# Patient Record
Sex: FEMALE | Race: WHITE | NOT HISPANIC OR LATINO | Employment: OTHER | URBAN - METROPOLITAN AREA
[De-identification: names, ages, dates, MRNs, and addresses within clinical notes are randomized per-mention and may not be internally consistent; named-entity substitution may affect disease eponyms.]

---

## 2017-01-21 RX ORDER — TRAMADOL HYDROCHLORIDE 50 MG/1
50 TABLET ORAL 2 TIMES DAILY PRN
Qty: 60 TABLET | Refills: 0 | Status: SHIPPED | OUTPATIENT
Start: 2017-01-21 | End: 2017-03-15 | Stop reason: SDUPTHER

## 2017-01-27 DIAGNOSIS — M25.512 LEFT SHOULDER PAIN: ICD-10-CM

## 2017-01-27 DIAGNOSIS — M65.20 CALCIFIC TENDONITIS: ICD-10-CM

## 2017-01-27 RX ORDER — MELOXICAM 15 MG/1
TABLET ORAL
Qty: 30 TABLET | Refills: 1 | Status: SHIPPED | OUTPATIENT
Start: 2017-01-27 | End: 2017-02-25 | Stop reason: SDUPTHER

## 2017-02-10 RX ORDER — SUMATRIPTAN SUCCINATE 100 MG/1
TABLET ORAL
Qty: 9 TABLET | Refills: 2 | Status: SHIPPED | OUTPATIENT
Start: 2017-02-10 | End: 2017-04-24 | Stop reason: SDUPTHER

## 2017-02-25 DIAGNOSIS — M65.20 CALCIFIC TENDONITIS: ICD-10-CM

## 2017-02-25 DIAGNOSIS — M25.512 LEFT SHOULDER PAIN: ICD-10-CM

## 2017-02-25 RX ORDER — ZOLPIDEM TARTRATE 10 MG/1
TABLET ORAL
Qty: 30 TABLET | Refills: 5 | Status: SHIPPED | OUTPATIENT
Start: 2017-02-25 | End: 2017-03-24 | Stop reason: SDUPTHER

## 2017-02-27 RX ORDER — MELOXICAM 15 MG/1
TABLET ORAL
Qty: 30 TABLET | Refills: 3 | Status: SHIPPED | OUTPATIENT
Start: 2017-02-27 | End: 2017-05-02

## 2017-03-17 RX ORDER — TRAMADOL HYDROCHLORIDE 50 MG/1
TABLET ORAL
Qty: 60 TABLET | Refills: 1 | Status: SHIPPED | OUTPATIENT
Start: 2017-03-17 | End: 2017-04-24 | Stop reason: SDUPTHER

## 2017-03-26 RX ORDER — ZOLPIDEM TARTRATE 10 MG/1
TABLET ORAL
Qty: 30 TABLET | Refills: 2 | Status: SHIPPED | OUTPATIENT
Start: 2017-03-26 | End: 2017-08-14 | Stop reason: SDUPTHER

## 2017-04-17 ENCOUNTER — PATIENT MESSAGE (OUTPATIENT)
Dept: FAMILY MEDICINE | Facility: CLINIC | Age: 67
End: 2017-04-17

## 2017-04-18 ENCOUNTER — OFFICE VISIT (OUTPATIENT)
Dept: SPORTS MEDICINE | Facility: CLINIC | Age: 67
End: 2017-04-18
Payer: COMMERCIAL

## 2017-04-18 VITALS — TEMPERATURE: 98 F | BODY MASS INDEX: 23.16 KG/M2 | WEIGHT: 118 LBS | HEIGHT: 60 IN

## 2017-04-18 DIAGNOSIS — M12.812 ROTATOR CUFF ARTHROPATHY, LEFT: Primary | ICD-10-CM

## 2017-04-18 PROCEDURE — 20610 DRAIN/INJ JOINT/BURSA W/O US: CPT | Mod: LT,S$GLB,, | Performed by: FAMILY MEDICINE

## 2017-04-18 PROCEDURE — 1160F RVW MEDS BY RX/DR IN RCRD: CPT | Mod: S$GLB,,, | Performed by: FAMILY MEDICINE

## 2017-04-18 PROCEDURE — 99999 PR PBB SHADOW E&M-EST. PATIENT-LVL III: CPT | Mod: PBBFAC,,, | Performed by: FAMILY MEDICINE

## 2017-04-18 PROCEDURE — 99214 OFFICE O/P EST MOD 30 MIN: CPT | Mod: 25,S$GLB,, | Performed by: FAMILY MEDICINE

## 2017-04-18 RX ORDER — TRIAMCINOLONE ACETONIDE 40 MG/ML
40 INJECTION, SUSPENSION INTRA-ARTICULAR; INTRAMUSCULAR
Status: DISCONTINUED | OUTPATIENT
Start: 2017-04-18 | End: 2017-04-18 | Stop reason: HOSPADM

## 2017-04-18 RX ADMIN — TRIAMCINOLONE ACETONIDE 40 MG: 40 INJECTION, SUSPENSION INTRA-ARTICULAR; INTRAMUSCULAR at 01:04

## 2017-04-18 NOTE — PROGRESS NOTES
Florentino Bowman, a 67 y.o. female, is here for evaluation of LEFT shoulder pain.     HISTORY OF PRESENT ILLNESS  Hand dominance, right      Location: anterior and lateral, left    Onset: Insidious  Palliative:    Relative rest   Oral analgesics   CSI, SAB, 15.12, 16.05, significant improvement, [place]    CSI, SAB, 12/05/16, 85% improvement   HgPT, modest compliance    Provocative:    ADLs   Prior: none  Progression: worsening discomfort since mid-March 2017  Quality:    Sharp pain at times   Ache  Radiation: none  Severity: per nursing documentation  Timing: intermittent with use  Trauma: none known    Review of systems (ROS):  A 10+ review of systems was performed with pertinent positives and negatives noted above in the history of present illness. Other systems were negative unless otherwise specified.      PHYSICAL EXAMINATION  General:  The patient is alert and oriented x 3. Mood is pleasant. Observation of ears, eyes and nose reveal no gross abnormalities. HEENT: NCAT, sclera anicteric. Lungs: Respirations are equal and unlabored.     LEFT SHOULDER EXAMINATION     OBSERVATION:     Swelling  none  Deformity  none   Discoloration  none   Scapular winging none   Scars   none  Atrophy  none    TENDERNESS / CREPITUS (T/C):          T/C      T/C   Clavicle   -/-  SUPRAspinatus    -/-     AC Jt.    -/-  INFRAspinatus  -/-    SC Jt.    -/-  Deltoid    -/-      G. Tuberosity  -/-  LH BICEP groove  -/-   Acromion:  -/-  Midline Neck   -/-     Scapular Spine -/-  Trapezium   -/-   SMA Scapula  -/-  GH jt. line - post  -/-     Scapulothoracic  -/-         ROM:     Right shoulder   Left shoulder        AROM (PROM)   AROM (PROM)   FE    170° (175°)     170° (175°)     ER at 0°    60°  (65°)    60°  (65°)   ER at 90° ABD  90°  (90°)    90°  (90°)   IR at 90°  ABD   NA  (40°)     NA  (40°)      IR (spine level)   T10     T10    STRENGTH: (* = with pain) RIGHT SHOULDER  LEFT  SHOULDER   SCAPTION   5/5    5/5    IR    5/5    5/5   ER    5/5    5/5   BICEPS   5/5    5/5   Deltoid    5/5    5/5     SIGNS:  Painful side       NEER   -   ONEENAS        -    CANDELARIO   +   SPEEDS        +   DROP ARM   -   BELLY PRESS       -    X-Body ADD    -   LIFT-OFF        +   HORNBLOWERS      -              STABILITY TESTING   RIGHT SHOULDER  LEFT SHOULDER     Translation     Anterior up face    up face    Posterior up face   up face    Sulcus  < 10mm   < 10 mm     Signs   Apprehension   neg     neg       Relocation   no change    no change      Jerk test  neg    neg    EXTREMITY NEURO-VASCULAR EXAM    Sensation grossly intact to light touch all dermatomal regions.    DTR 2+ Biceps, Triceps, BR and Negative Dannys sign   Grossly intact motor function at Elbow, Wrist and Hand   Distal pulses radial and ulnar 2+, brisk cap refill, symmetric.      NECK:  Painless FROM and spinous processes non-tender. Negative Spurlings sign.       Other Findings:    ASSESSMENT & PLAN   Assessment:   #1 supraspinatus tendinosis, left    W/ calcification     No evidence of neurologic pathology  No evidence of vascular pathology     Imaging studies reviewed:   X-ray shoulder, left 16.12  MRI shoulder s contrast, left 15.10     Plan:     We discussed options including:  #1 watchful waiting  #2 re start physical therapy aimed at:   GH RoM   Strengthening rotator cuff   Scapular stability  #3 injection therapy:   CSI SAB    Left, effective 85%, repeat    CSI iaGH    Left,    orthobiologics   #4 perc tenotomy      The patient chooses #2 and #3 csi sab     Pain management: handout given  Bracing:   Physical therapy:    hgPT, handout given, re start as above  Activity (e.g. sports, work) restrictions: as tolerated   school/vocation:      Follow up appointment offered, deferred by patient  A/e csi sab left  [facility], [facility] in June??  Should symptoms worsen or fail to resolve, consider:  Revisiting the above  options

## 2017-04-18 NOTE — PROCEDURES
Large Joint Aspiration/Injection  Date/Time: 4/18/2017 1:43 PM  Performed by: SANTIAGO AQUINO  Authorized by: SANTIAGO AQUINO     Consent Done?:  Yes (Verbal)  Indications:  Pain  Procedure site marked: Yes    Timeout: Prior to procedure the correct patient, procedure, and site was verified      Location:  Shoulder  Site:  L subacromial bursa  Prep: Patient was prepped and draped in usual sterile fashion    Ultrasonic Guidance for needle placement: No  Needle size:  22 G  Approach:  Posterior  Medications:  40 mg triamcinolone acetonide 40 mg/mL  Patient tolerance:  Patient tolerated the procedure well with no immediate complications

## 2017-04-18 NOTE — MR AVS SNAPSHOT
[facility]  [address]  [facility] [address]  Phone: [phone]                  Florentino Bowman   2017 1:00 PM   Appointment    Description:  Female : 1950   Provider:  Catalino Arora MD   Department:  [facility]                To Do List           Future Appointments        Provider Department Dept Phone    2017 1:00 PM Catalino Arora MD [facility] [phone]      Goals (5 Years of Data)     None      [facility]     [facility] Care [facility] -  Assistance  Unless otherwise directed by your provider, please contact [facility], our nurse care line that is available for  assistance.     Registered nurses in the [facility] provide: appointment scheduling, clinical advisement, health education, and other advisory services.  Call: [phone] (toll free)               Medications           Message regarding Medications     Verify the changes and/or additions to your medication regime listed below are the same as discussed with your clinician today.  If any of these changes or additions are incorrect, please notify your healthcare provider.             Verify that the below list of medications is an accurate representation of the medications you are currently taking.  If none reported, the list may be blank. If incorrect, please contact your healthcare provider. Carry this list with you in case of emergency.           Current Medications     alprazolam (XANAX) 0.5 MG tablet Take 1 tablet (0.5 mg total) by mouth nightly as needed.    aspirin (ECOTRIN) 81 MG EC tablet Take 1 tablet (81 mg total) by mouth once daily.    atorvastatin (LIPITOR) 20 MG tablet Take 1 tablet (20 mg total) by mouth once daily.    citalopram (CELEXA) 20 MG tablet Take 1 tablet (20 mg total) by mouth once daily.    estradiol (ESTRACE) 1 MG tablet Take 1 mg by mouth once daily.     levothyroxine (SYNTHROID) 125  MCG tablet Take 1 tablet (125 mcg total) by mouth once daily.    meloxicam (MOBIC) 15 MG tablet Take 1 tablet by mouth once daily.    progesterone (PROMETRIUM) 100 MG capsule Take 100 mg by mouth.     sumatriptan (IMITREX) 100 MG tablet TAKE 1 TABLET BY MOUTH EVERY 2 HOURS AS NEEDED FOR MIGRAINE    sumatriptan (IMITREX) 100 MG tablet TAKE 1 TABLET BY MOUTH EVERY 2 HOURS AS NEEDED FOR MIGRAINE    tramadol (ULTRAM) 50 mg tablet TAKE 1 TABLET BY MOUTH TWICE A DAY AS NEEDED FOR PAIN    vitamin D 1000 units Tab Take 185 mg by mouth once daily.    zolpidem (AMBIEN) 10 mg Tab TAKE 1 TABLET BY MOUTH NIGHTLY AS NEEDED           Clinical Reference Information           Allergies as of 4/18/2017     No Known Allergies      Immunizations Administered on Date of Encounter - 4/18/2017     None      Language Assistance Services     ATTENTION: Language assistance services are available, free of charge. Please call [phone].      ATENCIÓN: Si habla anil, tiene a calle disposición servicios gratuitos de asistencia lingüística. Llame al [phone].     CHÚ Ý: N?u b?n nói Ti?ng Vi?t, có các d?ch v? h? tr? ngôn ng? mi?n phí jonelleh cho b?n. G?i s? [phone].         [facility] complies with applicable Federal civil rights laws and does not discriminate on the basis of race, color, national origin, age, disability, or sex.

## 2017-04-21 ENCOUNTER — CLINICAL SUPPORT (OUTPATIENT)
Dept: FAMILY MEDICINE | Facility: CLINIC | Age: 67
End: 2017-04-21
Payer: COMMERCIAL

## 2017-04-21 DIAGNOSIS — Z23 NEED FOR MMR VACCINE: Primary | ICD-10-CM

## 2017-04-21 PROCEDURE — 90471 IMMUNIZATION ADMIN: CPT | Mod: S$GLB,,, | Performed by: FAMILY MEDICINE

## 2017-04-21 PROCEDURE — 90707 MMR VACCINE SC: CPT | Mod: S$GLB,,, | Performed by: FAMILY MEDICINE

## 2017-04-21 NOTE — PROGRESS NOTES
Pt here for mmr vaccine due to traveling out of the country.   Pt states dr Chou recommended they receive this vaccine prior to traveling

## 2017-04-24 RX ORDER — SUMATRIPTAN SUCCINATE 100 MG/1
TABLET ORAL
Qty: 9 TABLET | Refills: 2 | Status: SHIPPED | OUTPATIENT
Start: 2017-04-24 | End: 2017-05-02 | Stop reason: SDUPTHER

## 2017-04-25 RX ORDER — TRAMADOL HYDROCHLORIDE 50 MG/1
TABLET ORAL
Qty: 60 TABLET | Refills: 2 | Status: SHIPPED | OUTPATIENT
Start: 2017-04-25 | End: 2017-05-02

## 2017-05-02 ENCOUNTER — TELEPHONE (OUTPATIENT)
Dept: FAMILY MEDICINE | Facility: CLINIC | Age: 67
End: 2017-05-02

## 2017-05-02 ENCOUNTER — OFFICE VISIT (OUTPATIENT)
Dept: FAMILY MEDICINE | Facility: CLINIC | Age: 67
End: 2017-05-02
Payer: COMMERCIAL

## 2017-05-02 VITALS
RESPIRATION RATE: 18 BRPM | DIASTOLIC BLOOD PRESSURE: 72 MMHG | TEMPERATURE: 97 F | SYSTOLIC BLOOD PRESSURE: 112 MMHG | HEIGHT: 60 IN | HEART RATE: 90 BPM | BODY MASS INDEX: 23.29 KG/M2 | OXYGEN SATURATION: 97 % | WEIGHT: 118.63 LBS

## 2017-05-02 DIAGNOSIS — M54.31 SCIATICA OF RIGHT SIDE: Primary | ICD-10-CM

## 2017-05-02 PROCEDURE — 99213 OFFICE O/P EST LOW 20 MIN: CPT | Mod: S$GLB,,, | Performed by: FAMILY MEDICINE

## 2017-05-02 PROCEDURE — 1160F RVW MEDS BY RX/DR IN RCRD: CPT | Mod: S$GLB,,, | Performed by: FAMILY MEDICINE

## 2017-05-02 RX ORDER — HYDROCODONE BITARTRATE AND ACETAMINOPHEN 5; 325 MG/1; MG/1
1 TABLET ORAL 3 TIMES DAILY PRN
Qty: 30 TABLET | Refills: 0 | Status: SHIPPED | OUTPATIENT
Start: 2017-05-02 | End: 2017-05-24 | Stop reason: SDUPTHER

## 2017-05-02 RX ORDER — GABAPENTIN 300 MG/1
300 CAPSULE ORAL 2 TIMES DAILY
Qty: 60 CAPSULE | Refills: 5 | Status: SHIPPED | OUTPATIENT
Start: 2017-05-02 | End: 2017-08-14 | Stop reason: SDUPTHER

## 2017-05-02 RX ORDER — METHYLPREDNISOLONE 4 MG/1
TABLET ORAL
Qty: 1 PACKAGE | Refills: 0 | Status: SHIPPED | OUTPATIENT
Start: 2017-05-02 | End: 2017-05-26 | Stop reason: ALTCHOICE

## 2017-05-02 NOTE — PROGRESS NOTES
Subjective:      Patient ID: Florentino Bowman is a 67 y.o. female.    Chief Complaint: Sciatica (right side)    HPI Comments: Excruciating pain top right buttock, numb right medial thiah and pain lateral leg; began Thursday twisting progressed since then; saw chiro yest and today and xrayed L4-5 on right no space; going to [place] Sunday; sitting aggravates it; lying flat best; right leg is heavy; no falls or trips; voids ok;     Review of Systems   Constitutional: Negative.    HENT: Negative.    Respiratory: Negative.    Cardiovascular: Negative.    Gastrointestinal: Negative.    Endocrine: Negative.    Genitourinary: Negative.    Musculoskeletal: Positive for back pain and gait problem.   Neurological: Positive for numbness.   Psychiatric/Behavioral: Negative.    All other systems reviewed and are negative.    Objective:     Physical Exam   Constitutional: She is oriented to person, place, and time. She appears well-developed and well-nourished.   HENT:   Head: Normocephalic.   Eyes: Conjunctivae and EOM are normal. Pupils are equal, round, and reactive to light.   Neck: Normal range of motion. Neck supple.   Cardiovascular: Normal rate, regular rhythm and normal heart sounds.    Pulmonary/Chest: Effort normal and breath sounds normal.   Musculoskeletal: She exhibits no tenderness or deformity.   Neurological: She is alert and oriented to person, place, and time. She has normal reflexes. She displays normal reflexes. No cranial nerve deficit. She exhibits normal muscle tone. Coordination normal.   Skin: Skin is warm and dry.   Psychiatric: She has a normal mood and affect. Her behavior is normal. Judgment and thought content normal.   Nursing note and vitals reviewed.    Assessment:     1. Sciatica of right side      Plan:     New Prescriptions    GABAPENTIN (NEURONTIN) 300 MG CAPSULE    Take 1 capsule (300 mg total) by mouth 2 (two) times daily.    HYDROCODONE-ACETAMINOPHEN 5-325MG (NORCO) 5-325 MG PER TABLET     Take 1 tablet by mouth 3 (three) times daily as needed for Pain.    METHYLPREDNISOLONE (MEDROL DOSEPACK) 4 MG TABLET    use as directed     Discontinued Medications    ASPIRIN (ECOTRIN) 81 MG EC TABLET    Take 1 tablet (81 mg total) by mouth once daily.    GABAPENTIN (NEURONTIN) 300 MG CAPSULE    Take 1 capsule (300 mg total) by mouth every evening.    GABAPENTIN (NEURONTIN) 300 MG CAPSULE    Take 1 capsule (300 mg total) by mouth 3 (three) times daily. START TAKING ONE CAPS IN EVENING, AND GRADUALLY INCREASE BY ADDING ONE CAPS EVERY 3 DAYS, IF TOLERATED.    MELOXICAM (MOBIC) 15 MG TABLET    Take 1 tablet by mouth once daily.    SUMATRIPTAN (IMITREX) 100 MG TABLET    TAKE 1 TABLET BY MOUTH EVERY 2 HOURS AS NEEDED FOR MIGRAINE    TRAMADOL (ULTRAM) 50 MG TABLET    TAKE 1 TABLET BY MOUTH TWICE A DAY AS NEEDED FOR PAIN    VITAMIN D 1000 UNITS TAB    Take 185 mg by mouth once daily.     Modified Medications    No medications on file       Sciatica of right side  -     MRI Lumbar Spine Without Contrast; Future; Expected date: 5/2/17  -     Ambulatory referral to Pain Clinic    Other orders  -     methylPREDNISolone (MEDROL DOSEPACK) 4 mg tablet; use as directed  Dispense: 1 Package; Refill: 0  -     gabapentin (NEURONTIN) 300 MG capsule; Take 1 capsule (300 mg total) by mouth 2 (two) times daily.  Dispense: 60 capsule; Refill: 5  -     hydrocodone-acetaminophen 5-325mg (NORCO) 5-325 mg per tablet; Take 1 tablet by mouth 3 (three) times daily as needed for Pain.  Dispense: 30 tablet; Refill: 0

## 2017-05-02 NOTE — TELEPHONE ENCOUNTER
----- Message from Lucero Bejarano sent at 5/2/2017  9:51 AM CDT -----  Contact: Will pt's / [phone]  Patient experiencing lower back pain. Patient would like to see Dr. Chou today to see if she can get a MRI scheduled and a Epidural shot. Please advise

## 2017-05-02 NOTE — MR AVS SNAPSHOT
[address]  [place] [address]  Phone: [phone]  Fax: [phone]                  Florentino Bowman   2017 1:20 PM   Office Visit    Description:  Female : 1950   Provider:  Fam Chou MD   Department:  [facility]           Reason for Visit     Sciatica           Diagnoses this Visit        Comments    Sciatica of right side    -  Primary            To Do List           Goals (5 Years of Data)     None      Follow-Up and Disposition     Return if symptoms worsen or fail to improve.       These Medications        Disp Refills Start End    methylPREDNISolone (MEDROL DOSEPACK) 4 mg tablet 1 Package 0 2017     use as directed    Pharmacy: [facility] - [place], [place] - [address] Ph #: [phone]       gabapentin (NEURONTIN) 300 MG capsule 60 capsule 5 2017    Take 1 capsule (300 mg total) by mouth 2 (two) times daily. - Oral    Pharmacy: [facility] - [place], [place] - [address] Ph #: [phone]       hydrocodone-acetaminophen 5-325mg (NORCO) 5-325 mg per tablet 30 tablet 0 2017     Take 1 tablet by mouth 3 (three) times daily as needed for Pain. - Oral    Pharmacy: [facility] - [place], [place] - [address] Ph #: [phone]         [facility]     [facility] Care [facility] -  Assistance  Unless otherwise directed by your provider, please contact [facility], our nurse care line that is available for  assistance.     Registered nurses in the [facility] provide: appointment scheduling, clinical advisement, health education, and other advisory services.  Call: [phone] (toll free)               Medications           Message regarding Medications     Verify the changes and/or additions to your medication regime listed below are the same as discussed with your clinician today.  If any of these  changes or additions are incorrect, please notify your healthcare provider.        START taking these NEW medications        Refills    methylPREDNISolone (MEDROL DOSEPACK) 4 mg tablet 0    Sig: use as directed    Class: Normal    gabapentin (NEURONTIN) 300 MG capsule 5    Sig: Take 1 capsule (300 mg total) by mouth 2 (two) times daily.    Class: Normal    Route: Oral    hydrocodone-acetaminophen 5-325mg (NORCO) 5-325 mg per tablet 0    Sig: Take 1 tablet by mouth 3 (three) times daily as needed for Pain.    Class: Normal    Route: Oral      STOP taking these medications     aspirin (ECOTRIN) 81 MG EC tablet Take 1 tablet (81 mg total) by mouth once daily.    vitamin D 1000 units Tab Take 185 mg by mouth once daily.    meloxicam (MOBIC) 15 MG tablet Take 1 tablet by mouth once daily.    tramadol (ULTRAM) 50 mg tablet TAKE 1 TABLET BY MOUTH TWICE A DAY AS NEEDED FOR PAIN           Verify that the below list of medications is an accurate representation of the medications you are currently taking.  If none reported, the list may be blank. If incorrect, please contact your healthcare provider. Carry this list with you in case of emergency.           Current Medications     alprazolam (XANAX) 0.5 MG tablet Take 1 tablet (0.5 mg total) by mouth nightly as needed.    atorvastatin (LIPITOR) 20 MG tablet Take 1 tablet (20 mg total) by mouth once daily.    citalopram (CELEXA) 20 MG tablet Take 1 tablet (20 mg total) by mouth once daily.    estradiol (ESTRACE) 1 MG tablet Take 1 mg by mouth once daily.     gabapentin (NEURONTIN) 300 MG capsule Take 1 capsule (300 mg total) by mouth 2 (two) times daily.    hydrocodone-acetaminophen 5-325mg (NORCO) 5-325 mg per tablet Take 1 tablet by mouth 3 (three) times daily as needed for Pain.    levothyroxine (SYNTHROID) 125 MCG tablet Take 1 tablet (125 mcg total) by mouth once daily.    methylPREDNISolone (MEDROL DOSEPACK) 4 mg tablet use as directed    progesterone (PROMETRIUM) 100 MG  capsule Take 100 mg by mouth.     sumatriptan (IMITREX) 100 MG tablet TAKE 1 TABLET BY MOUTH EVERY 2 HOURS AS NEEDED FOR MIGRAINE    zolpidem (AMBIEN) 10 mg Tab TAKE 1 TABLET BY MOUTH NIGHTLY AS NEEDED           Clinical Reference Information           Your Vitals Were     BP Pulse Temp Resp Height Weight    112/72 (BP Location: Left arm, Patient Position: Sitting, BP Method: Manual) 90 97 °F (36.1 °C) (Oral) 18 5' (1.524 m) 53.8 kg (118 lb 9.7 oz)    SpO2 BMI             97% 23.16 kg/m2         Blood Pressure          Most Recent Value    BP  112/72      Allergies as of 5/2/2017     No Known Allergies      Immunizations Administered on Date of Encounter - 5/2/2017     None      Orders Placed During Today's Visit      Normal Orders This Visit    Ambulatory referral to Pain Clinic     Future Labs/Procedures Expected by Expires    MRI Lumbar Spine Without Contrast  5/2/2017 5/3/2018      Language Assistance Services     ATTENTION: Language assistance services are available, free of charge. Please call [phone].      ATENCIÓN: Si suzyla anil, tiene a calle disposición servicios gratuitos de asistencia lingüística. Llame al [phone].     DANE Ý: N?u b?n nói Ti?ng Vi?t, có các d?ch v? h? tr? ngôn ng? mi?n phí dành cho b?n. G?i s? [phone].         [facility] complies with applicable Federal civil rights laws and does not discriminate on the basis of race, color, national origin, age, disability, or sex.

## 2017-05-03 ENCOUNTER — HOSPITAL ENCOUNTER (OUTPATIENT)
Dept: RADIOLOGY | Facility: HOSPITAL | Age: 67
Discharge: HOME | End: 2017-05-03
Attending: FAMILY MEDICINE
Payer: MEDICARE

## 2017-05-03 ENCOUNTER — TELEPHONE (OUTPATIENT)
Dept: FAMILY MEDICINE | Facility: CLINIC | Age: 67
End: 2017-05-03

## 2017-05-03 DIAGNOSIS — M54.31 SCIATICA OF RIGHT SIDE: ICD-10-CM

## 2017-05-03 PROCEDURE — 72148 MRI LUMBAR SPINE W/O DYE: CPT | Mod: TC,PO

## 2017-05-03 NOTE — TELEPHONE ENCOUNTER
----- Message from Lucero Bejarano sent at 5/3/2017 10:26 AM CDT -----  Contact: VIVEK DOYLE: Please fax MRI results to Aramis Vital MD when they're available in [facility]. Patient trying to get in to see Dr. Vital this week for a Epidural injection.

## 2017-05-06 ENCOUNTER — TELEPHONE (OUTPATIENT)
Dept: FAMILY MEDICINE | Facility: CLINIC | Age: 67
End: 2017-05-06

## 2017-05-06 DIAGNOSIS — R49.0 CHRONIC HOARSENESS: Primary | ICD-10-CM

## 2017-05-06 NOTE — TELEPHONE ENCOUNTER
Pt called by me to notify her of MRI; saw Dr Vital; complains of hoarsness for one year; referral to ENT Dr Hoffman placed; non smoker; clears throat a lot

## 2017-05-08 ENCOUNTER — TELEPHONE (OUTPATIENT)
Dept: FAMILY MEDICINE | Facility: CLINIC | Age: 67
End: 2017-05-08

## 2017-05-08 NOTE — TELEPHONE ENCOUNTER
----- Message from Fam Chou MD sent at 5/6/2017 10:08 AM CDT -----  Left message; ddd, arhtirits, mod sp stenosis and pinched nerves

## 2017-05-22 DIAGNOSIS — M54.10 RADICULOPATHY WITH LOWER EXTREMITY SYMPTOMS: ICD-10-CM

## 2017-05-22 DIAGNOSIS — E03.4 HYPOTHYROIDISM DUE TO ACQUIRED ATROPHY OF THYROID: ICD-10-CM

## 2017-05-22 DIAGNOSIS — M54.31 SCIATICA OF RIGHT SIDE: Primary | ICD-10-CM

## 2017-05-22 RX ORDER — CITALOPRAM 20 MG/1
TABLET, FILM COATED ORAL
Qty: 30 TABLET | Refills: 11 | Status: SHIPPED | OUTPATIENT
Start: 2017-05-22 | End: 2017-08-14 | Stop reason: SDUPTHER

## 2017-05-22 RX ORDER — LEVOTHYROXINE SODIUM 125 UG/1
TABLET ORAL
Qty: 90 TABLET | Refills: 3 | Status: SHIPPED | OUTPATIENT
Start: 2017-05-22 | End: 2017-08-14 | Stop reason: SDUPTHER

## 2017-05-23 NOTE — PROGRESS NOTES
Chronic Pain - New Consult    Referring Physician: Fam Chou MD    Chief Complaint:   Chief Complaint   Patient presents with    Hip Pain    Leg Pain        SUBJECTIVE:    Florentino Bowman is a 68 y/o female with hx of chronic low back pain who presents to the clinic for the evaluation of right hip pain. The pain started approximately one month ago and symptoms have been worsening, although improved today. She first noticed the pain after lifting her grandchild one month ago. The pain is located in the right lumbar paraspinal and buttock area and radiates into the anterior and lateral aspect of the right thigh to the knee. Initially, the pain was radiating below the knee down into the lateral calf. The pain is described as burning, numbing and stabbing and is rated as 2/10. The pain is rated with a score of  2/10 on the BEST day and a score of 8/10 on the WORST day.  Symptoms interfere with daily activity and sleeping. The pain is exacerbated by standing, bending, walking, activity, twisting, going from sit to stand standing for a long time.  The pain is mitigated by heat, ice and laying down, massage, and TENS unit. She has tried chiropractic care, Medrol dose pack, and lumbar facet injection with no improvement. The patient reports 4 hours of uninterrupted sleep per night.    Patient denies night fever/night sweats, urinary incontinence, bowel incontinence, significant weight loss, and significant motor weakness. She reports numbness in the right medial thigh area.    Physical Therapy/Home Exercise: yes, exercising at home    Pain Disability Index Review:  Last 3 PDI Scores 5/24/2017 5/24/2017   Pain Disability Index (PDI) 27 42       Pain Medications:  Citalopram 20 mg, 1 tablet daily  Gabapentin 300mg, 1 tablet BID  Alprazolam 0.5mg, 1/2 tablet every couple of weeks  Ambien 10mg QHS       report:  Reviewed     Pain Procedures:   Lumbar Facet Injection (5/5/17) - no relief    Imaging:      MRI lumbar  spine without contrast   17     History:   low back pain., M54.31 Sciatica, right side.    Standard multiplanar noncontrast MRI sequences of the lumbar spine.    The conus is located at the L1 level.  No abnormal signal in distal cord.    L1-2: Minimal disc bulge.    L2-3: Minimal disc bulge.    L3-4: Minimal disc bulge.  Mild degenerative facets with mild facet hypertrophy.    L4-5: Disc space narrowing.  Circumferential disc bulge.  Mild-to-moderate central spinal stenosis.  Bilateral bony neural foraminal narrowing with possible L4 nerve impingement.  Facet hypertrophy.    L5-S1:  Desiccation of the disc with broad-based disc bulge.  Narrowing of the lateral recesses.  Bilateral bony neural foraminal narrowing without evidence of definite L5 nerve dimensions.    Impression:    Mild broad-based disc bulge at the L2-L3 level.  Circumferential disc bulge at the L3-L4 level with degenerative changes of the vertebral endplates, bilateral bony neural foramina narrowing but no definite nerve impingement.  Disc space narrowing with degenerative changes of the vertebral endplates at the L4-L5 level there is circumferential disc bulge and moderate central spinal stenosis.  Bilateral bony neural foraminal narrowing at the L4-L5 level with bilateral degenerative changes of the facets and possible bilateral L4 nerve impingement.  Desiccation of the disc with broad based disc bulge paramedian greater to the right side at the L5-S1 level with possible impingement on the right S1 nerve root.  Bilateral bony neural foraminal narrowing L5-S1 with possible but not definite L5 nerve root impingement    Past Medical History:   Diagnosis Date    Headache     Hypothyroid      Past Surgical History:   Procedure Laterality Date     SECTION      x2    LAPAROSCOPIC HYSTERECTOMY  2010    supracervical/BSO    TONSILLECTOMY       Social History     Social History    Marital status:      Spouse name: N/A    Number  of children: N/A    Years of education: N/A     Occupational History    retired       Social History Main Topics    Smoking status: Never Smoker    Smokeless tobacco: Never Used    Alcohol use Yes      Comment: social    Drug use: No    Sexual activity: Yes     Partners: Male     Other Topics Concern    Not on file     Social History Narrative    , 2 adult children and exercises regularly-rides bike [place] the [email or web address],     Family History   Problem Relation Age of Onset    Dementia Mother     Hypertension Mother     Heart disease Mother     Cancer Father      colon    Cancer Sister      breast    Heart disease Sister     No Known Problems Daughter     No Known Problems Son     No Known Problems Sister        Review of patient's allergies indicates:  No Known Allergies    Current Outpatient Prescriptions   Medication Sig    alprazolam (XANAX) 0.5 MG tablet Take 1 tablet (0.5 mg total) by mouth nightly as needed.    atorvastatin (LIPITOR) 20 MG tablet Take 1 tablet (20 mg total) by mouth once daily.    citalopram (CELEXA) 20 MG tablet Take 1 tablet by mouth once daily.    estradiol (ESTRACE) 1 MG tablet Take 1 mg by mouth once daily.     gabapentin (NEURONTIN) 300 MG capsule Take 1 capsule (300 mg total) by mouth 2 (two) times daily.    hydrocodone-acetaminophen 7.5-325mg (NORCO) 7.5-325 mg per tablet Take 1 tablet by mouth daily as needed.    levothyroxine (SYNTHROID) 125 MCG tablet Take 1 tablet by mouth once daily.    meloxicam (MOBIC) 15 MG tablet Take 1 tablet by mouth daily as needed.    progesterone (PROMETRIUM) 100 MG capsule Take 100 mg by mouth.     sumatriptan (IMITREX) 100 MG tablet TAKE 1 TABLET BY MOUTH EVERY 2 HOURS AS NEEDED FOR MIGRAINE    zolpidem (AMBIEN) 10 mg Tab TAKE 1 TABLET BY MOUTH NIGHTLY AS NEEDED    methylPREDNISolone (MEDROL DOSEPACK) 4 mg tablet use as directed     No current facility-administered medications for this visit.        REVIEW OF  SYSTEMS:  GENERAL: No weight loss, malaise or fevers.  HEENT: Negative for frequent or significant headaches.  NECK: Negative for lumps or significant neck swelling.  RESPIRATORY: Negative for wheezing or shortness of breath.  CARDIOVASCULAR: Negative for chest pain or palpitations.  GI: No blood in stools or black stools or change in bowel habits.  : Negative for urinary tract infections or incontinence.  MUSCULOSKELETAL: See HPI  SKIN: Negative for lesions, rash, and itching.  PSYCH: + sleep disturbance   HEMATOLOGY/LYMPHOLOGY Negative for prolonged bleeding, bruising easily or swollen nodes.  NEURO:  No history of syncope, seizures or tremors.    OBJECTIVE:    /64 (BP Location: Left arm, Patient Position: Sitting, BP Method: Automatic)   Pulse 70   Resp 20   Ht 5' (1.524 m)   Wt 53.3 kg (117 lb 6.4 oz)   BMI 22.93 kg/m²     PHYSICAL EXAMINATION:  GENERAL: Well appearing, in no acute distress.  PSYCH:  Mood and affect is appropriate.  Awake, alert, and oriented x 3.  SKIN: Skin color, texture, turgor normal, no rashes or lesions  HEENT: Normocephalic, atraumatic.  EOM intact.  CV: Radial pulses are 2+.  RESP:  Respirations are unlabored.  GI: Abdomen soft and non-tender.  MSK:  No atrophy or tone abnormalities are noted.      Neck: No obvious deformity or signs of trauma.  Normal cervical spine range of motion.    Back: Straight leg raising in the sitting and supine positions is positive for low back pain. Tenderness to palpation over the lumbar paraspinous muscles and facets on the right.  Negative for pain with facet loading and back extension/rotation. Normal range of motion without pain reproduction.    Buttocks:  No pain to palpation over the PSIS. - ROSA    Extremities:  Peripheral joint ROM is full and pain free without obvious instability or laxity in all four extremities. No edema or skin discolorations noted.     Gait:  Gait is normal.    NEUR:  Bilateral lower extremity coordination and  muscle stretch reflexes are physiologic and symmetric. Strength testing is 5/5 throughout all muscle groups in the upper and lower extremities. Sensation diminished to light touch along right medial thigh.    ASSESSMENT: 67 y.o. female with right-sided low back and leg pain. Lumbar MRI shows: Disc space narrowing with degenerative changes of the vertebral endplates at the L4-L5 level there is circumferential disc bulge and moderate central spinal stenosis.  Bilateral bony neural foraminal narrowing at the L4-L5 level with bilateral degenerative changes of the facets and possible bilateral L4 nerve impingement.  Desiccation of the disc with broad based disc bulge paramedian greater to the right side at the L5-S1 level with possible impingement on the right S1 nerve root.  Bilateral bony neural foraminal narrowing L5-S1 with possible but not definite L5 nerve root impingement    1. Osteoarthritis of spine with radiculopathy, lumbar region    2. DDD (degenerative disc disease), lumbar        PLAN:     - I have stressed the importance of physical activity and a home exercise plan to help with pain and improve health.  - Schedule for a Right Transforaminal epidural steroid injection at L4 and L5 to help with pain and progress with a home exercise plan.  - RTC after procedure.  - Counseled patient regarding the importance of activity modification and physical therapy.    The above plan and management options were discussed at length with patient. Patient is in agreement with the above and verbalized understanding. It will be communicated with the referring physician via electronic record, fax, or mail.    Sixto Moya III  05/24/2017

## 2017-05-23 NOTE — TELEPHONE ENCOUNTER
----- Message from Lcuero Bejarano sent at 5/23/2017  2:50 PM CDT -----  Contact: Will pt's  [phone]  FYI:  states patient has back problems. She did see Dr. Vital but his treatment only worked for one day. Patient would like a referral to see someone else because she wasn't pleased with the injection given. Patient has been scheduled to see Dr. Sixto Moya III, MD in [place] 05/24/2017. Please put in referral so I can link to appointment.

## 2017-05-24 ENCOUNTER — OFFICE VISIT (OUTPATIENT)
Dept: PAIN MEDICINE | Facility: CLINIC | Age: 67
End: 2017-05-24
Payer: COMMERCIAL

## 2017-05-24 VITALS
HEART RATE: 70 BPM | BODY MASS INDEX: 23.04 KG/M2 | WEIGHT: 117.38 LBS | HEIGHT: 60 IN | DIASTOLIC BLOOD PRESSURE: 64 MMHG | SYSTOLIC BLOOD PRESSURE: 111 MMHG | RESPIRATION RATE: 20 BRPM

## 2017-05-24 DIAGNOSIS — M51.36 DDD (DEGENERATIVE DISC DISEASE), LUMBAR: ICD-10-CM

## 2017-05-24 DIAGNOSIS — M47.26 OSTEOARTHRITIS OF SPINE WITH RADICULOPATHY, LUMBAR REGION: Primary | ICD-10-CM

## 2017-05-24 PROCEDURE — 99204 OFFICE O/P NEW MOD 45 MIN: CPT | Mod: S$GLB,,, | Performed by: ANESTHESIOLOGY

## 2017-05-24 PROCEDURE — 1160F RVW MEDS BY RX/DR IN RCRD: CPT | Mod: S$GLB,,, | Performed by: ANESTHESIOLOGY

## 2017-05-24 RX ORDER — HYDROCODONE BITARTRATE AND ACETAMINOPHEN 7.5; 325 MG/1; MG/1
1 TABLET ORAL DAILY PRN
COMMUNITY
Start: 2017-05-23 | End: 2017-08-14 | Stop reason: SDUPTHER

## 2017-05-24 RX ORDER — MELOXICAM 15 MG/1
1 TABLET ORAL DAILY PRN
COMMUNITY
Start: 2017-05-22 | End: 2017-08-14 | Stop reason: SDUPTHER

## 2017-05-24 RX ORDER — SODIUM CHLORIDE 9 MG/ML
500 INJECTION, SOLUTION INTRAVENOUS CONTINUOUS
Status: CANCELLED | OUTPATIENT
Start: 2017-05-24

## 2017-05-24 NOTE — LETTER
May 24, 2017      Fam Chou MD  [address] [place] [address] - Pain Management  [address] [place] [address]  Phone: [phone]  Fax: [phone]          Patient: Florentino Bowman   MR Number: [id number]   YOB: 1950   Date of Visit: 5/24/2017       Dear Dr. Fam Chou:    Thank you for referring Florentino Bowman to me for evaluation. Attached you will find relevant portions of my assessment and plan of care.    If you have questions, please do not hesitate to call me. I look forward to following Florentino Bowman along with you.    Sincerely,    Sixto Moya III, MD    Enclosure  CC:  No Recipients    If you would like to receive this communication electronically, please contact [email or web address] or [phone] to request more information on [employer] Link access.    For providers and/or their staff who would like to refer a patient to [facility], please contact us through our one-stop-shop provider referral line, [facility], at [phone].    If you feel you have received this communication in error or would no longer like to receive these types of communications, please e-mail [email or web address]

## 2017-05-26 RX ORDER — SODIUM CHLORIDE 9 MG/ML
500 INJECTION, SOLUTION INTRAVENOUS CONTINUOUS
Status: CANCELLED | OUTPATIENT
Start: 2017-05-26

## 2017-05-30 PROBLEM — M47.26 OSTEOARTHRITIS OF SPINE WITH RADICULOPATHY, LUMBAR REGION: Status: ACTIVE | Noted: 2017-05-30

## 2017-05-30 PROBLEM — M51.369 DDD (DEGENERATIVE DISC DISEASE), LUMBAR: Status: ACTIVE | Noted: 2017-05-30

## 2017-05-30 PROBLEM — M51.36 DDD (DEGENERATIVE DISC DISEASE), LUMBAR: Status: ACTIVE | Noted: 2017-05-30

## 2017-06-12 ENCOUNTER — TELEPHONE (OUTPATIENT)
Dept: FAMILY MEDICINE | Facility: CLINIC | Age: 67
End: 2017-06-12

## 2017-06-12 RX ORDER — PROMETHAZINE HYDROCHLORIDE AND CODEINE PHOSPHATE 6.25; 1 MG/5ML; MG/5ML
5 SOLUTION ORAL EVERY 4 HOURS PRN
Qty: 240 ML | Refills: 1 | Status: SHIPPED | OUTPATIENT
Start: 2017-06-12 | End: 2017-11-15 | Stop reason: SDUPTHER

## 2017-06-12 NOTE — TELEPHONE ENCOUNTER
----- Message from Sarah Ca sent at 6/12/2017  3:17 PM CDT -----  Contact: -Will [phone]  Patient leaving for trip Wednesday. Has had a cough that they can't seem to shake.  says in the past, patient was prescribed Promethezine. Please send Rx to [facility]

## 2017-06-13 DIAGNOSIS — I77.9 BILATERAL CAROTID ARTERY DISEASE: ICD-10-CM

## 2017-06-13 RX ORDER — SUMATRIPTAN SUCCINATE 100 MG/1
TABLET ORAL
Qty: 9 TABLET | Refills: 4 | Status: SHIPPED | OUTPATIENT
Start: 2017-06-13 | End: 2017-08-14 | Stop reason: SDUPTHER

## 2017-06-13 RX ORDER — ATORVASTATIN CALCIUM 20 MG/1
TABLET, FILM COATED ORAL
Qty: 90 TABLET | Refills: 3 | Status: SHIPPED | OUTPATIENT
Start: 2017-06-13 | End: 2017-08-14 | Stop reason: SDUPTHER

## 2017-06-14 DIAGNOSIS — M25.512 LEFT SHOULDER PAIN: ICD-10-CM

## 2017-06-14 DIAGNOSIS — M65.20 CALCIFIC TENDONITIS: ICD-10-CM

## 2017-06-14 RX ORDER — MELOXICAM 15 MG/1
TABLET ORAL
Qty: 30 TABLET | Refills: 2 | Status: SHIPPED | OUTPATIENT
Start: 2017-06-14 | End: 2017-08-14 | Stop reason: SDUPTHER

## 2017-06-29 ENCOUNTER — PATIENT MESSAGE (OUTPATIENT)
Dept: FAMILY MEDICINE | Facility: CLINIC | Age: 67
End: 2017-06-29

## 2017-07-05 ENCOUNTER — OFFICE VISIT (OUTPATIENT)
Dept: PAIN MEDICINE | Facility: CLINIC | Age: 67
End: 2017-07-05
Payer: MEDICARE

## 2017-07-05 VITALS
DIASTOLIC BLOOD PRESSURE: 62 MMHG | BODY MASS INDEX: 23.36 KG/M2 | RESPIRATION RATE: 20 BRPM | SYSTOLIC BLOOD PRESSURE: 113 MMHG | HEIGHT: 60 IN | HEART RATE: 74 BPM | WEIGHT: 119 LBS

## 2017-07-05 DIAGNOSIS — M51.36 DDD (DEGENERATIVE DISC DISEASE), LUMBAR: ICD-10-CM

## 2017-07-05 DIAGNOSIS — M48.061 LUMBAR STENOSIS: ICD-10-CM

## 2017-07-05 DIAGNOSIS — M47.26 OSTEOARTHRITIS OF SPINE WITH RADICULOPATHY, LUMBAR REGION: Primary | ICD-10-CM

## 2017-07-05 PROCEDURE — 99214 OFFICE O/P EST MOD 30 MIN: CPT | Mod: S$GLB,,, | Performed by: ANESTHESIOLOGY

## 2017-07-05 RX ORDER — SUMATRIPTAN SUCCINATE 100 MG/1
TABLET ORAL
Qty: 9 TABLET | Refills: 5 | Status: SHIPPED | OUTPATIENT
Start: 2017-07-05 | End: 2017-08-14 | Stop reason: SDUPTHER

## 2017-07-05 NOTE — PROGRESS NOTES
Chronic patient Established Note (Follow up visit)      SUBJECTIVE:    Florentino Bowman presents to the clinic for a follow-up appointment for low back and right leg pain. Since the last visit, Florentino Bowman states the pain has been improving. Current pain intensity is 1/10. The pain is located along the right lower lumbar paraspinal area and radiates into the anterior aspect of the right thigh.  She reports 75% pain relief after right L4 and L5 TESI which continues. She notes significant improvement in her overall pain although still notes functional limitations with exercising and housework.     Pain Disability Index Review:  Last 3 PDI Scores 7/5/2017 5/24/2017 5/24/2017   Pain Disability Index (PDI) 14 27 42       Pain Medications:  Citalopram 20 mg, 1 tablet daily  Gabapentin 300mg, 1 tablet BID  Alprazolam 0.5mg, 1/2 tablet every couple of weeks  Ambien 10mg QHS         report:  Reviewed      Pain Procedures:  Right MICHAEL-TRANSFORAMINAL Right L4 and L5 (05/30/2017)  Lumbar Facet Injection (5/5/17) - no relief     Imaging:        MRI lumbar spine without contrast   05/03/17:    History:   low back pain., M54.31 Sciatica, right side.    Standard multiplanar noncontrast MRI sequences of the lumbar spine.    The conus is located at the L1 level.  No abnormal signal in distal cord.    L1-2: Minimal disc bulge.    L2-3: Minimal disc bulge.    L3-4: Minimal disc bulge.  Mild degenerative facets with mild facet hypertrophy.    L4-5: Disc space narrowing.  Circumferential disc bulge.  Mild-to-moderate central spinal stenosis.  Bilateral bony neural foraminal narrowing with possible L4 nerve impingement.  Facet hypertrophy.    L5-S1:  Desiccation of the disc with broad-based disc bulge.  Narrowing of the lateral recesses.  Bilateral bony neural foraminal narrowing without evidence of definite L5 nerve dimensions.     Impression:     Mild broad-based disc bulge at the L2-L3 level.  Circumferential disc bulge at the L3-L4  level with degenerative changes of the vertebral endplates, bilateral bony neural foramina narrowing but no definite nerve impingement.  Disc space narrowing with degenerative changes of the vertebral endplates at the L4-L5 level there is circumferential disc bulge and moderate central spinal stenosis.  Bilateral bony neural foraminal narrowing at the L4-L5 level with bilateral degenerative changes of the facets and possible bilateral L4 nerve impingement.  Desiccation of the disc with broad based disc bulge paramedian greater to the right side at the L5-S1 level with possible impingement on the right S1 nerve root.  Bilateral bony neural foraminal narrowing L5-S1 with possible but not definite L5 nerve root impingement      Allergies: Review of patient's allergies indicates:  No Known Allergies    Current Medications:   Current Outpatient Prescriptions   Medication Sig Dispense Refill    alprazolam (XANAX) 0.5 MG tablet Take 1 tablet (0.5 mg total) by mouth nightly as needed. 30 tablet 5    atorvastatin (LIPITOR) 20 MG tablet TAKE 1 TABLET BY MOUTH DAILY 90 tablet 3    citalopram (CELEXA) 20 MG tablet Take 1 tablet by mouth once daily. 30 tablet 11    estradiol (ESTRACE) 1 MG tablet Take 1 mg by mouth once daily.       gabapentin (NEURONTIN) 300 MG capsule Take 1 capsule (300 mg total) by mouth 2 (two) times daily. 60 capsule 5    hydrocodone-acetaminophen 7.5-325mg (NORCO) 7.5-325 mg per tablet Take 1 tablet by mouth daily as needed.      levothyroxine (SYNTHROID) 125 MCG tablet Take 1 tablet by mouth once daily. 90 tablet 3    meloxicam (MOBIC) 15 MG tablet Take 1 tablet by mouth daily as needed.      meloxicam (MOBIC) 15 MG tablet TAKE 1 TABLET BY MOUTH once DAILY 30 tablet 2    progesterone (PROMETRIUM) 100 MG capsule Take 100 mg by mouth.       sumatriptan (IMITREX) 100 MG tablet TAKE 1 TABLET BY MOUTH EVERY 2 HOURS AS NEEDED FOR MIGRAINE 9 tablet 9    sumatriptan (IMITREX) 100 MG tablet TAKE 1  TABLET BY MOUTH EVERY 2 HOURS AS NEEDED FOR MIGRAINE 9 tablet 4    zolpidem (AMBIEN) 10 mg Tab TAKE 1 TABLET BY MOUTH NIGHTLY AS NEEDED 30 tablet 2     No current facility-administered medications for this visit.        REVIEW OF SYSTEMS:    GENERAL: No weight loss, malaise or fevers.  HEENT: Negative for frequent or significant headaches.  NECK: Negative for lumps or significant neck swelling.  RESPIRATORY: Negative for wheezing or shortness of breath.  CARDIOVASCULAR: Negative for chest pain or palpitations.  GI: No blood in stools or black stools or change in bowel habits.  : Negative for urinary tract infections or incontinence.  MUSCULOSKELETAL: See HPI  SKIN: Negative for lesions, rash, and itching.  PSYCH: + sleep disturbance   HEMATOLOGY/LYMPHOLOGY Negative for prolonged bleeding, bruising easily or swollen nodes.  NEURO:  No history of syncope, seizures or tremors.    Past Medical History:  Past Medical History:   Diagnosis Date    Headache     Hypothyroid        Past Surgical History:  Past Surgical History:   Procedure Laterality Date     SECTION      x2    LAPAROSCOPIC HYSTERECTOMY  2010    supracervical/BSO    TONSILLECTOMY         Family History:  Family History   Problem Relation Age of Onset    Dementia Mother     Hypertension Mother     Heart disease Mother     Cancer Father      colon    Cancer Sister      breast    Heart disease Sister     No Known Problems Daughter     No Known Problems Son     No Known Problems Sister        Social History:  Social History     Social History    Marital status:      Spouse name: N/A    Number of children: N/A    Years of education: N/A     Occupational History    retired       Social History Main Topics    Smoking status: Never Smoker    Smokeless tobacco: Never Used    Alcohol use Yes      Comment: social    Drug use: No    Sexual activity: Yes     Partners: Male     Other Topics Concern    None     Social  History Narrative    , 2 adult children and exercises regularly-rides bike on the [employer],       OBJECTIVE:    /62 (BP Location: Left arm, Patient Position: Sitting, BP Method: Manual)   Pulse 74   Resp 20   Ht 5' (1.524 m)   Wt 54 kg (119 lb)   BMI 23.24 kg/m²     PHYSICAL EXAMINATION:    General appearance: Well appearing, in no acute distress, alert and oriented x3.  Psych:  Mood and affect appropriate.  Skin: Skin color, texture, turgor normal, no rashes or lesions, in both upper and lower body.  Head/face:  Atraumatic, normocephalic.   Cor: Radial pulses 2+  Pulm: Breathing unlabored.  GI: Abdomen soft and non-tender.  Back: Tenderness to palpation over the lower lumbar spine and paraspinous muscles on the right. Normal range of motion without pain reproduction.  Extremities: Peripheral joint ROM is full and pain free without obvious instability or laxity in all four extremities. No deformities, edema, or skin discoloration.   Musculoskeletal:  No atrophy or tone abnormalities are noted.  Neuro: Bilateral upper and lower extremity strength is normal and symmetric.  No loss of sensation is noted.  Gait: Normal.      ASSESSMENT: 67 y.o. female with right-sided low back and leg pain which is improving after right L4 and L5 TESI.     1. Osteoarthritis of spine with radiculopathy, lumbar region    2. DDD (degenerative disc disease), lumbar          PLAN:     - I have stressed the importance of physical activity and a home exercise plan to help with pain and improve health.  - Referral to Physical therapy for lumbar stabilization, core strengthening, and a home exercise program.  - Increase Neurontin to 300mg three times a day to help with radicular pain.  - Will repeat lumbar MICHAEL in the future as needed.  - RTC in 6 weeks.  - Counseled patient regarding the importance of physical therapy.    The above plan and management options were discussed at length with patient. Patient is in agreement with the  above and verbalized understanding.    Sixto Moya III  07/05/2017

## 2017-07-05 NOTE — LETTER
July 5, 2017      Fam Chou MD  [address] [place] [address] - Pain Management  [address] [place] [id number]  Phone: [phone]  Fax: [phone]          Patient: Florentino Bowman   MR Number: [id number]   YOB: 1950   Date of Visit: 7/5/2017       Dear Dr. Fam Chou:    Thank you for referring Florentino Bowman to me for evaluation. Attached you will find relevant portions of my assessment and plan of care.    If you have questions, please do not hesitate to call me. I look forward to following Florentino Bowman along with you.    Sincerely,    Sixto Moya III, MD    Enclosure  CC:  No Recipients    If you would like to receive this communication electronically, please contact [email or web address] or [phone] to request more information on "[employer] Link access.    For providers and/or their staff who would like to refer a patient to [facility], please contact us through our one-stop-shop provider referral line, [facility], at [phone].    If you feel you have received this communication in error or would no longer like to receive these types of communications, please e-mail [email or web address]

## 2017-08-14 ENCOUNTER — OFFICE VISIT (OUTPATIENT)
Dept: FAMILY MEDICINE | Facility: CLINIC | Age: 67
End: 2017-08-14
Payer: MEDICARE

## 2017-08-14 VITALS
DIASTOLIC BLOOD PRESSURE: 76 MMHG | SYSTOLIC BLOOD PRESSURE: 124 MMHG | HEART RATE: 83 BPM | WEIGHT: 121.25 LBS | TEMPERATURE: 99 F | BODY MASS INDEX: 23.81 KG/M2 | OXYGEN SATURATION: 99 % | HEIGHT: 60 IN

## 2017-08-14 DIAGNOSIS — M85.9 DISORDER OF BONE DENSITY AND STRUCTURE, UNSPECIFIED: ICD-10-CM

## 2017-08-14 DIAGNOSIS — Z12.31 ENCOUNTER FOR SCREENING MAMMOGRAM FOR MALIGNANT NEOPLASM OF BREAST: ICD-10-CM

## 2017-08-14 DIAGNOSIS — I70.90 ATHEROSCLEROSIS: ICD-10-CM

## 2017-08-14 DIAGNOSIS — M94.9 DISORDER OF CARTILAGE: ICD-10-CM

## 2017-08-14 DIAGNOSIS — E03.4 HYPOTHYROIDISM DUE TO ACQUIRED ATROPHY OF THYROID: ICD-10-CM

## 2017-08-14 DIAGNOSIS — R19.4 BOWEL HABIT CHANGES: ICD-10-CM

## 2017-08-14 DIAGNOSIS — I77.89 OTHER SPECIFIED DISORDERS OF ARTERIES AND ARTERIOLES: ICD-10-CM

## 2017-08-14 DIAGNOSIS — M54.31 SCIATICA OF RIGHT SIDE: ICD-10-CM

## 2017-08-14 DIAGNOSIS — E55.9 VITAMIN D DEFICIENCY: ICD-10-CM

## 2017-08-14 DIAGNOSIS — I77.9 BILATERAL CAROTID ARTERY DISEASE: Primary | ICD-10-CM

## 2017-08-14 PROCEDURE — 90670 PCV13 VACCINE IM: CPT | Mod: S$GLB,,, | Performed by: FAMILY MEDICINE

## 2017-08-14 PROCEDURE — G0009 ADMIN PNEUMOCOCCAL VACCINE: HCPCS | Mod: S$GLB,,, | Performed by: FAMILY MEDICINE

## 2017-08-14 PROCEDURE — 99213 OFFICE O/P EST LOW 20 MIN: CPT | Mod: S$GLB,,, | Performed by: FAMILY MEDICINE

## 2017-08-14 RX ORDER — ATORVASTATIN CALCIUM 20 MG/1
20 TABLET, FILM COATED ORAL DAILY
Qty: 90 TABLET | Refills: 3 | Status: SHIPPED | OUTPATIENT
Start: 2017-08-14 | End: 2018-08-21 | Stop reason: SDUPTHER

## 2017-08-14 RX ORDER — MELOXICAM 15 MG/1
15 TABLET ORAL DAILY PRN
Qty: 90 TABLET | Refills: 3 | Status: SHIPPED | OUTPATIENT
Start: 2017-08-14 | End: 2017-10-10

## 2017-08-14 RX ORDER — SUMATRIPTAN SUCCINATE 100 MG/1
TABLET ORAL
Qty: 9 TABLET | Refills: 9 | Status: SHIPPED | OUTPATIENT
Start: 2017-08-14 | End: 2018-01-22 | Stop reason: SDUPTHER

## 2017-08-14 RX ORDER — HYDROCODONE BITARTRATE AND ACETAMINOPHEN 7.5; 325 MG/1; MG/1
1 TABLET ORAL DAILY PRN
Qty: 30 TABLET | Refills: 0 | Status: SHIPPED | OUTPATIENT
Start: 2017-08-14 | End: 2017-10-10 | Stop reason: SDUPTHER

## 2017-08-14 RX ORDER — CITALOPRAM 20 MG/1
20 TABLET, FILM COATED ORAL DAILY
Qty: 90 TABLET | Refills: 3 | Status: SHIPPED | OUTPATIENT
Start: 2017-08-14 | End: 2018-08-21 | Stop reason: SDUPTHER

## 2017-08-14 RX ORDER — GABAPENTIN 300 MG/1
300 CAPSULE ORAL 3 TIMES DAILY
Qty: 90 CAPSULE | Refills: 11 | Status: SHIPPED | OUTPATIENT
Start: 2017-08-14 | End: 2017-09-14

## 2017-08-14 RX ORDER — ZOLPIDEM TARTRATE 10 MG/1
TABLET ORAL
Qty: 30 TABLET | Refills: 5 | Status: SHIPPED | OUTPATIENT
Start: 2017-08-14 | End: 2018-02-05 | Stop reason: SDUPTHER

## 2017-08-14 RX ORDER — LEVOTHYROXINE SODIUM 125 UG/1
125 TABLET ORAL DAILY
Qty: 90 TABLET | Refills: 3 | Status: SHIPPED | OUTPATIENT
Start: 2017-08-14 | End: 2018-04-26

## 2017-08-14 RX ORDER — ALPRAZOLAM 0.5 MG/1
TABLET ORAL
Qty: 30 TABLET | Refills: 5 | Status: SHIPPED | OUTPATIENT
Start: 2017-08-14 | End: 2018-01-31 | Stop reason: SDUPTHER

## 2017-08-14 NOTE — PROGRESS NOTES
Subjective:      Patient ID: Florentino Bowman is a 67 y.o. female.    Chief Complaint: Annual Exam    2 months ago in [place] had episode left leg shaking and a episcleral hemorrhage and went to ER in ambulance;  systolic in ambulance; shaking leg resolved; Rx drops for red eye; pt concerned that pressure was elevated; concerned about the bp up and left leg shaking; mother lost her eye with glaucoma; pt will see optometrist; BP normal here; hasn 't checked it lately at home; time for another carotid ultrasound to be done at [place] to be assured we get percent blockage estimate; colonoscopy done 3 years ago with Dr Arzola, had diverticulosis; now, feels not complete emptying, a change for her; no bleeding; sister has mesothelioma of her peritoneum and father had colon cancer; Pt would like to go to [place], referring to Lan Reid MD, [facility] doctor.      Review of Systems   Constitutional: Positive for activity change. Negative for unexpected weight change.   HENT: Negative.  Negative for hearing loss, rhinorrhea and trouble swallowing.    Eyes: Negative for discharge and visual disturbance.   Respiratory: Negative.  Negative for chest tightness and wheezing.    Cardiovascular: Negative.  Negative for chest pain and palpitations.   Gastrointestinal: Negative.  Negative for blood in stool, constipation, diarrhea and vomiting.   Endocrine: Negative.  Negative for polydipsia and polyuria.   Genitourinary: Negative.  Negative for difficulty urinating, dysuria, hematuria and menstrual problem.   Musculoskeletal: Negative.  Negative for joint swelling and neck pain.   Neurological: Negative for weakness and headaches.   Psychiatric/Behavioral: Negative.  Negative for confusion and dysphoric mood.   All other systems reviewed and are negative.    Objective:     Physical Exam   Constitutional: She is oriented to person, place, and time. She appears well-developed and well-nourished.   HENT:   Head: Normocephalic.    Eyes: Conjunctivae and EOM are normal. Pupils are equal, round, and reactive to light.   Neck: Normal range of motion. Neck supple.   Cardiovascular: Normal rate, regular rhythm and normal heart sounds.    Pulmonary/Chest: Effort normal and breath sounds normal.   Musculoskeletal: Normal range of motion.   Neurological: She is alert and oriented to person, place, and time. She has normal reflexes.   Skin: Skin is warm and dry.   Psychiatric: She has a normal mood and affect. Her behavior is normal. Judgment and thought content normal.   Nursing note and vitals reviewed.    Assessment:     1. Bilateral carotid artery disease    2. Other specified disorders of arteries and arterioles     3. Bowel habit changes    4. Hypothyroidism due to acquired atrophy of thyroid    5. Sciatica of right side    6. Atherosclerosis     7. Vitamin D deficiency     8. Disorder of bone density and structure, unspecified     9. Disorder of cartilage     10. Encounter for screening mammogram for malignant neoplasm of breast       Plan:     New Prescriptions    No medications on file     Discontinued Medications    MELOXICAM (MOBIC) 15 MG TABLET    TAKE 1 TABLET BY MOUTH once DAILY    SUMATRIPTAN (IMITREX) 100 MG TABLET    TAKE 1 TABLET BY MOUTH EVERY 2 HOURS AS NEEDED FOR MIGRAINE    SUMATRIPTAN (IMITREX) 100 MG TABLET    TAKE 1 TABLET BY MOUTH EVERY 2 HOURS AS NEEDED FOR MIGRAINE     Modified Medications    Modified Medication Previous Medication    ALPRAZOLAM (XANAX) 0.5 MG TABLET alprazolam (XANAX) 0.5 MG tablet       Take 1 tablet (0.5 mg total) by mouth nightly as needed.    Take 1 tablet (0.5 mg total) by mouth nightly as needed.    ATORVASTATIN (LIPITOR) 20 MG TABLET atorvastatin (LIPITOR) 20 MG tablet       Take 1 tablet (20 mg total) by mouth once daily.    TAKE 1 TABLET BY MOUTH DAILY    CITALOPRAM (CELEXA) 20 MG TABLET citalopram (CELEXA) 20 MG tablet       Take 1 tablet (20 mg total) by mouth once daily.    Take 1 tablet by  mouth once daily.    GABAPENTIN (NEURONTIN) 300 MG CAPSULE gabapentin (NEURONTIN) 300 MG capsule       Take 1 capsule (300 mg total) by mouth 3 (three) times daily.    Take 1 capsule (300 mg total) by mouth 2 (two) times daily.    HYDROCODONE-ACETAMINOPHEN 7.5-325MG (NORCO) 7.5-325 MG PER TABLET hydrocodone-acetaminophen 7.5-325mg (NORCO) 7.5-325 mg per tablet       Take 1 tablet by mouth daily as needed.    Take 1 tablet by mouth daily as needed.    LEVOTHYROXINE (SYNTHROID) 125 MCG TABLET levothyroxine (SYNTHROID) 125 MCG tablet       Take 1 tablet (125 mcg total) by mouth once daily.    Take 1 tablet by mouth once daily.    MELOXICAM (MOBIC) 15 MG TABLET meloxicam (MOBIC) 15 MG tablet       Take 1 tablet (15 mg total) by mouth daily as needed.    Take 1 tablet by mouth daily as needed.    SUMATRIPTAN (IMITREX) 100 MG TABLET sumatriptan (IMITREX) 100 MG tablet       TAKE 1 TABLET BY MOUTH EVERY 2 HOURS AS NEEDED FOR MIGRAINE    TAKE 1 TABLET BY MOUTH EVERY 2 HOURS AS NEEDED FOR MIGRAINE    ZOLPIDEM (AMBIEN) 10 MG TAB zolpidem (AMBIEN) 10 mg Tab       TAKE 1 TABLET BY MOUTH NIGHTLY AS NEEDED    TAKE 1 TABLET BY MOUTH NIGHTLY AS NEEDED       Bilateral carotid artery disease  -     US Carotid Bilateral; Future; Expected date: 08/14/2017  -     CBC auto differential; Future; Expected date: 08/14/2017  -     Comprehensive metabolic panel; Future; Expected date: 08/14/2017  -     Lipid panel; Future  -     Urinalysis; Future  -     Vitamin D; Future  -     DXA Bone Density Spine And Hip; Future; Expected date: 08/14/2017  -     Mammo Digital Screening Bilat with CAD; Future; Expected date: 08/14/2017  -     TSH; Future  -     atorvastatin (LIPITOR) 20 MG tablet; Take 1 tablet (20 mg total) by mouth once daily.  Dispense: 90 tablet; Refill: 3    Other specified disorders of arteries and arterioles   -     US Carotid Bilateral; Future; Expected date: 08/14/2017  -     CBC auto differential; Future; Expected date:  08/14/2017  -     Comprehensive metabolic panel; Future; Expected date: 08/14/2017  -     Lipid panel; Future  -     Urinalysis; Future  -     Vitamin D; Future  -     DXA Bone Density Spine And Hip; Future; Expected date: 08/14/2017  -     Mammo Digital Screening Bilat with CAD; Future; Expected date: 08/14/2017  -     TSH; Future    Bowel habit changes  -     Ambulatory referral to Gastroenterology  -     CBC auto differential; Future; Expected date: 08/14/2017  -     Comprehensive metabolic panel; Future; Expected date: 08/14/2017  -     Lipid panel; Future  -     Urinalysis; Future  -     Vitamin D; Future  -     DXA Bone Density Spine And Hip; Future; Expected date: 08/14/2017  -     Mammo Digital Screening Bilat with CAD; Future; Expected date: 08/14/2017  -     TSH; Future    Hypothyroidism due to acquired atrophy of thyroid  -     TSH; Future  -     levothyroxine (SYNTHROID) 125 MCG tablet; Take 1 tablet (125 mcg total) by mouth once daily.  Dispense: 90 tablet; Refill: 3    Sciatica of right side  -     TSH; Future    Atherosclerosis   -     Lipid panel; Future    Vitamin D deficiency   -     Vitamin D; Future    Disorder of bone density and structure, unspecified   -     DXA Bone Density Spine And Hip; Future; Expected date: 08/14/2017    Disorder of cartilage   -     DXA Bone Density Spine And Hip; Future; Expected date: 08/14/2017    Encounter for screening mammogram for malignant neoplasm of breast   -     Mammo Digital Screening Bilat with CAD; Future; Expected date: 08/14/2017    Other orders  -     Pneumococcal Conjugate Vaccine (13 Valent) (IM)  -     zolpidem (AMBIEN) 10 mg Tab; TAKE 1 TABLET BY MOUTH NIGHTLY AS NEEDED  Dispense: 30 tablet; Refill: 5  -     sumatriptan (IMITREX) 100 MG tablet; TAKE 1 TABLET BY MOUTH EVERY 2 HOURS AS NEEDED FOR MIGRAINE  Dispense: 9 tablet; Refill: 9  -     meloxicam (MOBIC) 15 MG tablet; Take 1 tablet (15 mg total) by mouth daily as needed.  Dispense: 90 tablet;  Refill: 3  -     hydrocodone-acetaminophen 7.5-325mg (NORCO) 7.5-325 mg per tablet; Take 1 tablet by mouth daily as needed.  Dispense: 30 tablet; Refill: 0  -     gabapentin (NEURONTIN) 300 MG capsule; Take 1 capsule (300 mg total) by mouth 3 (three) times daily.  Dispense: 90 capsule; Refill: 11  -     citalopram (CELEXA) 20 MG tablet; Take 1 tablet (20 mg total) by mouth once daily.  Dispense: 90 tablet; Refill: 3  -     alprazolam (XANAX) 0.5 MG tablet; Take 1 tablet (0.5 mg total) by mouth nightly as needed.  Dispense: 30 tablet; Refill: 5

## 2017-08-15 ENCOUNTER — OFFICE VISIT (OUTPATIENT)
Dept: PAIN MEDICINE | Facility: CLINIC | Age: 67
End: 2017-08-15
Payer: MEDICARE

## 2017-08-15 VITALS
WEIGHT: 121 LBS | RESPIRATION RATE: 20 BRPM | SYSTOLIC BLOOD PRESSURE: 99 MMHG | HEART RATE: 65 BPM | HEIGHT: 60 IN | BODY MASS INDEX: 23.75 KG/M2 | DIASTOLIC BLOOD PRESSURE: 62 MMHG

## 2017-08-15 DIAGNOSIS — M51.36 DDD (DEGENERATIVE DISC DISEASE), LUMBAR: Primary | ICD-10-CM

## 2017-08-15 DIAGNOSIS — M54.16 LUMBAR RADICULAR PAIN: ICD-10-CM

## 2017-08-15 DIAGNOSIS — M47.816 LUMBAR SPONDYLOSIS: ICD-10-CM

## 2017-08-15 PROCEDURE — 99213 OFFICE O/P EST LOW 20 MIN: CPT | Mod: S$GLB,,, | Performed by: ANESTHESIOLOGY

## 2017-08-15 NOTE — PROGRESS NOTES
Chronic patient Established Note (Follow up visit)      SUBJECTIVE:    Florentino Bowman presents to the clinic for a follow-up appointment for low back pain. Since the last visit, Florentino Bowman states the pain has been stable. Current pain intensity is 3/10. She complains today of bilateral low back pain that radiates into the anterior aspect of the thigh. The pain is made worse with activity. She reports her back pain is still better than it was prior to the injection and at this time is tolerable. She has been doing Pilates and riding her bike for exercise. She does not find that PT was helpful.        Pain Disability Index Review:  Last 3 PDI Scores 8/15/2017 7/5/2017 5/24/2017   Pain Disability Index (PDI) 32 14 27       Pain Medications:  Gabapentin 300mg, 1 tablet 3 times daily  Meloxicam 15mg, takes infrequently (doesn't help)       report:  Reviewed      Pain Procedures:  Right MICHAEL-TRANSFORAMINAL Right L4 and L5 (05/30/2017)  Lumbar Facet Injection (5/5/17) - no relief     Imaging:        MRI lumbar spine without contrast   05/03/17:    History:   low back pain., M54.31 Sciatica, right side.    Standard multiplanar noncontrast MRI sequences of the lumbar spine.    The conus is located at the L1 level.  No abnormal signal in distal cord.    L1-2: Minimal disc bulge.    L2-3: Minimal disc bulge.    L3-4: Minimal disc bulge.  Mild degenerative facets with mild facet hypertrophy.    L4-5: Disc space narrowing.  Circumferential disc bulge.  Mild-to-moderate central spinal stenosis.  Bilateral bony neural foraminal narrowing with possible L4 nerve impingement.  Facet hypertrophy.    L5-S1:  Desiccation of the disc with broad-based disc bulge.  Narrowing of the lateral recesses.  Bilateral bony neural foraminal narrowing without evidence of definite L5 nerve dimensions.     Impression:     Mild broad-based disc bulge at the L2-L3 level.  Circumferential disc bulge at the L3-L4 level with degenerative changes of  the vertebral endplates, bilateral bony neural foramina narrowing but no definite nerve impingement.  Disc space narrowing with degenerative changes of the vertebral endplates at the L4-L5 level there is circumferential disc bulge and moderate central spinal stenosis.  Bilateral bony neural foraminal narrowing at the L4-L5 level with bilateral degenerative changes of the facets and possible bilateral L4 nerve impingement.  Desiccation of the disc with broad based disc bulge paramedian greater to the right side at the L5-S1 level with possible impingement on the right S1 nerve root.  Bilateral bony neural foraminal narrowing L5-S1 with possible but not definite L5 nerve root impingement      Current Medications:   Current Outpatient Prescriptions   Medication Sig Dispense Refill    alprazolam (XANAX) 0.5 MG tablet Take 1 tablet (0.5 mg total) by mouth nightly as needed. 30 tablet 5    atorvastatin (LIPITOR) 20 MG tablet Take 1 tablet (20 mg total) by mouth once daily. 90 tablet 3    citalopram (CELEXA) 20 MG tablet Take 1 tablet (20 mg total) by mouth once daily. 90 tablet 3    estradiol (ESTRACE) 1 MG tablet Take 1 mg by mouth once daily.       gabapentin (NEURONTIN) 300 MG capsule Take 1 capsule (300 mg total) by mouth 3 (three) times daily. 90 capsule 11    hydrocodone-acetaminophen 7.5-325mg (NORCO) 7.5-325 mg per tablet Take 1 tablet by mouth daily as needed. 30 tablet 0    levothyroxine (SYNTHROID) 125 MCG tablet Take 1 tablet (125 mcg total) by mouth once daily. 90 tablet 3    meloxicam (MOBIC) 15 MG tablet Take 1 tablet (15 mg total) by mouth daily as needed. 90 tablet 3    progesterone (PROMETRIUM) 100 MG capsule Take 100 mg by mouth once daily.       sumatriptan (IMITREX) 100 MG tablet TAKE 1 TABLET BY MOUTH EVERY 2 HOURS AS NEEDED FOR MIGRAINE 9 tablet 9    zolpidem (AMBIEN) 10 mg Tab TAKE 1 TABLET BY MOUTH NIGHTLY AS NEEDED 30 tablet 5     No current facility-administered medications for this  visit.        REVIEW OF SYSTEMS:    GENERAL:  No weight loss, malaise or fevers.  HEENT:  Negative for frequent or significant headaches.  NECK:  Negative for pain and significant neck swelling.  RESPIRATORY:  Negative for cough, wheezing or shortness of breath.  CARDIOVASCULAR:  Negative for chest pain, leg swelling or palpitations.  GI:  Negative for abdominal discomfort, blood in stools or black stools or change in bowel habits.  MUSCULOSKELETAL:  See HPI.  SKIN:  Negative for lesions, rash, and itching.  PSYCH:  Negative for sleep disturbance.  HEMATOLOGY/LYMPHOLOGY:  Negative for prolonged bleeding, bruising easily or swollen nodes.  NEURO:   No history of headaches, syncope, paralysis, seizures or tremors.  All other reviewed and negative other than HPI.    Past Medical History:  Past Medical History:   Diagnosis Date    Headache(784.0)     Hypothyroid        Past Surgical History:  Past Surgical History:   Procedure Laterality Date     SECTION      x2    LAPAROSCOPIC HYSTERECTOMY  2010    supracervical/BSO    TONSILLECTOMY         Family History:  Family History   Problem Relation Age of Onset    Dementia Mother     Hypertension Mother     Heart disease Mother     Cancer Father      colon    Cancer Sister      breast    Heart disease Sister     No Known Problems Daughter     No Known Problems Son     No Known Problems Sister        Social History:  Social History     Social History    Marital status:      Spouse name: N/A    Number of children: N/A    Years of education: N/A     Occupational History    retired       Social History Main Topics    Smoking status: Never Smoker    Smokeless tobacco: Never Used    Alcohol use Yes      Comment: social    Drug use: No    Sexual activity: Yes     Partners: Male     Other Topics Concern    None     Social History Narrative    , 2 adult children and exercises regularly-rides bike on the [employer],        OBJECTIVE:    BP 99/62 (BP Location: Left arm, Patient Position: Sitting, BP Method: Medium (Automatic))   Pulse 65   Resp 20   Ht 5' (1.524 m)   Wt 54.9 kg (121 lb)   BMI 23.63 kg/m²     PHYSICAL EXAMINATION:    General appearance: Well appearing, in no acute distress, alert and oriented x3.  Psych:  Mood and affect appropriate.  Skin: Skin color, texture, turgor normal, no rashes or lesions, in both upper and lower body.  Head/face:  Atraumatic, normocephalic.   Cor: Radial pulses 2+  Pulm: Breathing unlabored.  GI: Abdomen soft and non-tender.  Back: No pain to palpation over the lumbar spine and paraspinous muscles. Positive for pain with facet loading. Normal range of motion.  Extremities: Peripheral joint ROM is full and pain free without obvious instability or laxity in all four extremities. No deformities, edema, or skin discoloration.   Musculoskeletal:  No atrophy or tone abnormalities are noted.  Neuro: Bilateral upper and lower extremity strength is normal and symmetric.  No loss of sensation is noted.  Gait: Normal.      ASSESSMENT: 67 y.o.  female with low back and right leg pain, consistent with discogenic pain and radiculitis. We discussed options including watchful waiting, additional PT and repeating lumbar MICHAEL. The patient chooses to continue with home exercise at this time.     1. DDD (degenerative disc disease), lumbar    2. Lumbar radicular pain        PLAN:     - I have stressed the importance of physical activity and a home exercise plan to help with pain and improve health.  - Will repeat lumbar MICHAEL in the future as needed.  - RTC as needed.      The above plan and management options were discussed at length with patient. Patient is in agreement with the above and verbalized understanding.    Sixto Moya III  08/15/2017

## 2017-08-15 NOTE — LETTER
August 15, 2017      Fam Chou MD  [address] [place] [address] - Pain Management  [address] [place] [id number]  Phone: [phone]  Fax: [phone]          Patient: Florentino Bowman   MR Number: [id number]   YOB: 1950   Date of Visit: 8/15/2017       Dear Dr. Fam Chou:    Thank you for referring Florentino Bowman to me for evaluation. Attached you will find relevant portions of my assessment and plan of care.    If you have questions, please do not hesitate to call me. I look forward to following Florentino Bowman along with you.    Sincerely,    Sixto Moya III, MD    Enclosure  CC:  No Recipients    If you would like to receive this communication electronically, please contact [email or web address] or [phone] to request more information on [employer] access.    For providers and/or their staff who would like to refer a patient to [facility], please contact us through our one-stop-shop provider referral line, [facility], at [phone].    If you feel you have received this communication in error or would no longer like to receive these types of communications, please e-mail [email or web address]

## 2017-08-25 ENCOUNTER — OFFICE VISIT (OUTPATIENT)
Dept: GASTROENTEROLOGY | Facility: CLINIC | Age: 67
End: 2017-08-25
Payer: MEDICARE

## 2017-08-25 VITALS
SYSTOLIC BLOOD PRESSURE: 121 MMHG | HEIGHT: 60 IN | DIASTOLIC BLOOD PRESSURE: 81 MMHG | BODY MASS INDEX: 23.55 KG/M2 | WEIGHT: 119.94 LBS

## 2017-08-25 DIAGNOSIS — R19.8 RECTAL PRESSURE: Primary | ICD-10-CM

## 2017-08-25 DIAGNOSIS — Z80.0 FAMILY HISTORY OF COLON CANCER: ICD-10-CM

## 2017-08-25 DIAGNOSIS — R19.4 CHANGE IN BOWEL HABIT: ICD-10-CM

## 2017-08-25 PROCEDURE — 99203 OFFICE O/P NEW LOW 30 MIN: CPT | Mod: S$PBB,,, | Performed by: NURSE PRACTITIONER

## 2017-08-25 PROCEDURE — 99213 OFFICE O/P EST LOW 20 MIN: CPT | Mod: PBBFAC,PN | Performed by: NURSE PRACTITIONER

## 2017-08-25 PROCEDURE — 99999 PR PBB SHADOW E&M-EST. PATIENT-LVL III: CPT | Mod: PBBFAC,,, | Performed by: NURSE PRACTITIONER

## 2017-08-25 NOTE — LETTER
August 25, 2017      Fam Chou MD  [address] [place] [id number]           [facility]  [address] [place] [address]  Phone: [phone]          Patient: Florentino Bowman   MR Number: [id number]   YOB: 1950   Date of Visit: 8/25/2017       Dear Dr. Fam Chou:    Thank you for referring Florentino Bowman to me for evaluation. Attached you will find relevant portions of my assessment and plan of care.    If you have questions, please do not hesitate to call me. I look forward to following Florentino Bowman along with you.    Sincerely,    Shahla Ovalle, [facility]    Enclosure  CC:  No Recipients    If you would like to receive this communication electronically, please contact [email or web address] or [phone] to request more information on [facility] [employer] access.    For providers and/or their staff who would like to refer a patient to [facility], please contact us through our one-stop-shop provider referral line, [facility], at [phone].    If you feel you have received this communication in error or would no longer like to receive these types of communications, please e-mail [email or web address]

## 2017-08-25 NOTE — PROGRESS NOTES
Subjective:       Patient ID: Florentino Bowman is a 67 y.o. female.    Chief Complaint: Other (bowel changes)    HPI  Reports recent change in bowel habits.  She describes multiple bowel movements through out the day without the feeling of complete evacuation.  Describes pressure and fullness in rectum after bowel movement.  She may have some days with no bowel movements and also times with liquid stools.  She does have straining at times.  DEnies blood with bowel movements or black stools.  Denies nausea or vomiting.  No weight loss.  She reports prior to this change in bowel habits, that she was having a regular daily bowel movement.  Complaints have been present over the last year.    Father with colon cancer.    She last had colonoscopy  3 years ago.      Review of Systems   Constitutional: Negative.  Negative for activity change, appetite change, fatigue, fever and unexpected weight change.   Respiratory: Negative for shortness of breath.    Cardiovascular: Negative for chest pain.   Gastrointestinal: Positive for constipation and diarrhea. Negative for abdominal distention, abdominal pain, blood in stool, nausea and vomiting.   Genitourinary: Negative.    Musculoskeletal: Negative.    Skin: Negative.    Neurological: Negative.    Psychiatric/Behavioral: Negative.        Objective:      Physical Exam   Constitutional: She is oriented to person, place, and time. She appears well-developed and well-nourished. No distress.   HENT:   Head: Normocephalic.   Eyes: No scleral icterus.   Cardiovascular: Normal rate.    Pulmonary/Chest: Effort normal. No respiratory distress.   Abdominal: Soft. Bowel sounds are normal. She exhibits no distension and no mass. There is no tenderness. There is no guarding.   Musculoskeletal: Normal range of motion.   Neurological: She is alert and oriented to person, place, and time.   Skin: Skin is warm and dry. She is not diaphoretic.   Psychiatric: She has a normal mood and affect. Her  behavior is normal. Judgment and thought content normal.   Vitals reviewed.      Assessment:       1. Rectal pressure    2. Family history of colon cancer    3. Change in bowel habit        Plan:         Florentino was seen today for other.    Diagnoses and all orders for this visit:    Rectal pressure    Family history of colon cancer    Change in bowel habit    Other orders  -     Case request GI: COLONOSCOPY Miralax         I have explained the planned procedures to the patient.The risks, benefits and alternatives of the procedure were also explained in detail. Patient verbalized understanding, all questions were answered. The patient agrees to proceed as planned.    Add fiber supplement.    Can consider further workup depending upon response and findings.

## 2017-09-05 ENCOUNTER — ANESTHESIA EVENT (OUTPATIENT)
Dept: ENDOSCOPY | Facility: HOSPITAL | Age: 67
End: 2017-09-05
Payer: MEDICARE

## 2017-09-06 ENCOUNTER — HOSPITAL ENCOUNTER (OUTPATIENT)
Facility: HOSPITAL | Age: 67
Discharge: HOME | End: 2017-09-06
Attending: INTERNAL MEDICINE | Admitting: INTERNAL MEDICINE
Payer: MEDICARE

## 2017-09-06 ENCOUNTER — SURGERY (OUTPATIENT)
Age: 67
End: 2017-09-06

## 2017-09-06 ENCOUNTER — ANESTHESIA (OUTPATIENT)
Dept: ENDOSCOPY | Facility: HOSPITAL | Age: 67
End: 2017-09-06
Payer: MEDICARE

## 2017-09-06 DIAGNOSIS — Z12.11 SCREENING FOR COLORECTAL CANCER: ICD-10-CM

## 2017-09-06 DIAGNOSIS — Z12.12 SCREENING FOR COLORECTAL CANCER: ICD-10-CM

## 2017-09-06 DIAGNOSIS — Z80.0 FAMILY HISTORY OF COLON CANCER: ICD-10-CM

## 2017-09-06 DIAGNOSIS — R19.4 CHANGE IN BOWEL HABIT: Primary | ICD-10-CM

## 2017-09-06 PROCEDURE — 45380 COLONOSCOPY AND BIOPSY: CPT | Performed by: INTERNAL MEDICINE

## 2017-09-06 PROCEDURE — 88305 TISSUE EXAM BY PATHOLOGIST: CPT | Mod: 26,,, | Performed by: PATHOLOGY

## 2017-09-06 PROCEDURE — 37000009 HC ANESTHESIA EA ADD 15 MINS: Performed by: INTERNAL MEDICINE

## 2017-09-06 PROCEDURE — 45380 COLONOSCOPY AND BIOPSY: CPT | Mod: ,,, | Performed by: INTERNAL MEDICINE

## 2017-09-06 PROCEDURE — 27201012 HC FORCEPS, HOT/COLD, DISP: Performed by: INTERNAL MEDICINE

## 2017-09-06 PROCEDURE — 88305 TISSUE EXAM BY PATHOLOGIST: CPT | Performed by: PATHOLOGY

## 2017-09-06 PROCEDURE — 37000008 HC ANESTHESIA 1ST 15 MINUTES: Performed by: INTERNAL MEDICINE

## 2017-09-06 PROCEDURE — 25000003 PHARM REV CODE 250: Performed by: INTERNAL MEDICINE

## 2017-09-06 PROCEDURE — 63600175 PHARM REV CODE 636 W HCPCS: Performed by: NURSE ANESTHETIST, CERTIFIED REGISTERED

## 2017-09-06 RX ORDER — LIDOCAINE HCL/PF 100 MG/5ML
SYRINGE (ML) INTRAVENOUS
Status: DISCONTINUED | OUTPATIENT
Start: 2017-09-06 | End: 2017-09-06

## 2017-09-06 RX ORDER — PROPOFOL 10 MG/ML
VIAL (ML) INTRAVENOUS CONTINUOUS PRN
Status: DISCONTINUED | OUTPATIENT
Start: 2017-09-06 | End: 2017-09-06

## 2017-09-06 RX ORDER — SODIUM CHLORIDE 9 MG/ML
INJECTION, SOLUTION INTRAVENOUS CONTINUOUS
Status: DISCONTINUED | OUTPATIENT
Start: 2017-09-06 | End: 2017-09-06 | Stop reason: HOSPADM

## 2017-09-06 RX ORDER — PROPOFOL 10 MG/ML
VIAL (ML) INTRAVENOUS
Status: DISCONTINUED | OUTPATIENT
Start: 2017-09-06 | End: 2017-09-06

## 2017-09-06 RX ADMIN — SODIUM CHLORIDE: 0.9 INJECTION, SOLUTION INTRAVENOUS at 07:09

## 2017-09-06 RX ADMIN — PROPOFOL 50 MG: 10 INJECTION, EMULSION INTRAVENOUS at 07:09

## 2017-09-06 RX ADMIN — LIDOCAINE HYDROCHLORIDE 80 MG: 20 INJECTION, SOLUTION INTRAVENOUS at 07:09

## 2017-09-06 RX ADMIN — PROPOFOL 150 MCG/KG/MIN: 10 INJECTION, EMULSION INTRAVENOUS at 07:09

## 2017-09-06 NOTE — ANESTHESIA PREPROCEDURE EVALUATION
09/06/2017  Florentino Bowman is a 67 y.o., female for colonoscopy under MAC    Anesthesia Evaluation     I have reviewed the Nursing Notes.   I have reviewed the Medications.     Review of Systems  Anesthesia Hx:  No problems with previous Anesthesia  Denies Personal Hx of Anesthesia complications.   Social:  Non-Smoker, Alcohol Use    Cardiovascular:   Exercise tolerance: good    Musculoskeletal:   Arthritis   Spine Disorders: Disc disease and Degenerative disease    Neurological:   Headaches    Endocrine:   Hypothyroidism    Psych:   Psychiatric History anxiety depression          Physical Exam  General:  Well nourished       Chest/Lungs:  Chest/Lungs Clear    Heart/Vascular:  Heart Findings: Normal            Anesthesia Plan  Type of Anesthesia, risks & benefits discussed:  Anesthesia Type:  MAC  Patient's Preference:   Intra-op Monitoring Plan:   Intra-op Monitoring Plan Comments:   Post Op Pain Control Plan:   Post Op Pain Control Plan Comments:   Induction:    Beta Blocker:  Patient is not currently on a Beta-Blocker (No further documentation required).       Informed Consent: Patient understands risks and agrees with Anesthesia plan.  Questions answered. Anesthesia consent signed with patient.  ASA Score: 2     Day of Surgery Review of History & Physical:            Ready For Surgery From Anesthesia Perspective.

## 2017-09-06 NOTE — TRANSFER OF CARE
Anesthesia Transfer of Care Note    Patient: Florentino Bowman    Procedure(s) Performed: Procedure(s) (LRB):  COLONOSCOPY Miralax (N/A)    Patient location: GI    Anesthesia Type: MAC    Transport from OR: Transported from OR on room air with adequate spontaneous ventilation    Post pain: adequate analgesia    Post assessment: no apparent anesthetic complications and tolerated procedure well    Post vital signs: stable    Level of consciousness: awake, alert and oriented    Nausea/Vomiting: no nausea/vomiting    Complications: none    Transfer of care protocol was followed      Last vitals:   Visit Vitals  BP (!) 140/71 (BP Location: Right arm, Patient Position: Lying)   Pulse 73   Temp 37.3 °C (99.2 °F) (Oral)   Resp 16   Ht 5' (1.524 m)   Wt 54 kg (119 lb)   SpO2 100%   Breastfeeding? No   BMI 23.24 kg/m²

## 2017-09-06 NOTE — DISCHARGE INSTRUCTIONS
Discharge Summary/Instructions for after Colonoscopy with Biopsy/Polypectomy    Florentino Bowman  9/6/2017  Quentin Mercado Jr., MD    Restrictions on Activity:    - Do not drive car or operate machinery until the day after the procedure.  - The following day: return to full activity including work.  - For 3 days: No heavy lifting, straining or running.  - Diet: Eat and drink normally unless instructed otherwise.    Treatment for Common Side Effects:  - Mild abdominal pain and bloating or excessive gas: rest, eat lightly and use a heating pad.     Symptoms to watch for and report to your physician:  1. Severe abdominal pain.  2. Fever within 24 hours after a procedure.  3. A large amount of rectal bleeding. (A small amount of blood from the rectum is not serious, especially if hemorrhoids are present.)  4. Because air was put into your colon during the procedure, expelling large amount of air from your rectum is normal.  5. You may not have a bowel movement for 1-3 days because of the colonoscopy prep. This is normal.  6. Go directly to the emergency room if you notice any of the following:     Chills and/or fever over 101   Persistent vomiting   Severe abdominal pain, other than gas cramps   Severe chest pain   Black, tarry stools   Any bleeding - exceeding one tablespoon    If you have any questions or problems, please call your Physician:    Quentin Mercado Jr., MD      Lab Results: Contact Physician's Office      If a complication or emergency situation arises and you are unable to reach your Physician - GO TO THE EMERGENCY ROOM.

## 2017-09-06 NOTE — ANESTHESIA POSTPROCEDURE EVALUATION
Anesthesia Post Evaluation    Patient: Florentino Bowman    Procedure(s) Performed: Procedure(s) (LRB):  COLONOSCOPY Miralax (N/A)    Final Anesthesia Type: MAC  Patient location during evaluation: GI PACU  Patient participation: Yes- Able to Participate  Level of consciousness: awake and alert and oriented  Post-procedure vital signs: reviewed and stable  Pain management: adequate  Airway patency: patent  PONV status at discharge: No PONV  Anesthetic complications: no      Cardiovascular status: blood pressure returned to baseline, hemodynamically stable and stable  Respiratory status: unassisted, spontaneous ventilation and room air  Hydration status: euvolemic  Follow-up not needed.        Visit Vitals  BP 95/65   Pulse 64   Temp 36.7 °C (98 °F)   Resp 16   Ht 5' (1.524 m)   Wt 54 kg (119 lb)   SpO2 97%   Breastfeeding? No   BMI 23.24 kg/m²       Pain/Юлия Score: Pain Assessment Performed: Yes (9/6/2017  7:06 AM)  Presence of Pain: denies (9/6/2017  7:06 AM)  Юлия Score: 9 (9/6/2017  8:14 AM)

## 2017-09-07 ENCOUNTER — HOSPITAL ENCOUNTER (OUTPATIENT)
Dept: RADIOLOGY | Facility: HOSPITAL | Age: 67
Discharge: HOME | End: 2017-09-07
Attending: FAMILY MEDICINE
Payer: MEDICARE

## 2017-09-07 VITALS
TEMPERATURE: 98 F | HEART RATE: 64 BPM | OXYGEN SATURATION: 100 % | WEIGHT: 119 LBS | RESPIRATION RATE: 17 BRPM | DIASTOLIC BLOOD PRESSURE: 91 MMHG | HEIGHT: 60 IN | BODY MASS INDEX: 23.36 KG/M2 | SYSTOLIC BLOOD PRESSURE: 154 MMHG

## 2017-09-07 VITALS — HEIGHT: 60 IN | WEIGHT: 118 LBS | BODY MASS INDEX: 23.16 KG/M2

## 2017-09-07 DIAGNOSIS — I77.89 OTHER SPECIFIED DISORDERS OF ARTERIES AND ARTERIOLES: ICD-10-CM

## 2017-09-07 DIAGNOSIS — M94.9 DISORDER OF CARTILAGE: ICD-10-CM

## 2017-09-07 DIAGNOSIS — R19.4 BOWEL HABIT CHANGES: ICD-10-CM

## 2017-09-07 DIAGNOSIS — I77.9 BILATERAL CAROTID ARTERY DISEASE: ICD-10-CM

## 2017-09-07 DIAGNOSIS — Z12.31 ENCOUNTER FOR SCREENING MAMMOGRAM FOR MALIGNANT NEOPLASM OF BREAST: ICD-10-CM

## 2017-09-07 DIAGNOSIS — M85.9 DISORDER OF BONE DENSITY AND STRUCTURE, UNSPECIFIED: ICD-10-CM

## 2017-09-07 PROCEDURE — 93880 EXTRACRANIAL BILAT STUDY: CPT | Mod: TC

## 2017-09-07 PROCEDURE — 77067 SCR MAMMO BI INCL CAD: CPT | Mod: TC

## 2017-09-07 PROCEDURE — 93880 EXTRACRANIAL BILAT STUDY: CPT | Mod: 26,,, | Performed by: RADIOLOGY

## 2017-09-07 PROCEDURE — 77080 DXA BONE DENSITY AXIAL: CPT | Mod: 26,,, | Performed by: RADIOLOGY

## 2017-09-07 PROCEDURE — 77067 SCR MAMMO BI INCL CAD: CPT | Mod: 26,,, | Performed by: RADIOLOGY

## 2017-09-07 PROCEDURE — 77080 DXA BONE DENSITY AXIAL: CPT | Mod: TC

## 2017-09-08 ENCOUNTER — TELEPHONE (OUTPATIENT)
Dept: FAMILY MEDICINE | Facility: CLINIC | Age: 67
End: 2017-09-08

## 2017-09-08 ENCOUNTER — PATIENT MESSAGE (OUTPATIENT)
Dept: FAMILY MEDICINE | Facility: CLINIC | Age: 67
End: 2017-09-08

## 2017-09-08 NOTE — TELEPHONE ENCOUNTER
Patient advised of bone density results. Patient states she already takes estradiol on a daily basis, patient would like to know if there is another estrogen medication that may be stronger to help with osteoporosis

## 2017-09-08 NOTE — TELEPHONE ENCOUNTER
----- Message from Fam Chou MD sent at 9/7/2017 10:48 PM CDT -----  Osteopenia, or mild osteoporosis in hips; estrogen replacement is good

## 2017-09-11 ENCOUNTER — PATIENT MESSAGE (OUTPATIENT)
Dept: PAIN MEDICINE | Facility: CLINIC | Age: 67
End: 2017-09-11

## 2017-09-12 ENCOUNTER — TELEPHONE (OUTPATIENT)
Dept: PAIN MEDICINE | Facility: CLINIC | Age: 67
End: 2017-09-12

## 2017-09-15 ENCOUNTER — TELEPHONE (OUTPATIENT)
Dept: GASTROENTEROLOGY | Facility: CLINIC | Age: 67
End: 2017-09-15

## 2017-09-15 NOTE — TELEPHONE ENCOUNTER
----- Message from Shahana Trujillo MA sent at 9/15/2017  9:54 AM CDT -----      ----- Message -----  From: Quentin Mercado Jr., MD  Sent: 9/14/2017   6:13 PM  To: Rogelio Bernstein ([place])    Polyp is an adenoma.  Follow-up colonoscopy in 5 years.  Random biopsies of the colon are normal.  This indicates there is no microscopic colitis.

## 2017-09-15 NOTE — TELEPHONE ENCOUNTER
----- Message from Shahana Trujillo MA sent at 9/15/2017  9:54 AM CDT -----      ----- Message -----  From: Quentin Mercado Jr., MD  Sent: 9/14/2017   6:13 PM  To: Rogelio Bernstein ([place])    Polyp is an adenoma.  Follow-up colonoscopy in 5 years.  Random biopsies of the colon are normal.  This indicates there is no microscopic colitis.

## 2017-09-19 DIAGNOSIS — M54.16 LUMBAR RADICULAR PAIN: Primary | ICD-10-CM

## 2017-09-19 RX ORDER — SODIUM CHLORIDE 9 MG/ML
500 INJECTION, SOLUTION INTRAVENOUS CONTINUOUS
Status: CANCELLED | OUTPATIENT
Start: 2017-09-19

## 2017-10-02 ENCOUNTER — PATIENT MESSAGE (OUTPATIENT)
Dept: FAMILY MEDICINE | Facility: CLINIC | Age: 67
End: 2017-10-02

## 2017-10-02 ENCOUNTER — TELEPHONE (OUTPATIENT)
Dept: FAMILY MEDICINE | Facility: CLINIC | Age: 67
End: 2017-10-02

## 2017-10-02 DIAGNOSIS — I77.9 BILATERAL CAROTID ARTERY DISEASE: ICD-10-CM

## 2017-10-02 DIAGNOSIS — G47.00 INSOMNIA, UNSPECIFIED TYPE: ICD-10-CM

## 2017-10-02 DIAGNOSIS — F41.9 ANXIETY: ICD-10-CM

## 2017-10-02 DIAGNOSIS — Z00.00 WELLNESS EXAMINATION: ICD-10-CM

## 2017-10-02 DIAGNOSIS — E03.4 HYPOTHYROIDISM DUE TO ACQUIRED ATROPHY OF THYROID: ICD-10-CM

## 2017-10-02 DIAGNOSIS — M94.9 DISORDER OF CARTILAGE: ICD-10-CM

## 2017-10-02 DIAGNOSIS — M47.26 OSTEOARTHRITIS OF SPINE WITH RADICULOPATHY, LUMBAR REGION: Primary | ICD-10-CM

## 2017-10-06 ENCOUNTER — TELEPHONE (OUTPATIENT)
Dept: FAMILY MEDICINE | Facility: CLINIC | Age: 67
End: 2017-10-06

## 2017-10-06 ENCOUNTER — LAB VISIT (OUTPATIENT)
Dept: LAB | Facility: HOSPITAL | Age: 67
End: 2017-10-06
Attending: FAMILY MEDICINE
Payer: MEDICARE

## 2017-10-06 DIAGNOSIS — G47.00 INSOMNIA, UNSPECIFIED TYPE: ICD-10-CM

## 2017-10-06 DIAGNOSIS — Z00.00 WELLNESS EXAMINATION: ICD-10-CM

## 2017-10-06 DIAGNOSIS — F41.9 ANXIETY: ICD-10-CM

## 2017-10-06 DIAGNOSIS — M94.9 DISORDER OF CARTILAGE: ICD-10-CM

## 2017-10-06 DIAGNOSIS — I77.9 BILATERAL CAROTID ARTERY DISEASE: ICD-10-CM

## 2017-10-06 DIAGNOSIS — M47.26 OSTEOARTHRITIS OF SPINE WITH RADICULOPATHY, LUMBAR REGION: ICD-10-CM

## 2017-10-06 DIAGNOSIS — E03.4 HYPOTHYROIDISM DUE TO ACQUIRED ATROPHY OF THYROID: ICD-10-CM

## 2017-10-06 LAB
25(OH)D3+25(OH)D2 SERPL-MCNC: 128 NG/ML
ALBUMIN SERPL BCP-MCNC: 3.8 G/DL
ALP SERPL-CCNC: 65 U/L
ALT SERPL W/O P-5'-P-CCNC: 16 U/L
ANION GAP SERPL CALC-SCNC: 5 MMOL/L
AST SERPL-CCNC: 19 U/L
BASOPHILS # BLD AUTO: 0.03 K/UL
BASOPHILS NFR BLD: 0.5 %
BILIRUB SERPL-MCNC: 0.7 MG/DL
BUN SERPL-MCNC: 11 MG/DL
CALCIUM SERPL-MCNC: 9.3 MG/DL
CHLORIDE SERPL-SCNC: 102 MMOL/L
CHOLEST SERPL-MCNC: 162 MG/DL
CHOLEST/HDLC SERPL: 2 {RATIO}
CO2 SERPL-SCNC: 29 MMOL/L
CREAT SERPL-MCNC: 0.7 MG/DL
DIFFERENTIAL METHOD: ABNORMAL
EOSINOPHIL # BLD AUTO: 0.1 K/UL
EOSINOPHIL NFR BLD: 1.6 %
ERYTHROCYTE [DISTWIDTH] IN BLOOD BY AUTOMATED COUNT: 12.5 %
EST. GFR  (AFRICAN AMERICAN): >60 ML/MIN/1.73 M^2
EST. GFR  (NON AFRICAN AMERICAN): >60 ML/MIN/1.73 M^2
GLUCOSE SERPL-MCNC: 86 MG/DL
HCT VFR BLD AUTO: 40 %
HDLC SERPL-MCNC: 82 MG/DL
HDLC SERPL: 50.6 %
HGB BLD-MCNC: 13.3 G/DL
LDLC SERPL CALC-MCNC: 69.6 MG/DL
LYMPHOCYTES # BLD AUTO: 1.8 K/UL
LYMPHOCYTES NFR BLD: 32.5 %
MCH RBC QN AUTO: 33.2 PG
MCHC RBC AUTO-ENTMCNC: 33.3 G/DL
MCV RBC AUTO: 100 FL
MONOCYTES # BLD AUTO: 0.4 K/UL
MONOCYTES NFR BLD: 7.8 %
NEUTROPHILS # BLD AUTO: 3.2 K/UL
NEUTROPHILS NFR BLD: 57.4 %
NONHDLC SERPL-MCNC: 80 MG/DL
PLATELET # BLD AUTO: 198 K/UL
PMV BLD AUTO: 10.3 FL
POTASSIUM SERPL-SCNC: 4 MMOL/L
PROT SERPL-MCNC: 7.3 G/DL
RBC # BLD AUTO: 4.01 M/UL
SODIUM SERPL-SCNC: 136 MMOL/L
T3FREE SERPL-MCNC: 2 PG/ML
T4 FREE SERPL-MCNC: 1.05 NG/DL
TRIGL SERPL-MCNC: 52 MG/DL
TSH SERPL DL<=0.005 MIU/L-ACNC: 3.75 UIU/ML
WBC # BLD AUTO: 5.5 K/UL

## 2017-10-06 PROCEDURE — 84481 FREE ASSAY (FT-3): CPT

## 2017-10-06 PROCEDURE — 84482 T3 REVERSE: CPT

## 2017-10-06 PROCEDURE — 85025 COMPLETE CBC W/AUTO DIFF WBC: CPT

## 2017-10-06 PROCEDURE — 84439 ASSAY OF FREE THYROXINE: CPT

## 2017-10-06 PROCEDURE — 84443 ASSAY THYROID STIM HORMONE: CPT

## 2017-10-06 PROCEDURE — 80061 LIPID PANEL: CPT

## 2017-10-06 PROCEDURE — 82306 VITAMIN D 25 HYDROXY: CPT

## 2017-10-06 PROCEDURE — 80053 COMPREHEN METABOLIC PANEL: CPT

## 2017-10-07 ENCOUNTER — PATIENT MESSAGE (OUTPATIENT)
Dept: FAMILY MEDICINE | Facility: CLINIC | Age: 67
End: 2017-10-07

## 2017-10-09 ENCOUNTER — TELEPHONE (OUTPATIENT)
Dept: FAMILY MEDICINE | Facility: CLINIC | Age: 67
End: 2017-10-09

## 2017-10-09 DIAGNOSIS — M54.5 CHRONIC LOW BACK PAIN, UNSPECIFIED BACK PAIN LATERALITY, WITH SCIATICA PRESENCE UNSPECIFIED: ICD-10-CM

## 2017-10-09 DIAGNOSIS — M54.10 RADICULOPATHY WITH LOWER EXTREMITY SYMPTOMS: ICD-10-CM

## 2017-10-09 DIAGNOSIS — M54.16 LUMBAR RADICULAR PAIN: ICD-10-CM

## 2017-10-09 DIAGNOSIS — G89.29 CHRONIC LOW BACK PAIN, UNSPECIFIED BACK PAIN LATERALITY, WITH SCIATICA PRESENCE UNSPECIFIED: ICD-10-CM

## 2017-10-09 DIAGNOSIS — M54.31 SCIATICA OF RIGHT SIDE: Primary | ICD-10-CM

## 2017-10-09 NOTE — TELEPHONE ENCOUNTER
----- Message from Fam Chou MD sent at 10/7/2017  3:11 PM CDT -----   lab results faxed  to Dr Ayala  Per dr chou  Pt reviewed results thru the portal

## 2017-10-09 NOTE — TELEPHONE ENCOUNTER
----- Message from Saima Conklin sent at 10/9/2017  3:13 PM CDT -----  Patient has a referral to Neurosurgery to see Dr. Latasha Fernandes. Dr. Fernandes is going to be out until the end of the year and will not return until January 2018. Patient has been scheduled with Dr. Lan Figueroa Neurosurgery in [place] on 11/14/17 @ 9:00am. Patient wants a call back from you stating that it is ok with you if she sees Dr. Figueroa since Dr. Fernandes is out.

## 2017-10-10 ENCOUNTER — OFFICE VISIT (OUTPATIENT)
Dept: PAIN MEDICINE | Facility: CLINIC | Age: 67
End: 2017-10-10
Payer: MEDICARE

## 2017-10-10 VITALS
BODY MASS INDEX: 23.49 KG/M2 | DIASTOLIC BLOOD PRESSURE: 68 MMHG | HEART RATE: 73 BPM | SYSTOLIC BLOOD PRESSURE: 116 MMHG | RESPIRATION RATE: 18 BRPM | HEIGHT: 60 IN | WEIGHT: 119.63 LBS

## 2017-10-10 DIAGNOSIS — M54.16 LUMBAR RADICULAR PAIN: Primary | ICD-10-CM

## 2017-10-10 DIAGNOSIS — M47.816 LUMBAR SPONDYLOSIS: ICD-10-CM

## 2017-10-10 DIAGNOSIS — M51.36 DDD (DEGENERATIVE DISC DISEASE), LUMBAR: ICD-10-CM

## 2017-10-10 LAB — T3REVERSE SERPL-MCNC: 30.4 NG/DL (ref 9–27)

## 2017-10-10 PROCEDURE — 99214 OFFICE O/P EST MOD 30 MIN: CPT | Mod: S$GLB,,, | Performed by: ANESTHESIOLOGY

## 2017-10-10 RX ORDER — PREGABALIN 75 MG/1
75 CAPSULE ORAL 2 TIMES DAILY
Qty: 60 CAPSULE | Refills: 6 | Status: SHIPPED | OUTPATIENT
Start: 2017-10-10 | End: 2017-12-06 | Stop reason: CLARIF

## 2017-10-10 RX ORDER — HYDROCODONE BITARTRATE AND ACETAMINOPHEN 7.5; 325 MG/1; MG/1
1 TABLET ORAL DAILY PRN
Qty: 30 TABLET | Refills: 0 | Status: SHIPPED | OUTPATIENT
Start: 2017-10-10 | End: 2017-12-26 | Stop reason: SDUPTHER

## 2017-10-10 NOTE — PROGRESS NOTES
Chronic patient Established Note (Follow up visit)      SUBJECTIVE:     Florentino Bowman presents to the clinic for a follow-up appointment for low back and right leg pain. Since the last visit, Florentino Bowman states the pain has been persistent. She reports only 20% improvement after right L4 and L5 TESI on 9/19. She complains today of continued bilateral low back pain that radiates into the anterior aspect of the right thigh. The leg pain is more significant than the back pain. She describes the pain in the thigh as burning in nature. The pain is made worse with activity and improved with sitting and rest.  She continues to do Pilates and rides her bike for exercise. She did not find that PT was helpful. She is scheduled to see Dr. Figueroa next month.      Pain Disability Index Review:  Last 3 PDI Scores 10/10/2017 8/15/2017 7/5/2017   Pain Disability Index (PDI) 35 32 14       Pain Medications:    - 1/2 tablet Norco 7.5/325 daily as needed       report:  Reviewed      Pain Procedures:  Right MICHAEL-TRANSFORAMINAL Right L4 and L5 (09/19/2017)  Right MICHAEL-TRANSFORAMINAL Right L4 and L5 (05/30/2017)  Lumbar Facet Injection (5/5/17) - no relief     Imaging:        MRI lumbar spine without contrast (5/03/17):    History:   low back pain., M54.31 Sciatica, right side.    Standard multiplanar noncontrast MRI sequences of the lumbar spine.    The conus is located at the L1 level.  No abnormal signal in distal cord.    L1-2: Minimal disc bulge.    L2-3: Minimal disc bulge.    L3-4: Minimal disc bulge.  Mild degenerative facets with mild facet hypertrophy.    L4-5: Disc space narrowing.  Circumferential disc bulge.  Mild-to-moderate central spinal stenosis.  Bilateral bony neural foraminal narrowing with possible L4 nerve impingement.  Facet hypertrophy.    L5-S1:  Desiccation of the disc with broad-based disc bulge.  Narrowing of the lateral recesses.  Bilateral bony neural foraminal narrowing without evidence of definite L5  nerve dimensions.     Impression:     Mild broad-based disc bulge at the L2-L3 level.  Circumferential disc bulge at the L3-L4 level with degenerative changes of the vertebral endplates, bilateral bony neural foramina narrowing but no definite nerve impingement.  Disc space narrowing with degenerative changes of the vertebral endplates at the L4-L5 level there is circumferential disc bulge and moderate central spinal stenosis.  Bilateral bony neural foraminal narrowing at the L4-L5 level with bilateral degenerative changes of the facets and possible bilateral L4 nerve impingement.  Desiccation of the disc with broad based disc bulge paramedian greater to the right side at the L5-S1 level with possible impingement on the right S1 nerve root.  Bilateral bony neural foraminal narrowing L5-S1 with possible but not definite L5 nerve root impingement      Current Medications:   Current Outpatient Prescriptions   Medication Sig Dispense Refill    alprazolam (XANAX) 0.5 MG tablet Take 1 tablet (0.5 mg total) by mouth nightly as needed. 30 tablet 5    atorvastatin (LIPITOR) 20 MG tablet Take 1 tablet (20 mg total) by mouth once daily. 90 tablet 3    citalopram (CELEXA) 20 MG tablet Take 1 tablet (20 mg total) by mouth once daily. 90 tablet 3    estradiol (ESTRACE) 1 MG tablet Take 1 mg by mouth once daily.       hydrocodone-acetaminophen 7.5-325mg (NORCO) 7.5-325 mg per tablet Take 1 tablet by mouth daily as needed. 30 tablet 0    levothyroxine (SYNTHROID) 125 MCG tablet Take 1 tablet (125 mcg total) by mouth once daily. 90 tablet 3    multivitamin capsule Take 1 capsule by mouth once daily.      progesterone (PROMETRIUM) 100 MG capsule Take 100 mg by mouth once daily.       sumatriptan (IMITREX) 100 MG tablet TAKE 1 TABLET BY MOUTH EVERY 2 HOURS AS NEEDED FOR MIGRAINE 9 tablet 9    zolpidem (AMBIEN) 10 mg Tab TAKE 1 TABLET BY MOUTH NIGHTLY AS NEEDED 30 tablet 5    ergocalciferol (DRISDOL) 8,000 unit/mL Drop Take  by mouth once daily.      pregabalin (LYRICA) 75 MG capsule Take 1 capsule (75 mg total) by mouth 2 (two) times daily. 60 capsule 6     No current facility-administered medications for this visit.        REVIEW OF SYSTEMS:    GENERAL:  No weight loss, malaise or fevers.  HEENT:  Negative for frequent or significant headaches.  NECK:  Negative for pain and significant neck swelling.  RESPIRATORY:  Negative for cough, wheezing or shortness of breath.  CARDIOVASCULAR:  Negative for chest pain, leg swelling or palpitations.  GI:  Negative for abdominal discomfort, blood in stools or black stools or change in bowel habits.  MUSCULOSKELETAL:  See HPI.  SKIN:  Negative for lesions, rash, and itching.  PSYCH:  + sleep disturbance  HEMATOLOGY/LYMPHOLOGY:  Negative for prolonged bleeding, bruising easily or swollen nodes.  NEURO:   No history of headaches, syncope, paralysis, seizures or tremors.  All other reviewed and negative other than HPI.    Past Medical History:  Past Medical History:   Diagnosis Date    Headache(784.0)     Hypothyroid        Past Surgical History:  Past Surgical History:   Procedure Laterality Date    BREAST BIOPSY       SECTION      x2    COLONOSCOPY N/A 2017    Procedure: COLONOSCOPY Miralax;  Surgeon: Quentin Mercado Jr., MD;  [facility];  Service: Endoscopy;  Laterality: N/A;    HYSTERECTOMY      LAPAROSCOPIC HYSTERECTOMY  2010    supracervical/BSO    TONSILLECTOMY         Family History:  Family History   Problem Relation Age of Onset    Dementia Mother     Hypertension Mother     Heart disease Mother     Cancer Father      colon    Cancer Sister      breast    Breast cancer Sister     Heart disease Sister     No Known Problems Daughter     No Known Problems Son     No Known Problems Sister        Social History:  Social History     Social History    Marital status:      Spouse name: N/A    Number of children: N/A    Years of education: N/A      Occupational History    retired       Social History Main Topics    Smoking status: Never Smoker    Smokeless tobacco: Never Used    Alcohol use Yes      Comment: social    Drug use: No    Sexual activity: Yes     Partners: Male     Other Topics Concern    None     Social History Narrative    , 2 adult children and exercises regularly-rides bike on the [employer],       OBJECTIVE:    /68 (BP Location: Left arm, Patient Position: Sitting, BP Method: Medium (Automatic))   Pulse 73   Resp 18   Ht 5' (1.524 m)   Wt 54.3 kg (119 lb 9.6 oz)   BMI 23.36 kg/m²     PHYSICAL EXAMINATION:    General appearance: Well appearing, in no acute distress, alert and oriented x3.  Psych:  Mood and affect appropriate.  Skin: Skin color, texture, turgor normal, no rashes or lesions, in both upper and lower body.  Head/face:  Atraumatic, normocephalic.   Cor: Radial pulses 2+  Pulm: Breathing unlabored.  GI: Abdomen soft and non-tender.  Back: No pain to palpation over the lumbar spine and paraspinous muscles. Positive for pain with facet loading. Normal range of motion.  Extremities: Peripheral joint ROM is full and pain free without obvious instability or laxity in all four extremities. No deformities, edema, or skin discoloration.   Musculoskeletal:  No atrophy or tone abnormalities are noted.  Neuro: Bilateral upper and lower extremity strength is normal and symmetric.  No loss of sensation is noted.  Gait: Normal.      ASSESSMENT: 67 y.o.  female with low back and right anterior thigh pain, consistent with discogenic pain and radiculitis. Florentino is now s/p lumbar facet injections with Dr. Herrera with no improvement. She is s/p right L4 and L5 x 2 with the first procedure providing moderate relief for approximately 1-2 months and only minimal improvement after the second procedure. I think it is reasonable for her to sit down with neurosurgery to discuss surgical options.     1. Lumbar  radicular pain    2. DDD (degenerative disc disease), lumbar    3. Lumbar spondylosis        PLAN:     - I have stressed the importance of physical activity and a home exercise plan to help with pain and improve health.  - Follow up with Dr. Figueroa as planned.  - Start Lyrica 75 mg BID.  - Rx Norco 7.5/325 daily as needed for moderate-severe pain.  - RTC in 6 weeks.      The above plan and management options were discussed at length with patient. Patient is in agreement with the above and verbalized understanding.    Sixto Moya III  10/10/2017

## 2017-11-14 ENCOUNTER — INITIAL CONSULT (OUTPATIENT)
Dept: NEUROSURGERY | Facility: CLINIC | Age: 67
End: 2017-11-14
Payer: MEDICARE

## 2017-11-14 VITALS
BODY MASS INDEX: 18.84 KG/M2 | SYSTOLIC BLOOD PRESSURE: 137 MMHG | DIASTOLIC BLOOD PRESSURE: 71 MMHG | WEIGHT: 124.31 LBS | HEART RATE: 71 BPM | HEIGHT: 68 IN

## 2017-11-14 DIAGNOSIS — M54.17 LUMBOSACRAL RADICULOPATHY AT L5: ICD-10-CM

## 2017-11-14 DIAGNOSIS — M48.061 BILATERAL STENOSIS OF LATERAL RECESS OF LUMBAR SPINE: ICD-10-CM

## 2017-11-14 DIAGNOSIS — M51.36 DDD (DEGENERATIVE DISC DISEASE), LUMBAR: Primary | ICD-10-CM

## 2017-11-14 PROCEDURE — 99213 OFFICE O/P EST LOW 20 MIN: CPT | Mod: PBBFAC,PO | Performed by: NEUROLOGICAL SURGERY

## 2017-11-14 PROCEDURE — 99204 OFFICE O/P NEW MOD 45 MIN: CPT | Mod: S$PBB,,, | Performed by: NEUROLOGICAL SURGERY

## 2017-11-14 PROCEDURE — 99999 PR PBB SHADOW E&M-EST. PATIENT-LVL III: CPT | Mod: PBBFAC,,, | Performed by: NEUROLOGICAL SURGERY

## 2017-11-14 NOTE — PROGRESS NOTES
NEUROSURGICAL OUTPATIENT CONSULTATION NOTE    DATE OF SERVICE:  11/14/2017    ATTENDING PHYSICIAN:  Lan Figueroa MD    CONSULT REQUESTED BY:  Dr Chou    REASON FOR CONSULT:  Right leg pain    SUBJECTIVE:    HISTORY OF PRESENT ILLNESS:  This is a very pleasant 67 y.o. female who has been complaining of persistent right leg pain since April 2017. The pain was traveling in the L5 distribution initially. She got good pain relief after a right L4 and L5 TFESI and then the pain came back in the proximal leg and posterior upper buttock area. She got a second TFESI without significant pain relief. She has been doing PT and pilates exercises without significant pain relief. She also complains of low back pain worse with back extension. She denies having weakness, numbness or sphincter dysfunction symptoms. She tried Lyrica but had too much brain fog side effects.      Low Back Pain Scale  R Low Back-Pain Score:  (Varies from 1-6)  R Low Back-Pain Intensity: Pain killers give complete relief from pain  R Low Back-Pain Score: I can look after myself normally but it causes extra pain  Low Back-Lifting: Pain prevents me from lifting heavy weights off the floor, but I can manage if they are conveniently positioned for example on a table   Low Back-Walking: Pain prevents me walking more than 1 mile   Low Back-Sitting: Pain prevents me from sitting more than 1 hour   Low Back-Standing: I have some pain on standing but it does not increase with time   Low Back-Sleeping: I have pain in bed but it does not prevent me from sleeping well   Low Back-Social Life: My social life is normal but it increases the degree of pain   Low Back-Traveling: I have extra pain while traveling but it does not compel me to seek alternate forms of travel   Low Back-Changing Degree of Pain: My pain seems to be getting better but improvement is slow         PAST MEDICAL HISTORY:  Active Ambulatory Problems     Diagnosis Date Noted    Headache(784.0)      Hypothyroid     Depression 2013    Insomnia 2013    Anxiety 2013    Leg pain 04/10/2014    Facet arthropathy 2014    Chronic back pain 2014    Carotid artery disease 2014    Radiculopathy with lower extremity symptoms 2015    Calcific tendonitis 2015    Left shoulder pain 2015    Sciatica of right side 2017    Osteoarthritis of spine with radiculopathy, lumbar region 2017    DDD (degenerative disc disease), lumbar 2017    Change in bowel habit 2017    Screening for colorectal cancer 2017    Lumbar radicular pain 2017     Resolved Ambulatory Problems     Diagnosis Date Noted    No Resolved Ambulatory Problems     Past Medical History:   Diagnosis Date    Headache(784.0)     Hypothyroid        PAST SURGICAL HISTORY:  Past Surgical History:   Procedure Laterality Date    BREAST BIOPSY       SECTION      x2    COLONOSCOPY N/A 2017    Procedure: COLONOSCOPY Miralax;  Surgeon: Quentin Mercado Jr., MD;  Location: [facility];  Service: Endoscopy;  Laterality: N/A;    HYSTERECTOMY      LAPAROSCOPIC HYSTERECTOMY      supracervical/BSO    TONSILLECTOMY         SOCIAL HISTORY:   Social History     Social History    Marital status:      Spouse name: N/A    Number of children: N/A    Years of education: N/A     Occupational History    retired       Social History Main Topics    Smoking status: Never Smoker    Smokeless tobacco: Never Used    Alcohol use Yes      Comment: social    Drug use: No    Sexual activity: Yes     Partners: Male     Other Topics Concern    Not on file     Social History Narrative    , 2 adult children and exercises regularly-rides bike on the [employer],       FAMILY HISTORY:  Family History   Problem Relation Age of Onset    Dementia Mother     Hypertension Mother     Heart disease Mother     Cancer Father      colon    Cancer Sister       breast    Breast cancer Sister     Heart disease Sister     No Known Problems Daughter     No Known Problems Son     No Known Problems Sister        CURRENTS MEDICATIONS:  Current Outpatient Prescriptions on File Prior to Visit   Medication Sig Dispense Refill    alprazolam (XANAX) 0.5 MG tablet Take 1 tablet (0.5 mg total) by mouth nightly as needed. 30 tablet 5    atorvastatin (LIPITOR) 20 MG tablet Take 1 tablet (20 mg total) by mouth once daily. 90 tablet 3    citalopram (CELEXA) 20 MG tablet Take 1 tablet (20 mg total) by mouth once daily. 90 tablet 3    estradiol (ESTRACE) 1 MG tablet Take 1 mg by mouth once daily.       levothyroxine (SYNTHROID) 125 MCG tablet Take 1 tablet (125 mcg total) by mouth once daily. 90 tablet 3    multivitamin capsule Take 1 capsule by mouth once daily.      progesterone (PROMETRIUM) 100 MG capsule Take 100 mg by mouth once daily.       sumatriptan (IMITREX) 100 MG tablet TAKE 1 TABLET BY MOUTH EVERY 2 HOURS AS NEEDED FOR MIGRAINE 9 tablet 9    zolpidem (AMBIEN) 10 mg Tab TAKE 1 TABLET BY MOUTH NIGHTLY AS NEEDED 30 tablet 5    ergocalciferol (DRISDOL) 8,000 unit/mL Drop Take by mouth once daily.      hydrocodone-acetaminophen 7.5-325mg (NORCO) 7.5-325 mg per tablet Take 1 tablet by mouth daily as needed. 30 tablet 0    pregabalin (LYRICA) 75 MG capsule Take 1 capsule (75 mg total) by mouth 2 (two) times daily. 60 capsule 6     No current facility-administered medications on file prior to visit.        ALLERGIES:  Review of patient's allergies indicates:  No Known Allergies    REVIEW OF SYSTEMS:  Review of Systems   Constitutional: Negative for diaphoresis, fever and weight loss.   Respiratory: Negative for shortness of breath.    Cardiovascular: Negative for chest pain.   Gastrointestinal: Negative for blood in stool.   Genitourinary: Negative for hematuria.   Endo/Heme/Allergies: Does not bruise/bleed easily.   All other systems reviewed and are  negative.      OBJECTIVE:    PHYSICAL EXAMINATION:   Vitals:    11/14/17 0933   BP: 137/71   Pulse: 71       Physical Exam:  Vitals reviewed.    Constitutional: She appears well-developed and well-nourished.     Eyes: Pupils are equal, round, and reactive to light. Conjunctivae and EOM are normal.     Cardiovascular: Normal distal pulses and no edema.     Abdominal: Soft.     Skin: Skin displays no rash on trunk and no rash on extremities. Skin displays no lesions on trunk and no lesions on extremities.     Psych/Behavior: She is alert. She is oriented to person, place, and time. She has a normal mood and affect.     Musculoskeletal:        Neck: Range of motion is full.     Neurological:        DTRs: Tricep reflexes are 2+ on the right side and 2+ on the left side. Bicep reflexes are 2+ on the right side and 2+ on the left side. Brachioradialis reflexes are 2+ on the right side and 2+ on the left side. Patellar reflexes are 2+ on the right side and 2+ on the left side. Achilles reflexes are 2+ on the right side and 2+ on the left side.       Back Exam     Tenderness   The patient is experiencing no tenderness.     Range of Motion   Extension: normal   Flexion: normal   Lateral Bend Right: normal   Lateral Bend Left: normal   Rotation Right: normal   Rotation Left: normal     Muscle Strength   Right Quadriceps:  5/5   Left Quadriceps:  5/5   Right Hamstrings:  5/5   Left Hamstrings:  5/5     Tests   Straight leg raise right: negative  Straight leg raise left: negative    Other   Toe Walk: normal  Heel Walk: normal            SI joint:   Palpation at the right and left SI joints not painful      Neurologic Exam     Mental Status   Oriented to person, place, and time.   Speech: speech is normal   Level of consciousness: alert    Cranial Nerves   Cranial nerves II through XII intact.     CN III, IV, VI   Pupils are equal, round, and reactive to light.  Extraocular motions are normal.     Motor Exam   Muscle bulk:  normal  Overall muscle tone: normal    Strength   Right deltoid: 5/5  Left deltoid: 5/5  Right biceps: 5/5  Left biceps: 5/5  Right triceps: 5/5  Left triceps: 5/5  Right wrist flexion: 5/5  Left wrist flexion: 5/5  Right wrist extension: 5/5  Left wrist extension: 5/5  Right interossei: 5/5  Left interossei: 5/5  Right iliopsoas: 5/5  Left iliopsoas: 5/5  Right quadriceps: 5/5  Left quadriceps: 5/5  Right hamstrin/5  Left hamstrin/5  Right anterior tibial: 5/5  Left anterior tibial: 5/5  Right posterior tibial: 5/5  Left posterior tibial: 5/5  Right peroneal: 5/5  Left peroneal: 5/5  Right gastroc: 5/5  Left gastroc: 5/5Right EHL 4+/5  Left EHL 5/5     Sensory Exam   Light touch normal.   Pinprick normal.     Gait, Coordination, and Reflexes     Gait  Gait: normal    Coordination   Finger to nose coordination: normal  Tandem walking coordination: normal    Reflexes   Right brachioradialis: 2+  Left brachioradialis: 2+  Right biceps: 2+  Left biceps: 2+  Right triceps: 2+  Left triceps: 2+  Right patellar: 2+  Left patellar: 2+  Right achilles: 2+  Left achilles: 2+  Right plantar: normal  Left plantar: normal  Right Troncoso: absent  Left [place]o: absent  Right ankle clonus: absent  Left ankle clonus: absent        DIAGNOSTIC DATA:  I personally reviewed the following imaging:   Lumbar spine MRI: 2017: severe degenerative disc disease at L4-5 with bilateral lateral recess stenosis and foraminal stenosis    ASSESMENT:  This is a 67 y.o. female with     Problem List Items Addressed This Visit        Neuro    DDD (degenerative disc disease), lumbar - Primary      Other Visit Diagnoses     Bilateral stenosis of lateral recess of lumbar spine        Lumbosacral radiculopathy at L5              PLAN:  I explained the natural history of the disease and all treatment options. I recommended a minimally invasive L4-5 laminectomy, medial facetectomy and foraminotomy.     We have discussed the risks of surgery  including bleeding, infection, failure of surgery, CSF leak, nerve root injury, spinal cord injury, weakness, paralysis, peripheral neuropathy, need for reoperation. Patient understands the risks and would like to proceed with surgery.            Lan iFgueroa MD  Pager: [phone]

## 2017-11-16 ENCOUNTER — TELEPHONE (OUTPATIENT)
Dept: NEUROSURGERY | Facility: CLINIC | Age: 67
End: 2017-11-16

## 2017-11-16 DIAGNOSIS — M54.16 SPINAL STENOSIS OF LUMBAR REGION WITH RADICULOPATHY: ICD-10-CM

## 2017-11-16 DIAGNOSIS — M51.36 DEGENERATION OF LUMBAR INTERVERTEBRAL DISC: Primary | ICD-10-CM

## 2017-11-16 DIAGNOSIS — M48.061 SPINAL STENOSIS OF LUMBAR REGION WITH RADICULOPATHY: ICD-10-CM

## 2017-11-18 RX ORDER — PROMETHAZINE HYDROCHLORIDE AND CODEINE PHOSPHATE 6.25; 1 MG/5ML; MG/5ML
5 SOLUTION ORAL EVERY 4 HOURS PRN
Qty: 240 ML | Refills: 0 | Status: SHIPPED | OUTPATIENT
Start: 2017-11-18 | End: 2017-11-28

## 2017-11-29 ENCOUNTER — TELEPHONE (OUTPATIENT)
Dept: NEUROSURGERY | Facility: CLINIC | Age: 67
End: 2017-11-29

## 2017-11-29 NOTE — TELEPHONE ENCOUNTER
Pt spoke with [facility] about moving surgery to main campus. Pt verbalized understanding.    ----- Message from Nirali Price sent at 11/29/2017  2:46 PM CST -----  Contact: Self/ [phone]  Patient is returning your call.  Please advise.

## 2017-12-06 ENCOUNTER — ANESTHESIA EVENT (OUTPATIENT)
Dept: SURGERY | Facility: HOSPITAL | Age: 67
End: 2017-12-06
Payer: MEDICARE

## 2017-12-06 DIAGNOSIS — M79.604 PAIN OF RIGHT LOWER EXTREMITY: Primary | ICD-10-CM

## 2017-12-06 RX ORDER — TRAMADOL HYDROCHLORIDE 50 MG/1
50 TABLET ORAL EVERY 12 HOURS PRN
COMMUNITY
End: 2017-12-26 | Stop reason: ALTCHOICE

## 2017-12-06 RX ORDER — MELOXICAM 15 MG/1
15 TABLET ORAL DAILY PRN
Status: ON HOLD | COMMUNITY
End: 2018-06-12

## 2017-12-06 NOTE — ANESTHESIA PREPROCEDURE EVALUATION
Pre-operative evaluation for Procedure(s) (LRB):  LAMINECTOMY-LUMBAR Right L4-5 Laminectomy, Medial Facetcetomy, and Foraminotomy (Right)    Florentino Bowman is a 67 y.o. female with PMHx of HLD, mild carotid stenosis (<50% bilaterally), hypothyroidism, lumbar stenosis, DDD, anxiety, and depression who presents for above procedure.     Prev airway: None documented    Patient Active Problem List   Diagnosis    Headache(784.0)    Hypothyroid    Depression    Insomnia    Anxiety    Leg pain    Facet arthropathy    Chronic back pain    Carotid artery disease    Radiculopathy with lower extremity symptoms    Calcific tendonitis    Left shoulder pain    Sciatica of right side    Osteoarthritis of spine with radiculopathy, lumbar region    DDD (degenerative disc disease), lumbar    Change in bowel habit    Screening for colorectal cancer    Lumbar radicular pain    Pre-op exam       Review of patient's allergies indicates:  No Known Allergies     No current facility-administered medications on file prior to encounter.      Current Outpatient Prescriptions on File Prior to Encounter   Medication Sig Dispense Refill    alprazolam (XANAX) 0.5 MG tablet Take 1 tablet (0.5 mg total) by mouth nightly as needed. 30 tablet 5    atorvastatin (LIPITOR) 20 MG tablet Take 1 tablet (20 mg total) by mouth once daily. (Patient taking differently: Take 20 mg by mouth once daily. TAKES 3 TIMES A WEEK) 90 tablet 3    citalopram (CELEXA) 20 MG tablet Take 1 tablet (20 mg total) by mouth once daily. 90 tablet 3    estradiol (ESTRACE) 1 MG tablet Take 1 mg by mouth every evening.       levothyroxine (SYNTHROID) 125 MCG tablet Take 1 tablet (125 mcg total) by mouth once daily. 90 tablet 3    progesterone (PROMETRIUM) 100 MG capsule Take 100 mg by mouth every evening.       sumatriptan (IMITREX) 100 MG tablet TAKE 1 TABLET BY MOUTH EVERY 2 HOURS AS NEEDED FOR MIGRAINE 9 tablet 9    zolpidem (AMBIEN) 10 mg Tab TAKE 1  TABLET BY MOUTH NIGHTLY AS NEEDED 30 tablet 5    multivitamin capsule Take 1 capsule by mouth once daily.         Past Surgical History:   Procedure Laterality Date    BREAST BIOPSY       SECTION      x2    COLONOSCOPY N/A 2017    Procedure: COLONOSCOPY Miralax;  Surgeon: Quentin Mercado Jr., MD;  Location: [facility];  Service: Endoscopy;  Laterality: N/A;    HYSTERECTOMY      LAPAROSCOPIC HYSTERECTOMY      supracervical/BSO    TONSILLECTOMY         Social History     Social History    Marital status:      Spouse name: N/A    Number of children: N/A    Years of education: N/A     Occupational History    retired       Social History Main Topics    Smoking status: Never Smoker    Smokeless tobacco: Never Used    Alcohol use Yes      Comment: social    Drug use: No    Sexual activity: Yes     Partners: Male     Other Topics Concern    Not on file     Social History Narrative    , 2 adult children and exercises regularly-rides bike on the [employer],         Vital Signs Range (Last 24H):  Temp:  [36.8 °C (98.2 °F)]   Pulse:  [66]   Resp:  [18]   BP: (140)/(71)   SpO2:  [99 %]       CBC: No results for input(s): WBC, RBC, HGB, HCT, PLT, MCV, MCH, MCHC in the last 72 hours.    CMP: No results for input(s): NA, K, CL, CO2, BUN, CREATININE, GLU, MG, PHOS, CALCIUM, ALBUMIN, PROT, ALKPHOS, ALT, AST, BILITOT in the last 72 hours.    INR  No results for input(s): PT, INR, PROTIME, APTT in the last 72 hours.        Diagnostic Studies:      EKG:  Normal sinus rhythm  Possible Left atrial enlargement  Nonspecific ST abnormality  Abnormal ECG  When compared with ECG of 11-DEC-2017 13:38,  No significant change was found            Anesthesia Assessment: Preoperative EQUATION    Planned Procedure: Procedure(s) (LRB):  LAMINECTOMY-LUMBAR Right L4-5 Laminectomy, Medial Facetcetomy, and Foraminotomy (Right)  Requested Anesthesia Type:General  Surgeon: Lan Figueroa,  MD  Service: Neurosurgery  Known or anticipated Date of Surgery:12/29/2017  Optimization:  Anesthesia Preop Clinic Assessment NOT Indicated       Plan:    Testing:  PT/INR, PTT, T&S, T&C and EKG        Consultation:Patient's PCP for a statement of optimization      Patient  has previously scheduled Medical Appointment:12/8  Pt cleared for general anesthesia and back surgery      Electronically signed by Fam Chou MD at 12/8/2017  9:38 AM          Navigation: Tests Scheduled.              Consults scheduled.             Results will be tracked by Preop Clinic.               No anesthesia preop clinic visit.                                                                                                      Anesthesia Evaluation    I have reviewed the Patient Summary Reports.     I have reviewed the Medications.     Review of Systems  Anesthesia Hx:  No problems with previous Anesthesia  History of prior surgery of interest to airway management or planning: Denies Family Hx of Anesthesia complications.   Denies Personal Hx of Anesthesia complications.   Social:  Social Alcohol Use, Non-Smoker    Hematology/Oncology:  Hematology Normal   Oncology Normal     EENT/Dental:EENT/Dental Normal   Cardiovascular:   Exercise tolerance: good hyperlipidemia    Pulmonary:  Pulmonary Normal    Renal/:  Renal/ Normal     Hepatic/GI:  Hepatic/GI Normal    Musculoskeletal:   Arthritis  Lumbar stenosis  Spine Disorders: lumbar Degenerative disease    Neurological:   Neuromuscular Disease, Headaches    Endocrine:   Hypothyroidism    Psych:   anxiety depression          Physical Exam  General:  Well nourished    Airway/Jaw/Neck:  Airway Findings: Mouth Opening: Normal Tongue: Normal  General Airway Assessment: Adult  Mallampati: II  Improves to I with phonation.  TM Distance: Normal, at least 6 cm     Eyes/Ears/Nose:  EYES/EARS/NOSE FINDINGS: Normal   Dental:  Dental Findings:   Chest/Lungs:  Chest/Lungs Findings: Clear to  auscultation, Normal Respiratory Rate     Heart/Vascular:  Heart Findings: Rate: Normal        Mental Status:  Mental Status Findings:  Cooperative, Alert and Oriented     BALDOMERO HAIRSTON 12/6/17    Anesthesia Plan  Type of Anesthesia, risks & benefits discussed:  Anesthesia Type:  general  Patient's Preference:   Intra-op Monitoring Plan: standard ASA monitors  Intra-op Monitoring Plan Comments:   Post Op Pain Control Plan:   Post Op Pain Control Plan Comments:   Induction:   IV  Beta Blocker:  Patient is not currently on a Beta-Blocker (No further documentation required).       Informed Consent: Patient understands risks and agrees with Anesthesia plan.  Questions answered. Anesthesia consent signed with patient.  ASA Score: 2     Day of Surgery Review of History & Physical: I have interviewed and examined the patient. I have reviewed the patient's H&P dated:    H&P update referred to the surgeon.         Ready For Surgery From Anesthesia Perspective.

## 2017-12-06 NOTE — PRE ADMISSION SCREENING
Anesthesia Assessment: Preoperative EQUATION    Planned Procedure: Procedure(s) (LRB):  LAMINECTOMY-LUMBAR Right L4-5 Laminectomy, Medial Facetcetomy, and Foraminotomy (Right)  Requested Anesthesia Type:General  Surgeon: Lan Figueroa MD  Service: Neurosurgery  Known or anticipated Date of Surgery:12/29/2017  Optimization:  Anesthesia Preop Clinic Assessment NOT Indicated         Plan:    Testing:  PT/INR, PTT, T&S, T&C and EKG        Consultation:Patient's PCP for a statement of optimization      Patient  has previously scheduled Medical Appointment:12/8    Navigation: Tests Scheduled.              Consults scheduled.             Results will be tracked by Preop Clinic.               No anesthesia preop clinic visit.

## 2017-12-08 ENCOUNTER — HOSPITAL ENCOUNTER (OUTPATIENT)
Dept: RADIOLOGY | Facility: HOSPITAL | Age: 67
Discharge: HOME | End: 2017-12-08
Attending: FAMILY MEDICINE
Payer: MEDICARE

## 2017-12-08 ENCOUNTER — TELEPHONE (OUTPATIENT)
Dept: FAMILY MEDICINE | Facility: CLINIC | Age: 67
End: 2017-12-08

## 2017-12-08 ENCOUNTER — OFFICE VISIT (OUTPATIENT)
Dept: FAMILY MEDICINE | Facility: CLINIC | Age: 67
End: 2017-12-08
Payer: MEDICARE

## 2017-12-08 VITALS
SYSTOLIC BLOOD PRESSURE: 122 MMHG | OXYGEN SATURATION: 100 % | HEIGHT: 68 IN | DIASTOLIC BLOOD PRESSURE: 82 MMHG | WEIGHT: 121.81 LBS | BODY MASS INDEX: 18.46 KG/M2 | HEART RATE: 87 BPM | TEMPERATURE: 98 F

## 2017-12-08 DIAGNOSIS — Z01.818 PRE-OP EXAM: ICD-10-CM

## 2017-12-08 DIAGNOSIS — Z01.818 PRE-OP EXAM: Primary | ICD-10-CM

## 2017-12-08 DIAGNOSIS — Z01.811 PRE-OP CHEST EXAM: Primary | ICD-10-CM

## 2017-12-08 DIAGNOSIS — Z01.811 PRE-OP CHEST EXAM: ICD-10-CM

## 2017-12-08 PROCEDURE — 99213 OFFICE O/P EST LOW 20 MIN: CPT | Mod: S$GLB,,, | Performed by: FAMILY MEDICINE

## 2017-12-08 PROCEDURE — 71020 XR CHEST PA AND LATERAL: CPT | Mod: TC,PO

## 2017-12-08 NOTE — PROGRESS NOTES
Subjective:      Patient ID: Florentino Bowman is a 67 y.o. female.    Chief Complaint: Pre-op Exam    Pre op exam for back surgery with Dr Figueroa at [facility]; no history of anesthesia or surgery complications; no family history of anesthesia or surgery complications      Review of Systems   Constitutional: Negative.    HENT: Negative.    Respiratory: Negative.    Cardiovascular: Negative.    Gastrointestinal: Negative.    Endocrine: Negative.    Genitourinary: Negative.    Musculoskeletal: Positive for back pain.   Psychiatric/Behavioral: Negative.    All other systems reviewed and are negative.    Objective:     Physical Exam   Constitutional: She is oriented to person, place, and time. She appears well-developed and well-nourished.   HENT:   Head: Normocephalic.   Eyes: Conjunctivae and EOM are normal. Pupils are equal, round, and reactive to light.   Neck: Normal range of motion. Neck supple.   Cardiovascular: Normal rate, regular rhythm and normal heart sounds.    Pulmonary/Chest: Effort normal and breath sounds normal.   Musculoskeletal: Normal range of motion.   Neurological: She is alert and oriented to person, place, and time. She has normal reflexes.   Skin: Skin is warm and dry.   Psychiatric: She has a normal mood and affect. Her behavior is normal. Judgment and thought content normal.   Nursing note and vitals reviewed.    Assessment:     1. Pre-op exam      Plan:     New Prescriptions    No medications on file     Discontinued Medications    No medications on file     Modified Medications    No medications on file       Pre-op exam      Pt cleared for general anesthesia and back surgery

## 2017-12-11 ENCOUNTER — HOSPITAL ENCOUNTER (OUTPATIENT)
Dept: CARDIOLOGY | Facility: HOSPITAL | Age: 67
Discharge: HOME | End: 2017-12-11
Attending: ANESTHESIOLOGY
Payer: MEDICARE

## 2017-12-11 DIAGNOSIS — M79.604 PAIN OF RIGHT LOWER EXTREMITY: ICD-10-CM

## 2017-12-11 PROCEDURE — 93010 ELECTROCARDIOGRAM REPORT: CPT | Mod: ,,, | Performed by: INTERNAL MEDICINE

## 2017-12-11 PROCEDURE — 93005 ELECTROCARDIOGRAM TRACING: CPT

## 2017-12-12 ENCOUNTER — PATIENT MESSAGE (OUTPATIENT)
Dept: FAMILY MEDICINE | Facility: CLINIC | Age: 67
End: 2017-12-12

## 2017-12-13 ENCOUNTER — TELEPHONE (OUTPATIENT)
Dept: FAMILY MEDICINE | Facility: CLINIC | Age: 67
End: 2017-12-13

## 2017-12-13 ENCOUNTER — PATIENT MESSAGE (OUTPATIENT)
Dept: SURGERY | Facility: HOSPITAL | Age: 67
End: 2017-12-13

## 2017-12-13 DIAGNOSIS — R94.31 ABNORMAL EKG: Primary | ICD-10-CM

## 2017-12-14 ENCOUNTER — OFFICE VISIT (OUTPATIENT)
Dept: CARDIOLOGY | Facility: CLINIC | Age: 67
End: 2017-12-14
Payer: MEDICARE

## 2017-12-14 VITALS
DIASTOLIC BLOOD PRESSURE: 81 MMHG | HEART RATE: 78 BPM | HEIGHT: 60 IN | BODY MASS INDEX: 23.75 KG/M2 | SYSTOLIC BLOOD PRESSURE: 131 MMHG | WEIGHT: 121 LBS | OXYGEN SATURATION: 98 %

## 2017-12-14 DIAGNOSIS — R94.31 ABNORMAL ECG: Primary | ICD-10-CM

## 2017-12-14 DIAGNOSIS — E03.4 HYPOTHYROIDISM DUE TO ACQUIRED ATROPHY OF THYROID: ICD-10-CM

## 2017-12-14 DIAGNOSIS — I77.9 BILATERAL CAROTID ARTERY DISEASE: ICD-10-CM

## 2017-12-14 DIAGNOSIS — Z01.818 PRE-OP EXAM: ICD-10-CM

## 2017-12-14 PROCEDURE — 93005 ELECTROCARDIOGRAM TRACING: CPT | Mod: PBBFAC,PO | Performed by: NURSE PRACTITIONER

## 2017-12-14 PROCEDURE — 99999 PR PBB SHADOW E&M-EST. PATIENT-LVL III: CPT | Mod: PBBFAC,,, | Performed by: INTERNAL MEDICINE

## 2017-12-14 PROCEDURE — 99204 OFFICE O/P NEW MOD 45 MIN: CPT | Mod: S$PBB,,, | Performed by: INTERNAL MEDICINE

## 2017-12-14 PROCEDURE — 93010 ELECTROCARDIOGRAM REPORT: CPT | Mod: ,,, | Performed by: INTERNAL MEDICINE

## 2017-12-14 PROCEDURE — 99213 OFFICE O/P EST LOW 20 MIN: CPT | Mod: PBBFAC,PO | Performed by: INTERNAL MEDICINE

## 2017-12-14 NOTE — LETTER
December 14, 2017      Fam Chou MD  [address] [facility] [address]           [facility] - Cardiology  [address], [address]  [facility] [address]  Phone: [phone]  Fax: [phone]          Patient: Florentino Bowman   MR Number: [id number]   YOB: 1950   Date of Visit: 12/14/2017       Dear Dr. Fam Chou:    Thank you for referring Florentino Bowman to me for evaluation. Attached you will find relevant portions of my assessment and plan of care.    If you have questions, please do not hesitate to call me. I look forward to following Florentino Bowman along with you.    Sincerely,    Florian Freitas MD    Enclosure  CC:  No Recipients    If you would like to receive this communication electronically, please contact [email or web address] or [phone] to request more information on [employer] access.    For providers and/or their staff who would like to refer a patient to [facility], please contact us through our one-stop-shop provider referral line, [facility], at [phone].    If you feel you have received this communication in error or would no longer like to receive these types of communications, please e-mail [email or web address]

## 2017-12-14 NOTE — PROGRESS NOTES
"Subjective:    Patient ID:  Florentino Bowman is a 67 y.o. female who presents for evaluation of Abnormal ECG      HPI  68 y/o female with hx of hypothyroidism, chronic back pain mild bilateral carotid artery disease per carotid artery US who presents for evaluation of abnormal ECG. Had ECG performed as part of pre-op evaluation prior to back surgery. ECG with NSR, poss LAE, IRBBB, prolonged QT. On my interpretation shows NSR, poss IRBBB. She had no cardiac complaints at full activity and more specifically denies CP, SOB/DIETZ, orthopnea, PND, palps, syncope, LE edema. Is very active, does not smoke, and drinks about 2 glasses of wine every other night. She did have an episode earlier this year while on vacation in [place] where she "felt bad," her left eye developed red sclera, was taken to the local hospital for evaluation which was unrevealing. She states that in the ambulance SBP was in the 180's. Normally her BP usually in the 120-130's, but she does not check it regularly.     Review of Systems   Constitution: Negative for weakness and malaise/fatigue.   HENT: Negative for congestion.    Eyes: Negative for blurred vision.   Cardiovascular: Negative for chest pain, claudication, cyanosis, dyspnea on exertion, irregular heartbeat, leg swelling, near-syncope, orthopnea, palpitations, paroxysmal nocturnal dyspnea and syncope.   Respiratory: Negative for shortness of breath.    Endocrine: Negative for polyuria.   Hematologic/Lymphatic: Negative for bleeding problem.   Skin: Negative for itching and rash.   Musculoskeletal: Negative for joint swelling, muscle cramps and muscle weakness.   Gastrointestinal: Negative for abdominal pain, hematemesis, hematochezia, melena, nausea and vomiting.   Genitourinary: Negative for dysuria and hematuria.   Neurological: Negative for dizziness, focal weakness, headaches, light-headedness and loss of balance.   Psychiatric/Behavioral: Negative for depression. The patient is not " nervous/anxious.         Objective:    Physical Exam   Constitutional: She is oriented to person, place, and time. She appears well-developed and well-nourished.   HENT:   Head: Normocephalic and atraumatic.   Neck: Neck supple. No JVD present.   Cardiovascular: Normal rate, regular rhythm and normal heart sounds.    Pulses:       Carotid pulses are 2+ on the right side, and 2+ on the left side.       Radial pulses are 2+ on the right side, and 2+ on the left side.        Femoral pulses are 2+ on the right side, and 2+ on the left side.       Dorsalis pedis pulses are 2+ on the right side, and 2+ on the left side.        Posterior tibial pulses are 2+ on the right side, and 2+ on the left side.   Pulmonary/Chest: Effort normal and breath sounds normal.   Abdominal: Soft. Bowel sounds are normal.   Musculoskeletal: She exhibits no edema.   Neurological: She is alert and oriented to person, place, and time.   Skin: Skin is warm and dry.   Psychiatric: She has a normal mood and affect. Her behavior is normal. Thought content normal.         Assessment:       1. Abnormal ECG    2. Bilateral carotid artery disease    3. Hypothyroidism due to acquired atrophy of thyroid    4. Pre-op exam      68 y/o female with hx and presentation as above. Doing well from a cardiac perspective with no active cardiac complaints. Repeat ECG in clinic today per my interpretation shows NSR with non specific ST/T wave abnormality. No ischemic or acute changes and current ECG does not portend a poor prognosis and clinically is likely irrelevant. No further workup recommended. Discussed the importance of heart healthy diet, regular exercise. She was told to check BP BID for the next few weeks and call clinic with results.        Plan:       -The patient is at an acceptable risk from a cardiac perspective for upcoming non cardiac surgery

## 2017-12-26 ENCOUNTER — PATIENT MESSAGE (OUTPATIENT)
Dept: PAIN MEDICINE | Facility: CLINIC | Age: 67
End: 2017-12-26

## 2017-12-26 DIAGNOSIS — M51.36 DDD (DEGENERATIVE DISC DISEASE), LUMBAR: ICD-10-CM

## 2017-12-26 DIAGNOSIS — M54.16 LUMBAR RADICULAR PAIN: ICD-10-CM

## 2017-12-26 DIAGNOSIS — M54.16 LUMBAR RADICULAR PAIN: Primary | ICD-10-CM

## 2017-12-26 RX ORDER — HYDROCODONE BITARTRATE AND ACETAMINOPHEN 7.5; 325 MG/1; MG/1
1 TABLET ORAL EVERY 12 HOURS PRN
Qty: 30 TABLET | Refills: 0 | Status: ON HOLD | OUTPATIENT
Start: 2017-12-26 | End: 2017-12-29 | Stop reason: HOSPADM

## 2017-12-26 RX ORDER — HYDROCODONE BITARTRATE AND ACETAMINOPHEN 7.5; 325 MG/1; MG/1
1 TABLET ORAL EVERY 12 HOURS PRN
Qty: 6 TABLET | Refills: 0 | Status: SHIPPED | OUTPATIENT
Start: 2017-12-26 | End: 2017-12-26 | Stop reason: SDUPTHER

## 2017-12-26 NOTE — TELEPHONE ENCOUNTER
Please send to [facility]   hydrocodone-acetaminophen 7.5-325mg (NORCO) 7.5-325 mg per tablet  Opioid Risk Tool Score   None   (TOOL NOT COMPLETED) Current Potential Daily Morphine Equivalence = 15 mg MEDD   Take 1 tablet by mouth every 12 (twelve) hours as needed.   Normal, Disp-30 tablet, R-

## 2017-12-28 ENCOUNTER — TELEPHONE (OUTPATIENT)
Dept: NEUROLOGY | Facility: CLINIC | Age: 67
End: 2017-12-28

## 2017-12-28 DIAGNOSIS — Z01.818 PRE-OP EXAM: Primary | ICD-10-CM

## 2017-12-28 NOTE — TELEPHONE ENCOUNTER
----- Message from Lana Lloyd sent at 12/28/2017  3:26 PM CST -----  Contact: [phone] self  Patient would like to speak with the nurse to verify what time she needs to come for her procedure tomorrow. Please call and advise.

## 2017-12-29 ENCOUNTER — HOSPITAL ENCOUNTER (OUTPATIENT)
Facility: HOSPITAL | Age: 67
Discharge: HOME | End: 2017-12-29
Attending: NEUROLOGICAL SURGERY | Admitting: NEUROLOGICAL SURGERY
Payer: MEDICARE

## 2017-12-29 ENCOUNTER — ANESTHESIA (OUTPATIENT)
Dept: SURGERY | Facility: HOSPITAL | Age: 67
End: 2017-12-29
Payer: MEDICARE

## 2017-12-29 VITALS
WEIGHT: 120 LBS | RESPIRATION RATE: 17 BRPM | DIASTOLIC BLOOD PRESSURE: 60 MMHG | HEART RATE: 65 BPM | OXYGEN SATURATION: 98 % | TEMPERATURE: 98 F | HEIGHT: 60 IN | BODY MASS INDEX: 23.56 KG/M2 | SYSTOLIC BLOOD PRESSURE: 99 MMHG

## 2017-12-29 DIAGNOSIS — M54.10 RADICULOPATHY WITH LOWER EXTREMITY SYMPTOMS: Primary | ICD-10-CM

## 2017-12-29 LAB
ABO + RH BLD: NORMAL
BLD GP AB SCN CELLS X3 SERPL QL: NORMAL

## 2017-12-29 PROCEDURE — 25000003 PHARM REV CODE 250: Performed by: STUDENT IN AN ORGANIZED HEALTH CARE EDUCATION/TRAINING PROGRAM

## 2017-12-29 PROCEDURE — 37000008 HC ANESTHESIA 1ST 15 MINUTES: Performed by: NEUROLOGICAL SURGERY

## 2017-12-29 PROCEDURE — 71000015 HC POSTOP RECOV 1ST HR: Performed by: NEUROLOGICAL SURGERY

## 2017-12-29 PROCEDURE — 71000033 HC RECOVERY, INTIAL HOUR: Performed by: NEUROLOGICAL SURGERY

## 2017-12-29 PROCEDURE — 71000039 HC RECOVERY, EACH ADD'L HOUR: Performed by: NEUROLOGICAL SURGERY

## 2017-12-29 PROCEDURE — 63047 LAM FACETEC & FORAMOT LUMBAR: CPT | Mod: GC,,, | Performed by: NEUROLOGICAL SURGERY

## 2017-12-29 PROCEDURE — 63600175 PHARM REV CODE 636 W HCPCS: Performed by: NEUROLOGICAL SURGERY

## 2017-12-29 PROCEDURE — 63600175 PHARM REV CODE 636 W HCPCS: Performed by: STUDENT IN AN ORGANIZED HEALTH CARE EDUCATION/TRAINING PROGRAM

## 2017-12-29 PROCEDURE — C1713 ANCHOR/SCREW BN/BN,TIS/BN: HCPCS | Performed by: NEUROLOGICAL SURGERY

## 2017-12-29 PROCEDURE — 27201423 OPTIME MED/SURG SUP & DEVICES STERILE SUPPLY: Performed by: NEUROLOGICAL SURGERY

## 2017-12-29 PROCEDURE — 86920 COMPATIBILITY TEST SPIN: CPT

## 2017-12-29 PROCEDURE — 37000009 HC ANESTHESIA EA ADD 15 MINS: Performed by: NEUROLOGICAL SURGERY

## 2017-12-29 PROCEDURE — C9290 INJ, BUPIVACAINE LIPOSOME: HCPCS | Performed by: NEUROLOGICAL SURGERY

## 2017-12-29 PROCEDURE — 36000710: Performed by: NEUROLOGICAL SURGERY

## 2017-12-29 PROCEDURE — D9220A PRA ANESTHESIA: Mod: ,,, | Performed by: ANESTHESIOLOGY

## 2017-12-29 PROCEDURE — 86901 BLOOD TYPING SEROLOGIC RH(D): CPT

## 2017-12-29 PROCEDURE — 71000016 HC POSTOP RECOV ADDL HR: Performed by: NEUROLOGICAL SURGERY

## 2017-12-29 PROCEDURE — 27201037 HC PRESSURE MONITORING SET UP

## 2017-12-29 PROCEDURE — 25000003 PHARM REV CODE 250: Performed by: NEUROLOGICAL SURGERY

## 2017-12-29 PROCEDURE — 63600175 PHARM REV CODE 636 W HCPCS

## 2017-12-29 PROCEDURE — 36000711: Performed by: NEUROLOGICAL SURGERY

## 2017-12-29 RX ORDER — ONDANSETRON 2 MG/ML
4 INJECTION INTRAMUSCULAR; INTRAVENOUS DAILY PRN
Status: DISCONTINUED | OUTPATIENT
Start: 2017-12-29 | End: 2017-12-29 | Stop reason: HOSPADM

## 2017-12-29 RX ORDER — KETAMINE HCL IN 0.9 % NACL 50 MG/5 ML
SYRINGE (ML) INTRAVENOUS
Status: DISCONTINUED | OUTPATIENT
Start: 2017-12-29 | End: 2017-12-29

## 2017-12-29 RX ORDER — BACITRACIN 50000 [IU]/1
INJECTION, POWDER, FOR SOLUTION INTRAMUSCULAR
Status: DISCONTINUED | OUTPATIENT
Start: 2017-12-29 | End: 2017-12-29 | Stop reason: HOSPADM

## 2017-12-29 RX ORDER — METHOCARBAMOL 500 MG/1
TABLET, FILM COATED ORAL
Status: DISCONTINUED
Start: 2017-12-29 | End: 2017-12-29 | Stop reason: HOSPADM

## 2017-12-29 RX ORDER — ACETAMINOPHEN 10 MG/ML
INJECTION, SOLUTION INTRAVENOUS
Status: DISCONTINUED | OUTPATIENT
Start: 2017-12-29 | End: 2017-12-29

## 2017-12-29 RX ORDER — ROCURONIUM BROMIDE 10 MG/ML
INJECTION, SOLUTION INTRAVENOUS
Status: DISCONTINUED | OUTPATIENT
Start: 2017-12-29 | End: 2017-12-29

## 2017-12-29 RX ORDER — NEOSTIGMINE METHYLSULFATE 1 MG/ML
INJECTION, SOLUTION INTRAVENOUS
Status: DISCONTINUED | OUTPATIENT
Start: 2017-12-29 | End: 2017-12-29

## 2017-12-29 RX ORDER — HYDROCODONE BITARTRATE AND ACETAMINOPHEN 10; 325 MG/1; MG/1
TABLET ORAL
Status: DISCONTINUED
Start: 2017-12-29 | End: 2017-12-29 | Stop reason: HOSPADM

## 2017-12-29 RX ORDER — METHOCARBAMOL 500 MG/1
500 TABLET, FILM COATED ORAL 4 TIMES DAILY
Status: DISCONTINUED | OUTPATIENT
Start: 2017-12-29 | End: 2017-12-29 | Stop reason: HOSPADM

## 2017-12-29 RX ORDER — CEFAZOLIN SODIUM 1 G/3ML
2 INJECTION, POWDER, FOR SOLUTION INTRAMUSCULAR; INTRAVENOUS
Status: COMPLETED | OUTPATIENT
Start: 2017-12-29 | End: 2017-12-29

## 2017-12-29 RX ORDER — HYDROMORPHONE HYDROCHLORIDE 2 MG/ML
INJECTION, SOLUTION INTRAMUSCULAR; INTRAVENOUS; SUBCUTANEOUS
Status: COMPLETED
Start: 2017-12-29 | End: 2017-12-29

## 2017-12-29 RX ORDER — METHOCARBAMOL 500 MG/1
500 TABLET, FILM COATED ORAL 4 TIMES DAILY
Qty: 40 TABLET | Refills: 0 | Status: SHIPPED | OUTPATIENT
Start: 2017-12-29 | End: 2018-01-08

## 2017-12-29 RX ORDER — LIDOCAINE HCL/PF 100 MG/5ML
SYRINGE (ML) INTRAVENOUS
Status: DISCONTINUED | OUTPATIENT
Start: 2017-12-29 | End: 2017-12-29

## 2017-12-29 RX ORDER — SODIUM CHLORIDE 9 MG/ML
INJECTION, SOLUTION INTRAVENOUS CONTINUOUS PRN
Status: DISCONTINUED | OUTPATIENT
Start: 2017-12-29 | End: 2017-12-29

## 2017-12-29 RX ORDER — HYDROMORPHONE HYDROCHLORIDE 2 MG/ML
0.2 INJECTION, SOLUTION INTRAMUSCULAR; INTRAVENOUS; SUBCUTANEOUS EVERY 5 MIN PRN
Status: DISCONTINUED | OUTPATIENT
Start: 2017-12-29 | End: 2017-12-29 | Stop reason: HOSPADM

## 2017-12-29 RX ORDER — SODIUM CHLORIDE 0.9 % (FLUSH) 0.9 %
3 SYRINGE (ML) INJECTION
Status: DISCONTINUED | OUTPATIENT
Start: 2017-12-29 | End: 2017-12-29 | Stop reason: HOSPADM

## 2017-12-29 RX ORDER — MUPIROCIN 20 MG/G
1 OINTMENT TOPICAL
Status: DISCONTINUED | OUTPATIENT
Start: 2017-12-29 | End: 2017-12-29 | Stop reason: HOSPADM

## 2017-12-29 RX ORDER — MUPIROCIN 20 MG/G
OINTMENT TOPICAL
Status: DISCONTINUED | OUTPATIENT
Start: 2017-12-29 | End: 2017-12-29 | Stop reason: HOSPADM

## 2017-12-29 RX ORDER — HYDROCODONE BITARTRATE AND ACETAMINOPHEN 10; 325 MG/1; MG/1
1 TABLET ORAL EVERY 6 HOURS PRN
Qty: 75 TABLET | Refills: 0 | Status: SHIPPED | OUTPATIENT
Start: 2017-12-29 | End: 2018-03-20

## 2017-12-29 RX ORDER — ONDANSETRON 2 MG/ML
INJECTION INTRAMUSCULAR; INTRAVENOUS
Status: DISCONTINUED | OUTPATIENT
Start: 2017-12-29 | End: 2017-12-29

## 2017-12-29 RX ORDER — PHENYLEPHRINE HYDROCHLORIDE 10 MG/ML
INJECTION INTRAVENOUS
Status: DISCONTINUED | OUTPATIENT
Start: 2017-12-29 | End: 2017-12-29

## 2017-12-29 RX ORDER — BUPIVACAINE HYDROCHLORIDE AND EPINEPHRINE 5; 5 MG/ML; UG/ML
INJECTION, SOLUTION EPIDURAL; INTRACAUDAL; PERINEURAL
Status: DISCONTINUED | OUTPATIENT
Start: 2017-12-29 | End: 2017-12-29 | Stop reason: HOSPADM

## 2017-12-29 RX ORDER — FENTANYL CITRATE 50 UG/ML
INJECTION, SOLUTION INTRAMUSCULAR; INTRAVENOUS
Status: DISCONTINUED | OUTPATIENT
Start: 2017-12-29 | End: 2017-12-29

## 2017-12-29 RX ORDER — PROPOFOL 10 MG/ML
VIAL (ML) INTRAVENOUS
Status: DISCONTINUED | OUTPATIENT
Start: 2017-12-29 | End: 2017-12-29

## 2017-12-29 RX ORDER — HYDROMORPHONE HYDROCHLORIDE 2 MG/1
2 TABLET ORAL EVERY 4 HOURS PRN
Qty: 40 TABLET | Refills: 0 | Status: SHIPPED | OUTPATIENT
Start: 2017-12-29 | End: 2018-03-20

## 2017-12-29 RX ORDER — LIDOCAINE HYDROCHLORIDE AND EPINEPHRINE 10; 10 MG/ML; UG/ML
INJECTION, SOLUTION INFILTRATION; PERINEURAL
Status: DISCONTINUED | OUTPATIENT
Start: 2017-12-29 | End: 2017-12-29 | Stop reason: HOSPADM

## 2017-12-29 RX ORDER — MIDAZOLAM HYDROCHLORIDE 1 MG/ML
INJECTION, SOLUTION INTRAMUSCULAR; INTRAVENOUS
Status: DISCONTINUED | OUTPATIENT
Start: 2017-12-29 | End: 2017-12-29

## 2017-12-29 RX ORDER — VANCOMYCIN HYDROCHLORIDE 1 G/20ML
INJECTION, POWDER, LYOPHILIZED, FOR SOLUTION INTRAVENOUS
Status: DISCONTINUED | OUTPATIENT
Start: 2017-12-29 | End: 2017-12-29 | Stop reason: HOSPADM

## 2017-12-29 RX ORDER — GLYCOPYRROLATE 0.2 MG/ML
INJECTION INTRAMUSCULAR; INTRAVENOUS
Status: DISCONTINUED | OUTPATIENT
Start: 2017-12-29 | End: 2017-12-29

## 2017-12-29 RX ORDER — SUCCINYLCHOLINE CHLORIDE 20 MG/ML
INJECTION INTRAMUSCULAR; INTRAVENOUS
Status: DISCONTINUED | OUTPATIENT
Start: 2017-12-29 | End: 2017-12-29

## 2017-12-29 RX ORDER — HYDROCODONE BITARTRATE AND ACETAMINOPHEN 10; 325 MG/1; MG/1
1 TABLET ORAL ONCE
Status: COMPLETED | OUTPATIENT
Start: 2017-12-29 | End: 2017-12-29

## 2017-12-29 RX ADMIN — PHENYLEPHRINE HYDROCHLORIDE 100 MCG: 10 INJECTION INTRAVENOUS at 08:12

## 2017-12-29 RX ADMIN — ROCURONIUM BROMIDE 5 MG: 10 INJECTION, SOLUTION INTRAVENOUS at 07:12

## 2017-12-29 RX ADMIN — ACETAMINOPHEN 1000 MG: 10 INJECTION, SOLUTION INTRAVENOUS at 08:12

## 2017-12-29 RX ADMIN — HYDROMORPHONE HYDROCHLORIDE 0.2 MG: 2 INJECTION INTRAMUSCULAR; INTRAVENOUS; SUBCUTANEOUS at 09:12

## 2017-12-29 RX ADMIN — FENTANYL CITRATE 50 MCG: 50 INJECTION, SOLUTION INTRAMUSCULAR; INTRAVENOUS at 07:12

## 2017-12-29 RX ADMIN — NEOSTIGMINE METHYLSULFATE 5 MG: 1 INJECTION INTRAVENOUS at 09:12

## 2017-12-29 RX ADMIN — LIDOCAINE HYDROCHLORIDE 80 MG: 20 INJECTION, SOLUTION INTRAVENOUS at 07:12

## 2017-12-29 RX ADMIN — HYDROMORPHONE HYDROCHLORIDE 0.2 MG: 2 INJECTION INTRAMUSCULAR; INTRAVENOUS; SUBCUTANEOUS at 10:12

## 2017-12-29 RX ADMIN — MIDAZOLAM HYDROCHLORIDE 1 MG: 1 INJECTION, SOLUTION INTRAMUSCULAR; INTRAVENOUS at 07:12

## 2017-12-29 RX ADMIN — METHOCARBAMOL 500 MG: 500 TABLET ORAL at 10:12

## 2017-12-29 RX ADMIN — SODIUM CHLORIDE, SODIUM GLUCONATE, SODIUM ACETATE, POTASSIUM CHLORIDE, MAGNESIUM CHLORIDE, SODIUM PHOSPHATE, DIBASIC, AND POTASSIUM PHOSPHATE: .53; .5; .37; .037; .03; .012; .00082 INJECTION, SOLUTION INTRAVENOUS at 08:12

## 2017-12-29 RX ADMIN — GLYCOPYRROLATE 0.6 MG: 0.2 INJECTION, SOLUTION INTRAMUSCULAR; INTRAVENOUS at 09:12

## 2017-12-29 RX ADMIN — PHENYLEPHRINE HYDROCHLORIDE 100 MCG: 10 INJECTION INTRAVENOUS at 09:12

## 2017-12-29 RX ADMIN — PHENYLEPHRINE HYDROCHLORIDE 200 MCG: 10 INJECTION INTRAVENOUS at 08:12

## 2017-12-29 RX ADMIN — ROCURONIUM BROMIDE 15 MG: 10 INJECTION, SOLUTION INTRAVENOUS at 07:12

## 2017-12-29 RX ADMIN — SUCCINYLCHOLINE CHLORIDE 160 MG: 20 INJECTION, SOLUTION INTRAMUSCULAR; INTRAVENOUS at 07:12

## 2017-12-29 RX ADMIN — MUPIROCIN: 20 OINTMENT TOPICAL at 06:12

## 2017-12-29 RX ADMIN — GLYCOPYRROLATE 0.2 MG: 0.2 INJECTION, SOLUTION INTRAMUSCULAR; INTRAVENOUS at 07:12

## 2017-12-29 RX ADMIN — PROPOFOL 50 MG: 10 INJECTION, EMULSION INTRAVENOUS at 07:12

## 2017-12-29 RX ADMIN — SODIUM CHLORIDE: 0.9 INJECTION, SOLUTION INTRAVENOUS at 06:12

## 2017-12-29 RX ADMIN — PROPOFOL 150 MG: 10 INJECTION, EMULSION INTRAVENOUS at 07:12

## 2017-12-29 RX ADMIN — CEFAZOLIN 2 G: 330 INJECTION, POWDER, FOR SOLUTION INTRAMUSCULAR; INTRAVENOUS at 08:12

## 2017-12-29 RX ADMIN — HYDROCODONE BITARTRATE AND ACETAMINOPHEN 1 TABLET: 10; 325 TABLET ORAL at 10:12

## 2017-12-29 RX ADMIN — ONDANSETRON 4 MG: 2 INJECTION INTRAMUSCULAR; INTRAVENOUS at 09:12

## 2017-12-29 RX ADMIN — Medication 30 MG: at 08:12

## 2017-12-29 RX ADMIN — ROCURONIUM BROMIDE 10 MG: 10 INJECTION, SOLUTION INTRAVENOUS at 08:12

## 2017-12-29 NOTE — ANESTHESIA POSTPROCEDURE EVALUATION
Anesthesia Post Evaluation    Patient: Florentino Bowman    Procedure(s) Performed: Procedure(s) (LRB):  LAMINECTOMY-LUMBAR Right L4-5 Laminectomy, Medial Facetectomy, and Foraminotomy (Right)    Final Anesthesia Type: general  Patient location during evaluation: PACU  Patient participation: Yes- Able to Participate  Level of consciousness: awake and alert  Post-procedure vital signs: reviewed and stable  Pain management: adequate  Airway patency: patent  PONV status at discharge: No PONV  Anesthetic complications: no      Cardiovascular status: blood pressure returned to baseline  Respiratory status: unassisted  Hydration status: euvolemic  Follow-up not needed.        Visit Vitals  BP 99/60   Pulse 65   Temp 36.7 °C (98 °F) (Temporal)   Resp 17   Ht 5' (1.524 m)   Wt 54.4 kg (120 lb)   SpO2 98%   Breastfeeding? No   BMI 23.44 kg/m²       Pain/Юлия Score: Pain Assessment Performed: Yes (12/29/2017  2:40 PM)  Presence of Pain: complains of pain/discomfort (12/29/2017  2:40 PM)  Pain Rating Prior to Med Admin: 7 (12/29/2017 10:15 AM)  Юлия Score: 10 (12/29/2017  2:40 PM)

## 2017-12-29 NOTE — OP NOTE
DATE OF PROCEDURE: 12/2/2017     PREOPERATIVE DIAGNOSES:  1. Bilateral lateral recess stenosis at L4-5 with radiculopathy     POSTOPERATIVE DIAGNOSES:  1. Bilateral lateral recess stenosis at L4-5 with radiculopathy     PROCEDURES:     1. Right MIS approach to the L4-5 level  2. L4-L5 bilateral laminectomy, medial facetectomy and foraminotomy  3. Microscope assistance  4.Fluoroscopy     PRIMARY SURGEON: Lan Figueroa M.D.     ASSISTANT SURGEON: Seamus Carrasco MD     INDICATIONS: This is an -year-old female with refractory right leg pain and bilateral buttock pain.  The risks of the procedure include hemorrhage, infection, paralysis, loss of sensation, loss of sphincter functions, death and postoperative medical complication.     DESCRIPTION OF THE PROCEDURE: The patient was intubated under general   anesthesia. She was placed prone on the Chaitanya table frame. All pressure points   were carefully padded. Using fluoroscopy, 1 x 18 mm incision was planned between L4 and L5,  15 mm right to the midline. Incision was made with # 15 blade. Subcutaneous tissue hemostasis was completed and the thoracolumbar fascia was opened with a k-wire. We then inserted the serial dilators and a final 18 mm x 5 cm tubular retractor was docked at the junction of the inferior lamina and the left inferior facet of L4 and the retractor was fixed on the table and then using a microscope, the multifidus muscle was subperiosteally dissected using the monopolar. We then exposed the base of the spinous process, the lamina, and the medial facets. Using the high speed ronan, we drilled the base of the spinous  process, the ipsilateral and contralateral lamina, as well as the ipsilateral and contralateral one-half of the medial facet. The yellow ligament insertion was exposed and the ligament was resected using Kerrison ipsilaterally and contralaterally.      The bilateral L5 nerve roots in the lateral receses were decompress by removing 1/4 of  the medial superior articulating process of L5. We completed bilateral foraminotomy by removing the superior articulating process of L5.      The thoracolumbar fascia was closed with 0 Vicryl. The wound's subcutaneous layer was closed with inverted 2-0 Vicryl and the skin was closed with a running subcuticular 4-0 Monocryl. The wound was dressed with Steri-Strips and the patient was    transferred to the Recovery Room under the care of the anesthesiologist. Blood loss was 25 mL. There was no complication.

## 2017-12-29 NOTE — HPI
This is a very pleasant 67 y.o. female who has been complaining of persistent right leg pain since April 2017. The pain was traveling in the L5 distribution initially. She got good pain relief after a right L4 and L5 TFESI and then the pain came back in the proximal leg and posterior upper buttock area. She got a second TFESI without significant pain relief. She has been doing PT and pilates exercises without significant pain relief. She also complains of low back pain worse with back extension. She denies having weakness, numbness or sphincter dysfunction symptoms. She tried Lyrica but had too much brain fog side effects.

## 2017-12-29 NOTE — PROGRESS NOTES
"Report received from Cecily GUTIÉRREZ RN, denies pain, states she "feels tired" and requests to sleep a little longer, dimmed lights, encouraged rest, vss, will continue to monitor.   "

## 2017-12-29 NOTE — SUBJECTIVE & OBJECTIVE
Prescriptions Prior to Admission   Medication Sig Dispense Refill Last Dose    alprazolam (XANAX) 0.5 MG tablet Take 1 tablet (0.5 mg total) by mouth nightly as needed. 30 tablet 5 2017 at 0800    atorvastatin (LIPITOR) 20 MG tablet Take 1 tablet (20 mg total) by mouth once daily. (Patient taking differently: Take 20 mg by mouth once daily. TAKES 3 TIMES A WEEK) 90 tablet 3 Past Week at Unknown time    citalopram (CELEXA) 20 MG tablet Take 1 tablet (20 mg total) by mouth once daily. 90 tablet 3 2017 at 0800    estradiol (ESTRACE) 1 MG tablet Take 1 mg by mouth every evening.    2017 at 2000    hydrocodone-acetaminophen 7.5-325mg (NORCO) 7.5-325 mg per tablet Take 1 tablet by mouth every 12 (twelve) hours as needed. 30 tablet 0 2017 at 1600    levothyroxine (SYNTHROID) 125 MCG tablet Take 1 tablet (125 mcg total) by mouth once daily. 90 tablet 3 2017 at 0400    progesterone (PROMETRIUM) 100 MG capsule Take 100 mg by mouth every evening.    2017 at 2000    sumatriptan (IMITREX) 100 MG tablet TAKE 1 TABLET BY MOUTH EVERY 2 HOURS AS NEEDED FOR MIGRAINE 9 tablet 9 2017 at 2000    zolpidem (AMBIEN) 10 mg Tab TAKE 1 TABLET BY MOUTH NIGHTLY AS NEEDED 30 tablet 5 2017 at 2000    meloxicam (MOBIC) 15 MG tablet Take 15 mg by mouth daily as needed for Pain.   More than a month at Unknown time    multivitamin capsule Take 1 capsule by mouth once daily.   More than a month at Unknown time       Review of patient's allergies indicates:  No Known Allergies    Past Medical History:   Diagnosis Date    Headache(784.0)     Hypothyroid      Past Surgical History:   Procedure Laterality Date    BREAST BIOPSY       SECTION      x2    COLONOSCOPY N/A 2017    Procedure: COLONOSCOPY Miralax;  Surgeon: Quentin Mercado Jr., MD;  [facility];  Service: Endoscopy;  Laterality: N/A;    HYSTERECTOMY      LAPAROSCOPIC HYSTERECTOMY      supracervical/BSO     TONSILLECTOMY       Family History     Problem Relation (Age of Onset)    Breast cancer Sister    Cancer Father, Sister    Dementia Mother    Heart disease Mother, Sister    Hypertension Mother    No Known Problems Daughter, Son, Sister        Social History Main Topics    Smoking status: Never Smoker    Smokeless tobacco: Never Used    Alcohol use Yes      Comment: social    Drug use: No    Sexual activity: Yes     Partners: Male     Review of Systems   Musculoskeletal: Positive for back pain and myalgias.   All other systems reviewed and are negative.    Objective:     Weight: 54.4 kg (120 lb)  Body mass index is 23.44 kg/m².  Vital Signs (Most Recent):  Temp: 98.2 °F (36.8 °C) (12/29/17 0558)  Pulse: 66 (12/29/17 0558)  Resp: 18 (12/29/17 0558)  BP: (!) 140/71 (12/29/17 0558)  SpO2: 99 % (12/29/17 0558) Vital Signs (24h Range):  Temp:  [98.2 °F (36.8 °C)] 98.2 °F (36.8 °C)  Pulse:  [66] 66  Resp:  [18] 18  SpO2:  [99 %] 99 %  BP: (140)/(71) 140/71                           Physical Exam:  Vitals reviewed.    Constitutional: She appears well-developed and well-nourished. She is not diaphoretic. No distress.     Eyes: EOM are normal.     Cardiovascular: Normal rate.     Abdominal: Soft.     Psych/Behavior: She is alert. She is oriented to person, place, and time.     Neurological:        Sensory: There is no sensory deficit in the trunk. There is no sensory deficit in the extremities.   E4V5M6  AOx3  FCx4  SILT  RLE: 4/5 KE otherwise 5/5  Remainder 5/5 throughout       Significant Labs:  No results for input(s): GLU, NA, K, CL, CO2, BUN, CREATININE, CALCIUM, MG in the last 48 hours.  No results for input(s): WBC, HGB, HCT, PLT in the last 48 hours.  No results for input(s): LABPT, INR, APTT in the last 48 hours.  Microbiology Results (last 7 days)     ** No results found for the last 168 hours. **        Recent Lab Results     None        All pertinent labs from the last 24 hours have been  reviewed.    Significant Diagnostics:  CT: No results found in the last 24 hours.  MRI: No results found in the last 24 hours.  I have reviewed all pertinent imaging results/findings within the past 24 hours.

## 2017-12-29 NOTE — DISCHARGE INSTRUCTIONS
Please follow ONLY the instructions that are checked below.    Activity Restrictions:  [x]  Return to work will be determined on an individual basis.  [x]  No lifting greater than 10 pounds.  [x]  Avoid bending and twisting the area of your surgery more than 45 degrees from neutral position in any direction.  [x]  No driving or operating machinery:  []  until cleared by your surgeon.  [x]  while taking narcotic pain medications or muscle relaxants.  [x]  No cervical collar, soft collar, or lumbar brace required.  []  Wear your brace at all times. You may be given an extra brace or soft collar to wear when showering.  []  Wear your brace at all times except when flat in bed.  []  Wear brace for comfort only.  [x]  Increase ambulation over the next 2 weeks so that you are walking 2 miles per day at 2 weeks post-operatively.  [x]  Walk on paved surfaces only. It is okay to walk up and down stairs while holding onto a side rail.  []  No sexual activity for 2-3 weeks.    Discharge Medication/Follow-up:  [x]  Please refer to discharge medication reconciliation form.  []  Do not take ANY non-steroidal anti-inflammatory drugs (NSAIDS), including the following: ibuprofen, naprosyn, Aleve, Advil, Indocin, Mobic, or Celebrex for:  []  4 weeks  []  8 weeks  []  6 months  [x]  Prescriptions for appropriate medication will be given upon discharge.   []  Pain control:             []  Muscle relaxer:            []  Take docusate (Colace 100 mg): take one capsule a day as needed for constipation. You can get this over the counter.  [x]  Follow-up appointment:  [x]  10-14 days post-op for wound check by physician assistant/nurse  []  4-6 weeks with MD:  []  with x-rays  []  without x-rays  []  An appointment will be mailed to you.    Wound Care:  [x]  Remove dressing or bandaid in  2  days.  []  No bandage required. Keep your incision open to the air.  [x]  You may shower on the 2nd day after your surgery. Have the force of water hit  you opposite from the incision. Pat the incision dry after your shower; do not scrub the incision.  [x]  You cannot take a bath until 8 weeks after surgery.  []  Apply bacitracin to incision twice a day for    more days.    Call your doctor or go to the Emergency Room for any signs of infection, including: increased redness, drainage, pain, or fever (temperature ?101.5 for 24 hours). Call your doctor or go to the Emergency Room if there are any localized neurological changes; problems with speech, vision, numbness, tingling, weakness, or severe headache; or for other concerns.    Special Instructions:  [x]  No use of tobacco products.  [x]  Diet: Please eat a regular diet as tolerated.  []  Other diet:              Specific physician instructions:           Physicians need 3 days' notice for pain medicine to be refilled. Pain medicine will only be refilled between 8 AM and 5 PM, Monday through Friday, due to Food and Drug Administration regulation of documentation.    If you have any questions about this form, please call [phone].    Form No. 46910 (Revised 10/31/2013)

## 2017-12-29 NOTE — H&P
[facility]  Neuorsurgery  History and Physical     Patient Name: Florentino Bowman  MRN: [id number]  Admission Date: 12/29/2017  Attending Physician: Lan Figueroa MD   Primary Care Physician: Fam Chou MD    Patient information was obtained from patient and past medical records.     Subjective:     Chief Complaint/Reason for Admission: laminectomy     HPI:  This is a very pleasant 67 y.o. female who has been complaining of persistent right leg pain since April 2017. The pain was traveling in the L5 distribution initially. She got good pain relief after a right L4 and L5 TFESI and then the pain came back in the proximal leg and posterior upper buttock area. She got a second TFESI without significant pain relief. She has been doing PT and pilates exercises without significant pain relief. She also complains of low back pain worse with back extension. She denies having weakness, numbness or sphincter dysfunction symptoms. She tried Lyrica but had too much brain fog side effects.       Prescriptions Prior to Admission   Medication Sig Dispense Refill Last Dose    alprazolam (XANAX) 0.5 MG tablet Take 1 tablet (0.5 mg total) by mouth nightly as needed. 30 tablet 5 12/28/2017 at 0800    atorvastatin (LIPITOR) 20 MG tablet Take 1 tablet (20 mg total) by mouth once daily. (Patient taking differently: Take 20 mg by mouth once daily. TAKES 3 TIMES A WEEK) 90 tablet 3 Past Week at Unknown time    citalopram (CELEXA) 20 MG tablet Take 1 tablet (20 mg total) by mouth once daily. 90 tablet 3 12/28/2017 at 0800    estradiol (ESTRACE) 1 MG tablet Take 1 mg by mouth every evening.    12/28/2017 at 2000    hydrocodone-acetaminophen 7.5-325mg (NORCO) 7.5-325 mg per tablet Take 1 tablet by mouth every 12 (twelve) hours as needed. 30 tablet 0 12/28/2017 at 1600    levothyroxine (SYNTHROID) 125 MCG tablet Take 1 tablet (125 mcg total) by mouth once daily. 90 tablet 3 12/29/2017 at 0400    progesterone  (PROMETRIUM) 100 MG capsule Take 100 mg by mouth every evening.    2017 at     sumatriptan (IMITREX) 100 MG tablet TAKE 1 TABLET BY MOUTH EVERY 2 HOURS AS NEEDED FOR MIGRAINE 9 tablet 9 2017 at     zolpidem (AMBIEN) 10 mg Tab TAKE 1 TABLET BY MOUTH NIGHTLY AS NEEDED 30 tablet 5 2017 at     meloxicam (MOBIC) 15 MG tablet Take 15 mg by mouth daily as needed for Pain.   More than a month at Unknown time    multivitamin capsule Take 1 capsule by mouth once daily.   More than a month at Unknown time       Review of patient's allergies indicates:  No Known Allergies    Past Medical History:   Diagnosis Date    Headache(784.0)     Hypothyroid      Past Surgical History:   Procedure Laterality Date    BREAST BIOPSY       SECTION      x2    COLONOSCOPY N/A 2017    Procedure: COLONOSCOPY Miralax;  Surgeon: Quentin Mercado Jr., MD;  Location: [facility];  Service: Endoscopy;  Laterality: N/A;    HYSTERECTOMY      LAPAROSCOPIC HYSTERECTOMY      supracervical/BSO    TONSILLECTOMY       Family History     Problem Relation (Age of Onset)    Breast cancer Sister    Cancer Father, Sister    Dementia Mother    Heart disease Mother, Sister    Hypertension Mother    No Known Problems Daughter, Son, Sister        Social History Main Topics    Smoking status: Never Smoker    Smokeless tobacco: Never Used    Alcohol use Yes      Comment: social    Drug use: No    Sexual activity: Yes     Partners: Male     Review of Systems   Musculoskeletal: Positive for back pain and myalgias.   All other systems reviewed and are negative.    Objective:     Weight: 54.4 kg (120 lb)  Body mass index is 23.44 kg/m².  Vital Signs (Most Recent):  Temp: 98.2 °F (36.8 °C) (17)  Pulse: 66 (17 05)  Resp: 18 (17)  BP: (!) 140/71 (17 05)  SpO2: 99 % (17) Vital Signs (24h Range):  Temp:  [98.2 °F (36.8 °C)] 98.2 °F (36.8 °C)  Pulse:  [66] 66  Resp:   [18] 18  SpO2:  [99 %] 99 %  BP: (140)/(71) 140/71                           Physical Exam:  Vitals reviewed.    Constitutional: She appears well-developed and well-nourished. She is not diaphoretic. No distress.     Eyes: EOM are normal.     Cardiovascular: Normal rate.     Abdominal: Soft.     Psych/Behavior: She is alert. She is oriented to person, place, and time.     Neurological:        Sensory: There is no sensory deficit in the trunk. There is no sensory deficit in the extremities.   E4V5M6  AOx3  FCx4  SILT  RLE: 4/5 KE otherwise 5/5  Remainder 5/5 throughout       Significant Labs:  No results for input(s): GLU, NA, K, CL, CO2, BUN, CREATININE, CALCIUM, MG in the last 48 hours.  No results for input(s): WBC, HGB, HCT, PLT in the last 48 hours.  No results for input(s): LABPT, INR, APTT in the last 48 hours.  Microbiology Results (last 7 days)     ** No results found for the last 168 hours. **        Recent Lab Results     None        All pertinent labs from the last 24 hours have been reviewed.    Significant Diagnostics:  CT: No results found in the last 24 hours.  MRI: No results found in the last 24 hours.  I have reviewed all pertinent imaging results/findings within the past 24 hours.    Assessment and Plan:     Radiculopathy with lower extremity symptoms    To OR for R MIS laminectomy            Seamus Muñiz MD  Neurosurgery  [facility]

## 2017-12-29 NOTE — PROGRESS NOTES
"Patient's  is concerned that patient will have "severe pain" post operatively and requests prescription for po dilaudid, jose cruz DUTTON, Karime PA returned call and stated she would forward message to Dr. Figueroa and have him come see patient, patient reports pain is 3/10, able to dress self, ambulates independently with stand by assist, voids spontaneously, and denies additional needs at this time, discussed discharge instructions with patient and , answered all questions, verbal understanding received, will continue to monitor.  "

## 2017-12-29 NOTE — BRIEF OP NOTE
[facility]  Brief Operative Note     SUMMARY     Surgery Date: 12/29/2017     Surgeon(s) and Role:     * Lan Figueroa MD - Primary    Assisting Surgeon: Seamus Muñiz    Pre-op Diagnosis:  Degeneration of lumbar intervertebral disc [M51.36]  Spinal stenosis of lumbar region with radiculopathy [M48.061, M54.16]    Post-op Diagnosis:  Post-Op Diagnosis Codes:     * Degeneration of lumbar intervertebral disc [M51.36]     * Spinal stenosis of lumbar region with radiculopathy [M48.061, M54.16]    Procedure(s) (LRB):  LAMINECTOMY-LUMBAR Right L4-5 Laminectomy, Medial Facetectomy, and Foraminotomy (Right)    Anesthesia: General    Description of the findings of the procedure: See full op note    Estimated Blood Loss: * No values recorded between 12/29/2017  8:17 AM and 12/29/2017  9:47 AM *         Specimens:   Specimen (12h ago through future)    None          Discharge Note    SUMMARY     Admit Date: 12/29/2017    Discharge Date and Time:  12/29/2017 9:50 AM    Hospital Course (synopsis of major diagnoses, care, treatment, and services provided during the course of the hospital stay): Pt was admitted for outpt surgery listed above and was deemed appropriate for DC on day of surgery     Final Diagnosis: Post-Op Diagnosis Codes:     * Degeneration of lumbar intervertebral disc [M51.36]     * Spinal stenosis of lumbar region with radiculopathy [M48.061, M54.16]    Disposition: Home or Self Care    Follow Up/Patient Instructions: As scheduled    Medications:  Reconciled Home Medications:   Current Discharge Medication List      START taking these medications    Details   hydrocodone-acetaminophen 10-325mg (NORCO)  mg Tab Take 1 tablet by mouth every 6 (six) hours as needed for Pain.  Qty: 75 tablet, Refills: 0      methocarbamol (ROBAXIN) 500 MG Tab Take 1 tablet (500 mg total) by mouth 4 (four) times daily.  Qty: 40 tablet, Refills: 0         CONTINUE these medications which have NOT CHANGED     Details   alprazolam (XANAX) 0.5 MG tablet Take 1 tablet (0.5 mg total) by mouth nightly as needed.  Qty: 30 tablet, Refills: 5    Comments: This prescription was filled today(12/16/2016). Any refills authorized will be placed on file.      atorvastatin (LIPITOR) 20 MG tablet Take 1 tablet (20 mg total) by mouth once daily.  Qty: 90 tablet, Refills: 3    Associated Diagnoses: Bilateral carotid artery disease      citalopram (CELEXA) 20 MG tablet Take 1 tablet (20 mg total) by mouth once daily.  Qty: 90 tablet, Refills: 3    Comments: This prescription was filled today(5/22/2017). Any refills authorized will be placed on file.      estradiol (ESTRACE) 1 MG tablet Take 1 mg by mouth every evening.       levothyroxine (SYNTHROID) 125 MCG tablet Take 1 tablet (125 mcg total) by mouth once daily.  Qty: 90 tablet, Refills: 3    Comments: This prescription was filled today(5/22/2017). Any refills authorized will be placed on file.  Associated Diagnoses: Hypothyroidism due to acquired atrophy of thyroid      progesterone (PROMETRIUM) 100 MG capsule Take 100 mg by mouth every evening.       sumatriptan (IMITREX) 100 MG tablet TAKE 1 TABLET BY MOUTH EVERY 2 HOURS AS NEEDED FOR MIGRAINE  Qty: 9 tablet, Refills: 9      zolpidem (AMBIEN) 10 mg Tab TAKE 1 TABLET BY MOUTH NIGHTLY AS NEEDED  Qty: 30 tablet, Refills: 5      meloxicam (MOBIC) 15 MG tablet Take 15 mg by mouth daily as needed for Pain.      multivitamin capsule Take 1 capsule by mouth once daily.         STOP taking these medications       hydrocodone-acetaminophen 7.5-325mg (NORCO) 7.5-325 mg per tablet Comments:   Reason for Stopping:             No discharge procedures on file.

## 2017-12-29 NOTE — TRANSFER OF CARE
Anesthesia Transfer of Care Note    Patient: Florentino Bowman    Procedure(s) Performed: Procedure(s) (LRB):  LAMINECTOMY-LUMBAR Right L4-5 Laminectomy, Medial Facetectomy, and Foraminotomy (Right)    Patient location: PACU    Anesthesia Type: general    Transport from OR: Transported from OR on 6-10 L/min O2 by face mask with adequate spontaneous ventilation    Post pain: adequate analgesia    Post assessment: no apparent anesthetic complications    Post vital signs: stable    Level of consciousness: awake and alert    Nausea/Vomiting: no nausea/vomiting    Complications: none    Transfer of care protocol was followed      Last vitals:   Visit Vitals  /62   Pulse 80   Temp 36.4 °C (97.5 °F) (Temporal)   Resp 13   Ht 5' (1.524 m)   Wt 54.4 kg (120 lb)   SpO2 100%   Breastfeeding? No   BMI 23.44 kg/m²

## 2017-12-29 NOTE — PROGRESS NOTES
Dr. Figueroa at bedside, will send RX for Dilaudid to pharmacy and stated ok to give RX for Norco also.

## 2018-01-02 LAB
BLD PROD TYP BPU: NORMAL
BLD PROD TYP BPU: NORMAL
BLOOD UNIT EXPIRATION DATE: NORMAL
BLOOD UNIT EXPIRATION DATE: NORMAL
BLOOD UNIT TYPE CODE: 9500
BLOOD UNIT TYPE CODE: 9500
BLOOD UNIT TYPE: NORMAL
BLOOD UNIT TYPE: NORMAL
CODING SYSTEM: NORMAL
CODING SYSTEM: NORMAL
DISPENSE STATUS: NORMAL
DISPENSE STATUS: NORMAL
TRANS ERYTHROCYTES VOL PATIENT: NORMAL ML
TRANS ERYTHROCYTES VOL PATIENT: NORMAL ML

## 2018-01-04 ENCOUNTER — PATIENT MESSAGE (OUTPATIENT)
Dept: NEUROSURGERY | Facility: CLINIC | Age: 68
End: 2018-01-04

## 2018-01-16 ENCOUNTER — OFFICE VISIT (OUTPATIENT)
Dept: NEUROSURGERY | Facility: CLINIC | Age: 68
End: 2018-01-16
Payer: MEDICARE

## 2018-01-16 VITALS
HEART RATE: 82 BPM | BODY MASS INDEX: 23.28 KG/M2 | SYSTOLIC BLOOD PRESSURE: 131 MMHG | DIASTOLIC BLOOD PRESSURE: 75 MMHG | WEIGHT: 119.19 LBS

## 2018-01-16 DIAGNOSIS — Z98.890 S/P LAMINECTOMY: Primary | ICD-10-CM

## 2018-01-16 PROCEDURE — 99999 PR PBB SHADOW E&M-EST. PATIENT-LVL III: CPT | Mod: PBBFAC,,, | Performed by: PHYSICIAN ASSISTANT

## 2018-01-16 PROCEDURE — 99213 OFFICE O/P EST LOW 20 MIN: CPT | Mod: PBBFAC,PO | Performed by: PHYSICIAN ASSISTANT

## 2018-01-16 PROCEDURE — 99024 POSTOP FOLLOW-UP VISIT: CPT | Mod: POP,,, | Performed by: PHYSICIAN ASSISTANT

## 2018-01-16 NOTE — PROGRESS NOTES
[facility] - Neurosurgery  Progress Note      SUBJECTIVE:     Chief Complaint/Reason for Visit: 2 week post op follow up     History of Present Illness:  Florentino Bowman is a 67 y.o. female who is 2 weeks status post L4-5 laminectomy for treatment of refractory right leg pain and bilateral buttock pain. Patient reports no change in right leg pain. She states the new left leg pain she experienced postoperatively has resolved.     Low Back Pain Scale  R Low Back-Pain Score: 1  R Low Back-Pain Intensity: Pain killers give moderate relief from pain  R Low Back-Pain Score: I can look after myself normally but it causes extra pain  Low Back-Lifting: I can lift heavy weights but it gives extra pain   Low Back-Walking: Pain prevents me walking more than 1 mile   Low Back-Sitting: Pain prevents me from sitting more than 1 hour   Low Back-Standing: I cannot stand for longer than 30 mins without increasing pain   Low Back-Sleeping: Because of pain my normal nights sleep is reduced by less than one half   Low Back-Social Life: Pain has no significant effect on my social life apart from limiting my more en   Low Back-Traveling: I have some pain when traveling but catherine of my usual forms of travel make it any worse   Low Back-Changing Degree of Pain: My pain seems to be getting better but improvement is slow         Review of patient's allergies indicates:  No Known Allergies      OBJECTIVE:     Vital Signs (Most Recent):  Pulse: 82 (01/16/18 1139)  BP: 131/75 (01/16/18 1139)    Physical Exam:  General: well developed, well nourished, no distress  Neurologic: Alert and oriented. Thought content appropriate.   GCS: Motor: 6/Verbal: 5/Eyes: 4 GCS Total: 15   Mental Status: Awake, Alert, Oriented x3   Cranial nerves: face symmetric, tongue midline, pupils equal, round, reactive to light, EOMI.   Motor Strength: moves all extremities with good strength and tone   Sensation: response to light touch throughout  No gait disturbances   Incision  is clean, dry and intact with no signs of erythema, swelling or purulent drainage. Dissolvable sutures are intact. All skin edges are completely approximated.       Diagnostic Results:  N/A    ASSESSMENT/PLAN:     67 y.o. female 2 weeks s/p L4-5 laminectomy.     - Follow up in 4 weeks with Dr. Figueroa   - Keep incision open to air.  - Can shower and get incision wet, just pat dry and no vigorous scrubbing. Do not submerge incision for another 6 weeks.   - No lifting more than 10 lbs or excessive bending/twisting.   - Encouraged patient to call if they have any questions or concerns prior to next follow up appt.      Olga Hanna PA-C  Neurosurgery

## 2018-01-22 RX ORDER — SUMATRIPTAN SUCCINATE 100 MG/1
TABLET ORAL
Qty: 30 TABLET | Refills: 5 | Status: SHIPPED | OUTPATIENT
Start: 2018-01-22 | End: 2018-08-21 | Stop reason: SDUPTHER

## 2018-01-31 RX ORDER — ALPRAZOLAM 0.5 MG/1
TABLET ORAL
Qty: 30 TABLET | Refills: 5 | Status: SHIPPED | OUTPATIENT
Start: 2018-01-31 | End: 2018-08-21 | Stop reason: SDUPTHER

## 2018-02-05 RX ORDER — ZOLPIDEM TARTRATE 10 MG/1
TABLET ORAL
Qty: 30 TABLET | Refills: 5 | Status: SHIPPED | OUTPATIENT
Start: 2018-02-05 | End: 2018-06-28 | Stop reason: SDUPTHER

## 2018-02-05 RX ORDER — ZOLPIDEM TARTRATE 10 MG/1
TABLET ORAL
Qty: 30 TABLET | OUTPATIENT
Start: 2018-02-05

## 2018-02-19 ENCOUNTER — TELEPHONE (OUTPATIENT)
Dept: NEUROSURGERY | Facility: CLINIC | Age: 68
End: 2018-02-19

## 2018-02-19 ENCOUNTER — OFFICE VISIT (OUTPATIENT)
Dept: NEUROSURGERY | Facility: CLINIC | Age: 68
End: 2018-02-19
Payer: MEDICARE

## 2018-02-19 ENCOUNTER — HOSPITAL ENCOUNTER (OUTPATIENT)
Dept: RADIOLOGY | Facility: HOSPITAL | Age: 68
Discharge: HOME | End: 2018-02-19
Attending: NEUROLOGICAL SURGERY
Payer: COMMERCIAL

## 2018-02-19 VITALS
DIASTOLIC BLOOD PRESSURE: 73 MMHG | SYSTOLIC BLOOD PRESSURE: 137 MMHG | BODY MASS INDEX: 23.16 KG/M2 | WEIGHT: 118.63 LBS | HEART RATE: 79 BPM

## 2018-02-19 DIAGNOSIS — Z98.890 S/P LUMBAR LAMINECTOMY: ICD-10-CM

## 2018-02-19 DIAGNOSIS — M25.551 PAIN OF RIGHT HIP JOINT: Primary | ICD-10-CM

## 2018-02-19 DIAGNOSIS — M25.551 PAIN OF RIGHT HIP JOINT: ICD-10-CM

## 2018-02-19 PROBLEM — M47.26 OSTEOARTHRITIS OF SPINE WITH RADICULOPATHY, LUMBAR REGION: Status: RESOLVED | Noted: 2017-05-30 | Resolved: 2018-02-19

## 2018-02-19 PROBLEM — Z01.818 PRE-OP EXAM: Status: RESOLVED | Noted: 2017-12-08 | Resolved: 2018-02-19

## 2018-02-19 PROBLEM — M54.16 LUMBAR RADICULAR PAIN: Status: RESOLVED | Noted: 2017-09-19 | Resolved: 2018-02-19

## 2018-02-19 PROBLEM — M54.31 SCIATICA OF RIGHT SIDE: Status: RESOLVED | Noted: 2017-05-02 | Resolved: 2018-02-19

## 2018-02-19 PROCEDURE — 73502 X-RAY EXAM HIP UNI 2-3 VIEWS: CPT | Mod: 26,RT,, | Performed by: RADIOLOGY

## 2018-02-19 PROCEDURE — 99999 PR PBB SHADOW E&M-EST. PATIENT-LVL IV: CPT | Mod: PBBFAC,,, | Performed by: NEUROLOGICAL SURGERY

## 2018-02-19 PROCEDURE — 73502 X-RAY EXAM HIP UNI 2-3 VIEWS: CPT | Mod: TC,FY,RT

## 2018-02-19 PROCEDURE — 99214 OFFICE O/P EST MOD 30 MIN: CPT | Mod: PBBFAC,PO,25 | Performed by: NEUROLOGICAL SURGERY

## 2018-02-19 PROCEDURE — 99024 POSTOP FOLLOW-UP VISIT: CPT | Mod: POP,,, | Performed by: NEUROLOGICAL SURGERY

## 2018-02-19 NOTE — PROGRESS NOTES
NEUROSURGICAL POST-OPERATIVE PROGRESS NOTE    DATE OF SERVICE:  02/19/2018      ATTENDING PHYSICIAN:  Lan Figueroa MD    SUBJECTIVE:    INTERIM HISTORY:    This is a very pleasant 67 y.o. y.o. female, who is status 6 weeks L4-5 laminectomy for right leg pain. The patient is complaining of right anterior thigh and hip pain. Pain usually started in the late morning. Exacerbated by walking. Have noticed a right skin eruption consistent with hives where the pain is.      Low Back Pain Scale  R Low Back-Pain Score: 1   Low Back-Sitting: I can sit only in my favorite chair as long as I like   Low Back-Standing: I cannot stand for longer than 30 mins without increasing pain   Low Back-Sleeping: I have pain in bed but it does not prevent me from sleeping well   Low Back-Social Life: My social life is normal but it increases the degree of pain   Low Back-Traveling: I have some pain when traveling but catherine of my usual forms of travel make it any worse   Low Back-Changing Degree of Pain: My pain seems to be getting better but improvement is slow         OBJECTIVE:    PHYSICAL EXAMINATION:   Vitals:    02/19/18 0907   BP: 137/73   Pulse: 79       Neurosurgery Physical Exam    Right Hip Exam     Tenderness   The patient is experiencing tenderness in the greater trochanter.    Tests   ROSA: positive    Other   Erythema: present      Left Hip Exam   Left hip exam is normal.      Back Exam     Muscle Strength   Right Quadriceps:  5/5   Left Quadriceps:  5/5             Neurologic Exam     Motor Exam     Strength   Right iliopsoas: 5/5  Left iliopsoas: 5/5  Right quadriceps: 5/5  Left quadriceps: 5/5  Right anterior tibial: 5/5  Left anterior tibial: 5/5  Right gastroc: 5/5  Left gastroc: 5/5      Wound has healed.    DIAGNOSTIC DATA:    NA    ASSESMENT:    This is a 67 y.o. female who is s/p 6 weeks L4-5 laminectomy. Right hip pain. Hip osteoarthritis vs bursitis    PLAN:    Consultation ortho  Right hip XR  Lumbar PT  Follow-up  in 6 weeks        Lan Figueroa MD  Pager: [phone]

## 2018-02-19 NOTE — TELEPHONE ENCOUNTER
----- Message from Lan Figueroa MD sent at 2/19/2018 12:52 PM CST -----  Please let her know that her hip XR did not show significant degenerative changes. It is possible the her pain at this time is from a bursitis. We referred her to orthopaedic. Follow-up in 6 weeks with me. She can contact me anytime.     Thanks    DD

## 2018-02-20 ENCOUNTER — TELEPHONE (OUTPATIENT)
Dept: ORTHOPEDICS | Facility: CLINIC | Age: 68
End: 2018-02-20

## 2018-03-09 PROBLEM — M54.40 CHRONIC RIGHT-SIDED LOW BACK PAIN WITH SCIATICA: Status: ACTIVE | Noted: 2018-03-09

## 2018-03-09 PROBLEM — G89.29 CHRONIC RIGHT-SIDED LOW BACK PAIN WITH SCIATICA: Status: ACTIVE | Noted: 2018-03-09

## 2018-03-20 ENCOUNTER — OFFICE VISIT (OUTPATIENT)
Dept: ORTHOPEDICS | Facility: CLINIC | Age: 68
End: 2018-03-20
Payer: MEDICARE

## 2018-03-20 VITALS — HEIGHT: 60 IN | WEIGHT: 118 LBS | BODY MASS INDEX: 23.16 KG/M2

## 2018-03-20 DIAGNOSIS — M25.551 PAIN OF RIGHT HIP JOINT: Primary | ICD-10-CM

## 2018-03-20 PROCEDURE — 20610 DRAIN/INJ JOINT/BURSA W/O US: CPT | Mod: S$PBB,RT,, | Performed by: ORTHOPAEDIC SURGERY

## 2018-03-20 PROCEDURE — 20610 DRAIN/INJ JOINT/BURSA W/O US: CPT | Mod: PBBFAC,PO | Performed by: ORTHOPAEDIC SURGERY

## 2018-03-20 PROCEDURE — 99212 OFFICE O/P EST SF 10 MIN: CPT | Mod: PBBFAC,PO | Performed by: ORTHOPAEDIC SURGERY

## 2018-03-20 PROCEDURE — 99999 PR PBB SHADOW E&M-EST. PATIENT-LVL II: CPT | Mod: PBBFAC,,, | Performed by: ORTHOPAEDIC SURGERY

## 2018-03-20 PROCEDURE — 99214 OFFICE O/P EST MOD 30 MIN: CPT | Mod: S$PBB,25,, | Performed by: ORTHOPAEDIC SURGERY

## 2018-03-20 RX ORDER — TRIAMCINOLONE ACETONIDE 40 MG/ML
40 INJECTION, SUSPENSION INTRA-ARTICULAR; INTRAMUSCULAR
Status: COMPLETED | OUTPATIENT
Start: 2018-03-20 | End: 2018-03-20

## 2018-03-20 RX ADMIN — TRIAMCINOLONE ACETONIDE 40 MG: 40 INJECTION, SUSPENSION INTRA-ARTICULAR; INTRAMUSCULAR at 01:03

## 2018-03-20 NOTE — PROGRESS NOTES
Subjective:      Patient ID: Florentino Bowman is a 68 y.o. female.    Chief Complaint: Pain of the Right Hip    HPI      Florentino Bowman is seen for evaluation and treatment of hip pain.  They have experienced problems with their right hip over the past 6 months Pain is located laterally. They have been treated with The patient underwent lumbar discectomy 3 months ago for the symptoms without much improvement.   Symptoms have recently stayed the same. Ambulation reportedly has not been impaired. Self care ADLs are not painful.     Review of Systems   Constitution: Negative for fever and weight loss.   HENT: Negative for congestion.    Eyes: Negative for visual disturbance.   Cardiovascular: Negative for chest pain.   Respiratory: Negative for shortness of breath.    Hematologic/Lymphatic: Negative for bleeding problem. Does not bruise/bleed easily.   Skin: Negative for poor wound healing.   Musculoskeletal: Positive for back pain and joint pain.   Gastrointestinal: Negative for abdominal pain.   Genitourinary: Negative for dysuria.   Neurological: Negative for seizures.   Psychiatric/Behavioral: Negative for altered mental status.   Allergic/Immunologic: Negative for persistent infections.         Objective:            Ortho/SPM Exam    Right Hip    There were no vitals filed for this visit.     The patient is not in acute distress.   Body habitus is:normal.   The patient walks without a limp.   The skin over the hip is:intact.   There is:tenderness laterally.  Range of motion- Flexion 125, External rotation 40, internal rotation 35.  Resisted SLR negative.  Pain with rotation negative  Sciatic tension findings negative.  Shortening/lengthening compared to the contralateral side exam deferred.  Pulses DP present, PT present.  Motor normal 5/5 strength in all tested muscle groups.   Sensory normal.    I reviewed the relevant radiographic images for the patient's condition: Recent films are normal        Assessment:        Encounter Diagnosis   Name Primary?    Pain of right hip joint Yes          Probable slow recovery from discectomy.  Remote possibility of bursitis            Plan:       Florentino was seen today for pain.    Diagnoses and all orders for this visit:    Pain of right hip joint          I explained my diagnostic impression and the reasoning behind it in detail, using layman's terms.      The patient requested trochanteric injection..  This is reasonable for diagnostic and palliative reasons.  Consent was given.    Appropriate home exercise program and activity modification explained

## 2018-03-20 NOTE — LETTER
March 20, 2018      Lan Figueroa MD  [address]  [address]  [place] [address]           [facility]  [address]  [place] [address]  Phone: [phone]          Patient: Florentino Bowman   MR Number: [id number]   YOB: 1950   Date of Visit: 3/20/2018       Dear Dr. Lan Figueroa:    Thank you for referring Florentino Bowman to me for evaluation. Attached you will find relevant portions of my assessment and plan of care.    If you have questions, please do not hesitate to call me. I look forward to following Florentino Bowman along with you.    Sincerely,    Omer Velázquez MD    Enclosure  CC:  No Recipients    If you would like to receive this communication electronically, please contact [email or web address] or [phone] to request more information on [email or web address] Link access.    For providers and/or their staff who would like to refer a patient to [facility], please contact us through our one-stop-shop provider referral line, [facility] , at [phone].    If you feel you have received this communication in error or would no longer like to receive these types of communications, please e-mail [email or web address]

## 2018-03-20 NOTE — PROCEDURES
Procedures     After obtaining verbal informed consent the patient's right greater trochanteric bursa was aseptically injected with 40 mg of triamcinolone and 1 cc of 2%  Xylocaine.  Appropriate management of postinjection flare was explained.

## 2018-03-27 ENCOUNTER — OFFICE VISIT (OUTPATIENT)
Dept: NEUROSURGERY | Facility: CLINIC | Age: 68
End: 2018-03-27
Payer: MEDICARE

## 2018-03-27 VITALS
HEART RATE: 66 BPM | DIASTOLIC BLOOD PRESSURE: 68 MMHG | BODY MASS INDEX: 23.21 KG/M2 | SYSTOLIC BLOOD PRESSURE: 128 MMHG | WEIGHT: 118.81 LBS

## 2018-03-27 DIAGNOSIS — Z98.890 S/P LAMINECTOMY: Primary | ICD-10-CM

## 2018-03-27 DIAGNOSIS — M79.2 NEUROPATHIC PAIN: ICD-10-CM

## 2018-03-27 PROCEDURE — 99999 PR PBB SHADOW E&M-EST. PATIENT-LVL III: CPT | Mod: PBBFAC,,, | Performed by: PHYSICIAN ASSISTANT

## 2018-03-27 PROCEDURE — 99213 OFFICE O/P EST LOW 20 MIN: CPT | Mod: PBBFAC,PO | Performed by: PHYSICIAN ASSISTANT

## 2018-03-27 PROCEDURE — 99024 POSTOP FOLLOW-UP VISIT: CPT | Mod: POP,,, | Performed by: PHYSICIAN ASSISTANT

## 2018-03-27 RX ORDER — HYDROCODONE BITARTRATE AND ACETAMINOPHEN 5; 325 MG/1; MG/1
1 TABLET ORAL EVERY 6 HOURS PRN
Qty: 20 TABLET | Refills: 0 | Status: SHIPPED | OUTPATIENT
Start: 2018-03-27 | End: 2018-05-11 | Stop reason: SDUPTHER

## 2018-03-27 NOTE — PROGRESS NOTES
[facility] - Neurosurgery  Progress Note      SUBJECTIVE:     Chief Complaint/Reason for Visit: follow up after surgery     History of Present Illness:  Florentino Bowman is a 68 y.o. female who is 3 months s/p  L4-5 laminectomy for treatment of refractory right leg pain and bilateral buttock pain. At her 2 week appointment, she reported no change in her right leg pain. At six weeks post op, Dr. Figueroa believed the pain was due to hip osteoarthritis vs bursitis. She was referred to ortho and received a bursa injection last week. Patient continues to report low back and right anterior thigh pain. She states the bursa injection did not help and ortho does not think the pain is from her hip. The pain in the back is located in the lower lumbar region, midline. The right leg pain is always in the right anterior thigh, sometimes in the groin, and never below the knee. The pain is tolerable in the morning, but is severe by the end of the day. She does not take gabapentin or lyrica. She has not completed the PT Dr. Figueroa ordered. She states PT has not helped her in the past.       Low Back Pain Scale  R Low Back-Pain Score: 3  R Low Back-Pain Intensity: The pain is bad, but I manage without taking pain killers  R Low Back-Pain Score: I can look after myself normally but it causes extra pain  Low Back-Lifting: Pain prevents me from lifting heavy weights off the floor, but I can manage if they are conveniently positioned for example on a table   Low Back-Walking: Pain prevents me walking more than 1 mile   Low Back-Sitting: Pain prevents me from sitting more than 1 hour   Low Back-Standing: I cannot stand for longer than 1 hour without increasing pain   Low Back-Sleeping: I have pain in bed but it does not prevent me from sleeping well   Low Back-Social Life: My social life is normal but it increases the degree of pain   Low Back-Traveling: I have extra pain while traveling but it does not compel me to seek alternate forms of  travel   Low Back-Changing Degree of Pain: My pain is gradually worsening           Review of patient's allergies indicates:  No Known Allergies    Past Medical History:   Diagnosis Date    Headache(784.0)     Hypothyroid      Past Surgical History:   Procedure Laterality Date    BREAST BIOPSY       SECTION      x2    COLONOSCOPY N/A 2017    Procedure: COLONOSCOPY Miralax;  Surgeon: Quentin Mercado Jr., MD;  Location: [facility];  Service: Endoscopy;  Laterality: N/A;    HYSTERECTOMY      LAPAROSCOPIC HYSTERECTOMY  2010    supracervical/BSO    SPINE SURGERY      TONSILLECTOMY       Family History   Problem Relation Age of Onset    Dementia Mother     Hypertension Mother     Heart disease Mother     Cancer Father      colon    Cancer Sister      breast    Breast cancer Sister     Heart disease Sister     No Known Problems Daughter     No Known Problems Son     No Known Problems Sister      Social History   Substance Use Topics    Smoking status: Never Smoker    Smokeless tobacco: Never Used    Alcohol use Yes      Comment: social        OBJECTIVE:     Vital Signs (Most Recent):  Pulse: 66 (18 1109)  BP: 128/68 (18 1109)    Physical Exam:  General: well developed, well nourished, no distress.   Neurologic: Alert and oriented. Thought content appropriate.  GCS: Motor: 6/Verbal: 5/Eyes: 4 GCS Total: 15  Head: normocephalic, atraumatic  Eyes: EOMI.  Neck: trachea midline, no JVD   Cardiovascular: no LE edema  Pulmonary: normal respirations, no signs of respiratory distress  Abdomen: soft, non-distended  Sensory: intact to light touch throughout  Skin: Skin is warm, dry and intact.    Motor Strength: Moves all extremities spontaneously with good tone.  o abnormal movements seen.       Iliopsoas Quadriceps Knee  Flexion Tibialis  anterior Gastro- cnemius EHL    Lower: R 5/5 5/5 5/5 5/5 5/5 5/5     L 5/5 5/5 5/5 5/5 5/5 5/5      DTR's: 2 + and symmetric in LE  Gait:  normal    Wound has healed well     Diagnostic Results:  Dr. Figueroa reviewed MRI with patient. There is an abnormal finding behind L1.     ASSESSMENT/PLAN:     67 yo female 3 mo s/p L4-5 laminectomy. Persistent right anterior thigh pain. Abnormality of lumbar MRI at L1. Possible nerve sheath tumor    -MRI lumbar spine w wo contrast  -Compound cream  -[facility] [phone]  -Will call with results of MRI after discussing with Dr. Figueroa  -Dr. Figueroa personally spoke with patient       JACOBY GibbsC  Neurosurgery

## 2018-03-29 ENCOUNTER — PATIENT MESSAGE (OUTPATIENT)
Dept: NEUROSURGERY | Facility: CLINIC | Age: 68
End: 2018-03-29

## 2018-04-04 ENCOUNTER — PATIENT MESSAGE (OUTPATIENT)
Dept: NEUROSURGERY | Facility: CLINIC | Age: 68
End: 2018-04-04

## 2018-04-05 ENCOUNTER — HOSPITAL ENCOUNTER (OUTPATIENT)
Dept: RADIOLOGY | Facility: HOSPITAL | Age: 68
Discharge: HOME | End: 2018-04-05
Attending: PHYSICIAN ASSISTANT
Payer: MEDICARE

## 2018-04-05 DIAGNOSIS — Z98.890 S/P LAMINECTOMY: ICD-10-CM

## 2018-04-05 DIAGNOSIS — M79.2 NEUROPATHIC PAIN: ICD-10-CM

## 2018-04-05 PROCEDURE — 72158 MRI LUMBAR SPINE W/O & W/DYE: CPT | Mod: 26,,, | Performed by: RADIOLOGY

## 2018-04-05 PROCEDURE — 72158 MRI LUMBAR SPINE W/O & W/DYE: CPT | Mod: TC

## 2018-04-05 PROCEDURE — 25500020 PHARM REV CODE 255: Performed by: PHYSICIAN ASSISTANT

## 2018-04-05 PROCEDURE — A9585 GADOBUTROL INJECTION: HCPCS | Performed by: PHYSICIAN ASSISTANT

## 2018-04-05 RX ORDER — GADOBUTROL 604.72 MG/ML
5 INJECTION INTRAVENOUS
Status: COMPLETED | OUTPATIENT
Start: 2018-04-05 | End: 2018-04-05

## 2018-04-05 RX ADMIN — GADOBUTROL 5 ML: 604.72 INJECTION INTRAVENOUS at 03:04

## 2018-04-09 ENCOUNTER — PATIENT MESSAGE (OUTPATIENT)
Dept: NEUROSURGERY | Facility: CLINIC | Age: 68
End: 2018-04-09

## 2018-04-10 ENCOUNTER — PATIENT MESSAGE (OUTPATIENT)
Dept: NEUROSURGERY | Facility: CLINIC | Age: 68
End: 2018-04-10

## 2018-04-10 DIAGNOSIS — M79.651 RIGHT THIGH PAIN: Primary | ICD-10-CM

## 2018-04-11 ENCOUNTER — TELEPHONE (OUTPATIENT)
Dept: NEUROSURGERY | Facility: CLINIC | Age: 68
End: 2018-04-11

## 2018-04-11 NOTE — TELEPHONE ENCOUNTER
Spoke with patient. Discussed MRI results in detail. Patient is interested in attending physical therapy. She would like to attend physical therapy before seeing general surgery to work up a possible femoral hernia.     Olga Hanna PA-C  Neurosurgery

## 2018-04-26 ENCOUNTER — TELEPHONE (OUTPATIENT)
Dept: ENDOCRINOLOGY | Facility: CLINIC | Age: 68
End: 2018-04-26

## 2018-04-26 ENCOUNTER — OFFICE VISIT (OUTPATIENT)
Dept: ENDOCRINOLOGY | Facility: CLINIC | Age: 68
End: 2018-04-26
Payer: MEDICARE

## 2018-04-26 VITALS
WEIGHT: 119.25 LBS | DIASTOLIC BLOOD PRESSURE: 80 MMHG | SYSTOLIC BLOOD PRESSURE: 120 MMHG | BODY MASS INDEX: 23.41 KG/M2 | RESPIRATION RATE: 17 BRPM | HEART RATE: 84 BPM | HEIGHT: 60 IN

## 2018-04-26 DIAGNOSIS — F32.A DEPRESSION, UNSPECIFIED DEPRESSION TYPE: ICD-10-CM

## 2018-04-26 DIAGNOSIS — R49.0 HOARSENESS: ICD-10-CM

## 2018-04-26 DIAGNOSIS — E03.4 HYPOTHYROIDISM DUE TO ACQUIRED ATROPHY OF THYROID: Primary | ICD-10-CM

## 2018-04-26 DIAGNOSIS — E03.9 HYPOTHYROIDISM, UNSPECIFIED TYPE: Primary | ICD-10-CM

## 2018-04-26 PROCEDURE — 99999 PR PBB SHADOW E&M-EST. PATIENT-LVL III: CPT | Mod: PBBFAC,,, | Performed by: INTERNAL MEDICINE

## 2018-04-26 PROCEDURE — 99213 OFFICE O/P EST LOW 20 MIN: CPT | Mod: PBBFAC | Performed by: INTERNAL MEDICINE

## 2018-04-26 PROCEDURE — 99204 OFFICE O/P NEW MOD 45 MIN: CPT | Mod: S$PBB,,, | Performed by: INTERNAL MEDICINE

## 2018-04-26 RX ORDER — LIOTHYRONINE SODIUM 5 UG/1
5 TABLET ORAL DAILY
Qty: 30 TABLET | Refills: 11 | Status: SHIPPED | OUTPATIENT
Start: 2018-04-26 | End: 2019-03-25 | Stop reason: SDUPTHER

## 2018-04-26 RX ORDER — LEVOTHYROXINE SODIUM 112 UG/1
112 TABLET ORAL DAILY
Qty: 30 TABLET | Refills: 11 | Status: SHIPPED | OUTPATIENT
Start: 2018-04-26 | End: 2018-07-18

## 2018-04-26 NOTE — PROGRESS NOTES
"Subjective:     Patient ID: Florentino Bowman is a 68 y.o. female.    Chief Complaint: Consult    HPI:   Ms. Bowman is a 68 y.o. female who is here for a consult visit for evaluation of hypothyroidism due to thirty years ago. This was diagnosed in the context of symptoms and family history.     Reports feeling "OK, I don't have a lot of energy. But I feel dragged down."   Recent back surgery 12/2017, does not feel fully recovered. Still reports low back pain.   Reports cold intolerance and depressed mood -- no real change with treatment. Reports hoarseness for the past year, intermittent. Not necessarily associated with excess talking. Has post nasal drip but minimal. Denies GERD symptoms. Denies allergies or sinus issues.     Current medication is levothyroxine 125 mcg. Has used this for 1 - 2 years. Takes it properly without food first thing in the morning with water. Waits 30 - 45 minutes before breakfast or other pills.    Others tried armour thyroid, synthroid - no difference.     Family history: Mother and three sisters with hypothyroidism. Mother and sisters with depression.     Recently stopped citalopram after slow titration (yesterday).    Review of Systems   Constitutional: Negative for chills and fever.   HENT: Negative for congestion and sinus pressure.    Eyes: Negative for visual disturbance.   Respiratory: Negative for chest tightness and shortness of breath.    Cardiovascular: Negative for chest pain, palpitations and leg swelling.   Gastrointestinal: Negative for abdominal pain and vomiting.   Genitourinary: Negative for dysuria.   Musculoskeletal: Negative for arthralgias.   Skin: Negative for rash.   Neurological: Negative for weakness.   Hematological: Does not bruise/bleed easily.   Psychiatric/Behavioral: Negative for sleep disturbance.        Objective:     Physical Exam   Constitutional: She is oriented to person, place, and time. She appears well-developed and well-nourished. No distress. "   Eyes: Conjunctivae and EOM are normal. Pupils are equal, round, and reactive to light. No scleral icterus.   No proptosis.    Neck: Normal range of motion. Neck supple. No thyromegaly present.   Cardiovascular: Normal rate and intact distal pulses.    Pulmonary/Chest: Effort normal and breath sounds normal.   Neurological: She is alert and oriented to person, place, and time. She has normal reflexes.   No tremor.   Skin: Skin is warm and dry.   Psychiatric: She has a normal mood and affect.       Vitals:    04/26/18 0807   BP: 120/80   Pulse: 84   Resp: 17   Weight: 54.1 kg (119 lb 4.3 oz)   Height: 5' (1.524 m)     Results for CARLA ARAUJO (MRN [id number]) as of 4/26/2018 08:25   Ref. Range 10/6/2017 08:28   TSH Latest Ref Range: 0.400 - 4.000 uIU/mL 3.747   T3, Free Latest Ref Range: 2.3 - 4.2 pg/mL 2.0 (L)   Free T4 Latest Ref Range: 0.71 - 1.51 ng/dL 1.05   Results for CARLA ARAUJO (MRN [id number]) as of 4/26/2018 08:25   Ref. Range 10/6/2017 08:28   T3, Reverse Latest Ref Range: 9.0 - 27.0 ng/dL 30.4 (H)       Assessment/Plan:       1. Hypothyroidism due to acquired atrophy of thyroid    - TSH; Future  - T4, free; Future  - consider adding T3 and lowering T4     2. Depression, unspecified depression type    3. Hoarseness  - has inhomogenous thyroid gland on US, but no dimensions - consider repeat US but on exam thyroid feels normal and without discrete nodules.   - questioning vocal cord polyps or PND - refer to ENT

## 2018-04-27 ENCOUNTER — PATIENT MESSAGE (OUTPATIENT)
Dept: ENDOCRINOLOGY | Facility: CLINIC | Age: 68
End: 2018-04-27

## 2018-05-02 ENCOUNTER — TELEPHONE (OUTPATIENT)
Dept: ORTHOPEDICS | Facility: CLINIC | Age: 68
End: 2018-05-02

## 2018-05-02 DIAGNOSIS — M54.50 LUMBAR SPINE PAIN: Primary | ICD-10-CM

## 2018-05-09 PROBLEM — M54.41 CHRONIC RIGHT-SIDED LOW BACK PAIN WITH RIGHT-SIDED SCIATICA: Status: ACTIVE | Noted: 2018-03-09

## 2018-05-11 ENCOUNTER — PATIENT MESSAGE (OUTPATIENT)
Dept: NEUROSURGERY | Facility: CLINIC | Age: 68
End: 2018-05-11

## 2018-05-11 RX ORDER — HYDROCODONE BITARTRATE AND ACETAMINOPHEN 5; 325 MG/1; MG/1
1 TABLET ORAL EVERY 6 HOURS PRN
Qty: 20 TABLET | Refills: 0 | Status: SHIPPED | OUTPATIENT
Start: 2018-05-11 | End: 2018-06-21

## 2018-05-22 ENCOUNTER — TELEPHONE (OUTPATIENT)
Dept: ORTHOPEDICS | Facility: CLINIC | Age: 68
End: 2018-05-22

## 2018-05-22 DIAGNOSIS — M54.50 LUMBAR SPINE PAIN: Primary | ICD-10-CM

## 2018-05-28 ENCOUNTER — PATIENT MESSAGE (OUTPATIENT)
Dept: ENDOCRINOLOGY | Facility: CLINIC | Age: 68
End: 2018-05-28

## 2018-05-29 ENCOUNTER — HOSPITAL ENCOUNTER (OUTPATIENT)
Dept: RADIOLOGY | Facility: HOSPITAL | Age: 68
Discharge: HOME | End: 2018-05-29
Attending: ORTHOPAEDIC SURGERY
Payer: COMMERCIAL

## 2018-05-29 DIAGNOSIS — M54.50 LUMBAR SPINE PAIN: ICD-10-CM

## 2018-05-29 PROCEDURE — 72120 X-RAY BEND ONLY L-S SPINE: CPT | Mod: 26,,, | Performed by: RADIOLOGY

## 2018-05-29 PROCEDURE — 72100 X-RAY EXAM L-S SPINE 2/3 VWS: CPT | Mod: 26,,, | Performed by: RADIOLOGY

## 2018-05-29 PROCEDURE — 72100 X-RAY EXAM L-S SPINE 2/3 VWS: CPT | Mod: TC,FY

## 2018-06-04 ENCOUNTER — TELEPHONE (OUTPATIENT)
Dept: ORTHOPEDICS | Facility: CLINIC | Age: 68
End: 2018-06-04

## 2018-06-04 ENCOUNTER — OFFICE VISIT (OUTPATIENT)
Dept: ORTHOPEDICS | Facility: CLINIC | Age: 68
End: 2018-06-04
Payer: COMMERCIAL

## 2018-06-04 VITALS — HEIGHT: 60 IN | WEIGHT: 118.19 LBS | BODY MASS INDEX: 23.2 KG/M2

## 2018-06-04 DIAGNOSIS — M54.16 LUMBAR RADICULOPATHY: Primary | ICD-10-CM

## 2018-06-04 PROCEDURE — 99213 OFFICE O/P EST LOW 20 MIN: CPT | Mod: S$GLB,,, | Performed by: ORTHOPAEDIC SURGERY

## 2018-06-04 PROCEDURE — 99999 PR PBB SHADOW E&M-EST. PATIENT-LVL II: CPT | Mod: PBBFAC,,, | Performed by: ORTHOPAEDIC SURGERY

## 2018-06-04 PROCEDURE — 99212 OFFICE O/P EST SF 10 MIN: CPT | Mod: PBBFAC | Performed by: ORTHOPAEDIC SURGERY

## 2018-06-04 NOTE — PROGRESS NOTES
DATE: 2018  PATIENT: Florentino Bowman    Attending Physician: Berry Kelly M.D.    CHIEF COMPLAINT: LBP, right leg pain    HISTORY:  Florentino Bowman is a 68 y.o. female here for initial evaluation of low back and right leg pain (Back - 2, Leg - 6). The pain has been present since 2017. She reports her pain began after lifting/twisting a toddler. She was initially evaluated and treated in the neurosurgery clinic and underwent an L4-5 laminectomy in 2017. She reports that her back did not improve after surgery and that her right leg pain worsened. The patient describes the pain as burning.  The pain is worse with activity and gets worse throughout the day, and improved by rest. There is associated numbness and tingling of the right leg. There is no subjective weakness. Prior treatments have included MICHAEL's (before surgery), PT, and medications.    The Patient denies myelopathic symptoms such as handwriting changes or difficulty with buttons/coins/keys. Denies perineal paresthesias, bowel/bladder dysfunction.    PAST MEDICAL/SURGICAL HISTORY:  Past Medical History:   Diagnosis Date    Headache(784.0)     Hypothyroid      Past Surgical History:   Procedure Laterality Date    BREAST BIOPSY       SECTION      x2    COLONOSCOPY N/A 2017    Procedure: COLONOSCOPY Miralax;  Surgeon: Quentin Mercado Jr., MD;  Location: [facility];  Service: Endoscopy;  Laterality: N/A;    HYSTERECTOMY      LAPAROSCOPIC HYSTERECTOMY      supracervical/BSO    SPINE SURGERY      TONSILLECTOMY         Current Medications:   Current Outpatient Prescriptions:     ALPRAZolam (XANAX) 0.5 MG tablet, Take 1 tablet by mouth nightly as needed., Disp: 30 tablet, Rfl: 5    atorvastatin (LIPITOR) 20 MG tablet, Take 1 tablet (20 mg total) by mouth once daily. (Patient taking differently: Take 20 mg by mouth once daily. TAKES 3 TIMES A WEEK), Disp: 90 tablet, Rfl: 3    citalopram (CELEXA) 20 MG tablet, Take 1  tablet (20 mg total) by mouth once daily., Disp: 90 tablet, Rfl: 3    estradiol (ESTRACE) 1 MG tablet, Take 1 mg by mouth every evening. , Disp: , Rfl:     hydrocodone-acetaminophen 5-325mg (NORCO) 5-325 mg per tablet, Take 1 tablet by mouth every 6 (six) hours as needed for Pain., Disp: 20 tablet, Rfl: 0    levothyroxine (SYNTHROID) 112 MCG tablet, Take 1 tablet (112 mcg total) by mouth once daily., Disp: 30 tablet, Rfl: 11    liothyronine (CYTOMEL) 5 MCG Tab, Take 1 tablet (5 mcg total) by mouth once daily., Disp: 30 tablet, Rfl: 11    meloxicam (MOBIC) 15 MG tablet, Take 15 mg by mouth daily as needed for Pain., Disp: , Rfl:     multivitamin capsule, Take 1 capsule by mouth once daily., Disp: , Rfl:     progesterone (PROMETRIUM) 100 MG capsule, Take 100 mg by mouth every evening. , Disp: , Rfl:     sumatriptan (IMITREX) 100 MG tablet, TAKE 1 TABLET BY MOUTH EVERY 2 HOURS AS NEEDED FOR MIGRAINE, Disp: 30 tablet, Rfl: 5    zolpidem (AMBIEN) 10 mg Tab, TAKE 1 TABLET BY MOUTH NIGHTLY AS NEEDED, Disp: 30 tablet, Rfl: 5    Social History:   Social History     Social History    Marital status:      Spouse name: N/A    Number of children: N/A    Years of education: N/A     Occupational History    retired       Social History Main Topics    Smoking status: Never Smoker    Smokeless tobacco: Never Used    Alcohol use Yes      Comment: social    Drug use: No    Sexual activity: Yes     Partners: Male     Other Topics Concern    Not on file     Social History Narrative    , 2 adult children and exercises regularly-rides bike on the [employer],       REVIEW OF SYSTEMS:  Constitution: Negative. Negative for chills, fever and night sweats.   Cardiovascular: Negative for chest pain and syncope.   Respiratory: Negative for cough and shortness of breath.   Gastrointestinal: See HPI. Negative for nausea/vomiting. Negative for abdominal pain.  Genitourinary: See HPI. Negative for  discoloration or dysuria.  Hematologic/Lymphatic: Negative for bleeding/clotting disorders.   Musculoskeletal: Negative for falls and muscle weakness.   Neurological: See HPI. No history of seizures. No history of cranial surgery or shunts.  Neurological: See HPI. No seizures.   Endocrine: Negative for polydipsia, polyphagia and polyuria.   Allergic/Immunologic: Negative for hives and persistent infections.     EXAM:  Ht 5' (1.524 m)   Wt 53.6 kg (118 lb 2.7 oz)   BMI 23.08 kg/m²     PHYSICAL EXAMINATION:    General: The patient is a pleasant 68 y.o. female in no apparent distress, the patient is orientatied to person, place and time.  Psych: Normal mood and affect  HEENT: Vision grossly intact, hearing intact to the spoken word.  Lungs: Respirations unlabored.  Gait: Normal station and gait, no difficulty with toe or heel walk.   Skin: Dorsal lumbar skin negative for rashes, lesions, hairy patches. Healed midline surgical incision. There is no lumbar tenderness to palpation.  Range of motion: Lumbar range of motion is acceptable.  Spinal Balance: Global saggital and coronal spinal balance acceptable, no significant for scoliosis and kyphosis.  Musculoskeletal: No pain with the range of motion of the bilateral hips. No trochanteric tenderness to palpation.  Vascular: Bilateral lower extremities warm and well perfused, Dorsalis pedis pulses 2+ bilaterally.  Neurological: Normal strength and tone in all major motor groups in the bilateral lower extremities. Normal sensation to light touch in the L2-S1 dermatomes bilaterally.  Deep tendon reflexes symmetric 1+ in the bilateral lower extremities.  Negative Babinski bilaterally. Straight leg raise negative bilaterally.    IMAGING:      Today I personally reviewed AP, Lat and Flex/Ex  upright L-spine that demonstrate loss of disc space worse at L3-4. No spondylolisthesis.     ASSESSMENT/PLAN:    I think the patient would be a good candidate for an epidural  injection.    She will think about her options.

## 2018-06-04 NOTE — TELEPHONE ENCOUNTER
----- Message from Berry Kelly MD sent at 6/4/2018  3:32 PM CDT -----  Could you let her know  Her infection labs are normal and I have ordered a repeat injection with Dr. Moya.

## 2018-06-04 NOTE — TELEPHONE ENCOUNTER
Left message for pt advising that per Dr. Kelly, her infection labs are normal and he has ordered a repeat injection with Dr. Moya.

## 2018-06-05 ENCOUNTER — PATIENT MESSAGE (OUTPATIENT)
Dept: ORTHOPEDICS | Facility: CLINIC | Age: 68
End: 2018-06-05

## 2018-06-06 ENCOUNTER — TELEPHONE (OUTPATIENT)
Dept: PAIN MEDICINE | Facility: CLINIC | Age: 68
End: 2018-06-06

## 2018-06-06 DIAGNOSIS — M54.16 LUMBAR RADICULOPATHY: Primary | ICD-10-CM

## 2018-06-08 ENCOUNTER — TELEPHONE (OUTPATIENT)
Dept: PAIN MEDICINE | Facility: CLINIC | Age: 68
End: 2018-06-08

## 2018-06-08 NOTE — TELEPHONE ENCOUNTER
----- Message from Tania Solorio LPN sent at 6/5/2018 10:53 AM CDT -----  Hi. This pt wants to be scheduled in [place] for next Tuesday. I'm not able to send it to the pool. Can anyone put her on the schedule?    ----- Message -----  From: Sixto Moya III, MD  Sent: 6/4/2018   3:37 PM  To: Berry Kelly MD, DELPHINE Mcgregor,    Please schedule this patient for a Right L4/5 TESI.    ----- Message -----  From: Berry Kelly MD  Sent: 6/4/2018   3:32 PM  To: Sixto Moya III, MD    Could you do an Right sided L4/5 TFESI for her please?

## 2018-06-12 ENCOUNTER — TELEPHONE (OUTPATIENT)
Dept: PAIN MEDICINE | Facility: CLINIC | Age: 68
End: 2018-06-12

## 2018-06-12 DIAGNOSIS — M54.16 LUMBAR RADICULAR PAIN: Primary | ICD-10-CM

## 2018-06-12 NOTE — TELEPHONE ENCOUNTER
Spoke with pt and [place] [facility] [place] of 1000 and an arrival time of 0830, with exact location of cath lab. Understanding verbalized.  Pt stated that she is aware of all pre-procedure instructions.

## 2018-06-15 ENCOUNTER — HOSPITAL ENCOUNTER (OUTPATIENT)
Facility: HOSPITAL | Age: 68
Discharge: HOME | End: 2018-06-15
Attending: ANESTHESIOLOGY | Admitting: ANESTHESIOLOGY
Payer: COMMERCIAL

## 2018-06-15 VITALS
BODY MASS INDEX: 23.16 KG/M2 | HEART RATE: 89 BPM | SYSTOLIC BLOOD PRESSURE: 141 MMHG | DIASTOLIC BLOOD PRESSURE: 62 MMHG | OXYGEN SATURATION: 99 % | HEIGHT: 60 IN | WEIGHT: 118 LBS | TEMPERATURE: 97 F | RESPIRATION RATE: 17 BRPM

## 2018-06-15 DIAGNOSIS — M54.16 LUMBAR RADICULOPATHY: Primary | ICD-10-CM

## 2018-06-15 DIAGNOSIS — M54.16 LUMBAR RADICULAR PAIN: ICD-10-CM

## 2018-06-15 PROCEDURE — 63600175 PHARM REV CODE 636 W HCPCS

## 2018-06-15 PROCEDURE — 99152 MOD SED SAME PHYS/QHP 5/>YRS: CPT | Mod: ,,, | Performed by: ANESTHESIOLOGY

## 2018-06-15 PROCEDURE — 99152 MOD SED SAME PHYS/QHP 5/>YRS: CPT

## 2018-06-15 PROCEDURE — 25000003 PHARM REV CODE 250

## 2018-06-15 PROCEDURE — 64483 NJX AA&/STRD TFRM EPI L/S 1: CPT | Mod: RT,,, | Performed by: ANESTHESIOLOGY

## 2018-06-15 RX ORDER — SODIUM CHLORIDE 0.9 % (FLUSH) 0.9 %
3 SYRINGE (ML) INJECTION
Status: DISCONTINUED | OUTPATIENT
Start: 2018-06-15 | End: 2018-06-15 | Stop reason: HOSPADM

## 2018-06-15 NOTE — OP NOTE
Patient Name: Florentino Bowman  MRN: [id number]    INFORMED CONSENT: The procedure, risks, benefits and options were discussed with patient. There are no contraindications to the procedure. The patient expressed understanding and agreed to proceed. The personnel performing the procedure was discussed. I verify that I personally obtained the patient's consent prior to the start of the procedure and the signed consent can be found on the patient's chart.    PROCEDURE: Right L4/5 TRANSFORAMINAL EPIDURAL STEROID INJECTION    Procedure Date: 6/15/2018  Surgeon(s) and Role:     * Sixto Moya III, MD - Primary  Anesthesia: RN IV Sedation    Pre Procedure diagnosis:   Lumbar Radiculopathy    Post-Procedure diagnosis: SAME    Sedation: Yes - Fentanyl 50 mcg and Midazolam 2 mg    DESCRIPTION OF PROCEDURE: The patient was brought to the procedure room. IV access was obtained prior to the procedure. The patient was positioned prone on the fluoroscopy table. Continuous hemodynamic monitoring was initiated including blood pressure, EKG, and pulse oximetry.  The skin was prepped with chlorhexidine three times and draped in a sterile fashion. Skin anesthesia was achieved using 5 mL of lidocaine 1% over the respective injection sites.     An oblique fluoroscopic view was obtained, with the superior articular process of the inferior vertebral body aligned with the pedicle. The tip of a 22-gauge 3.5-inch Quincke-type spinal needle was advanced toward the 6 oclock position of the pedicle under intermittent fluoroscopic guidance. Confirmation of proper needle position was made with AP, oblique, and lateral fluoroscopic views. Negative aspiration for blood or CSF was confirmed. 0.5 mL of Omnipaque 300 was injected at each level. Live fluoroscopic imaging revealed a clear outline of the spinal nerve with proximal spread of agent through the neural foramen into the anterior epidural space. A total combination of 2 mL of Lidocaine 1%  and 40 mg Depo-Medrol was injected at each level. The needles were removed and bleeding was nil.  A sterile dressing was applied. Florentino was taken back to the recovery room for further observation.     Blood Loss: Nil

## 2018-06-15 NOTE — DISCHARGE SUMMARY
Discharge Note  Short Stay      SUMMARY     Admit Date: 6/15/2018    Attending Physician: Sixto Moya III      Discharge Physician: Sixto Moya III      Discharge Date: 6/15/2018 11:39 AM    Final Diagnosis:   1. Lumbar radiculopathy    2. Lumbar radicular pain        Disposition: Home or self care    Patient Instructions:   Discharge Medication List as of 6/15/2018 11:09 AM      CONTINUE these medications which have NOT CHANGED    Details   ALPRAZolam (XANAX) 0.5 MG tablet Take 1 tablet by mouth nightly as needed., Normal      atorvastatin (LIPITOR) 20 MG tablet Take 1 tablet (20 mg total) by mouth once daily., Starting Mon 8/14/2017, Normal      citalopram (CELEXA) 20 MG tablet Take 1 tablet (20 mg total) by mouth once daily., Starting Mon 8/14/2017, Normal      estradiol (ESTRACE) 1 MG tablet Take 1 mg by mouth every evening. , Starting Mon 4/22/2013, Historical Med      hydrocodone-acetaminophen 5-325mg (NORCO) 5-325 mg per tablet Take 1 tablet by mouth every 6 (six) hours as needed for Pain., Starting Fri 5/11/2018, Normal      levothyroxine (SYNTHROID) 112 MCG tablet Take 1 tablet (112 mcg total) by mouth once daily., Starting Thu 4/26/2018, Until Fri 4/26/2019, Normal      liothyronine (CYTOMEL) 5 MCG Tab Take 1 tablet (5 mcg total) by mouth once daily., Starting Thu 4/26/2018, Until Fri 4/26/2019, Normal      multivitamin capsule Take 1 capsule by mouth once daily., Historical Med      progesterone (PROMETRIUM) 100 MG capsule Take 100 mg by mouth every evening. , Starting Fri 5/10/2013, Historical Med      sumatriptan (IMITREX) 100 MG tablet TAKE 1 TABLET BY MOUTH EVERY 2 HOURS AS NEEDED FOR MIGRAINE, Normal      zolpidem (AMBIEN) 10 mg Tab TAKE 1 TABLET BY MOUTH NIGHTLY AS NEEDED, Normal                 Discharge Diagnosis: Same as Pre and Post Procedure  Condition on Discharge: Stable.  Diet on Discharge: Same as before.  Activity: as per instruction sheet.  Discharge to: Home with a  responsible adult.  Follow up: in 4 weeks

## 2018-06-15 NOTE — NURSING
Received via stretcher from procedure.  Awake and alert.  Denies pain.  bandaid to back clean dry and intact.  Tolerating fluids and sandwich upon arrival.  Spouse at side.  Continue to monitor.

## 2018-06-15 NOTE — H&P (VIEW-ONLY)
DATE: 2018  PATIENT: Florentino Bowman    Attending Physician: Berry Kelly M.D.    CHIEF COMPLAINT: LBP, right leg pain    HISTORY:  Florentino Bowman is a 68 y.o. female here for initial evaluation of low back and right leg pain (Back - 2, Leg - 6). The pain has been present since 2017. She reports her pain began after lifting/twisting a toddler. She was initially evaluated and treated in the neurosurgery clinic and underwent an L4-5 laminectomy in 2017. She reports that her back did not improve after surgery and that her right leg pain worsened. The patient describes the pain as burning.  The pain is worse with activity and gets worse throughout the day, and improved by rest. There is associated numbness and tingling of the right leg. There is no subjective weakness. Prior treatments have included MICHAEL's (before surgery), PT, and medications.    The Patient denies myelopathic symptoms such as handwriting changes or difficulty with buttons/coins/keys. Denies perineal paresthesias, bowel/bladder dysfunction.    PAST MEDICAL/SURGICAL HISTORY:  Past Medical History:   Diagnosis Date    Headache(784.0)     Hypothyroid      Past Surgical History:   Procedure Laterality Date    BREAST BIOPSY       SECTION      x2    COLONOSCOPY N/A 2017    Procedure: COLONOSCOPY Miralax;  Surgeon: Quentin Mercado Jr., MD;  Location: [facility];  Service: Endoscopy;  Laterality: N/A;    HYSTERECTOMY      LAPAROSCOPIC HYSTERECTOMY      supracervical/BSO    SPINE SURGERY      TONSILLECTOMY         Current Medications:   Current Outpatient Prescriptions:     ALPRAZolam (XANAX) 0.5 MG tablet, Take 1 tablet by mouth nightly as needed., Disp: 30 tablet, Rfl: 5    atorvastatin (LIPITOR) 20 MG tablet, Take 1 tablet (20 mg total) by mouth once daily. (Patient taking differently: Take 20 mg by mouth once daily. TAKES 3 TIMES A WEEK), Disp: 90 tablet, Rfl: 3    citalopram (CELEXA) 20 MG tablet, Take 1  tablet (20 mg total) by mouth once daily., Disp: 90 tablet, Rfl: 3    estradiol (ESTRACE) 1 MG tablet, Take 1 mg by mouth every evening. , Disp: , Rfl:     hydrocodone-acetaminophen 5-325mg (NORCO) 5-325 mg per tablet, Take 1 tablet by mouth every 6 (six) hours as needed for Pain., Disp: 20 tablet, Rfl: 0    levothyroxine (SYNTHROID) 112 MCG tablet, Take 1 tablet (112 mcg total) by mouth once daily., Disp: 30 tablet, Rfl: 11    liothyronine (CYTOMEL) 5 MCG Tab, Take 1 tablet (5 mcg total) by mouth once daily., Disp: 30 tablet, Rfl: 11    meloxicam (MOBIC) 15 MG tablet, Take 15 mg by mouth daily as needed for Pain., Disp: , Rfl:     multivitamin capsule, Take 1 capsule by mouth once daily., Disp: , Rfl:     progesterone (PROMETRIUM) 100 MG capsule, Take 100 mg by mouth every evening. , Disp: , Rfl:     sumatriptan (IMITREX) 100 MG tablet, TAKE 1 TABLET BY MOUTH EVERY 2 HOURS AS NEEDED FOR MIGRAINE, Disp: 30 tablet, Rfl: 5    zolpidem (AMBIEN) 10 mg Tab, TAKE 1 TABLET BY MOUTH NIGHTLY AS NEEDED, Disp: 30 tablet, Rfl: 5    Social History:   Social History     Social History    Marital status:      Spouse name: N/A    Number of children: N/A    Years of education: N/A     Occupational History    retired       Social History Main Topics    Smoking status: Never Smoker    Smokeless tobacco: Never Used    Alcohol use Yes      Comment: social    Drug use: No    Sexual activity: Yes     Partners: Male     Other Topics Concern    Not on file     Social History Narrative    , 2 adult children and exercises regularly-rides bike on the [employer],       REVIEW OF SYSTEMS:  Constitution: Negative. Negative for chills, fever and night sweats.   Cardiovascular: Negative for chest pain and syncope.   Respiratory: Negative for cough and shortness of breath.   Gastrointestinal: See HPI. Negative for nausea/vomiting. Negative for abdominal pain.  Genitourinary: See HPI. Negative for  discoloration or dysuria.  Hematologic/Lymphatic: Negative for bleeding/clotting disorders.   Musculoskeletal: Negative for falls and muscle weakness.   Neurological: See HPI. No history of seizures. No history of cranial surgery or shunts.  Neurological: See HPI. No seizures.   Endocrine: Negative for polydipsia, polyphagia and polyuria.   Allergic/Immunologic: Negative for hives and persistent infections.     EXAM:  Ht 5' (1.524 m)   Wt 53.6 kg (118 lb 2.7 oz)   BMI 23.08 kg/m²     PHYSICAL EXAMINATION:    General: The patient is a pleasant 68 y.o. female in no apparent distress, the patient is orientatied to person, place and time.  Psych: Normal mood and affect  HEENT: Vision grossly intact, hearing intact to the spoken word.  Lungs: Respirations unlabored.  Gait: Normal station and gait, no difficulty with toe or heel walk.   Skin: Dorsal lumbar skin negative for rashes, lesions, hairy patches. Healed midline surgical incision. There is no lumbar tenderness to palpation.  Range of motion: Lumbar range of motion is acceptable.  Spinal Balance: Global saggital and coronal spinal balance acceptable, no significant for scoliosis and kyphosis.  Musculoskeletal: No pain with the range of motion of the bilateral hips. No trochanteric tenderness to palpation.  Vascular: Bilateral lower extremities warm and well perfused, Dorsalis pedis pulses 2+ bilaterally.  Neurological: Normal strength and tone in all major motor groups in the bilateral lower extremities. Normal sensation to light touch in the L2-S1 dermatomes bilaterally.  Deep tendon reflexes symmetric 1+ in the bilateral lower extremities.  Negative Babinski bilaterally. Straight leg raise negative bilaterally.    IMAGING:      Today I personally reviewed AP, Lat and Flex/Ex  upright L-spine that demonstrate loss of disc space worse at L3-4. No spondylolisthesis.     ASSESSMENT/PLAN:    I think the patient would be a good candidate for an epidural  injection.    She will think about her options.

## 2018-06-15 NOTE — DISCHARGE INSTRUCTIONS

## 2018-06-15 NOTE — PLAN OF CARE
Problem: Patient Care Overview  Goal: Plan of Care Review  Outcome: Ongoing (interventions implemented as appropriate)  Patient arrived to room. PIV placed, labs sent. Admit assessment completed. Plan of care discussed with patient.  at bedside. Nurse call bell within reach. Will monitor

## 2018-06-15 NOTE — NURSING
IV d/c'd with cath tip intact.  Pt and spouse verbalized understanding of d/c instructions.  trf off unit via wheelchair for discharge home.

## 2018-06-19 ENCOUNTER — TELEPHONE (OUTPATIENT)
Dept: FAMILY MEDICINE | Facility: CLINIC | Age: 68
End: 2018-06-19

## 2018-06-19 NOTE — TELEPHONE ENCOUNTER
----- Message from Poppy Cancino sent at 6/19/2018 11:28 AM CDT -----  Contact: Self  Patient is requesting a medication refill.     RX name:  Tramadol  Strength: 50 mg  Quantity: 60 pills  Directions: Take 1 tablet (50 mg total) by mouth every 6 (six) hours as needed    Pharmacy name: [facility]      Phone number where patient can be reached: [phone]

## 2018-06-20 ENCOUNTER — PATIENT MESSAGE (OUTPATIENT)
Dept: FAMILY MEDICINE | Facility: CLINIC | Age: 68
End: 2018-06-20

## 2018-06-21 DIAGNOSIS — M54.16 LUMBAR RADICULOPATHY: Primary | ICD-10-CM

## 2018-06-21 RX ORDER — TRAMADOL HYDROCHLORIDE 50 MG/1
50 TABLET ORAL EVERY 12 HOURS PRN
Qty: 30 TABLET | Refills: 0 | Status: SHIPPED | OUTPATIENT
Start: 2018-06-21 | End: 2018-07-01

## 2018-06-29 RX ORDER — ZOLPIDEM TARTRATE 10 MG/1
TABLET ORAL
Qty: 30 TABLET | Refills: 1 | Status: SHIPPED | OUTPATIENT
Start: 2018-06-29 | End: 2018-08-21 | Stop reason: SDUPTHER

## 2018-07-06 ENCOUNTER — OFFICE VISIT (OUTPATIENT)
Dept: ORTHOPEDICS | Facility: CLINIC | Age: 68
End: 2018-07-06
Payer: COMMERCIAL

## 2018-07-06 VITALS
WEIGHT: 117.06 LBS | SYSTOLIC BLOOD PRESSURE: 121 MMHG | BODY MASS INDEX: 22.98 KG/M2 | DIASTOLIC BLOOD PRESSURE: 77 MMHG | HEART RATE: 71 BPM | HEIGHT: 60 IN

## 2018-07-06 DIAGNOSIS — M54.16 LUMBAR RADICULOPATHY: ICD-10-CM

## 2018-07-06 DIAGNOSIS — Z98.890 HISTORY OF LUMBAR LAMINECTOMY: Primary | ICD-10-CM

## 2018-07-06 PROCEDURE — 99999 PR PBB SHADOW E&M-EST. PATIENT-LVL III: CPT | Mod: PBBFAC,,, | Performed by: PHYSICIAN ASSISTANT

## 2018-07-06 PROCEDURE — 99213 OFFICE O/P EST LOW 20 MIN: CPT | Mod: S$GLB,,, | Performed by: PHYSICIAN ASSISTANT

## 2018-07-06 RX ORDER — PREGABALIN 25 MG/1
25 CAPSULE ORAL 2 TIMES DAILY
COMMUNITY
End: 2018-07-19 | Stop reason: DRUGHIGH

## 2018-07-06 NOTE — PROGRESS NOTES
DATE: 7/6/2018  PATIENT: Florentino Bowman    Attending Physician: Berry Kelly M.D.    History:  Florentino Bowman returns for follow up after having a right L4/5 TFESI with Dr. Moya 6/15/2018.  She reports no relief with the injection.  She is s/p right L4/5 laminectomy with Dr. Figueroa 12/29/2017.  She continues to have right sided low back and right anterior leg pain.  The pain is worse with sitting, getting up from sitting and bending to put her shoes on.  The pain is also worse throughout the day.  The pain is improved with ice and rest.  She has failed numerous treatments including medications, physical therapy, ESIs and a laminectomy with Dr. Figueroa.     The Patient denies myelopathic symptoms such as handwriting changes or difficulty with buttons/coins/keys. Denies perineal paresthesias, bowel/bladder dysfunction.    Physical exam stable.  Neuro exam stable.      Imaging:  Today I personally re-reviewed AP, Lat and Flex/Ex  upright L-spine that demonstrate loss of disc space worse at L3/4 where there is focal kyphosis.    MRI lumbar spine demonstrates significant L4/5 spondylosis.  There is no significant spinal stenosis.     A/P:  Patient seen and examined with Dr. Kelly.     Plan for CT lumbar spine.  Follow up with Dr. Kelly after CT.

## 2018-07-11 ENCOUNTER — OFFICE VISIT (OUTPATIENT)
Dept: OTOLARYNGOLOGY | Facility: CLINIC | Age: 68
End: 2018-07-11
Payer: COMMERCIAL

## 2018-07-11 ENCOUNTER — HOSPITAL ENCOUNTER (OUTPATIENT)
Dept: RADIOLOGY | Facility: HOSPITAL | Age: 68
Discharge: HOME | End: 2018-07-11
Attending: ORTHOPAEDIC SURGERY
Payer: COMMERCIAL

## 2018-07-11 ENCOUNTER — OFFICE VISIT (OUTPATIENT)
Dept: ORTHOPEDICS | Facility: CLINIC | Age: 68
End: 2018-07-11
Payer: COMMERCIAL

## 2018-07-11 VITALS
HEART RATE: 72 BPM | DIASTOLIC BLOOD PRESSURE: 70 MMHG | SYSTOLIC BLOOD PRESSURE: 127 MMHG | BODY MASS INDEX: 23.5 KG/M2 | WEIGHT: 119.69 LBS | HEIGHT: 60 IN

## 2018-07-11 VITALS — WEIGHT: 116.38 LBS | HEIGHT: 60 IN | BODY MASS INDEX: 22.85 KG/M2

## 2018-07-11 DIAGNOSIS — Z98.890 HISTORY OF LUMBAR LAMINECTOMY: ICD-10-CM

## 2018-07-11 DIAGNOSIS — Z98.890 HISTORY OF LUMBAR DISCECTOMY: Primary | ICD-10-CM

## 2018-07-11 DIAGNOSIS — R49.0 HOARSENESS OF VOICE: Primary | ICD-10-CM

## 2018-07-11 DIAGNOSIS — K21.9 LARYNGOPHARYNGEAL REFLUX (LPR): ICD-10-CM

## 2018-07-11 DIAGNOSIS — J38.7 PRESBYLARYNGES: ICD-10-CM

## 2018-07-11 PROCEDURE — 99999 PR PBB SHADOW E&M-EST. PATIENT-LVL IV: CPT | Mod: PBBFAC,,, | Performed by: OTOLARYNGOLOGY

## 2018-07-11 PROCEDURE — 72131 CT LUMBAR SPINE W/O DYE: CPT | Mod: TC

## 2018-07-11 PROCEDURE — 99999 PR PBB SHADOW E&M-EST. PATIENT-LVL II: CPT | Mod: PBBFAC,,, | Performed by: ORTHOPAEDIC SURGERY

## 2018-07-11 PROCEDURE — 99213 OFFICE O/P EST LOW 20 MIN: CPT | Mod: S$GLB,,, | Performed by: ORTHOPAEDIC SURGERY

## 2018-07-11 PROCEDURE — 72131 CT LUMBAR SPINE W/O DYE: CPT | Mod: 26,,, | Performed by: RADIOLOGY

## 2018-07-11 PROCEDURE — 99204 OFFICE O/P NEW MOD 45 MIN: CPT | Mod: 25,S$GLB,, | Performed by: OTOLARYNGOLOGY

## 2018-07-11 RX ORDER — TOBRAMYCIN 3 MG/ML
SOLUTION/ DROPS OPHTHALMIC
Refills: 0 | COMMUNITY
Start: 2018-06-20 | End: 2018-08-21

## 2018-07-11 NOTE — PROGRESS NOTES
Chief Complaint   Patient presents with    Hoarse     pt referred by Dr Chou, started a year ago   .     HPI:Florentino Bowman is a 68 y.o. female who has been referred by Dr. Fam Chou for a one year history of hoarseness. Her voice is progressively worsening over this time. There are pitch breaks or cracks. There is vocal fatigue. She denies dysphagia, odynophagia, throat pain, and otalgia.  There is no hemoptysis or hematemesis. She is breathing well.     She admits to throat clearing and cough. She denies heartburn and reflux.      Past Medical History:   Diagnosis Date    Headache(784.0)     Hypothyroid      Social History     Social History    Marital status:      Spouse name: N/A    Number of children: N/A    Years of education: N/A     Occupational History    retired       Social History Main Topics    Smoking status: Never Smoker    Smokeless tobacco: Never Used    Alcohol use Yes      Comment: social    Drug use: No    Sexual activity: Yes     Partners: Male     Other Topics Concern    Not on file     Social History Narrative    , 2 adult children and exercises regularly-rides bike on the [employer],     Past Surgical History:   Procedure Laterality Date    BREAST BIOPSY       SECTION      x2    COLONOSCOPY N/A 2017    Procedure: COLONOSCOPY Miralax;  Surgeon: Quentin Mercado Jr., MD;  Location: [facility];  Service: Endoscopy;  Laterality: N/A;    HYSTERECTOMY      LAPAROSCOPIC HYSTERECTOMY      supracervical/BSO    SPINE SURGERY      TONSILLECTOMY       Family History   Problem Relation Age of Onset    Dementia Mother     Hypertension Mother     Heart disease Mother     Cancer Father         colon    Cancer Sister         breast    Breast cancer Sister     Heart disease Sister     No Known Problems Daughter     No Known Problems Son     No Known Problems Sister            Review of Systems  General: negative for chills, fever or  weight loss  Psychological: negative for mood changes or depression  Ophthalmic: negative for blurry vision, photophobia or eye pain  ENT: see HPI  Respiratory: no cough, shortness of breath, or wheezing  Cardiovascular: no chest pain or dyspnea on exertion  Gastrointestinal: no abdominal pain, change in bowel habits, or black/ bloody stools  Musculoskeletal: negative for gait disturbance or muscular weakness  Neurological: no syncope or seizures; no ataxia  Dermatological: negative for puritis,  rash and jaundice  Hematologic/lymphatic: no easy bruising, no new lumps or bumps      Physical Exam:    Vitals:    07/11/18 1315   BP: 127/70   Pulse: 72       Constitutional: Well appearing / communicating without difficutly.  NAD.  Eyes: EOM I Bilaterally  Head/Face: Normocephalic.  Negative paranasal sinus pressure/tenderness.  Salivary glands WNL.  House Brackmann I Bilaterally.    Right Ear: Auricle normal appearance. External Auditory Canal within normal limits no lesions or masses,TM w/o masses/lesions/perforations. TM mobility noted.   Left Ear: Auricle normal appearance. External Auditory Canal within normal limits no lesions or masses,TM w/o masses/lesions/perforations. TM mobility noted.  Nose: No gross nasal septal deviation. Inferior Turbinates 3+ bilaterally. No septal perforation. No masses/lesions. External nasal skin appears normal without masses/lesions.  Oral Cavity: Gingiva/lips within normal limits.  Dentition/gingiva healthy appearing. Mucus membranes moist. Floor of mouth soft, no masses palpated. Oral Tongue mobile. Hard Palate appears normal.    Oropharynx: Base of tongue appears normal. No masses/lesions noted. Tonsillar fossa/pharyngeal wall without lesions. Posterior oropharynx WNL.  Soft palate without masses. Midline uvula.   Neck/Lymphatic: No LAD I-VI bilaterally.  No thyromegaly.  No masses noted on exam.    Mirror laryngoscopy/nasopharyngoscopy: Active gag reflex.  Unable to  perform.    Neuro/Psychiatric: AOx3.  Normal mood and affect.   Cardiovascular: Normal carotid pulses bilaterally, no increasing jugular venous distention noted at cervical region bilaterally.    Respiratory: Normal respiratory effort, no stridor, no retractions noted.      See separate procedure note for FFL.       Assessment:    ICD-10-CM ICD-9-CM    1. 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 Laryngopharyngeal reflux (LPR) K21.9 478.79      The primary encounter diagnosis was Hoarseness of voice. Diagnoses of Presbylarynges and Laryngopharyngeal reflux (LPR) were also pertinent to this visit.      Plan:  Orders Placed This Encounter   Procedures    Ambulatory referral to Speech Therapy      We reviewed the findings from her recent scope examination and that the findings are consistent with presbylarynx.  Presbylarynx describes age-related changes to the soft tissues of the larynx including the loss of vocal fold tone and elasticity and bowing of the vocal cords.  This can affect voice quality including hoarseness, breathy voice, reduced voice volume, and unstable pitch.  I would recommend speech therapy when the patient is motivated.    She also has the physical findings of chronic laryngopharyngeal reflux, which I believe is the cause of the hoarseness. I explained to the patient how it is common to experience throat discomfort, a foreign body sensation, problems swallowing, chronic cough and hoarseness among other things due to reflux, even in the absence of heartburn. Smaller volumes of gastric contents are required to irritate the pharynx than the lower esophagus. Often patients with significant reflux and heartburn will seek medical treatment earlier.     I recommended that the patient start taking Ranitidine 300mg daily* and provided a prescription.     I also recommended that the patient refrain from eating within 3 hours of going to bed  at night and that the patient place a 2 x 4 underneath the head board of the bed to elevate the head of bed very subtly and optimize the impact of gravity on the potential reflux.  I recommended that the patient avoid alcohol, caffeine, tobacco, tomato sauce, spicy foods, fried food, and chocolate.     Follow up in 6 weeks to reassess progress with treatment regimen.     Reta Reyes MD

## 2018-07-11 NOTE — PROCEDURES
Procedures     Due to indication in patient's history, presentation or risk factors,  a fiber optic exam was performed.    SEPARATE PROCEDURE NOTE:    ANESTHESIA:  Topical xylocaine with prema-synephrine    FINDINGS:  Mild  inaterarytenoid erythema and edema    PROCEDURE:  After verbal consent was obtained, the flexible scope was passed through the patient's nasal cavity without difficulty.  The nasopharynx (adenoid pad) and eustachian tube orifices were first visualized and were found to be normal, without masses or irregularity.  The posterior pharyngeal wall and base of tongue were then examined and no mass or irregular tissue was seen.  The scope was then advanced to the larynx, and the epiglottis, valleculae, and piriform sinuses were normal, without masses or mucosal irregularity.  The false vocal folds and true vocal folds were then examined and were found to have normal mobility (full abduction and adduction) and no masses or mucosal irregularity was seen.  The interartyenoid area had mild edema and erythema consistent with reflux.

## 2018-07-11 NOTE — PROGRESS NOTES
DATE: 7/11/2018  PATIENT: Florentino Bowman    Attending Physician: Berry Kelly M.D.    History:  Florentino Bowman returns for follow up after having a right L4/5 TFESI with Dr. Moya 6/15/2018.  She reports no relief with the injection.  She is s/p right L4/5 laminectomy with Dr. Figueroa 12/29/2017.  She continues to have right sided low back and right anterior leg pain.  The pain is worse with sitting, getting up from sitting and bending to put her shoes on.  The pain is also worse throughout the day.  The pain is improved with ice and rest.  She has failed numerous treatments including medications, physical therapy, ESIs and a laminectomy with Dr. Figueroa.     The Patient denies myelopathic symptoms such as handwriting changes or difficulty with buttons/coins/keys. Denies perineal paresthesias, bowel/bladder dysfunction.    Physical exam stable.  Neuro exam stable.      Imaging:  Today I personally re-reviewed AP, Lat and Flex/Ex  upright L-spine that demonstrate loss of disc space worse at L4/5 where there is focal kyphosis.    MRI lumbar spine demonstrates significant L4/5 spondylosis.  There is no significant spinal stenosis.     A/P:  Today we discussed options. I think her only surgical option is an L4/5 TLIF. I explained the procedure in detail, she will think about this and call me if she would like to schedule surgery.    I spent 15 minutes with the patient of which greater than 1/2 the time was devoted to counciling the patient regarding treatment options.

## 2018-07-11 NOTE — PATIENT INSTRUCTIONS
How Acid Reflux Affects Your Throat    Do you have to clear your throat or cough often? Are you hoarse? Do you have trouble swallowing? If you have these or other throat symptoms, you may have acid reflux. This occurs when stomach acid flows back up and irritates your throat.  Why you have throat symptoms  There are muscles (esophageal sphincters) at both ends of the tube that carries food to your stomach (the esophagus). These muscles relax to let food pass. Then they tighten to keep stomach acid down. When the lower esophageal sphincter (LES) doesnt tighten enough, acid can flow back (reflux) from your stomach into your esophagus. This may cause heartburn. In some cases the upper esophageal sphincter (UES) also doesnt work well. Then acid can travel higher and enter your throat (pharynx). In many cases, this causes throat symptoms.  Common throat symptoms  · Need to clear your throat often  · Feeling like youre choking  · Long-term (chronic) cough  · Hoarseness  · Trouble swallowing  · Feel like you have a lump in your throat  · Sour or acid taste  · Sore throat that keeps coming back   Date Last Reviewed: 7/1/2016  © 1377-1586 [employer]. [address] [place] [address], [place] [address]. All rights reserved. This information is not intended as a substitute for professional medical care. Always follow your healthcare professional's instructions.      Tips to Control Acid Reflux    To control acid reflux, youll need to make some basic diet and lifestyle changes. The simple steps outlined below may be all youll need to ease discomfort.  Watch what you eat  · Avoid fatty foods and spicy foods.  · Eat fewer acidic foods, such as citrus and tomato-based foods. These can increase symptoms.  · Limit drinking alcohol, caffeine, and fizzy beverages. All increase acid reflux.  · Try limiting chocolate, peppermint, and spearmint. These can worsen acid reflux in some people.  Watch when you eat  · Avoid lying  down for 3 hours after eating.  · Do not snack before going to bed.  Raise your head  Raising your head and upper body by 4 to 6 inches helps limit reflux when youre lying down. Put blocks under the head of your bed frame to raise it.  Other changes  · Lose weight, if you need to  · Dont exercise near bedtime  · Avoid tight-fitting clothes  · Limit aspirin and ibuprofen  · Stop smoking   Date Last Reviewed: 7/1/2016  © 8930-2738 [employer]. [address] [place] [address], [place] [address]. All rights reserved. This information is not intended as a substitute for professional medical care. Always follow your healthcare professional's instructions.

## 2018-07-11 NOTE — LETTER
July 11, 2018      Beti Harris MD  [address]  [facility] [address]           [facility]  [address] [place] [address], [address]  [facility] [address]  Phone: [phone]  Fax: [phone]          Patient: Florentino Bowman   MR Number: [id number]   YOB: 1950   Date of Visit: 7/11/2018       Dear Dr. Beti Harris:    Thank you for referring Florentino Bowman to me for evaluation. Attached you will find relevant portions of my assessment and plan of care.    If you have questions, please do not hesitate to call me. I look forward to following Florentino Bowman along with you.    Sincerely,    Reta Reyes MD    Enclosure  CC:  No Recipients    If you would like to receive this communication electronically, please contact [email or web address] or [phone] to request more information on [facility] Link access.    For providers and/or their staff who would like to refer a patient to [facility], please contact us through our one-stop-shop provider referral line, [facility], at [phone].    If you feel you have received this communication in error or would no longer like to receive these types of communications, please e-mail [email or web address]

## 2018-07-16 ENCOUNTER — LAB VISIT (OUTPATIENT)
Dept: LAB | Facility: HOSPITAL | Age: 68
End: 2018-07-16
Attending: INTERNAL MEDICINE
Payer: COMMERCIAL

## 2018-07-16 DIAGNOSIS — E03.9 HYPOTHYROIDISM, UNSPECIFIED TYPE: ICD-10-CM

## 2018-07-16 LAB
T4 FREE SERPL-MCNC: 1.38 NG/DL
TSH SERPL DL<=0.005 MIU/L-ACNC: 0.03 UIU/ML

## 2018-07-16 PROCEDURE — 84439 ASSAY OF FREE THYROXINE: CPT

## 2018-07-16 PROCEDURE — 36415 COLL VENOUS BLD VENIPUNCTURE: CPT | Mod: PO

## 2018-07-16 PROCEDURE — 84443 ASSAY THYROID STIM HORMONE: CPT

## 2018-07-17 ENCOUNTER — PATIENT MESSAGE (OUTPATIENT)
Dept: ENDOCRINOLOGY | Facility: CLINIC | Age: 68
End: 2018-07-17

## 2018-07-18 ENCOUNTER — PATIENT MESSAGE (OUTPATIENT)
Dept: ENDOCRINOLOGY | Facility: CLINIC | Age: 68
End: 2018-07-18

## 2018-07-18 ENCOUNTER — PATIENT MESSAGE (OUTPATIENT)
Dept: PAIN MEDICINE | Facility: CLINIC | Age: 68
End: 2018-07-18

## 2018-07-18 DIAGNOSIS — E05.80 THYROTOXICOSIS, EXOGENOUS IATROGENIC: Primary | ICD-10-CM

## 2018-07-18 RX ORDER — LEVOTHYROXINE SODIUM 100 UG/1
100 TABLET ORAL DAILY
Qty: 30 TABLET | Refills: 11 | Status: SHIPPED | OUTPATIENT
Start: 2018-07-18 | End: 2018-10-22

## 2018-07-18 NOTE — TELEPHONE ENCOUNTER
Iatrogenic thyrotoxicosis     PLAN:  Reduce LT4 100 mcg daily   Continue liothyronine 5 mcg dialy   Repeat tsh in three months.

## 2018-07-19 ENCOUNTER — TELEPHONE (OUTPATIENT)
Dept: PAIN MEDICINE | Facility: CLINIC | Age: 68
End: 2018-07-19

## 2018-07-19 DIAGNOSIS — M54.16 LUMBAR RADICULOPATHY: Primary | ICD-10-CM

## 2018-07-19 RX ORDER — TRAMADOL HYDROCHLORIDE 50 MG/1
50 TABLET ORAL 2 TIMES DAILY PRN
Qty: 30 TABLET | Refills: 0 | Status: SHIPPED | OUTPATIENT
Start: 2018-07-19 | End: 2018-07-29

## 2018-07-19 RX ORDER — PREGABALIN 75 MG/1
75 CAPSULE ORAL 2 TIMES DAILY
Qty: 60 CAPSULE | Refills: 2 | Status: SHIPPED | OUTPATIENT
Start: 2018-07-19 | End: 2018-08-21

## 2018-07-19 NOTE — TELEPHONE ENCOUNTER
Spoke with pt and informed her that her Tramadol and Lyrica have been sent to her pharmacy. Understanding verbalized.

## 2018-07-19 NOTE — TELEPHONE ENCOUNTER
----- Message from Sixto Moya III, MD sent at 7/19/2018  3:50 PM CDT -----  Please inform patient a prescription for Tramadol and Lyrica has been sent to the pharmacy.

## 2018-08-21 ENCOUNTER — OFFICE VISIT (OUTPATIENT)
Dept: FAMILY MEDICINE | Facility: CLINIC | Age: 68
End: 2018-08-21
Payer: COMMERCIAL

## 2018-08-21 VITALS
SYSTOLIC BLOOD PRESSURE: 100 MMHG | OXYGEN SATURATION: 97 % | HEIGHT: 60 IN | DIASTOLIC BLOOD PRESSURE: 62 MMHG | HEART RATE: 96 BPM | WEIGHT: 121.88 LBS | BODY MASS INDEX: 23.93 KG/M2 | TEMPERATURE: 98 F

## 2018-08-21 DIAGNOSIS — M51.36 DDD (DEGENERATIVE DISC DISEASE), LUMBAR: ICD-10-CM

## 2018-08-21 DIAGNOSIS — Z00.00 WELLNESS EXAMINATION: Primary | ICD-10-CM

## 2018-08-21 DIAGNOSIS — I77.9 BILATERAL CAROTID ARTERY DISEASE: ICD-10-CM

## 2018-08-21 DIAGNOSIS — M54.16 LUMBAR RADICULAR PAIN: ICD-10-CM

## 2018-08-21 DIAGNOSIS — Z23 NEED FOR PNEUMOCOCCAL VACCINATION: ICD-10-CM

## 2018-08-21 DIAGNOSIS — Z98.890 S/P LUMBAR LAMINECTOMY: ICD-10-CM

## 2018-08-21 PROCEDURE — 90732 PPSV23 VACC 2 YRS+ SUBQ/IM: CPT | Mod: S$GLB,,, | Performed by: FAMILY MEDICINE

## 2018-08-21 PROCEDURE — 90471 IMMUNIZATION ADMIN: CPT | Mod: S$GLB,,, | Performed by: FAMILY MEDICINE

## 2018-08-21 PROCEDURE — 99397 PER PM REEVAL EST PAT 65+ YR: CPT | Mod: 25,S$GLB,, | Performed by: FAMILY MEDICINE

## 2018-08-21 RX ORDER — ALPRAZOLAM 0.5 MG/1
0.5 TABLET ORAL NIGHTLY PRN
Qty: 30 TABLET | Refills: 5 | Status: SHIPPED | OUTPATIENT
Start: 2018-08-21 | End: 2019-03-07

## 2018-08-21 RX ORDER — ZOLPIDEM TARTRATE 10 MG/1
TABLET ORAL
Qty: 30 TABLET | Refills: 1 | Status: SHIPPED | OUTPATIENT
Start: 2018-08-21 | End: 2018-09-17 | Stop reason: SDUPTHER

## 2018-08-21 RX ORDER — CITALOPRAM 20 MG/1
20 TABLET, FILM COATED ORAL DAILY
Qty: 90 TABLET | Refills: 3 | Status: SHIPPED | OUTPATIENT
Start: 2018-08-21 | End: 2019-06-03 | Stop reason: SDUPTHER

## 2018-08-21 RX ORDER — ATORVASTATIN CALCIUM 20 MG/1
20 TABLET, FILM COATED ORAL DAILY
Qty: 45 TABLET | Refills: 3 | Status: SHIPPED | OUTPATIENT
Start: 2018-08-21 | End: 2018-09-13

## 2018-08-21 RX ORDER — HYDROCODONE BITARTRATE AND ACETAMINOPHEN 5; 325 MG/1; MG/1
1 TABLET ORAL DAILY PRN
Qty: 30 TABLET | Refills: 0 | Status: SHIPPED | OUTPATIENT
Start: 2018-08-21 | End: 2018-09-17 | Stop reason: SDUPTHER

## 2018-08-21 RX ORDER — SUMATRIPTAN SUCCINATE 100 MG/1
TABLET ORAL
Qty: 30 TABLET | Refills: 5 | Status: SHIPPED | OUTPATIENT
Start: 2018-08-21 | End: 2019-09-06 | Stop reason: SDUPTHER

## 2018-08-21 NOTE — PROGRESS NOTES
Subjective:      Patient ID: Florentino Bowman is a 68 y.o. female.    Chief Complaint: Annual Exam (wellness) and Back Pain      HPI   Wellness; back still hurts and legs are weak an d pain in legs despite; saw ortho and recommended fusion; Had CT; saw Dr Kelly; had injections since surgery  Back hurts and right medial and lateral thigh ain    Review of Systems   Constitutional: Positive for activity change. Negative for unexpected weight change.   HENT: Negative for hearing loss, rhinorrhea and trouble swallowing.    Eyes: Negative for discharge and visual disturbance.   Respiratory: Negative for chest tightness and wheezing.    Cardiovascular: Negative for chest pain and palpitations.   Gastrointestinal: Negative for blood in stool, constipation, diarrhea and vomiting.   Endocrine: Negative for polydipsia and polyuria.   Genitourinary: Negative for difficulty urinating, dysuria, hematuria and menstrual problem.   Musculoskeletal: Negative for arthralgias, joint swelling and neck pain.   Neurological: Positive for weakness and headaches.   Psychiatric/Behavioral: Negative for confusion and dysphoric mood.   All other systems reviewed and are negative.    Objective:     Physical Exam   Constitutional: She is oriented to person, place, and time. She appears well-developed and well-nourished.   HENT:   Head: Normocephalic.   Eyes: Conjunctivae and EOM are normal. Pupils are equal, round, and reactive to light.   Neck: Normal range of motion. Neck supple.   Cardiovascular: Normal rate, regular rhythm and normal heart sounds.   Pulmonary/Chest: Effort normal and breath sounds normal.   Musculoskeletal: Normal range of motion.   Neurological: She is alert and oriented to person, place, and time. She has normal reflexes.   Skin: Skin is warm and dry.   Psychiatric: She has a normal mood and affect. Her behavior is normal. Judgment and thought content normal.   Nursing note and vitals reviewed.    Assessment:     1.  Wellness examination    2. Need for pneumococcal vaccination    3. Lumbar radicular pain    4. DDD (degenerative disc disease), lumbar    5. S/P lumbar laminectomy    6. Bilateral carotid artery disease      Plan:     New Prescriptions    HYDROCODONE-ACETAMINOPHEN (NORCO) 5-325 MG PER TABLET    Take 1 tablet by mouth daily as needed for Pain.     Discontinued Medications    PREGABALIN (LYRICA) 75 MG CAPSULE    Take 1 capsule (75 mg total) by mouth 2 (two) times daily.    TOBRAMYCIN SULFATE 0.3% (TOBREX) 0.3 % OPHTHALMIC SOLUTION    INSTILL 1 DROP INTO LEFT EYE FOUR TIMES A DAY FOR 5 DAYS     Modified Medications    Modified Medication Previous Medication    ALPRAZOLAM (XANAX) 0.5 MG TABLET ALPRAZolam (XANAX) 0.5 MG tablet       Take 1 tablet (0.5 mg total) by mouth nightly as needed.    Take 1 tablet by mouth nightly as needed.    ATORVASTATIN (LIPITOR) 20 MG TABLET atorvastatin (LIPITOR) 20 MG tablet       Take 1 tablet (20 mg total) by mouth once daily.    Take 1 tablet (20 mg total) by mouth once daily.    CITALOPRAM (CELEXA) 20 MG TABLET citalopram (CELEXA) 20 MG tablet       Take 1 tablet (20 mg total) by mouth once daily.    Take 1 tablet (20 mg total) by mouth once daily.    SUMATRIPTAN (IMITREX) 100 MG TABLET sumatriptan (IMITREX) 100 MG tablet       TAKE 1 TABLET BY MOUTH EVERY 2 HOURS AS NEEDED FOR MIGRAINE    TAKE 1 TABLET BY MOUTH EVERY 2 HOURS AS NEEDED FOR MIGRAINE    ZOLPIDEM (AMBIEN) 10 MG TAB zolpidem (AMBIEN) 10 mg Tab       TAKE 1 TABLET BY MOUTH NIGHTLY AS NEEDED    TAKE 1 TABLET BY MOUTH NIGHTLY AS NEEDED       Wellness examination    Need for pneumococcal vaccination  -     (In Office Administered) Pneumococcal Polysaccharide Vaccine (23 Valent) (SQ/IM)    Lumbar radicular pain    DDD (degenerative disc disease), lumbar    S/P lumbar laminectomy    Bilateral carotid artery disease  -     atorvastatin (LIPITOR) 20 MG tablet; Take 1 tablet (20 mg total) by mouth once daily.  Dispense: 45 tablet;  Refill: 3  -     US Carotid Bilateral; Future; Expected date: 08/21/2018    Other orders  -     HYDROcodone-acetaminophen (NORCO) 5-325 mg per tablet; Take 1 tablet by mouth daily as needed for Pain.  Dispense: 30 tablet; Refill: 0  -     ALPRAZolam (XANAX) 0.5 MG tablet; Take 1 tablet (0.5 mg total) by mouth nightly as needed.  Dispense: 30 tablet; Refill: 5  -     citalopram (CELEXA) 20 MG tablet; Take 1 tablet (20 mg total) by mouth once daily.  Dispense: 90 tablet; Refill: 3  -     sumatriptan (IMITREX) 100 MG tablet; TAKE 1 TABLET BY MOUTH EVERY 2 HOURS AS NEEDED FOR MIGRAINE  Dispense: 30 tablet; Refill: 5  -     zolpidem (AMBIEN) 10 mg Tab; TAKE 1 TABLET BY MOUTH NIGHTLY AS NEEDED  Dispense: 30 tablet; Refill: 1      Use hydrocodone for back and leg pain relief prn for now until sees another spine specialist

## 2018-08-24 ENCOUNTER — HOSPITAL ENCOUNTER (OUTPATIENT)
Dept: RADIOLOGY | Facility: HOSPITAL | Age: 68
Discharge: HOME | End: 2018-08-24
Attending: FAMILY MEDICINE
Payer: COMMERCIAL

## 2018-08-24 DIAGNOSIS — I77.9 BILATERAL CAROTID ARTERY DISEASE: ICD-10-CM

## 2018-08-24 PROCEDURE — 93880 EXTRACRANIAL BILAT STUDY: CPT | Mod: 26,,, | Performed by: RADIOLOGY

## 2018-08-24 PROCEDURE — 93880 EXTRACRANIAL BILAT STUDY: CPT | Mod: TC

## 2018-08-26 ENCOUNTER — TELEPHONE (OUTPATIENT)
Dept: FAMILY MEDICINE | Facility: CLINIC | Age: 68
End: 2018-08-26

## 2018-08-26 DIAGNOSIS — I77.9 BILATERAL CAROTID ARTERY DISEASE: Primary | ICD-10-CM

## 2018-08-27 ENCOUNTER — TELEPHONE (OUTPATIENT)
Dept: FAMILY MEDICINE | Facility: CLINIC | Age: 68
End: 2018-08-27

## 2018-08-27 ENCOUNTER — PATIENT MESSAGE (OUTPATIENT)
Dept: FAMILY MEDICINE | Facility: CLINIC | Age: 68
End: 2018-08-27

## 2018-08-27 DIAGNOSIS — I77.9 BILATERAL CAROTID ARTERY DISEASE: Primary | ICD-10-CM

## 2018-08-27 NOTE — TELEPHONE ENCOUNTER
----- Message from Fam Chou MD sent at 8/26/2018  7:05 PM CDT -----  Some plaque buildup in both internal carotid arteries in 50-69 percent area; should visit vascular surgeon for baseline exam; doubt any procedures are going to be recommended; already on atrovastatin

## 2018-08-29 ENCOUNTER — TELEPHONE (OUTPATIENT)
Dept: FAMILY MEDICINE | Facility: CLINIC | Age: 68
End: 2018-08-29

## 2018-08-29 NOTE — TELEPHONE ENCOUNTER
----- Message from Lana Lloyd sent at 8/29/2018 10:19 AM CDT -----  Contact: [phone]  Patient requested to speak with nurse albert about the appt that was scheduled for 09/06. Please call.

## 2018-09-11 ENCOUNTER — TELEPHONE (OUTPATIENT)
Dept: FAMILY MEDICINE | Facility: CLINIC | Age: 68
End: 2018-09-11

## 2018-09-11 ENCOUNTER — OFFICE VISIT (OUTPATIENT)
Dept: FAMILY MEDICINE | Facility: CLINIC | Age: 68
End: 2018-09-11
Payer: COMMERCIAL

## 2018-09-11 VITALS
OXYGEN SATURATION: 98 % | DIASTOLIC BLOOD PRESSURE: 76 MMHG | BODY MASS INDEX: 23.66 KG/M2 | WEIGHT: 120.5 LBS | HEIGHT: 60 IN | SYSTOLIC BLOOD PRESSURE: 114 MMHG | HEART RATE: 82 BPM | TEMPERATURE: 98 F

## 2018-09-11 DIAGNOSIS — J06.9 ACUTE URI: ICD-10-CM

## 2018-09-11 DIAGNOSIS — J04.0 LARYNGITIS: Primary | ICD-10-CM

## 2018-09-11 PROCEDURE — 99213 OFFICE O/P EST LOW 20 MIN: CPT | Mod: S$GLB,,, | Performed by: FAMILY MEDICINE

## 2018-09-11 PROCEDURE — 1101F PT FALLS ASSESS-DOCD LE1/YR: CPT | Mod: CPTII,S$GLB,, | Performed by: FAMILY MEDICINE

## 2018-09-11 RX ORDER — AZITHROMYCIN 250 MG/1
TABLET, FILM COATED ORAL
Qty: 6 TABLET | Refills: 0 | Status: SHIPPED | OUTPATIENT
Start: 2018-09-11 | End: 2018-09-16

## 2018-09-11 NOTE — TELEPHONE ENCOUNTER
----- Message from Lana Lloyd sent at 9/11/2018  9:26 AM CDT -----  Contact: [phone]  Patient would like to speak with the nurse about coming in today to get an antibiotic injection for laryngitis. Please call.

## 2018-09-11 NOTE — PROGRESS NOTES
Subjective:       Patient ID: Florentino Bowman is a 68 y.o. female.    Chief Complaint:   Chief Complaint   Patient presents with    Laryngitis       URI    This is a new problem. The current episode started in the past 7 days. The problem has been unchanged. Associated symptoms include coughing, sinus pain and a sore throat. Pertinent negatives include no abdominal pain, chest pain, congestion, diarrhea, dysuria, ear pain, headaches, nausea, neck pain, rash, rhinorrhea, vomiting or wheezing.     Review of Systems   Constitutional: Negative for activity change, appetite change, chills and fatigue.   HENT: Positive for sinus pain and sore throat. Negative for congestion, ear discharge, ear pain, rhinorrhea and trouble swallowing.    Eyes: Negative for photophobia, pain, redness, itching and visual disturbance.   Respiratory: Positive for cough. Negative for chest tightness, shortness of breath and wheezing.    Cardiovascular: Negative for chest pain, palpitations and leg swelling.   Gastrointestinal: Negative for abdominal distention, abdominal pain, blood in stool, diarrhea, nausea and vomiting.   Genitourinary: Negative for dysuria, pelvic pain, vaginal bleeding, vaginal discharge and vaginal pain.   Musculoskeletal: Negative for arthralgias, back pain, gait problem and neck pain.   Skin: Negative for color change, pallor and rash.   Neurological: Negative for dizziness, tremors, weakness, light-headedness, numbness and headaches.   Psychiatric/Behavioral: Negative for agitation, behavioral problems, confusion and sleep disturbance.       Past Medical History:   Diagnosis Date    Headache(784.0)     Hypothyroid      Social History     Socioeconomic History    Marital status:      Spouse name: Not on file    Number of children: Not on file    Years of education: Not on file    Highest education level: Not on file   Social Needs    Financial resource strain: Not on file    Food insecurity - worry: Not  on file    Food insecurity - inability: Not on file    Transportation needs - medical: Not on file    Transportation needs - non-medical: Not on file   Occupational History    Occupation: retired    Tobacco Use    Smoking status: Never Smoker    Smokeless tobacco: Never Used   Substance and Sexual Activity    Alcohol use: Yes     Comment: social    Drug use: No    Sexual activity: Yes     Partners: Male   Other Topics Concern    Not on file   Social History Narrative    , 2 adult children and exercises regularly-[place],       Objective:     /76 (BP Location: Left arm, Patient Position: Sitting)   Pulse 82   Temp 98 °F (36.7 °C) (Oral)   Ht 5' (1.524 m)   Wt 54.6 kg (120 lb 7.7 oz)   SpO2 98%   BMI 23.53 kg/m²     Physical Exam   Constitutional: She is oriented to person, place, and time. She appears well-developed and well-nourished.   HENT:   Head: Normocephalic and atraumatic.   Right Ear: External ear normal.   Left Ear: External ear normal.   Mouth/Throat: Posterior oropharyngeal erythema present.   Eyes: Conjunctivae and EOM are normal. Pupils are equal, round, and reactive to light.   Neck: Normal range of motion. Neck supple.   Cardiovascular: Normal rate, regular rhythm, normal heart sounds and intact distal pulses.   Pulmonary/Chest: Effort normal and breath sounds normal.   Abdominal: Soft. Bowel sounds are normal.   Genitourinary: Vagina normal and uterus normal. There is no rash, tenderness or lesion on the right labia. There is no rash, tenderness or lesion on the left labia. Cervix exhibits no motion tenderness and no discharge. Right adnexum displays no mass and no tenderness. Left adnexum displays no mass and no tenderness. No erythema or tenderness in the vagina. No signs of injury around the vagina. No vaginal discharge found.   Musculoskeletal: Normal range of motion.   Neurological: She is alert and oriented to person, place, and time.    Skin: Skin is warm and dry. Capillary refill takes less than 2 seconds.   Psychiatric: She has a normal mood and affect. Her behavior is normal. Judgment normal.       Assessment:         Laryngitis    Acute URI  -     azithromycin (Z-MANPREET) 250 MG tablet; Take 2 tablets by mouth on day 1; Take 1 tablet by mouth on days 2-5  Dispense: 6 tablet; Refill: 0  - I counseled the patient on general home care guidelines for cough and congestion including increasing fluid intake, getting plenty of rest and use of OTC cough and cold medications.  I recommended guafenesin for congestion and dextromethorphan as directed for cough.  A brand like Mucinex DM is recommended.  Avoidance of decongestants is recommended for patients with heart problems and hypertension.  Extra vitamin C may also benefit.  Return to clinic if symptoms last longer than 10 days or sooner if worsen with symptoms like fever > 100.4, severe sinus pain or headache, thick yellow nasal discharge or sputum, dehydration or lethargy.  - Instructed only to fill the antibiotic if symptoms worsen after the 5th day or persist more than 10.

## 2018-09-13 ENCOUNTER — OFFICE VISIT (OUTPATIENT)
Dept: VASCULAR SURGERY | Facility: CLINIC | Age: 68
End: 2018-09-13
Payer: COMMERCIAL

## 2018-09-13 VITALS
WEIGHT: 120.69 LBS | BODY MASS INDEX: 23.69 KG/M2 | DIASTOLIC BLOOD PRESSURE: 66 MMHG | HEIGHT: 60 IN | SYSTOLIC BLOOD PRESSURE: 107 MMHG | TEMPERATURE: 98 F | HEART RATE: 81 BPM

## 2018-09-13 DIAGNOSIS — I77.9 BILATERAL CAROTID ARTERY DISEASE: Primary | ICD-10-CM

## 2018-09-13 PROCEDURE — 99245 OFF/OP CONSLTJ NEW/EST HI 55: CPT | Mod: S$GLB,,, | Performed by: SURGERY

## 2018-09-13 PROCEDURE — 99999 PR PBB SHADOW E&M-EST. PATIENT-LVL III: CPT | Mod: PBBFAC,,, | Performed by: SURGERY

## 2018-09-13 RX ORDER — ZOSTER VACCINE RECOMBINANT, ADJUVANTED 50 MCG/0.5
KIT INTRAMUSCULAR
Refills: 1 | COMMUNITY
Start: 2018-08-22 | End: 2018-12-14 | Stop reason: CLARIF

## 2018-09-13 RX ORDER — ATORVASTATIN CALCIUM 20 MG/1
40 TABLET, FILM COATED ORAL DAILY
Qty: 45 TABLET | Refills: 6 | Status: SHIPPED | OUTPATIENT
Start: 2018-09-13 | End: 2018-12-10

## 2018-09-13 RX ORDER — ASPIRIN 81 MG/1
81 TABLET ORAL DAILY
Refills: 0 | COMMUNITY
Start: 2018-09-13 | End: 2018-12-10

## 2018-09-13 NOTE — LETTER
September 14, 2018      Fam Chou MD  [address] [facility] [address]           [place] - Vascular Surgery  [address]  [facility] [address]  Phone: [phone]  Fax: [phone]          Patient: Florentino Bowman   MR Number: [id number]   YOB: 1950   Date of Visit: 9/13/2018       Dear Dr. Fam Chou:    Thank you for referring Florentino Bowman to me for evaluation. Attached you will find relevant portions of my assessment and plan of care.    If you have questions, please do not hesitate to call me. I look forward to following Florentino Bowman along with you.    Sincerely,    Zamzam Lux MD    Enclosure  CC:  No Recipients    If you would like to receive this communication electronically, please contact [email or web address] or [phone] to request more information on [employer] Link access.    For providers and/or their staff who would like to refer a patient to [facility], please contact us through our one-stop-shop provider referral line, [facility] , at [phone].    If you feel you have received this communication in error or would no longer like to receive these types of communications, please e-mail [email or web address]

## 2018-09-14 NOTE — PROGRESS NOTES
Patient ID: Florentino Bowman is a 68 y.o. female.    I. HISTORY     Chief Complaint: Carotid Artery Disease      HPI Florentino Bowman is a 68 y.o. female referred from Dr. Fam Chou for evaluation and management of asymptomatic carotid stenosis. Denies any history of arm or leg numbness or weakness, facial droop, vision changes or slurred speech. No significant complaints today. Has a family history of stroke (mother) but no significant coronary disease. Denies chest pain or dyspnea on exertion. Denies claudication. Only isdsue is chronic back pain. Has seen Danyelle Kelly and [facility]. Underwent laminectomy in Dec 2017 for right leg radiculopathy which has persisted.    No PCI or CABG  No LE intervention or bypass.    Non-smoker  On Statin but not aspirin    Review of Systems   Constitution: Negative.   HENT: Negative.    Eyes: Negative for blurred vision, vision loss in left eye and vision loss in right eye.   Cardiovascular: Negative for chest pain, claudication and leg swelling.   Respiratory: Negative.    Endocrine: Negative.    Hematologic/Lymphatic: Negative for adenopathy and bleeding problem. Does not bruise/bleed easily.   Skin: Negative.    Musculoskeletal: Positive for arthritis, back pain, myalgias and stiffness.   Gastrointestinal: Negative for abdominal pain, nausea and vomiting.   Neurological: Positive for paresthesias and sensory change.   Psychiatric/Behavioral: Negative.    Allergic/Immunologic: Negative.        II. PHYSICAL EXAM   Right Arm BP - Sittin/66 (18 0953)  Left Arm BP - Sittin/66 (18 0953)  Pulse: 81  Temp: 97.5 °F (36.4 °C)  Body mass index is 23.57 kg/m².      Physical Exam   Constitutional: She is oriented to person, place, and time. She appears well-developed and well-nourished. No distress.   HENT:   Head: Normocephalic and atraumatic.   Eyes: Conjunctivae and EOM are normal.   Cardiovascular: Normal rate, normal heart sounds and intact distal pulses.    Pulses:       Radial pulses are 2+ on the right side, and 2+ on the left side.        Dorsalis pedis pulses are 1+ on the right side, and 1+ on the left side.   Pulmonary/Chest: Effort normal and breath sounds normal. No respiratory distress.   Musculoskeletal: Normal range of motion. She exhibits no edema or tenderness.   Neurological: She is alert and oriented to person, place, and time. No cranial nerve deficit.   Skin: Skin is warm and dry. No erythema.   Psychiatric: She has a normal mood and affect.   Vitals reviewed.      III. ASSESSMENT & PLAN (MEDICAL DECISION MAKING)     1. Bilateral carotid artery disease        Imaging Results:  Carotid Duplex:   R L   50-69%    EDV 68 50-69%    EDV 48         Assessment/Diagnosis and Plan:    Florentino Bowman is a 68 y.o. female with moderate bilateral carotid stenosis. She remains asymptomatic and there is no indication currently to undergo endarterectomy.   She has had some progression of her stenosis over the last few years and we discussed this as well.    Recommend we increase her statin dose and start daily 81mg Aspirin.  Continue exercise as tolerated by back symptoms.    See me back in 1 year with repeat carotid duplex    Zamzam Lux MD FACS [facility]  Vascular & Endovascular Surgery

## 2018-09-17 ENCOUNTER — PATIENT MESSAGE (OUTPATIENT)
Dept: VASCULAR SURGERY | Facility: CLINIC | Age: 68
End: 2018-09-17

## 2018-09-18 ENCOUNTER — PATIENT MESSAGE (OUTPATIENT)
Dept: FAMILY MEDICINE | Facility: CLINIC | Age: 68
End: 2018-09-18

## 2018-09-19 ENCOUNTER — TELEPHONE (OUTPATIENT)
Dept: INTERNAL MEDICINE | Facility: CLINIC | Age: 68
End: 2018-09-19

## 2018-09-19 RX ORDER — ZOLPIDEM TARTRATE 10 MG/1
TABLET ORAL
Qty: 30 TABLET | Refills: 0 | Status: SHIPPED | OUTPATIENT
Start: 2018-09-19 | End: 2018-12-03 | Stop reason: SDUPTHER

## 2018-09-19 RX ORDER — HYDROCODONE BITARTRATE AND ACETAMINOPHEN 5; 325 MG/1; MG/1
1 TABLET ORAL DAILY PRN
Qty: 30 TABLET | Refills: 0 | Status: SHIPPED | OUTPATIENT
Start: 2018-09-19 | End: 2018-11-01 | Stop reason: SDUPTHER

## 2018-09-19 NOTE — TELEPHONE ENCOUNTER
----- Message from Nirali Price sent at 9/19/2018 12:25 PM CDT -----  Contact: Self [phone]  Patient would like a refill for HYDROcodone-acetaminophen (NORCO) 5-325 mg per tablet [facility] [place] [facility] [address]. Please advise.

## 2018-10-01 DIAGNOSIS — M43.10 SPONDYLOLISTHESIS, ACQUIRED: ICD-10-CM

## 2018-10-01 DIAGNOSIS — M43.10 SPONDYLOLISTHESIS, UNSPECIFIED SPINAL REGION: ICD-10-CM

## 2018-10-01 DIAGNOSIS — Z12.31 VISIT FOR SCREENING MAMMOGRAM: Primary | ICD-10-CM

## 2018-10-04 ENCOUNTER — HOSPITAL ENCOUNTER (OUTPATIENT)
Dept: RADIOLOGY | Facility: HOSPITAL | Age: 68
Discharge: HOME | End: 2018-10-04
Attending: OBSTETRICS & GYNECOLOGY
Payer: COMMERCIAL

## 2018-10-04 DIAGNOSIS — Z12.31 VISIT FOR SCREENING MAMMOGRAM: ICD-10-CM

## 2018-10-04 PROCEDURE — 77063 BREAST TOMOSYNTHESIS BI: CPT | Mod: 26,,, | Performed by: RADIOLOGY

## 2018-10-04 PROCEDURE — 77063 BREAST TOMOSYNTHESIS BI: CPT | Mod: TC

## 2018-10-04 PROCEDURE — 77067 SCR MAMMO BI INCL CAD: CPT | Mod: 26,,, | Performed by: RADIOLOGY

## 2018-10-05 ENCOUNTER — PATIENT MESSAGE (OUTPATIENT)
Dept: FAMILY MEDICINE | Facility: CLINIC | Age: 68
End: 2018-10-05

## 2018-10-18 ENCOUNTER — LAB VISIT (OUTPATIENT)
Dept: LAB | Facility: HOSPITAL | Age: 68
End: 2018-10-18
Attending: INTERNAL MEDICINE
Payer: MEDICARE

## 2018-10-18 DIAGNOSIS — E05.80 THYROTOXICOSIS, EXOGENOUS IATROGENIC: ICD-10-CM

## 2018-10-18 LAB
T4 FREE SERPL-MCNC: 1.41 NG/DL
TSH SERPL DL<=0.005 MIU/L-ACNC: 0.14 UIU/ML

## 2018-10-18 PROCEDURE — 36415 COLL VENOUS BLD VENIPUNCTURE: CPT | Mod: PO

## 2018-10-18 PROCEDURE — 84443 ASSAY THYROID STIM HORMONE: CPT

## 2018-10-18 PROCEDURE — 84439 ASSAY OF FREE THYROXINE: CPT

## 2018-10-21 ENCOUNTER — PATIENT MESSAGE (OUTPATIENT)
Dept: ENDOCRINOLOGY | Facility: CLINIC | Age: 68
End: 2018-10-21

## 2018-10-21 DIAGNOSIS — E05.80 THYROTOXICOSIS, EXOGENOUS IATROGENIC: Primary | ICD-10-CM

## 2018-10-22 RX ORDER — LEVOTHYROXINE SODIUM 88 UG/1
88 TABLET ORAL DAILY
Qty: 30 TABLET | Refills: 11 | Status: SHIPPED | OUTPATIENT
Start: 2018-10-22 | End: 2019-01-30 | Stop reason: SDUPTHER

## 2018-10-22 NOTE — TELEPHONE ENCOUNTER
Results for orders placed or performed in visit on 10/18/18   TSH   Result Value Ref Range    TSH 0.141 (L) 0.400 - 4.000 uIU/mL   T4, free   Result Value Ref Range    Free T4 1.41 0.71 - 1.51 ng/dL     PLAN:  Cut back to levothyroxine 88 mcg daily   Continue liothyronine 5 mcg daily   Repeat TSH in three months.

## 2018-10-25 ENCOUNTER — PATIENT MESSAGE (OUTPATIENT)
Dept: ORTHOPEDICS | Facility: CLINIC | Age: 68
End: 2018-10-25

## 2018-10-25 ENCOUNTER — PATIENT MESSAGE (OUTPATIENT)
Dept: ENDOCRINOLOGY | Facility: CLINIC | Age: 68
End: 2018-10-25

## 2018-10-26 PROBLEM — M43.10 SPONDYLOLISTHESIS: Status: ACTIVE | Noted: 2018-10-26

## 2018-10-26 RX ORDER — SODIUM CHLORIDE 9 MG/ML
INJECTION, SOLUTION INTRAVENOUS CONTINUOUS
Status: CANCELLED | OUTPATIENT
Start: 2018-10-26

## 2018-10-26 RX ORDER — MUPIROCIN 20 MG/G
OINTMENT TOPICAL
Status: CANCELLED | OUTPATIENT
Start: 2018-10-26

## 2018-11-02 ENCOUNTER — PATIENT MESSAGE (OUTPATIENT)
Dept: FAMILY MEDICINE | Facility: CLINIC | Age: 68
End: 2018-11-02

## 2018-11-04 RX ORDER — HYDROCODONE BITARTRATE AND ACETAMINOPHEN 5; 325 MG/1; MG/1
1 TABLET ORAL DAILY PRN
Qty: 30 TABLET | Refills: 0 | Status: SHIPPED | OUTPATIENT
Start: 2018-11-04 | End: 2018-12-10 | Stop reason: SDUPTHER

## 2018-11-07 ENCOUNTER — TELEPHONE (OUTPATIENT)
Dept: PREADMISSION TESTING | Facility: HOSPITAL | Age: 68
End: 2018-11-07

## 2018-11-07 ENCOUNTER — TELEPHONE (OUTPATIENT)
Dept: FAMILY MEDICINE | Facility: CLINIC | Age: 68
End: 2018-11-07

## 2018-11-07 DIAGNOSIS — Z01.818 PREOPERATIVE TESTING: Primary | ICD-10-CM

## 2018-11-07 NOTE — TELEPHONE ENCOUNTER
----- Message from Beatris Bernard RN sent at 11/7/2018 11:17 AM CST -----  Please schedule poc, labs, and ua no sooner than Dec 13th. Thanks!

## 2018-11-07 NOTE — TELEPHONE ENCOUNTER
----- Message from Beatris Bernard RN sent at 11/7/2018 11:19 AM CST -----  Patient is scheduled for Lumbar spine fusion, TLIF, using pedicle screw L4/5 TLIF on 12/27/2018 with Dr. Kelly. (approximately 290 minutes of general anesthesia). Patient will need medical clearance for this surgery. Patient was last seen by Dr. Chou on 8/21/2018. Please schedule appt.Thanks!

## 2018-11-07 NOTE — TELEPHONE ENCOUNTER
Spoke with pt. Appt has been scheduled for pre-op clearance on 12/10/18 at 10:40 am. Pt has been notified and verbalized understanding of appt details.

## 2018-12-03 ENCOUNTER — PATIENT MESSAGE (OUTPATIENT)
Dept: FAMILY MEDICINE | Facility: CLINIC | Age: 68
End: 2018-12-03

## 2018-12-04 RX ORDER — ZOLPIDEM TARTRATE 10 MG/1
TABLET ORAL
Qty: 30 TABLET | Refills: 1 | Status: SHIPPED | OUTPATIENT
Start: 2018-12-04 | End: 2019-01-03 | Stop reason: SDUPTHER

## 2018-12-07 ENCOUNTER — TELEPHONE (OUTPATIENT)
Dept: ORTHOPEDICS | Facility: CLINIC | Age: 68
End: 2018-12-07

## 2018-12-07 NOTE — TELEPHONE ENCOUNTER
We spoke   She is unable to come in Wednesday   She has tickets to an event that day   She will be here Thursday doing her preop   anytime would be ok  Or Friday

## 2018-12-10 ENCOUNTER — PATIENT MESSAGE (OUTPATIENT)
Dept: SURGERY | Facility: HOSPITAL | Age: 68
End: 2018-12-10

## 2018-12-10 ENCOUNTER — OFFICE VISIT (OUTPATIENT)
Dept: FAMILY MEDICINE | Facility: CLINIC | Age: 68
End: 2018-12-10
Payer: MEDICARE

## 2018-12-10 VITALS
DIASTOLIC BLOOD PRESSURE: 76 MMHG | HEIGHT: 60 IN | WEIGHT: 123.88 LBS | HEART RATE: 80 BPM | SYSTOLIC BLOOD PRESSURE: 118 MMHG | OXYGEN SATURATION: 100 % | TEMPERATURE: 98 F | BODY MASS INDEX: 24.32 KG/M2

## 2018-12-10 DIAGNOSIS — M54.16 LUMBAR RADICULOPATHY: ICD-10-CM

## 2018-12-10 DIAGNOSIS — M54.16 LUMBAR RADICULAR PAIN: ICD-10-CM

## 2018-12-10 DIAGNOSIS — Z01.818 PRE-OP EVALUATION: Primary | ICD-10-CM

## 2018-12-10 PROCEDURE — 99213 OFFICE O/P EST LOW 20 MIN: CPT | Mod: S$GLB,,, | Performed by: FAMILY MEDICINE

## 2018-12-10 RX ORDER — PREGABALIN 75 MG/1
75 CAPSULE ORAL 2 TIMES DAILY
Refills: 2 | COMMUNITY
Start: 2018-10-04 | End: 2019-01-14

## 2018-12-10 RX ORDER — ATORVASTATIN CALCIUM 40 MG/1
40 TABLET, FILM COATED ORAL NIGHTLY
COMMUNITY
Start: 2018-09-05 | End: 2019-04-18 | Stop reason: SDUPTHER

## 2018-12-10 RX ORDER — HYDROCODONE BITARTRATE AND ACETAMINOPHEN 5; 325 MG/1; MG/1
1 TABLET ORAL DAILY PRN
Qty: 30 TABLET | Refills: 0 | Status: SHIPPED | OUTPATIENT
Start: 2018-12-10 | End: 2019-03-07 | Stop reason: SDUPTHER

## 2018-12-10 NOTE — PROGRESS NOTES
Subjective:      Patient ID: Florentino Bowman is a 68 y.o. female.    Chief Complaint: Pre-op Exam      HPI    pre op evaluation for lumbar fusion after [facility] at [facility]; had laminectomy one year ago and still has pain;   No history of surgical complications; non smoker;     Review of Systems   Constitutional: Positive for activity change. Negative for unexpected weight change.   HENT: Negative for hearing loss, rhinorrhea and trouble swallowing.    Eyes: Negative for discharge and visual disturbance.   Respiratory: Negative for chest tightness and wheezing.    Cardiovascular: Negative for chest pain and palpitations.   Gastrointestinal: Negative for blood in stool, constipation, diarrhea and vomiting.   Endocrine: Negative for polydipsia and polyuria.   Genitourinary: Negative for difficulty urinating, dysuria, hematuria and menstrual problem.   Musculoskeletal: Positive for back pain. Negative for arthralgias, joint swelling and neck pain.   Neurological: Positive for headaches. Negative for weakness.   Psychiatric/Behavioral: Negative for confusion and dysphoric mood.   All other systems reviewed and are negative.    Objective:     Physical Exam   Constitutional: She is oriented to person, place, and time. She appears well-developed and well-nourished.   HENT:   Head: Normocephalic.   Eyes: Conjunctivae and EOM are normal. Pupils are equal, round, and reactive to light.   Neck: Normal range of motion. Neck supple.   Cardiovascular: Normal rate, regular rhythm and normal heart sounds.   Pulmonary/Chest: Effort normal and breath sounds normal.   Musculoskeletal: Normal range of motion.   Neurological: She is alert and oriented to person, place, and time. She has normal reflexes.   Skin: Skin is warm and dry.   Psychiatric: She has a normal mood and affect. Her behavior is normal. Judgment and thought content normal.   Nursing note and vitals reviewed.    Assessment:     1. Pre-op evaluation    2. Lumbar  radicular pain    3. Lumbar radiculopathy      Plan:        Medication List           Accurate as of 12/10/18 11:55 AM. If you have any questions, ask your nurse or doctor.               CHANGE how you take these medications    atorvastatin 40 MG tablet  Commonly known as:  LIPITOR  What changed:  Another medication with the same name was removed. Continue taking this medication, and follow the directions you see here.  Changed by:  Fam Chou MD        CONTINUE taking these medications    AFLURIA QUAD 2814-4676 (PF) 60 mcg/0.5 mL vaccine  Generic drug:  influenza     ALPRAZolam 0.5 MG tablet  Commonly known as:  XANAX  Take 1 tablet (0.5 mg total) by mouth nightly as needed.     citalopram 20 MG tablet  Commonly known as:  CELEXA  Take 1 tablet (20 mg total) by mouth once daily.     estradiol 1 MG tablet  Commonly known as:  ESTRACE     HYDROcodone-acetaminophen 5-325 mg per tablet  Commonly known as:  NORCO  TAKE 1 TABLET BY MOUTH DAILY AS NEEDED FOR PAIN     levothyroxine 88 MCG tablet  Commonly known as:  SYNTHROID  Take 1 tablet (88 mcg total) by mouth once daily.     liothyronine 5 MCG Tab  Commonly known as:  CYTOMEL  Take 1 tablet (5 mcg total) by mouth once daily.     LYRICA 75 MG capsule  Generic drug:  pregabalin     multivitamin capsule     progesterone 100 MG capsule  Commonly known as:  PROMETRIUM     ranitidine 300 MG tablet  Commonly known as:  ZANTAC  Take 1 tablet (300 mg total) by mouth every evening.     SHINGRIX (PF) 50 mcg/0.5 mL injection  Generic drug:  varicella-zoster [id number] (PF)     sumatriptan 100 MG tablet  Commonly known as:  IMITREX  TAKE 1 TABLET BY MOUTH EVERY 2 HOURS AS NEEDED FOR MIGRAINE     zolpidem 10 mg Tab  Commonly known as:  AMBIEN  TAKE 1 TABLET BY MOUTH NIGHTLY AS NEEDED        STOP taking these medications    aspirin 81 MG EC tablet  Commonly known as:  ECOTRIN  Stopped by:  Fam Chou MD          Pre-op evaluation    Lumbar radicular pain    Lumbar  radiculopathy    pt cleared for back surgery and general anesthesia; labs pending from anesthesia; off ASA and stop estradiol and progesterone

## 2018-12-11 ENCOUNTER — PATIENT MESSAGE (OUTPATIENT)
Dept: ORTHOPEDICS | Facility: CLINIC | Age: 68
End: 2018-12-11

## 2018-12-14 ENCOUNTER — HOSPITAL ENCOUNTER (OUTPATIENT)
Dept: PREADMISSION TESTING | Facility: HOSPITAL | Age: 68
Discharge: HOME | End: 2018-12-14
Attending: ANESTHESIOLOGY
Payer: MEDICARE

## 2018-12-14 VITALS
OXYGEN SATURATION: 99 % | SYSTOLIC BLOOD PRESSURE: 130 MMHG | RESPIRATION RATE: 16 BRPM | HEIGHT: 60 IN | BODY MASS INDEX: 24.15 KG/M2 | WEIGHT: 123 LBS | TEMPERATURE: 98 F | HEART RATE: 73 BPM | DIASTOLIC BLOOD PRESSURE: 66 MMHG

## 2018-12-14 NOTE — DISCHARGE INSTRUCTIONS
Your surgery has been scheduled for:__________________________________________    You should report to:  ____[facility], located on the [place] side of the first floor of the           [facility] ([phone])  ____[facility], located on the [facility] side of the            Second floor of the [facility] ([phone])  ____3rd Floor [facility] located on the [facility] side of the [facility] [phone]  Please Note   - Tell your doctor if you take Aspirin, products containing Aspirin, herbal medications  or blood thinners, such as Coumadin, Ticlid, or Plavix.  (Consult your provider regarding holding or stopping before surgery).  - Arrange for someone to drive you home following surgery.  You will not be allowed to leave the surgical facility alone or drive yourself home following sedation and anesthesia.  Before Surgery  - Stop taking all herbal medications 14days prior to surgery  - No Motrin/Advil (Ibuprofen) 7 days before surgery  - No Aleve (Naproxen) 7 days before surgery  - Stop Taking Asprin, products containing Asprin _____days before surgery  - Stop taking blood thinners_______days before surgery  - No Goody's/BC  Powder 7 days before surgery  - Refrain from drinking alcoholic beverages for 24hours before and after surgery  - Stop or limit smoking _________days before surgery  - You may take Tylenol for pain    Night before Surgery  NOTHING TO EAT OR DRINK AFTER MIDNIGHT OR FOLLOW SURGEON'S INSTRUCTIONS   - Take a shower or bath (shower is recommended).  Bathe with Hibiclens soap or an antibacterial soap from the neck down.  If not supplied by your surgeon, hibiclens soap will need to be purchased over the counter in pharmacy.  Rinse soap off thoroughly.  - Shampoo your hair with your regular shampoo  The Day of Surgery  ·  If you are told to take medication on the morning of surgery, it may be taken  with a sip of water.   - Take another bath or shower with hibiclens or any antibacterial soap, to reduce the chance of infection.  - Take heart and blood pressure medications with a small sip of water, as advised by the perioperative team.  - Do not take fluid pills  - You may brush your teeth and rinse your mouth, but do not swall any additional water.   - Do not apply perfumes, powder, body lotions or deodorant on the day of surgery.  - Nail polish should be removed.  - Do not wear makeup or moisturizer  - Wear comfortable clothes, such as a button front shirt and loose fitting pants.  - Leave all jewelry, including body piercings, and valuables at home.    - Bring any devices you will neeed after surgery such as crutches or canes.  - If you have sleep apnea, please bring your CPAP machine  In the event that your physical condition changes including the onset of a cold or respiratory illness, or if you have to delay or cancel your surgery, please notify your surgeon.      Anesthesia: General Anesthesia  Youre due to have surgery. During surgery, youll be given medication called anesthesia. (It is also called anesthetic.) This will keep you comfortable and pain-free. Your anesthesia provider will use general anesthesia. This sheet tells you more about it.  What is general anesthesia?     You are watched continuously during your procedure by the anesthesia provider   General anesthesia puts you into a state like deep sleep. It goes into the bloodstream (IV anesthetics), into the lungs (gas anesthetics), or both. You feel nothing during the procedure. You will not remember it. During the procedure, the anesthesia provider monitors you continuously. He or she checks your heart rate and rhythm, blood pressure, breathing, and blood oxygen.  · IV Anesthetics. IV anesthetics are given through an IV line in your arm. Theyre often given first. This is so you are asleep before a gas anesthetic is started. Some kinds of IV  anesthetics relieve pain. Others relax you. Your doctor will decide which kind is best in your case.  · Gas Anesthetics. Gas anesthetics are breathed into the lungs. They are often used to keep you asleep. They can be given through a facemask or a tube placed in your larynx or trachea (breathing tube).  ? If you have a facemask, your anesthesia provider will most likely place it over your nose and mouth while youre still awake. Youll breathe oxygen through the mask as your IV anesthetic is started. Gas anesthetic may be added through the mask.  ? If you have a tube in the larynx or trachea, it will be inserted into your throat after youre asleep.  Anesthesia tools and medications  You will likely have:  · IV anesthetics. These are put into an IV line into your bloodstream.  · Gas anesthetics. You breathe these anesthetics into your lungs, where they pass into your bloodstream.  · Pulse oximeter. This is a small clip that is attached to the end of your finger. This measures your blood oxygen level.  · Electrocardiography leads (electrodes). These are small sticky pads that are placed on your chest. They record your heart rate and rhythm.  · Blood pressure cuff. This reads your blood pressure.  Risks and possible complications  General anesthesia has some risks. These include:  · Breathing problems  · Nausea and vomiting  · Sore throat or hoarseness (usually temporary)  · Allergic reaction to the anesthetic  · Irregular heartbeat (rare)  · Cardiac arrest (rare)   Anesthesia safety  · Follow all instructions you are given for how long not to eat or drink before your procedure.  · Be sure your doctor knows what medications and drugs you take. This includes over-the-counter medications, herbs, supplements, alcohol or other drugs. You will be asked when those were last taken.  · Have an adult family member or friend drive you home after the procedure.  · For the first 24 hours after your surgery:  ? Do not drive or use  heavy equipment.  ? Have a trusted family member or spouse make important decisions or sign documents.  ? Avoid alcohol.  ? Have a responsible adult stay with you. He or she can watch for problems and help keep you safe.  Date Last Reviewed: 10/16/2014  © 9721-9687 [employer]. [address] [place] [id number]. All rights reserved. This information is not intended as a substitute for professional medical care. Always follow your healthcare professional's instructions.

## 2018-12-18 ENCOUNTER — PATIENT MESSAGE (OUTPATIENT)
Dept: NEUROSURGERY | Facility: CLINIC | Age: 68
End: 2018-12-18

## 2018-12-19 ENCOUNTER — PATIENT MESSAGE (OUTPATIENT)
Dept: ORTHOPEDICS | Facility: CLINIC | Age: 68
End: 2018-12-19

## 2018-12-20 ENCOUNTER — PATIENT MESSAGE (OUTPATIENT)
Dept: ORTHOPEDICS | Facility: CLINIC | Age: 68
End: 2018-12-20

## 2019-01-04 RX ORDER — ZOLPIDEM TARTRATE 10 MG/1
TABLET ORAL
Qty: 30 TABLET | Refills: 1 | Status: SHIPPED | OUTPATIENT
Start: 2019-01-04 | End: 2019-03-29 | Stop reason: SDUPTHER

## 2019-01-07 ENCOUNTER — TELEPHONE (OUTPATIENT)
Dept: PAIN MEDICINE | Facility: CLINIC | Age: 69
End: 2019-01-07

## 2019-01-07 NOTE — TELEPHONE ENCOUNTER
----- Message from Linda Wang sent at 1/7/2019  1:44 PM CST -----  Contact: [phone]/self  Patient requesting to speak with you regarding scheduling an appt for tomorrow.    152pm - RS appt from 1/16/19 to 1/8/19 at 245pm.  Pt verbalized understanding.

## 2019-01-08 ENCOUNTER — TELEPHONE (OUTPATIENT)
Dept: PAIN MEDICINE | Facility: CLINIC | Age: 69
End: 2019-01-08

## 2019-01-08 ENCOUNTER — OFFICE VISIT (OUTPATIENT)
Dept: PAIN MEDICINE | Facility: CLINIC | Age: 69
End: 2019-01-08
Payer: MEDICARE

## 2019-01-08 VITALS
SYSTOLIC BLOOD PRESSURE: 122 MMHG | DIASTOLIC BLOOD PRESSURE: 80 MMHG | BODY MASS INDEX: 24.37 KG/M2 | WEIGHT: 124.75 LBS

## 2019-01-08 DIAGNOSIS — M47.816 LUMBAR SPONDYLOSIS: ICD-10-CM

## 2019-01-08 DIAGNOSIS — M47.816 LUMBAR FACET ARTHROPATHY: Primary | ICD-10-CM

## 2019-01-08 DIAGNOSIS — M54.16 LUMBAR RADICULITIS: ICD-10-CM

## 2019-01-08 DIAGNOSIS — Z98.890 S/P LUMBAR LAMINECTOMY: ICD-10-CM

## 2019-01-08 DIAGNOSIS — M51.36 DDD (DEGENERATIVE DISC DISEASE), LUMBAR: Primary | ICD-10-CM

## 2019-01-08 PROCEDURE — 99214 PR OFFICE/OUTPT VISIT, EST, LEVL IV, 30-39 MIN: ICD-10-PCS | Mod: S$PBB,,, | Performed by: ANESTHESIOLOGY

## 2019-01-08 PROCEDURE — 99214 OFFICE O/P EST MOD 30 MIN: CPT | Mod: S$PBB,,, | Performed by: ANESTHESIOLOGY

## 2019-01-08 PROCEDURE — 99214 OFFICE O/P EST MOD 30 MIN: CPT | Mod: PBBFAC,PN | Performed by: ANESTHESIOLOGY

## 2019-01-08 PROCEDURE — 99999 PR PBB SHADOW E&M-EST. PATIENT-LVL IV: CPT | Mod: PBBFAC,,, | Performed by: ANESTHESIOLOGY

## 2019-01-08 PROCEDURE — 99999 PR PBB SHADOW E&M-EST. PATIENT-LVL IV: ICD-10-PCS | Mod: PBBFAC,,, | Performed by: ANESTHESIOLOGY

## 2019-01-08 RX ORDER — HYDROCODONE BITARTRATE AND ACETAMINOPHEN 5; 325 MG/1; MG/1
1 TABLET ORAL DAILY PRN
Qty: 20 TABLET | Refills: 0 | Status: SHIPPED | OUTPATIENT
Start: 2019-01-08 | End: 2019-02-07 | Stop reason: SDUPTHER

## 2019-01-08 NOTE — PROGRESS NOTES
Chronic patient Established Note (Follow up visit)      SUBJECTIVE:     Florentino Bowman is a 68-year-old female with history of right L4/5 hemilaminectomy who presents to the clinic for a follow-up appointment for low back and right thigh pain. Since the last visit, Florentino Bowman states the pain has been persistent.  She was scheduled to undergo L4/5 fusion in December with Dr. Conti but decided to not proceed with the surgery She reports minimal improvement after right L4/5 TESI in June 2018. She complains today of continued pain in the right lower back area present which is at its worst in the morning.  As the day progresses the pain in her back will gradually improve.  Her chief complaint is pain in the anteromedial aspect of the right thigh. The leg pain is made worse with activity and improved with rest.  She did not find that PT was helpful. She is scheduled to see Dr. Kelly next week.  She is here today to discuss other options to manage her back and leg pain. She is interested in trying the Healthy Back program.      Pain Disability Index Review:  Last 3 PDI Scores 1/8/2019 10/10/2017 8/15/2017   Pain Disability Index (PDI) 39 [phone]       Pain Medications:    - Norco 5/325 daily as needed  - Lyrica 75 mg BID       report:  Reviewed      Pain Procedures:  Right L4/5 TESI (6/5/2018)  Right MICHAEL-TRANSFORAMINAL Right L4 and L5 (09/19/2017)  Right MICHAEL-TRANSFORAMINAL Right L4 and L5 (05/30/2017)  Lumbar Facet Injection (5/5/17) - no relief     Imaging:    Ct LUMBAR Spine (7/11/2018):    FINDINGS:  Spinal Alignment: Within normal limits.    Vertebrae: No fracture.  Postsurgical changes of right L4/L5 hemilaminectomy.  Significant degenerative changes with endplate erosion and sclerosis at the level of L4/L5.    Discs: Significant disc height loss at L4/L5.    Degenerative findings:    *T12-L1: No significant spinal canal stenosis or neural foraminal narrowing.  *L1-L2: No significant spinal canal stenosis or  neural foraminal narrowing.  *L2-L3: Mild diffuse disc bulge and mild bilateral facet arthropathy without significant spinal canal stenosis or neural foraminal narrowing.  *L3-L4: Diffuse disc bulge, bilateral facet arthropathy and buckling of the ligamentum flavum results in mild left neural foraminal narrowing.  No significant spinal canal stenosis.  *L4-L5: Postsurgical changes of right hemilaminectomy.  There is disc bulge, facet arthropathy resulting in mild right neural foraminal narrowing.  *L5-S1: Diffuse disc bulge and bilateral facet arthropathy resulting in mild left neural foraminal narrowing.  Paraspinal muscles & soft tissues: Normal.    Partially visualized intraabdominal/ intrapelvic structures: Unremarkable.    MRI lumbar spine with/without contrast (4/5/2018):    Prior right L4-5 laminectomy for presumed microdiscectomy.    Multilevel degenerative change throughout the lumbar spine most notable at L4-5 where there is advanced degenerative disc disease.  No significant spinal canal stenosis at any level, but there is scattered multilevel lateral recess and neural foraminal encroachment as detailed above.     MRI lumbar spine without contrast (5/03/17):    History:   low back pain., M54.31 Sciatica, right side.    Standard multiplanar noncontrast MRI sequences of the lumbar spine.    The conus is located at the L1 level.  No abnormal signal in distal cord.    L1-2: Minimal disc bulge.    L2-3: Minimal disc bulge.    L3-4: Minimal disc bulge.  Mild degenerative facets with mild facet hypertrophy.    L4-5: Disc space narrowing.  Circumferential disc bulge.  Mild-to-moderate central spinal stenosis.  Bilateral bony neural foraminal narrowing with possible L4 nerve impingement.  Facet hypertrophy.    L5-S1:  Desiccation of the disc with broad-based disc bulge.  Narrowing of the lateral recesses.  Bilateral bony neural foraminal narrowing without evidence of definite L5 nerve  dimensions.     Impression:     Mild broad-based disc bulge at the L2-L3 level.  Circumferential disc bulge at the L3-L4 level with degenerative changes of the vertebral endplates, bilateral bony neural foramina narrowing but no definite nerve impingement.  Disc space narrowing with degenerative changes of the vertebral endplates at the L4-L5 level there is circumferential disc bulge and moderate central spinal stenosis.  Bilateral bony neural foraminal narrowing at the L4-L5 level with bilateral degenerative changes of the facets and possible bilateral L4 nerve impingement.  Desiccation of the disc with broad based disc bulge paramedian greater to the right side at the L5-S1 level with possible impingement on the right S1 nerve root.  Bilateral bony neural foraminal narrowing L5-S1 with possible but not definite L5 nerve root impingement      Current Medications:   Current Outpatient Medications   Medication Sig Dispense Refill    ALPRAZolam (XANAX) 0.5 MG tablet Take 1 tablet (0.5 mg total) by mouth nightly as needed. (Patient taking differently: Take 0.5 mg by mouth nightly as needed. Takes prn during day) 30 tablet 5    atorvastatin (LIPITOR) 40 MG tablet Take 40 mg by mouth every evening.       citalopram (CELEXA) 20 MG tablet Take 1 tablet (20 mg total) by mouth once daily. (Patient taking differently: Take 20 mg by mouth every morning. ) 90 tablet 3    estradiol (ESTRACE) 1 MG tablet Take 1 mg by mouth every evening.       HYDROcodone-acetaminophen (NORCO) 5-325 mg per tablet Take 1 tablet by mouth daily as needed for Pain. 30 tablet 0    levothyroxine (SYNTHROID) 88 MCG tablet Take 1 tablet (88 mcg total) by mouth once daily. (Patient taking differently: Take 88 mcg by mouth before breakfast. ) 30 tablet 11    liothyronine (CYTOMEL) 5 MCG Tab Take 1 tablet (5 mcg total) by mouth once daily. (Patient taking differently: Take 5 mcg by mouth every morning. ) 30 tablet 11    LYRICA 75 mg capsule Take 75  mg by mouth 2 (two) times daily.  2    progesterone (PROMETRIUM) 100 MG capsule Take 100 mg by mouth every evening.       sumatriptan (IMITREX) 100 MG tablet TAKE 1 TABLET BY MOUTH EVERY 2 HOURS AS NEEDED FOR MIGRAINE 30 tablet 5    zolpidem (AMBIEN) 10 mg Tab TAKE 1 TABLET BY MOUTH NIGHTLY AS NEEDED 30 tablet 1    HYDROcodone-acetaminophen (NORCO) 5-325 mg per tablet Take 1 tablet by mouth daily as needed for Pain. 20 tablet 0     No current facility-administered medications for this visit.        REVIEW OF SYSTEMS:    GENERAL:  No weight loss, malaise or fevers.  HEENT:  Negative for frequent or significant headaches.  NECK:  Negative for pain and significant neck swelling.  RESPIRATORY:  Negative for wheezing or shortness of breath.  CARDIOVASCULAR:  Negative for chest pain, leg swelling or palpitations.  GI:  Negative for abdominal discomfort, blood in stools or black stools or change in bowel habits.  MUSCULOSKELETAL:  See HPI.  SKIN:  Negative for lesions, rash, and itching.  PSYCH:  + sleep disturbance  HEMATOLOGY/LYMPHOLOGY:  Negative for prolonged bleeding, bruising easily or swollen nodes.  NEURO:   No history of paralysis, seizures or tremors.  All other reviewed and negative other than HPI.    Past Medical History:  Past Medical History:   Diagnosis Date    Headache(784.0)     Hypothyroid        Past Surgical History:  Past Surgical History:   Procedure Laterality Date    BREAST BIOPSY Right      SECTION      x2    COLONOSCOPY Miralax N/A 2017    Performed by Quentin Mercado Jr[facility] at [facility]    MICHAEL-TRANSFORAMINAL Right 2017    Performed by Sixto Moya III, [facility] at [facility]    MICHAEL-TRANSFORAMINAL Right L4 and L5 Right 2017    Performed by Sixto Moya III, [facility] at [facility]    HYSTERECTOMY      LAMINECTOMY-LUMBAR Right L4-5 Laminectomy, Medial Facetectomy, and Foraminotomy Right 2017    Per[facility] Lan [facility]eroa [facility] [address] [place] [address]     LAPAROSCOPIC HYSTERECTOMY  2010    supracervical/BSO    OOPHORECTOMY      SPINE SURGERY      TONSILLECTOMY         Family History:  Family History   Problem Relation Age of Onset    Dementia Mother     Hypertension Mother     Heart disease Mother     Cancer Father         colon    Cancer Sister         breast    Breast cancer Sister     Heart disease Sister     No Known Problems Daughter     No Known Problems Son     No Known Problems Sister        Social History:  Social History     Socioeconomic History    Marital status:      Spouse name: None    Number of children: None    Years of education: None    Highest education level: None   Social Needs    Financial resource strain: None    Food insecurity - worry: None    Food insecurity - inability: None    Transportation needs - medical: None    Transportation needs - non-medical: None   Occupational History    Occupation: retired    Tobacco Use    Smoking status: Never Smoker    Smokeless tobacco: Never Used   Substance and Sexual Activity    Alcohol use: Yes     Comment: social    Drug use: No    Sexual activity: Yes     Partners: Male   Other Topics Concern    None   Social History Narrative    , 2 adult children and exercises regularly-rides bike on the [employer],       OBJECTIVE:    /80   Wt 56.6 kg (124 lb 12.5 oz)   LMP 01/01/2001   BMI 24.37 kg/m²     PHYSICAL EXAMINATION:    General appearance: Well appearing, in no acute distress, alert and oriented x3.  Psych:  Mood and affect appropriate.  Skin: Skin color, texture, turgor normal, no rashes or lesions, in both upper and lower body.  Head/face:  Atraumatic, normocephalic.   Cor: Radial pulses 2+  Pulm: Breathing unlabored.  GI: Abdomen soft and non-tender.  Back:  SLR is negative. [place]renss to palpation over the lumbar spine and paraspinous muscles on the right. Positive for pain with facet loading. Normal range of motion.  Extremities:  Peripheral joint ROM is full and pain free without obvious instability or laxity in all four extremities. No deformities, edema, or skin discoloration.   Musculoskeletal:  No atrophy or tone abnormalities are noted.  Neuro: Bilateral upper and lower extremity strength is normal and symmetric.  No loss of sensation is noted.  Gait: Normal.      ASSESSMENT: 68 y.o.  female with hx of right L4/5 laminectomy with chronic low back and right thigh pain.      1. DDD (degenerative disc disease), lumbar    2. Lumbar spondylosis    3. Lumbar radiculitis    4. S/P lumbar laminectomy        PLAN:     - I have stressed the importance of physical activity and a home exercise plan to help with pain and improve health.  - Referral [facility].  - Continue Lyrica 75 mg BID.  - Rx Norco 5/325 daily as needed.  - Schedule for right L4/5 and L5/S1 facet medial branch blocks to determine if back pain is facet mediated.  - RTC after procedure.      The above plan and management options were discussed at length with patient. Patient is in agreement with the above and verbalized understanding.    Sixto Moya III  01/08/2019

## 2019-01-14 ENCOUNTER — CLINICAL SUPPORT (OUTPATIENT)
Dept: REHABILITATION | Facility: OTHER | Age: 69
End: 2019-01-14
Attending: PHYSICAL MEDICINE & REHABILITATION
Payer: MEDICARE

## 2019-01-14 VITALS
SYSTOLIC BLOOD PRESSURE: 118 MMHG | HEIGHT: 60 IN | DIASTOLIC BLOOD PRESSURE: 72 MMHG | BODY MASS INDEX: 24.35 KG/M2 | HEART RATE: 81 BPM | WEIGHT: 124 LBS

## 2019-01-14 DIAGNOSIS — M54.41 CHRONIC RIGHT-SIDED LOW BACK PAIN WITH RIGHT-SIDED SCIATICA: Primary | ICD-10-CM

## 2019-01-14 DIAGNOSIS — M51.36 DDD (DEGENERATIVE DISC DISEASE), LUMBAR: ICD-10-CM

## 2019-01-14 DIAGNOSIS — G89.29 CHRONIC RIGHT-SIDED LOW BACK PAIN WITH RIGHT-SIDED SCIATICA: Primary | ICD-10-CM

## 2019-01-14 DIAGNOSIS — M47.26 OSTEOARTHRITIS OF SPINE WITH RADICULOPATHY, LUMBAR REGION: ICD-10-CM

## 2019-01-14 DIAGNOSIS — Z98.890 S/P LUMBAR LAMINECTOMY: ICD-10-CM

## 2019-01-14 PROCEDURE — 99204 PR OFFICE/OUTPT VISIT, NEW, LEVL IV, 45-59 MIN: ICD-10-PCS | Mod: ,,, | Performed by: PHYSICAL MEDICINE & REHABILITATION

## 2019-01-14 PROCEDURE — 99204 OFFICE O/P NEW MOD 45 MIN: CPT | Mod: ,,, | Performed by: PHYSICAL MEDICINE & REHABILITATION

## 2019-01-14 NOTE — PROGRESS NOTES
Health  met with patient to complete initial outcomes for the Healthy Back lumbar program.  Questions were reviewed with patient and discussed with Dr. Leonardo. The patient will meet with Dr. Leonardo to determine program enrollment.     Any changes to patient responses are below in red font.    HealthyBack Questionnaire  1/14/2019   Please select the location of your worst pain:  Leg- Right   Please select the location of any additional pain: (hold down the control key, and click to select multiple responses) Low back, Seat- Right, Leg- Right, Seat-Left   Did any event trigger your pain?  Yes   If yes, please describe:  Raising then twisting.my back   How long has this pain been going on?  4 years lower back pain, worse leg pain in the last year and a half (April 2017)   Please indicate how the pain is changing.  Worsening   What is the WORST level of pain that you have experienced in the last week?  7   What is the LEAST level of pain that you have experienced in the past week?  0   What can you NOT do not that you used to be able to do?  Hobbies, get dressed comfortable, play w grandkids   Are your limitations mainly due to your pain?  Yes   What are your additional complaints, if any? Burning   Is there ever a time during the day when your pain stops, even for a brief moment, before returning? Yes   If yes, when your pain stops, does it disappear completely? Is it totally gone? No   Does bending forward make your typical pain worse? Yes   Morning: Better during   Afternoon: Worse during   Evening:  Worse during   Nighttime: Better during   Standing:  Worse   Lying on stomach: Worse   Lying on back: Worse   Sitting:  Worse   Walking: Worse   Climbing stairs: Worse   In the last seven days, have you had any changes in your bowel and/or bladder habits? No   Have all of your attempts to treat your back/leg pain resulted in failure?  Yes   Do you believe your doctor(s) do NOT understand how much pain you have?   No   Do you believe that you have one or more conditions that have not been diagnosed by your doctor(s)?  Yes   Do you believe that you deserve more understanding from others including your family than you are getting?  No   Do you feel relatively calm about how your back/leg pain has impacted your life?  No   Are you OK with treatment taking a very long time (even years) before you feel relief from your back/leg pain?  Yes   Do you believe that your pain has caused you to be more forgetful?  No   Do you feel that you have not received enough treatment for your pain?  No   Do you believe that your doctor(s) do not have the right training to treat your back/leg pain effectively?  No   Do you believe you should not be allowed to work or attend school because of your back/leg pain?  No   When did this pain begin?  4 years ago low back pain, pain on right leg started in Aprl 2017   Did the pain begin after an injury or an accident? Yes   If yes, please enter an approximate date. April 2017   Is the pain work related?  No   Please duncan which of the following over-the-counter medicines you take. (hold down the control key, and click to select multiple responses) None   Please duncan which of the following prescription medicines you take. (hold down the control key, and click to select multiple responses) Other   Emergency department  No   Health care providers (hold down the control key, and click to select multiple responses) Family doctor, Chiropractor, Physical therapist   Investigations done (hold down the control key, and click to select multiple responses) X-ray, MRI   Procedures (hold down the control key, and click to select multiple responses) Epidural steroid injections (MICHAEL)   Emergency department  No   Health care providers (hold down the control key, and click to select multiple responses) Family doctor   Investigations done (hold down the control key, and click to select multiple responses) X-ray, MRI    Procedures (hold down the control key, and click to select multiple responses) None   Have you had any surgery on your back?  Yes   Please duncan what you are experiencing. (hold down the control key, and click to select multiple responses) None   First activity you would like to perform better:  Prolonged Walking   Current ability score to perform first activity: 5   Second activity you would like to perform better: Bending   Current ability score to perform second activity: 5   Third activity you would like to perform better: Standing   Current ability score to perform third activity: 5   Pain intensity:  The pain comes and goes and is moderate.   Personal care (washing, dressing, etc.):  Washing and dressing increase the pain, and I find it necessary to change my way of doing it.   Lifting: I can only life very light weights, at the most.   Walking: I cannot walk more than a half mile without increasing pain.   Sitting: Pain prevents me from sitting more than 1/2 hour.   Standing: Pain prevents me from standing more than 1/2 hour.   Sleeping: I get no pain in bed.   Social life: Pain has restricted my social life, and I do not go out as often.   Traveling: I get extra pain while traveling, but it does not compel me to seek alternative forms of travel.   Changing degree of pain: My pain is gradually worsening.   Do you need any help looking after yourself? I need no help at all.   When doing household tasks, e.g., preparing food, gardening, using the video recorder, radio, telephone, or washing the car: I need help with the more difficult household tasks.   Thinking about how easily you can get around your home and community: I get around my home and community by myself without any difficulty.   Because of your health, your relationships, e.g., your friends, partner or parents, generally: Are very close and warm   Thinking about your relationships with other people: I have plenty of friends and am never lonely.    Thinking about your health and your relationship with your  family:  There are some parts of my family role I cannot carry out.   Thinking about your vision, including when using your glasses or contact lenses if needed: I see normally.   Thinking about your hearing, including using your hearing aid if needed: I hear normally.   When you communicate with others, e.g., talking, listening, writing, or signing: I have no trouble speaking to them or understanding what they are saying.   Thinking about how you sleep: My sleep is interrupted some of the time, but I am usually able to go back to sleep without difficulty.   Thinking about how you generally feel: I feel moderately anxious, worried or depressed.   How much pain or discomfort do you experience? I have moderate pain.   Little interest or pleasure in doing things Several days   Feeling down, depressed or hopeless Several days   What is your occupation?  Retired   How do you spend your leisure time? What are your hobbies? Ride bike and work in the yard   How do you spend your leisure time? What are your hobbies? Ride bike and work in the yard   What is your highest level of education? College   What is your work status? Retired   How did you hear about the Healthyback program?  Physician   When did this pain begin?  4 years ago low back pain, pain on right leg started in Aprl 2017   Do you need any help looking after yourself? I need no help at all.   When doing household tasks, e.g., preparing food, gardening, using the video recorder, radio, telephone, or washing the car: I need help with the more difficult household tasks.   Thinking about how easily you can get around your home and community: I get around my home and community by myself without any difficulty.   Because of your health, your relationships, e.g., your friends, partner or parents, generally: Are very close and warm   Thinking about your relationships with other people: I have plenty of friends  and am never lonely.   Thinking about your health and your relationship with your  family:  There are some parts of my family role I cannot carry out.   Thinking about your vision, including when using your glasses or contact lenses if needed: I see normally.   Thinking about your hearing, including using your hearing aid if needed: I hear normally.   When you communicate with others, e.g., talking, listening, writing, or signing: I have no trouble speaking to them or understanding what they are saying.   Thinking about how you sleep: My sleep is interrupted some of the time, but I am usually able to go back to sleep without difficulty.   Thinking about how you generally feel: I feel moderately anxious, worried or depressed.   How much pain or discomfort do you experience? I have moderate pain.   Little interest or pleasure in doing things Several days   Feeling down, depressed or hopeless Several days

## 2019-01-14 NOTE — PROGRESS NOTES
Subjective:      Patient ID: Florentino Bowman is a 68 y.o. female.    Chief Complaint: No chief complaint on file.    Ms Bowman is a 69 yo female here for evaluation for the healthy back lumbar program.  She has had low back pain for 4 years, the leg pain started in April 2017 after picking up a baby from a car.  The pain is the pain is right lateral leg dominant, the pain is the entire right thigh and into right glute and low back. She also has left back and left glute.  The pain is a deep ache.  She feels a burning in the right thigh, some times in the side and sometimes inner thigh.  There is no tingling, numbness, or weakness. The pain is intermittent ranging from 0-7/10.  It is worse with bending, standing, lying on stomach and back, walking too far, sitting, stairs, afternoon and evening.  It is better lying on side, ice, pain med, morning and bedtime.  She had right L4-5 laminectomy on 12/29/2017 with no relief.  She has been considering a fusion which she is waiting on.  She did PT before and after surgery with temporary relief.  She went to chiropractor with no relief in 2017.  She has had 4 injections with temporary relief.  Her goals are walking, bending, and standing. Pattern 3    MRI lumbar 3/2018  The vertebral bodies demonstrate no evidence of fracture, osseous destructive process or aggressive bone marrow replacement process.  Few small scattered T1 hyperintense foci within the if visualized vertebra compatible with hemangiomas    Normal sagittal alignment is preserved.  No spondylolisthesis.    Degenerative changes at individual disc levels further detailed below:    T12-L1, L1-2: No focal disc abnormality, spinal canal stenosis or significant neural foraminal narrowing.    L2-3: Minimal disc bulging and facet arthropathy.  Mild bilateral lateral recess encroachment.  No spinal stenosis or neural foraminal narrowing.    L3-4: Leftward eccentric degenerative endplate changes, marginal osteophyte  formation and disc bulging.  Mild facet arthropathy and thickening ligamentum flavum.  Left greater than right lateral recess stenosis with mild left neural foraminal narrowing.    L4-5: Prior right hemilaminotomy for presumed discectomy. Mild facet arthropathy.  Advanced degenerative disc disease with loss of disc height, disc bulging, discogenic endplate changes as well as endplate marginal osteophyte formation eccentric to the right.  No significant spinal stenosis but there is right greater than left lateral recess and neural foraminal encroachment.    L5-S1: There is an annular fissure with moderate left paracentral/foraminal disc protrusion, which appears new since the prior exam.  This exerts mass effect on the descending left S1 nerve root at the level of the lateral recess, also mildly encroaches on the exiting left L5 nerve root.  No significant narrowing the spinal canal or right neural foramen.  Mild facet arthropathy.    The terminal spinal cord, conus, and cauda equina demonstrate normal caliber, morphology, and signal.  No abnormal postcontrast intrathecal enhancement.    No spinal canal or paraspinous mass is identified. The visualized paraspinous soft tissue structures appear within normal limits.    Impression      Prior right L4-5 laminectomy for presumed microdiscectomy.    Multilevel degenerative change throughout the lumbar spine most notable at L4-5 where there is advanced degenerative disc disease.  No significant spinal canal stenosis at any level, but there is scattered multilevel lateral recess and neural foraminal encroachment as detailed above.      Ct lumbar 7/2018  Spinal Alignment: Within normal limits.    Vertebrae: No fracture.  Postsurgical changes of right L4/L5 hemilaminectomy.  Significant degenerative changes with endplate erosion and sclerosis at the level of L4/L5.    Discs: Significant disc height loss at L4/L5.    Degenerative findings:    *T12-L1: No significant spinal  canal stenosis or neural foraminal narrowing.  *L1-L2: No significant spinal canal stenosis or neural foraminal narrowing.  *L2-L3: Mild diffuse disc bulge and mild bilateral facet arthropathy without significant spinal canal stenosis or neural foraminal narrowing.  *L3-L4: Diffuse disc bulge, bilateral facet arthropathy and buckling of the ligamentum flavum results in mild left neural foraminal narrowing.  No significant spinal canal stenosis.  *L4-L5: Postsurgical changes of right hemilaminectomy.  There is disc bulge, facet arthropathy resulting in mild right neural foraminal narrowing.  *L5-S1: Diffuse disc bulge and bilateral facet arthropathy resulting in mild left neural foraminal narrowing.  Paraspinal muscles & soft tissues: Normal.    Partially visualized intraabdominal/ intrapelvic structures: Unremarkable.    Impression      Mild multilevel lumbar spondylosis as detailed above, without significant spinal canal stenosis or neural foraminal narrowing, similar to prior examination.  Advanced degenerative disc disease at L4/L5.    Postsurgical changes of right L4/L5 hemilaminectomy.      Past Medical History:  No date: Headache(784.0)  No date: Hypothyroid    Past Surgical History:  No date: BREAST BIOPSY; Right  No date:  SECTION      Comment:  x2  2017: COLONOSCOPY Miralax; N/A      Comment:  Performed by Quentin Mercado Jr., MD at [facility] ENDO  2017: MICHAEL-TRANSFORAMINAL; Right      Comment:  Performed by Sixto Moya III, MD at [facility] OR  2017: MICHAEL-TRANSFORAMINAL Right L4 and L5; Right      Comment:  Performed by Sixto Moya III, MD at [facility] OR  No date: HYSTERECTOMY  2017: LAMINECTOMY-LUMBAR Right L4-5 Laminectomy, Medial   Facetectomy, and Foraminotomy; Right      Comment:  Performed by Lan Figueroa MD at [facility] OR 2ND FLR  : LAPAROSCOPIC HYSTERECTOMY      Comment:  supracervical/BSO  No date: OOPHORECTOMY  No date: SPINE SURGERY  No date:  TONSILLECTOMY    Review of patient's family history indicates:  Problem: Dementia      Relation: Mother          Age of Onset: (Not Specified)  Problem: Hypertension      Relation: Mother          Age of Onset: (Not Specified)  Problem: Heart disease      Relation: Mother          Age of Onset: (Not Specified)  Problem: Cancer      Relation: Father          Age of Onset: (Not Specified)          Comment: colon  Problem: Cancer      Relation: Sister          Age of Onset: (Not Specified)          Comment: breast  Problem: Breast cancer      Relation: Sister          Age of Onset: (Not Specified)  Problem: Heart disease      Relation: Sister          Age of Onset: (Not Specified)  Problem: No Known Problems      Relation: Daughter          Age of Onset: (Not Specified)  Problem: No Known Problems      Relation: Son          Age of Onset: (Not Specified)  Problem: No Known Problems      Relation: Sister          Age of Onset: (Not Specified)      Social History    Socioeconomic History      Marital status:       Spouse name: Not on file      Number of children: Not on file      Years of education: Not on file      Highest education level: Not on file    Social Needs      Financial resource strain: Not on file      Food insecurity - worry: Not on file      Food insecurity - inability: Not on file      Transportation needs - medical: Not on file      Transportation needs - non-medical: Not on file    Occupational History      Occupation: retired     Tobacco Use      Smoking status: Never Smoker      Smokeless tobacco: Never Used    Substance and Sexual Activity      Alcohol use: Yes        Comment: social      Drug use: No      Sexual activity: Yes        Partners: Male    Other Topics      Concerns:        Not on file    Social History Narrative      , 2 adult children and exercises regularly-rides bike on the [employer],      Current Outpatient Medications:  ALPRAZolam (XANAX) 0.5 MG tablet, Take  1 tablet (0.5 mg total) by mouth nightly as needed. (Patient taking differently: Take 0.5 mg by mouth nightly as needed. Takes prn during day), Disp: 30 tablet, Rfl: 5  atorvastatin (LIPITOR) 40 MG tablet, Take 40 mg by mouth every evening. , Disp: , Rfl:   citalopram (CELEXA) 20 MG tablet, Take 1 tablet (20 mg total) by mouth once daily. (Patient taking differently: Take 20 mg by mouth every morning. ), Disp: 90 tablet, Rfl: 3  estradiol (ESTRACE) 1 MG tablet, Take 1 mg by mouth every evening. , Disp: , Rfl:   HYDROcodone-acetaminophen (NORCO) 5-325 mg per tablet, Take 1 tablet by mouth daily as needed for Pain., Disp: 30 tablet, Rfl: 0  HYDROcodone-acetaminophen (NORCO) 5-325 mg per tablet, Take 1 tablet by mouth daily as needed for Pain., Disp: 20 tablet, Rfl: 0  levothyroxine (SYNTHROID) 88 MCG tablet, Take 1 tablet (88 mcg total) by mouth once daily. (Patient taking differently: Take 88 mcg by mouth before breakfast. ), Disp: 30 tablet, Rfl: 11  liothyronine (CYTOMEL) 5 MCG Tab, Take 1 tablet (5 mcg total) by mouth once daily. (Patient taking differently: Take 5 mcg by mouth every morning. ), Disp: 30 tablet, Rfl: 11  LYRICA 75 mg capsule, Take 75 mg by mouth 2 (two) times daily., Disp: , Rfl: 2  progesterone (PROMETRIUM) 100 MG capsule, Take 100 mg by mouth every evening. , Disp: , Rfl:   sumatriptan (IMITREX) 100 MG tablet, TAKE 1 TABLET BY MOUTH EVERY 2 HOURS AS NEEDED FOR MIGRAINE, Disp: 30 tablet, Rfl: 5  zolpidem (AMBIEN) 10 mg Tab, TAKE 1 TABLET BY MOUTH NIGHTLY AS NEEDED, Disp: 30 tablet, Rfl: 1    No current facility-administered medications for this visit.       Review of patient's allergies indicates:  No Known Allergies          Review of Systems   Constitution: Negative for weight gain and weight loss.   Cardiovascular: Negative for chest pain.   Respiratory: Negative for shortness of breath.    Musculoskeletal: Positive for back pain (right thigh pain). Negative for joint pain and joint swelling.    Gastrointestinal: Negative for abdominal pain, bowel incontinence, nausea and vomiting.   Genitourinary: Negative for bladder incontinence.   Neurological: Positive for paresthesias (burning right thigh). Negative for numbness.         Objective:        General: Florentino is well-developed, well-nourished, appears stated age, in no acute distress, alert and oriented to time, place and person.     General    Vitals reviewed.  Constitutional: She is oriented to person, place, and time. She appears well-developed and well-nourished.   HENT:   Head: Normocephalic and atraumatic.   Pulmonary/Chest: Effort normal.   Neurological: She is alert and oriented to person, place, and time.   Psychiatric: She has a normal mood and affect. Her behavior is normal. Judgment and thought content normal.     General Musculoskeletal Exam   Gait: normal     Right Ankle/Foot Exam     Tests   Heel Walk: able to perform  Tiptoe Walk: able to perform    Left Ankle/Foot Exam     Tests   Heel Walk: able to perform  Tiptoe Walk: able to perform  Back (L-Spine & T-Spine) / Neck (C-Spine) Exam     Back (L-Spine & T-Spine) Range of Motion   Extension: 10   Flexion: 90   Lateral bend right: 10   Lateral bend left: 10   Rotation right: 30   Rotation left: 30     Spinal Sensation   Right Side Sensation  C-Spine Level: normal   L-Spine Level: normal  S-Spine Level: normal  Left Side Sensation  C-Spine Level: normal  L-Spine Level: normal  S-Spine Level: normal    Back (L-Spine & T-Spine) Tests   Right Side Tests  Straight leg raise:      Sitting SLR: > 70 degrees      Left Side Tests  Straight leg raise:     Sitting SLR: > 70 degrees          Other She has no scoliosis .  Spinal Kyphosis:  Absent      Muscle Strength   Right Upper Extremity   Biceps: 5/5/5   Deltoid:  5/5  Triceps:  5/5  Wrist extension: 5/5/5   Finger Flexors:  5/5  Left Upper Extremity  Biceps: 5/5/5   Deltoid:  5/5  Triceps:  5/5  Wrist extension: 5/5/5   Finger Flexors:  5/5  Right  Lower Extremity   Hip Flexion: 5/5   Quadriceps:  5/5   Anterior tibial:  5/5/5  EHL:  5/5  Left Lower Extremity   Hip Flexion: 5/5   Quadriceps:  5/5   Anterior tibial:  5/5/5   EHL:  5/5    Reflexes     Left Side  Biceps:  2+  Triceps:  2+  Brachioradialis:  2+  Quadriceps:  2+  Achilles:  2+  Left Troncoso's Sign:  Absent  Babinski Sign:  absent    Right Side   Biceps:  2+  Triceps:  2+  Brachioradialis:  2+  Quadriceps:  2+  Achilles:  2+  Right Troncoso's Sign:  absent  Babinski Sign:  absent    Vascular Exam     Right Pulses        Carotid:                  2+    Left Pulses        Carotid:                  2+              Assessment:       1. Chronic right-sided low back pain with right-sided sciatica    2. DDD (degenerative disc disease), lumbar    3. S/P lumbar laminectomy    4. Osteoarthritis of spine with radiculopathy, lumbar region           Plan:       Orders Placed This Encounter    Ambulatory consult to [facility]     The patient has had a history of low back pain with limitations in there activities of Daily living    Previous treatment has not provided relief.    The situation was discussed at length with the patient.  More than 50% of the total time of 45 minutes was spent in counseling.  We discussed different causes of back pain and different treatment options.  We discussed the importance of stretching and strengthening.  We discussed moving, she is very hesitant in her movements.  She does try to stay active and her  tries to work with her on exercise.  We discussed posture.  We discussed pain better and not pain gone, functioning better.      Based on the history, physical exam, and functional index, an active physical therapy program is recommended.  The goal is to restore the patients function and reduce pain.  A program of progressive resistance exercises, biomechanical, and mobility maneuvers, instructions in proper body mechanics, aerobic conditioning and HEP will be  utilized. The program will continue as long as making improvements.    An assessment of patients progress will be made at each visit to document change in status.    The patient will be actively involved in there own treatment, and responsible for appointments and home program    The patient's lumbar isometric strength will be tested and they will be placed in a program of isolated strength training based on 50% of their total functional torque and advanced as clinically appropriate.      Directional preference of pain will further influence the patients active rehabilitation program    The patient was instructed there might be an initial increase in discomfort    They are enrolled with fair prognosis  Pattern 3      Follow-up: No Follow-up on file. If there are any questions prior to this, the patient was instructed to contact the office.

## 2019-01-16 PROBLEM — M47.816 LUMBAR SPONDYLOSIS: Status: ACTIVE | Noted: 2019-01-16

## 2019-01-25 ENCOUNTER — LAB VISIT (OUTPATIENT)
Dept: LAB | Facility: HOSPITAL | Age: 69
End: 2019-01-25
Attending: INTERNAL MEDICINE
Payer: MEDICARE

## 2019-01-25 DIAGNOSIS — E05.80 THYROTOXICOSIS, EXOGENOUS IATROGENIC: ICD-10-CM

## 2019-01-25 LAB — TSH SERPL DL<=0.005 MIU/L-ACNC: 1.51 UIU/ML

## 2019-01-25 PROCEDURE — 84443 ASSAY THYROID STIM HORMONE: CPT

## 2019-01-25 PROCEDURE — 36415 COLL VENOUS BLD VENIPUNCTURE: CPT | Mod: PO

## 2019-01-28 ENCOUNTER — OFFICE VISIT (OUTPATIENT)
Dept: SPORTS MEDICINE | Facility: CLINIC | Age: 69
End: 2019-01-28
Payer: MEDICARE

## 2019-01-28 ENCOUNTER — HOSPITAL ENCOUNTER (OUTPATIENT)
Dept: RADIOLOGY | Facility: HOSPITAL | Age: 69
Discharge: HOME | End: 2019-01-28
Attending: FAMILY MEDICINE
Payer: MEDICARE

## 2019-01-28 VITALS — TEMPERATURE: 98 F | WEIGHT: 123 LBS | HEIGHT: 60 IN | BODY MASS INDEX: 24.15 KG/M2

## 2019-01-28 DIAGNOSIS — G89.29 CHRONIC LEFT SHOULDER PAIN: ICD-10-CM

## 2019-01-28 DIAGNOSIS — M25.512 CHRONIC LEFT SHOULDER PAIN: ICD-10-CM

## 2019-01-28 DIAGNOSIS — M12.812 ROTATOR CUFF ARTHROPATHY OF LEFT SHOULDER: Primary | ICD-10-CM

## 2019-01-28 PROCEDURE — 99999 PR PBB SHADOW E&M-EST. PATIENT-LVL III: ICD-10-PCS | Mod: PBBFAC,,, | Performed by: FAMILY MEDICINE

## 2019-01-28 PROCEDURE — 99213 OFFICE O/P EST LOW 20 MIN: CPT | Mod: PBBFAC,25,PO | Performed by: FAMILY MEDICINE

## 2019-01-28 PROCEDURE — 73030 X-RAY EXAM OF SHOULDER: CPT | Mod: 26,LT,, | Performed by: RADIOLOGY

## 2019-01-28 PROCEDURE — 73030 X-RAY EXAM OF SHOULDER: CPT | Mod: TC,FY,PO,LT

## 2019-01-28 PROCEDURE — 99214 PR OFFICE/OUTPT VISIT, EST, LEVL IV, 30-39 MIN: ICD-10-PCS | Mod: 25,S$PBB,, | Performed by: FAMILY MEDICINE

## 2019-01-28 PROCEDURE — 73030 XR SHOULDER COMPLETE 2 OR MORE VIEWS LEFT: ICD-10-PCS | Mod: 26,LT,, | Performed by: RADIOLOGY

## 2019-01-28 PROCEDURE — 99999 PR PBB SHADOW E&M-EST. PATIENT-LVL III: CPT | Mod: PBBFAC,,, | Performed by: FAMILY MEDICINE

## 2019-01-28 PROCEDURE — 20611 DRAIN/INJ JOINT/BURSA W/US: CPT | Mod: PBBFAC,PO | Performed by: FAMILY MEDICINE

## 2019-01-28 PROCEDURE — 99214 OFFICE O/P EST MOD 30 MIN: CPT | Mod: 25,S$PBB,, | Performed by: FAMILY MEDICINE

## 2019-01-28 PROCEDURE — 20611 LARGE JOINT ASPIRATION/INJECTION: L SUBACROMIAL BURSA: ICD-10-PCS | Mod: S$PBB,LT,, | Performed by: FAMILY MEDICINE

## 2019-01-28 RX ORDER — TRIAMCINOLONE ACETONIDE 40 MG/ML
40 INJECTION, SUSPENSION INTRA-ARTICULAR; INTRAMUSCULAR
Status: DISCONTINUED | OUTPATIENT
Start: 2019-01-28 | End: 2019-01-28 | Stop reason: HOSPADM

## 2019-01-28 RX ADMIN — TRIAMCINOLONE ACETONIDE 40 MG: 40 INJECTION, SUSPENSION INTRA-ARTICULAR; INTRAMUSCULAR at 02:01

## 2019-01-28 NOTE — PROCEDURES
"Large Joint Aspiration/Injection: L subacromial bursa  Date/Time: 1/28/2019 2:54 PM  Performed by: Catalino Arora MD  Authorized by: Catalino Arora MD     Consent Done?:  Yes (Verbal)  Indications:  Pain  Procedure site marked: Yes    Timeout: Prior to procedure the correct patient, procedure, and site was verified      Location:  Shoulder  Site:  L subacromial bursa  Prep: Patient was prepped and draped in usual sterile fashion    Ultrasonic Guidance for needle placement: Yes  Images are saved and documented.  Needle size:  20 G  Approach:  Posterior  Medications:  40 mg triamcinolone acetonide 40 mg/mL  Patient tolerance:  Patient tolerated the procedure well with no immediate complications    Additional Comments: Description of ultrasound utilization for needle guidance:   Ultrasound guidance used for needle localization. Images saved and stored for documentation. The subacromial / subdeltoid bursa was visualized. Dynamic visualization of the 20g x 1.5" needle was continuous throughout the procedure.        "

## 2019-01-28 NOTE — PROGRESS NOTES
Florentino Bowman, a 68 y.o. female, is here for evaluation of LEFT shoulder pain. Patient is currently working with fPT @ [facility] for both her shoulder and her lumbar spine.     HISTORY OF PRESENT ILLNESS  Hand dominance, right      Location: anterior and lateral, left    Onset: Insidious  Palliative:    Relative rest   Oral analgesics   CSI, SAB, 15.12, 16.05, significant improvement, [place]    CSI, SAB, 12/05/16, 85% improvement   HgPT, modest compliance     CSi, SAB, 17.04.18 80% improvement > 12 months  Provocative:    ADLs    Getting dressed  Prior: Sees Dr. Kelly for her Lumbar spine - possible fusion, [facility]    Progression: worsening discomfort since mid-March 2017  Quality:    Sharp pain at times   Ache  Radiation: none  Severity: per nursing documentation  Timing: intermittent with use  Trauma: 2015: Patient reports that she was throwing a rope out onto a mooring ball in the middle of the [place] and as she was pulling the rope to dock she felt a pain in her posterior shoulder.     Review of systems (ROS):  A 10+ review of systems was performed with pertinent positives and negatives noted above in the history of present illness. Other systems were negative unless otherwise specified.      PHYSICAL EXAMINATION  General:  The patient is alert and oriented x 3. Mood is pleasant. Observation of ears, eyes and nose reveal no gross abnormalities. HEENT: NCAT, sclera anicteric. Lungs: Respirations are equal and unlabored.     LEFT SHOULDER EXAMINATION     OBSERVATION:     Swelling  none  Deformity  none   Discoloration  none   Scapular winging none   Scars   none  Atrophy  none    TENDERNESS / CREPITUS (T/C):          T/C      T/C   Clavicle   -/-  SUPRAspinatus    -/-     AC Jt.    -/-  INFRAspinatus  -/-    SC Jt.    -/-  Deltoid    -/-      G. Tuberosity  -/-  LH BICEP groove  -/-   Acromion:  +/-  Midline Neck   -/-     Scapular Spine +/-  Trapezium   -/-   SMA Scapula  -/-  GH  jt. line - post  -/-     Scapulothoracic  -/-         ROM:     Right shoulder   Left shoulder        AROM (PROM)   AROM (PROM)   FE    170° (175°)     170° (175°)     ER at 0°    60°  (65°)    60°  (65°)   ER at 90° ABD  90°  (90°)    90°  (90°)   IR at 90°  ABD   NA  (40°)     NA  (40°)      IR (spine level)   T10     T10    STRENGTH: (* = with pain) RIGHT SHOULDER  LEFT SHOULDER   SCAPTION   5/5    5/5    IR    5/5    5/5   ER    5/5    5/5   BICEPS   5/5    5/5   Deltoid    5/5    5/5     SIGNS:  Painful side       NEER   -   ONEENAS        -    CANDELARIO   +   SPEEDS        +   DROP ARM   -   BELLY PRESS       -    X-Body ADD    -   LIFT-OFF        +   HORNBLOWERS      -              STABILITY TESTING   RIGHT SHOULDER  LEFT SHOULDER     Translation     Anterior up face    up face    Posterior up face   up face    Sulcus  < 10mm   < 10 mm     Signs   Apprehension   neg     neg       Relocation   no change    no change      Jerk test  neg    neg    EXTREMITY NEURO-VASCULAR EXAM    Sensation grossly intact to light touch all dermatomal regions.    DTR 2+ Biceps, Triceps, BR and Negative Dannys sign   Grossly intact motor function at Elbow, Wrist and Hand   Distal pulses radial and ulnar 2+, brisk cap refill, symmetric.      NECK:  Painless FROM and spinous processes non-tender. Negative Spurlings sign.       Other Findings:    ASSESSMENT & PLAN   Assessment:   #1 supraspinatus tendinosis, left (non dom)   W/ calcification     No evidence of neurologic pathology  No evidence of vascular pathology     Imaging studies reviewed:   X-ray shoulder, left 19.01     Plan:     We discussed options including:  #1 watchful waiting  #2 re start physical therapy aimed at:   GH RoM   Strengthening rotator cuff   Scapular stability  #3 injection therapy:   CSI SAB    Left, effective 85%, repeat    CSI iaGH    Left,    orthobiologics   #4 perc tenotomy       The patient chooses #3 csi sab     Pain management: handout  given  Bracing:   Physical therapy:   Activity (e.g. sports, work) restrictions: as tolerated   school/vocation: travels     Follow up appointment offered, deferred by patient  How was trip to [place] in 06/2019?  Should symptoms worsen or fail to resolve, consider:  Revisiting the above options

## 2019-01-29 ENCOUNTER — PATIENT MESSAGE (OUTPATIENT)
Dept: ENDOCRINOLOGY | Facility: CLINIC | Age: 69
End: 2019-01-29

## 2019-01-30 RX ORDER — LEVOTHYROXINE SODIUM 88 UG/1
88 TABLET ORAL DAILY
Qty: 30 TABLET | Refills: 11 | Status: SHIPPED | OUTPATIENT
Start: 2019-01-30 | End: 2021-08-07 | Stop reason: SDUPTHER

## 2019-02-07 ENCOUNTER — PATIENT MESSAGE (OUTPATIENT)
Dept: FAMILY MEDICINE | Facility: CLINIC | Age: 69
End: 2019-02-07

## 2019-02-07 ENCOUNTER — TELEPHONE (OUTPATIENT)
Dept: FAMILY MEDICINE | Facility: CLINIC | Age: 69
End: 2019-02-07

## 2019-02-07 DIAGNOSIS — M51.36 DDD (DEGENERATIVE DISC DISEASE), LUMBAR: ICD-10-CM

## 2019-02-07 DIAGNOSIS — M47.816 LUMBAR SPONDYLOSIS: ICD-10-CM

## 2019-02-07 RX ORDER — HYDROCODONE BITARTRATE AND ACETAMINOPHEN 5; 325 MG/1; MG/1
1 TABLET ORAL DAILY PRN
Qty: 30 TABLET | Refills: 0 | Status: SHIPPED | OUTPATIENT
Start: 2019-02-07 | End: 2019-06-03

## 2019-02-13 ENCOUNTER — OFFICE VISIT (OUTPATIENT)
Dept: PAIN MEDICINE | Facility: CLINIC | Age: 69
End: 2019-02-13
Payer: MEDICARE

## 2019-02-13 VITALS — SYSTOLIC BLOOD PRESSURE: 116 MMHG | DIASTOLIC BLOOD PRESSURE: 70 MMHG | BODY MASS INDEX: 24.03 KG/M2 | WEIGHT: 123 LBS

## 2019-02-13 DIAGNOSIS — M54.16 LUMBAR RADICULITIS: ICD-10-CM

## 2019-02-13 DIAGNOSIS — M51.36 DDD (DEGENERATIVE DISC DISEASE), LUMBAR: Primary | ICD-10-CM

## 2019-02-13 DIAGNOSIS — Z98.890 S/P LUMBAR LAMINECTOMY: ICD-10-CM

## 2019-02-13 DIAGNOSIS — M47.816 LUMBAR SPONDYLOSIS: ICD-10-CM

## 2019-02-13 PROCEDURE — 99999 PR PBB SHADOW E&M-EST. PATIENT-LVL II: CPT | Mod: PBBFAC,,, | Performed by: ANESTHESIOLOGY

## 2019-02-13 PROCEDURE — 99999 PR PBB SHADOW E&M-EST. PATIENT-LVL II: ICD-10-PCS | Mod: PBBFAC,,, | Performed by: ANESTHESIOLOGY

## 2019-02-13 PROCEDURE — 99214 OFFICE O/P EST MOD 30 MIN: CPT | Mod: S$PBB,,, | Performed by: ANESTHESIOLOGY

## 2019-02-13 PROCEDURE — 99214 PR OFFICE/OUTPT VISIT, EST, LEVL IV, 30-39 MIN: ICD-10-PCS | Mod: S$PBB,,, | Performed by: ANESTHESIOLOGY

## 2019-02-13 PROCEDURE — 99212 OFFICE O/P EST SF 10 MIN: CPT | Mod: PBBFAC,PN | Performed by: ANESTHESIOLOGY

## 2019-02-13 RX ORDER — PREGABALIN 75 MG/1
75 CAPSULE ORAL 2 TIMES DAILY
Qty: 60 CAPSULE | Refills: 5 | Status: SHIPPED | OUTPATIENT
Start: 2019-02-13 | End: 2019-06-03

## 2019-02-13 NOTE — PROGRESS NOTES
Chronic patient Established Note (Follow up visit)    INTERVAL HISTORY (02/13/2019):    Florentino Bowman presents to the clinic today for a follow-up appointment for low back and right leg pain. Since the last visit, the pain has been persistent. The patient reports 20-30% improvement of her low back pain after right L4/5 and L5/S1 facet medial branch blcoks. Current pain intensity is 4/10. The pain is located in the right low back area and radiates into the anterior aspect of the right thigh. The pain is made worse with activity.  She is scheduled to start [facility] next week. She is interested in learning more about laser spine surgery.    SUBJECTIVE:     Florentino Bowman is a 68-year-old female with history of right L4/5 hemilaminectomy who presents to the clinic for a follow-up appointment for low back and right thigh pain. Since the last visit, Florentino Bowman states the pain has been persistent.  She was scheduled to undergo L4/5 fusion in December with Dr. Conti but decided to not proceed with the surgery She reports minimal improvement after right L4/5 TESI in June 2018. She complains today of continued pain in the right lower back area present which is at its worst in the morning.  As the day progresses the pain in her back will gradually improve.  Her chief complaint is pain in the anteromedial aspect of the right thigh. The leg pain is made worse with activity and improved with rest.  She did not find that PT was helpful. She is scheduled to see Dr. Kelly next week.  She is here today to discuss other options to manage her back and leg pain. She is interested in trying the [facility] program.      Pain Disability Index Review:  Last 3 PDI Scores 2/13/2019 1/8/2019 10/10/2017   Pain Disability Index (PDI) 18 39 35       Pain Medications:    - Norco 5/325 daily as needed  - Lyrica 75 mg as needed       report:  Reviewed      Pain Procedures:  Right L4/5 and L5/S1 MBB (1/16/2019)  Right L4/5  TESI (6/5/2018)  Right MICHAEL-TRANSFORAMINAL Right L4 and L5 (09/19/2017)  Right MICHAEL-TRANSFORAMINAL Right L4 and L5 (05/30/2017)  Lumbar Facet Injection (5/5/17) - no relief     Imaging:    Ct LUMBAR Spine (7/11/2018):    FINDINGS:  Spinal Alignment: Within normal limits.    Vertebrae: No fracture.  Postsurgical changes of right L4/L5 hemilaminectomy.  Significant degenerative changes with endplate erosion and sclerosis at the level of L4/L5.    Discs: Significant disc height loss at L4/L5.    Degenerative findings:    *T12-L1: No significant spinal canal stenosis or neural foraminal narrowing.  *L1-L2: No significant spinal canal stenosis or neural foraminal narrowing.  *L2-L3: Mild diffuse disc bulge and mild bilateral facet arthropathy without significant spinal canal stenosis or neural foraminal narrowing.  *L3-L4: Diffuse disc bulge, bilateral facet arthropathy and buckling of the ligamentum flavum results in mild left neural foraminal narrowing.  No significant spinal canal stenosis.  *L4-L5: Postsurgical changes of right hemilaminectomy.  There is disc bulge, facet arthropathy resulting in mild right neural foraminal narrowing.  *L5-S1: Diffuse disc bulge and bilateral facet arthropathy resulting in mild left neural foraminal narrowing.  Paraspinal muscles & soft tissues: Normal.    Partially visualized intraabdominal/ intrapelvic structures: Unremarkable.    MRI lumbar spine with/without contrast (4/5/2018):    Prior right L4-5 laminectomy for presumed microdiscectomy.    Multilevel degenerative change throughout the lumbar spine most notable at L4-5 where there is advanced degenerative disc disease.  No significant spinal canal stenosis at any level, but there is scattered multilevel lateral recess and neural foraminal encroachment as detailed above.     MRI lumbar spine without contrast (5/03/17):    History:   low back pain., M54.31 Sciatica, right side.    Standard multiplanar noncontrast MRI sequences of  the lumbar spine.    The conus is located at the L1 level.  No abnormal signal in distal cord.    L1-2: Minimal disc bulge.    L2-3: Minimal disc bulge.    L3-4: Minimal disc bulge.  Mild degenerative facets with mild facet hypertrophy.    L4-5: Disc space narrowing.  Circumferential disc bulge.  Mild-to-moderate central spinal stenosis.  Bilateral bony neural foraminal narrowing with possible L4 nerve impingement.  Facet hypertrophy.    L5-S1:  Desiccation of the disc with broad-based disc bulge.  Narrowing of the lateral recesses.  Bilateral bony neural foraminal narrowing without evidence of definite L5 nerve dimensions.     Impression:     Mild broad-based disc bulge at the L2-L3 level.  Circumferential disc bulge at the L3-L4 level with degenerative changes of the vertebral endplates, bilateral bony neural foramina narrowing but no definite nerve impingement.  Disc space narrowing with degenerative changes of the vertebral endplates at the L4-L5 level there is circumferential disc bulge and moderate central spinal stenosis.  Bilateral bony neural foraminal narrowing at the L4-L5 level with bilateral degenerative changes of the facets and possible bilateral L4 nerve impingement.  Desiccation of the disc with broad based disc bulge paramedian greater to the right side at the L5-S1 level with possible impingement on the right S1 nerve root.  Bilateral bony neural foraminal narrowing L5-S1 with possible but not definite L5 nerve root impingement      Current Medications:   Current Outpatient Medications   Medication Sig Dispense Refill    ALPRAZolam (XANAX) 0.5 MG tablet Take 1 tablet (0.5 mg total) by mouth nightly as needed. (Patient taking differently: Take 0.5 mg by mouth nightly as needed. Takes prn during day) 30 tablet 5    atorvastatin (LIPITOR) 40 MG tablet Take 40 mg by mouth every evening.       citalopram (CELEXA) 20 MG tablet Take 1 tablet (20 mg total) by mouth once daily. (Patient taking  differently: Take 20 mg by mouth every morning. ) 90 tablet 3    estradiol (ESTRACE) 1 MG tablet Take 1 mg by mouth every evening.       HYDROcodone-acetaminophen (NORCO) 5-325 mg per tablet Take 1 tablet by mouth daily as needed for Pain. 30 tablet 0    HYDROcodone-acetaminophen (NORCO) 5-325 mg per tablet Take 1 tablet by mouth daily as needed for Pain. 30 tablet 0    levothyroxine (SYNTHROID) 88 MCG tablet Take 1 tablet (88 mcg total) by mouth once daily. 30 tablet 11    liothyronine (CYTOMEL) 5 MCG Tab Take 1 tablet (5 mcg total) by mouth once daily. (Patient taking differently: Take 5 mcg by mouth every morning. ) 30 tablet 11    progesterone (PROMETRIUM) 100 MG capsule Take 100 mg by mouth every evening.       sumatriptan (IMITREX) 100 MG tablet TAKE 1 TABLET BY MOUTH EVERY 2 HOURS AS NEEDED FOR MIGRAINE 30 tablet 5    zolpidem (AMBIEN) 10 mg Tab TAKE 1 TABLET BY MOUTH NIGHTLY AS NEEDED 30 tablet 1    pregabalin (LYRICA) 75 MG capsule Take 1 capsule (75 mg total) by mouth 2 (two) times daily. 60 capsule 5     No current facility-administered medications for this visit.        REVIEW OF SYSTEMS:    GENERAL:  No weight loss, malaise or fevers.  HEENT:  Negative for frequent or significant headaches.  NECK:  Negative for pain and significant neck swelling.  RESPIRATORY:  Negative for wheezing or shortness of breath.  CARDIOVASCULAR:  Negative for chest pain, leg swelling or palpitations.  GI:  Negative for abdominal discomfort, blood in stools or black stools or change in bowel habits.  MUSCULOSKELETAL:  See HPI.  SKIN:  Negative for lesions, rash, and itching.  PSYCH:  + sleep disturbance  HEMATOLOGY/LYMPHOLOGY:  Negative for prolonged bleeding, bruising easily or swollen nodes.  NEURO:   No history of paralysis, seizures or tremors.  All other reviewed and negative other than HPI.    Past Medical History:  Past Medical History:   Diagnosis Date    Headache(784.0)     Hypothyroid        Past Surgical  History:  Past Surgical History:   Procedure Laterality Date    BLOCK, NERVE, FACET JOINT, LUMBAR, MEDIAL BRANCH  Right 2019    Performed by [facility]xto M[facility] III, [facility] at [facility]    BREAST BIOPSY Right      SECTION      x2    COLONOSCOPY Miralax N/A 2017    [facility]    MICHAEL-TRANSFORAMINAL Right 2017    Performed by Sixto M[facility] III, MD at [facility]    MICHAEL-TRANSFORAMINAL Right L4 and L5 Right 2017    Performed by [facility]xto [facility] III, MD at [facility]    HYSTERECTOMY      LAMINECTOMY-LUMBAR Right L4-5 Laminectomy, Medial Facetectomy, and Foraminotomy Right 2017    Performed [facility] Lan [facility] at [facility] OR 2ND [facility]    LAPAROSCOPIC HYSTERECTOMY  2010    supracervical/BSO    OOPHORECTOMY      SPINE SURGERY      TONSILLECTOMY         Family History:  Family History   Problem Relation Age of Onset    Dementia Mother     Hypertension Mother     Heart disease Mother     Cancer Father         colon    Cancer Sister         breast    Breast cancer Sister     Heart disease Sister     No Known Problems Daughter     No Known Problems Son     No Known Problems Sister        Social History:  Social History     Socioeconomic History    Marital status:      Spouse name: None    Number of children: None    Years of education: None    Highest education level: None   Social Needs    Financial resource strain: None    Food insecurity - worry: None    Food insecurity - inability: None    Transportation needs - medical: None    Transportation needs - non-medical: None   Occupational History    Occupation: retired    Tobacco Use    Smoking status: Never Smoker    Smokeless tobacco: Never Used   Substance and Sexual Activity    Alcohol use: Yes     Comment: social    Drug use: No    Sexual activity: Yes     Partners: Male   Other Topics Concern    None   Social History Narrative    ,  2 adult children and exercises regularly-rides bike on the [employer],       OBJECTIVE:    /70   Wt 55.8 kg (123 lb 0.3 oz)   LMP 01/01/2001   BMI 24.03 kg/m²     PHYSICAL EXAMINATION:    General appearance: Well appearing, in no acute distress, alert and oriented x3.  Psych:  Mood and affect appropriate.  Skin: Skin color, texture, turgor normal, no rashes or lesions, in both upper and lower body.  Head/face:  Atraumatic, normocephalic.   Cor: Radial pulses 2+  Pulm: Breathing unlabored.  GI: Abdomen soft and non-tender.  Back:  SLR is negative. Tenderenss to palpation over the lumbar spine and paraspinous muscles on the right. Positive for pain with facet loading. Normal range of motion.  Extremities: Peripheral joint ROM is full and pain free without obvious instability or laxity in all four extremities. No deformities, edema, or skin discoloration.   Musculoskeletal:  No atrophy or tone abnormalities are noted.  Neuro: Bilateral upper and lower extremity strength is normal and symmetric.  No loss of sensation is noted.  Gait: Normal.      ASSESSMENT: 68 y.o.  female with hx of right L4/5 laminectomy with chronic low back and right thigh pain.      1. DDD (degenerative disc disease), lumbar    2. Lumbar spondylosis    3. Lumbar radiculitis    4. S/P lumbar laminectomy        PLAN:     - I have stressed the importance of physical activity and a home exercise plan to help with pain and improve health.  - Continue [facility] program.  - Refill Lyrica 75 mg BID. Encouraged patient to take medication as prescribed.  - Rx TENS with garment and electrodes to use on right thigh. She reports improvement with TENS in the past.  - Discussed SCS trial. Patient not interested at this time.  - RTC when [facility] Program complete.      The above plan and management options were discussed at length with patient. Patient is in agreement with the above and verbalized understanding.    Sixto Moya  III  02/13/2019

## 2019-02-18 ENCOUNTER — PATIENT MESSAGE (OUTPATIENT)
Dept: PAIN MEDICINE | Facility: CLINIC | Age: 69
End: 2019-02-18

## 2019-02-19 ENCOUNTER — CLINICAL SUPPORT (OUTPATIENT)
Dept: REHABILITATION | Facility: OTHER | Age: 69
End: 2019-02-19
Attending: PHYSICAL MEDICINE & REHABILITATION
Payer: MEDICARE

## 2019-02-19 DIAGNOSIS — M54.41 CHRONIC RIGHT-SIDED LOW BACK PAIN WITH RIGHT-SIDED SCIATICA: Primary | ICD-10-CM

## 2019-02-19 DIAGNOSIS — G89.29 CHRONIC RIGHT-SIDED LOW BACK PAIN WITH RIGHT-SIDED SCIATICA: Primary | ICD-10-CM

## 2019-02-19 PROCEDURE — G8979 MOBILITY GOAL STATUS: HCPCS | Mod: CK

## 2019-02-19 PROCEDURE — 97161 PT EVAL LOW COMPLEX 20 MIN: CPT

## 2019-02-19 PROCEDURE — G8978 MOBILITY CURRENT STATUS: HCPCS | Mod: CK

## 2019-02-19 PROCEDURE — 97110 THERAPEUTIC EXERCISES: CPT

## 2019-02-19 NOTE — PLAN OF CARE
" [facility] - PHYSICAL THERAPY EVALUATION     Name: Florentino Bowman  Clinic Number: [id number]    Therapy Diagnosis:   Encounter Diagnosis   Name Primary?    Chronic right-sided low back pain with right-sided sciatica Yes     Physician: Cecily Leonardo, *    Physician Orders: PT Eval and Treat   Medical Diagnosis from Referral:   M54.41,G89.29 (ICD-10-CM) - Chronic right-sided low back pain with right-sided sciatica   M51.36 (ICD-10-CM) - DDD (degenerative disc disease), lumbar   Z98.890 (ICD-10-CM) - S/P lumbar laminectomy   M47.26 (ICD-10-CM) - Osteoarthritis of spine with radiculopathy, lumbar region   Evaluation Date: 2/19/2019  Authorization Period Expiration: 12/31/2019  Plan of Care Expiration: 05/19/2019  Reassessment Due: 03/19/2019  Visit # / Visits authorized: 1/ 20    Time In: 1030  Time Out: 1150  Total Billable Time: 80 minutes    Precautions: L4-5 Laminectomy     Pattern of pain determined: Pattern 3      Subjective   Date of onset: April 2017 when picking up granddaughter out of car seat that entailed a twisting motion  History of current condition - Florentino reports being in bed for a few days following this initial incident, but complains that her pain never really went away. She states that she had a laminectomy later that year in response to her symptoms, but she states that the procedure actually made her symptoms worse. She complains of pain in her spine, Right buttock, and along the Right thigh; she states the pain did not extend further but has intensified in the same locations. She reports that the pain has only worsened over the past year. She states she'll have some "good days" in response to some stretching, but then the pain will come back with high intensity.      Per Office Visit 01/14/2019, Dr Leonardo  "Ms Bowman is a 69 yo female here for evaluation for the healthy back lumbar program.  She has had low back pain for 4 years, the leg pain started in April 2017 after picking " "up a baby from a car.  The pain is the pain is right lateral leg dominant, the pain is the entire right thigh and into right glute and low back. She also has left back and left glute.  The pain is a deep ache.  She feels a burning in the right thigh, some times in the side and sometimes inner thigh.  There is no tingling, numbness, or weakness. The pain is intermittent ranging from 0-7/10.  It is worse with bending, standing, lying on stomach and back, walking too far, sitting, stairs, afternoon and evening.  It is better lying on side, ice, pain med, morning and bedtime.  She had right L4-5 laminectomy on 12/29/2017 with no relief.  She has been considering a fusion which she is waiting on.  She did PT before and after surgery with temporary relief.  She went to chiropractor with no relief in 2017.  She has had 4 injections with temporary relief.  Her goals are walking, bending, and standing. Pattern 3"    Per Office Visit 02/13/2019, Dr. Griffin CASTRO  "INTERVAL HISTORY (02/13/2019):  Florentino Bowman presents to the clinic today for a follow-up appointment for low back and right leg pain. Since the last visit, the pain has been persistent. The patient reports 20-30% improvement of her low back pain after right L4/5 and L5/S1 facet medial branch blcoks. Current pain intensity is 4/10. The pain is located in the right low back area and radiates into the anterior aspect of the right thigh. The pain is made worse with activity.  She is scheduled to start Healthy Back Program next week. She is interested in learning more about laser spine surgery.     SUBJECTIVE:   Florentino Bowman is a 68-year-old female with history of right L4/5 hemilaminectomy who presents to the clinic for a follow-up appointment for low back and right thigh pain. Since the last visit, Florentino Bowman states the pain has been persistent.  She was scheduled to undergo L4/5 fusion in December with Dr. Conti but decided to not proceed with the surgery " "She reports minimal improvement after right L4/5 TESI in 2018. She complains today of continued pain in the right lower back area present which is at its worst in the morning.  As the day progresses the pain in her back will gradually improve.  Her chief complaint is pain in the anteromedial aspect of the right thigh. The leg pain is made worse with activity and improved with rest.  She did not find that PT was helpful. She is scheduled to see Dr. Kelly next week.  She is here today to discuss other options to manage her back and leg pain. She is interested in trying the Healthy Back program."    Medical History:   Past Medical History:   Diagnosis Date    Headache(784.0)     Hypothyroid        Surgical History:   Florentino Bowman  has a past surgical history that includes Laparoscopic hysterectomy ();  section; Tonsillectomy; Hysterectomy; Colonoscopy (N/A, 2017); Spine surgery; Breast biopsy (Right); Oophorectomy; and Injection of anesthetic agent around medial branch nerves innervating lumbar facet joint (Right, 2019).    Medications:   Florentino has a current medication list which includes the following prescription(s): alprazolam, atorvastatin, citalopram, estradiol, hydrocodone-acetaminophen, hydrocodone-acetaminophen, levothyroxine, liothyronine, pregabalin, progesterone, sumatriptan, and zolpidem.    Allergies:   Review of patient's allergies indicates:  No Known Allergies     Imaging: CT Lumbar Spine Without Contrast 2018  FINDINGS:  Spinal Alignment: Within normal limits.    Vertebrae: No fracture.  Postsurgical changes of right L4/L5 hemilaminectomy.  Significant degenerative changes with endplate erosion and sclerosis at the level of L4/L5.    Discs: Significant disc height loss at L4/L5.    Degenerative findings:    *T12-L1: No significant spinal canal stenosis or neural foraminal narrowing.  *L1-L2: No significant spinal canal stenosis or neural foraminal " "narrowing.  *L2-L3: Mild diffuse disc bulge and mild bilateral facet arthropathy without significant spinal canal stenosis or neural foraminal narrowing.  *L3-L4: Diffuse disc bulge, bilateral facet arthropathy and buckling of the ligamentum flavum results in mild left neural foraminal narrowing.  No significant spinal canal stenosis.  *L4-L5: Postsurgical changes of right hemilaminectomy.  There is disc bulge, facet arthropathy resulting in mild right neural foraminal narrowing.  *L5-S1: Diffuse disc bulge and bilateral facet arthropathy resulting in mild left neural foraminal narrowing.  Paraspinal muscles & soft tissues: Normal.    Partially visualized intraabdominal/ intrapelvic structures: Unremarkable.      Impression     Mild multilevel lumbar spondylosis as detailed above, without significant spinal canal stenosis or neural foraminal narrowing, similar to prior examination.  Advanced degenerative disc disease at L4/L5.    Postsurgical changes of right L4/L5 hemilaminectomy.     Prior Therapy: 2 POC of PT; 1 pre-op and 1 post-op in the past couple of years (She describes working on some core strengthening and receiving manual treat, but denies any significant relief.)  Prior Treatment: Chiropractic treatment in May 2017 with only acute relief; Three Injections over the past couple of years without relief   Social History: Lives with    Occupation: Retired;   Leisure: Cycling (can't anymore); Working in [employer]; Playing with grandchildren; Traveling (trip planned for [place])      Prior Level of Function: Independent  Current Level of Function: Independent, with pain  DME owned/used: None    Pain:  Current 1/10, worst 7/10, best 0/10   Location: Mid-line spine, Right Buttocks, Right Thigh   Description: Deep burning in the Right buttock and thigh with intermittent sharp pains; Soreness in the lumbar spine with some "sharpness"  Aggravating Factors: Twisting Movements, Bending down, " "Prolonged Sitting   Easing Factors: Resting or stopping a movement; Changing positions; Ice helps   Disturbed Sleep: No (wake up pain-free usually from sleeping over night)     Pattern of pain questions:  1.  Where is your pain the worst? Right thigh  2.  Is your pain constant or intermittent? Intermittent  3.  Does bending forward make your typical pain worse? Yes  4.  Since the start of your back pain, has there been a change in your bowel or bladder? No  5.  What can't you do now that you use to be able to do? "I can't play golf anymore. I avoid picking up heavy things."    Patients goals: "I would like to be pain-free."    Red Flag Screening:   Cough  Sneeze  Strain: (--)  Bladder/ bowel: (--)  Falls: (--)  Night pain: (--)  Unexplained weight loss: (--)  General health: "Good"    OBJECTIVE     Postural examination/scapula alignment: Left shoulder/scapulae elevated compared to Right; Forward bending with observable scoliosis with Right PSIS displaying increased mobility    MOVEMENT LOSS    ROM Loss   Flexion within functional limits   Extension within functional limits   Side bending Right within functional limits   Side bending Left within functional limits   Rotation Right within functional limits   Rotation Left within functional limits     Lower Extremity Strength and AROM: WFL grossly     Bridge Endurance: 60+ seconds    GAIT:  Assistive Device used: none  Level of Assistance: independent  Patient displays the following gait deviations:  no gait deviations observed.     Special Tests:   Test Name  Test Result   Prone Instability Test (+)   SI Joint Provocation Test (+)   Straight Leg Raise (--)   Neural Tension Test (--)   Walking on toes (--)   Walking on heels  (--)       NEUROLOGICAL SCREENING     Sensory deficit: None    REPEATED TEST MOVEMENTS:  Repeated Flexion in Standing end range pain  pain during motion   Repeated Extension in Standing no effect     Baseline Isometric Testing on Med X equipment: " Testing administered by PT  Date of testin2019  ROM 48-0 deg   Max Peak Torque 78 ft/lbs    Min Peak Torque 32 ft/lbs    Flex/Ext Ratio 2.44   % below normative data -36%   Counter weight 102   Femur 4   Seat pad 0       CMS Impairment/Limitation/Restriction for FOTO LUMBAR Survey    Therapist reviewed FOTO scores for Florentino Bowman on 2019.   FOTO documents entered into [facility] - see Media section.    Limitation Score: 46%  Category: Mobility                     CMS Impairment/Limitation/Restriction for FOTO Lumbar Spine Survey                   Status         Limitation             G-Code CMS Severity Modifier  Intake        54%           46%                      Current Status CK - At least 40 percent but less than 60 percent  Predicted  59%           41%                      Goal Status+ CK - At least 40 percent but less than 60 percent       Treatment   Treatment Time In: 1110  Treatment Time Out: 1150  Total Treatment time separate from Evaluation: 40 minutes      Florentino received therapeutic exercises to develop/improve posture, lumbar/cervical ROM, strength and muscular endurance for 30 minutes including the following exercises:     -LTR, Bridging, Pelvic Tilts, TrA Marching, Prone Press Ups    Written Home Exercises Provided: yes.  Exercises were reviewed and Florentino was able to demonstrate them prior to the end of the session.  Florentino demonstrated good  understanding of the education provided.     See EMR under Patient Instructions for exercises provided 2019.      Education provided:   - Patient received education regarding proper posture and body mechanics.  Patient was given top 10 tips handout which discusses posture seated, standing, lifting correctly, components of exercise, importance of nutrition and hydration, and importance of sleep.  - Davina roll tried, recommended, and purchase information was provided.    - Patient received a handout regarding anticipated muscular soreness  "following the isometric test and strategies for management were reviewed with patient including stretching, using ice and scheduled rest.   - Patient received education on the Healthy Back program, purpose of the isometric test, progression of back strengthening as well as wellness approach and systemic strengthening.  Details of the program were discussed.  Reviewed that patient should feel support/pressure from med ex restraints but no pain or discomfort and patient expressed understanding.    Florentino received cold pack for 10 minutes to Lumbar spine in Z-lie position.    Assessment   Florentino is a 68 y.o. female referred to [facility] with a medical diagnosis of "chronic right-sided low back back with right-sided sciatica." She presents with good mobility and extremity strength; somewhat hypermobile with mild prone instability. She will benefit from progression of core and pelvic stability strengthening. Per MedX testing, she is 36% below the age-related norms and should do well to progress lumbar extensor strength as able working to increase level of function. She did report some decrease in Right thigh pain following the MedX, though radicular symptoms were not affected with repeated testing. PT plan to work toward goals of increasing functional strength and stability in order to improve quality of life with decreased pain with movements.     Pain Pattern: Pattern 3       Pt prognosis is Good.   Pt will benefit from skilled outpatient Physical Therapy to address the deficits stated above and in the chart below, provide pt/family education, and to maximize pt's level of independence. Based on the above history and physical examination an active physical therapy program is recommended.  Pt will continue to benefit from skilled outpatient physical therapy to address the deficits listed below in the chart, provide pt/family education and to maximize pt's level of independence in the home and community " environment. .       Plan of care discussed with patient: Yes  Pt's spiritual, cultural and educational needs considered and patient is agreeable to the plan of care and goals as stated below:     Anticipated Barriers for therapy: None    PT Evaluation Completed? Yes    Medical necessity is demonstrated by the following problem list.    Pt presents with the following impairments:     History  Co-morbidities and personal factors that may impact the plan of care Co-morbidities:   prior lumbar surgery    Personal Factors:   no deficits     low   Examination  Body Structures and Functions, activity limitations and participation restrictions that may impact the plan of care Body Regions:   back  lower extremities    Body Systems:    gross symmetry  ROM  strength  gross coordinated movement  motor control    Participation Restrictions:   None    Activity limitations:   Learning and applying knowledge  no deficits    General Tasks and Commands  no deficits    Communication  no deficits    Mobility  lifting and carrying objects    Self care  no deficits    Domestic Life  doing house work (cleaning house, washing dishes, laundry)    Interactions/Relationships  no deficits    Life Areas  no deficits    Community and Social Life  recreation and leisure         low   Clinical Presentation stable and uncomplicated low   Decision Making/ Complexity Score: low       GOALS: Pt is in agreement with the following goals.    Short term goals:  6 weeks or 10 visits   1.  Pt will demonstrate increased lumbar ROM by at least 3 degrees from the initial ROM value with improvements noted in functional ROM and ability to perform ADLs.  2.  Pt will demonstrate increased maximum isometric torque value by 10% when compared to the initial value resulting in improved ability to perform bending, lifting, and carrying activities safely, confidently.  3.  Patient report a reduction in worst pain score by 1-2 points for improved tolerance for lifting  "grandchildren and enjoying leisure activities like golf.  4.  Pt able to perform HEP correctly with minimal cueing or supervision from therapist to encourage independent management of symptoms.       Long term goals: 13 weeks or 20 visits   1. Pt will demonstrate increased lumbar ROM by at least 6 degrees from initial ROM value, resulting in improved ability to perform functional fwd bending while standing and sitting.   2. Pt will demonstrate increased maximum isometric torque value by 20% when compared to the initial value resulting in improved ability to perform bending, lifting, and carrying activities safely, confidently.  3. Pt to demonstrate ability to independently control and reduce their pain through posture positioning and mechanical movements throughout a typical day.  4.  Patient will demonstrate improved overall function per FOTO Survey to CJ = at least 20% but < 40% impaired, limited or restricted score or less in order to show subjective improvement of condition.  5. Pt will demonstrate independence with the HEP at discharge  6.   "I would like to be pain-free."      Plan   Outpatient physical therapy 2x week for 13 weeks or 20 visits to include the following:   - Patient education  - Therapeutic exercise  - Manual therapy  - Performance testing   - Neuromuscular Re-education  - Therapeutic activity   - Modalities    Pt may be seen by PTA as part of the rehabilitation team.     Therapist: Jj Bradshaw, PT  2/19/2019    "I certify the need for these services furnished under this plan of treatment and while under my care."    ____________________________________  Physician/Referring Practitioner    _______________  Date of Signature            "

## 2019-02-21 ENCOUNTER — CLINICAL SUPPORT (OUTPATIENT)
Dept: REHABILITATION | Facility: OTHER | Age: 69
End: 2019-02-21
Attending: PHYSICAL MEDICINE & REHABILITATION
Payer: MEDICARE

## 2019-02-21 ENCOUNTER — TELEPHONE (OUTPATIENT)
Dept: PAIN MEDICINE | Facility: CLINIC | Age: 69
End: 2019-02-21

## 2019-02-21 DIAGNOSIS — M54.41 CHRONIC RIGHT-SIDED LOW BACK PAIN WITH RIGHT-SIDED SCIATICA: Primary | ICD-10-CM

## 2019-02-21 DIAGNOSIS — G89.29 CHRONIC RIGHT-SIDED LOW BACK PAIN WITH RIGHT-SIDED SCIATICA: Primary | ICD-10-CM

## 2019-02-21 PROCEDURE — 97110 THERAPEUTIC EXERCISES: CPT

## 2019-02-21 NOTE — PROGRESS NOTES
"[facility]      Name: Florentino Bowman  Clinic Number: [id number]    Therapy Diagnosis:   Encounter Diagnosis   Name Primary?    Chronic right-sided low back pain with right-sided sciatica Yes     Physician: Cecily Leonardo, *    Visit Date: 2019    Physician: Cecily Leonardo, *     Physician Orders: PT Eval and Treat   Medical Diagnosis from Referral:   M54.41,G89.29 (ICD-10-CM) - Chronic right-sided low back pain with right-sided sciatica   M51.36 (ICD-10-CM) - DDD (degenerative disc disease), lumbar   Z98.890 (ICD-10-CM) - S/P lumbar laminectomy   M47.26 (ICD-10-CM) - Osteoarthritis of spine with radiculopathy, lumbar region     Evaluation Date: 2019  Authorization Period Expiration: 2019  Plan of Care Expiration: 2019  Reassessment Due: 2019  Visit # / Visits authorized:      Time In: 10:00  Time Out: 1100  Total Billable Time: 30 minutes     Precautions: L4-5 Laminectomy      Pattern of pain determined: Pattern 3         Subjective   Florentino reports minimal symptoms today but her leg symptoms usually get worse as the day goes on.. She finds HEP is ok so far.     Patient reports tolerating previous visit DOMS rated as a 5/10 at worst.     Patient reports their pain to be 1/10 on a 0-10 scale with 0 being no pain and 10 being the worst pain imaginable.  Pain Location: Right glut and thigh      Occupation: Retired;   Leisure: Cycling (can't anymore); Working in [employer]; Playing with grandchildren; Traveling (trip planned for [place])        Pt goals: "I would like to be pain-free."    Objective   Baseline Isometric Testing on Med X equipment: Testing administered by PT  Date of testin2019  ROM 48-0 deg   Max Peak Torque 78 ft/lbs    Min Peak Torque 32 ft/lbs    Flex/Ext Ratio 2.44   % below normative data -36%   Counter weight 102   Femur 4   Seat pad 0      Outcomes:  Initial score: 46%  Visit 5 score:  Goal: At " least 40 percent but less than 60 percent      Treatment    Pt was instructed in and performed the following:     Florentino received therapeutic exercises to develop/improved posture, cardiovascular endurance, muscular endurance, lumbar/cervical ROM, strength and muscular endurance for 50 minutes including the following exercises:     [facility] 2/21/2019   Visit Number 2   VAS Pain Rating 1   Lumbar Extension Seat Pad -   Femur Restraint -   Top Dead Center -   Counterweight -   Lumbar Flexion -   Lumbar Extension -   Lumbar Peak Torque -   Min Torque -   Test Percent Below Normative Data -   Lumbar Weight 39   Repetitions 9   Rating of Perceived Exertion 4   Ice - Z Lie (in min.) 10             Peripheral muscle strengthening which included 1 set of 15-20 repetitions at a slow, controlled 10-13 second per rep pace focused on strengthening supporting musculature for improved body mechanics and functional mobility.  Pt and therapist focused on proper form during treatment to ensure optimal strengthening of each targeted muscle group.  Machines were utilized including torso rotation, leg extension, leg curl, chest press, upright row. Tricep extension, bicep curl, leg press, and hip abduction added visit 3    Florentino received the following manual therapy techniques: None        Home Exercises Provided and Patient Education Provided     LTR, Bridging, Pelvic Tilts, TrA Marching, Prone Press Ups    Education provided:   - Pt education on progression of weights and exertion rating to safely progress through resistance trainging.     Written Home Exercises Provided: Patient instructed to cont prior HEP.  Exercises were reviewed and Florentino was able to demonstrate them prior to the end of the session.  Florentino demonstrated good  understanding of the education provided.     See EMR under Patient Instructions for exercises provided prior visit.    Lumbar roll use compliance: Unknown      Assessment     Pt presents to physical  therapy for 2nd visit following initial evaluation. Pt reports moderate soreness following initial evaluation. Reviewed HEP exercises. Pt performed with moderate verbal cues for technique. Pt introduced to Med X lumbar dynamic exercises and peripheral resistance exercises up to upright row.  Pt tolerated Med X lumbar exercise weight of 50% peak torque  with reported 4/10 Mariama Exertion scale. Pt required max verbal cues to maintian speed to 10-12 sec per rep. Pt had difficulty going through full range of motion and performed sever reps that were not counted by machine. Plan to remove pacer and monitor full range next session. Pt completed all peripheral resistance exercises with no reports of increased symptoms or discomfort.       Patient is making good progress towards established goals.  Pt will continue to benefit from skilled outpatient physical therapy to address the deficits stated in the impairment chart, provide pt/family education and to maximize pt's level of independence in the home and community environment.       Pt's spiritual, cultural and educational needs considered and pt agreeable to plan of care and goals as stated below:   Medical necessity is demonstrated by the following problem list.    Pt presents with the following impairments:      History  Co-morbidities and personal factors that may impact the plan of care Co-morbidities:   prior lumbar surgery     Personal Factors:   no deficits       low   Examination  Body Structures and Functions, activity limitations and participation restrictions that may impact the plan of care Body Regions:   back  lower extremities     Body Systems:    gross symmetry  ROM  strength  gross coordinated movement  motor control     Participation Restrictions:   None     Activity limitations:   Learning and applying knowledge  no deficits     General Tasks and Commands  no deficits     Communication  no deficits     Mobility  lifting and carrying objects     Self  care  no deficits     Domestic Life  doing house work (cleaning house, washing dishes, laundry)     Interactions/Relationships  no deficits     Life Areas  no deficits     Community and Social Life  recreation and leisure             low   Clinical Presentation stable and uncomplicated low   Decision Making/ Complexity Score: low         GOALS: Pt is in agreement with the following goals.     Short term goals:  6 weeks or 10 visits   1.  Pt will demonstrate increased lumbar ROM by at least 3 degrees from the initial ROM value with improvements noted in functional ROM and ability to perform ADLs.  2.  Pt will demonstrate increased maximum isometric torque value by 10% when compared to the initial value resulting in improved ability to perform bending, lifting, and carrying activities safely, confidently.  3.  Patient report a reduction in worst pain score by 1-2 points for improved tolerance for lifting grandchildren and enjoying leisure activities like golf.  4.  Pt able to perform HEP correctly with minimal cueing or supervision from therapist to encourage independent management of symptoms.         Long term goals: 13 weeks or 20 visits   1. Pt will demonstrate increased lumbar ROM by at least 6 degrees from initial ROM value, resulting in improved ability to perform functional fwd bending while standing and sitting.   2. Pt will demonstrate increased maximum isometric torque value by 20% when compared to the initial value resulting in improved ability to perform bending, lifting, and carrying activities safely, confidently.  3. Pt to demonstrate ability to independently control and reduce their pain through posture positioning and mechanical movements throughout a typical day.  4.  Patient will demonstrate improved overall function per FOTO Survey to CJ = at least 20% but < 40% impaired, limited or restricted score or less in order to show subjective improvement of condition.  5. Pt will demonstrate independence  "with the HEP at discharge  6.   "I would like to be pain-free."       Plan   Continue with established Plan of Care towards established PT goals.     "

## 2019-02-21 NOTE — TELEPHONE ENCOUNTER
----- Message from Katya Be LPN sent at 2/19/2019  2:37 PM CST -----  Contact: Samantha dia/ [employer]/ [phone]      ----- Message -----  From: Tatyana Lopez  Sent: 2/19/2019   2:15 PM  To: [facility] Staff    Samantha called in to get a copy of patient's most recent office notes.     Please call.     Fax# [phone]    2/21/19 @ 984am - faxed the last OV note and  [facility]ographics [facility] at [phone], attn:  Samantha.

## 2019-02-25 ENCOUNTER — CLINICAL SUPPORT (OUTPATIENT)
Dept: REHABILITATION | Facility: OTHER | Age: 69
End: 2019-02-25
Attending: PHYSICAL MEDICINE & REHABILITATION
Payer: MEDICARE

## 2019-02-25 DIAGNOSIS — M54.41 CHRONIC RIGHT-SIDED LOW BACK PAIN WITH RIGHT-SIDED SCIATICA: Primary | ICD-10-CM

## 2019-02-25 DIAGNOSIS — G89.29 CHRONIC RIGHT-SIDED LOW BACK PAIN WITH RIGHT-SIDED SCIATICA: Primary | ICD-10-CM

## 2019-02-25 PROCEDURE — 97110 THERAPEUTIC EXERCISES: CPT

## 2019-02-25 NOTE — PROGRESS NOTES
"[facility]      Name: Florentino Bowman  Clinic Number: [id number]    Therapy Diagnosis:   Encounter Diagnosis   Name Primary?    Chronic right-sided low back pain with right-sided sciatica Yes     Physician: Cecily Leonardo, *    Visit Date: 2019    Physician: Cecily Leonardo, *     Physician Orders: PT Eval and Treat   Medical Diagnosis from Referral:   M54.41,G89.29 (ICD-10-CM) - Chronic right-sided low back pain with right-sided sciatica   M51.36 (ICD-10-CM) - DDD (degenerative disc disease), lumbar   Z98.890 (ICD-10-CM) - S/P lumbar laminectomy   M47.26 (ICD-10-CM) - Osteoarthritis of spine with radiculopathy, lumbar region     Evaluation Date: 2019  Authorization Period Expiration: 2019  Plan of Care Expiration: 2019  Reassessment Due: 2019  Visit # / Visits authorized: 3/ 20     Time In: 10:00  Time Out: 1100  Total Billable Time: 30 minutes     Precautions: L4-5 Laminectomy      Pattern of pain determined: Pattern 3     Face to Face discussion of patient was done between PT and PTA.       Subjective   Florentino reports no LBP today, but her sciatic symptoms start towards the end of the day. She finds HEP is ok so far.     Patient reports tolerating previous visit DOMS rated as a 5/10 at worst.     Patient reports their pain to be 0/10 on a 0-10 scale with 0 being no pain and 10 being the worst pain imaginable.  Pain Location: Right glut and thigh      Occupation: Retired;   Leisure: Cycling (can't anymore); Working in [employer]; Playing with grandchildren; Traveling (trip planned for [place])        Pt goals: "I would like to be pain-free."    Objective   Baseline Isometric Testing on Med X equipment: Testing administered by PT  Date of testin2019  ROM 48-0 deg   Max Peak Torque 78 ft/lbs    Min Peak Torque 32 ft/lbs    Flex/Ext Ratio 2.44   % below normative data -36%   Counter weight 102   Femur 4   Seat pad 0 "      Outcomes:  Initial score: 46%  Visit 5 score:  Goal: At least 40 percent but less than 60 percent      Treatment    Pt was instructed in and performed the following:     Florentino received therapeutic exercises to develop/improved posture, cardiovascular endurance, muscular endurance, lumbar/cervical ROM, strength and muscular endurance for 50 minutes including the following exercises:               Peripheral muscle strengthening which included 1 set of 15-20 repetitions at a slow, controlled 10-13 second per rep pace focused on strengthening supporting musculature for improved body mechanics and functional mobility.  Pt and therapist focused on proper form during treatment to ensure optimal strengthening of each targeted muscle group.  Machines were utilized including torso rotation, leg extension, leg curl, chest press, upright row. Tricep extension, bicep curl, leg press, and hip abduction added visit 3    Florentino received the following manual therapy techniques: None        Home Exercises Provided and Patient Education Provided   [facility] 2/25/2019   Visit Number 3   VAS Pain Rating 0   Treadmill Time (in min.) 10   Extension in Lying 10   Lumbar Extension Seat Pad -   Femur Restraint -   Top Dead Center -   Counterweight -   Lumbar Flexion -   Lumbar Extension -   Lumbar Peak Torque -   Min Torque -   Test Percent Below Normative Data -   Lumbar Weight 39   Repetitions 15   Rating of Perceived Exertion 4   Ice - Z Lie (in min.) 10   LTR,   Bridging,   Pelvic Tilts,   TrA Marching,   Prone Press Ups  Piriformis stretch 10 secs hold 3x  Education provided:   - Pt education on progression of weights and exertion rating to safely progress through resistance trainging.     Written Home Exercises Provided: Patient instructed to cont prior HEP.  Exercises were reviewed and Florentino was able to demonstrate them prior to the end of the session.  Florentino demonstrated good  understanding of the education provided.      See EMR under Patient Instructions for exercises provided prior visit.    Lumbar roll use compliance: Unknown      Assessment    Pt reports no pain today. Reviewed HEP exercises. Pt performed with moderate verbal cues for technique. .  Pt tolerated Med X lumbar exercise weight with no c/o LBP and completed 15 reps with no difficulty.  Plan to remove pacer and monitor full range next session. Pt completed all peripheral resistance exercises with no reports of increased symptoms or discomfort.       Patient is making good progress towards established goals.  Pt will continue to benefit from skilled outpatient physical therapy to address the deficits stated in the impairment chart, provide pt/family education and to maximize pt's level of independence in the home and community environment.       Pt's spiritual, cultural and educational needs considered and pt agreeable to plan of care and goals as stated below:   Medical necessity is demonstrated by the following problem list.    Pt presents with the following impairments:      History  Co-morbidities and personal factors that may impact the plan of care Co-morbidities:   prior lumbar surgery     Personal Factors:   no deficits       low   Examination  Body Structures and Functions, activity limitations and participation restrictions that may impact the plan of care Body Regions:   back  lower extremities     Body Systems:    gross symmetry  ROM  strength  gross coordinated movement  motor control     Participation Restrictions:   None     Activity limitations:   Learning and applying knowledge  no deficits     General Tasks and Commands  no deficits     Communication  no deficits     Mobility  lifting and carrying objects     Self care  no deficits     Domestic Life  doing house work (cleaning house, washing dishes, laundry)     Interactions/Relationships  no deficits     Life Areas  no deficits     Community and Social Life  recreation and leisure             low  "  Clinical Presentation stable and uncomplicated low   Decision Making/ Complexity Score: low         GOALS: Pt is in agreement with the following goals.     Short term goals:  6 weeks or 10 visits   1.  Pt will demonstrate increased lumbar ROM by at least 3 degrees from the initial ROM value with improvements noted in functional ROM and ability to perform ADLs.  2.  Pt will demonstrate increased maximum isometric torque value by 10% when compared to the initial value resulting in improved ability to perform bending, lifting, and carrying activities safely, confidently.  3.  Patient report a reduction in worst pain score by 1-2 points for improved tolerance for lifting grandchildren and enjoying leisure activities like golf.  4.  Pt able to perform HEP correctly with minimal cueing or supervision from therapist to encourage independent management of symptoms.         Long term goals: 13 weeks or 20 visits   1. Pt will demonstrate increased lumbar ROM by at least 6 degrees from initial ROM value, resulting in improved ability to perform functional fwd bending while standing and sitting.   2. Pt will demonstrate increased maximum isometric torque value by 20% when compared to the initial value resulting in improved ability to perform bending, lifting, and carrying activities safely, confidently.  3. Pt to demonstrate ability to independently control and reduce their pain through posture positioning and mechanical movements throughout a typical day.  4.  Patient will demonstrate improved overall function per FOTO Survey to CJ = at least 20% but < 40% impaired, limited or restricted score or less in order to show subjective improvement of condition.  5. Pt will demonstrate independence with the HEP at discharge  6.   "I would like to be pain-free."       Plan   Continue with established Plan of Care towards established PT goals.   "

## 2019-02-28 ENCOUNTER — CLINICAL SUPPORT (OUTPATIENT)
Dept: REHABILITATION | Facility: OTHER | Age: 69
End: 2019-02-28
Attending: PHYSICAL MEDICINE & REHABILITATION
Payer: MEDICARE

## 2019-02-28 DIAGNOSIS — G89.29 CHRONIC RIGHT-SIDED LOW BACK PAIN WITH RIGHT-SIDED SCIATICA: Primary | ICD-10-CM

## 2019-02-28 DIAGNOSIS — M54.41 CHRONIC RIGHT-SIDED LOW BACK PAIN WITH RIGHT-SIDED SCIATICA: Primary | ICD-10-CM

## 2019-02-28 PROCEDURE — 97110 THERAPEUTIC EXERCISES: CPT

## 2019-02-28 NOTE — PROGRESS NOTES
"[facility]      Name: Florentino Bowman  Clinic Number: [id number]    Therapy Diagnosis:   Encounter Diagnosis   Name Primary?    Chronic right-sided low back pain with right-sided sciatica Yes     Physician: Cecily Leonardo, *    Visit Date: 2019    Physician: Cecily Leonardo, *     Physician Orders: PT Eval and Treat   Medical Diagnosis from Referral:   M54.41,G89.29 (ICD-10-CM) - Chronic right-sided low back pain with right-sided sciatica   M51.36 (ICD-10-CM) - DDD (degenerative disc disease), lumbar   Z98.890 (ICD-10-CM) - S/P lumbar laminectomy   M47.26 (ICD-10-CM) - Osteoarthritis of spine with radiculopathy, lumbar region     Evaluation Date: 2019  Authorization Period Expiration: 2019  Plan of Care Expiration: 2019  Reassessment Due: 2019  Visit # / Visits authorized:      Time In: 10:00  Time Out: 1100  Total Billable Time: 60 minutes     Precautions: L4-5 Laminectomy      Pattern of pain determined: Pattern 3     Face to Face discussion of patient was done between PT and PTA.       Subjective   Florentino reports she always feels better in the AM     Patient reports their pain to be 0/10 on a 0-10 scale with 0 being no pain and 10 being the worst pain imaginable.  Pain Location: Right glut and thigh      Occupation: Retired;   Leisure: Cycling (can't anymore); Working in [employer]; Playing with grandchildren; Traveling (trip planned for [place])        Pt goals: "I would like to be pain-free."    Objective   Baseline Isometric Testing on Med X equipment: Testing administered by PT  Date of testin2019  ROM 48-0 deg   Max Peak Torque 78 ft/lbs    Min Peak Torque 32 ft/lbs    Flex/Ext Ratio 2.44   % below normative data -36%   Counter weight 102   Femur 4   Seat pad 0      Outcomes:  Initial score: 46%  Visit 5 score:  Goal: At least 40 percent but less than 60 percent      Treatment    Pt was instructed in and " performed the following:     Florentino received therapeutic exercises to develop/improved posture, cardiovascular endurance, muscular endurance, lumbar/cervical ROM, strength and muscular endurance for 60 minutes including the following exercises:     [facility] 2/28/2019   Visit Number 4   VAS Pain Rating 0   Treadmill Time (in min.) 10   Extension in Lying 10   Lumbar Extension Seat Pad -   Femur Restraint -   Top Dead Center -   Counterweight -   Lumbar Flexion -   Lumbar Extension -   Lumbar Peak Torque -   Min Torque -   Test Percent Below Normative Data -   Lumbar Weight 39   Repetitions 20   Rating of Perceived Exertion 4   Ice - Z Lie (in min.) 10         LTR 10x10   Bridging 10x  PPT  10x  EIL 10x  Piriformis 3 x10            Peripheral muscle strengthening which included 1 set of 15-20 repetitions at a slow, controlled 10-13 second per rep pace focused on strengthening supporting musculature for improved body mechanics and functional mobility.  Pt and therapist focused on proper form during treatment to ensure optimal strengthening of each targeted muscle group.  Machines were utilized including torso rotation, leg extension, leg curl, chest press, upright row. Tricep extension, bicep curl, leg press, and hip abduction added visit 3    Florentino received the following manual therapy techniques: None        Home Exercises Provided and Patient Education Provided   Bridging,   Pelvic Tilts,   TrA Marching,   Prone Press Ups  Piriformis stretch 10 secs hold 3x      Education provided:   - Pt education on progression of weights and exertion rating to safely progress through resistance trainging.     Written Home Exercises Provided: Patient instructed to cont prior HEP.  Exercises were reviewed and Florentino was able to demonstrate them prior to the end of the session.  Florentino demonstrated good  understanding of the education provided.     See EMR under Patient Instructions for exercises provided prior  visit.    Lumbar roll use compliance: Unknown      Assessment   Pt tolerated treatment and was able to attain 20 reps on Med X.  Increase 5% next visit.  Removed pacere well. Pt completed all peripheral resistance exercises with no reports of increased symptoms or discomfort.  Added variation to piriformis stretching today for pt to perform with HEP  Patient is making good progress towards established goals.  Pt will continue to benefit from skilled outpatient physical therapy to address the deficits stated in the impairment chart, provide pt/family education and to maximize pt's level of independence in the home and community environment.       Pt's spiritual, cultural and educational needs considered and pt agreeable to plan of care and goals as stated below:   Medical necessity is demonstrated by the following problem list.    Pt presents with the following impairments:      History  Co-morbidities and personal factors that may impact the plan of care Co-morbidities:   prior lumbar surgery     Personal Factors:   no deficits       low   Examination  Body Structures and Functions, activity limitations and participation restrictions that may impact the plan of care Body Regions:   back  lower extremities     Body Systems:    gross symmetry  ROM  strength  gross coordinated movement  motor control     Participation Restrictions:   None     Activity limitations:   Learning and applying knowledge  no deficits     General Tasks and Commands  no deficits     Communication  no deficits     Mobility  lifting and carrying objects     Self care  no deficits     Domestic Life  doing house work (cleaning house, washing dishes, laundry)     Interactions/Relationships  no deficits     Life Areas  no deficits     Community and Social Life  recreation and leisure             low   Clinical Presentation stable and uncomplicated low   Decision Making/ Complexity Score: low         GOALS: Pt is in agreement with the following  "goals.     Short term goals:  6 weeks or 10 visits   1.  Pt will demonstrate increased lumbar ROM by at least 3 degrees from the initial ROM value with improvements noted in functional ROM and ability to perform ADLs.  2.  Pt will demonstrate increased maximum isometric torque value by 10% when compared to the initial value resulting in improved ability to perform bending, lifting, and carrying activities safely, confidently.  3.  Patient report a reduction in worst pain score by 1-2 points for improved tolerance for lifting grandchildren and enjoying leisure activities like golf.  4.  Pt able to perform HEP correctly with minimal cueing or supervision from therapist to encourage independent management of symptoms.         Long term goals: 13 weeks or 20 visits   1. Pt will demonstrate increased lumbar ROM by at least 6 degrees from initial ROM value, resulting in improved ability to perform functional fwd bending while standing and sitting.   2. Pt will demonstrate increased maximum isometric torque value by 20% when compared to the initial value resulting in improved ability to perform bending, lifting, and carrying activities safely, confidently.  3. Pt to demonstrate ability to independently control and reduce their pain through posture positioning and mechanical movements throughout a typical day.  4.  Patient will demonstrate improved overall function per FOTO Survey to CJ = at least 20% but < 40% impaired, limited or restricted score or less in order to show subjective improvement of condition.  5. Pt will demonstrate independence with the HEP at discharge  6.   "I would like to be pain-free."       Plan   Continue with established Plan of Care towards established PT goals.   "

## 2019-03-04 ENCOUNTER — CLINICAL SUPPORT (OUTPATIENT)
Dept: REHABILITATION | Facility: OTHER | Age: 69
End: 2019-03-04
Attending: PHYSICAL MEDICINE & REHABILITATION
Payer: MEDICARE

## 2019-03-04 DIAGNOSIS — M54.41 CHRONIC RIGHT-SIDED LOW BACK PAIN WITH RIGHT-SIDED SCIATICA: Primary | ICD-10-CM

## 2019-03-04 DIAGNOSIS — G89.29 CHRONIC RIGHT-SIDED LOW BACK PAIN WITH RIGHT-SIDED SCIATICA: Primary | ICD-10-CM

## 2019-03-04 PROCEDURE — 97110 THERAPEUTIC EXERCISES: CPT

## 2019-03-04 NOTE — PROGRESS NOTES
"[facility]      Name: Florentino Bowman  Clinic Number: [id number]    Therapy Diagnosis:   Encounter Diagnosis   Name Primary?    Chronic right-sided low back pain with right-sided sciatica Yes     Physician: Cecily Leonardo, *    Visit Date: 3/4/2019    Physician: Cecily Leonardo, *     Physician Orders: PT Eval and Treat   Medical Diagnosis from Referral:   M54.41,G89.29 (ICD-10-CM) - Chronic right-sided low back pain with right-sided sciatica   M51.36 (ICD-10-CM) - DDD (degenerative disc disease), lumbar   Z98.890 (ICD-10-CM) - S/P lumbar laminectomy   M47.26 (ICD-10-CM) - Osteoarthritis of spine with radiculopathy, lumbar region     Evaluation Date: 2019  Authorization Period Expiration: 2019  Plan of Care Expiration: 2019  Reassessment Due: 2019  Visit # / Visits authorized:      Time In: ***  Time Out: ***  Total Billable Time: *** minutes     Precautions: L4-5 Laminectomy      Pattern of pain determined: Pattern 3     Face to Face discussion of patient was done between PT and PTA.       Subjective   Florentino reports she always feels better in the AM     Patient reports their pain to be 0/10 on a 0-10 scale with 0 being no pain and 10 being the worst pain imaginable.  Pain Location: Right glut and thigh      Occupation: Retired;   Leisure: Cycling (can't anymore); Working in [employer]; Playing with grandchildren; Traveling (trip planned for [place])        Pt goals: "I would like to be pain-free."    Objective   Baseline Isometric Testing on Med X equipment: Testing administered by PT  Date of testin2019  ROM 48-0 deg   Max Peak Torque 78 ft/lbs    Min Peak Torque 32 ft/lbs    Flex/Ext Ratio 2.44   % below normative data -36%   Counter weight 102   Femur 4   Seat pad 0      Outcomes:  Initial score: 46%  Visit 5 score:  Goal: At least 40 percent but less than 60 percent      Treatment    Pt was instructed in and " performed the following:     Florentino received therapeutic exercises to develop/improved posture, cardiovascular endurance, muscular endurance, lumbar/cervical ROM, strength and muscular endurance for *** minutes including the following exercises:       LTR 10x10   Bridging 10x  PPT  10x  EIL 10x  Piriformis 3 x10      ***      Peripheral muscle strengthening which included 1 set of 15-20 repetitions at a slow, controlled 10-13 second per rep pace focused on strengthening supporting musculature for improved body mechanics and functional mobility.  Pt and therapist focused on proper form during treatment to ensure optimal strengthening of each targeted muscle group.  Machines were utilized including torso rotation, leg extension, leg curl, chest press, upright row. Tricep extension, bicep curl, leg press, and hip abduction added visit 3    Florentino received the following manual therapy techniques: None        Home Exercises Provided and Patient Education Provided   Bridging,   Pelvic Tilts,   TrA Marching,   Prone Press Ups  Piriformis stretch 10 secs hold 3x      Education provided:   - Pt education on progression of weights and exertion rating to safely progress through resistance trainging.     Written Home Exercises Provided: Patient instructed to cont prior HEP.  Exercises were reviewed and Florentino was able to demonstrate them prior to the end of the session.  Florentino demonstrated good  understanding of the education provided.     See EMR under Patient Instructions for exercises provided prior visit.    Lumbar roll use compliance: Unknown      Assessment   Pt tolerated treatment and was able to attain 20 reps on Med X.  Increase 5% next visit.  Removed pacere well. Pt completed all peripheral resistance exercises with no reports of increased symptoms or discomfort.  Added variation to piriformis stretching today for pt to perform with HEP  Patient is making good progress towards established goals.  Pt will continue to  benefit from skilled outpatient physical therapy to address the deficits stated in the impairment chart, provide pt/family education and to maximize pt's level of independence in the home and community environment.       Pt's spiritual, cultural and educational needs considered and pt agreeable to plan of care and goals as stated below:   Medical necessity is demonstrated by the following problem list.    Pt presents with the following impairments:      History  Co-morbidities and personal factors that may impact the plan of care Co-morbidities:   prior lumbar surgery     Personal Factors:   no deficits       low   Examination  Body Structures and Functions, activity limitations and participation restrictions that may impact the plan of care Body Regions:   back  lower extremities     Body Systems:    gross symmetry  ROM  strength  gross coordinated movement  motor control     Participation Restrictions:   None     Activity limitations:   Learning and applying knowledge  no deficits     General Tasks and Commands  no deficits     Communication  no deficits     Mobility  lifting and carrying objects     Self care  no deficits     Domestic Life  doing house work (cleaning house, washing dishes, laundry)     Interactions/Relationships  no deficits     Life Areas  no deficits     Community and Social Life  recreation and leisure             low   Clinical Presentation stable and uncomplicated low   Decision Making/ Complexity Score: low         GOALS: Pt is in agreement with the following goals.     Short term goals:  6 weeks or 10 visits   1.  Pt will demonstrate increased lumbar ROM by at least 3 degrees from the initial ROM value with improvements noted in functional ROM and ability to perform ADLs.  2.  Pt will demonstrate increased maximum isometric torque value by 10% when compared to the initial value resulting in improved ability to perform bending, lifting, and carrying activities safely, confidently.  3.   "Patient report a reduction in worst pain score by 1-2 points for improved tolerance for lifting grandchildren and enjoying leisure activities like golf.  4.  Pt able to perform HEP correctly with minimal cueing or supervision from therapist to encourage independent management of symptoms.         Long term goals: 13 weeks or 20 visits   1. Pt will demonstrate increased lumbar ROM by at least 6 degrees from initial ROM value, resulting in improved ability to perform functional fwd bending while standing and sitting.   2. Pt will demonstrate increased maximum isometric torque value by 20% when compared to the initial value resulting in improved ability to perform bending, lifting, and carrying activities safely, confidently.  3. Pt to demonstrate ability to independently control and reduce their pain through posture positioning and mechanical movements throughout a typical day.  4.  Patient will demonstrate improved overall function per FOTO Survey to CJ = at least 20% but < 40% impaired, limited or restricted score or less in order to show subjective improvement of condition.  5. Pt will demonstrate independence with the HEP at discharge  6.   "I would like to be pain-free."       Plan   Continue with established Plan of Care towards established PT goals.   "

## 2019-03-04 NOTE — PROGRESS NOTES
"[facility]      Name: Florentino Bowman  Clinic Number: [id number]    Therapy Diagnosis:   Encounter Diagnosis   Name Primary?    Chronic right-sided low back pain with right-sided sciatica Yes     Physician: Cecily Leonardo, *    Visit Date: 3/4/2019    Physician: Cecily Leonardo, *     Physician Orders: PT Eval and Treat   Medical Diagnosis from Referral:   M54.41,G89.29 (ICD-10-CM) - Chronic right-sided low back pain with right-sided sciatica   M51.36 (ICD-10-CM) - DDD (degenerative disc disease), lumbar   Z98.890 (ICD-10-CM) - S/P lumbar laminectomy   M47.26 (ICD-10-CM) - Osteoarthritis of spine with radiculopathy, lumbar region     Evaluation Date: 2019  Authorization Period Expiration: 2019  Plan of Care Expiration: 2019  Reassessment Due: 2019  Visit # / Visits authorized:      Time In: 905  Time Out: 1005  Total Billable Time: 60 minutes     Precautions: L4-5 Laminectomy      Pattern of pain determined: Pattern 3     Face to Face discussion of patient was done between PT and PTA.       Subjective   Florentino reports that she has been feeling good over the weekend and has noticed improvements in her LE symptoms. She denies pain this AM.    Patient reports their pain to be 0/10 on a 0-10 scale with 0 being no pain and 10 being the worst pain imaginable.  Pain Location: Right glut and thigh      Occupation: Retired;   Leisure: Cycling (can't anymore); Working in [employer]; Playing with grandchildren; Traveling (trip planned for [place])        Pt goals: "I would like to be pain-free."    Objective   Baseline Isometric Testing on Med X equipment: Testing administered by PT  Date of testin2019  ROM 48-0 deg   Max Peak Torque 78 ft/lbs    Min Peak Torque 32 ft/lbs    Flex/Ext Ratio 2.44   % below normative data -36%   Counter weight 102   Femur 4   Seat pad 0      Outcomes:  Initial score: 46%  Visit 5 score:  Goal: At " least 40 percent but less than 60 percent    Treatment    Pt was instructed in and performed the following:     Florentino received therapeutic exercises to develop/improved posture, cardiovascular endurance, muscular endurance, lumbar/cervical ROM, strength and muscular endurance for 50 minutes including the following exercises:     LTR x20  PPT  x10--> TrA +Marching x20  +1 LE Kicks x20 +Double LE Tucks x10  Bridging x10 for 5 second holds   EIL 10x  Piriformis x5 ea L/R for 5-10 seconds     [facility] 3/4/2019   Visit Number 5   VAS Pain Rating 0   Treadmill Time (in min.) 10   Lumbar Weight 45   Repetitions 13   Rating of Perceived Exertion 6   Ice - Z Lie (in min.) 10     Peripheral muscle strengthening which included 1 set of 15-20 repetitions at a slow, controlled 10-13 second per rep pace focused on strengthening supporting musculature for improved body mechanics and functional mobility.  Pt and therapist focused on proper form during treatment to ensure optimal strengthening of each targeted muscle group.  Machines were utilized including torso rotation, leg extension, leg curl, chest press, upright row. Tricep extension, bicep curl, leg press, and hip abduction added visit 3    Home Exercises Provided and Patient Education Provided   Bridging,   Pelvic Tilts,   TrA Marching,   Prone Press Ups  Piriformis stretch 10 secs hold 3x    Education provided:   - Pt education on progression of weights and exertion rating to safely progress through resistance trainging.     Written Home Exercises Provided: Patient instructed to cont prior HEP.  Exercises were reviewed and Florentino was able to demonstrate them prior to the end of the session.  Florentino demonstrated good  understanding of the education provided.     See EMR under Patient Instructions for exercises provided prior visit.    Lumbar roll use compliance: Unknown      Assessment   Florentino tolerated session overall well this date but did have some initial  difficulty performing advancing core strengthening exercises and required increased verbal cues. She should benefit from continuing to progress core strength. She did fair with increase in MedX weight and should continue to advance through protocol as able.     -Patient is making good progress towards established goals. She performed x13 reps of 45 ft/lbs at an RPE of 6/10; Maintain weight working to increase reps with controlled RPE  Pt will continue to benefit from skilled outpatient physical therapy to address the deficits stated in the impairment chart, provide pt/family education and to maximize pt's level of independence in the home and community environment.       Pt's spiritual, cultural and educational needs considered and pt agreeable to plan of care and goals as stated below:   Medical necessity is demonstrated by the following problem list.    Pt presents with the following impairments:      History  Co-morbidities and personal factors that may impact the plan of care Co-morbidities:   prior lumbar surgery     Personal Factors:   no deficits       low   Examination  Body Structures and Functions, activity limitations and participation restrictions that may impact the plan of care Body Regions:   back  lower extremities     Body Systems:    gross symmetry  ROM  strength  gross coordinated movement  motor control     Participation Restrictions:   None     Activity limitations:   Learning and applying knowledge  no deficits     General Tasks and Commands  no deficits     Communication  no deficits     Mobility  lifting and carrying objects     Self care  no deficits     Domestic Life  doing house work (cleaning house, washing dishes, laundry)     Interactions/Relationships  no deficits     Life Areas  no deficits     Community and Social Life  recreation and leisure             low   Clinical Presentation stable and uncomplicated low   Decision Making/ Complexity Score: low         GOALS: Pt is in agreement  "with the following goals.     Short term goals:  6 weeks or 10 visits   1.  Pt will demonstrate increased lumbar ROM by at least 3 degrees from the initial ROM value with improvements noted in functional ROM and ability to perform ADLs.  2.  Pt will demonstrate increased maximum isometric torque value by 10% when compared to the initial value resulting in improved ability to perform bending, lifting, and carrying activities safely, confidently.  3.  Patient report a reduction in worst pain score by 1-2 points for improved tolerance for lifting grandchildren and enjoying leisure activities like golf.  4.  Pt able to perform HEP correctly with minimal cueing or supervision from therapist to encourage independent management of symptoms.         Long term goals: 13 weeks or 20 visits   1. Pt will demonstrate increased lumbar ROM by at least 6 degrees from initial ROM value, resulting in improved ability to perform functional fwd bending while standing and sitting.   2. Pt will demonstrate increased maximum isometric torque value by 20% when compared to the initial value resulting in improved ability to perform bending, lifting, and carrying activities safely, confidently.  3. Pt to demonstrate ability to independently control and reduce their pain through posture positioning and mechanical movements throughout a typical day.  4.  Patient will demonstrate improved overall function per FOTO Survey to CJ = at least 20% but < 40% impaired, limited or restricted score or less in order to show subjective improvement of condition.  5. Pt will demonstrate independence with the HEP at discharge  6.   "I would like to be pain-free."       Plan   Continue with established Plan of Care towards established PT goals.   "

## 2019-03-06 ENCOUNTER — TELEPHONE (OUTPATIENT)
Dept: FAMILY MEDICINE | Facility: CLINIC | Age: 69
End: 2019-03-06

## 2019-03-06 RX ORDER — MECLIZINE HYDROCHLORIDE 25 MG/1
25 TABLET ORAL 3 TIMES DAILY PRN
Qty: 30 TABLET | Refills: 1 | Status: SHIPPED | OUTPATIENT
Start: 2019-03-06 | End: 2019-06-03

## 2019-03-06 NOTE — TELEPHONE ENCOUNTER
----- Message from Bettie Wyman sent at 3/6/2019 11:07 AM CST -----  Contact: [phone]/self  Patient is requesting a prescription be sent to pharmacy to treat Vertigo.     Send to [facility] #[id number] - [place], [place] - [address]

## 2019-03-07 ENCOUNTER — OFFICE VISIT (OUTPATIENT)
Dept: FAMILY MEDICINE | Facility: CLINIC | Age: 69
End: 2019-03-07
Payer: MEDICARE

## 2019-03-07 VITALS
OXYGEN SATURATION: 97 % | HEART RATE: 65 BPM | WEIGHT: 123.69 LBS | HEIGHT: 60 IN | SYSTOLIC BLOOD PRESSURE: 110 MMHG | TEMPERATURE: 98 F | BODY MASS INDEX: 24.28 KG/M2 | DIASTOLIC BLOOD PRESSURE: 74 MMHG

## 2019-03-07 DIAGNOSIS — M47.816 LUMBAR SPONDYLOSIS: ICD-10-CM

## 2019-03-07 DIAGNOSIS — M51.36 DDD (DEGENERATIVE DISC DISEASE), LUMBAR: ICD-10-CM

## 2019-03-07 PROCEDURE — 99213 OFFICE O/P EST LOW 20 MIN: CPT | Mod: S$GLB,,, | Performed by: FAMILY MEDICINE

## 2019-03-07 PROCEDURE — 99213 PR OFFICE/OUTPT VISIT, EST, LEVL III, 20-29 MIN: ICD-10-PCS | Mod: S$GLB,,, | Performed by: FAMILY MEDICINE

## 2019-03-07 RX ORDER — VALACYCLOVIR HYDROCHLORIDE 1 G/1
TABLET, FILM COATED ORAL
Refills: 1 | COMMUNITY
Start: 2019-02-26 | End: 2019-06-03

## 2019-03-07 RX ORDER — HYDROCODONE BITARTRATE AND ACETAMINOPHEN 5; 325 MG/1; MG/1
1 TABLET ORAL DAILY PRN
Qty: 30 TABLET | Refills: 0 | Status: SHIPPED | OUTPATIENT
Start: 2019-03-07 | End: 2019-04-09 | Stop reason: SDUPTHER

## 2019-03-07 NOTE — PROGRESS NOTES
Subjective:      Patient ID: Florentino Bowman is a 68 y.o. female.    Chief Complaint: Medication Refill and Dizziness      HPI   Sudden dizziness this week a floor while stretching; ; no pain; room is spinning; nauseated; vision OK; no head trauma; no fever, no hearing loss; meclizine causes dizziness; going to [place];   Back still hurts, no more surgery planned, right leg affected, sciatica  Needs   Review of Systems   Constitutional: Negative.  Negative for activity change and unexpected weight change.   HENT: Negative.  Negative for hearing loss, rhinorrhea and trouble swallowing.    Eyes: Negative for discharge and visual disturbance.   Respiratory: Negative.  Negative for chest tightness and wheezing.    Cardiovascular: Negative.  Negative for chest pain and palpitations.   Gastrointestinal: Negative.  Negative for blood in stool, constipation, diarrhea and vomiting.   Endocrine: Negative.  Negative for polydipsia and polyuria.   Genitourinary: Negative.  Negative for difficulty urinating, dysuria, hematuria and menstrual problem.   Musculoskeletal: Positive for back pain and gait problem. Negative for arthralgias, joint swelling and neck pain.   Neurological: Positive for headaches. Negative for weakness.   Psychiatric/Behavioral: Negative.  Negative for confusion and dysphoric mood.   All other systems reviewed and are negative.    Objective:     Physical Exam   Constitutional: She is oriented to person, place, and time. She appears well-developed and well-nourished.   HENT:   Head: Normocephalic.   Eyes: Conjunctivae and EOM are normal. Pupils are equal, round, and reactive to light.   Neck: Normal range of motion. Neck supple.   Cardiovascular: Normal rate, regular rhythm and normal heart sounds.   Pulmonary/Chest: Effort normal and breath sounds normal.   Musculoskeletal: Normal range of motion.   Neurological: She is alert and oriented to person, place, and time. She has normal reflexes.   Skin: Skin  is warm and dry.   Psychiatric: She has a normal mood and affect. Her behavior is normal. Judgment and thought content normal.   Nursing note and vitals reviewed.    Assessment:     1. DDD (degenerative disc disease), lumbar    2. Lumbar spondylosis      Plan:        Medication List           Accurate as of 3/7/19 11:59 PM. If you have any questions, ask your nurse or doctor.               CHANGE how you take these medications    citalopram 20 MG tablet  Commonly known as:  CELEXA  Take 1 tablet (20 mg total) by mouth once daily.  What changed:  when to take this     liothyronine 5 MCG Tab  Commonly known as:  CYTOMEL  Take 1 tablet (5 mcg total) by mouth once daily.  What changed:  when to take this        CONTINUE taking these medications    atorvastatin 40 MG tablet  Commonly known as:  LIPITOR     estradiol 1 MG tablet  Commonly known as:  ESTRACE     * HYDROcodone-acetaminophen 5-325 mg per tablet  Commonly known as:  NORCO  Take 1 tablet by mouth daily as needed for Pain.     * HYDROcodone-acetaminophen 5-325 mg per tablet  Commonly known as:  NORCO  Take 1 tablet by mouth daily as needed for Pain.     levothyroxine 88 MCG tablet  Commonly known as:  SYNTHROID  Take 1 tablet (88 mcg total) by mouth once daily.     meclizine 25 mg tablet  Commonly known as:  ANTIVERT  Take 1 tablet (25 mg total) by mouth 3 (three) times daily as needed. For dizziness     pregabalin 75 MG capsule  Commonly known as:  LYRICA  Take 1 capsule (75 mg total) by mouth 2 (two) times daily.     progesterone 100 MG capsule  Commonly known as:  PROMETRIUM     sumatriptan 100 MG tablet  Commonly known as:  IMITREX  TAKE 1 TABLET BY MOUTH EVERY 2 HOURS AS NEEDED FOR MIGRAINE     valACYclovir 1000 MG tablet  Commonly known as:  VALTREX     zolpidem 10 mg Tab  Commonly known as:  AMBIEN  TAKE 1 TABLET BY MOUTH NIGHTLY AS NEEDED         * This list has 2 medication(s) that are the same as other medications prescribed for you. Read the  directions carefully, and ask your doctor or other care provider to review them with you.            STOP taking these medications    ALPRAZolam 0.5 MG tablet  Commonly known as:  XANAX  Stopped by:  Fam Chou MD           Where to Get Your Medications      These medications were sent to [facility]/pharmacy #[id number] - [place] - [address] [facility]  [address] [place] [address]    Phone:  [phone]   · HYDROcodone-acetaminophen 5-325 mg per tablet       DDD (degenerative disc disease), lumbar    Lumbar spondylosis    Other orders  -     HYDROcodone-acetaminophen (NORCO) 5-325 mg per tablet; Take 1 tablet by mouth daily as needed for Pain.  Dispense: 30 tablet; Refill: 0    stop xanax for now, use hydrocodone for pain relief  Will try buspar in future for anxiety

## 2019-03-12 ENCOUNTER — CLINICAL SUPPORT (OUTPATIENT)
Dept: REHABILITATION | Facility: OTHER | Age: 69
End: 2019-03-12
Attending: PHYSICAL MEDICINE & REHABILITATION
Payer: MEDICARE

## 2019-03-12 DIAGNOSIS — M54.41 CHRONIC RIGHT-SIDED LOW BACK PAIN WITH RIGHT-SIDED SCIATICA: Primary | ICD-10-CM

## 2019-03-12 DIAGNOSIS — G89.29 CHRONIC RIGHT-SIDED LOW BACK PAIN WITH RIGHT-SIDED SCIATICA: Primary | ICD-10-CM

## 2019-03-12 PROCEDURE — 97110 THERAPEUTIC EXERCISES: CPT

## 2019-03-12 NOTE — PROGRESS NOTES
"[facility]      Name: Florentino Bowman  Clinic Number: [id number]    Therapy Diagnosis:   Encounter Diagnosis   Name Primary?    Chronic right-sided low back pain with right-sided sciatica Yes     Physician: Cecily Leonardo, *    Visit Date: 3/12/2019    Physician: Cecily Leonardo, *     Physician Orders: PT Eval and Treat   Medical Diagnosis from Referral:   M54.41,G89.29 (ICD-10-CM) - Chronic right-sided low back pain with right-sided sciatica   M51.36 (ICD-10-CM) - DDD (degenerative disc disease), lumbar   Z98.890 (ICD-10-CM) - S/P lumbar laminectomy   M47.26 (ICD-10-CM) - Osteoarthritis of spine with radiculopathy, lumbar region     Evaluation Date: 2019  Authorization Period Expiration: 2019  Plan of Care Expiration: 2019  Reassessment Due: 2019  Visit # / Visits authorized:      Time In: 2:05  Time Out: 3:05  Total Billable Time: 60 minutes     Precautions: L4-5 Laminectomy      Pattern of pain determined: Pattern 3     Face to Face discussion of patient was done between PT and PTA.       Subjective   Florentino reports 2/10 LBP.She feels the LBP intensity has decreased a little.     Patient reports their pain to be 1/10 on a 0-10 scale with 0 being no pain and 10 being the worst pain imaginable.  Pain Location: Right glut and thigh      Occupation: Retired;   Leisure: Cycling (can't anymore); Working in [employer]; Playing with grandchildren; Traveling (trip planned for [place])        Pt goals: "I would like to be pain-free."    Objective   Baseline Isometric Testing on Med X equipment: Testing administered by PT  Date of testin2019  ROM 48-0 deg   Max Peak Torque 78 ft/lbs    Min Peak Torque 32 ft/lbs    Flex/Ext Ratio 2.44   % below normative data -36%   Counter weight 102   Femur 4   Seat pad 0      Outcomes:  Initial score: 46%  Visit 5 score:  Goal: At least 40 percent but less than 60 percent    Treatment    Pt " was instructed in and performed the following:     Florentino received therapeutic exercises to develop/improved posture, cardiovascular endurance, muscular endurance, lumbar/cervical ROM, strength and muscular endurance for 50 minutes including the following exercises:   [facility] 3/12/2019   Visit Number 6   VAS Pain Rating 1   Treadmill Time (in min.) 10   Extension in Lying 10   Lumbar Extension Seat Pad -   Femur Restraint -   Top Dead Center -   Counterweight -   Lumbar Flexion -   Lumbar Extension -   Lumbar Peak Torque -   Min Torque -   Test Percent Below Normative Data -   Lumbar Weight 45   Repetitions 15   Rating of Perceived Exertion 5   Ice - Z Lie (in min.) 10   LTR x20  PPT  x10--> TrA +Marching x20  +1 LE Kicks x20 +Double LE Tucks x10  Bridging x10 for 5 second holds   EIL 10x  Piriformis x5 ea L/R for 5-10 seconds       Peripheral muscle strengthening which included 1 set of 15-20 repetitions at a slow, controlled 10-13 second per rep pace focused on strengthening supporting musculature for improved body mechanics and functional mobility.  Pt and therapist focused on proper form during treatment to ensure optimal strengthening of each targeted muscle group.  Machines were utilized including torso rotation, leg extension, leg curl, chest press, upright row. Tricep extension, bicep curl, leg press, and hip abduction added visit 3    Home Exercises Provided and Patient Education Provided   Bridging,   Pelvic Tilts,   TrA Marching,   Prone Press Ups  Piriformis stretch 10 secs hold 3x    Education provided:   - Pt education on progression of weights and exertion rating to safely progress through resistance trainging.     Written Home Exercises Provided: Patient instructed to cont prior HEP.  Exercises were reviewed and Florentino was able to demonstrate them prior to the end of the session.  Florentino demonstrated good  understanding of the education provided.     See EMR under Patient Instructions for  exercises provided prior visit.    Lumbar roll use compliance: Unknown      Assessment   Florentino tolerated session overall well with a decrease in her LBP. She should benefit from continuing to progress core strength. She did fair with MedX machine with the same weight and 15 reps with RPE a 5. Cont to progress.    -Patient is making good progress towards established goals. She performed x13 reps of 45 ft/lbs at an RPE of 6/10; Maintain weight working to increase reps with controlled RPE  Pt will continue to benefit from skilled outpatient physical therapy to address the deficits stated in the impairment chart, provide pt/family education and to maximize pt's level of independence in the home and community environment.       Pt's spiritual, cultural and educational needs considered and pt agreeable to plan of care and goals as stated below:   Medical necessity is demonstrated by the following problem list.    Pt presents with the following impairments:      History  Co-morbidities and personal factors that may impact the plan of care Co-morbidities:   prior lumbar surgery     Personal Factors:   no deficits       low   Examination  Body Structures and Functions, activity limitations and participation restrictions that may impact the plan of care Body Regions:   back  lower extremities     Body Systems:    gross symmetry  ROM  strength  gross coordinated movement  motor control     Participation Restrictions:   None     Activity limitations:   Learning and applying knowledge  no deficits     General Tasks and Commands  no deficits     Communication  no deficits     Mobility  lifting and carrying objects     Self care  no deficits     Domestic Life  doing house work (cleaning house, washing dishes, laundry)     Interactions/Relationships  no deficits     Life Areas  no deficits     Community and Social Life  recreation and leisure             low   Clinical Presentation stable and uncomplicated low   Decision Making/  "Complexity Score: low         GOALS: Pt is in agreement with the following goals.     Short term goals:  6 weeks or 10 visits   1.  Pt will demonstrate increased lumbar ROM by at least 3 degrees from the initial ROM value with improvements noted in functional ROM and ability to perform ADLs.  2.  Pt will demonstrate increased maximum isometric torque value by 10% when compared to the initial value resulting in improved ability to perform bending, lifting, and carrying activities safely, confidently.  3.  Patient report a reduction in worst pain score by 1-2 points for improved tolerance for lifting grandchildren and enjoying leisure activities like golf.  4.  Pt able to perform HEP correctly with minimal cueing or supervision from therapist to encourage independent management of symptoms.         Long term goals: 13 weeks or 20 visits   1. Pt will demonstrate increased lumbar ROM by at least 6 degrees from initial ROM value, resulting in improved ability to perform functional fwd bending while standing and sitting.   2. Pt will demonstrate increased maximum isometric torque value by 20% when compared to the initial value resulting in improved ability to perform bending, lifting, and carrying activities safely, confidently.  3. Pt to demonstrate ability to independently control and reduce their pain through posture positioning and mechanical movements throughout a typical day.  4.  Patient will demonstrate improved overall function per FOTO Survey to CJ = at least 20% but < 40% impaired, limited or restricted score or less in order to show subjective improvement of condition.  5. Pt will demonstrate independence with the HEP at discharge  6.   "I would like to be pain-free."       Plan   Continue with established Plan of Care towards established PT goals.   "

## 2019-03-14 ENCOUNTER — CLINICAL SUPPORT (OUTPATIENT)
Dept: REHABILITATION | Facility: OTHER | Age: 69
End: 2019-03-14
Attending: PHYSICAL MEDICINE & REHABILITATION
Payer: MEDICARE

## 2019-03-14 DIAGNOSIS — G89.29 CHRONIC RIGHT-SIDED LOW BACK PAIN WITH RIGHT-SIDED SCIATICA: Primary | ICD-10-CM

## 2019-03-14 DIAGNOSIS — M54.41 CHRONIC RIGHT-SIDED LOW BACK PAIN WITH RIGHT-SIDED SCIATICA: Primary | ICD-10-CM

## 2019-03-14 PROCEDURE — 97110 THERAPEUTIC EXERCISES: CPT

## 2019-03-14 NOTE — PROGRESS NOTES
"[facility]      Name: Florentino Bowman  Clinic Number: [id number]    Therapy Diagnosis:   Encounter Diagnosis   Name Primary?    Chronic right-sided low back pain with right-sided sciatica Yes     Physician: Cecily Leonardo, *    Visit Date: 3/14/2019    Physician: Cecily Leonardo, *     Physician Orders: PT Eval and Treat   Medical Diagnosis from Referral:   M54.41,G89.29 (ICD-10-CM) - Chronic right-sided low back pain with right-sided sciatica   M51.36 (ICD-10-CM) - DDD (degenerative disc disease), lumbar   Z98.890 (ICD-10-CM) - S/P lumbar laminectomy   M47.26 (ICD-10-CM) - Osteoarthritis of spine with radiculopathy, lumbar region     Evaluation Date: 2019  Authorization Period Expiration: 2019  Plan of Care Expiration: 2019  Reassessment Due: 2019  Visit # / Visits authorized:      Time In: 9:55  Time Out: 10:55   Total Billable Time: 40 minutes     Precautions: L4-5 Laminectomy    Pattern of pain determined: Pattern 3       Subjective   Florentino reports mild low back and right glut/thigh pain. She feels her leg symptoms improved a good bit with EIL but increased low back pain.     Patient reports their pain to be 3/10 on a 0-10 scale with 0 being no pain and 10 being the worst pain imaginable.  Pain Location: Right glut and thigh      Occupation: Retired;   Leisure: Cycling (can't anymore); Working in [employer]; Playing with grandchildren; Traveling (trip planned for [place])        Pt goals: "I would like to be pain-free."    Objective   Baseline Isometric Testing on Med X equipment: Testing administered by PT  Date of testin2019  ROM 48-0 deg   Max Peak Torque 78 ft/lbs    Min Peak Torque 32 ft/lbs    Flex/Ext Ratio 2.44   % below normative data -36%   Counter weight 102   Femur 4   Seat pad 0      Outcomes:  Initial score: 46%  Visit 5 score:  Goal: At least 40 percent but less than 60 percent    Treatment    Pt was " instructed in and performed the following:     Florentino received therapeutic exercises to develop/improved posture, cardiovascular endurance, muscular endurance, lumbar/cervical ROM, strength and muscular endurance for 50 minutes including the following exercises:   [facility] 3/14/2019   Visit Number 7   VAS Pain Rating 1   Treadmill Time (in min.) 10   Extension in Lying 10   Lumbar Extension Seat Pad -   Femur Restraint -   Top Dead Center -   Counterweight -   Lumbar Flexion -   Lumbar Extension -   Lumbar Peak Torque -   Min Torque -   Test Percent Below Normative Data -   Lumbar Weight 45   Repetitions 16   Rating of Perceived Exertion 4   Ice - Z Lie (in min.) 10         LTR x20  PPT  x10--> TrA +Marching x20  +1 LE Kicks x20 +Double LE Tucks x10  Bridging x10 for 5 second holds   EIL 10x 4   Piriformis x5 ea L/R for 5-10 seconds       Peripheral muscle strengthening which included 1 set of 15-20 repetitions at a slow, controlled 10-13 second per rep pace focused on strengthening supporting musculature for improved body mechanics and functional mobility.  Pt and therapist focused on proper form during treatment to ensure optimal strengthening of each targeted muscle group.  Machines were utilized including torso rotation, leg extension, leg curl, chest press, upright row. Tricep extension, bicep curl, leg press, and hip abduction added visit 3    Home Exercises Provided and Patient Education Provided   Bridging,   Pelvic Tilts,   TrA Marching,   Prone Press Ups  Piriformis stretch 10 secs hold 3x    Education provided:   - Pt education on progression of weights and exertion rating to safely progress through resistance trainging.     Written Home Exercises Provided: Patient instructed to cont prior HEP.  Exercises were reviewed and Florentino was able to demonstrate them prior to the end of the session.  Florentino demonstrated good  understanding of the education provided.     See EMR under Patient Instructions  for exercises provided prior visit.    Lumbar roll use compliance: Unknown      Assessment   Florentino tolerated session overall well with a decrease in her right leg symptoms but reported increased low back pain with repeated EIL stretch. Pt educated on purpose of EIL stretch and concept of peripheralization/centralization of symptoms. She reported the low back did feel much better after perpheral exercises and ice in z-lie. She did fair with MedX machine with the same weight and 16 reps with RPE a 4. Cont to progress. Maintain weight working to increase reps with controlled RPE.     -Patient is making good progress towards established goals.   Pt will continue to benefit from skilled outpatient physical therapy to address the deficits stated in the impairment chart, provide pt/family education and to maximize pt's level of independence in the home and community environment.       Pt's spiritual, cultural and educational needs considered and pt agreeable to plan of care and goals as stated below:   Medical necessity is demonstrated by the following problem list.    Pt presents with the following impairments:      History  Co-morbidities and personal factors that may impact the plan of care Co-morbidities:   prior lumbar surgery     Personal Factors:   no deficits       low   Examination  Body Structures and Functions, activity limitations and participation restrictions that may impact the plan of care Body Regions:   back  lower extremities     Body Systems:    gross symmetry  ROM  strength  gross coordinated movement  motor control     Participation Restrictions:   None     Activity limitations:   Learning and applying knowledge  no deficits     General Tasks and Commands  no deficits     Communication  no deficits     Mobility  lifting and carrying objects     Self care  no deficits     Domestic Life  doing house work (cleaning house, washing dishes, laundry)     Interactions/Relationships  no deficits     Life  "Areas  no deficits     Community and Social Life  recreation and leisure             low   Clinical Presentation stable and uncomplicated low   Decision Making/ Complexity Score: low         GOALS: Pt is in agreement with the following goals.     Short term goals:  6 weeks or 10 visits   1.  Pt will demonstrate increased lumbar ROM by at least 3 degrees from the initial ROM value with improvements noted in functional ROM and ability to perform ADLs.  2.  Pt will demonstrate increased maximum isometric torque value by 10% when compared to the initial value resulting in improved ability to perform bending, lifting, and carrying activities safely, confidently.  3.  Patient report a reduction in worst pain score by 1-2 points for improved tolerance for lifting grandchildren and enjoying leisure activities like golf.  4.  Pt able to perform HEP correctly with minimal cueing or supervision from therapist to encourage independent management of symptoms.         Long term goals: 13 weeks or 20 visits   1. Pt will demonstrate increased lumbar ROM by at least 6 degrees from initial ROM value, resulting in improved ability to perform functional fwd bending while standing and sitting.   2. Pt will demonstrate increased maximum isometric torque value by 20% when compared to the initial value resulting in improved ability to perform bending, lifting, and carrying activities safely, confidently.  3. Pt to demonstrate ability to independently control and reduce their pain through posture positioning and mechanical movements throughout a typical day.  4.  Patient will demonstrate improved overall function per FOTO Survey to CJ = at least 20% but < 40% impaired, limited or restricted score or less in order to show subjective improvement of condition.  5. Pt will demonstrate independence with the HEP at discharge  6.   "I would like to be pain-free."       Plan   Continue with established Plan of Care towards established PT goals.   "

## 2019-03-22 ENCOUNTER — CLINICAL SUPPORT (OUTPATIENT)
Dept: REHABILITATION | Facility: OTHER | Age: 69
End: 2019-03-22
Attending: PHYSICAL MEDICINE & REHABILITATION
Payer: MEDICARE

## 2019-03-22 DIAGNOSIS — M54.41 CHRONIC RIGHT-SIDED LOW BACK PAIN WITH RIGHT-SIDED SCIATICA: Primary | ICD-10-CM

## 2019-03-22 DIAGNOSIS — G89.29 CHRONIC RIGHT-SIDED LOW BACK PAIN WITH RIGHT-SIDED SCIATICA: Primary | ICD-10-CM

## 2019-03-22 PROCEDURE — 97110 THERAPEUTIC EXERCISES: CPT

## 2019-03-22 NOTE — PROGRESS NOTES
"[facility]      Name: Florentino Bowman  Clinic Number: [id number]    Therapy Diagnosis:   Encounter Diagnosis   Name Primary?    Chronic right-sided low back pain with right-sided sciatica Yes     Physician: Cecily Leonardo, *    Visit Date: 3/22/2019    Physician: Cecily Leonardo, *     Physician Orders: PT Eval and Treat   Medical Diagnosis from Referral:   M54.41,G89.29 (ICD-10-CM) - Chronic right-sided low back pain with right-sided sciatica   M51.36 (ICD-10-CM) - DDD (degenerative disc disease), lumbar   Z98.890 (ICD-10-CM) - S/P lumbar laminectomy   M47.26 (ICD-10-CM) - Osteoarthritis of spine with radiculopathy, lumbar region     Evaluation Date: 2019  Authorization Period Expiration: 2019  Plan of Care Expiration: 2019  Reassessment Due: 2019  Visit # / Visits authorized:      Time In: 11:20  Time Out: 11:15  Total Billable Time: 40 minutes     Precautions: L4-5 Laminectomy    Pattern of pain determined: Pattern 3       Subjective   Florentino reports mild low back and right glut/thigh pain. She c/o that she feels the lumbar machine is hurting her and due to she is very sore after her visits and takes 4 days to recover.     Patient reports their pain to be 3/10 on a 0-10 scale with 0 being no pain and 10 being the worst pain imaginable.  Pain Location: Right glut and thigh      Occupation: Retired;   Leisure: Cycling (can't anymore); Working in [employer]; Playing with grandchildren; Traveling (trip planned for [place])        Pt goals: "I would like to be pain-free."    Objective   Baseline Isometric Testing on Med X equipment: Testing administered by PT  Date of testin2019  ROM 48-0 deg   Max Peak Torque 78 ft/lbs    Min Peak Torque 32 ft/lbs    Flex/Ext Ratio 2.44   % below normative data -36%   Counter weight 102   Femur 4   Seat pad 0      Outcomes:  Initial score: 46%  Visit 5 score:  Goal: At least 40 percent " but less than 60 percent    Treatment    Pt was instructed in and performed the following:     Florentino received therapeutic exercises to develop/improved posture, cardiovascular endurance, muscular endurance, lumbar/cervical ROM, strength and muscular endurance for 50 minutes including the following exercises:       [facility] 3/22/2019   Visit Number 8   VAS Pain Rating 1   Treadmill Time (in min.) 10   Extension in Lying 10   Lumbar Extension Seat Pad -   Femur Restraint -   Top Dead Center -   Counterweight -   Lumbar Flexion -   Lumbar Extension -   Lumbar Peak Torque -   Min Torque -   Test Percent Below Normative Data -   Lumbar Weight 40   Repetitions 15   Rating of Perceived Exertion 3   Ice - Z Lie (in min.) 10       LTR x20  PPT  x10--> TrA +Marching x20  +1 LE Kicks x20 +Double LE Tucks x10  Bridging x10 for 5 second holds   EIL 10x 4   Piriformis x5 ea L/R for 5-10 seconds       Peripheral muscle strengthening which included 1 set of 15-20 repetitions at a slow, controlled 10-13 second per rep pace focused on strengthening supporting musculature for improved body mechanics and functional mobility.  Pt and therapist focused on proper form during treatment to ensure optimal strengthening of each targeted muscle group.  Machines were utilized including torso rotation, leg extension, leg curl, chest press, upright row. Tricep extension, bicep curl, leg press, and hip abduction added visit 3    Home Exercises Provided and Patient Education Provided   Bridging,   Pelvic Tilts,   TrA Marching,   Prone Press Ups  Piriformis stretch 10 secs hold 3x    Education provided:   - Pt education on progression of weights and exertion rating to safely progress through resistance trainging.     Written Home Exercises Provided: Patient instructed to cont prior HEP.  Exercises were reviewed and Florentino was able to demonstrate them prior to the end of the session.  Florentino demonstrated good  understanding of the education  provided.     See EMR under Patient Instructions for exercises provided prior visit.    Lumbar roll use compliance: Unknown      Assessment   Florentino tolerated session overall well although did decrease her weight today on the lumbar machine due to she has been very sore after her visits. She tolerated the changed weight today compared to pervious weight due to very hard for her. Assess DOM next session and tx pt appropriately.     -Patient is making good progress towards established goals.   Pt will continue to benefit from skilled outpatient physical therapy to address the deficits stated in the impairment chart, provide pt/family education and to maximize pt's level of independence in the home and community environment.       Pt's spiritual, cultural and educational needs considered and pt agreeable to plan of care and goals as stated below:   Medical necessity is demonstrated by the following problem list.    Pt presents with the following impairments:      History  Co-morbidities and personal factors that may impact the plan of care Co-morbidities:   prior lumbar surgery     Personal Factors:   no deficits       low   Examination  Body Structures and Functions, activity limitations and participation restrictions that may impact the plan of care Body Regions:   back  lower extremities     Body Systems:    gross symmetry  ROM  strength  gross coordinated movement  motor control     Participation Restrictions:   None     Activity limitations:   Learning and applying knowledge  no deficits     General Tasks and Commands  no deficits     Communication  no deficits     Mobility  lifting and carrying objects     Self care  no deficits     Domestic Life  doing house work (cleaning house, washing dishes, laundry)     Interactions/Relationships  no deficits     Life Areas  no deficits     Community and Social Life  recreation and leisure             low   Clinical Presentation stable and uncomplicated low   Decision  "Making/ Complexity Score: low         GOALS: Pt is in agreement with the following goals.     Short term goals:  6 weeks or 10 visits   1.  Pt will demonstrate increased lumbar ROM by at least 3 degrees from the initial ROM value with improvements noted in functional ROM and ability to perform ADLs.  2.  Pt will demonstrate increased maximum isometric torque value by 10% when compared to the initial value resulting in improved ability to perform bending, lifting, and carrying activities safely, confidently.  3.  Patient report a reduction in worst pain score by 1-2 points for improved tolerance for lifting grandchildren and enjoying leisure activities like golf.  4.  Pt able to perform HEP correctly with minimal cueing or supervision from therapist to encourage independent management of symptoms.         Long term goals: 13 weeks or 20 visits   1. Pt will demonstrate increased lumbar ROM by at least 6 degrees from initial ROM value, resulting in improved ability to perform functional fwd bending while standing and sitting.   2. Pt will demonstrate increased maximum isometric torque value by 20% when compared to the initial value resulting in improved ability to perform bending, lifting, and carrying activities safely, confidently.  3. Pt to demonstrate ability to independently control and reduce their pain through posture positioning and mechanical movements throughout a typical day.  4.  Patient will demonstrate improved overall function per FOTO Survey to CJ = at least 20% but < 40% impaired, limited or restricted score or less in order to show subjective improvement of condition.  5. Pt will demonstrate independence with the HEP at discharge  6.   "I would like to be pain-free."       Plan   Continue with established Plan of Care towards established PT goals.   "

## 2019-03-26 RX ORDER — LIOTHYRONINE SODIUM 5 UG/1
TABLET ORAL
Qty: 30 TABLET | Refills: 11 | Status: SHIPPED | OUTPATIENT
Start: 2019-03-26 | End: 2021-08-07 | Stop reason: SDUPTHER

## 2019-03-27 ENCOUNTER — CLINICAL SUPPORT (OUTPATIENT)
Dept: REHABILITATION | Facility: OTHER | Age: 69
End: 2019-03-27
Attending: PHYSICAL MEDICINE & REHABILITATION
Payer: MEDICARE

## 2019-03-27 DIAGNOSIS — G89.29 CHRONIC RIGHT-SIDED LOW BACK PAIN WITH RIGHT-SIDED SCIATICA: Primary | ICD-10-CM

## 2019-03-27 DIAGNOSIS — M54.41 CHRONIC RIGHT-SIDED LOW BACK PAIN WITH RIGHT-SIDED SCIATICA: Primary | ICD-10-CM

## 2019-03-27 PROCEDURE — 97110 THERAPEUTIC EXERCISES: CPT | Performed by: PHYSICAL THERAPIST

## 2019-03-27 NOTE — PROGRESS NOTES
"[facility]      Name: Florentino Bowman  Clinic Number: [id number]    Therapy Diagnosis:   Encounter Diagnosis   Name Primary?    Chronic right-sided low back pain with right-sided sciatica Yes     Physician: Cecily Leonardo, *    Visit Date: 3/27/2019    Physician: Cecily Leonardo, *     Physician Orders: PT Eval and Treat   Medical Diagnosis from Referral:   M54.41,G89.29 (ICD-10-CM) - Chronic right-sided low back pain with right-sided sciatica   M51.36 (ICD-10-CM) - DDD (degenerative disc disease), lumbar   Z98.890 (ICD-10-CM) - S/P lumbar laminectomy   M47.26 (ICD-10-CM) - Osteoarthritis of spine with radiculopathy, lumbar region     Evaluation Date: 2019  Authorization Period Expiration: 2019  Plan of Care Expiration: 2019  Reassessment Due: 2019  Visit # / Visits authorized:  Please test on Visit 11 due to slow progress     Time In: 240  Time Out: 340  Total Billable Time: 40 minutes     Precautions: L4-5 Laminectomy    Pattern of pain determined: Pattern 3       Subjective   Florentino reports mild low back and right glut/thigh pain. She c/o that she feels the lumbar machine has been causing her increased pain and soreness.  Will continue with same weight and reps today.      Patient reports their pain to be 3/10 on a 0-10 scale with 0 being no pain and 10 being the worst pain imaginable.  Pain Location: Right glut and thigh      Occupation: Retired;   Leisure: Cycling (can't anymore); Working in [employer]; Playing with grandchildren; Traveling (trip planned for [place])        Pt goals: "I would like to be pain-free."    Objective   Baseline Isometric Testing on Med X equipment: Testing administered by PT  Date of testin2019  ROM 48-0 deg   Max Peak Torque 78 ft/lbs    Min Peak Torque 32 ft/lbs    Flex/Ext Ratio 2.44   % below normative data -36%   Counter weight 102   Femur 4   Seat pad 0      Outcomes:  Initial " score: 46%  Visit 5 score:  Goal: At least 40 percent but less than 60 percent    Treatment    Pt was instructed in and performed the following:     Florentino received therapeutic exercises to develop/improved posture, cardiovascular endurance, muscular endurance, lumbar/cervical ROM, strength and muscular endurance for 50 minutes including the following exercises:     [facility] 3/27/2019   Visit Number 9   VAS Pain Rating 1   Treadmill Time (in min.) 10   Extension in Lying 10   Lumbar Weight 40   Repetitions 15   Rating of Perceived Exertion 6   Ice - Z Lie (in min.) 10       LTR x20 ( as need in am)  PPT  x10--> TrA +Marching x20  +1 LE Kicks x20 +Double LE Tucks x10, pillow squeeze 5 sec hold  Bridging x10 for 5 second holds   EIL 10x 4 thru shortened range, trial of EIS with towel for overpressure  Piriformis x5 ea L/R for 5-10 seconds       Peripheral muscle strengthening which included 1 set of 15-20 repetitions at a slow, controlled 10-13 second per rep pace focused on strengthening supporting musculature for improved body mechanics and functional mobility.  Pt and therapist focused on proper form during treatment to ensure optimal strengthening of each targeted muscle group.  Machines were utilized including torso rotation, leg extension, leg curl, chest press, upright row. Tricep extension, bicep curl, leg press, and hip abduction added visit 3    Home Exercises Provided and Patient Education Provided   Bridging,   Pelvic Tilts with 5 second pillow squeeze  TrA Marching,   Prone Press Ups  Piriformis stretch 10 secs hold 3x  EIL thu shortened range or EIS with towel for overpressure    Education provided:   - Pt education on progression of weights and exertion rating to safely progress through resistance trainging.     Written Home Exercises Provided: Patient instructed to cont prior HEP.  Exercises were reviewed and Florentino was able to demonstrate them prior to the end of the session.  Florentino  demonstrated good  understanding of the education provided.     See EMR under Patient Instructions for exercises provided prior visit.    Lumbar roll use compliance: Unknown      Assessment   Florentino tolerated session overall well although did keep weight the same, lower than previous,  today on the lumbar machine due to she has been very sore after her visits.  Continue strengthening next visit and [place] Test on Visit 11.  She is frustrated by her lack of improvement at this point.  Discussed pt follow up with MD for alternative treatment, further imaging studies or considering MICHAEL or RFA.      -Patient is making good progress towards established goals.   Pt will continue to benefit from skilled outpatient physical therapy to address the deficits stated in the impairment chart, provide pt/family education and to maximize pt's level of independence in the home and community environment.       Pt's spiritual, cultural and educational needs considered and pt agreeable to plan of care and goals as stated below:   Medical necessity is demonstrated by the following problem list.    Pt presents with the following impairments:      History  Co-morbidities and personal factors that may impact the plan of care Co-morbidities:   prior lumbar surgery     Personal Factors:   no deficits       low   Examination  Body Structures and Functions, activity limitations and participation restrictions that may impact the plan of care Body Regions:   back  lower extremities     Body Systems:    gross symmetry  ROM  strength  gross coordinated movement  motor control     Participation Restrictions:   None     Activity limitations:   Learning and applying knowledge  no deficits     General Tasks and Commands  no deficits     Communication  no deficits     Mobility  lifting and carrying objects     Self care  no deficits     Domestic Life  doing house work (cleaning house, washing dishes, laundry)     Interactions/Relationships  no  "deficits     Life Areas  no deficits     Community and Social Life  recreation and leisure             low   Clinical Presentation stable and uncomplicated low   Decision Making/ Complexity Score: low         GOALS: Pt is in agreement with the following goals.     Short term goals:  6 weeks or 10 visits   1.  Pt will demonstrate increased lumbar ROM by at least 3 degrees from the initial ROM value with improvements noted in functional ROM and ability to perform ADLs.  2.  Pt will demonstrate increased maximum isometric torque value by 10% when compared to the initial value resulting in improved ability to perform bending, lifting, and carrying activities safely, confidently.  3.  Patient report a reduction in worst pain score by 1-2 points for improved tolerance for lifting grandchildren and enjoying leisure activities like golf.  4.  Pt able to perform HEP correctly with minimal cueing or supervision from therapist to encourage independent management of symptoms.         Long term goals: 13 weeks or 20 visits   1. Pt will demonstrate increased lumbar ROM by at least 6 degrees from initial ROM value, resulting in improved ability to perform functional fwd bending while standing and sitting.   2. Pt will demonstrate increased maximum isometric torque value by 20% when compared to the initial value resulting in improved ability to perform bending, lifting, and carrying activities safely, confidently.  3. Pt to demonstrate ability to independently control and reduce their pain through posture positioning and mechanical movements throughout a typical day.  4.  Patient will demonstrate improved overall function per FOTO Survey to CJ = at least 20% but < 40% impaired, limited or restricted score or less in order to show subjective improvement of condition.  5. Pt will demonstrate independence with the HEP at discharge  6.   "I would like to be pain-free."       Plan   Continue with established Plan of Care towards " established PT goals. See for regular visit on 10 and test on Visit 11 with PT.

## 2019-03-28 ENCOUNTER — PATIENT MESSAGE (OUTPATIENT)
Dept: FAMILY MEDICINE | Facility: CLINIC | Age: 69
End: 2019-03-28

## 2019-03-29 ENCOUNTER — CLINICAL SUPPORT (OUTPATIENT)
Dept: REHABILITATION | Facility: OTHER | Age: 69
End: 2019-03-29
Attending: PHYSICAL MEDICINE & REHABILITATION
Payer: MEDICARE

## 2019-03-29 DIAGNOSIS — M54.41 CHRONIC RIGHT-SIDED LOW BACK PAIN WITH RIGHT-SIDED SCIATICA: Primary | ICD-10-CM

## 2019-03-29 DIAGNOSIS — G89.29 CHRONIC RIGHT-SIDED LOW BACK PAIN WITH RIGHT-SIDED SCIATICA: Primary | ICD-10-CM

## 2019-03-29 PROCEDURE — 97110 THERAPEUTIC EXERCISES: CPT

## 2019-03-29 NOTE — PROGRESS NOTES
"[facility]      Name: Florentino Bowman  Clinic Number: [id number]    Therapy Diagnosis:   Encounter Diagnosis   Name Primary?    Chronic right-sided low back pain with right-sided sciatica Yes     Physician: Cecily Leonardo, *    Visit Date: 3/29/2019    Physician: Cecily Leonardo, *     Physician Orders: PT Eval and Treat   Medical Diagnosis from Referral:   M54.41,G89.29 (ICD-10-CM) - Chronic right-sided low back pain with right-sided sciatica   M51.36 (ICD-10-CM) - DDD (degenerative disc disease), lumbar   Z98.890 (ICD-10-CM) - S/P lumbar laminectomy   M47.26 (ICD-10-CM) - Osteoarthritis of spine with radiculopathy, lumbar region     Evaluation Date: 2019  Authorization Period Expiration: 2019  Plan of Care Expiration: 2019  Reassessment Due: 2019  Visit # / Visits authorized:  Please test on Visit 11 due to slow progress     Time In: 11:30  Time Out:12:30  Total Billable Time: 50 minutes     Precautions: L4-5 Laminectomy    Pattern of pain determined: Pattern 3       Subjective   Florentino reports mild low back and right glut/thigh pain. She stated that the lumbar machine was giving her increased pain, but the last 2 session she has felt better with he decreased weight and less reps.     Patient reports their pain to be 3/10 on a 0-10 scale with 0 being no pain and 10 being the worst pain imaginable.  Pain Location: Right glut and thigh      Occupation: Retired;   Leisure: Cycling (can't anymore); Working in [employer]; Playing with grandchildren; Traveling (trip planned for [place])        Pt goals: "I would like to be pain-free."    Objective   Baseline Isometric Testing on Med X equipment: Testing administered by PT  Date of testin2019  ROM 48-0 deg   Max Peak Torque 78 ft/lbs    Min Peak Torque 32 ft/lbs    Flex/Ext Ratio 2.44   % below normative data -36%   Counter weight 102   Femur 4   Seat pad 0 "      Outcomes:  Initial score: 46%  Visit 5 score:  Goal: At least 40 percent but less than 60 percent    Treatment    Pt was instructed in and performed the following:     Florentino received therapeutic exercises to develop/improved posture, cardiovascular endurance, muscular endurance, lumbar/cervical ROM, strength and muscular endurance for 50 minutes including the following exercises:       [facility] 3/29/2019   Visit Number 10   VAS Pain Rating 3   Treadmill Time (in min.) 10   Extension in Lying 10   Lumbar Extension Seat Pad -   Femur Restraint -   Top Dead Center -   Counterweight -   Lumbar Flexion -   Lumbar Extension -   Lumbar Peak Torque -   Min Torque -   Test Percent Below Normative Data -   Lumbar Weight 40   Repetitions 16   Rating of Perceived Exertion 4   Ice - Z Lie (in min.) 10       LTR x20 ( as need in am)  PPT  x10--> TrA +Marching x20  +1 LE Kicks x20 +Double LE Tucks x10, pillow squeeze 5 sec hold  Bridging x10 for 5 second holds   EIL 10x 4 thru shortened range, trial of EIS with towel for overpressure  Piriformis x5 ea L/R for 5-10 seconds     Manual: Vacuum/cupping STM with manual therapy techniques was performed to L LB to decrease muscle tightness, increase circulation and promote healing process. The pt's skin was monitored for redness adjusting pressure as needed. The pt was instructed in possible side effects of bruising and/or soreness. 5 min      Peripheral muscle strengthening which included 1 set of 15-20 repetitions at a slow, controlled 10-13 second per rep pace focused on strengthening supporting musculature for improved body mechanics and functional mobility.  Pt and therapist focused on proper form during treatment to ensure optimal strengthening of each targeted muscle group.  Machines were utilized including torso rotation, leg extension, leg curl, chest press, upright row. Tricep extension, bicep curl, leg press, and hip abduction added visit 3    Home Exercises  Provided and Patient Education Provided   Bridging,   Pelvic Tilts with 5 second pillow squeeze  TrA Marching,   Prone Press Ups  Piriformis stretch 10 secs hold 3x  EIL thu shortened range or EIS with towel for overpressure    Education provided:   - Pt education on progression of weights and exertion rating to safely progress through resistance trainging.     Written Home Exercises Provided: Patient instructed to cont prior HEP.  Exercises were reviewed and Florentino was able to demonstrate them prior to the end of the session.  Florentino demonstrated good  understanding of the education provided.     See EMR under Patient Instructions for exercises provided prior visit.     Lumbar roll use compliance: Unknown      Assessment   Florentino tolerated session overall well with a minimal decrease pain today with cupping for the first time and at the end of her session. Try cupping next session.  Pt was only able to do one more reps today then last visit due t pt fearful of having DOMS and pain. Progress slowly. She is frustrated by her lack of improvement at this point. Pt did not test today due to pt felt she was not ready due to pain. Please test on the 11th visit. Discussed again today  pt follow up with MD for alternative treatment if therapy does not help her.   -Patient is making good progress towards established goals.   Pt will continue to benefit from skilled outpatient physical therapy to address the deficits stated in the impairment chart, provide pt/family education and to maximize pt's level of independence in the home and community environment.       Pt's spiritual, cultural and educational needs considered and pt agreeable to plan of care and goals as stated below:   Medical necessity is demonstrated by the following problem list.    Pt presents with the following impairments:      History  Co-morbidities and personal factors that may impact the plan of care Co-morbidities:   prior lumbar surgery     Personal  Factors:   no deficits       low   Examination  Body Structures and Functions, activity limitations and participation restrictions that may impact the plan of care Body Regions:   back  lower extremities     Body Systems:    gross symmetry  ROM  strength  gross coordinated movement  motor control     Participation Restrictions:   None     Activity limitations:   Learning and applying knowledge  no deficits     General Tasks and Commands  no deficits     Communication  no deficits     Mobility  lifting and carrying objects     Self care  no deficits     Domestic Life  doing house work (cleaning house, washing dishes, laundry)     Interactions/Relationships  no deficits     Life Areas  no deficits     Community and Social Life  recreation and leisure             low   Clinical Presentation stable and uncomplicated low   Decision Making/ Complexity Score: low         GOALS: Pt is in agreement with the following goals.     Short term goals:  6 weeks or 10 visits   1.  Pt will demonstrate increased lumbar ROM by at least 3 degrees from the initial ROM value with improvements noted in functional ROM and ability to perform ADLs.  2.  Pt will demonstrate increased maximum isometric torque value by 10% when compared to the initial value resulting in improved ability to perform bending, lifting, and carrying activities safely, confidently.  3.  Patient report a reduction in worst pain score by 1-2 points for improved tolerance for lifting grandchildren and enjoying leisure activities like golf.  4.  Pt able to perform HEP correctly with minimal cueing or supervision from therapist to encourage independent management of symptoms.         Long term goals: 13 weeks or 20 visits   1. Pt will demonstrate increased lumbar ROM by at least 6 degrees from initial ROM value, resulting in improved ability to perform functional fwd bending while standing and sitting.   2. Pt will demonstrate increased maximum isometric torque value by  "20% when compared to the initial value resulting in improved ability to perform bending, lifting, and carrying activities safely, confidently.  3. Pt to demonstrate ability to independently control and reduce their pain through posture positioning and mechanical movements throughout a typical day.  4.  Patient will demonstrate improved overall function per FOTO Survey to CJ = at least 20% but < 40% impaired, limited or restricted score or less in order to show subjective improvement of condition.  5. Pt will demonstrate independence with the HEP at discharge  6.   "I would like to be pain-free."       Plan   Continue with established Plan of Care towards established PT goals. See for regular visit on 10 and test on Visit 11 with PT.  "

## 2019-03-31 ENCOUNTER — PATIENT MESSAGE (OUTPATIENT)
Dept: ENDOCRINOLOGY | Facility: CLINIC | Age: 69
End: 2019-03-31

## 2019-03-31 RX ORDER — ZOLPIDEM TARTRATE 10 MG/1
10 TABLET ORAL NIGHTLY PRN
Qty: 30 TABLET | Refills: 1 | Status: SHIPPED | OUTPATIENT
Start: 2019-03-31 | End: 2019-05-27 | Stop reason: SDUPTHER

## 2019-04-02 ENCOUNTER — CLINICAL SUPPORT (OUTPATIENT)
Dept: REHABILITATION | Facility: OTHER | Age: 69
End: 2019-04-02
Attending: PHYSICAL MEDICINE & REHABILITATION
Payer: MEDICARE

## 2019-04-02 DIAGNOSIS — G89.29 CHRONIC RIGHT-SIDED LOW BACK PAIN WITH RIGHT-SIDED SCIATICA: Primary | ICD-10-CM

## 2019-04-02 DIAGNOSIS — M54.41 CHRONIC RIGHT-SIDED LOW BACK PAIN WITH RIGHT-SIDED SCIATICA: Primary | ICD-10-CM

## 2019-04-02 PROCEDURE — 97110 THERAPEUTIC EXERCISES: CPT

## 2019-04-02 NOTE — PROGRESS NOTES
"[facility]      Name: Florentino Bowman  Clinic Number: [id number]    Therapy Diagnosis:   Encounter Diagnosis   Name Primary?    Chronic right-sided low back pain with right-sided sciatica Yes     Physician: Cecily Leonardo, *    Visit Date: 2019    Physician: Cecily Leonardo, *     Physician Orders: PT Eval and Treat   Medical Diagnosis from Referral:   M54.41,G89.29 (ICD-10-CM) - Chronic right-sided low back pain with right-sided sciatica   M51.36 (ICD-10-CM) - DDD (degenerative disc disease), lumbar   Z98.890 (ICD-10-CM) - S/P lumbar laminectomy   M47.26 (ICD-10-CM) - Osteoarthritis of spine with radiculopathy, lumbar region     Evaluation Date: 2019  Authorization Period Expiration: 2019  Plan of Care Expiration: 2019  Reassessment Due: 2019  Visit # / Visits authorized:  Please test on Visit 11 due to slow progress     Time In: 920am  Time Out:1020am  Total Billable Time: 60 minutes     Precautions: L4-5 Laminectomy    Pattern of pain determined: Pattern 3       Subjective   Florentino reports she is stronger but the pain in her hip and R leg is no better    Patient reports their pain to be 3/10 on a 0-10 scale with 0 being no pain and 10 being the worst pain imaginable.  Pain Location: Right glut and thigh      Occupation: Retired;   Leisure: Cycling (can't anymore); Working in [employer]; Playing with grandchildren; Traveling (trip planned for [place])        Pt goals: "I would like to be pain-free."    Objective   Baseline Isometric Testing on Med X equipment: Testing administered by PT  Date of testin2019  ROM 48-0 deg   Max Peak Torque 78 ft/lbs    Min Peak Torque 32 ft/lbs    Flex/Ext Ratio 2.44   % below normative data -36%   Counter weight 102   Femur 4   Seat pad 0      Outcomes:  Initial score: 46%  Visit 5 score:  Goal: At least 40 percent but less than 60 percent    Date of testin19  Mid point  ROM " 48-0 deg   Max Peak Torque 76   Min Peak Torque 57   Flex/Ext Ratio 1.3   % below normative data -31   Counter weight 102   Femur 4   Seat pad 0      1% overall strength gain  Treatment    Pt was instructed in and performed the following:     Florentino received therapeutic exercises to develop/improved posture, cardiovascular endurance, muscular endurance, lumbar/cervical ROM, strength and muscular endurance for 50 minutes including the following exercises:         LTR x20 ( as need in am)  PPT  x10--> TrA +Marching x20  +1 LE Kicks x20 +Double LE Tucks x10, pillow squeeze 5 sec hold  Bridging x10 for 5 second holds   EIL 10x 4 thru shortened range, trial of EIS with towel for overpressure  Piriformis x5 ea L/R for 5-10 seconds       Peripheral muscle strengthening which included 1 set of 15-20 repetitions at a slow, controlled 10-13 second per rep pace focused on strengthening supporting musculature for improved body mechanics and functional mobility.  Pt and therapist focused on proper form during treatment to ensure optimal strengthening of each targeted muscle group.  Machines were utilized including torso rotation, leg extension, leg curl, chest press, upright row. Tricep extension, bicep curl, leg press, and hip abduction added visit 3    Home Exercises Provided and Patient Education Provided   Bridging,   Pelvic Tilts with 5 second pillow squeeze  TrA Marching,   Prone Press Ups  Piriformis stretch 10 secs hold 3x  EIL thu shortened range or EIS with towel for overpressure    Education provided:   - Pt education on progression of weights and exertion rating to safely progress through resistance trainging.     Written Home Exercises Provided: Patient instructed to cont prior HEP.  Exercises were reviewed and Florentino was able to demonstrate them prior to the end of the session.  Florentino demonstrated good  understanding of the education provided.     See EMR under Patient Instructions for exercises provided prior  visit.     Lumbar roll use compliance: Unknown      Added std side bend and L SL over 3 pillows to stretch and open up vertebral spaces  Assessment   .Patient has attended 10 visits at [facility] which included MD evaluation, PT evaluation with isometric testing, and physical therapy treatment including HEP instruction, education, aerobic work, dynamic strengthening on med ex equipment for the spine, and whole body strengthening on med ex equipment with increasing weight loads.  Patient  is demonstrating increased ability to reduce symptoms, improved posture, improved   ROM, and improved   strength on med ex test by 1%   average.  -Patient is making good progress towards established goals.   Pt will continue to benefit from skilled outpatient physical therapy to address the deficits stated in the impairment chart, provide pt/family education and to maximize pt's level of independence in the home and community environment.       Pt's spiritual, cultural and educational needs considered and pt agreeable to plan of care and goals as stated below:   Medical necessity is demonstrated by the following problem list.    Pt presents with the following impairments:      History  Co-morbidities and personal factors that may impact the plan of care Co-morbidities:   prior lumbar surgery     Personal Factors:   no deficits       low   Examination  Body Structures and Functions, activity limitations and participation restrictions that may impact the plan of care Body Regions:   back  lower extremities     Body Systems:    gross symmetry  ROM  strength  gross coordinated movement  motor control     Participation Restrictions:   None     Activity limitations:   Learning and applying knowledge  no deficits     General Tasks and Commands  no deficits     Communication  no deficits     Mobility  lifting and carrying objects     Self care  no deficits     Domestic Life  doing house work (cleaning house, washing dishes,  "laundry)     Interactions/Relationships  no deficits     Life Areas  no deficits     Community and Social Life  recreation and leisure             low   Clinical Presentation stable and uncomplicated low   Decision Making/ Complexity Score: low         GOALS: Pt is in agreement with the following goals.     Short term goals:  6 weeks or 10 visits   1.  Pt will demonstrate increased lumbar ROM by at least 3 degrees from the initial ROM value with improvements noted in functional ROM and ability to perform ADLs.  2.  Pt will demonstrate increased maximum isometric torque value by 10% when compared to the initial value resulting in improved ability to perform bending, lifting, and carrying activities safely, confidently.  3.  Patient report a reduction in worst pain score by 1-2 points for improved tolerance for lifting grandchildren and enjoying leisure activities like golf.  4.  Pt able to perform HEP correctly with minimal cueing or supervision from therapist to encourage independent management of symptoms.         Long term goals: 13 weeks or 20 visits   1. Pt will demonstrate increased lumbar ROM by at least 6 degrees from initial ROM value, resulting in improved ability to perform functional fwd bending while standing and sitting.   2. Pt will demonstrate increased maximum isometric torque value by 20% when compared to the initial value resulting in improved ability to perform bending, lifting, and carrying activities safely, confidently.  3. Pt to demonstrate ability to independently control and reduce their pain through posture positioning and mechanical movements throughout a typical day.  4.  Patient will demonstrate improved overall function per FOTO Survey to CJ = at least 20% but < 40% impaired, limited or restricted score or less in order to show subjective improvement of condition.  5. Pt will demonstrate independence with the HEP at discharge  6.   "I would like to be pain-free."       Plan   Continue " with established Plan of Care towards established PT goals.

## 2019-04-04 ENCOUNTER — CLINICAL SUPPORT (OUTPATIENT)
Dept: REHABILITATION | Facility: OTHER | Age: 69
End: 2019-04-04
Attending: PHYSICAL MEDICINE & REHABILITATION
Payer: MEDICARE

## 2019-04-04 DIAGNOSIS — M54.41 CHRONIC RIGHT-SIDED LOW BACK PAIN WITH RIGHT-SIDED SCIATICA: Primary | ICD-10-CM

## 2019-04-04 DIAGNOSIS — G89.29 CHRONIC RIGHT-SIDED LOW BACK PAIN WITH RIGHT-SIDED SCIATICA: Primary | ICD-10-CM

## 2019-04-04 PROCEDURE — 97110 THERAPEUTIC EXERCISES: CPT

## 2019-04-04 PROCEDURE — 97140 MANUAL THERAPY 1/> REGIONS: CPT

## 2019-04-04 NOTE — PROGRESS NOTES
"[facility]      Name: Florentino Bowman  Clinic Number: [id number]    Therapy Diagnosis:   Encounter Diagnosis   Name Primary?    Chronic right-sided low back pain with right-sided sciatica Yes     Physician: Cecily Leonardo, *    Visit Date: 2019    Physician: Cecily Leonardo, *     Physician Orders: PT Eval and Treat   Medical Diagnosis from Referral:   M54.41,G89.29 (ICD-10-CM) - Chronic right-sided low back pain with right-sided sciatica   M51.36 (ICD-10-CM) - DDD (degenerative disc disease), lumbar   Z98.890 (ICD-10-CM) - S/P lumbar laminectomy   M47.26 (ICD-10-CM) - Osteoarthritis of spine with radiculopathy, lumbar region     Evaluation Date: 2019  Authorization Period Expiration: 2019  Plan of Care Expiration: 2019  Reassessment Due: 2019  Visit # / Visits authorized:  Please test on Visit 11 due to slow progress     Time In: 9:45 am  Time Out:10:45 am  Total Billable Time: 40 minutes     Precautions: L4-5 Laminectomy    Pattern of pain determined: Pattern 3       Subjective   Florentino reports she still has right low back, glut, and then goes into leg. Anthony medel feels this piercing pain in her hip has not changed much since IE. She reports feeling much better after manual mobilization this session.     Patient reports their pain to be 3/10 on a 0-10 scale with 0 being no pain and 10 being the worst pain imaginable.  Pain Location: Right glut and thigh      Occupation: Retired;   Leisure: Cycling (can't anymore); Working in [employer]; Playing with grandchildren; Traveling (trip planned for [place])        Pt goals: "I would like to be pain-free."    Objective   Baseline Isometric Testing on Med X equipment: Testing administered by PT  Date of testin2019  ROM 48-0 deg   Max Peak Torque 78 ft/lbs    Min Peak Torque 32 ft/lbs    Flex/Ext Ratio 2.44   % below normative data -36%   Counter weight 102   Femur 4   Seat pad 0 "      Outcomes:  Initial score: 46%  Visit 5 score:  Goal: At least 40 percent but less than 60 percent    Date of testin19  Mid point  ROM 48-0 deg   Max Peak Torque 76   Min Peak Torque 57   Flex/Ext Ratio 1.3   % below normative data -31   Counter weight 102   Femur 4   Seat pad 0      1% overall strength gain  Treatment    Pt was instructed in and performed the following:     Florentino received therapeutic exercises to develop/improved posture, cardiovascular endurance, muscular endurance, lumbar/cervical ROM, strength and muscular endurance for 50 minutes including the following exercises:     [facility] 2019   Visit Number 12   VAS Pain Rating 3   Treadmill Time (in min.) -   Time 10   Extension in Lying 10   Extension in Standing 10   Manual Therapy 10   Lumbar Extension Seat Pad -   Femur Restraint -   Top Dead Center -   Counterweight -   Lumbar Flexion -   Lumbar Extension -   Lumbar Peak Torque -   Min Torque -   Test Percent Below Normative Data -   Lumbar Weight 40   Repetitions 18   Rating of Perceived Exertion 3   Ice - Z Lie (in min.) 10     LTR x20 ( as need in am)  PPT  x10--> TrA +Marching x20, pillow squeeze 5 sec hold  Bridging x10 for 5 second holds   EIL 10x 4 thru shortened range, trial of EIS with towel for overpressure  Piriformis x5 ea L/R for 5-10 seconds     Manual: PAs and UPAs, side-glides grossly throughout lumbar spine, screw mobilization of thoracic spine grade 3-4 10 minutes     Peripheral muscle strengthening which included 1 set of 15-20 repetitions at a slow, controlled 10-13 second per rep pace focused on strengthening supporting musculature for improved body mechanics and functional mobility.  Pt and therapist focused on proper form during treatment to ensure optimal strengthening of each targeted muscle group.  Machines were utilized including torso rotation, leg extension, leg curl, chest press, upright row. Tricep extension, bicep curl, leg press, and hip  abduction added visit 3    Home Exercises Provided and Patient Education Provided   Bridging,   Pelvic Tilts with 5 second pillow squeeze  TrA Marching,   Prone Press Ups  Piriformis stretch 10 secs hold 3x  EIL thu shortened range or EIS with towel for overpressure    Education provided:   - Pt education on progression of weights and exertion rating to safely progress through resistance trainging.     Written Home Exercises Provided: Patient instructed to cont prior HEP.  Exercises were reviewed and Florentino was able to demonstrate them prior to the end of the session.  Florentino demonstrated good  understanding of the education provided.     See EMR under Patient Instructions for exercises provided prior visit.     Lumbar roll use compliance: Unknown      Added std side bend and L SL over 3 pillows to stretch and open up vertebral spaces  Assessment   .Patient presents with mild-moderate low back pain which improved significantly by end of session. Pt demonstrated significantly improved EIL ROM and reduced pain with manual mobilization techniques listed above. Plan to continue to perform as time permits. Pt was able to tolerate Med X lumbar extension exercise at same weight with minimally increased symptoms at end range extension. Noted pt sat lower in Med X seated, added SP and increased femur setting with mildly improved symptoms. Reduced extension range to 6 for next session. Pt reported this was the best session she has had so far.   -Patient is making good progress towards established goals.   Pt will continue to benefit from skilled outpatient physical therapy to address the deficits stated in the impairment chart, provide pt/family education and to maximize pt's level of independence in the home and community environment.       Pt's spiritual, cultural and educational needs considered and pt agreeable to plan of care and goals as stated below:   Medical necessity is demonstrated by the following problem list.     Pt presents with the following impairments:      History  Co-morbidities and personal factors that may impact the plan of care Co-morbidities:   prior lumbar surgery     Personal Factors:   no deficits       low   Examination  Body Structures and Functions, activity limitations and participation restrictions that may impact the plan of care Body Regions:   back  lower extremities     Body Systems:    gross symmetry  ROM  strength  gross coordinated movement  motor control     Participation Restrictions:   None     Activity limitations:   Learning and applying knowledge  no deficits     General Tasks and Commands  no deficits     Communication  no deficits     Mobility  lifting and carrying objects     Self care  no deficits     Domestic Life  doing house work (cleaning house, washing dishes, laundry)     Interactions/Relationships  no deficits     Life Areas  no deficits     Community and Social Life  recreation and leisure             low   Clinical Presentation stable and uncomplicated low   Decision Making/ Complexity Score: low         GOALS: Pt is in agreement with the following goals.     Short term goals:  6 weeks or 10 visits   1.  Pt will demonstrate increased lumbar ROM by at least 3 degrees from the initial ROM value with improvements noted in functional ROM and ability to perform ADLs.  2.  Pt will demonstrate increased maximum isometric torque value by 10% when compared to the initial value resulting in improved ability to perform bending, lifting, and carrying activities safely, confidently.  3.  Patient report a reduction in worst pain score by 1-2 points for improved tolerance for lifting grandchildren and enjoying leisure activities like golf.  4.  Pt able to perform HEP correctly with minimal cueing or supervision from therapist to encourage independent management of symptoms.         Long term goals: 13 weeks or 20 visits   1. Pt will demonstrate increased lumbar ROM by at least 6 degrees from  "initial ROM value, resulting in improved ability to perform functional fwd bending while standing and sitting.   2. Pt will demonstrate increased maximum isometric torque value by 20% when compared to the initial value resulting in improved ability to perform bending, lifting, and carrying activities safely, confidently.  3. Pt to demonstrate ability to independently control and reduce their pain through posture positioning and mechanical movements throughout a typical day.  4.  Patient will demonstrate improved overall function per FOTO Survey to CJ = at least 20% but < 40% impaired, limited or restricted score or less in order to show subjective improvement of condition.  5. Pt will demonstrate independence with the HEP at discharge  6.   "I would like to be pain-free."       Plan   Continue with established Plan of Care towards established PT goals.   "

## 2019-04-08 ENCOUNTER — CLINICAL SUPPORT (OUTPATIENT)
Dept: REHABILITATION | Facility: OTHER | Age: 69
End: 2019-04-08
Attending: PHYSICAL MEDICINE & REHABILITATION
Payer: MEDICARE

## 2019-04-08 DIAGNOSIS — G89.29 CHRONIC RIGHT-SIDED LOW BACK PAIN WITH RIGHT-SIDED SCIATICA: Primary | ICD-10-CM

## 2019-04-08 DIAGNOSIS — M54.41 CHRONIC RIGHT-SIDED LOW BACK PAIN WITH RIGHT-SIDED SCIATICA: Primary | ICD-10-CM

## 2019-04-08 PROCEDURE — 97110 THERAPEUTIC EXERCISES: CPT

## 2019-04-08 PROCEDURE — 97140 MANUAL THERAPY 1/> REGIONS: CPT

## 2019-04-08 NOTE — PROGRESS NOTES
"[facility]      Name: Florentino Bowman  Clinic Number: [id number]    Therapy Diagnosis:   Encounter Diagnosis   Name Primary?    Chronic right-sided low back pain with right-sided sciatica Yes     Physician: Cecily Leonardo, *    Visit Date: 2019    Physician: Cecily Leonardo, *     Physician Orders: PT Eval and Treat   Medical Diagnosis from Referral:   M54.41,G89.29 (ICD-10-CM) - Chronic right-sided low back pain with right-sided sciatica   M51.36 (ICD-10-CM) - DDD (degenerative disc disease), lumbar   Z98.890 (ICD-10-CM) - S/P lumbar laminectomy   M47.26 (ICD-10-CM) - Osteoarthritis of spine with radiculopathy, lumbar region     Evaluation Date: 2019  Authorization Period Expiration: 2019  Plan of Care Expiration: 2019  Reassessment Due: 2019  Visit # / Visits authorized:       Time In: 9:25 am  Time Out:10:30 am  Total Billable Time: 30 minutes     Precautions: L4-5 Laminectomy    Pattern of pain determined: Pattern 3       Subjective   Florentino reports she had a lot of soreness the day after last session but felt she had almost no pain the 2 days after the manual mobilization last session. She feels her hip also felt better but had increased pain after a lot of bending to play with her grandchildren.     Patient reports their pain to be 3/10 on a 0-10 scale with 0 being no pain and 10 being the worst pain imaginable.  Pain Location: Right glut and thigh      Occupation: Retired;   Leisure: Cycling (can't anymore); Working in [employer]; Playing with grandchildren; Traveling (trip planned for [place])        Pt goals: "I would like to be pain-free."    Objective   Baseline Isometric Testing on Med X equipment: Testing administered by PT  Date of testin2019  ROM 48-0 deg   Max Peak Torque 78 ft/lbs    Min Peak Torque 32 ft/lbs    Flex/Ext Ratio 2.44   % below normative data -36%   Counter weight 102   Femur 4   Seat " pad 0      Outcomes:  Initial score: 46%  Visit 5 score:  Goal: At least 40 percent but less than 60 percent    Date of testin19  Mid point  ROM 48-0 deg   Max Peak Torque 76   Min Peak Torque 57   Flex/Ext Ratio 1.3   % below normative data -31   Counter weight 102   Femur 4   Seat pad 0      1% overall strength gain  Treatment    Pt was instructed in and performed the following:     Florentino received therapeutic exercises to develop/improved posture, cardiovascular endurance, muscular endurance, lumbar/cervical ROM, strength and muscular endurance for 50 minutes including the following exercises:     [facility] 2019   Visit Number 13   VAS Pain Rating 3   Treadmill Time (in min.) -   Time 10   Extension in Lying 10   Extension in Standing 10   Manual Therapy 10   Lumbar Extension Seat Pad -   Femur Restraint -   Top Dead Center -   Counterweight -   Lumbar Flexion 48   Lumbar Extension 12   Lumbar Peak Torque -   Min Torque -   Test Percent Below Normative Data -   Lumbar Weight 40   Repetitions 15   Rating of Perceived Exertion 4   Ice - Z Lie (in min.) 10         LTR x20 ( as need in am)  PPT  x10--> TrA +Marching x20, pillow squeeze 5 sec hold  Bridging x10 for 5 second holds   EIL 10x 4 thru shortened range, trial of EIS with towel for overpressure  OB 10x   Piriformis x5 ea L/R for 5-10 seconds     Manual: PAs and UPAs, side-glides grossly throughout lumbar spine, screw mobilization of thoracic spine grade 3-4 10 minutes     Peripheral muscle strengthening which included 1 set of 15-20 repetitions at a slow, controlled 10-13 second per rep pace focused on strengthening supporting musculature for improved body mechanics and functional mobility.  Pt and therapist focused on proper form during treatment to ensure optimal strengthening of each targeted muscle group.  Machines were utilized including torso rotation, leg extension, leg curl, chest press, upright row. Tricep extension, bicep curl,  leg press, and hip abduction added visit 3    Home Exercises Provided and Patient Education Provided   Bridging,   Pelvic Tilts with 5 second pillow squeeze  TrA Marching,   Prone Press Ups  Piriformis stretch 10 secs hold 3x  EIL thu shortened range or EIS with towel for overpressure  OB     Education provided:   - Pt education on progression of weights and exertion rating to safely progress through resistance trainging.     Written Home Exercises Provided: Patient instructed to cont prior HEP.  Exercises were reviewed and Florentino was able to demonstrate them prior to the end of the session.  Florentino demonstrated good  understanding of the education provided.     See EMR under Patient Instructions for exercises provided prior visit.     Lumbar roll use compliance: Unknown      Assessment   Patient presents with mild-moderate low back pain which improved moderately by end of session. Pt demonstrated significantly improved EIL ROM and reduced pain with manual mobilization techniques listed above. Plan to continue to perform as time permits. Pt was able to tolerate Med X lumbar extension exercise at same weight with minimally increased symptoms at end range extension. Reduced extension range to 12 but pt continues to report bilateral SI/PSIS regional pain. No worse after last session.   -Patient is making good progress towards established goals.   Pt will continue to benefit from skilled outpatient physical therapy to address the deficits stated in the impairment chart, provide pt/family education and to maximize pt's level of independence in the home and community environment.       Pt's spiritual, cultural and educational needs considered and pt agreeable to plan of care and goals as stated below:   Medical necessity is demonstrated by the following problem list.    Pt presents with the following impairments:      History  Co-morbidities and personal factors that may impact the plan of care Co-morbidities:   prior  lumbar surgery     Personal Factors:   no deficits       low   Examination  Body Structures and Functions, activity limitations and participation restrictions that may impact the plan of care Body Regions:   back  lower extremities     Body Systems:    gross symmetry  ROM  strength  gross coordinated movement  motor control     Participation Restrictions:   None     Activity limitations:   Learning and applying knowledge  no deficits     General Tasks and Commands  no deficits     Communication  no deficits     Mobility  lifting and carrying objects     Self care  no deficits     Domestic Life  doing house work (cleaning house, washing dishes, laundry)     Interactions/Relationships  no deficits     Life Areas  no deficits     Community and Social Life  recreation and leisure             low   Clinical Presentation stable and uncomplicated low   Decision Making/ Complexity Score: low         GOALS: Pt is in agreement with the following goals.     Short term goals:  6 weeks or 10 visits   1.  Pt will demonstrate increased lumbar ROM by at least 3 degrees from the initial ROM value with improvements noted in functional ROM and ability to perform ADLs.  2.  Pt will demonstrate increased maximum isometric torque value by 10% when compared to the initial value resulting in improved ability to perform bending, lifting, and carrying activities safely, confidently.  3.  Patient report a reduction in worst pain score by 1-2 points for improved tolerance for lifting grandchildren and enjoying leisure activities like golf.  4.  Pt able to perform HEP correctly with minimal cueing or supervision from therapist to encourage independent management of symptoms.         Long term goals: 13 weeks or 20 visits   1. Pt will demonstrate increased lumbar ROM by at least 6 degrees from initial ROM value, resulting in improved ability to perform functional fwd bending while standing and sitting.   2. Pt will demonstrate increased maximum  "isometric torque value by 20% when compared to the initial value resulting in improved ability to perform bending, lifting, and carrying activities safely, confidently.  3. Pt to demonstrate ability to independently control and reduce their pain through posture positioning and mechanical movements throughout a typical day.  4.  Patient will demonstrate improved overall function per FOTO Survey to CJ = at least 20% but < 40% impaired, limited or restricted score or less in order to show subjective improvement of condition.  5. Pt will demonstrate independence with the HEP at discharge  6.   "I would like to be pain-free."       Plan   Continue with established Plan of Care towards established PT goals.   "

## 2019-04-09 ENCOUNTER — PATIENT MESSAGE (OUTPATIENT)
Dept: FAMILY MEDICINE | Facility: CLINIC | Age: 69
End: 2019-04-09

## 2019-04-09 ENCOUNTER — TELEPHONE (OUTPATIENT)
Dept: FAMILY MEDICINE | Facility: CLINIC | Age: 69
End: 2019-04-09

## 2019-04-09 RX ORDER — HYDROCODONE BITARTRATE AND ACETAMINOPHEN 5; 325 MG/1; MG/1
1 TABLET ORAL DAILY PRN
Qty: 30 TABLET | Refills: 0 | Status: SHIPPED | OUTPATIENT
Start: 2019-04-09 | End: 2019-05-08 | Stop reason: SDUPTHER

## 2019-04-10 ENCOUNTER — CLINICAL SUPPORT (OUTPATIENT)
Dept: REHABILITATION | Facility: OTHER | Age: 69
End: 2019-04-10
Attending: PHYSICAL MEDICINE & REHABILITATION
Payer: MEDICARE

## 2019-04-10 DIAGNOSIS — G89.29 CHRONIC RIGHT-SIDED LOW BACK PAIN WITH RIGHT-SIDED SCIATICA: Primary | ICD-10-CM

## 2019-04-10 DIAGNOSIS — M54.41 CHRONIC RIGHT-SIDED LOW BACK PAIN WITH RIGHT-SIDED SCIATICA: Primary | ICD-10-CM

## 2019-04-10 PROCEDURE — 97110 THERAPEUTIC EXERCISES: CPT

## 2019-04-10 NOTE — PROGRESS NOTES
"[facility]      Name: Florentino Bowman  Clinic Number: [id number]    Therapy Diagnosis:   Encounter Diagnosis   Name Primary?    Chronic right-sided low back pain with right-sided sciatica Yes     Physician: Cecily Leonardo, *    Visit Date: 4/10/2019    Physician: Cecily Leonardo, *     Physician Orders: PT Eval and Treat   Medical Diagnosis from Referral:   M54.41,G89.29 (ICD-10-CM) - Chronic right-sided low back pain with right-sided sciatica   M51.36 (ICD-10-CM) - DDD (degenerative disc disease), lumbar   Z98.890 (ICD-10-CM) - S/P lumbar laminectomy   M47.26 (ICD-10-CM) - Osteoarthritis of spine with radiculopathy, lumbar region     Evaluation Date: 2019  Authorization Period Expiration: 2019  Plan of Care Expiration: 2019  Reassessment Due: 05/10/2019  Visit # / Visits authorized:       Time In: 2:00  Time Out:3:00  Total Billable Time: 30 minutes     Precautions: L4-5 Laminectomy    Pattern of pain determined: Pattern 3       Subjective   Florentino reports she feels better overall since last session. She reports she continues to have increased low back pain on waking. Her hip and right glut pain increases as the day goes on. She reported feeling much better on leaving today's treatment.     Patient reports their pain to be 3/10 on a 0-10 scale with 0 being no pain and 10 being the worst pain imaginable.  Pain Location: Right glut and thigh      Occupation: Retired;   Leisure: Cycling (can't anymore); Working in [employer]; Playing with grandchildren; Traveling (trip planned for [place])        Pt goals: "I would like to be pain-free."    Objective   Baseline Isometric Testing on Med X equipment: Testing administered by PT  Date of testin2019  ROM 48-0 deg   Max Peak Torque 78 ft/lbs    Min Peak Torque 32 ft/lbs    Flex/Ext Ratio 2.44   % below normative data -36%   Counter weight 102   Femur 4   Seat pad 0 "      Outcomes:  Initial score: 46%  Visit 5 score:  Goal: At least 40 percent but less than 60 percent    Date of testin19  Mid point  ROM 48-0 deg   Max Peak Torque 76   Min Peak Torque 57   Flex/Ext Ratio 1.3   % below normative data -31   Counter weight 102   Femur 4   Seat pad 0      1% overall strength gain  Treatment    Pt was instructed in and performed the following:     Florentino received therapeutic exercises to develop/improved posture, cardiovascular endurance, muscular endurance, lumbar/cervical ROM, strength and muscular endurance for 50 minutes including the following exercises:   [facility] 4/10/2019   Visit Number 14   VAS Pain Rating 3   Treadmill Time (in min.) -   Time 10   Extension in Lying 10   Extension in Standing 10   Manual Therapy 10   Lumbar Extension Seat Pad -   Femur Restraint -   Top Dead Center -   Counterweight -   Lumbar Flexion -   Lumbar Extension -   Lumbar Peak Torque -   Min Torque -   Test Percent Below Normative Data -   Lumbar Weight 40   Repetitions 20   Rating of Perceived Exertion 4   Ice - Z Lie (in min.) 10         Piriformis 4 point stretch  x5 ea L/R for 20 seconds   PPT  x10--> TrA +Marching x20, pillow squeeze 5 sec hold  Bridging x10 for 5 second holds   EIL 10x 4 thru shortened range 10x  OB 10x   Wall slides shoulder flexion with bilateral side bending 5 s/h 10x      Manual: PAs and UPAs, side-glides grossly throughout lumbar spine, screw mobilization of thoracic spine grade 3-4 10 minutes     Peripheral muscle strengthening which included 1 set of 15-20 repetitions at a slow, controlled 10-13 second per rep pace focused on strengthening supporting musculature for improved body mechanics and functional mobility.  Pt and therapist focused on proper form during treatment to ensure optimal strengthening of each targeted muscle group.  Machines were utilized including torso rotation, leg extension, leg curl, chest press, upright row. Tricep extension,  bicep curl, leg press, and hip abduction added visit 3    Home Exercises Provided and Patient Education Provided      Pelvic Tilts with 5 second pillow squeeze, TrA Marching, Bridging,  Piriformis stretch 10 secs hold 3x  EIL thu shortened range or EIS with towel for overpressure  OB   Wall slides shoulder flexion with bilateral side bending 5 s/h 10x      Education provided:   - Pt education on progression of weights and exertion rating to safely progress through resistance trainging.     Written Home Exercises Provided: Patient instructed to cont prior HEP.  Exercises were reviewed and Florentino was able to demonstrate them prior to the end of the session.  Florentino demonstrated good  understanding of the education provided.     See EMR under Patient Instructions for exercises provided prior visit.    Assessment   Patient presents with mild low back pain which improved moderately with resistance exercise, HEP stretches, and manual mobilization by end of session. Pt reports she sleeps on her side without a pillow. Pt educated on using pillow between legs while sleeping to improve pelvic alignment and potentially reduce morning symptoms. Pt also encouraged to attempt stretching before going to sleep. She expressed understanding of instructions and will try. Pt demonstrated significantly improved EIL ROM with manual mobilization techniques listed above. However, pt reported mildly increased low back pain with EIL which improved with addition of shoulder flexion wall slides. Added trial of wall slides  to HEP. Pt was able to tolerate Med X lumbar extension exercise at same weight with minimally increased symptoms at end range extension. Pt reports better response with reduced ROM from previous session. Maintain ROM and increase weights 5% next session.   -Patient is making good progress towards established goals.   Pt will continue to benefit from skilled outpatient physical therapy to address the deficits stated in the  impairment chart, provide pt/family education and to maximize pt's level of independence in the home and community environment.       Pt's spiritual, cultural and educational needs considered and pt agreeable to plan of care and goals as stated below:   Medical necessity is demonstrated by the following problem list.    Pt presents with the following impairments:      History  Co-morbidities and personal factors that may impact the plan of care Co-morbidities:   prior lumbar surgery     Personal Factors:   no deficits       low   Examination  Body Structures and Functions, activity limitations and participation restrictions that may impact the plan of care Body Regions:   back  lower extremities     Body Systems:    gross symmetry  ROM  strength  gross coordinated movement  motor control     Participation Restrictions:   None     Activity limitations:   Learning and applying knowledge  no deficits     General Tasks and Commands  no deficits     Communication  no deficits     Mobility  lifting and carrying objects     Self care  no deficits     Domestic Life  doing house work (cleaning house, washing dishes, laundry)     Interactions/Relationships  no deficits     Life Areas  no deficits     Community and Social Life  recreation and leisure             low   Clinical Presentation stable and uncomplicated low   Decision Making/ Complexity Score: low         GOALS: Pt is in agreement with the following goals.     Short term goals:  6 weeks or 10 visits   1.  Pt will demonstrate increased lumbar ROM by at least 3 degrees from the initial ROM value with improvements noted in functional ROM and ability to perform ADLs.  2.  Pt will demonstrate increased maximum isometric torque value by 10% when compared to the initial value resulting in improved ability to perform bending, lifting, and carrying activities safely, confidently.  3.  Patient report a reduction in worst pain score by 1-2 points for improved tolerance for  "lifting grandchildren and enjoying leisure activities like golf.  4.  Pt able to perform HEP correctly with minimal cueing or supervision from therapist to encourage independent management of symptoms.         Long term goals: 13 weeks or 20 visits   1. Pt will demonstrate increased lumbar ROM by at least 6 degrees from initial ROM value, resulting in improved ability to perform functional fwd bending while standing and sitting.   2. Pt will demonstrate increased maximum isometric torque value by 20% when compared to the initial value resulting in improved ability to perform bending, lifting, and carrying activities safely, confidently.  3. Pt to demonstrate ability to independently control and reduce their pain through posture positioning and mechanical movements throughout a typical day.  4.  Patient will demonstrate improved overall function per FOTO Survey to CJ = at least 20% but < 40% impaired, limited or restricted score or less in order to show subjective improvement of condition.  5. Pt will demonstrate independence with the HEP at discharge  6.   "I would like to be pain-free."       Plan   Continue with established Plan of Care towards established PT goals.   "

## 2019-04-15 ENCOUNTER — CLINICAL SUPPORT (OUTPATIENT)
Dept: REHABILITATION | Facility: OTHER | Age: 69
End: 2019-04-15
Attending: PHYSICAL MEDICINE & REHABILITATION
Payer: MEDICARE

## 2019-04-15 DIAGNOSIS — G89.29 CHRONIC RIGHT-SIDED LOW BACK PAIN WITH RIGHT-SIDED SCIATICA: Primary | ICD-10-CM

## 2019-04-15 DIAGNOSIS — M54.41 CHRONIC RIGHT-SIDED LOW BACK PAIN WITH RIGHT-SIDED SCIATICA: Primary | ICD-10-CM

## 2019-04-15 PROCEDURE — 97110 THERAPEUTIC EXERCISES: CPT

## 2019-04-15 PROCEDURE — 97140 MANUAL THERAPY 1/> REGIONS: CPT

## 2019-04-15 NOTE — PROGRESS NOTES
"[facility]      Name: Florentino Bowman  Clinic Number: [id number]    Therapy Diagnosis:   Encounter Diagnosis   Name Primary?    Chronic right-sided low back pain with right-sided sciatica Yes     Physician: Cecily Leonardo, *    Visit Date: 4/15/2019    Physician: Cecily Leonardo, *     Physician Orders: PT Eval and Treat   Medical Diagnosis from Referral:   M54.41,G89.29 (ICD-10-CM) - Chronic right-sided low back pain with right-sided sciatica   M51.36 (ICD-10-CM) - DDD (degenerative disc disease), lumbar   Z98.890 (ICD-10-CM) - S/P lumbar laminectomy   M47.26 (ICD-10-CM) - Osteoarthritis of spine with radiculopathy, lumbar region     Evaluation Date: 2019  Authorization Period Expiration: 2019  Plan of Care Expiration: 2019  Reassessment Due: 05/10/2019  Visit # / Visits authorized: 15/ 20      Time In: 10:15  Time Out: 11:45  Total Billable Time: 60 minutes     Precautions: L4-5 Laminectomy    Pattern of pain determined: Pattern 3       Subjective   Florentino reports she feels better overall since last session. She reports she continues to have increased low back pain on waking but its better overall and relieves within a few minutes. She doesn't like to use the pillow.     Patient reports their pain to be 1/10 on a 0-10 scale with 0 being no pain and 10 being the worst pain imaginable.  Pain Location: Right glut and thigh      Occupation: Retired;   Leisure: Cycling (can't anymore); Working in [employer]; Playing with grandchildren; Traveling (trip planned for [place])        Pt goals: "I would like to be pain-free."    Objective   Baseline Isometric Testing on Med X equipment: Testing administered by PT  Date of testin2019  ROM 48-0 deg   Max Peak Torque 78 ft/lbs    Min Peak Torque 32 ft/lbs    Flex/Ext Ratio 2.44   % below normative data -36%   Counter weight 102   Femur 4   Seat pad 0      Outcomes:  Initial score: 46%  Visit 5 " score:  Goal: At least 40 percent but less than 60 percent    Date of testin19  Mid point  ROM 48-0 deg   Max Peak Torque 76   Min Peak Torque 57   Flex/Ext Ratio 1.3   % below normative data -31   Counter weight 102   Femur 4   Seat pad 0      1% overall strength gain  Treatment    Pt was instructed in and performed the following:     Florentino received therapeutic exercises to develop/improved posture, cardiovascular endurance, muscular endurance, lumbar/cervical ROM, strength and muscular endurance for 50 minutes including the following exercises:       [facility] 4/15/2019   Visit Number 15   VAS Pain Rating 1   Treadmill Time (in min.) -   Time -   Extension in Lying 10   Extension in Standing -   Manual Therapy 10   Lumbar Extension Seat Pad -   Femur Restraint -   Top Dead Center -   Counterweight -   Lumbar Flexion -   Lumbar Extension -   Lumbar Peak Torque -   Min Torque -   Test Percent Below Normative Data -   Lumbar Weight 40   Repetitions 16   Rating of Perceived Exertion 4   Ice - Z Lie (in min.) 10     Piriformis 4 point stretch  x2 ea L/R for 20 seconds   PPT  x10--> TrA +Marching R TB x20, pillow squeeze 5 sec hold  Bridging with R TB x10 for 5 second holds  EIL 10x   NANCY 30 s/h 4x  OB 10x   Wall slides shoulder flexion with bilateral side bending 5 s/h 10x      Manual: PAs and UPAs, side-glides grossly throughout lumbar spine, screw mobilization of thoracic spine grade 3-4 10 minutes     Peripheral muscle strengthening which included 1 set of 15-20 repetitions at a slow, controlled 10-13 second per rep pace focused on strengthening supporting musculature for improved body mechanics and functional mobility.  Pt and therapist focused on proper form during treatment to ensure optimal strengthening of each targeted muscle group.  Machines were utilized including torso rotation, leg extension, leg curl, chest press, upright row. Tricep extension, bicep curl, leg press, and hip abduction  added visit 3    Home Exercises Provided and Patient Education Provided      Pelvic Tilts with 5 second pillow squeeze, TrA Marching, Bridging,  Piriformis stretch 10 secs hold 3x  EIL thu shortened range or EIS with towel for overpressure 10x   OB 10x   Wall slides shoulder flexion with bilateral side bending 5 s/h 10x  NANCY 30 s/h 4x    Education provided:   - Pt education on progression of weights and exertion rating to safely progress through resistance trainging.     Written Home Exercises Provided: Patient instructed to cont prior HEP.  Exercises were reviewed and Florentino was able to demonstrate them prior to the end of the session.  Florentino demonstrated good  understanding of the education provided.     See EMR under Patient Instructions for exercises provided 2/21/2019.    Assessment   Patient presents with mild low back pain which improved moderately with resistance exercise, HEP stretches, and manual mobilization by end of session. Progressed core exercises to include red TB with positive pt response.   Pt demonstrated significantly improved EIL ROM with manual mobilization techniques listed above. Modified EIL to static NANCY with pt reporting improved stretch response and reduced discomfort. Plan to teach squatting/bending mechanics next session. Pt was able to tolerate Med X lumbar extension exercise at same weight with minimally increased symptoms at end range extension. Pt cued to perform exercise with mild pelvic tilt with mildly improved symptoms but she reported some irritation by 18th rep. Maintain ROM and progress reps next session.   -Patient is making good progress towards established goals.   Pt will continue to benefit from skilled outpatient physical therapy to address the deficits stated in the impairment chart, provide pt/family education and to maximize pt's level of independence in the home and community environment.       Pt's spiritual, cultural and educational needs considered and pt  "agreeable to plan of care and goals as stated below:     GOALS: Pt is in agreement with the following goals.     Short term goals:  6 weeks or 10 visits   1.  Pt will demonstrate increased lumbar ROM by at least 3 degrees from the initial ROM value with improvements noted in functional ROM and ability to perform ADLs.  2.  Pt will demonstrate increased maximum isometric torque value by 10% when compared to the initial value resulting in improved ability to perform bending, lifting, and carrying activities safely, confidently.  3.  Patient report a reduction in worst pain score by 1-2 points for improved tolerance for lifting grandchildren and enjoying leisure activities like golf.  4.  Pt able to perform HEP correctly with minimal cueing or supervision from therapist to encourage independent management of symptoms.         Long term goals: 13 weeks or 20 visits   1. Pt will demonstrate increased lumbar ROM by at least 6 degrees from initial ROM value, resulting in improved ability to perform functional fwd bending while standing and sitting.   2. Pt will demonstrate increased maximum isometric torque value by 20% when compared to the initial value resulting in improved ability to perform bending, lifting, and carrying activities safely, confidently.  3. Pt to demonstrate ability to independently control and reduce their pain through posture positioning and mechanical movements throughout a typical day.  4.  Patient will demonstrate improved overall function per FOTO Survey to CJ = at least 20% but < 40% impaired, limited or restricted score or less in order to show subjective improvement of condition.  5. Pt will demonstrate independence with the HEP at discharge  6.   "I would like to be pain-free."       Plan   Continue with established Plan of Care towards established PT goals.   "

## 2019-04-18 ENCOUNTER — CLINICAL SUPPORT (OUTPATIENT)
Dept: REHABILITATION | Facility: OTHER | Age: 69
End: 2019-04-18
Attending: PHYSICAL MEDICINE & REHABILITATION
Payer: MEDICARE

## 2019-04-18 DIAGNOSIS — G89.29 CHRONIC RIGHT-SIDED LOW BACK PAIN WITH RIGHT-SIDED SCIATICA: Primary | ICD-10-CM

## 2019-04-18 DIAGNOSIS — M54.41 CHRONIC RIGHT-SIDED LOW BACK PAIN WITH RIGHT-SIDED SCIATICA: Primary | ICD-10-CM

## 2019-04-18 NOTE — PROGRESS NOTES
"[facility]      Name: Florentino Bowman  Clinic Number: [id number]    Therapy Diagnosis:   Encounter Diagnosis   Name Primary?    Chronic right-sided low back pain with right-sided sciatica Yes     Physician: Cecily Leonardo, *    Visit Date: 2019    Physician: Cecily Leonardo, *     Physician Orders: PT Eval and Treat   Medical Diagnosis from Referral:   M54.41,G89.29 (ICD-10-CM) - Chronic right-sided low back pain with right-sided sciatica   M51.36 (ICD-10-CM) - DDD (degenerative disc disease), lumbar   Z98.890 (ICD-10-CM) - S/P lumbar laminectomy   M47.26 (ICD-10-CM) - Osteoarthritis of spine with radiculopathy, lumbar region     Evaluation Date: 2019  Authorization Period Expiration: 2019  Plan of Care Expiration: 2019  Reassessment Due: 05/10/2019  Visit # / Visits authorized:       Time In: 10:45  Time Out: 11:45  Total Billable Time: 40 minutes     Precautions: L4-5 Laminectomy    Pattern of pain determined: Pattern 3       Subjective   Florentino reports she feels better overall since last session. She reports she continues to have increased low back pain on waking but its better overall and relieves within a few minutes.     Patient reports their pain to be 1/10 on a 0-10 scale with 0 being no pain and 10 being the worst pain imaginable.  Pain Location: Right glut and thigh      Occupation: Retired;   Leisure: Cycling (can't anymore); Working in [employer]; Playing with grandchildren; Traveling (trip planned for [place])        Pt goals: "I would like to be pain-free."    Objective   Baseline Isometric Testing on Med X equipment: Testing administered by PT  Date of testin2019  ROM 48-0 deg   Max Peak Torque 78 ft/lbs    Min Peak Torque 32 ft/lbs    Flex/Ext Ratio 2.44   % below normative data -36%   Counter weight 102   Femur 4   Seat pad 0      Outcomes:  Initial score: 46%  Visit 5 score:  38%  Visit 10:  47%  Goal: " At least 40 percent but less than 60 percent    Date of testin19  Mid point  ROM 48-0 deg   Max Peak Torque 76   Min Peak Torque 57   Flex/Ext Ratio 1.3   % below normative data -31   Counter weight 102   Femur 4   Seat pad 0      1% overall strength gain  Treatment    Pt was instructed in and performed the following:     Florentino received therapeutic exercises to develop/improved posture, cardiovascular endurance, muscular endurance, lumbar/cervical ROM, strength and muscular endurance for 50 minutes including the following exercises:     [facility] 2019   Visit Number 16   VAS Pain Rating 1   Treadmill Time (in min.) 15   Time -   Extension in Lying 10   Extension in Standing 10   Manual Therapy -   Lumbar Extension Seat Pad -   Femur Restraint -   Top Dead Center -   Counterweight -   Lumbar Flexion -   Lumbar Extension -   Lumbar Peak Torque -   Min Torque -   Test Percent Below Normative Data -   Lumbar Weight 40   Repetitions 20   Rating of Perceived Exertion 3   Ice - Z Lie (in min.) 10     Piriformis 4 point stretch  x2 ea L/R for 20 seconds   PPT Seated 10x  Hip/back disassociation 10x   Sit<>stand squat training 10x     NANCY 30 s/h 4x  OB 10x       Manual: PAs and UPAs, side-glides grossly throughout lumbar spine, screw mobilization of thoracic spine grade 3-4 10 minutes     Peripheral muscle strengthening which included 1 set of 15-20 repetitions at a slow, controlled 10-13 second per rep pace focused on strengthening supporting musculature for improved body mechanics and functional mobility.  Pt and therapist focused on proper form during treatment to ensure optimal strengthening of each targeted muscle group.  Machines were utilized including torso rotation, leg extension, leg curl, chest press, upright row. Tricep extension, bicep curl, leg press, and hip abduction added visit 3    Home Exercises Provided and Patient Education Provided      Pelvic Tilts with 5 second pillow squeeze,  TrA Marching, Bridging,  Piriformis stretch 10 secs hold 3x  EIL thu shortened range or EIS with towel for overpressure 10x   OB 10x   Wall slides shoulder flexion with bilateral side bending 5 s/h 10x  NANCY 30 s/h 4x    Education provided:   - Pt education on progression of weights and exertion rating to safely progress through resistance trainging.     Written Home Exercises Provided: Patient instructed to cont prior HEP.  Exercises were reviewed and Florentino was able to demonstrate them prior to the end of the session.  Florentino demonstrated good  understanding of the education provided.     See EMR under Patient Instructions for exercises provided 2/21/2019.    Assessment   Patient presents with mild low back pain which improved moderately with resistance exercise, HEP stretches, and manual mobilization by end of session. Progressed core exercises to seated positions and to squatting/bending mechanics. Plan to review and progress as tolerated. Pt was able to tolerate Med X lumbar extension exercise at same weight with no symptomes at end range extension today. Pt cued to perform exercise with mild pelvic tilt with mildly improved symptoms. Plan to attempt to increase weights 5% next session and maintain ROM as appropriate next session.    -Patient is making good progress towards established goals.   Pt will continue to benefit from skilled outpatient physical therapy to address the deficits stated in the impairment chart, provide pt/family education and to maximize pt's level of independence in the home and community environment.       Pt's spiritual, cultural and educational needs considered and pt agreeable to plan of care and goals as stated below:     GOALS: Pt is in agreement with the following goals.     Short term goals:  6 weeks or 10 visits   1.  Pt will demonstrate increased lumbar ROM by at least 3 degrees from the initial ROM value with improvements noted in functional ROM and ability to perform ADLs.  "Progressing, not met.    2.  Pt will demonstrate increased maximum isometric torque value by 10% when compared to the initial value resulting in improved ability to perform bending, lifting, and carrying activities safely, confidently.Progressing, not met.  3.  Patient report a reduction in worst pain score by 1-2 points for improved tolerance for lifting grandchildren and enjoying leisure activities like golf.Progressing, not met.  4.  Pt able to perform HEP correctly with minimal cueing or supervision from therapist to encourage independent management of symptoms. Progressing, not met.        Long term goals: 13 weeks or 20 visits   1. Pt will demonstrate increased lumbar ROM by at least 6 degrees from initial ROM value, resulting in improved ability to perform functional fwd bending while standing and sitting. Progressing, not met.  2. Pt will demonstrate increased maximum isometric torque value by 20% when compared to the initial value resulting in improved ability to perform bending, lifting, and carrying activities safely, confidently.Progressing, not met.  3. Pt to demonstrate ability to independently control and reduce their pain through posture positioning and mechanical movements throughout a typical day.Progressing, not met.  4.  Patient will demonstrate improved overall function per FOTO Survey to CJ = at least 20% but < 40% impaired, limited or restricted score or less in order to show subjective improvement of condition.Progressing, not met.  5. Pt will demonstrate independence with the HEP at dischargeProgressing, not met.  6.   "I would like to be pain-free."Progressing, not met.       Plan   Continue with established Plan of Care towards established PT goals.   "

## 2019-04-22 RX ORDER — ATORVASTATIN CALCIUM 40 MG/1
TABLET, FILM COATED ORAL
Qty: 30 TABLET | Refills: 5 | Status: SHIPPED | OUTPATIENT
Start: 2019-04-22 | End: 2019-10-01 | Stop reason: SDUPTHER

## 2019-04-29 ENCOUNTER — CLINICAL SUPPORT (OUTPATIENT)
Dept: REHABILITATION | Facility: OTHER | Age: 69
End: 2019-04-29
Attending: PHYSICAL MEDICINE & REHABILITATION
Payer: MEDICARE

## 2019-04-29 DIAGNOSIS — M54.41 CHRONIC RIGHT-SIDED LOW BACK PAIN WITH RIGHT-SIDED SCIATICA: Primary | ICD-10-CM

## 2019-04-29 DIAGNOSIS — G89.29 CHRONIC RIGHT-SIDED LOW BACK PAIN WITH RIGHT-SIDED SCIATICA: Primary | ICD-10-CM

## 2019-04-29 PROCEDURE — 97110 THERAPEUTIC EXERCISES: CPT

## 2019-04-29 NOTE — PROGRESS NOTES
"[facility]      Name: Florentino Bowman  Clinic Number: [id number]    Therapy Diagnosis:   Encounter Diagnosis   Name Primary?    Chronic right-sided low back pain with right-sided sciatica Yes     Physician: Cecily Leonardo, *    Visit Date: 2019    Physician: Cecily Leonardo, *     Physician Orders: PT Eval and Treat   Medical Diagnosis from Referral:   M54.41,G89.29 (ICD-10-CM) - Chronic right-sided low back pain with right-sided sciatica   M51.36 (ICD-10-CM) - DDD (degenerative disc disease), lumbar   Z98.890 (ICD-10-CM) - S/P lumbar laminectomy   M47.26 (ICD-10-CM) - Osteoarthritis of spine with radiculopathy, lumbar region     Evaluation Date: 2019  Authorization Period Expiration: 2019  Plan of Care Expiration: 2019  Reassessment Due: 05/10/2019  Visit # / Visits authorized:       Time In: 1100  Time Out: 1150  Total Billable Time: 50 minutes     Precautions: L4-5 Laminectomy    Pattern of pain determined: Pattern 3       Subjective   Florentino reports that her symptoms overall have not changed much and remain somewhat diverse in presentation of symptoms in the mid-to-low-back and down the Right LE segments. She verbalizes compliance with HEP.     Patient reports their pain to be 1/10 on a 0-10 scale with 0 being no pain and 10 being the worst pain imaginable.  Pain Location: Right glut and thigh      Occupation: Retired;   Leisure: Cycling (can't anymore); Working in [employer]; Playing with grandchildren; Traveling (trip planned for [place])        Pt goals: "I would like to be pain-free."    Objective   Baseline Isometric Testing on Med X equipment: Testing administered by PT  Date of testin2019  ROM 48-0 deg   Max Peak Torque 78 ft/lbs    Min Peak Torque 32 ft/lbs    Flex/Ext Ratio 2.44   % below normative data -36%   Counter weight 102   Femur 4   Seat pad 0      Outcomes:  Initial score: 46%  Visit 5 score:  " 38%  Visit 10:  47%  Goal: At least 40 percent but less than 60 percent    Date of testin19  Mid point  ROM 48-0 deg   Max Peak Torque 76   Min Peak Torque 57   Flex/Ext Ratio 1.3   % below normative data -31   Counter weight 102   Femur 4   Seat pad 0      1% overall strength gain  Treatment    Pt was instructed in and performed the following:     Florentino received therapeutic exercises to develop/improved posture, cardiovascular endurance, muscular endurance, lumbar/cervical ROM, strength and muscular endurance for 50 minutes including the following exercises:     Swiss Ball: DKTC x15--> Cross-Leg Piriformis x10 ea L/R  PPT Seated 10x  Hip/back disassociation 10x --> Using Swiss ball while seated in chair   Sit<>stand squat training 10x     NANCY 30 s/h 4x  OB 10x     [facility] 2019   Visit Number 17   VAS Pain Rating 1   Treadmill Time (in min.) -   Time 10   Lumbar Weight 44   Repetitions 15   Rating of Perceived Exertion 3   Ice - Z Lie (in min.) 10         NT Manual: PAs and UPAs, side-glides grossly throughout lumbar spine, screw mobilization of thoracic spine grade 3-4 10 minutes     Peripheral muscle strengthening which included 1 set of 15-20 repetitions at a slow, controlled 10-13 second per rep pace focused on strengthening supporting musculature for improved body mechanics and functional mobility.  Pt and therapist focused on proper form during treatment to ensure optimal strengthening of each targeted muscle group.  Machines were utilized including torso rotation, leg extension, leg curl, chest press, upright row. Tricep extension, bicep curl, leg press, and hip abduction added visit 3    Home Exercises Provided and Patient Education Provided      Pelvic Tilts with 5 second pillow squeeze, TrA Marching, Bridging,  Piriformis stretch 10 secs hold 3x  EIL thu shortened range or EIS with towel for overpressure 10x   OB 10x   Wall slides shoulder flexion with bilateral side bending 5 s/h  10x  NANCY 30 s/h 4x    Education provided:   - Pt education on progression of weights and exertion rating to safely progress through resistance trainging.     Written Home Exercises Provided: Patient instructed to cont prior HEP.  Exercises were reviewed and Florentino was able to demonstrate them prior to the end of the session.  Florentino demonstrated good  understanding of the education provided.     See EMR under Patient Instructions for exercises provided 2/21/2019.    Assessment     Florentino tolerated session with significant report of relief during hip stretching exercises and specifically the piriformis stretch. She continues to notice discomfort through the extension of the MedX even with the adjusted ranges. Plan to continue to progress as appropriate with POC toward goals, but patient should benefit from further re-assessment and follow-up of potential factors contributing to pain.     -Patient is making fair progress towards established goals. She performed x15 reps of 44 ft/lbs at an RPE of 3/10; Increase reps with continued weight   Pt will continue to benefit from skilled outpatient physical therapy to address the deficits stated in the impairment chart, provide pt/family education and to maximize pt's level of independence in the home and community environment.       Pt's spiritual, cultural and educational needs considered and pt agreeable to plan of care and goals as stated below:     GOALS: Pt is in agreement with the following goals.     Short term goals:  6 weeks or 10 visits   1.  Pt will demonstrate increased lumbar ROM by at least 3 degrees from the initial ROM value with improvements noted in functional ROM and ability to perform ADLs. Progressing, not met.    2.  Pt will demonstrate increased maximum isometric torque value by 10% when compared to the initial value resulting in improved ability to perform bending, lifting, and carrying activities safely, confidently.Progressing, not met.  3.  Patient  "report a reduction in worst pain score by 1-2 points for improved tolerance for lifting grandchildren and enjoying leisure activities like golf.Progressing, not met.  4.  Pt able to perform HEP correctly with minimal cueing or supervision from therapist to encourage independent management of symptoms. Progressing, not met.        Long term goals: 13 weeks or 20 visits   1. Pt will demonstrate increased lumbar ROM by at least 6 degrees from initial ROM value, resulting in improved ability to perform functional fwd bending while standing and sitting. Progressing, not met.  2. Pt will demonstrate increased maximum isometric torque value by 20% when compared to the initial value resulting in improved ability to perform bending, lifting, and carrying activities safely, confidently.Progressing, not met.  3. Pt to demonstrate ability to independently control and reduce their pain through posture positioning and mechanical movements throughout a typical day.Progressing, not met.  4.  Patient will demonstrate improved overall function per FOTO Survey to CJ = at least 20% but < 40% impaired, limited or restricted score or less in order to show subjective improvement of condition.Progressing, not met.  5. Pt will demonstrate independence with the HEP at dischargeProgressing, not met.  6.   "I would like to be pain-free."Progressing, not met.       Plan   Continue with established Plan of Care towards established PT goals.   "

## 2019-05-06 ENCOUNTER — CLINICAL SUPPORT (OUTPATIENT)
Dept: REHABILITATION | Facility: OTHER | Age: 69
End: 2019-05-06
Attending: PHYSICAL MEDICINE & REHABILITATION
Payer: MEDICARE

## 2019-05-06 DIAGNOSIS — G89.29 CHRONIC RIGHT-SIDED LOW BACK PAIN WITH RIGHT-SIDED SCIATICA: Primary | ICD-10-CM

## 2019-05-06 DIAGNOSIS — M54.41 CHRONIC RIGHT-SIDED LOW BACK PAIN WITH RIGHT-SIDED SCIATICA: Primary | ICD-10-CM

## 2019-05-06 PROCEDURE — G8979 MOBILITY GOAL STATUS: HCPCS | Mod: CJ | Performed by: PHYSICAL THERAPIST

## 2019-05-06 PROCEDURE — G8978 MOBILITY CURRENT STATUS: HCPCS | Mod: CK | Performed by: PHYSICAL THERAPIST

## 2019-05-06 PROCEDURE — 97110 THERAPEUTIC EXERCISES: CPT | Performed by: PHYSICAL THERAPIST

## 2019-05-06 PROCEDURE — G8980 MOBILITY D/C STATUS: HCPCS | Mod: CK | Performed by: PHYSICAL THERAPIST

## 2019-05-06 NOTE — PROGRESS NOTES
"[facility]      Name: Florentino Bowman  Clinic Number: [id number]    Therapy Diagnosis:   Encounter Diagnosis   Name Primary?    Chronic right-sided low back pain with right-sided sciatica Yes     Physician: Cecily Leonardo, *    Visit Date: 2019    Physician: Cecily Leonardo, *     Physician Orders: PT Eval and Treat   Medical Diagnosis from Referral:   M54.41,G89.29 (ICD-10-CM) - Chronic right-sided low back pain with right-sided sciatica   M51.36 (ICD-10-CM) - DDD (degenerative disc disease), lumbar   Z98.890 (ICD-10-CM) - S/P lumbar laminectomy   M47.26 (ICD-10-CM) - Osteoarthritis of spine with radiculopathy, lumbar region     Evaluation Date: 2019  Authorization Period Expiration: 2019  Plan of Care Expiration: 2019  Reassessment Due: 05/10/2019  Visit # / Visits authorized:  DISCHARGE NOTE     Time In: 1100  Time Out: 1150  Total Billable Time: 50 minutes     Precautions: L4-5 Laminectomy    Pattern of pain determined: Pattern 3       Subjective   Florentino reports that her symptoms overall have not changed much and remain somewhat diverse in presentation of symptoms in the mid-to-low-back and down the Right LE segments. She verbalizes compliance with HEP. She would like to DC today and is considering a surgical consult at this time.     Patient reports their pain to be 4/10 on a 0-10 scale with 0 being no pain and 10 being the worst pain imaginable.  Pain Location: Right glut and thigh      Occupation: Retired;   Leisure: Cycling (can't anymore); Working in [employer]; Playing with grandchildren; Traveling (trip planned for [place])        Pt goals: "I would like to be pain-free."    Objective   Baseline Isometric Testing on Med X equipment: Testing administered by PT  Date of testin2019  ROM 48-0 deg   Max Peak Torque 78 ft/lbs    Min Peak Torque 32 ft/lbs    Flex/Ext Ratio 2.44   % below normative data -36%   Counter " weight 102   Femur 4   Seat pad 0      Outcomes:  Initial score: 46%  Visit 5 score:  38%  Visit 10:  47%  Visit 20:  46%  Goal: At least 40 percent but less than 60 percent    Date of testin19  Mid point  ROM 48-0 deg   Max Peak Torque 76   Min Peak Torque 57   Flex/Ext Ratio 1.3   % below normative data -31   Counter weight 102   Femur 4   Seat pad 0      1% overall strength gain        Date of testin19 Final   ROM 48-0 deg   Max Peak Torque 107   Min Peak Torque 37   Flex/Ext Ratio 3.0   % below normative data    Counter weight 102   Femur 4   Seat pad 0     38% increase from Mid Point testing    Treatment    Pt was instructed in and performed the following:     Florentino received therapeutic exercises to develop/improved posture, cardiovascular endurance, muscular endurance, lumbar ROM, strength and muscular endurance for 50 minutes including the following exercises:     Swiss Ball: DKTC x15--> Cross-Leg Piriformis x10 ea L/R  PPT Seated 10x  Hip/back disassociation 10x --> Using Swiss ball while seated in chair   Sit<>stand squat training 10x     NANCY 30 s/h 4x  OB 10x     [facility] 2019   Visit Number 18   VAS Pain Rating 4   Treadmill Time (in min.) 10   Speed 2   Time -   Extension in Lying 10   Lumbar Extension Seat Pad 3   Femur Restraint 4   Top Dead Center 24   Counterweight -   Lumbar Flexion 48   Lumbar Extension 0   Lumbar Peak Torque 107   Min Torque 37   Test Percent Gain in Strength from Initial  38   Ice - Z Lie (in min.) 10       NT Manual: not today    Peripheral muscle strengthening which included 1 set of 15-20 repetitions at a slow, controlled 10-13 second per rep pace focused on strengthening supporting musculature for improved body mechanics and functional mobility.  Pt and therapist focused on proper form during treatment to ensure optimal strengthening of each targeted muscle group.  Machines were utilized including torso rotation, leg extension, leg curl, chest  press, upright row. Tricep extension, bicep curl, leg press, and hip abduction    Home Exercises Provided and Patient Education Provided      Pelvic Tilts with 5 second pillow squeeze, TrA Marching, Bridging,  Piriformis stretch 10 secs hold 3x  EIL thu shortened range or EIS with towel for overpressure 10x   OB 10x   Wall slides shoulder flexion with bilateral side bending 5 s/h 10x  NANCY 30 s/h 4x    Education provided:   - Pt education on progression of weights and exertion rating to safely progress through resistance trainging.     Written Home Exercises Provided: Patient instructed to cont prior HEP.  Exercises were reviewed and Florentino was able to demonstrate them prior to the end of the session.  Florentino demonstrated good  understanding of the education provided.     See EMR under Patient Instructions for exercises provided 2/21/2019.    Assessment       Florentino has been treated 18 visits with min pain relief, good tolerance to stretching, posture, use of lumbar roll, ice and z lie.  However, the improvements are temporary and she is going to consider other options at this time.  Good increase on Final testing with peak torque at 107 ftlbs, but min torque at 37 ftlbs at 0 degrees. There is a 38% improvement in strength overall from initial visits, but pain remains essentially unchanged at 4/10. She lives in [facility] and is not going to do wellness secondary to the distance.   Florentino tolerated session with good relief during stretching.    Pt will continue to benefit from skilled outpatient physical therapy to address the deficits stated in the impairment chart, provide pt/family education and to maximize pt's level of independence in the home and community environment.       Pt's spiritual, cultural and educational needs considered and pt agreeable to plan of care and goals as stated below:     GOALS: Pt is in agreement with the following goals.     Short term goals:  6 weeks or 10 visits   1.  Pt will demonstrate  "increased lumbar ROM by at least 3 degrees from the initial ROM value with improvements noted in functional ROM and ability to perform ADLs. met.    2.  Pt will demonstrate increased maximum isometric torque value by 10% when compared to the initial value resulting in improved ability to perform bending, lifting, and carrying activities safely, confidently. met.  3.  Patient report a reduction in worst pain score by 1-2 points for improved tolerance for lifting grandchildren and enjoying leisure activities like golf.Progressing, not met.  4.  Pt able to perform HEP correctly with minimal cueing or supervision from therapist to encourage independent management of symptoms. met.        Long term goals: 13 weeks or 20 visits   1. Pt will demonstrate increased lumbar ROM by at least 6 degrees from initial ROM value, resulting in improved ability to perform functional fwd bending while standing and sitting. Progressing, not met.  2. Pt will demonstrate increased maximum isometric torque value by 20% when compared to the initial value resulting in improved ability to perform bending, lifting, and carrying activities safely, confidently.Progressing, not met.  3. Pt to demonstrate ability to independently control and reduce their pain through posture positioning and mechanical movements throughout a typical day.Progressing, not met.  4.  Patient will demonstrate improved overall function per FOTO Survey to CJ = at least 20% but < 40% impaired, limited or restricted score or less in order to show subjective improvement of condition.Progressing, not met.  5. Pt will demonstrate independence with the HEP at discharge MET  6.   "I would like to be pain-free."Progressing, not met.       Plan     DC from [facility].  Pt is not interested in wellness at this time as she lives [place] away.  She may be considering surgical options  "

## 2019-05-08 ENCOUNTER — TELEPHONE (OUTPATIENT)
Dept: FAMILY MEDICINE | Facility: CLINIC | Age: 69
End: 2019-05-08

## 2019-05-08 ENCOUNTER — PATIENT MESSAGE (OUTPATIENT)
Dept: FAMILY MEDICINE | Facility: CLINIC | Age: 69
End: 2019-05-08

## 2019-05-08 ENCOUNTER — TELEPHONE (OUTPATIENT)
Dept: REHABILITATION | Facility: OTHER | Age: 69
End: 2019-05-08

## 2019-05-08 DIAGNOSIS — M47.816 LUMBAR SPONDYLOSIS: ICD-10-CM

## 2019-05-08 DIAGNOSIS — M51.36 DDD (DEGENERATIVE DISC DISEASE), LUMBAR: ICD-10-CM

## 2019-05-08 RX ORDER — HYDROCODONE BITARTRATE AND ACETAMINOPHEN 5; 325 MG/1; MG/1
1 TABLET ORAL DAILY PRN
Qty: 30 TABLET | Refills: 0 | Status: SHIPPED | OUTPATIENT
Start: 2019-05-08 | End: 2019-06-03 | Stop reason: SDUPTHER

## 2019-05-08 NOTE — TELEPHONE ENCOUNTER
Patient was discharged on 5/6/19.  Called to find out if patient wanted to do the 2 free visits in the [facility].  I left a message.

## 2019-05-22 ENCOUNTER — PATIENT MESSAGE (OUTPATIENT)
Dept: PAIN MEDICINE | Facility: CLINIC | Age: 69
End: 2019-05-22

## 2019-05-23 ENCOUNTER — TELEPHONE (OUTPATIENT)
Dept: PAIN MEDICINE | Facility: CLINIC | Age: 69
End: 2019-05-23

## 2019-05-23 DIAGNOSIS — M54.16 LUMBAR RADICULITIS: ICD-10-CM

## 2019-05-23 DIAGNOSIS — Z98.890 S/P LUMBAR LAMINECTOMY: ICD-10-CM

## 2019-05-23 DIAGNOSIS — M47.816 LUMBAR SPONDYLOSIS: Primary | ICD-10-CM

## 2019-05-23 DIAGNOSIS — M51.36 DDD (DEGENERATIVE DISC DISEASE), LUMBAR: ICD-10-CM

## 2019-05-26 ENCOUNTER — PATIENT MESSAGE (OUTPATIENT)
Dept: FAMILY MEDICINE | Facility: CLINIC | Age: 69
End: 2019-05-26

## 2019-05-27 RX ORDER — ZOLPIDEM TARTRATE 10 MG/1
10 TABLET ORAL NIGHTLY PRN
Qty: 30 TABLET | Refills: 1 | Status: SHIPPED | OUTPATIENT
Start: 2019-05-27 | End: 2019-06-03 | Stop reason: SDUPTHER

## 2019-06-03 ENCOUNTER — OFFICE VISIT (OUTPATIENT)
Dept: FAMILY MEDICINE | Facility: CLINIC | Age: 69
End: 2019-06-03
Payer: MEDICARE

## 2019-06-03 VITALS
SYSTOLIC BLOOD PRESSURE: 120 MMHG | WEIGHT: 121.56 LBS | DIASTOLIC BLOOD PRESSURE: 70 MMHG | OXYGEN SATURATION: 96 % | BODY MASS INDEX: 23.87 KG/M2 | HEART RATE: 86 BPM | TEMPERATURE: 98 F | HEIGHT: 60 IN

## 2019-06-03 DIAGNOSIS — F41.9 ANXIETY: ICD-10-CM

## 2019-06-03 DIAGNOSIS — M51.36 DDD (DEGENERATIVE DISC DISEASE), LUMBAR: ICD-10-CM

## 2019-06-03 DIAGNOSIS — F32.A DEPRESSION, UNSPECIFIED DEPRESSION TYPE: ICD-10-CM

## 2019-06-03 DIAGNOSIS — M54.16 LUMBAR RADICULOPATHY: Primary | ICD-10-CM

## 2019-06-03 DIAGNOSIS — G47.00 INSOMNIA, UNSPECIFIED TYPE: ICD-10-CM

## 2019-06-03 DIAGNOSIS — M54.16 LUMBAR RADICULAR PAIN: ICD-10-CM

## 2019-06-03 PROCEDURE — 99214 OFFICE O/P EST MOD 30 MIN: CPT | Mod: S$GLB,,, | Performed by: FAMILY MEDICINE

## 2019-06-03 PROCEDURE — 99214 PR OFFICE/OUTPT VISIT, EST, LEVL IV, 30-39 MIN: ICD-10-PCS | Mod: S$GLB,,, | Performed by: FAMILY MEDICINE

## 2019-06-03 RX ORDER — BUSPIRONE HYDROCHLORIDE 5 MG/1
5 TABLET ORAL 2 TIMES DAILY
Qty: 60 TABLET | Refills: 5 | Status: SHIPPED | OUTPATIENT
Start: 2019-06-03 | End: 2019-10-01 | Stop reason: SDUPTHER

## 2019-06-03 RX ORDER — HYDROCODONE BITARTRATE AND ACETAMINOPHEN 5; 325 MG/1; MG/1
1 TABLET ORAL 2 TIMES DAILY PRN
Qty: 60 TABLET | Refills: 0 | Status: SHIPPED | OUTPATIENT
Start: 2019-06-03 | End: 2019-07-29 | Stop reason: SDUPTHER

## 2019-06-03 RX ORDER — CITALOPRAM 40 MG/1
40 TABLET, FILM COATED ORAL DAILY
Qty: 90 TABLET | Refills: 1 | Status: SHIPPED | OUTPATIENT
Start: 2019-06-03 | End: 2019-10-01

## 2019-06-03 RX ORDER — ZOLPIDEM TARTRATE 10 MG/1
10 TABLET ORAL NIGHTLY PRN
Qty: 30 TABLET | Refills: 1 | Status: SHIPPED | OUTPATIENT
Start: 2019-06-03 | End: 2019-09-06 | Stop reason: SDUPTHER

## 2019-06-03 NOTE — PROGRESS NOTES
Subjective:      Patient ID: Florentino Bowman is a 69 y.o. female.    Chief Complaint: Medication Refill; Low-back Pain; and Hip Pain (rt hip pain )      HPI   Follow up chronic pain and insomnia; going to [place] next week and gone for a while and needs 6 weeks worth of medicine for the trip  I stopped her xanax; was taking for anxiety, for past 5 years;  Xanax helped, but I stopped due to taking norco for lower back and sciatica right leg; my plan was to give her Rx for anxiety, buspar, I forgot  Using topical pain cream, wants refilled, origianlly from Dr Figueroa  Chronic pain has put a dent in her social life and activity level, morning is better than evening  Has appt with chronic pain Griffin  Uses a TENS unit a lot and getting a rash  Uses ice on leg  Review of Systems   Constitutional: Negative.    HENT: Negative.    Respiratory: Negative.    Cardiovascular: Negative.    Gastrointestinal: Negative.    Endocrine: Negative.    Genitourinary: Negative.    Musculoskeletal: Positive for back pain.        Radiates to right thigh   Neurological: Positive for headaches.   Psychiatric/Behavioral: Positive for dysphoric mood and sleep disturbance. The patient is nervous/anxious.    All other systems reviewed and are negative.    Objective:     Physical Exam   Constitutional: She is oriented to person, place, and time. She appears well-developed and well-nourished.   HENT:   Head: Normocephalic.   Eyes: Pupils are equal, round, and reactive to light. Conjunctivae and EOM are normal.   Neck: Normal range of motion. Neck supple.   Cardiovascular: Normal rate, regular rhythm and normal heart sounds.   Pulmonary/Chest: Effort normal and breath sounds normal.   Musculoskeletal: Normal range of motion.   Neurological: She is alert and oriented to person, place, and time. She has normal reflexes.   Skin: Skin is warm and dry.   Psychiatric: She has a normal mood and affect. Her behavior is normal. Judgment and thought content  normal.   Nursing note and vitals reviewed.    Assessment:     1. Lumbar radiculopathy    2. DDD (degenerative disc disease), lumbar    3. Lumbar radicular pain    4. Depression, unspecified depression type    5. Anxiety    6. Insomnia, unspecified type      Plan:        Medication List           Accurate as of 6/3/19 11:59 PM. If you have any questions, ask your nurse or doctor.               START taking these medications    busPIRone 5 MG Tab  Commonly known as:  BUSPAR  Take 1 tablet (5 mg total) by mouth 2 (two) times daily.  Started by:  Fam Chou MD     CMPD PAIN MANAGEMENT COMPOUND OINTMNENT TWO  Apply 2 Pump (3.2 g total) topically 5 (five) times daily.  Started by:  Fam Chou MD        CHANGE how you take these medications    citalopram 40 MG tablet  Commonly known as:  CELEXA  Take 1 tablet (40 mg total) by mouth once daily.  What changed:    · medication strength  · how much to take  Changed by:  Fam Chou MD     HYDROcodone-acetaminophen 5-325 mg per tablet  Commonly known as:  NORCO  Take 1 tablet by mouth 2 (two) times daily as needed for Pain.  What changed:    · when to take this  · Another medication with the same name was removed. Continue taking this medication, and follow the directions you see here.  Changed by:  Fam Chou MD        CONTINUE taking these medications    atorvastatin 40 MG tablet  Commonly known as:  LIPITOR  TAKE 1 TABLET BY MOUTH DAILY     estradiol 1 MG tablet  Commonly known as:  ESTRACE     levothyroxine 88 MCG tablet  Commonly known as:  SYNTHROID  Take 1 tablet (88 mcg total) by mouth once daily.     liothyronine 5 MCG Tab  Commonly known as:  CYTOMEL  TAKE 1 TABLET BY MOUTH DAILY     progesterone 100 MG capsule  Commonly known as:  PROMETRIUM     sumatriptan 100 MG tablet  Commonly known as:  IMITREX  TAKE 1 TABLET BY MOUTH EVERY 2 HOURS AS NEEDED FOR MIGRAINE     zolpidem 10 mg Tab  Commonly known as:  AMBIEN  Take 1 tablet (10 mg total) by mouth  nightly as needed.           Where to Get Your Medications      These medications were sent to [facility]/pharmacy #[id number] - [place] - [address] [facility]  [address] [place] [address]    Phone:  [phone]   · busPIRone 5 MG Tab  · citalopram 40 MG tablet  · HYDROcodone-acetaminophen 5-325 mg per tablet  · zolpidem 10 mg Tab     Information about where to get these medications is not yet available    Ask your nurse or doctor about these medications  · CMPD PAIN MANAGEMENT COMPOUND OINTMNENT TWO       Lumbar radiculopathy    DDD (degenerative disc disease), lumbar    Lumbar radicular pain    Depression, unspecified depression type    Anxiety    Insomnia, unspecified type    Other orders  -     flurbiprofen (CMPD PAIN MANAGEMENT COMPOUND OINTMNENT TWO); Apply 2 Pump (3.2 g total) topically 5 (five) times daily.  Dispense: 120 g; Refill: 11  -     busPIRone (BUSPAR) 5 MG Tab; Take 1 tablet (5 mg total) by mouth 2 (two) times daily.  Dispense: 60 tablet; Refill: 5  -     citalopram (CELEXA) 40 MG tablet; Take 1 tablet (40 mg total) by mouth once daily.  Dispense: 90 tablet; Refill: 1  -     HYDROcodone-acetaminophen (NORCO) 5-325 mg per tablet; Take 1 tablet by mouth 2 (two) times daily as needed for Pain.  Dispense: 60 tablet; Refill: 0  -     zolpidem (AMBIEN) 10 mg Tab; Take 1 tablet (10 mg total) by mouth nightly as needed.  Dispense: 30 tablet; Refill: 1

## 2019-06-05 ENCOUNTER — TELEPHONE (OUTPATIENT)
Dept: FAMILY MEDICINE | Facility: CLINIC | Age: 69
End: 2019-06-05

## 2019-06-05 NOTE — TELEPHONE ENCOUNTER
Does someone have this form?  The patient wants us to fax it to them.    ----- Message from Justine Sánchez sent at 6/5/2019 12:00 PM CDT -----  Contact: Brenda @ [facility] [phone]  Needs a script for topical pain cream resent to fax# [phone]. Please call Brenda @ [facility] [phone] for more info and advise.

## 2019-07-06 RX ORDER — CITALOPRAM 20 MG/1
TABLET, FILM COATED ORAL
Qty: 90 TABLET | Refills: 3 | Status: SHIPPED | OUTPATIENT
Start: 2019-07-06 | End: 2019-10-01

## 2019-07-29 ENCOUNTER — PATIENT MESSAGE (OUTPATIENT)
Dept: FAMILY MEDICINE | Facility: CLINIC | Age: 69
End: 2019-07-29

## 2019-07-29 RX ORDER — HYDROCODONE BITARTRATE AND ACETAMINOPHEN 5; 325 MG/1; MG/1
1 TABLET ORAL 2 TIMES DAILY PRN
Qty: 60 TABLET | Refills: 0 | Status: SHIPPED | OUTPATIENT
Start: 2019-07-29 | End: 2019-09-06 | Stop reason: SDUPTHER

## 2019-09-06 ENCOUNTER — PATIENT MESSAGE (OUTPATIENT)
Dept: FAMILY MEDICINE | Facility: CLINIC | Age: 69
End: 2019-09-06

## 2019-09-06 RX ORDER — HYDROCODONE BITARTRATE AND ACETAMINOPHEN 5; 325 MG/1; MG/1
1 TABLET ORAL 2 TIMES DAILY PRN
Qty: 60 TABLET | Refills: 0 | Status: SHIPPED | OUTPATIENT
Start: 2019-09-06 | End: 2019-10-23

## 2019-09-06 RX ORDER — SUMATRIPTAN SUCCINATE 100 MG/1
TABLET ORAL
Qty: 9 TABLET | Refills: 19 | Status: SHIPPED | OUTPATIENT
Start: 2019-09-06 | End: 2019-10-01 | Stop reason: SDUPTHER

## 2019-09-06 RX ORDER — ZOLPIDEM TARTRATE 10 MG/1
TABLET ORAL
Qty: 30 TABLET | Refills: 1 | Status: SHIPPED | OUTPATIENT
Start: 2019-09-06 | End: 2019-10-01 | Stop reason: SDUPTHER

## 2019-10-01 ENCOUNTER — OFFICE VISIT (OUTPATIENT)
Dept: FAMILY MEDICINE | Facility: CLINIC | Age: 69
End: 2019-10-01
Payer: MEDICARE

## 2019-10-01 VITALS
WEIGHT: 118.19 LBS | HEART RATE: 91 BPM | BODY MASS INDEX: 23.2 KG/M2 | DIASTOLIC BLOOD PRESSURE: 76 MMHG | HEIGHT: 60 IN | SYSTOLIC BLOOD PRESSURE: 102 MMHG | OXYGEN SATURATION: 98 %

## 2019-10-01 DIAGNOSIS — M47.816 LUMBAR SPONDYLOSIS: ICD-10-CM

## 2019-10-01 DIAGNOSIS — Z12.31 ENCOUNTER FOR SCREENING MAMMOGRAM FOR MALIGNANT NEOPLASM OF BREAST: ICD-10-CM

## 2019-10-01 DIAGNOSIS — M54.41 CHRONIC RIGHT-SIDED LOW BACK PAIN WITH RIGHT-SIDED SCIATICA: ICD-10-CM

## 2019-10-01 DIAGNOSIS — F41.9 ANXIETY: ICD-10-CM

## 2019-10-01 DIAGNOSIS — M25.512 CHRONIC LEFT SHOULDER PAIN: ICD-10-CM

## 2019-10-01 DIAGNOSIS — I77.9 BILATERAL CAROTID ARTERY DISEASE, UNSPECIFIED TYPE: ICD-10-CM

## 2019-10-01 DIAGNOSIS — Z82.49 FAMILY HISTORY OF PREMATURE CAD: ICD-10-CM

## 2019-10-01 DIAGNOSIS — M43.10 SPONDYLOLISTHESIS, UNSPECIFIED SPINAL REGION: ICD-10-CM

## 2019-10-01 DIAGNOSIS — M94.9 DISORDER OF CARTILAGE, UNSPECIFIED: ICD-10-CM

## 2019-10-01 DIAGNOSIS — M51.36 DDD (DEGENERATIVE DISC DISEASE), LUMBAR: ICD-10-CM

## 2019-10-01 DIAGNOSIS — M54.5 CHRONIC LOW BACK PAIN, UNSPECIFIED BACK PAIN LATERALITY, WITH SCIATICA PRESENCE UNSPECIFIED: ICD-10-CM

## 2019-10-01 DIAGNOSIS — G47.00 INSOMNIA, UNSPECIFIED TYPE: ICD-10-CM

## 2019-10-01 DIAGNOSIS — E03.4 HYPOTHYROIDISM DUE TO ACQUIRED ATROPHY OF THYROID: ICD-10-CM

## 2019-10-01 DIAGNOSIS — M54.16 LUMBAR RADICULAR PAIN: ICD-10-CM

## 2019-10-01 DIAGNOSIS — R51.9 NONINTRACTABLE HEADACHE, UNSPECIFIED CHRONICITY PATTERN, UNSPECIFIED HEADACHE TYPE: ICD-10-CM

## 2019-10-01 DIAGNOSIS — I77.1 STRICTURE OF ARTERY: ICD-10-CM

## 2019-10-01 DIAGNOSIS — G89.29 CHRONIC LOW BACK PAIN, UNSPECIFIED BACK PAIN LATERALITY, WITH SCIATICA PRESENCE UNSPECIFIED: ICD-10-CM

## 2019-10-01 DIAGNOSIS — M54.16 LUMBAR RADICULOPATHY: ICD-10-CM

## 2019-10-01 DIAGNOSIS — Z00.00 WELLNESS EXAMINATION: Primary | ICD-10-CM

## 2019-10-01 DIAGNOSIS — G89.29 CHRONIC LEFT SHOULDER PAIN: ICD-10-CM

## 2019-10-01 DIAGNOSIS — F32.A DEPRESSION, UNSPECIFIED DEPRESSION TYPE: ICD-10-CM

## 2019-10-01 DIAGNOSIS — G89.29 CHRONIC RIGHT-SIDED LOW BACK PAIN WITH RIGHT-SIDED SCIATICA: ICD-10-CM

## 2019-10-01 PROBLEM — M25.551 PAIN OF RIGHT HIP JOINT: Status: RESOLVED | Noted: 2018-02-19 | Resolved: 2019-10-01

## 2019-10-01 PROCEDURE — 99214 PR OFFICE/OUTPT VISIT, EST, LEVL IV, 30-39 MIN: ICD-10-PCS | Mod: 25,S$GLB,ICN, | Performed by: FAMILY MEDICINE

## 2019-10-01 PROCEDURE — 99214 OFFICE O/P EST MOD 30 MIN: CPT | Mod: 25,S$GLB,ICN, | Performed by: FAMILY MEDICINE

## 2019-10-01 RX ORDER — ATORVASTATIN CALCIUM 40 MG/1
40 TABLET, FILM COATED ORAL DAILY
Qty: 90 TABLET | Refills: 3 | Status: SHIPPED | OUTPATIENT
Start: 2019-10-01 | End: 2020-10-22

## 2019-10-01 RX ORDER — BUPROPION HYDROCHLORIDE 150 MG/1
150 TABLET, EXTENDED RELEASE ORAL 2 TIMES DAILY
Qty: 60 TABLET | Refills: 11 | Status: SHIPPED | OUTPATIENT
Start: 2019-10-01 | End: 2020-09-14

## 2019-10-01 RX ORDER — SUMATRIPTAN SUCCINATE 100 MG/1
TABLET ORAL
Qty: 9 TABLET | Refills: 19 | Status: SHIPPED | OUTPATIENT
Start: 2019-10-01 | End: 2020-10-12

## 2019-10-01 RX ORDER — GABAPENTIN 300 MG/1
300 CAPSULE ORAL 3 TIMES DAILY
COMMUNITY
Start: 2019-09-30 | End: 2020-12-10

## 2019-10-01 RX ORDER — TRIAMCINOLONE ACETONIDE 5 MG/G
1 CREAM TOPICAL
COMMUNITY
Start: 2019-07-29 | End: 2020-12-10

## 2019-10-01 RX ORDER — ZOLPIDEM TARTRATE 10 MG/1
TABLET ORAL
Qty: 30 TABLET | Refills: 1 | Status: SHIPPED | OUTPATIENT
Start: 2019-10-01 | End: 2019-12-04 | Stop reason: SDUPTHER

## 2019-10-01 RX ORDER — MUPIROCIN 20 MG/G
1 OINTMENT TOPICAL
COMMUNITY
Start: 2019-07-01 | End: 2020-12-10

## 2019-10-01 RX ORDER — BUSPIRONE HYDROCHLORIDE 5 MG/1
5 TABLET ORAL 2 TIMES DAILY
Qty: 60 TABLET | Refills: 5 | Status: SHIPPED | OUTPATIENT
Start: 2019-10-01 | End: 2020-12-10

## 2019-10-01 NOTE — PROGRESS NOTES
"Subjective:      Patient ID: Florentino Bowman is a 69 y.o. female.    Chief Complaint: wellness      Vitals:    10/01/19 1354   BP: 102/76   Pulse: 91   SpO2: 98%   Weight: 53.6 kg (118 lb 2.7 oz)   Height: 5' (1.524 m)        HPI   Wellness; had L spine microdiscectomy with isaac Espino at [facility], at [place] L5S1.  "a few other things need to be done" surgery helped the acute pain, still has tingling numb feeling in her left leg down to toes  CBC and CMP normal she brought with her.  Needs yearly tests done  Has carotid artery disease, saw Dr Lux, who doubled her lipitor and added ASA 81 mg and told her to do followup ultrsounds  Needs lipid  More anxious, is on 20 mg of c elexa now, will change to wellbutrin  For pain, has been takiong total of 1/day of norco  ambien for sleep          Review of Systems   Constitutional: Positive for activity change. Negative for unexpected weight change.   HENT: Negative.  Negative for hearing loss, rhinorrhea and trouble swallowing.    Eyes: Negative for discharge and visual disturbance.   Respiratory: Negative.  Negative for chest tightness and wheezing.    Cardiovascular: Negative.  Negative for chest pain and palpitations.   Gastrointestinal: Positive for constipation. Negative for blood in stool, diarrhea and vomiting.   Endocrine: Positive for polyuria. Negative for polydipsia.   Genitourinary: Negative.  Negative for difficulty urinating, dysuria, hematuria and menstrual problem.   Musculoskeletal: Positive for back pain. Negative for arthralgias, joint swelling and neck pain.        Back pain, more that one cause and feel different, the norco helps both, helps   Right thigh pain     Neurological: Positive for headaches. Negative for weakness.   Psychiatric/Behavioral: Positive for dysphoric mood. Negative for confusion. The patient is nervous/anxious.    All other systems reviewed and are negative.    Objective:     Physical Exam   Constitutional: " She is oriented to person, place, and time. She appears well-developed and well-nourished.   HENT:   Head: Normocephalic.   Eyes: Pupils are equal, round, and reactive to light. Conjunctivae and EOM are normal.   Neck: Normal range of motion. Neck supple.   Cardiovascular: Normal rate, regular rhythm and normal heart sounds.   Pulmonary/Chest: Effort normal and breath sounds normal.   Musculoskeletal: Normal range of motion.   Neurological: She is alert and oriented to person, place, and time. She has normal reflexes.   Skin: Skin is warm and dry.   Psychiatric: She has a normal mood and affect. Her behavior is normal. Judgment and thought content normal.   Nursing note and vitals reviewed.    Assessment:     1. Wellness examination    2. Bilateral carotid artery disease, unspecified type    3. Stricture of artery     4. DDD (degenerative disc disease), lumbar    5. Lumbar radiculopathy    6. Lumbar radicular pain    7. Lumbar spondylosis    8. Depression, unspecified depression type    9. Anxiety    10. Hypothyroidism due to acquired atrophy of thyroid    11. Chronic low back pain, unspecified back pain laterality, with sciatica presence unspecified    12. Chronic left shoulder pain    13. Chronic right-sided low back pain with right-sided sciatica    14. Spondylolisthesis, unspecified spinal region    15. Nonintractable headache, unspecified chronicity pattern, unspecified headache type    16. Insomnia, unspecified type    17. Encounter for screening mammogram for malignant neoplasm of breast     18. Disorder of cartilage, unspecified     19. Family history of premature CAD      Plan:        Medication List           Accurate as of October 1, 2019 11:59 PM. If you have any questions, ask your nurse or doctor.               START taking these medications    buPROPion 150 MG TBSR 12 hr tablet  Commonly known as:  WELLBUTRIN SR  Take 1 tablet (150 mg total) by mouth 2 (two) times daily.  Started by:  Fam Chou,  MD        CHANGE how you take these medications    atorvastatin 40 MG tablet  Commonly known as:  LIPITOR  Take 1 tablet (40 mg total) by mouth once daily.  What changed:  additional instructions  Changed by:  Fam Chou MD     sumatriptan 100 MG tablet  Commonly known as:  IMITREX  One prn headache, repeat in 2 hours if needed  What changed:  See the new instructions.  Changed by:  Fam Chou MD     zolpidem 10 mg Tab  Commonly known as:  AMBIEN  TAKE 1 TABLET BY MOUTH EVERY DAY IN THE EVENING AS NEEDED  What changed:    · how much to take  · how to take this  · when to take this  · additional instructions  Changed by:  Fam Chou MD        CONTINUE taking these medications    busPIRone 5 MG Tab  Commonly known as:  BUSPAR  Take 1 tablet (5 mg total) by mouth 2 (two) times daily.     CMPD PAIN MANAGEMENT COMPOUND OINTMNENT TWO  Apply 2 Pump (3.2 g total) topically 5 (five) times daily.     estradiol 1 MG tablet  Commonly known as:  ESTRACE     gabapentin 300 MG capsule  Commonly known as:  NEURONTIN     HYDROcodone-acetaminophen 5-325 mg per tablet  Commonly known as:  NORCO  Take 1 tablet by mouth 2 (two) times daily as needed for Pain.     levothyroxine 88 MCG tablet  Commonly known as:  SYNTHROID  Take 1 tablet (88 mcg total) by mouth once daily.     liothyronine 5 MCG Tab  Commonly known as:  CYTOMEL  TAKE 1 TABLET BY MOUTH DAILY     mupirocin 2 % ointment  Commonly known as:  BACTROBAN     progesterone 100 MG capsule  Commonly known as:  PROMETRIUM     triamcinolone acetonide 0.5% 0.5 % Crea  Commonly known as:  KENALOG        STOP taking these medications    citalopram 20 MG tablet  Commonly known as:  CELEXA  Stopped by:  Fam Chou MD     citalopram 40 MG tablet  Commonly known as:  CELEXA  Stopped by:  Fam Chou MD           Where to Get Your Medications      These medications were sent to [facility]/pharmacy #[id number] - [place] - [address]  [address] [place]  [address]    Phone:  [phone]   · atorvastatin 40 MG tablet  · buPROPion 150 MG TBSR 12 hr tablet  · busPIRone 5 MG Tab  · sumatriptan 100 MG tablet  · zolpidem 10 mg Tab       Wellness examination  -     Lipid panel; Future  -     Mammo Digital Screening Bilat w/ Yordan; Future; Expected date: 10/01/2019  -     DXA Bone Density Spine And Hip; Future; Expected date: 10/01/2019  -     buPROPion (WELLBUTRIN SR) 150 MG TBSR 12 hr tablet; Take 1 tablet (150 mg total) by mouth 2 (two) times daily.  Dispense: 60 tablet; Refill: 11    Bilateral carotid artery disease, unspecified type  -     Ambulatory referral to Vascular Surgery  -     US Carotid Bilateral; Future; Expected date: 10/01/2019  -     Lipid panel; Future  -     Mammo Digital Screening Bilat w/ Yordan; Future; Expected date: 10/01/2019  -     DXA Bone Density Spine And Hip; Future; Expected date: 10/01/2019  -     buPROPion (WELLBUTRIN SR) 150 MG TBSR 12 hr tablet; Take 1 tablet (150 mg total) by mouth 2 (two) times daily.  Dispense: 60 tablet; Refill: 11  -     Ambulatory referral to Cardiology    Stricture of artery   -     US Carotid Bilateral; Future; Expected date: 10/01/2019  -     Lipid panel; Future  -     Mammo Digital Screening Bilat w/ Yordan; Future; Expected date: 10/01/2019  -     DXA Bone Density Spine And Hip; Future; Expected date: 10/01/2019  -     buPROPion (WELLBUTRIN SR) 150 MG TBSR 12 hr tablet; Take 1 tablet (150 mg total) by mouth 2 (two) times daily.  Dispense: 60 tablet; Refill: 11    DDD (degenerative disc disease), lumbar  -     Lipid panel; Future  -     Mammo Digital Screening Bilat w/ Yordan; Future; Expected date: 10/01/2019  -     DXA Bone Density Spine And Hip; Future; Expected date: 10/01/2019  -     buPROPion (WELLBUTRIN SR) 150 MG TBSR 12 hr tablet; Take 1 tablet (150 mg total) by mouth 2 (two) times daily.  Dispense: 60 tablet; Refill: 11    Lumbar radiculopathy  -     Lipid panel; Future  -     Mammo Digital Screening Bilat w/  Yordan; Future; Expected date: 10/01/2019  -     DXA Bone Density Spine And Hip; Future; Expected date: 10/01/2019  -     buPROPion (WELLBUTRIN SR) 150 MG TBSR 12 hr tablet; Take 1 tablet (150 mg total) by mouth 2 (two) times daily.  Dispense: 60 tablet; Refill: 11    Lumbar radicular pain  -     Lipid panel; Future  -     Mammo Digital Screening Bilat w/ Yordan; Future; Expected date: 10/01/2019  -     DXA Bone Density Spine And Hip; Future; Expected date: 10/01/2019  -     buPROPion (WELLBUTRIN SR) 150 MG TBSR 12 hr tablet; Take 1 tablet (150 mg total) by mouth 2 (two) times daily.  Dispense: 60 tablet; Refill: 11    Lumbar spondylosis  -     Lipid panel; Future  -     Mammo Digital Screening Bilat w/ Yordan; Future; Expected date: 10/01/2019  -     DXA Bone Density Spine And Hip; Future; Expected date: 10/01/2019  -     buPROPion (WELLBUTRIN SR) 150 MG TBSR 12 hr tablet; Take 1 tablet (150 mg total) by mouth 2 (two) times daily.  Dispense: 60 tablet; Refill: 11    Depression, unspecified depression type  -     Lipid panel; Future  -     Mammo Digital Screening Bilat w/ Yordan; Future; Expected date: 10/01/2019  -     DXA Bone Density Spine And Hip; Future; Expected date: 10/01/2019  -     buPROPion (WELLBUTRIN SR) 150 MG TBSR 12 hr tablet; Take 1 tablet (150 mg total) by mouth 2 (two) times daily.  Dispense: 60 tablet; Refill: 11    Anxiety  -     Lipid panel; Future  -     Mammo Digital Screening Bilat w/ Yordan; Future; Expected date: 10/01/2019  -     DXA Bone Density Spine And Hip; Future; Expected date: 10/01/2019  -     buPROPion (WELLBUTRIN SR) 150 MG TBSR 12 hr tablet; Take 1 tablet (150 mg total) by mouth 2 (two) times daily.  Dispense: 60 tablet; Refill: 11    Hypothyroidism due to acquired atrophy of thyroid  -     Lipid panel; Future  -     Mammo Digital Screening Bilat w/ Yordan; Future; Expected date: 10/01/2019  -     DXA Bone Density Spine And Hip; Future; Expected date: 10/01/2019  -     buPROPion  (WELLBUTRIN SR) 150 MG TBSR 12 hr tablet; Take 1 tablet (150 mg total) by mouth 2 (two) times daily.  Dispense: 60 tablet; Refill: 11    Chronic low back pain, unspecified back pain laterality, with sciatica presence unspecified  -     Lipid panel; Future  -     Mammo Digital Screening Bilat w/ Yordan; Future; Expected date: 10/01/2019  -     DXA Bone Density Spine And Hip; Future; Expected date: 10/01/2019  -     buPROPion (WELLBUTRIN SR) 150 MG TBSR 12 hr tablet; Take 1 tablet (150 mg total) by mouth 2 (two) times daily.  Dispense: 60 tablet; Refill: 11    Chronic left shoulder pain  -     Lipid panel; Future  -     Mammo Digital Screening Bilat w/ Yordan; Future; Expected date: 10/01/2019  -     DXA Bone Density Spine And Hip; Future; Expected date: 10/01/2019  -     buPROPion (WELLBUTRIN SR) 150 MG TBSR 12 hr tablet; Take 1 tablet (150 mg total) by mouth 2 (two) times daily.  Dispense: 60 tablet; Refill: 11    Chronic right-sided low back pain with right-sided sciatica  -     Lipid panel; Future  -     Mammo Digital Screening Bilat w/ Yordan; Future; Expected date: 10/01/2019  -     DXA Bone Density Spine And Hip; Future; Expected date: 10/01/2019  -     buPROPion (WELLBUTRIN SR) 150 MG TBSR 12 hr tablet; Take 1 tablet (150 mg total) by mouth 2 (two) times daily.  Dispense: 60 tablet; Refill: 11    Spondylolisthesis, unspecified spinal region  -     Lipid panel; Future  -     Mammo Digital Screening Bilat w/ Yordan; Future; Expected date: 10/01/2019  -     DXA Bone Density Spine And Hip; Future; Expected date: 10/01/2019  -     buPROPion (WELLBUTRIN SR) 150 MG TBSR 12 hr tablet; Take 1 tablet (150 mg total) by mouth 2 (two) times daily.  Dispense: 60 tablet; Refill: 11    Nonintractable headache, unspecified chronicity pattern, unspecified headache type  -     Lipid panel; Future  -     Mammo Digital Screening Bilat w/ Yordan; Future; Expected date: 10/01/2019  -     DXA Bone Density Spine And Hip; Future; Expected date:  10/01/2019  -     buPROPion (WELLBUTRIN SR) 150 MG TBSR 12 hr tablet; Take 1 tablet (150 mg total) by mouth 2 (two) times daily.  Dispense: 60 tablet; Refill: 11    Insomnia, unspecified type  -     Lipid panel; Future  -     Mammo Digital Screening Bilat w/ Yordan; Future; Expected date: 10/01/2019  -     DXA Bone Density Spine And Hip; Future; Expected date: 10/01/2019  -     buPROPion (WELLBUTRIN SR) 150 MG TBSR 12 hr tablet; Take 1 tablet (150 mg total) by mouth 2 (two) times daily.  Dispense: 60 tablet; Refill: 11    Encounter for screening mammogram for malignant neoplasm of breast   -     Mammo Digital Screening Bilat w/ Yordan; Future; Expected date: 10/01/2019    Disorder of cartilage, unspecified   -     DXA Bone Density Spine And Hip; Future; Expected date: 10/01/2019    Family history of premature CAD  -     Ambulatory referral to Cardiology    Other orders  -     atorvastatin (LIPITOR) 40 MG tablet; Take 1 tablet (40 mg total) by mouth once daily.  Dispense: 90 tablet; Refill: 3  -     busPIRone (BUSPAR) 5 MG Tab; Take 1 tablet (5 mg total) by mouth 2 (two) times daily.  Dispense: 60 tablet; Refill: 5  -     sumatriptan (IMITREX) 100 MG tablet; One prn headache, repeat in 2 hours if needed  Dispense: 9 tablet; Refill: 19  -     zolpidem (AMBIEN) 10 mg Tab; TAKE 1 TABLET BY MOUTH EVERY DAY IN THE EVENING AS NEEDED  Dispense: 30 tablet; Refill: 1

## 2019-10-09 ENCOUNTER — HOSPITAL ENCOUNTER (OUTPATIENT)
Dept: RADIOLOGY | Facility: HOSPITAL | Age: 69
Discharge: HOME | End: 2019-10-09
Attending: FAMILY MEDICINE
Payer: MEDICARE

## 2019-10-09 DIAGNOSIS — M54.41 CHRONIC RIGHT-SIDED LOW BACK PAIN WITH RIGHT-SIDED SCIATICA: ICD-10-CM

## 2019-10-09 DIAGNOSIS — I77.9 BILATERAL CAROTID ARTERY DISEASE, UNSPECIFIED TYPE: ICD-10-CM

## 2019-10-09 DIAGNOSIS — G47.00 INSOMNIA, UNSPECIFIED TYPE: ICD-10-CM

## 2019-10-09 DIAGNOSIS — M47.816 LUMBAR SPONDYLOSIS: ICD-10-CM

## 2019-10-09 DIAGNOSIS — I77.1 STRICTURE OF ARTERY: ICD-10-CM

## 2019-10-09 DIAGNOSIS — F32.A DEPRESSION, UNSPECIFIED DEPRESSION TYPE: ICD-10-CM

## 2019-10-09 DIAGNOSIS — E03.4 HYPOTHYROIDISM DUE TO ACQUIRED ATROPHY OF THYROID: ICD-10-CM

## 2019-10-09 DIAGNOSIS — Z00.00 WELLNESS EXAMINATION: ICD-10-CM

## 2019-10-09 DIAGNOSIS — R51.9 NONINTRACTABLE HEADACHE, UNSPECIFIED CHRONICITY PATTERN, UNSPECIFIED HEADACHE TYPE: ICD-10-CM

## 2019-10-09 DIAGNOSIS — M43.10 SPONDYLOLISTHESIS, UNSPECIFIED SPINAL REGION: ICD-10-CM

## 2019-10-09 DIAGNOSIS — M54.16 LUMBAR RADICULOPATHY: ICD-10-CM

## 2019-10-09 DIAGNOSIS — M94.9 DISORDER OF CARTILAGE, UNSPECIFIED: ICD-10-CM

## 2019-10-09 DIAGNOSIS — G89.29 CHRONIC LEFT SHOULDER PAIN: ICD-10-CM

## 2019-10-09 DIAGNOSIS — F41.9 ANXIETY: ICD-10-CM

## 2019-10-09 DIAGNOSIS — G89.29 CHRONIC RIGHT-SIDED LOW BACK PAIN WITH RIGHT-SIDED SCIATICA: ICD-10-CM

## 2019-10-09 DIAGNOSIS — M54.16 LUMBAR RADICULAR PAIN: ICD-10-CM

## 2019-10-09 DIAGNOSIS — G89.29 CHRONIC LOW BACK PAIN, UNSPECIFIED BACK PAIN LATERALITY, WITH SCIATICA PRESENCE UNSPECIFIED: ICD-10-CM

## 2019-10-09 DIAGNOSIS — Z12.31 ENCOUNTER FOR SCREENING MAMMOGRAM FOR MALIGNANT NEOPLASM OF BREAST: ICD-10-CM

## 2019-10-09 DIAGNOSIS — M25.512 CHRONIC LEFT SHOULDER PAIN: ICD-10-CM

## 2019-10-09 DIAGNOSIS — M51.36 DDD (DEGENERATIVE DISC DISEASE), LUMBAR: ICD-10-CM

## 2019-10-09 DIAGNOSIS — M54.5 CHRONIC LOW BACK PAIN, UNSPECIFIED BACK PAIN LATERALITY, WITH SCIATICA PRESENCE UNSPECIFIED: ICD-10-CM

## 2019-10-09 PROCEDURE — 77063 MAMMO DIGITAL SCREENING BILAT WITH TOMOSYNTHESIS_CAD: ICD-10-PCS | Mod: 26,,, | Performed by: RADIOLOGY

## 2019-10-09 PROCEDURE — 77080 DXA BONE DENSITY AXIAL: CPT | Mod: TC

## 2019-10-09 PROCEDURE — 77080 DXA BONE DENSITY AXIAL: CPT | Mod: 26,,, | Performed by: RADIOLOGY

## 2019-10-09 PROCEDURE — 93880 EXTRACRANIAL BILAT STUDY: CPT | Mod: TC

## 2019-10-09 PROCEDURE — 77063 BREAST TOMOSYNTHESIS BI: CPT | Mod: 26,,, | Performed by: RADIOLOGY

## 2019-10-09 PROCEDURE — 93880 EXTRACRANIAL BILAT STUDY: CPT | Mod: 26,,, | Performed by: RADIOLOGY

## 2019-10-09 PROCEDURE — 77080 DEXA BONE DENSITY SPINE HIP: ICD-10-PCS | Mod: 26,,, | Performed by: RADIOLOGY

## 2019-10-09 PROCEDURE — 93880 US CAROTID BILATERAL: ICD-10-PCS | Mod: 26,,, | Performed by: RADIOLOGY

## 2019-10-09 PROCEDURE — 77067 SCR MAMMO BI INCL CAD: CPT | Mod: TC

## 2019-10-09 PROCEDURE — 77067 MAMMO DIGITAL SCREENING BILAT WITH TOMOSYNTHESIS_CAD: ICD-10-PCS | Mod: 26,,, | Performed by: RADIOLOGY

## 2019-10-09 PROCEDURE — 77067 SCR MAMMO BI INCL CAD: CPT | Mod: 26,,, | Performed by: RADIOLOGY

## 2019-10-11 ENCOUNTER — TELEPHONE (OUTPATIENT)
Dept: FAMILY MEDICINE | Facility: CLINIC | Age: 69
End: 2019-10-11

## 2019-10-11 DIAGNOSIS — R92.8 ABNORMAL MAMMOGRAM: Primary | ICD-10-CM

## 2019-10-14 ENCOUNTER — TELEPHONE (OUTPATIENT)
Dept: FAMILY MEDICINE | Facility: CLINIC | Age: 69
End: 2019-10-14

## 2019-10-14 ENCOUNTER — PATIENT OUTREACH (OUTPATIENT)
Dept: ADMINISTRATIVE | Facility: OTHER | Age: 69
End: 2019-10-14

## 2019-10-16 ENCOUNTER — TELEPHONE (OUTPATIENT)
Dept: RADIOLOGY | Facility: HOSPITAL | Age: 69
End: 2019-10-16

## 2019-10-16 ENCOUNTER — OFFICE VISIT (OUTPATIENT)
Dept: VASCULAR SURGERY | Facility: CLINIC | Age: 69
End: 2019-10-16
Attending: SURGERY
Payer: MEDICARE

## 2019-10-16 ENCOUNTER — HOSPITAL ENCOUNTER (OUTPATIENT)
Dept: RADIOLOGY | Facility: HOSPITAL | Age: 69
Discharge: HOME | End: 2019-10-16
Attending: FAMILY MEDICINE
Payer: MEDICARE

## 2019-10-16 VITALS
HEART RATE: 85 BPM | TEMPERATURE: 98 F | BODY MASS INDEX: 22.51 KG/M2 | WEIGHT: 114.63 LBS | SYSTOLIC BLOOD PRESSURE: 133 MMHG | HEIGHT: 60 IN | DIASTOLIC BLOOD PRESSURE: 63 MMHG

## 2019-10-16 DIAGNOSIS — I65.23 CAROTID STENOSIS, ASYMPTOMATIC, BILATERAL: Primary | ICD-10-CM

## 2019-10-16 DIAGNOSIS — R92.8 ABNORMAL MAMMOGRAM: ICD-10-CM

## 2019-10-16 PROCEDURE — 99999 PR PBB SHADOW E&M-EST. PATIENT-LVL IV: CPT | Mod: PBBFAC,,, | Performed by: SURGERY

## 2019-10-16 PROCEDURE — 99213 PR OFFICE/OUTPT VISIT, EST, LEVL III, 20-29 MIN: ICD-10-PCS | Mod: S$PBB,,, | Performed by: SURGERY

## 2019-10-16 PROCEDURE — 77061 BREAST TOMOSYNTHESIS UNI: CPT | Mod: 26,,, | Performed by: RADIOLOGY

## 2019-10-16 PROCEDURE — 77065 MAMMO DIGITAL DIAGNOSTIC RIGHT WITH TOMOSYNTHESIS_CAD: ICD-10-PCS | Mod: 26,,, | Performed by: RADIOLOGY

## 2019-10-16 PROCEDURE — 77065 DX MAMMO INCL CAD UNI: CPT | Mod: TC,PO

## 2019-10-16 PROCEDURE — 99214 OFFICE O/P EST MOD 30 MIN: CPT | Mod: PBBFAC | Performed by: SURGERY

## 2019-10-16 PROCEDURE — 77061 MAMMO DIGITAL DIAGNOSTIC RIGHT WITH TOMOSYNTHESIS_CAD: ICD-10-PCS | Mod: 26,,, | Performed by: RADIOLOGY

## 2019-10-16 PROCEDURE — 77065 DX MAMMO INCL CAD UNI: CPT | Mod: 26,,, | Performed by: RADIOLOGY

## 2019-10-16 PROCEDURE — 99999 PR PBB SHADOW E&M-EST. PATIENT-LVL IV: ICD-10-PCS | Mod: PBBFAC,,, | Performed by: SURGERY

## 2019-10-16 PROCEDURE — 99213 OFFICE O/P EST LOW 20 MIN: CPT | Mod: S$PBB,,, | Performed by: SURGERY

## 2019-10-16 NOTE — PROGRESS NOTES
Patient ID: Florentino Bowman is a 69 y.o. female.    I. HISTORY     Chief Complaint: No chief complaint on file.      HPI Florentino Bowman is a 69 y.o. female referred from Dr. Fam Chou for evaluation and management of asymptomatic carotid stenosis. Denies any history of arm or leg numbness or weakness, facial droop, vision changes or slurred speech. No significant complaints today. Has a family history of stroke (mother) but no significant coronary disease. Denies chest pain or dyspnea on exertion. Denies claudication. Only isdsue is chronic back pain. Has seen Danyelle Kelly and [facility]. Underwent laminectomy in Dec 2017 for right leg radiculopathy which has persisted.    Interval:  The patient presents today for annual follow up of asymptomatic GLENDY. She denies interval development of amaurosis, TIA, stroke, or other issues. Also notes L calf burning and heaviness. Has noted a prominent vein at the top of her posterior calf. Has not tried compression stocking yet. States it is different from her prior sciatic nerve pain.     Non-smoker  On Statin + ASA 81 intermittently     Review of Systems   Constitution: Negative.   HENT: Negative.    Eyes: Negative for blurred vision, vision loss in left eye and vision loss in right eye.   Cardiovascular: Negative for chest pain, claudication and leg swelling.   Respiratory: Negative.    Endocrine: Negative.    Hematologic/Lymphatic: Negative for adenopathy and bleeding problem. Does not bruise/bleed easily.   Skin: Negative.    Musculoskeletal: Positive for arthritis, back pain, myalgias and stiffness.   Gastrointestinal: Negative for abdominal pain, nausea and vomiting.   Neurological: Positive for paresthesias and sensory change.   Psychiatric/Behavioral: Negative.    Allergic/Immunologic: Negative.        II. PHYSICAL EXAM            There is no height or weight on file to calculate BMI.      Physical Exam   Constitutional: She is oriented to person, place, and time. She  appears well-developed and well-nourished. No distress.   HENT:   Head: Normocephalic and atraumatic.   Eyes: Conjunctivae and EOM are normal.   Cardiovascular: Normal rate, normal heart sounds and intact distal pulses.   Pulses:       Radial pulses are 2+ on the right side, and 2+ on the left side.        Dorsalis pedis pulses are 1+ on the right side, and 1+ on the left side.   Pulmonary/Chest: Effort normal and breath sounds normal. No respiratory distress.   Musculoskeletal: Normal range of motion. She exhibits no edema or tenderness.   Neurological: She is alert and oriented to person, place, and time. No cranial nerve deficit.   Skin: Skin is warm and dry. No erythema.   Psychiatric: She has a normal mood and affect.   Vitals reviewed.      III. ASSESSMENT & PLAN (MEDICAL DECISION MAKING)     No diagnosis found.    Imaging Results:  10/9/19 Carotid Duplex  FINDINGS:  Right:  ICA  cm/sec    ICA/CCA peak systolic velocity ratio: 1.3    Vertebral artery: Antegrade flow and normal waveform.    Left:  ICA PSV 90 cm/sec    ICA/CCA peak systolic velocity ratio: 1.0    Vertebral artery: Antegrade flow and normal waveform.    Previously  Carotid Duplex:   R L   50-69%    EDV 68 50-69%    EDV 48         Assessment/Diagnosis and Plan:    Florentino Bowman is a 69 y.o. female with moderate bilateral carotid stenosis. She remains asymptomatic and there is no indication currently to undergo endarterectomy. Also with s/sx of mild venous insufficiency    - F/U 2 year with repeat carotid duplex and venous u/s  - Will try OTC compression stockings for her L leg  - cont Statin/ASA    Oscar Bowman MD   Pager: [phone]  General Surgery PGY-III  [facility]      STAFF ADDENDUM    I have reviewed the relevant data and the resident's assessment and agree with the findings and plan as outlined above.    Jaswinder Brandt MD  Vascular & Endovascular Surgery

## 2019-10-16 NOTE — LETTER
October 16, 2019      Fam Chou MD  [address] [address]           [facility] - Vascular Surgery  [address]  [place] [address]  Phone: [phone]  Fax: [phone]          Patient: Florentino Bowman   MR Number: [id number]   YOB: 1950   Date of Visit: 10/16/2019       Dear Dr. Fam Chou:    Thank you for referring Florentino Bowman to me for evaluation. Attached you will find relevant portions of my assessment and plan of care.    If you have questions, please do not hesitate to call me. I look forward to following Florentino Bowman along with you.    Sincerely,    Oscar Bowman MD    Enclosure  CC:  No Recipients    If you would like to receive this communication electronically, please contact [email or web address] or [phone] to request more information on [employer] Link access.    For providers and/or their staff who would like to refer a patient to [facility], please contact us through our one-stop-shop provider referral line, [facility] [place], at [phone].    If you feel you have received this communication in error or would no longer like to receive these types of communications, please e-mail [email or web address]

## 2019-10-17 ENCOUNTER — TELEPHONE (OUTPATIENT)
Dept: RADIOLOGY | Facility: HOSPITAL | Age: 69
End: 2019-10-17

## 2019-10-17 ENCOUNTER — TELEPHONE (OUTPATIENT)
Dept: FAMILY MEDICINE | Facility: CLINIC | Age: 69
End: 2019-10-17

## 2019-10-17 DIAGNOSIS — R92.8 ABNORMAL MAMMOGRAM OF RIGHT BREAST: Primary | ICD-10-CM

## 2019-10-17 NOTE — TELEPHONE ENCOUNTER
Spoke with patient. Reviewed breast biopsy procedure and reviewed instructions for breast biopsy. Patient expressed understanding and all questions were answered. Provided patient with my phone number to call for any further concerns or questions.   Patient scheduled breast biopsy at the [facility] on 10/21/2019.

## 2019-10-21 ENCOUNTER — HOSPITAL ENCOUNTER (OUTPATIENT)
Dept: RADIOLOGY | Facility: HOSPITAL | Age: 69
Discharge: HOME | End: 2019-10-21
Attending: FAMILY MEDICINE
Payer: MEDICARE

## 2019-10-21 DIAGNOSIS — R92.8 ABNORMAL MAMMOGRAM: ICD-10-CM

## 2019-10-21 PROCEDURE — 19081 MAMMO BREAST STEREOTACTIC BREAST BIOPSY RIGHT: ICD-10-PCS | Mod: RT,,, | Performed by: RADIOLOGY

## 2019-10-21 PROCEDURE — 19081 BX BREAST 1ST LESION STRTCTC: CPT | Mod: RT,,, | Performed by: RADIOLOGY

## 2019-10-21 PROCEDURE — 88305 TISSUE SPECIMEN TO PATHOLOGY, RADIOLOGY: ICD-10-PCS | Mod: 26,,, | Performed by: PATHOLOGY

## 2019-10-21 PROCEDURE — 19081 BX BREAST 1ST LESION STRTCTC: CPT | Mod: PO,RT

## 2019-10-21 PROCEDURE — 88305 TISSUE EXAM BY PATHOLOGIST: CPT | Performed by: PATHOLOGY

## 2019-10-21 PROCEDURE — 25000003 PHARM REV CODE 250: Mod: PO | Performed by: FAMILY MEDICINE

## 2019-10-21 PROCEDURE — 88305 TISSUE EXAM BY PATHOLOGIST: CPT | Mod: 26,,, | Performed by: PATHOLOGY

## 2019-10-21 RX ORDER — LIDOCAINE HYDROCHLORIDE AND EPINEPHRINE 20; 10 MG/ML; UG/ML
20 INJECTION, SOLUTION INFILTRATION; PERINEURAL ONCE
Status: COMPLETED | OUTPATIENT
Start: 2019-10-21 | End: 2019-10-21

## 2019-10-21 RX ORDER — LIDOCAINE HYDROCHLORIDE 10 MG/ML
1 INJECTION, SOLUTION EPIDURAL; INFILTRATION; INTRACAUDAL; PERINEURAL ONCE
Status: COMPLETED | OUTPATIENT
Start: 2019-10-21 | End: 2019-10-21

## 2019-10-21 RX ADMIN — LIDOCAINE HYDROCHLORIDE 1 ML: 10 INJECTION, SOLUTION EPIDURAL; INFILTRATION; INTRACAUDAL; PERINEURAL at 03:10

## 2019-10-21 RX ADMIN — LIDOCAINE HYDROCHLORIDE AND EPINEPHRINE 20 ML: 20; 10 INJECTION, SOLUTION INFILTRATION; PERINEURAL at 03:10

## 2019-10-22 ENCOUNTER — TELEPHONE (OUTPATIENT)
Dept: HEMATOLOGY/ONCOLOGY | Facility: CLINIC | Age: 69
End: 2019-10-22

## 2019-10-22 NOTE — TELEPHONE ENCOUNTER
Left voicemail for pt to return call for biopsy results.    ----- Message from Blake Ro MD sent at 10/22/2019  1:34 PM CDT -----  Benign and concordant.    Thank you,    Blake Ro M.D.

## 2019-10-22 NOTE — TELEPHONE ENCOUNTER
Patient called with results of breast biopsy from 10/21/2019,   no atypia/benign, all questions answered, pt advised to follow up in 6 months with repeat imaging per core biopsy protocol.  reviewed biopsy results and results are benign and concordant. Patient verbalized understanding.

## 2019-10-23 ENCOUNTER — PATIENT MESSAGE (OUTPATIENT)
Dept: FAMILY MEDICINE | Facility: CLINIC | Age: 69
End: 2019-10-23

## 2019-10-23 ENCOUNTER — TELEPHONE (OUTPATIENT)
Dept: FAMILY MEDICINE | Facility: CLINIC | Age: 69
End: 2019-10-23

## 2019-10-23 RX ORDER — ALPRAZOLAM 0.5 MG/1
0.5 TABLET ORAL NIGHTLY PRN
Qty: 30 TABLET | Refills: 5 | Status: SHIPPED | OUTPATIENT
Start: 2019-10-23 | End: 2020-09-24 | Stop reason: SDUPTHER

## 2019-10-28 ENCOUNTER — PATIENT OUTREACH (OUTPATIENT)
Dept: ADMINISTRATIVE | Facility: OTHER | Age: 69
End: 2019-10-28

## 2019-10-29 ENCOUNTER — OFFICE VISIT (OUTPATIENT)
Dept: CARDIOLOGY | Facility: CLINIC | Age: 69
End: 2019-10-29
Payer: MEDICARE

## 2019-10-29 VITALS
HEART RATE: 76 BPM | BODY MASS INDEX: 23.13 KG/M2 | SYSTOLIC BLOOD PRESSURE: 122 MMHG | OXYGEN SATURATION: 97 % | WEIGHT: 117.81 LBS | DIASTOLIC BLOOD PRESSURE: 84 MMHG | HEIGHT: 60 IN

## 2019-10-29 DIAGNOSIS — R07.9 CHEST PAIN: ICD-10-CM

## 2019-10-29 DIAGNOSIS — M54.16 LUMBAR RADICULOPATHY: ICD-10-CM

## 2019-10-29 DIAGNOSIS — F41.9 ANXIETY: ICD-10-CM

## 2019-10-29 DIAGNOSIS — E03.4 HYPOTHYROIDISM DUE TO ACQUIRED ATROPHY OF THYROID: ICD-10-CM

## 2019-10-29 DIAGNOSIS — R07.9 CHEST PAIN, UNSPECIFIED TYPE: Primary | ICD-10-CM

## 2019-10-29 DIAGNOSIS — I77.9 BILATERAL CAROTID ARTERY DISEASE, UNSPECIFIED TYPE: ICD-10-CM

## 2019-10-29 PROCEDURE — 99213 OFFICE O/P EST LOW 20 MIN: CPT | Mod: PBBFAC,PO,25 | Performed by: INTERNAL MEDICINE

## 2019-10-29 PROCEDURE — 99999 PR PBB SHADOW E&M-EST. PATIENT-LVL III: ICD-10-PCS | Mod: PBBFAC,,, | Performed by: INTERNAL MEDICINE

## 2019-10-29 PROCEDURE — 93010 EKG 12-LEAD: ICD-10-PCS | Mod: S$PBB,,, | Performed by: INTERNAL MEDICINE

## 2019-10-29 PROCEDURE — 99214 PR OFFICE/OUTPT VISIT, EST, LEVL IV, 30-39 MIN: ICD-10-PCS | Mod: S$PBB,,, | Performed by: INTERNAL MEDICINE

## 2019-10-29 PROCEDURE — 93010 ELECTROCARDIOGRAM REPORT: CPT | Mod: S$PBB,,, | Performed by: INTERNAL MEDICINE

## 2019-10-29 PROCEDURE — 93005 ELECTROCARDIOGRAM TRACING: CPT | Mod: PBBFAC,PO | Performed by: INTERNAL MEDICINE

## 2019-10-29 PROCEDURE — 99214 OFFICE O/P EST MOD 30 MIN: CPT | Mod: S$PBB,,, | Performed by: INTERNAL MEDICINE

## 2019-10-29 PROCEDURE — 99999 PR PBB SHADOW E&M-EST. PATIENT-LVL III: CPT | Mod: PBBFAC,,, | Performed by: INTERNAL MEDICINE

## 2019-10-29 NOTE — LETTER
October 29, 2019      Fam Chou MD  [address] [facility] [address]           [facility]  [address] [place] [address] [address]  [facility] [address]  Phone: [phone]          Patient: Florentino Bowman   MR Number: [id number]   YOB: 1950   Date of Visit: 10/29/2019       Dear Dr. Fam Chou:    Thank you for referring Florentino Bowman to me for evaluation. Attached you will find relevant portions of my assessment and plan of care.    If you have questions, please do not hesitate to call me. I look forward to following Florentino Bowman along with you.    Sincerely,    Florian Freitas MD    Enclosure  CC:  No Recipients    If you would like to receive this communication electronically, please contact [email or web address] or [phone] to request more information on [employer] Link access.    For providers and/or their staff who would like to refer a patient to [facility], please contact us through our one-stop-shop provider referral line, [facility], at [phone].    If you feel you have received this communication in error or would no longer like to receive these types of communications, please e-mail [email or web address]

## 2019-10-29 NOTE — PROGRESS NOTES
Subjective:    Patient ID:  Florentino Bowman is a 69 y.o. female who presents for evaluation of Chest Pain      HPI    70 y/o female last seen in cardiology clinic 2017 for abnormal ECG and lucia-operative cardiac evaluation. She has a hx of hypothyroidism, chronic back pain mild bilateral carotid artery disease per carotid artery US. Initially had ECG performed as part of pre-op evaluation prior to back surgery. ECG with NSR, poss LAE, IRBBB, prolonged QT. On my interpretation showed NSR, poss IRBBB. Cleared for surgery. She has been seen by vascular surgery for asymptomatic GLENDY and statin increased to moderate dose. Has not been taking ASA.   Has been having left sided, intermittent, non exertional, mild, sharp, chest pain. Denies CP, SOB/DIETZ, orthopnea, PND, palps, syncope, LE edema. Is very active, does not smoke, and drinks about 2 glasses of wine every night.     Review of Systems   Constitution: Negative for malaise/fatigue.   HENT: Negative for congestion.    Eyes: Negative for blurred vision.   Cardiovascular: Positive for chest pain. Negative for claudication, cyanosis, dyspnea on exertion, irregular heartbeat, leg swelling, near-syncope, orthopnea, palpitations, paroxysmal nocturnal dyspnea and syncope.   Respiratory: Negative for shortness of breath.    Endocrine: Negative for polyuria.   Hematologic/Lymphatic: Negative for bleeding problem.   Skin: Negative for itching and rash.   Musculoskeletal: Negative for joint swelling, muscle cramps and muscle weakness.   Gastrointestinal: Negative for abdominal pain, hematemesis, hematochezia, melena, nausea and vomiting.   Genitourinary: Negative for dysuria and hematuria.   Neurological: Negative for dizziness, focal weakness, headaches, light-headedness, loss of balance and weakness.   Psychiatric/Behavioral: Negative for depression. The patient is not nervous/anxious.         Objective:    Physical Exam   Constitutional: She is oriented to person, place, and  time. She appears well-developed and well-nourished.   HENT:   Head: Normocephalic and atraumatic.   Neck: Neck supple. No JVD present.   Cardiovascular: Normal rate, regular rhythm and normal heart sounds.   Pulses:       Carotid pulses are 2+ on the right side, and 2+ on the left side.       Radial pulses are 2+ on the right side, and 2+ on the left side.        Femoral pulses are 2+ on the right side, and 2+ on the left side.       Dorsalis pedis pulses are 2+ on the right side, and 2+ on the left side.        Posterior tibial pulses are 2+ on the right side, and 2+ on the left side.   Pulmonary/Chest: Effort normal and breath sounds normal.   Abdominal: Soft. Bowel sounds are normal.   Musculoskeletal: She exhibits no edema.   Neurological: She is alert and oriented to person, place, and time.   Skin: Skin is warm and dry.   Psychiatric: She has a normal mood and affect. Her behavior is normal. Thought content normal.         Assessment:       1. Chest pain, unspecified type    2. Bilateral carotid artery disease, unspecified type    3. Hypothyroidism due to acquired atrophy of thyroid    4. Anxiety    5. Lumbar radiculopathy    6. Chest pain      69 year old patient with hx and presentation as above. Has risk factors for having CAD and will evaluate symptoms with noninvasive cardiac stress imaging. ECG in clinic today with NSR. Discussed the etiology, evaluation, and management of CAD, risk factors, GLENDY< hypothyroidism, CP. Discussed the importance of med compliance, heart healthy diet, and regular exercise.      Plan:       -Exercise stress echo  -Primary prevention of CAD  -f/u phone review

## 2019-11-05 ENCOUNTER — HOSPITAL ENCOUNTER (OUTPATIENT)
Dept: CARDIOLOGY | Facility: HOSPITAL | Age: 69
Discharge: HOME | End: 2019-11-05
Attending: INTERNAL MEDICINE
Payer: MEDICARE

## 2019-11-05 DIAGNOSIS — R07.9 CHEST PAIN, UNSPECIFIED TYPE: ICD-10-CM

## 2019-11-05 LAB
CV STRESS BASE HR: 86 BPM
DIASTOLIC BLOOD PRESSURE: 79 MMHG
OHS CV CPX 1 MINUTE RECOVERY HEART RATE: 104 BPM
OHS CV CPX 85 PERCENT MAX PREDICTED HEART RATE MALE: 125
OHS CV CPX ESTIMATED METS: 8
OHS CV CPX MAX PREDICTED HEART RATE: 147
OHS CV CPX PATIENT IS FEMALE: 1
OHS CV CPX PATIENT IS MALE: 0
OHS CV CPX PEAK DIASTOLIC BLOOD PRESSURE: 74 MMHG
OHS CV CPX PEAK HEAR RATE: 129 BPM
OHS CV CPX PEAK RATE PRESSURE PRODUCT: NORMAL
OHS CV CPX PEAK SYSTOLIC BLOOD PRESSURE: 155 MMHG
OHS CV CPX PERCENT MAX PREDICTED HEART RATE ACHIEVED: 88
OHS CV CPX RATE PRESSURE PRODUCT PRESENTING: NORMAL
STRESS ECHO POST EXERCISE DUR MIN: 6 MINUTES
STRESS ECHO POST EXERCISE DUR SEC: 56 SECONDS
STRESS ST DEPRESSION: 0.5 MM
SYSTOLIC BLOOD PRESSURE: 133 MMHG

## 2019-11-05 PROCEDURE — 93351 STRESS TTE COMPLETE: CPT | Mod: 26,,, | Performed by: INTERNAL MEDICINE

## 2019-11-05 PROCEDURE — 93351 STRESS ECHO (CUPID ONLY): ICD-10-PCS | Mod: 26,,, | Performed by: INTERNAL MEDICINE

## 2019-11-05 PROCEDURE — 93351 STRESS TTE COMPLETE: CPT

## 2019-11-07 ENCOUNTER — IMMUNIZATION (OUTPATIENT)
Dept: FAMILY MEDICINE | Facility: CLINIC | Age: 69
End: 2019-11-07
Payer: MEDICARE

## 2019-11-07 ENCOUNTER — PATIENT MESSAGE (OUTPATIENT)
Dept: CARDIOLOGY | Facility: CLINIC | Age: 69
End: 2019-11-07

## 2019-11-07 ENCOUNTER — TELEPHONE (OUTPATIENT)
Dept: FAMILY MEDICINE | Facility: CLINIC | Age: 69
End: 2019-11-07

## 2019-11-07 PROCEDURE — G0008 FLU VACCINE - HIGH DOSE (65+) PRESERVATIVE FREE IM: ICD-10-PCS | Mod: S$GLB,,, | Performed by: FAMILY MEDICINE

## 2019-11-07 PROCEDURE — 90662 IIV NO PRSV INCREASED AG IM: CPT | Mod: S$GLB,,, | Performed by: FAMILY MEDICINE

## 2019-11-07 PROCEDURE — G0008 ADMIN INFLUENZA VIRUS VAC: HCPCS | Mod: S$GLB,,, | Performed by: FAMILY MEDICINE

## 2019-11-07 PROCEDURE — 90662 FLU VACCINE - HIGH DOSE (65+) PRESERVATIVE FREE IM: ICD-10-PCS | Mod: S$GLB,,, | Performed by: FAMILY MEDICINE

## 2019-11-11 ENCOUNTER — TELEPHONE (OUTPATIENT)
Dept: CARDIOLOGY | Facility: CLINIC | Age: 69
End: 2019-11-11

## 2019-11-11 PROBLEM — R53.1 DECREASED RANGE OF MOTION WITH DECREASED STRENGTH: Status: ACTIVE | Noted: 2019-11-11

## 2019-11-11 PROBLEM — M25.60 DECREASED RANGE OF MOTION WITH DECREASED STRENGTH: Status: ACTIVE | Noted: 2019-11-11

## 2019-11-11 NOTE — TELEPHONE ENCOUNTER
----- Message from Florian Freitas MD sent at 11/11/2019 12:28 PM CST -----  Your stress test was normal

## 2019-12-02 RX ORDER — CITALOPRAM 40 MG/1
TABLET, FILM COATED ORAL
Qty: 90 TABLET | Refills: 1 | Status: SHIPPED | OUTPATIENT
Start: 2019-12-02 | End: 2020-12-10

## 2019-12-04 ENCOUNTER — PATIENT MESSAGE (OUTPATIENT)
Dept: FAMILY MEDICINE | Facility: CLINIC | Age: 69
End: 2019-12-04

## 2019-12-05 RX ORDER — ZOLPIDEM TARTRATE 10 MG/1
TABLET ORAL
Qty: 30 TABLET | Refills: 1 | Status: SHIPPED | OUTPATIENT
Start: 2019-12-05 | End: 2020-07-17 | Stop reason: SDUPTHER

## 2020-04-01 PROBLEM — R53.1 DECREASED RANGE OF MOTION WITH DECREASED STRENGTH: Status: RESOLVED | Noted: 2019-11-11 | Resolved: 2020-04-01

## 2020-04-01 PROBLEM — M25.60 DECREASED RANGE OF MOTION WITH DECREASED STRENGTH: Status: RESOLVED | Noted: 2019-11-11 | Resolved: 2020-04-01

## 2020-07-15 DIAGNOSIS — Z71.89 COMPLEX CARE COORDINATION: ICD-10-CM

## 2020-07-20 ENCOUNTER — PATIENT MESSAGE (OUTPATIENT)
Dept: FAMILY MEDICINE | Facility: CLINIC | Age: 70
End: 2020-07-20

## 2020-07-20 RX ORDER — ZOLPIDEM TARTRATE 10 MG/1
TABLET ORAL
Qty: 30 TABLET | Refills: 0 | Status: SHIPPED | OUTPATIENT
Start: 2020-07-20 | End: 2020-07-29 | Stop reason: SDUPTHER

## 2020-07-29 ENCOUNTER — TELEPHONE (OUTPATIENT)
Dept: FAMILY MEDICINE | Facility: CLINIC | Age: 70
End: 2020-07-29

## 2020-07-29 ENCOUNTER — OFFICE VISIT (OUTPATIENT)
Dept: FAMILY MEDICINE | Facility: CLINIC | Age: 70
End: 2020-07-29
Payer: MEDICARE

## 2020-07-29 DIAGNOSIS — M94.9 DISORDER OF CARTILAGE, UNSPECIFIED: ICD-10-CM

## 2020-07-29 DIAGNOSIS — I77.9 BILATERAL CAROTID ARTERY DISEASE, UNSPECIFIED TYPE: ICD-10-CM

## 2020-07-29 DIAGNOSIS — E03.4 HYPOTHYROIDISM DUE TO ACQUIRED ATROPHY OF THYROID: ICD-10-CM

## 2020-07-29 DIAGNOSIS — R51.9 NONINTRACTABLE HEADACHE, UNSPECIFIED CHRONICITY PATTERN, UNSPECIFIED HEADACHE TYPE: ICD-10-CM

## 2020-07-29 DIAGNOSIS — G47.00 INSOMNIA, UNSPECIFIED TYPE: ICD-10-CM

## 2020-07-29 DIAGNOSIS — F51.01 PRIMARY INSOMNIA: Primary | ICD-10-CM

## 2020-07-29 DIAGNOSIS — Z09 ENCOUNTER FOR FOLLOW-UP EXAMINATION AFTER COMPLETED TREATMENT FOR CONDITIONS OTHER THAN MALIGNANT NEOPLASM: ICD-10-CM

## 2020-07-29 PROCEDURE — 99212 OFFICE O/P EST SF 10 MIN: CPT | Mod: 95,,, | Performed by: FAMILY MEDICINE

## 2020-07-29 PROCEDURE — 99212 PR OFFICE/OUTPT VISIT, EST, LEVL II, 10-19 MIN: ICD-10-PCS | Mod: 95,,, | Performed by: FAMILY MEDICINE

## 2020-07-29 RX ORDER — ZOLPIDEM TARTRATE 10 MG/1
TABLET ORAL
Qty: 90 TABLET | Refills: 0 | Status: SHIPPED | OUTPATIENT
Start: 2020-07-29 | End: 2020-07-31 | Stop reason: SDUPTHER

## 2020-07-29 NOTE — PROGRESS NOTES
Subjective:      Patient ID: Florentino Bowman is a 70 y.o. female.    Chief Complaint: No chief complaint on file.      There were no vitals filed for this visit.     HPI The patient location is: Home  The chief complaint leading to consultation is: insomnia and refilling ambietn    Visit type: audiovisual    Face to Face time with patient: 10   minutes of total time spent on the encounter, which includes face to face time and non-face to face time preparing to see the patient (eg, review of tests), Obtaining and/or reviewing separately obtained history, Documenting clinical information in the electronic or other health record, Independently interpreting results (not separately reported) and communicating results to the patient/family/caregiver, or Care coordination (not separately reported).         Each patient to whom he or she provides medical services by telemedicine is:  (1) informed of the relationship between the physician and patient and the respective role of any other health care provider with respect to management of the patient; and (2) notified that he or she may decline to receive medical services by telemedicine and may withdraw from such care at any time.    Notes:   Follow up for use of Ambien. Takes frequently for insomnia and has been taking it for years.  Yearly exam due in October. Takes half tablet prn and it helps for he sleeping; worries;  Has daiy morning headaches for about a month half of a imitrex makes it go away.  Will see dermatologist for her headaches and possbily get botox to help her headaches.  She had these in past and it helped her headaches.  Needs her wellness soon, so will order yearly labs and carotid u/s for follow up;  DEXA will be next year    Problem List  Patient Active Problem List   Diagnosis    Headache    Hypothyroid    Depression    Insomnia    Anxiety    Chronic back pain    Bilateral carotid artery disease    Calcific tendonitis    Left shoulder pain     DDD (degenerative disc disease), lumbar    Change in bowel habit    Screening for colorectal cancer    S/P lumbar laminectomy    Chronic right-sided low back pain with right-sided sciatica    Lumbar radiculopathy    Lumbar radicular pain    Spondylolisthesis    Lumbar spondylosis    Chest pain        ALLERGIES: Review of patient's allergies indicates:  No Known Allergies    MEDS:   Current Outpatient Medications:     ALPRAZolam (XANAX) 0.5 MG tablet, Take 1 tablet (0.5 mg total) by mouth nightly as needed., Disp: 30 tablet, Rfl: 5    atorvastatin (LIPITOR) 40 MG tablet, Take 1 tablet (40 mg total) by mouth once daily., Disp: 90 tablet, Rfl: 3    buPROPion (WELLBUTRIN SR) 150 MG TBSR 12 hr tablet, Take 1 tablet (150 mg total) by mouth 2 (two) times daily., Disp: 60 tablet, Rfl: 11    busPIRone (BUSPAR) 5 MG Tab, Take 1 tablet (5 mg total) by mouth 2 (two) times daily., Disp: 60 tablet, Rfl: 5    citalopram (CELEXA) 40 MG tablet, TAKE 1 TABLET BY MOUTH EVERY DAY, Disp: 90 tablet, Rfl: 1    estradiol (ESTRACE) 1 MG tablet, Take 1 mg by mouth every evening. , Disp: , Rfl:     flurbiprofen (CMPD PAIN MANAGEMENT COMPOUND OINTMNENT TWO), Apply 2 Pump (3.2 g total) topically 5 (five) times daily., Disp: 120 g, Rfl: 11    gabapentin (NEURONTIN) 300 MG capsule, , Disp: , Rfl:     levothyroxine (SYNTHROID) 88 MCG tablet, Take 1 tablet (88 mcg total) by mouth once daily., Disp: 30 tablet, Rfl: 11    liothyronine (CYTOMEL) 5 MCG Tab, TAKE 1 TABLET BY MOUTH DAILY, Disp: 30 tablet, Rfl: 11    mupirocin (BACTROBAN) 2 % ointment, Apply 1 application topically., Disp: , Rfl:     progesterone (PROMETRIUM) 100 MG capsule, Take 100 mg by mouth every evening. , Disp: , Rfl:     sumatriptan (IMITREX) 100 MG tablet, One prn headache, repeat in 2 hours if needed, Disp: 9 tablet, Rfl: 19    triamcinolone acetonide 0.5% (KENALOG) 0.5 % Crea, Apply 1 application topically., Disp: , Rfl:     zolpidem (AMBIEN) 10 mg Tab,  For sleep, Disp: 90 tablet, Rfl: 0      History:  Current Providers as of 2020  PCP: Fam Chou MD  Care Team Provider: Corona Ayala MD  Care Team Provider: Lan Reid MD  Care Team Provider: Brandon Syed LPN  Care Team Provider: Fam Chou MD  Care Team Provider: Tristan Fields Jr., MD  Care Team Provider: Vito Arriaga MD  Care Team Provider: Tim Beck MD  Care Team Provider: Bethel Isaac MD  Encounter Provider: Fam Chou MD, starting on  12:00 AM  Referring Provider: not found, starting on  12:00 AM  Consulting Physician: Fam Chou MD, starting on   7:56 AM (Active)   Past Medical History:   Diagnosis Date    Headache(784.0)     Hypothyroid      Past Surgical History:   Procedure Laterality Date    BREAST BIOPSY Right      SECTION      x2    COLONOSCOPY N/A 2017    Procedure: COLONOSCOPY Miralax;  Surgeon: Quentin Mercado Jr., MD;  Location: [facility];  Service: Endoscopy;  Laterality: N/A;    HYSTERECTOMY      INJECTION OF ANESTHETIC AGENT AROUND MEDIAL BRANCH NERVES INNERVATING LUMBAR FACET JOINT Right 2019    Procedure: BLOCK, NERVE, FACET JOINT, LUMBAR, MEDIAL BRANCH ;  Surgeon: Sixto Moya III, MD;  Location: [facility] OR;  Service: Pain Management;  Laterality: Right;  - L4/5 & L5/S1  PATIENT NEEDS TO BE THE FIRST PATIENT OF THE DAY, PER DR. MOYA!!!    LAPAROSCOPIC HYSTERECTOMY  2010    supracervical/BSO    OOPHORECTOMY      SPINE SURGERY      TONSILLECTOMY      TRANSFORAMINAL EPIDURAL INJECTION OF STEROID Right 2019    Procedure: INJECTION, STEROID, EPIDURAL, TRANSFORAMINAL APPROACH;  Surgeon: Sixto Moya III, MD;  Location: [facility] OR;  Service: Pain Management;  Laterality: Right;  -right TFESI L4/5     Social History     Tobacco Use    Smoking status: Never Smoker    Smokeless tobacco: Never Used   Substance Use Topics    Alcohol use: Yes      Frequency: 4 or more times a week     Drinks per session: 1 or 2     Binge frequency: Never     Comment: social    Drug use: No         Review of Systems   Constitutional: Negative for activity change and unexpected weight change.   HENT: Negative for hearing loss, rhinorrhea and trouble swallowing.    Eyes: Negative for discharge and visual disturbance.   Respiratory: Negative for chest tightness and wheezing.    Cardiovascular: Negative for chest pain and palpitations.   Gastrointestinal: Positive for constipation. Negative for blood in stool, diarrhea and vomiting.   Endocrine: Negative for polydipsia and polyuria.   Genitourinary: Negative for difficulty urinating, dysuria, hematuria and menstrual problem.   Musculoskeletal: Negative for arthralgias, joint swelling and neck pain.   Neurological: Positive for headaches. Negative for weakness.   Psychiatric/Behavioral: Positive for sleep disturbance. Negative for confusion and dysphoric mood.     Objective:     Physical Exam        Assessment:     1. Primary insomnia    2. Insomnia, unspecified type    3. Nonintractable headache, unspecified chronicity pattern, unspecified headache type    4. Bilateral carotid artery disease, unspecified type    5. Hypothyroidism due to acquired atrophy of thyroid      Plan:        Medication List          Accurate as of July 29, 2020  8:15 AM. If you have any questions, ask your nurse or doctor.            CHANGE how you take these medications    zolpidem 10 mg Tab  Commonly known as: AMBIEN  For sleep  What changed: additional instructions  Changed by: Fam Chou MD        CONTINUE taking these medications    ALPRAZolam 0.5 MG tablet  Commonly known as: XANAX  Take 1 tablet (0.5 mg total) by mouth nightly as needed.     atorvastatin 40 MG tablet  Commonly known as: LIPITOR  Take 1 tablet (40 mg total) by mouth once daily.     buPROPion 150 MG TBSR 12 hr tablet  Commonly known as: WELLBUTRIN SR  Take 1 tablet (150 mg  total) by mouth 2 (two) times daily.     busPIRone 5 MG Tab  Commonly known as: BUSPAR  Take 1 tablet (5 mg total) by mouth 2 (two) times daily.     citalopram 40 MG tablet  Commonly known as: CELEXA  TAKE 1 TABLET BY MOUTH EVERY DAY     CMPD PAIN MANAGEMENT COMPOUND OINTMNENT TWO  Apply 2 Pump (3.2 g total) topically 5 (five) times daily.     estradioL 1 MG tablet  Commonly known as: ESTRACE     gabapentin 300 MG capsule  Commonly known as: NEURONTIN     levothyroxine 88 MCG tablet  Commonly known as: SYNTHROID  Take 1 tablet (88 mcg total) by mouth once daily.     liothyronine 5 MCG Tab  Commonly known as: CYTOMEL  TAKE 1 TABLET BY MOUTH DAILY     mupirocin 2 % ointment  Commonly known as: BACTROBAN     progesterone 100 MG capsule  Commonly known as: PROMETRIUM     sumatriptan 100 MG tablet  Commonly known as: IMITREX  One prn headache, repeat in 2 hours if needed     triamcinolone acetonide 0.5% 0.5 % Crea  Commonly known as: KENALOG           Where to Get Your Medications      These medications were sent to [facility]/pharmacy #[id number] - [place] - [address] [facility]  [id number] [place] [address]    Phone: [phone]   · zolpidem 10 mg Tab       Primary insomnia  -     Ambulatory referral/consult to Plastic Surgery; Future; Expected date: 08/05/2020    Insomnia, unspecified type    Nonintractable headache, unspecified chronicity pattern, unspecified headache type    Bilateral carotid artery disease, unspecified type    Hypothyroidism due to acquired atrophy of thyroid    Other orders  -     zolpidem (AMBIEN) 10 mg Tab; For sleep  Dispense: 90 tablet; Refill: 0

## 2020-07-31 ENCOUNTER — TELEPHONE (OUTPATIENT)
Dept: FAMILY MEDICINE | Facility: CLINIC | Age: 70
End: 2020-07-31

## 2020-07-31 RX ORDER — ZOLPIDEM TARTRATE 10 MG/1
10 TABLET ORAL NIGHTLY PRN
Qty: 90 TABLET | Refills: 0 | Status: SHIPPED | OUTPATIENT
Start: 2020-07-31 | End: 2020-09-24 | Stop reason: SDUPTHER

## 2020-08-19 ENCOUNTER — TELEPHONE (OUTPATIENT)
Dept: FAMILY MEDICINE | Facility: CLINIC | Age: 70
End: 2020-08-19

## 2020-08-24 ENCOUNTER — TELEPHONE (OUTPATIENT)
Dept: FAMILY MEDICINE | Facility: CLINIC | Age: 70
End: 2020-08-24

## 2020-09-04 ENCOUNTER — TELEPHONE (OUTPATIENT)
Dept: FAMILY MEDICINE | Facility: CLINIC | Age: 70
End: 2020-09-04

## 2020-09-04 DIAGNOSIS — R92.8 ABNORMAL MAMMOGRAM: Primary | ICD-10-CM

## 2020-09-04 DIAGNOSIS — Z12.31 ENCOUNTER FOR SCREENING MAMMOGRAM FOR MALIGNANT NEOPLASM OF BREAST: ICD-10-CM

## 2020-09-04 NOTE — TELEPHONE ENCOUNTER
----- Message from Edilia Clemens sent at 9/4/2020 11:59 AM CDT -----  Type:  Needs Medical Advice    Who Called:Self  Reason:Patient had a biopsy awhile ago. She was supposed to set up a follow up mammogram but she doesn't have any orders so she would need orders for that  Would the patient rather a call back or a response via [facility]? call  Best Call Back Number: [phone]   Additional Information: none

## 2020-09-21 NOTE — LETTER
November 14, 2017      Fam Chou MD  [address] [facility] [address]           [place] - Neurosurgery  [address], [address]  [facility] [address]  Phone: [phone]          Patient: Florentino Bowman   MR Number: [id number]   YOB: 1950   Date of Visit: 11/14/2017       Dear Dr. Fam Chou:    Thank you for referring Florentino Bowman to me for evaluation. Attached you will find relevant portions of my assessment and plan of care.    If you have questions, please do not hesitate to call me. I look forward to following Florentino Bowman along with you.    Sincerely,    Lan Figueroa MD    Enclosure  CC:  No Recipients    If you would like to receive this communication electronically, please contact [email or web address] or [phone] to request more information on [facility] access.    For providers and/or their staff who would like to refer a patient to [facility], please contact us through our one-stop-shop provider referral line, Beatriz Benaivdes, at [phone].    If you feel you have received this communication in error or would no longer like to receive these types of communications, please e-mail [email or web address]          The ABCs of the Annual Wellness Visit  Subsequent Medicare Wellness Visit    No chief complaint on file.      Cc  Medicare exam  Sinus drainage  Foot pain    Medicare wellness exam    Acute complaint of sinus drainage and cough present for 1 week.  No fever.  Left foot pain-has x-ray to review  Leg pain and aches-worse at night feels like cramping    Elevated liver enzymes, takes a lot of ibuprofen, does not drink alcohol, has fatty liver per patient    Back surgery in 2014. Original injury 2009 while working for [employer].      Subjective   History of Present Illness:  Taiwo Pierce is a 47 y.o. male who presents for a Subsequent Medicare Wellness Visit.    47-year-old male requesting video/audio visit to do his visit today.  He has multiple complaints as well as chronic conditions.  Hypertension - reports that his bp is better when his pain is less.   Currently receives Diovan 320 mg daily, sildenafil 20 mg 3 times a day and Norvasc 10 mg daily.  Checks his blood pressure daily.  Reports that his blood pressure was elevated when pain.    Hyperlipidemia- currently takes Pravachol.  Does not watch his diet closely.    GERD-stable with a Prevacid.  Has been seen by GI.    Chronic pain syndrome-under the care of pain management.  Originated with Worker's [employer]. injury in 2009 with back surgery in 2014.  Patient is under the care of pain management for these Worker's Comp. issues.        HEALTH RISK ASSESSMENT    Recent Hospitalizations:  No hospitalization(s) within the last year.    Current Medical Providers:  Patient Care Team:  Helena Romero APRN as PCP - General (Family Medicine)    Smoking Status:  Social History     Tobacco Use   Smoking Status Never Smoker   Smokeless Tobacco Current User   • Types: Snuff       Alcohol Consumption:  Social History     Substance and Sexual Activity   Alcohol Use No       Depression Screen:   PHQ-2/PHQ-9 Depression Screening 9/21/2020   Little interest  or pleasure in doing things 0   Feeling down, depressed, or hopeless 0   Total Score 0       Fall Risk Screen:  MALINA Fall Risk Assessment has not been completed.    Health Habits and Functional and Cognitive Screening:  Functional & Cognitive Status 9/21/2020   Do you have difficulty preparing food and eating? No   Do you have difficulty bathing yourself, getting dressed or grooming yourself? No   Do you have difficulty using the toilet? No   Do you have difficulty moving around from place to place? Yes   Do you have trouble with steps or getting out of a bed or a chair? No   Current Diet Well Balanced Diet   Dental Exam Not up to date   Eye Exam Not up to date   Exercise (times per week) 0 times per week   Current Exercise Activities Include None   Do you need help using the phone?  No   Are you deaf or do you have serious difficulty hearing?  No   Do you need help with transportation? No   Do you need help shopping? No   Do you need help preparing meals?  No   Do you need help with housework?  No   Do you need help with laundry? No   Do you need help taking your medications? No   Do you need help managing money? No   Do you ever drive or ride in a car without wearing a seat belt? No   Have you felt unusual stress, anger or loneliness in the last month? No   Who do you live with? Spouse   If you need help, do you have trouble finding someone available to you? No   Have you been bothered in the last four weeks by sexual problems? No   Do you have difficulty concentrating, remembering or making decisions? Yes         Does the patient have evidence of cognitive impairment? No    Asprin use counseling:Taking ASA appropriately as indicated    Age-appropriate Screening Schedule:  Refer to the list below for future screening recommendations based on patient's age, sex and/or medical conditions. Orders for these recommended tests are listed in the plan section. The patient has been provided with a written plan.    Health  Maintenance   Topic Date Due   • COLONOSCOPY  09/05/2017   • INFLUENZA VACCINE  08/01/2020   • LIPID PANEL  09/02/2021   • TDAP/TD VACCINES (2 - Td) 08/15/2028          The following portions of the patient's history were reviewed and updated as appropriate: allergies, current medications, past family history, past medical history, past social history, past surgical history and problem list.    Outpatient Medications Prior to Visit   Medication Sig Dispense Refill   • DULoxetine (CYMBALTA) 30 MG capsule TAKE ONE CAPSULE BY MOUTH DAILY 90 capsule 4   • gabapentin (NEURONTIN) 300 MG capsule TAKE 2 CS TID  5   • metaxalone (SKELAXIN) 800 MG tablet   1   • oxyCODONE (ROXICODONE) 10 MG tablet TK 1 T PO  TID PRN P  0   • sildenafil (REVATIO) 20 MG tablet TAKE ONE TABLET BY MOUTH THREE TIMES A DAY AS NEEDED 90 tablet 2   • amLODIPine (NORVASC) 10 MG tablet Take 1 tablet by mouth Daily. 30 tablet 5   • hydroCHLOROthiazide (HYDRODIURIL) 25 MG tablet Take 1 tablet by mouth Every Morning. 30 tablet 5   • hydrOXYzine (ATARAX) 25 MG tablet TAKE ONE TABLET BY MOUTH ONCE NIGHTLY AS NEEDED FOR ANXIETY (INSOMNIA) 30 tablet 3   • ibuprofen (ADVIL,MOTRIN) 800 MG tablet Take 1 tablet by mouth Every 8 (Eight) Hours As Needed for Mild Pain . 30 tablet 5   • lansoprazole (PREVACID) 30 MG capsule TAKE ONE CAPSULE BY MOUTH DAILY 90 capsule 4   • pravastatin (PRAVACHOL) 20 MG tablet TAKE ONE TABLET BY MOUTH DAILY 30 tablet 1   • valsartan (DIOVAN) 160 MG tablet TAKE TWO TABLETS BY MOUTH DAILY 180 tablet 0   • valsartan (DIOVAN) 320 MG tablet Take 1 tablet by mouth Daily. 30 tablet 5     No facility-administered medications prior to visit.        Patient Active Problem List   Diagnosis   • GERD (gastroesophageal reflux disease)   • Hyperlipidemia   • Hypertension   • Erectile dysfunction   • Family history of diabetes mellitus in mother   • Chronic pain syndrome   • Disorder of prostate, unspecified   • Vitamin D deficiency    • Reactive  "depression       Advanced Care Planning:  ACP discussion was held with the patient during this visit. Patient does not have an advance directive, declines further assistance.    Review of Systems   Constitutional: Negative for activity change, appetite change, chills and fever.   HENT: Positive for congestion, postnasal drip, sinus pressure and sinus pain.    Eyes: Negative for pain, discharge, itching and visual disturbance.   Respiratory: Positive for cough. Negative for chest tightness, shortness of breath and wheezing.    Cardiovascular: Negative.    Gastrointestinal: Negative for abdominal pain, constipation, diarrhea and vomiting.   Endocrine: Negative.    Genitourinary: Negative for dysuria, flank pain, frequency and urgency.   Musculoskeletal: Positive for arthralgias, back pain and neck pain.        Neck, back, hands     Skin: Negative.    Allergic/Immunologic: Positive for environmental allergies. Negative for food allergies and immunocompromised state.   Neurological: Negative for dizziness, seizures, speech difficulty, light-headedness and headaches.   Hematological: Negative.    Psychiatric/Behavioral: Positive for sleep disturbance (due to pain ). Negative for behavioral problems, dysphoric mood, self-injury and suicidal ideas.       Compared to one year ago, the patient feels his physical health is worse.  Compared to one year ago, the patient feels his mental health is the same.    Reviewed chart for potential of high risk medication in the elderly: yes  Reviewed chart for potential of harmful drug interactions in the elderly:yes    Objective         Vitals:    09/21/20 1353   BP: 143/94   Weight: 105 kg (232 lb)   Height: 180.3 cm (71\")       Body mass index is 32.36 kg/m².  Discussed the patient's BMI with him. The BMI is below average; BMI management plan is completed.    Physical Exam  Constitutional:       General: He is not in acute distress.     Appearance: He is obese. He is not " ill-appearing, toxic-appearing or diaphoretic.   HENT:      Head: Atraumatic.      Right Ear: Hearing and external ear normal.      Left Ear: Hearing and external ear normal.      Nose: Nose normal.      Comments: External nose normal     Mouth/Throat:      Lips: Pink. No lesions.   Eyes:      General: Lids are normal.   Neck:      Musculoskeletal: Normal range of motion.   Pulmonary:      Effort: Pulmonary effort is normal. No tachypnea, bradypnea, accessory muscle usage or respiratory distress.   Chest:      Comments: Normal shape and appearance  Abdominal:      General: Abdomen is protuberant.   Musculoskeletal:      Comments: Sitting without difficulty, normal range of motion with movement while on sofa.  Did not stand during exam.   Lymphadenopathy:      Comments: No obvious cervical adenopathy through video link   Skin:     Coloration: Skin is not cyanotic, jaundiced or pale.      Nails: There is no clubbing.     Neurological:      Mental Status: He is alert and oriented to person, place, and time.   Psychiatric:         Attention and Perception: Attention normal.         Mood and Affect: Mood and affect normal.         Speech: Speech normal.         Behavior: Behavior normal. Behavior is cooperative.         Thought Content: Thought content normal.         Cognition and Memory: Cognition normal.         Lab Results   Component Value Date    TRIG 122 09/02/2020    HDL 50 09/02/2020     (H) 09/02/2020    VLDL 24.4 09/02/2020        Assessment/Plan   Medicare Risks and Personalized Health Plan  CMS Preventative Services Quick Reference  Advance Directive Discussion  Cardiovascular risk  Chronic Pain   Colon Cancer Screening  Diabetic Lab Screening   Immunizations Discussed/Encouraged (specific immunizations; Influenza and Pneumococcal 23 )  Inactivity/Sedentary  Obesity/Overweight   Polypharmacy  Prostate Cancer Screening     The above risks/problems have been discussed with the patient.  Pertinent  information has been shared with the patient in the After Visit Summary.  Follow up plans and orders are seen below in the Assessment/Plan Section.    Diagnoses and all orders for this visit:    1. Restless leg syndrome (Primary)  -     cloNIDine (Catapres) 0.1 MG tablet; Take 1 tablet by mouth 2 (Two) Times a Day As Needed for High Blood Pressure.  Dispense: 60 tablet; Refill: 5  -     rOPINIRole (Requip) 0.5 MG tablet; Take 1 tablet by mouth Every Night. Take 1 hour before bedtime.  Dispense: 30 tablet; Refill: 5  -     CBC & Differential; Future  -     Comprehensive Metabolic Panel; Future  -     TSH; Future  -     Hemoglobin A1c; Future  -     Vitamin D 25 Hydroxy; Future  -     Vitamin B12; Future  -     Lipid Panel; Future  -     Uric Acid; Future    2. Screening for colon cancer    3. Special screening for malignant neoplasms, colon  -     CBC & Differential; Future  -     Comprehensive Metabolic Panel; Future  -     TSH; Future  -     Hemoglobin A1c; Future  -     Vitamin D 25 Hydroxy; Future  -     Vitamin B12; Future  -     Lipid Panel; Future  -     Uric Acid; Future    4. Colon cancer screening    5. Acute URI  -     sulfamethoxazole-trimethoprim (BACTRIM DS,SEPTRA DS) 800-160 MG per tablet; Take 1 tablet by mouth 2 (Two) Times a Day for 10 days.  Dispense: 20 tablet; Refill: 0    6. Screening for prostate cancer    7. Vitamin D deficiency  -     Vitamin D 25 Hydroxy; Future    8. Mixed hyperlipidemia  -     CBC & Differential; Future  -     Comprehensive Metabolic Panel; Future  -     TSH; Future  -     Hemoglobin A1c; Future  -     Vitamin D 25 Hydroxy; Future  -     Vitamin B12; Future  -     Lipid Panel; Future  -     Uric Acid; Future  -     pravastatin (PRAVACHOL) 20 MG tablet; Take 1 tablet by mouth Daily.  Dispense: 30 tablet; Refill: 5    9. Essential hypertension  -     CBC & Differential; Future  -     Comprehensive Metabolic Panel; Future  -     TSH; Future  -     Hemoglobin A1c; Future  -      Vitamin D 25 Hydroxy; Future  -     Vitamin B12; Future  -     Lipid Panel; Future  -     Uric Acid; Future  -     amLODIPine (NORVASC) 10 MG tablet; Take 1 tablet by mouth Daily.  Dispense: 30 tablet; Refill: 5  -     aspirin (aspirin) 81 MG EC tablet; Take 1 tablet by mouth Daily.  Dispense: 90 tablet; Refill: 3  -     hydroCHLOROthiazide (HYDRODIURIL) 25 MG tablet; Take 1 tablet by mouth Every Morning.  Dispense: 30 tablet; Refill: 5  -     valsartan (Diovan) 320 MG tablet; Take 1 tablet by mouth Daily.  Dispense: 30 tablet; Refill: 5    10. Abnormal finding of blood chemistry, unspecified   -     Hemoglobin A1c; Future    11. Encounter for prostate cancer screening    12. Reactive depression  -     hydrOXYzine (ATARAX) 25 MG tablet; Take 1 tablet by mouth Every 8 (Eight) Hours As Needed for Itching.  Dispense: 30 tablet; Refill: 5    Other orders  -     Discontinue: aspirin (aspirin) 81 MG EC tablet; Take 1 tablet by mouth Daily.  Dispense: 90 tablet; Refill: 3  -     guaiFENesin-dextromethorphan (ROBITUSSIN DM) 100-10 MG/5ML syrup; Take 5 mL by mouth 3 (Three) Times a Day As Needed for Cough or Congestion.  Dispense: 1 each; Refill: 0  -     lansoprazole (PREVACID) 30 MG capsule; Take 1 capsule by mouth Daily.  Dispense: 90 capsule; Refill: 1        Current Outpatient Medications:   •  amLODIPine (NORVASC) 10 MG tablet, Take 1 tablet by mouth Daily., Disp: 30 tablet, Rfl: 5  •  DULoxetine (CYMBALTA) 30 MG capsule, TAKE ONE CAPSULE BY MOUTH DAILY, Disp: 90 capsule, Rfl: 4  •  gabapentin (NEURONTIN) 300 MG capsule, TAKE 2 CS TID, Disp: , Rfl: 5  •  hydroCHLOROthiazide (HYDRODIURIL) 25 MG tablet, Take 1 tablet by mouth Every Morning., Disp: 30 tablet, Rfl: 5  •  hydrOXYzine (ATARAX) 25 MG tablet, Take 1 tablet by mouth Every 8 (Eight) Hours As Needed for Itching., Disp: 30 tablet, Rfl: 5  •  lansoprazole (PREVACID) 30 MG capsule, Take 1 capsule by mouth Daily., Disp: 90 capsule, Rfl: 1  •  metaxalone (SKELAXIN)  800 MG tablet, , Disp: , Rfl: 1  •  oxyCODONE (ROXICODONE) 10 MG tablet, TK 1 T PO  TID PRN P, Disp: , Rfl: 0  •  pravastatin (PRAVACHOL) 20 MG tablet, Take 1 tablet by mouth Daily., Disp: 30 tablet, Rfl: 5  •  sildenafil (REVATIO) 20 MG tablet, TAKE ONE TABLET BY MOUTH THREE TIMES A DAY AS NEEDED, Disp: 90 tablet, Rfl: 2  •  valsartan (Diovan) 320 MG tablet, Take 1 tablet by mouth Daily., Disp: 30 tablet, Rfl: 5  •  aspirin (aspirin) 81 MG EC tablet, Take 1 tablet by mouth Daily., Disp: 90 tablet, Rfl: 3  •  cloNIDine (Catapres) 0.1 MG tablet, Take 1 tablet by mouth 2 (Two) Times a Day As Needed for High Blood Pressure., Disp: 60 tablet, Rfl: 5  •  guaiFENesin-dextromethorphan (ROBITUSSIN DM) 100-10 MG/5ML syrup, Take 5 mL by mouth 3 (Three) Times a Day As Needed for Cough or Congestion., Disp: 1 each, Rfl: 0  •  rOPINIRole (Requip) 0.5 MG tablet, Take 1 tablet by mouth Every Night. Take 1 hour before bedtime., Disp: 30 tablet, Rfl: 5  •  sulfamethoxazole-trimethoprim (BACTRIM DS,SEPTRA DS) 800-160 MG per tablet, Take 1 tablet by mouth 2 (Two) Times a Day for 10 days., Disp: 20 tablet, Rfl: 0    I have reviewed medication list and discussed with patient the possible side effects and interactions.  We have discussed purpose for medication, route, dosage, frequency.  Refill routine medications.    Encourage compliance with screening labs.  Discussed previous noncompliance issues.  Reviewed recent x-ray of the foot.  Requip for restless leg.    Pt has been instructed today regarding low fat heart smart diet. Weight management and routine exercise has been recommended. Avoid high fat foods, starchy foods and processed foods. Increase lean meats, fresh vegetables and fresh fruits.     Emotional support and active listening provided.     I have discussed diagnosis in detail today allowing time for questions and answers. Pt is aware of reasons to seek urgent or emergent medical care as well as reasons to return to the  clinic for evaluation. Possible side effects, interactions and progression of symptoms discussed as well. Pt / family states understanding.     Age appropriate education and safety instruction has been provided. Preventive education/recommendation > 15 minutes. Safety, accident prevention, vaccines, health maintenance concerns, nutrition, diet, exercise and social activity.     Medicare wellness exam has been performed today.  Medication list has been reviewed and discussed with patient.  Recommended preventative and screenings has been discussed with patient.    Would like to see him for medication evaluation of requip in 1-2 weeks, sooner if needed.  Follow up in 3 - 4  months. Routine labs fasting one week prior to next office visit. Return sooner if needed.     Coronavirus precautions have been reviewed and discussed.  I have discussed the CDC recommendations  of social distancing, hand washing, wearing mask and disinfecting commonly touched items. Reviewed need to notify PCP and self quarantine with mild symptoms.  Discussed procedure to obtain Covid-19 testing and notification of PCP/health dept/ED/Urgent Center if symptoms begin. Understanding verbalized.      31 minutes spent on video/audio assisted visit today.     Follow Up:  No follow-ups on file.     An After Visit Summary and PPPS were given to the patient.

## 2020-09-24 ENCOUNTER — TELEPHONE (OUTPATIENT)
Dept: FAMILY MEDICINE | Facility: CLINIC | Age: 70
End: 2020-09-24

## 2020-09-24 ENCOUNTER — IMMUNIZATION (OUTPATIENT)
Dept: FAMILY MEDICINE | Facility: CLINIC | Age: 70
End: 2020-09-24
Payer: MEDICARE

## 2020-09-24 VITALS — SYSTOLIC BLOOD PRESSURE: 114 MMHG | DIASTOLIC BLOOD PRESSURE: 62 MMHG

## 2020-09-24 PROCEDURE — G0008 PR ADMIN INFLUENZA VIRUS VAC: ICD-10-PCS | Mod: S$GLB,,, | Performed by: FAMILY MEDICINE

## 2020-09-24 PROCEDURE — 90694 VACC AIIV4 NO PRSRV 0.5ML IM: CPT | Mod: S$GLB,,, | Performed by: FAMILY MEDICINE

## 2020-09-24 PROCEDURE — G0008 ADMIN INFLUENZA VIRUS VAC: HCPCS | Mod: S$GLB,,, | Performed by: FAMILY MEDICINE

## 2020-09-24 PROCEDURE — 90694 FLU VACCINE - QUADRIVALENT - ADJUVANTED: ICD-10-PCS | Mod: S$GLB,,, | Performed by: FAMILY MEDICINE

## 2020-09-24 RX ORDER — ZOLPIDEM TARTRATE 10 MG/1
10 TABLET ORAL NIGHTLY PRN
Qty: 90 TABLET | Refills: 0 | Status: SHIPPED | OUTPATIENT
Start: 2020-09-24 | End: 2020-12-22

## 2020-09-24 RX ORDER — ALPRAZOLAM 0.5 MG/1
0.5 TABLET ORAL NIGHTLY PRN
Qty: 30 TABLET | Refills: 5 | Status: SHIPPED | OUTPATIENT
Start: 2020-09-24 | End: 2021-03-25 | Stop reason: SDUPTHER

## 2020-09-26 RX ORDER — ALPRAZOLAM 0.5 MG/1
0.5 TABLET ORAL NIGHTLY PRN
Qty: 30 TABLET | Refills: 5 | OUTPATIENT
Start: 2020-09-26

## 2020-09-26 RX ORDER — ZOLPIDEM TARTRATE 10 MG/1
10 TABLET ORAL NIGHTLY PRN
Qty: 90 TABLET | Refills: 0 | OUTPATIENT
Start: 2020-09-26

## 2020-10-15 ENCOUNTER — HOSPITAL ENCOUNTER (OUTPATIENT)
Dept: RADIOLOGY | Facility: HOSPITAL | Age: 70
Discharge: HOME | End: 2020-10-15
Attending: FAMILY MEDICINE
Payer: MEDICARE

## 2020-10-15 DIAGNOSIS — Z12.31 ENCOUNTER FOR SCREENING MAMMOGRAM FOR MALIGNANT NEOPLASM OF BREAST: ICD-10-CM

## 2020-10-15 DIAGNOSIS — R92.8 ABNORMAL MAMMOGRAM: ICD-10-CM

## 2020-10-15 PROCEDURE — 77067 MAMMO DIGITAL SCREENING BILAT WITH TOMOSYNTHESIS_CAD: ICD-10-PCS | Mod: 26,,, | Performed by: RADIOLOGY

## 2020-10-15 PROCEDURE — 77067 SCR MAMMO BI INCL CAD: CPT | Mod: TC

## 2020-10-15 PROCEDURE — 77063 BREAST TOMOSYNTHESIS BI: CPT | Mod: 26,,, | Performed by: RADIOLOGY

## 2020-10-15 PROCEDURE — 77067 SCR MAMMO BI INCL CAD: CPT | Mod: 26,,, | Performed by: RADIOLOGY

## 2020-10-15 PROCEDURE — 77063 MAMMO DIGITAL SCREENING BILAT WITH TOMOSYNTHESIS_CAD: ICD-10-PCS | Mod: 26,,, | Performed by: RADIOLOGY

## 2020-10-17 ENCOUNTER — TELEPHONE (OUTPATIENT)
Dept: FAMILY MEDICINE | Facility: CLINIC | Age: 70
End: 2020-10-17

## 2020-10-17 NOTE — TELEPHONE ENCOUNTER
Discussed with patient that during a routine audit we discovered a variation in the temperature of our medication refrigerator which may have affected the potency and effectiveness of our vaccines. The vaccine was not harmful but may have been ineffective so we recommend revaccination. Appointment set up for revaccination. she verbalized understanding and all questions were answered.

## 2020-10-20 ENCOUNTER — IMMUNIZATION (OUTPATIENT)
Dept: FAMILY MEDICINE | Facility: CLINIC | Age: 70
End: 2020-10-20
Payer: MEDICARE

## 2020-12-10 ENCOUNTER — TELEPHONE (OUTPATIENT)
Dept: FAMILY MEDICINE | Facility: CLINIC | Age: 70
End: 2020-12-10

## 2020-12-10 ENCOUNTER — OFFICE VISIT (OUTPATIENT)
Dept: FAMILY MEDICINE | Facility: CLINIC | Age: 70
End: 2020-12-10
Payer: MEDICARE

## 2020-12-10 VITALS
BODY MASS INDEX: 22.95 KG/M2 | DIASTOLIC BLOOD PRESSURE: 80 MMHG | SYSTOLIC BLOOD PRESSURE: 126 MMHG | HEIGHT: 60 IN | TEMPERATURE: 98 F | OXYGEN SATURATION: 98 % | HEART RATE: 83 BPM | WEIGHT: 116.88 LBS

## 2020-12-10 DIAGNOSIS — T45.2X1A POISONING BY VITAMIN D, ACCIDENTAL OR UNINTENTIONAL, INITIAL ENCOUNTER: ICD-10-CM

## 2020-12-10 DIAGNOSIS — M94.9 DISORDER OF CARTILAGE, UNSPECIFIED: ICD-10-CM

## 2020-12-10 DIAGNOSIS — I77.9 BILATERAL CAROTID ARTERY DISEASE, UNSPECIFIED TYPE: ICD-10-CM

## 2020-12-10 DIAGNOSIS — Z86.010 HISTORY OF COLON POLYPS: ICD-10-CM

## 2020-12-10 DIAGNOSIS — K59.00 CONSTIPATION, UNSPECIFIED CONSTIPATION TYPE: ICD-10-CM

## 2020-12-10 DIAGNOSIS — M54.16 LUMBAR RADICULOPATHY: ICD-10-CM

## 2020-12-10 DIAGNOSIS — M54.16 LUMBAR RADICULAR PAIN: ICD-10-CM

## 2020-12-10 DIAGNOSIS — G57.11 MERALGIA PARAESTHETICA, RIGHT: Primary | ICD-10-CM

## 2020-12-10 DIAGNOSIS — K57.90 DIVERTICULOSIS: ICD-10-CM

## 2020-12-10 DIAGNOSIS — Z82.0 FAMILY HISTORY OF ALZHEIMER'S DISEASE: ICD-10-CM

## 2020-12-10 DIAGNOSIS — M25.551 HIP PAIN, RIGHT: ICD-10-CM

## 2020-12-10 PROCEDURE — 99214 PR OFFICE/OUTPT VISIT, EST, LEVL IV, 30-39 MIN: ICD-10-PCS | Mod: S$GLB,,, | Performed by: FAMILY MEDICINE

## 2020-12-10 PROCEDURE — 99214 OFFICE O/P EST MOD 30 MIN: CPT | Mod: S$GLB,,, | Performed by: FAMILY MEDICINE

## 2020-12-10 RX ORDER — DONEPEZIL HYDROCHLORIDE 5 MG/1
5 TABLET, FILM COATED ORAL NIGHTLY
Qty: 90 TABLET | Refills: 3 | Status: CANCELLED | OUTPATIENT
Start: 2020-12-10 | End: 2021-12-10

## 2020-12-10 RX ORDER — DONEPEZIL HYDROCHLORIDE 5 MG/1
5 TABLET, FILM COATED ORAL NIGHTLY
Qty: 90 TABLET | Refills: 3 | Status: SHIPPED | OUTPATIENT
Start: 2020-12-10 | End: 2020-12-11 | Stop reason: SDUPTHER

## 2020-12-10 NOTE — TELEPHONE ENCOUNTER
----- Message from Bettie Wyman sent at 12/10/2020  4:35 PM CST -----  Regarding: change pharmacy  Contact: [phone]/self  Patient is requesting a call back regarding her rx donepeziL (ARICEPT) 5 MG tablets to the wrong pharmacy    ease send to :[facility]/PHARMACY #[id number] - [place], [place] - [address] [facility];      Would the patient rather a call back or a response via [facility]?  call  Best Call Back Number:  [phone]/self  Additional Information:

## 2020-12-10 NOTE — TELEPHONE ENCOUNTER
----- Message from Bettie Wyman sent at 12/10/2020  4:35 PM CST -----  Regarding: change pharmacy  Contact: [phone]/self  Patient is requesting a call back regarding her rx donepeziL (ARICEPT) 5 MG tablets to the wrong pharmacy    ease send to :[facility]/PHARMACY #[id number] - [place], [place] - [address] [facility];      Would the patient rather a call back or a response via [facility]?  call  Best Call Back Number:  [phone]/self  Additional Information:

## 2020-12-10 NOTE — PROGRESS NOTES
Subjective:      Patient ID: Florentino Bowman is a 70 y.o. female.    Chief Complaint: Annual Exam      Vitals:    12/10/20 1430   BP: 126/80   Pulse: 83   Temp: 97.7 °F (36.5 °C)   TempSrc: Oral   SpO2: 98%   Weight: 53 kg (116 lb 13.5 oz)   Height: 5' (1.524 m)        HPI   Check up; pain continues in both legs; hx of l spine surgery and EDS injections in back, wears compression sleeve tight thigh; left leg toes cramp, has tingling  Has a heat / vibration for feet  Still under care of Dr Beck and Bart  Was seeing Grieshaber  Constipation and his of p[olyps with Dr Jenkins 3 years ago;  Pt would like to see GI again, referrral placed  Gets side tracked and distracted and mother had Alzheimers    ;    Problem List  Patient Active Problem List   Diagnosis    Headache    Hypothyroid    Depression    Insomnia    Anxiety    Chronic back pain    Bilateral carotid artery disease    Calcific tendonitis    Left shoulder pain    DDD (degenerative disc disease), lumbar    Change in bowel habit    Screening for colorectal cancer    S/P lumbar laminectomy    Chronic right-sided low back pain with right-sided sciatica    Lumbar radiculopathy    Lumbar radicular pain    Spondylolisthesis    Lumbar spondylosis    Chest pain        ALLERGIES: Review of patient's allergies indicates:  No Known Allergies    MEDS:   Current Outpatient Medications:     ALPRAZolam (XANAX) 0.5 MG tablet, Take 1 tablet (0.5 mg total) by mouth nightly as needed., Disp: 30 tablet, Rfl: 5    atorvastatin (LIPITOR) 40 MG tablet, TAKE 1 TABLET BY MOUTH EVERY DAY, Disp: 90 tablet, Rfl: 3    buPROPion (WELLBUTRIN SR) 150 MG TBSR 12 hr tablet, TAKE 1 TABLET BY MOUTH TWICE A DAY, Disp: 180 tablet, Rfl: 1    estradiol (ESTRACE) 1 MG tablet, Take 1 mg by mouth every evening. , Disp: , Rfl:     flurbiprofen (CMPD PAIN MANAGEMENT COMPOUND OINTMNENT TWO), Apply 2 Pump (3.2 g total) topically 5 (five) times daily., Disp: 120 g, Rfl: 11     levothyroxine (SYNTHROID) 88 MCG tablet, Take 1 tablet (88 mcg total) by mouth once daily., Disp: 30 tablet, Rfl: 11    liothyronine (CYTOMEL) 5 MCG Tab, TAKE 1 TABLET BY MOUTH DAILY, Disp: 30 tablet, Rfl: 11    progesterone (PROMETRIUM) 100 MG capsule, Take 100 mg by mouth every evening. , Disp: , Rfl:     sumatriptan (IMITREX) 100 MG tablet, TAKE 1 TABLET BY MOUTH AS NEEDED HEADACHE,REPEAT IN 2 HOURS IF NEEDED, Disp: 12 tablet, Rfl: 14    zolpidem (AMBIEN) 10 mg Tab, Take 1 tablet (10 mg total) by mouth nightly as needed., Disp: 90 tablet, Rfl: 0    donepeziL (ARICEPT) 5 MG tablet, Take 1 tablet (5 mg total) by mouth every evening. To assist  In memory, Disp: 90 tablet, Rfl: 3      History:  Current Providers as of 12/10/2020  PCP: Fam Chou MD  Care Team Provider: Corona Ayala MD  Care Team Provider: Lan Reid MD  Care Team Provider: Brandon Syed LPN  Care Team Provider: Tristan Fields Jr., MD  Care Team Provider: Vito Arriaga MD  Care Team Provider: Tim Beck MD  Care Team Provider: Bethel Isaac MD  Encounter Provider: Fam Chou MD, starting on Thu Dec 10, 2020 12:00 AM  Referring Provider: not found, starting on Thu Dec 10, 2020 12:00 AM  Consulting Physician: Fam Chou MD, starting on Thu Dec 10, 2020  2:27 PM (Active)   Past Medical History:   Diagnosis Date    Headache(784.0)     Hypothyroid      Past Surgical History:   Procedure Laterality Date    BREAST BIOPSY Right      SECTION      x2    COLONOSCOPY N/A 2017    Procedure: COLONOSCOPY Miralax;  Surgeon: Quentin Mercado Jr., MD;  Location: [facility] ENDO;  Service: Endoscopy;  Laterality: N/A;    HYSTERECTOMY      INJECTION OF ANESTHETIC AGENT AROUND MEDIAL BRANCH NERVES INNERVATING LUMBAR FACET JOINT Right 2019    Procedure: BLOCK, NERVE, FACET JOINT, LUMBAR, MEDIAL BRANCH ;  Surgeon: Sixto Moya III, MD;  Location: [facility] OR;  Service: Pain Management;  Laterality:  Right;  - L4/5 & L5/S1  PATIENT NEEDS TO BE THE FIRST PATIENT OF THE DAY, PER DR. MOYA!!!    LAPAROSCOPIC HYSTERECTOMY  2010    supracervical/BSO    OOPHORECTOMY      SPINE SURGERY      TONSILLECTOMY      TRANSFORAMINAL EPIDURAL INJECTION OF STEROID Right 6/5/2019    Procedure: INJECTION, STEROID, EPIDURAL, TRANSFORAMINAL APPROACH;  Surgeon: Sixto Moya III, MD;  Location: [facility] OR;  Service: Pain Management;  Laterality: Right;  -right TFESI L4/5     Social History     Tobacco Use    Smoking status: Never Smoker    Smokeless tobacco: Never Used   Substance Use Topics    Alcohol use: Yes     Frequency: 4 or more times a week     Drinks per session: 1 or 2     Binge frequency: Never     Comment: social    Drug use: No         Review of Systems   Constitutional: Positive for fatigue.   Musculoskeletal: Positive for back pain.   Neurological: Positive for numbness.        Forgets overwhelmed, sick , covid, on xanax, half tablet helps, makes her tired and sleepy     Psychiatric/Behavioral: Negative for sleep disturbance and suicidal ideas.        Ambien     Objective:     Physical Exam  Vitals signs and nursing note reviewed.   Constitutional:       Appearance: She is well-developed.   HENT:      Head: Normocephalic.   Eyes:      Conjunctiva/sclera: Conjunctivae normal.      Pupils: Pupils are equal, round, and reactive to light.   Neck:      Musculoskeletal: Normal range of motion and neck supple.   Cardiovascular:      Rate and Rhythm: Normal rate and regular rhythm.      Heart sounds: Normal heart sounds.   Pulmonary:      Effort: Pulmonary effort is normal.      Breath sounds: Normal breath sounds.   Musculoskeletal: Normal range of motion.   Skin:     General: Skin is warm and dry.   Neurological:      Mental Status: She is alert and oriented to person, place, and time.      Deep Tendon Reflexes: Reflexes are normal and symmetric.   Psychiatric:         Behavior: Behavior normal.          Thought Content: Thought content normal.         Judgment: Judgment normal.             Assessment:     1. Meralgia paraesthetica, right    2. Lumbar radicular pain    3. Lumbar radiculopathy    4. Diverticulosis    5. History of colon polyps    6. Constipation, unspecified constipation type    7. Family history of Alzheimer's disease    8. Hip pain, right    9. Bilateral carotid artery disease, unspecified type    10. Poisoning by vitamin D, accidental or unintentional, initial encounter    11. Disorder of cartilage, unspecified       Plan:        Medication List          Accurate as of December 10, 2020 11:59 PM. If you have any questions, ask your nurse or doctor.            START taking these medications    donepeziL 5 MG tablet  Commonly known as: ARICEPT  Take 1 tablet (5 mg total) by mouth every evening. To assist  In memory  Started by: aFm Chou MD        CONTINUE taking these medications    ALPRAZolam 0.5 MG tablet  Commonly known as: XANAX  Take 1 tablet (0.5 mg total) by mouth nightly as needed.     atorvastatin 40 MG tablet  Commonly known as: LIPITOR  TAKE 1 TABLET BY MOUTH EVERY DAY     buPROPion 150 MG TBSR 12 hr tablet  Commonly known as: WELLBUTRIN SR  TAKE 1 TABLET BY MOUTH TWICE A DAY     CMPD PAIN MANAGEMENT COMPOUND OINTMNENT TWO  Apply 2 Pump (3.2 g total) topically 5 (five) times daily.     estradioL 1 MG tablet  Commonly known as: ESTRACE     levothyroxine 88 MCG tablet  Commonly known as: SYNTHROID  Take 1 tablet (88 mcg total) by mouth once daily.     liothyronine 5 MCG Tab  Commonly known as: CYTOMEL  TAKE 1 TABLET BY MOUTH DAILY     progesterone 100 MG capsule  Commonly known as: PROMETRIUM     sumatriptan 100 MG tablet  Commonly known as: IMITREX  TAKE 1 TABLET BY MOUTH AS NEEDED HEADACHE,REPEAT IN 2 HOURS IF NEEDED     zolpidem 10 mg Tab  Commonly known as: AMBIEN  Take 1 tablet (10 mg total) by mouth nightly as needed.        STOP taking these medications    busPIRone 5 MG  Tab  Commonly known as: BUSPAR  Stopped by: Fam Chou MD     citalopram 40 MG tablet  Commonly known as: CELEXA  Stopped by: Fam Chou MD     gabapentin 300 MG capsule  Commonly known as: NEURONTIN  Stopped by: Fam Chou MD     mupirocin 2 % ointment  Commonly known as: BACTROBAN  Stopped by: Fam Chou MD     triamcinolone acetonide 0.5% 0.5 % Crea  Commonly known as: KENALOG  Stopped by: Fam Chou MD           Where to Get Your Medications      These medications [facility] [place] [facility] [place] - [address] [place] [address]    Phone: [phone]   · donepeziL 5 MG tablet     Information about where to get these medications is not yet available    Ask your nurse or doctor about these medications  · CMPD PAIN MANAGEMENT COMPOUND OINTMNENT TWO       Meralgia paraesthetica, right    Lumbar radicular pain    Lumbar radiculopathy    Diverticulosis    History of colon polyps  -     Ambulatory referral/consult to Gastroenterology; Future; Expected date: 12/17/2020    Constipation, unspecified constipation type    Family history of Alzheimer's disease    Hip pain, right  -     X-Ray Hip 2 View Right; Future; Expected date: 12/10/2020  -     Ambulatory referral/consult to Orthopedics; Future; Expected date: 12/17/2020    Bilateral carotid artery disease, unspecified type    Poisoning by vitamin D, accidental or unintentional, initial encounter  -     Vitamin D; Future    Disorder of cartilage, unspecified   -     Vitamin D; Future    Other orders  -     flurbiprofen (CMPD PAIN MANAGEMENT COMPOUND OINTMNENT TWO); Apply 2 Pump (3.2 g total) topically 5 (five) times daily.  Dispense: 120 g; Refill: 11  -     donepeziL (ARICEPT) 5 MG tablet; Take 1 tablet (5 mg total) by mouth every evening. To assist  In memory  Dispense: 90 tablet; Refill: 3          Answers for HPI/ROS submitted by the patient on 12/10/2020   Back  pain  Chronicity: chronic  Onset: more than 1 year ago  Frequency: constantly  Progression since onset: unchanged  Pain location: gluteal, lumbar spine, sacro-iliac  Pain quality: burning  Radiates to: left foot, right thigh  Pain - numeric: 10/10  Pain is: the same all the time  Aggravated by: position, standing  Stiffness is present: in the morning  leg pain: Yes  paresthesias: Yes  tingling: Yes  Pain severity: severe  Treatments tried: analgesics, NSAIDs, home exercises, ice, injection treatment  Improvement on treatment: no relief

## 2020-12-11 ENCOUNTER — TELEPHONE (OUTPATIENT)
Dept: FAMILY MEDICINE | Facility: CLINIC | Age: 70
End: 2020-12-11

## 2020-12-11 ENCOUNTER — PATIENT MESSAGE (OUTPATIENT)
Dept: FAMILY MEDICINE | Facility: CLINIC | Age: 70
End: 2020-12-11

## 2020-12-11 RX ORDER — DONEPEZIL HYDROCHLORIDE 5 MG/1
5 TABLET, FILM COATED ORAL NIGHTLY
Qty: 90 TABLET | Refills: 3 | Status: SHIPPED | OUTPATIENT
Start: 2020-12-11 | End: 2021-05-27

## 2020-12-11 RX ORDER — AMITRIPTYLINE HYDROCHLORIDE 10 MG/1
10 TABLET, FILM COATED ORAL 2 TIMES DAILY
Qty: 60 TABLET | Refills: 11 | Status: SHIPPED | OUTPATIENT
Start: 2020-12-11 | End: 2021-05-27

## 2020-12-11 NOTE — TELEPHONE ENCOUNTER
1. Please send donepeziL (ARICEPT) 5 MG tablet to local [facility] pharmacy in [place].    2. Also patient states  suggested a low dose of Elavil to help with pain. Patient also requesting a prescription for Elavil       ----- Message from Cecille Ferrell sent at 12/11/2020  9:17 AM CST -----  Contact: [phone]  Who Called: PT  Regarding: rx   Would the patient rather a call back or a response via [facility]? Call back  Best Call Back Number: [phone]  Additional Information: donepeziL (ARICEPT) 5 MG tablet sent to wrong pharmacy and also she wanted to know about another rx Dr Chou was supposed to send for her

## 2020-12-14 ENCOUNTER — TELEPHONE (OUTPATIENT)
Dept: FAMILY MEDICINE | Facility: CLINIC | Age: 70
End: 2020-12-14

## 2020-12-29 ENCOUNTER — OFFICE VISIT (OUTPATIENT)
Dept: PHYSICAL MEDICINE AND REHAB | Facility: CLINIC | Age: 70
End: 2020-12-29
Payer: MEDICARE

## 2020-12-29 VITALS
HEIGHT: 60 IN | DIASTOLIC BLOOD PRESSURE: 76 MMHG | WEIGHT: 117.63 LBS | SYSTOLIC BLOOD PRESSURE: 129 MMHG | HEART RATE: 86 BPM | BODY MASS INDEX: 23.1 KG/M2

## 2020-12-29 DIAGNOSIS — M54.41 CHRONIC RIGHT-SIDED LOW BACK PAIN WITH RIGHT-SIDED SCIATICA: Primary | ICD-10-CM

## 2020-12-29 DIAGNOSIS — M51.36 DDD (DEGENERATIVE DISC DISEASE), LUMBAR: ICD-10-CM

## 2020-12-29 DIAGNOSIS — Z98.890 S/P LUMBAR LAMINECTOMY: ICD-10-CM

## 2020-12-29 DIAGNOSIS — G89.29 CHRONIC RIGHT-SIDED LOW BACK PAIN WITH RIGHT-SIDED SCIATICA: Primary | ICD-10-CM

## 2020-12-29 PROCEDURE — 99999 PR PBB SHADOW E&M-EST. PATIENT-LVL III: CPT | Mod: PBBFAC,,, | Performed by: PHYSICAL MEDICINE & REHABILITATION

## 2020-12-29 PROCEDURE — 99215 PR OFFICE/OUTPT VISIT, EST, LEVL V, 40-54 MIN: ICD-10-PCS | Mod: S$PBB,,, | Performed by: PHYSICAL MEDICINE & REHABILITATION

## 2020-12-29 PROCEDURE — 99215 OFFICE O/P EST HI 40 MIN: CPT | Mod: S$PBB,,, | Performed by: PHYSICAL MEDICINE & REHABILITATION

## 2020-12-29 PROCEDURE — 99999 PR PBB SHADOW E&M-EST. PATIENT-LVL III: ICD-10-PCS | Mod: PBBFAC,,, | Performed by: PHYSICAL MEDICINE & REHABILITATION

## 2020-12-29 PROCEDURE — 99213 OFFICE O/P EST LOW 20 MIN: CPT | Mod: PBBFAC | Performed by: PHYSICAL MEDICINE & REHABILITATION

## 2020-12-29 RX ORDER — CELECOXIB 200 MG/1
200 CAPSULE ORAL 2 TIMES DAILY
Qty: 60 CAPSULE | Refills: 3 | Status: SHIPPED | OUTPATIENT
Start: 2020-12-29 | End: 2021-01-28

## 2020-12-29 RX ORDER — ACETAMINOPHEN 500 MG
500-1000 TABLET ORAL 3 TIMES DAILY PRN
Refills: 0 | COMMUNITY
Start: 2020-12-29 | End: 2021-05-27

## 2020-12-29 NOTE — PROGRESS NOTES
Subjective:       Patient ID: Florentino Bowman is a 70 y.o. female.    Chief Complaint: No chief complaint on file.    HPI     Mrs. Bowman is a 70-year-old white female with past medical history of hypothyroidism, anxiety/depression, bilateral carotid artery disease and osteoarthritis.  She is presenting to the Physical Medicine Clinic for help with chronic low back pain.    The patient started complaining of low back pain around 2015.  It was initially localized but she started complaining of radicular symptoms in 2017.  Her imaging studies were positive for advance degenerative changes.  He was seen by Dr. Ba at the pain clinic.  In 05/2017 and 09/2017 she underwent right L4-5 transforaminal Epidural Steroid Injection.  She had no significant relief.  In 12/2017, she underwent minimally invasive surgery with right L4-5 laminectomy, medial facetectomy and foraminotomy.  She had minimal relief.  In 06/2018 she underwent right L4-5 transforaminal Epidural Steroid Injection.  Subsequently participated with the healthy back program at [facility].  In 01/2019 she underwent right L4-5 MBB.  In 06/2019 she underwent right L4-5 transforaminal Epidural Steroid Injection.  She had limited results.  She was being considered for lumbar fusion surgery.  She subsequently presented to [facility] with exacerbation of her back pain and left-sided radiculopathy.  She reports receiving epidural steroid injection with significant improvement of her shooting pain to the left leg, with some residual left foot numbness.    Currently, her back pain is an intermittent aching sensation in the right lower lumbar spine and buttock region.  She has occasional shooting pain to the right ft with numbness.  She denies any radiation to the left lower extremity but still has residual numbness in her left foot.  She also has sporadic burning sensations in the  anterior lateral aspect of the right thigh.  Her pain is  aggravated with excess activity and when she wakes up in the morning.  Is better with lying down and ice.  Her max pain 7-8/10 and minimum 3-4/10.  Today it is 3-4/10.  Patient denies any lower extremity weakness.  She denies any bowel or bladder incontinence.  She denies any leg claudications.  She denies any saddle anesthesia.    The patient has been tried on multiple medications.  She reports being on gabapentin at 300 mg p.o. 3 times per day with no relief but with grogginess.  She is tapering it down and stopping it.  She had similar response to Lyrica.  She does not recall being tried on Cymbalta or Effexor but is already on Wellbutrin.  She was recently started on amitriptyline 10 mg tablets.  She takes 1 tablet in the morning.  She reports little response in the past to ibuprofen, naproxen or meloxicam.    Past Medical History:   Diagnosis Date    Headache(784.0)     Hypothyroid        Review of patient's allergies indicates:  No Known Allergies    Review of Systems   Constitutional: Negative for chills, fatigue and fever.   Eyes: Negative for visual disturbance.   Respiratory: Negative for shortness of breath.    Cardiovascular: Negative for chest pain.   Gastrointestinal: Positive for constipation. Negative for blood in stool, nausea and vomiting.   Genitourinary: Negative for difficulty urinating.   Musculoskeletal: Positive for back pain. Negative for arthralgias (Lt shoulder), gait problem and neck pain.   Neurological: Positive for headaches. Negative for dizziness.   Psychiatric/Behavioral: Positive for sleep disturbance. Negative for behavioral problems.         Objective:      Physical Exam  Vitals signs reviewed.   Constitutional:       Appearance: She is well-developed.   HENT:      Head: Normocephalic and atraumatic.   Neck:      Musculoskeletal: Normal range of motion.      Comments: -ve tenderness C-spine.  Musculoskeletal:      Comments: BUE:  ROM:   RUE: full.   LUE: full.  Strength:     RUE: 5/5 at shoulder abduction, 5 elbow flexion, 5 elbow extension, 5 hand .   LUE: 5/5 at shoulder abduction, 5 elbow flexion, 5 elbow extension, 5 hand .  Sensation to pinprick:   RUE: intact.   LUE: intact.  DTR:    RUE: +1 biceps, +1 triceps.   LUE:  +1 biceps, +1 triceps.      BLE:  ROM:   RLE: full.   LLE: full.  Knee crepitus:   RLE: +ve.   LLE: +ve.   Strength:    RLE: 5/5 at hip flexion, 5 knee extension, 5 ankle DF, 5 PF, 5 EHL.   LLE: 5/5 at hip flexion, 5 knee extension, 5 ankle DF, 5 PF, 5 EHL.  Sensation to pinprick:     RLE: intact, except mild decrease on the anterior and lateral aspect of the right thigh     LLE: intact.   DTR:     RLE: +1 knee, +1 ankle.    LLE: +1 knee, +1 ankle.  Clonus:    Rt ankle: -ve.    Lt ankle: -ve.  SLR:      RLE: -ve at 70 degrees.      LLE: -ve at 70 degrees.   ROSA:     RLE: -ve.      LLE: -ve.  SI tenderness     RLE: -ve.      LLE: -ve.  Gilet's test:     RLE: -ve.      LLE: -ve.  +ve mild tenderness over lumbar spine.  No leg length discrepancy.    Directional Preference:  Spine flexion: 90 degrees , mild pain in back.  Spine extension: 10 degrees, mod pain in back.  Lateral bending: mod pain to Right, mod pain to Left.      Heel walking: WNL.  Toe walking: WNL.  Gait: WNL     Skin:     General: Skin is warm.   Neurological:      Mental Status: She is alert and oriented to person, place, and time.   Psychiatric:         Behavior: Behavior normal.         Assessment:       1. Chronic right-sided low back pain with right-sided sciatica    2. S/P lumbar laminectomy (Right L4-5, 12/2017)    3. DDD (degenerative disc disease), lumbar        Plan:         - MRI Lumbar Spine Without Contrast; Future  - Increase acetaminophen (TYLENOL) 500 MG tablet; Take 1-2 tablets (500-1,000 mg total) by mouth 3 (three) times daily as needed for Pain.  - Start celecoxib (CELEBREX) 200 MG capsule; Take 1 capsule (200 mg total) by mouth 2 (two) times daily.  - Increase  amitriptyline 10 mg tablets; take 1 tablet by mouth twice per day.  - Referral to the Pain Clinic or spine surgery may be done pending MRI results.  - Follow up in about 4 months (around 4/29/2021).     This was a 40 minute visit, 50% of which was spent educating the patient about the diagnosis and the treatment plan.    This note was partly generated with [employer] voice recognition software. I apologize for any possible typographical errors.

## 2020-12-30 ENCOUNTER — PATIENT MESSAGE (OUTPATIENT)
Dept: PHYSICAL MEDICINE AND REHAB | Facility: CLINIC | Age: 70
End: 2020-12-30

## 2020-12-31 ENCOUNTER — EXTERNAL CHRONIC CARE MANAGEMENT (OUTPATIENT)
Dept: PRIMARY CARE CLINIC | Facility: CLINIC | Age: 70
End: 2020-12-31
Payer: MEDICARE

## 2020-12-31 PROCEDURE — 99490 PR CHRONIC CARE MGMT, 1ST 20 MIN: ICD-10-PCS | Mod: S$GLB,,, | Performed by: FAMILY MEDICINE

## 2020-12-31 PROCEDURE — 99490 CHRNC CARE MGMT STAFF 1ST 20: CPT | Mod: S$GLB,,, | Performed by: FAMILY MEDICINE

## 2021-01-11 ENCOUNTER — TELEPHONE (OUTPATIENT)
Dept: FAMILY MEDICINE | Facility: CLINIC | Age: 71
End: 2021-01-11

## 2021-01-20 ENCOUNTER — PATIENT MESSAGE (OUTPATIENT)
Dept: FAMILY MEDICINE | Facility: CLINIC | Age: 71
End: 2021-01-20

## 2021-01-20 ENCOUNTER — OFFICE VISIT (OUTPATIENT)
Dept: GASTROENTEROLOGY | Facility: CLINIC | Age: 71
End: 2021-01-20
Payer: MEDICARE

## 2021-01-20 VITALS — WEIGHT: 117.94 LBS | BODY MASS INDEX: 23.03 KG/M2

## 2021-01-20 DIAGNOSIS — Z86.010 HISTORY OF COLON POLYPS: ICD-10-CM

## 2021-01-20 DIAGNOSIS — Z01.812 PRE-PROCEDURE LAB EXAM: ICD-10-CM

## 2021-01-20 DIAGNOSIS — M54.16 LUMBAR RADICULAR PAIN: Primary | ICD-10-CM

## 2021-01-20 DIAGNOSIS — K21.9 GASTROESOPHAGEAL REFLUX DISEASE, UNSPECIFIED WHETHER ESOPHAGITIS PRESENT: ICD-10-CM

## 2021-01-20 DIAGNOSIS — R19.4 CHANGE IN BOWEL HABIT: Primary | ICD-10-CM

## 2021-01-20 PROCEDURE — 99999 PR PBB SHADOW E&M-EST. PATIENT-LVL V: CPT | Mod: PBBFAC,,, | Performed by: INTERNAL MEDICINE

## 2021-01-20 PROCEDURE — 99204 OFFICE O/P NEW MOD 45 MIN: CPT | Mod: S$PBB,,, | Performed by: INTERNAL MEDICINE

## 2021-01-20 PROCEDURE — 99215 OFFICE O/P EST HI 40 MIN: CPT | Mod: PBBFAC,PO | Performed by: INTERNAL MEDICINE

## 2021-01-20 PROCEDURE — 99204 PR OFFICE/OUTPT VISIT, NEW, LEVL IV, 45-59 MIN: ICD-10-PCS | Mod: S$PBB,,, | Performed by: INTERNAL MEDICINE

## 2021-01-20 PROCEDURE — 99999 PR PBB SHADOW E&M-EST. PATIENT-LVL V: ICD-10-PCS | Mod: PBBFAC,,, | Performed by: INTERNAL MEDICINE

## 2021-01-20 RX ORDER — GABAPENTIN 300 MG/1
300 CAPSULE ORAL 3 TIMES DAILY
Status: ON HOLD | COMMUNITY
End: 2021-02-11 | Stop reason: CLARIF

## 2021-01-26 ENCOUNTER — OFFICE VISIT (OUTPATIENT)
Dept: PAIN MEDICINE | Facility: CLINIC | Age: 71
End: 2021-01-26
Payer: MEDICARE

## 2021-01-26 VITALS
OXYGEN SATURATION: 98 % | HEART RATE: 93 BPM | BODY MASS INDEX: 22.59 KG/M2 | HEIGHT: 60 IN | WEIGHT: 115.06 LBS | DIASTOLIC BLOOD PRESSURE: 68 MMHG | RESPIRATION RATE: 16 BRPM | SYSTOLIC BLOOD PRESSURE: 120 MMHG

## 2021-01-26 DIAGNOSIS — M51.36 LUMBAR DEGENERATIVE DISC DISEASE: Primary | ICD-10-CM

## 2021-01-26 DIAGNOSIS — Z98.890 HISTORY OF LUMBAR SURGERY: ICD-10-CM

## 2021-01-26 DIAGNOSIS — M54.16 LUMBAR RADICULAR PAIN: ICD-10-CM

## 2021-01-26 DIAGNOSIS — M47.816 LUMBAR SPONDYLOSIS: ICD-10-CM

## 2021-01-26 PROCEDURE — 99999 PR PBB SHADOW E&M-EST. PATIENT-LVL V: ICD-10-PCS | Mod: PBBFAC,,, | Performed by: ANESTHESIOLOGY

## 2021-01-26 PROCEDURE — 99214 OFFICE O/P EST MOD 30 MIN: CPT | Mod: S$PBB,,, | Performed by: ANESTHESIOLOGY

## 2021-01-26 PROCEDURE — 99215 OFFICE O/P EST HI 40 MIN: CPT | Mod: PBBFAC,PN | Performed by: ANESTHESIOLOGY

## 2021-01-26 PROCEDURE — 99999 PR PBB SHADOW E&M-EST. PATIENT-LVL V: CPT | Mod: PBBFAC,,, | Performed by: ANESTHESIOLOGY

## 2021-01-26 PROCEDURE — 99214 PR OFFICE/OUTPT VISIT, EST, LEVL IV, 30-39 MIN: ICD-10-PCS | Mod: S$PBB,,, | Performed by: ANESTHESIOLOGY

## 2021-01-31 ENCOUNTER — EXTERNAL CHRONIC CARE MANAGEMENT (OUTPATIENT)
Dept: PRIMARY CARE CLINIC | Facility: CLINIC | Age: 71
End: 2021-01-31
Payer: MEDICARE

## 2021-01-31 PROCEDURE — 99490 CHRNC CARE MGMT STAFF 1ST 20: CPT | Mod: S$GLB,,, | Performed by: FAMILY MEDICINE

## 2021-01-31 PROCEDURE — 99490 PR CHRONIC CARE MGMT, 1ST 20 MIN: ICD-10-PCS | Mod: S$GLB,,, | Performed by: FAMILY MEDICINE

## 2021-02-03 ENCOUNTER — PATIENT MESSAGE (OUTPATIENT)
Dept: FAMILY MEDICINE | Facility: CLINIC | Age: 71
End: 2021-02-03

## 2021-02-03 RX ORDER — PHENAZOPYRIDINE HYDROCHLORIDE 200 MG/1
200 TABLET, FILM COATED ORAL 3 TIMES DAILY PRN
Qty: 20 TABLET | Refills: 0 | Status: ON HOLD | OUTPATIENT
Start: 2021-02-03 | End: 2021-02-11 | Stop reason: CLARIF

## 2021-02-03 RX ORDER — CEPHALEXIN 500 MG/1
500 CAPSULE ORAL EVERY 12 HOURS
Qty: 14 CAPSULE | Refills: 0 | Status: ON HOLD | OUTPATIENT
Start: 2021-02-03 | End: 2021-02-11 | Stop reason: CLARIF

## 2021-02-08 ENCOUNTER — LAB VISIT (OUTPATIENT)
Dept: FAMILY MEDICINE | Facility: CLINIC | Age: 71
End: 2021-02-08
Payer: MEDICARE

## 2021-02-08 DIAGNOSIS — Z01.812 PRE-PROCEDURE LAB EXAM: ICD-10-CM

## 2021-02-08 PROCEDURE — U0005 INFEC AGEN DETEC AMPLI PROBE: HCPCS

## 2021-02-08 PROCEDURE — U0003 INFECTIOUS AGENT DETECTION BY NUCLEIC ACID (DNA OR RNA); SEVERE ACUTE RESPIRATORY SYNDROME CORONAVIRUS 2 (SARS-COV-2) (CORONAVIRUS DISEASE [COVID-19]), AMPLIFIED PROBE TECHNIQUE, MAKING USE OF HIGH THROUGHPUT TECHNOLOGIES AS DESCRIBED BY CMS-2020-01-R: HCPCS

## 2021-02-09 ENCOUNTER — TELEPHONE (OUTPATIENT)
Dept: ENDOSCOPY | Facility: HOSPITAL | Age: 71
End: 2021-02-09

## 2021-02-09 LAB — SARS-COV-2 RNA RESP QL NAA+PROBE: NOT DETECTED

## 2021-02-11 ENCOUNTER — ANESTHESIA (OUTPATIENT)
Dept: ENDOSCOPY | Facility: HOSPITAL | Age: 71
End: 2021-02-11
Payer: MEDICARE

## 2021-02-11 ENCOUNTER — ANESTHESIA EVENT (OUTPATIENT)
Dept: ENDOSCOPY | Facility: HOSPITAL | Age: 71
End: 2021-02-11
Payer: MEDICARE

## 2021-02-11 ENCOUNTER — HOSPITAL ENCOUNTER (OUTPATIENT)
Facility: HOSPITAL | Age: 71
Discharge: HOME | End: 2021-02-11
Attending: INTERNAL MEDICINE | Admitting: INTERNAL MEDICINE
Payer: MEDICARE

## 2021-02-11 VITALS
TEMPERATURE: 98 F | SYSTOLIC BLOOD PRESSURE: 121 MMHG | WEIGHT: 115 LBS | RESPIRATION RATE: 18 BRPM | DIASTOLIC BLOOD PRESSURE: 75 MMHG | HEIGHT: 60 IN | OXYGEN SATURATION: 100 % | BODY MASS INDEX: 22.58 KG/M2 | HEART RATE: 69 BPM

## 2021-02-11 DIAGNOSIS — R19.4 CHANGE IN BOWEL HABITS: ICD-10-CM

## 2021-02-11 PROCEDURE — 37000008 HC ANESTHESIA 1ST 15 MINUTES: Performed by: INTERNAL MEDICINE

## 2021-02-11 PROCEDURE — 63600175 PHARM REV CODE 636 W HCPCS: Performed by: NURSE ANESTHETIST, CERTIFIED REGISTERED

## 2021-02-11 PROCEDURE — 43239 EGD BIOPSY SINGLE/MULTIPLE: CPT | Performed by: INTERNAL MEDICINE

## 2021-02-11 PROCEDURE — 27201012 HC FORCEPS, HOT/COLD, DISP: Performed by: INTERNAL MEDICINE

## 2021-02-11 PROCEDURE — 43239 EGD BIOPSY SINGLE/MULTIPLE: CPT | Mod: 51,,, | Performed by: INTERNAL MEDICINE

## 2021-02-11 PROCEDURE — 88305 TISSUE EXAM BY PATHOLOGIST: CPT | Mod: 26,,, | Performed by: PATHOLOGY

## 2021-02-11 PROCEDURE — 43239 PR EGD, FLEX, W/BIOPSY, SGL/MULTI: ICD-10-PCS | Mod: 51,,, | Performed by: INTERNAL MEDICINE

## 2021-02-11 PROCEDURE — 45380 PR COLONOSCOPY,BIOPSY: ICD-10-PCS | Mod: ,,, | Performed by: INTERNAL MEDICINE

## 2021-02-11 PROCEDURE — 25000003 PHARM REV CODE 250: Performed by: NURSE ANESTHETIST, CERTIFIED REGISTERED

## 2021-02-11 PROCEDURE — 45380 COLONOSCOPY AND BIOPSY: CPT | Mod: ,,, | Performed by: INTERNAL MEDICINE

## 2021-02-11 PROCEDURE — 88305 TISSUE EXAM BY PATHOLOGIST: ICD-10-PCS | Mod: 26,,, | Performed by: PATHOLOGY

## 2021-02-11 PROCEDURE — 25000003 PHARM REV CODE 250: Performed by: INTERNAL MEDICINE

## 2021-02-11 PROCEDURE — 45380 COLONOSCOPY AND BIOPSY: CPT | Performed by: INTERNAL MEDICINE

## 2021-02-11 PROCEDURE — 37000009 HC ANESTHESIA EA ADD 15 MINS: Performed by: INTERNAL MEDICINE

## 2021-02-11 PROCEDURE — 88305 TISSUE EXAM BY PATHOLOGIST: CPT | Performed by: PATHOLOGY

## 2021-02-11 RX ORDER — SODIUM CHLORIDE 9 MG/ML
INJECTION, SOLUTION INTRAVENOUS CONTINUOUS
Status: DISCONTINUED | OUTPATIENT
Start: 2021-02-11 | End: 2021-02-11 | Stop reason: HOSPADM

## 2021-02-11 RX ORDER — LIDOCAINE HCL/PF 100 MG/5ML
SYRINGE (ML) INTRAVENOUS
Status: DISCONTINUED | OUTPATIENT
Start: 2021-02-11 | End: 2021-02-11

## 2021-02-11 RX ORDER — SODIUM CHLORIDE 0.9 % (FLUSH) 0.9 %
10 SYRINGE (ML) INJECTION
Status: DISCONTINUED | OUTPATIENT
Start: 2021-02-11 | End: 2021-02-11 | Stop reason: HOSPADM

## 2021-02-11 RX ORDER — SODIUM CHLORIDE 9 MG/ML
INJECTION, SOLUTION INTRAVENOUS CONTINUOUS PRN
Status: DISCONTINUED | OUTPATIENT
Start: 2021-02-11 | End: 2021-02-11

## 2021-02-11 RX ORDER — PROPOFOL 10 MG/ML
VIAL (ML) INTRAVENOUS
Status: DISCONTINUED | OUTPATIENT
Start: 2021-02-11 | End: 2021-02-11

## 2021-02-11 RX ORDER — DEXTROMETHORPHAN/PSEUDOEPHED 2.5-7.5/.8
DROPS ORAL
Status: COMPLETED | OUTPATIENT
Start: 2021-02-11 | End: 2021-02-11

## 2021-02-11 RX ORDER — PROPOFOL 10 MG/ML
VIAL (ML) INTRAVENOUS CONTINUOUS PRN
Status: DISCONTINUED | OUTPATIENT
Start: 2021-02-11 | End: 2021-02-11

## 2021-02-11 RX ORDER — CELECOXIB 200 MG/1
200 CAPSULE ORAL 2 TIMES DAILY
COMMUNITY
End: 2021-05-27

## 2021-02-11 RX ADMIN — PROPOFOL 50 MG: 10 INJECTION, EMULSION INTRAVENOUS at 09:02

## 2021-02-11 RX ADMIN — SIMETHICONE 40 MG: 20 SUSPENSION/ DROPS ORAL at 09:02

## 2021-02-11 RX ADMIN — SODIUM CHLORIDE 20 ML/HR: 0.9 INJECTION, SOLUTION INTRAVENOUS at 08:02

## 2021-02-11 RX ADMIN — PROPOFOL 150 MCG/KG/MIN: 10 INJECTION, EMULSION INTRAVENOUS at 09:02

## 2021-02-11 RX ADMIN — SODIUM CHLORIDE: 0.9 INJECTION, SOLUTION INTRAVENOUS at 08:02

## 2021-02-11 RX ADMIN — LIDOCAINE HYDROCHLORIDE 50 MG: 20 INJECTION, SOLUTION INTRAVENOUS at 09:02

## 2021-02-15 LAB
FINAL PATHOLOGIC DIAGNOSIS: NORMAL
GROSS: NORMAL
Lab: NORMAL

## 2021-02-19 PROBLEM — M51.36 LUMBAR DEGENERATIVE DISC DISEASE: Status: ACTIVE | Noted: 2021-02-19

## 2021-02-19 PROBLEM — M51.369 LUMBAR DEGENERATIVE DISC DISEASE: Status: ACTIVE | Noted: 2021-02-19

## 2021-02-28 ENCOUNTER — EXTERNAL CHRONIC CARE MANAGEMENT (OUTPATIENT)
Dept: PRIMARY CARE CLINIC | Facility: CLINIC | Age: 71
End: 2021-02-28
Payer: MEDICARE

## 2021-02-28 PROCEDURE — 99490 PR CHRONIC CARE MGMT, 1ST 20 MIN: ICD-10-PCS | Mod: S$GLB,,, | Performed by: FAMILY MEDICINE

## 2021-02-28 PROCEDURE — 99490 CHRNC CARE MGMT STAFF 1ST 20: CPT | Mod: S$GLB,,, | Performed by: FAMILY MEDICINE

## 2021-03-01 ENCOUNTER — PATIENT MESSAGE (OUTPATIENT)
Dept: FAMILY MEDICINE | Facility: CLINIC | Age: 71
End: 2021-03-01

## 2021-03-01 RX ORDER — HYDROCODONE BITARTRATE AND ACETAMINOPHEN 5; 325 MG/1; MG/1
1 TABLET ORAL EVERY 8 HOURS PRN
Qty: 20 TABLET | Refills: 0 | Status: SHIPPED | OUTPATIENT
Start: 2021-03-01 | End: 2021-11-17

## 2021-03-06 ENCOUNTER — PATIENT MESSAGE (OUTPATIENT)
Dept: PAIN MEDICINE | Facility: CLINIC | Age: 71
End: 2021-03-06

## 2021-03-07 ENCOUNTER — PATIENT MESSAGE (OUTPATIENT)
Dept: FAMILY MEDICINE | Facility: CLINIC | Age: 71
End: 2021-03-07

## 2021-03-08 ENCOUNTER — PATIENT MESSAGE (OUTPATIENT)
Dept: GASTROENTEROLOGY | Facility: CLINIC | Age: 71
End: 2021-03-08

## 2021-03-09 ENCOUNTER — PATIENT MESSAGE (OUTPATIENT)
Dept: FAMILY MEDICINE | Facility: CLINIC | Age: 71
End: 2021-03-09

## 2021-03-09 ENCOUNTER — OFFICE VISIT (OUTPATIENT)
Dept: PAIN MEDICINE | Facility: CLINIC | Age: 71
End: 2021-03-09
Payer: MEDICARE

## 2021-03-09 DIAGNOSIS — M79.2 NEUROPATHIC PAIN OF LEFT FOOT: Primary | ICD-10-CM

## 2021-03-09 DIAGNOSIS — M54.16 LUMBAR RADICULAR PAIN: ICD-10-CM

## 2021-03-09 DIAGNOSIS — Z98.890 S/P LUMBAR LAMINECTOMY: ICD-10-CM

## 2021-03-09 DIAGNOSIS — M51.36 LUMBAR DEGENERATIVE DISC DISEASE: ICD-10-CM

## 2021-03-09 PROCEDURE — 99213 PR OFFICE/OUTPT VISIT, EST, LEVL III, 20-29 MIN: ICD-10-PCS | Mod: 95,,, | Performed by: ANESTHESIOLOGY

## 2021-03-09 PROCEDURE — 99213 OFFICE O/P EST LOW 20 MIN: CPT | Mod: 95,,, | Performed by: ANESTHESIOLOGY

## 2021-03-09 RX ORDER — ZOLPIDEM TARTRATE 10 MG/1
TABLET ORAL
Qty: 90 TABLET | Refills: 0 | Status: SHIPPED | OUTPATIENT
Start: 2021-03-09 | End: 2021-06-02 | Stop reason: SDUPTHER

## 2021-03-09 RX ORDER — DULOXETIN HYDROCHLORIDE 30 MG/1
30 CAPSULE, DELAYED RELEASE ORAL 2 TIMES DAILY
Qty: 60 CAPSULE | Refills: 2 | Status: SHIPPED | OUTPATIENT
Start: 2021-03-09 | End: 2022-08-11 | Stop reason: SDUPTHER

## 2021-03-10 ENCOUNTER — PATIENT MESSAGE (OUTPATIENT)
Dept: PAIN MEDICINE | Facility: CLINIC | Age: 71
End: 2021-03-10

## 2021-03-28 ENCOUNTER — PATIENT MESSAGE (OUTPATIENT)
Dept: FAMILY MEDICINE | Facility: CLINIC | Age: 71
End: 2021-03-28

## 2021-03-30 RX ORDER — ALPRAZOLAM 0.5 MG/1
0.5 TABLET ORAL NIGHTLY PRN
Qty: 30 TABLET | Refills: 5 | Status: SHIPPED | OUTPATIENT
Start: 2021-03-30 | End: 2021-08-07

## 2021-03-31 ENCOUNTER — EXTERNAL CHRONIC CARE MANAGEMENT (OUTPATIENT)
Dept: PRIMARY CARE CLINIC | Facility: CLINIC | Age: 71
End: 2021-03-31
Payer: MEDICARE

## 2021-03-31 PROCEDURE — 99490 PR CHRONIC CARE MGMT, 1ST 20 MIN: ICD-10-PCS | Mod: S$GLB,,, | Performed by: FAMILY MEDICINE

## 2021-03-31 PROCEDURE — 99490 CHRNC CARE MGMT STAFF 1ST 20: CPT | Mod: S$GLB,,, | Performed by: FAMILY MEDICINE

## 2021-04-30 ENCOUNTER — EXTERNAL CHRONIC CARE MANAGEMENT (OUTPATIENT)
Dept: PRIMARY CARE CLINIC | Facility: CLINIC | Age: 71
End: 2021-04-30
Payer: MEDICARE

## 2021-04-30 PROCEDURE — 99490 CHRNC CARE MGMT STAFF 1ST 20: CPT | Mod: S$GLB,,, | Performed by: FAMILY MEDICINE

## 2021-04-30 PROCEDURE — 99490 PR CHRONIC CARE MGMT, 1ST 20 MIN: ICD-10-PCS | Mod: S$GLB,,, | Performed by: FAMILY MEDICINE

## 2021-05-27 ENCOUNTER — OFFICE VISIT (OUTPATIENT)
Dept: NEUROLOGY | Facility: CLINIC | Age: 71
End: 2021-05-27
Payer: MEDICARE

## 2021-05-27 VITALS
SYSTOLIC BLOOD PRESSURE: 116 MMHG | HEART RATE: 84 BPM | DIASTOLIC BLOOD PRESSURE: 80 MMHG | BODY MASS INDEX: 22.21 KG/M2 | WEIGHT: 113.13 LBS | HEIGHT: 60 IN

## 2021-05-27 DIAGNOSIS — G20.A1 PARKINSON'S DISEASE: ICD-10-CM

## 2021-05-27 DIAGNOSIS — R25.1 TREMOR: ICD-10-CM

## 2021-05-27 DIAGNOSIS — G20.C PARKINSONISM, UNSPECIFIED PARKINSONISM TYPE: Primary | ICD-10-CM

## 2021-05-27 DIAGNOSIS — R41.3 DECLINE IN VERBAL MEMORY: ICD-10-CM

## 2021-05-27 DIAGNOSIS — G43.009 MIGRAINE WITHOUT AURA AND WITHOUT STATUS MIGRAINOSUS, NOT INTRACTABLE: ICD-10-CM

## 2021-05-27 PROCEDURE — 99205 PR OFFICE/OUTPT VISIT, NEW, LEVL V, 60-74 MIN: ICD-10-PCS | Mod: S$PBB,,, | Performed by: PSYCHIATRY & NEUROLOGY

## 2021-05-27 PROCEDURE — 99999 PR PBB SHADOW E&M-EST. PATIENT-LVL IV: CPT | Mod: PBBFAC,,, | Performed by: PSYCHIATRY & NEUROLOGY

## 2021-05-27 PROCEDURE — 99214 OFFICE O/P EST MOD 30 MIN: CPT | Mod: PBBFAC,PN | Performed by: PSYCHIATRY & NEUROLOGY

## 2021-05-27 PROCEDURE — 99999 PR PBB SHADOW E&M-EST. PATIENT-LVL IV: ICD-10-PCS | Mod: PBBFAC,,, | Performed by: PSYCHIATRY & NEUROLOGY

## 2021-05-27 PROCEDURE — 99205 OFFICE O/P NEW HI 60 MIN: CPT | Mod: S$PBB,,, | Performed by: PSYCHIATRY & NEUROLOGY

## 2021-05-27 RX ORDER — PREGABALIN 100 MG/1
100 CAPSULE ORAL 2 TIMES DAILY
COMMUNITY
Start: 2021-05-05 | End: 2023-04-18

## 2021-05-27 RX ORDER — PREGABALIN 50 MG/1
50 CAPSULE ORAL 3 TIMES DAILY
COMMUNITY
Start: 2021-04-02 | End: 2021-12-07 | Stop reason: SDUPTHER

## 2021-05-28 ENCOUNTER — PATIENT MESSAGE (OUTPATIENT)
Dept: NEUROLOGY | Facility: CLINIC | Age: 71
End: 2021-05-28

## 2021-05-31 ENCOUNTER — EXTERNAL CHRONIC CARE MANAGEMENT (OUTPATIENT)
Dept: PRIMARY CARE CLINIC | Facility: CLINIC | Age: 71
End: 2021-05-31
Payer: MEDICARE

## 2021-05-31 PROCEDURE — 99490 CHRNC CARE MGMT STAFF 1ST 20: CPT | Mod: S$GLB,,, | Performed by: FAMILY MEDICINE

## 2021-05-31 PROCEDURE — 99490 PR CHRONIC CARE MGMT, 1ST 20 MIN: ICD-10-PCS | Mod: S$GLB,,, | Performed by: FAMILY MEDICINE

## 2021-06-02 ENCOUNTER — PATIENT MESSAGE (OUTPATIENT)
Dept: FAMILY MEDICINE | Facility: CLINIC | Age: 71
End: 2021-06-02

## 2021-06-04 RX ORDER — ZOLPIDEM TARTRATE 10 MG/1
TABLET ORAL
Qty: 90 TABLET | Refills: 0 | Status: SHIPPED | OUTPATIENT
Start: 2021-06-04 | End: 2021-08-07

## 2021-06-08 ENCOUNTER — PATIENT MESSAGE (OUTPATIENT)
Dept: NEUROLOGY | Facility: CLINIC | Age: 71
End: 2021-06-08

## 2021-06-30 ENCOUNTER — EXTERNAL CHRONIC CARE MANAGEMENT (OUTPATIENT)
Dept: PRIMARY CARE CLINIC | Facility: CLINIC | Age: 71
End: 2021-06-30
Payer: MEDICARE

## 2021-06-30 PROCEDURE — 99490 CHRNC CARE MGMT STAFF 1ST 20: CPT | Mod: S$GLB,,, | Performed by: FAMILY MEDICINE

## 2021-06-30 PROCEDURE — 99490 PR CHRONIC CARE MGMT, 1ST 20 MIN: ICD-10-PCS | Mod: S$GLB,,, | Performed by: FAMILY MEDICINE

## 2021-07-25 ENCOUNTER — CLINICAL SUPPORT (OUTPATIENT)
Dept: URGENT CARE | Facility: CLINIC | Age: 71
End: 2021-07-25
Payer: MEDICARE

## 2021-07-25 DIAGNOSIS — Z20.822 EXPOSURE TO COVID-19 VIRUS: Primary | ICD-10-CM

## 2021-07-25 LAB
CTP QC/QA: YES
SARS-COV-2 RDRP RESP QL NAA+PROBE: NEGATIVE

## 2021-07-25 PROCEDURE — U0002 COVID-19 LAB TEST NON-CDC: HCPCS | Mod: QW,CR,S$GLB, | Performed by: PHYSICIAN ASSISTANT

## 2021-07-25 PROCEDURE — U0002: ICD-10-PCS | Mod: QW,CR,S$GLB, | Performed by: PHYSICIAN ASSISTANT

## 2021-07-31 ENCOUNTER — PATIENT MESSAGE (OUTPATIENT)
Dept: NEUROLOGY | Facility: CLINIC | Age: 71
End: 2021-07-31

## 2021-07-31 ENCOUNTER — EXTERNAL CHRONIC CARE MANAGEMENT (OUTPATIENT)
Dept: PRIMARY CARE CLINIC | Facility: CLINIC | Age: 71
End: 2021-07-31
Payer: MEDICARE

## 2021-07-31 PROCEDURE — 99490 CHRNC CARE MGMT STAFF 1ST 20: CPT | Mod: S$GLB,,, | Performed by: FAMILY MEDICINE

## 2021-07-31 PROCEDURE — 99490 PR CHRONIC CARE MGMT, 1ST 20 MIN: ICD-10-PCS | Mod: S$GLB,,, | Performed by: FAMILY MEDICINE

## 2021-08-03 ENCOUNTER — OFFICE VISIT (OUTPATIENT)
Dept: NEUROLOGY | Facility: CLINIC | Age: 71
End: 2021-08-03
Payer: MEDICARE

## 2021-08-03 DIAGNOSIS — G20.C PARKINSONISM, UNSPECIFIED PARKINSONISM TYPE: Primary | ICD-10-CM

## 2021-08-03 DIAGNOSIS — M79.605 BILATERAL LEG PAIN: ICD-10-CM

## 2021-08-03 DIAGNOSIS — M79.604 BILATERAL LEG PAIN: ICD-10-CM

## 2021-08-03 DIAGNOSIS — M54.16 LUMBAR RADICULOPATHY: ICD-10-CM

## 2021-08-03 PROCEDURE — 99443 PR PHYSICIAN TELEPHONE EVALUATION 21-30 MIN: ICD-10-PCS | Mod: 95,,, | Performed by: PSYCHIATRY & NEUROLOGY

## 2021-08-03 PROCEDURE — 99443 PR PHYSICIAN TELEPHONE EVALUATION 21-30 MIN: CPT | Mod: 95,,, | Performed by: PSYCHIATRY & NEUROLOGY

## 2021-08-03 RX ORDER — CARBIDOPA AND LEVODOPA 25; 100 MG/1; MG/1
TABLET ORAL
Qty: 45 TABLET | Refills: 1 | Status: SHIPPED | OUTPATIENT
Start: 2021-08-03 | End: 2021-10-05 | Stop reason: SDUPTHER

## 2021-08-06 ENCOUNTER — PATIENT MESSAGE (OUTPATIENT)
Dept: FAMILY MEDICINE | Facility: CLINIC | Age: 71
End: 2021-08-06

## 2021-08-07 ENCOUNTER — TELEPHONE (OUTPATIENT)
Dept: FAMILY MEDICINE | Facility: CLINIC | Age: 71
End: 2021-08-07

## 2021-08-07 DIAGNOSIS — I77.9 BILATERAL CAROTID ARTERY DISEASE, UNSPECIFIED TYPE: ICD-10-CM

## 2021-08-07 DIAGNOSIS — E03.4 HYPOTHYROIDISM DUE TO ACQUIRED ATROPHY OF THYROID: ICD-10-CM

## 2021-08-07 DIAGNOSIS — Z00.00 WELLNESS EXAMINATION: Primary | ICD-10-CM

## 2021-08-07 RX ORDER — NALOXONE HYDROCHLORIDE 4 MG/.1ML
1 SPRAY NASAL ONCE
Qty: 1 EACH | Refills: 0 | Status: SHIPPED | OUTPATIENT
Start: 2021-08-07 | End: 2021-08-07

## 2021-08-07 RX ORDER — LIOTHYRONINE SODIUM 5 UG/1
5 TABLET ORAL DAILY
Qty: 90 TABLET | Refills: 0 | Status: SHIPPED | OUTPATIENT
Start: 2021-08-07 | End: 2021-10-29

## 2021-08-07 RX ORDER — HYDROCODONE BITARTRATE AND ACETAMINOPHEN 5; 325 MG/1; MG/1
1 TABLET ORAL EVERY 6 HOURS PRN
Qty: 10 TABLET | Refills: 0 | Status: SHIPPED | OUTPATIENT
Start: 2021-08-07 | End: 2022-08-11

## 2021-08-07 RX ORDER — LEVOTHYROXINE SODIUM 88 UG/1
90 TABLET ORAL DAILY
Qty: 90 TABLET | Refills: 0 | Status: SHIPPED | OUTPATIENT
Start: 2021-08-07 | End: 2021-10-29

## 2021-08-07 RX ORDER — ESTRADIOL 1 MG/1
1 TABLET ORAL NIGHTLY
Qty: 90 TABLET | Refills: 0 | Status: SHIPPED | OUTPATIENT
Start: 2021-08-07 | End: 2023-02-16

## 2021-08-10 ENCOUNTER — PATIENT MESSAGE (OUTPATIENT)
Dept: FAMILY MEDICINE | Facility: CLINIC | Age: 71
End: 2021-08-10

## 2021-08-12 ENCOUNTER — PATIENT MESSAGE (OUTPATIENT)
Dept: NEUROLOGY | Facility: CLINIC | Age: 71
End: 2021-08-12

## 2021-08-31 ENCOUNTER — EXTERNAL CHRONIC CARE MANAGEMENT (OUTPATIENT)
Dept: PRIMARY CARE CLINIC | Facility: CLINIC | Age: 71
End: 2021-08-31
Payer: MEDICARE

## 2021-08-31 PROCEDURE — 99490 PR CHRONIC CARE MGMT, 1ST 20 MIN: ICD-10-PCS | Mod: S$GLB,,, | Performed by: FAMILY MEDICINE

## 2021-08-31 PROCEDURE — 99490 CHRNC CARE MGMT STAFF 1ST 20: CPT | Mod: S$GLB,,, | Performed by: FAMILY MEDICINE

## 2021-09-30 ENCOUNTER — EXTERNAL CHRONIC CARE MANAGEMENT (OUTPATIENT)
Dept: PRIMARY CARE CLINIC | Facility: CLINIC | Age: 71
End: 2021-09-30
Payer: MEDICARE

## 2021-09-30 PROCEDURE — 99490 CHRNC CARE MGMT STAFF 1ST 20: CPT | Mod: S$GLB,,, | Performed by: FAMILY MEDICINE

## 2021-09-30 PROCEDURE — 99439 PR CHRONIC CARE MGMT, EA ADDTL 20 MIN: ICD-10-PCS | Mod: S$GLB,,, | Performed by: FAMILY MEDICINE

## 2021-09-30 PROCEDURE — 99490 PR CHRONIC CARE MGMT, 1ST 20 MIN: ICD-10-PCS | Mod: S$GLB,,, | Performed by: FAMILY MEDICINE

## 2021-09-30 PROCEDURE — 99439 CHRNC CARE MGMT STAF EA ADDL: CPT | Mod: S$GLB,,, | Performed by: FAMILY MEDICINE

## 2021-10-01 ENCOUNTER — PES CALL (OUTPATIENT)
Dept: ADMINISTRATIVE | Facility: CLINIC | Age: 71
End: 2021-10-01

## 2021-10-04 ENCOUNTER — PATIENT MESSAGE (OUTPATIENT)
Dept: NEUROLOGY | Facility: CLINIC | Age: 71
End: 2021-10-04

## 2021-10-05 RX ORDER — CARBIDOPA AND LEVODOPA 25; 100 MG/1; MG/1
TABLET ORAL
Qty: 45 TABLET | Refills: 1 | Status: SHIPPED | OUTPATIENT
Start: 2021-10-05 | End: 2021-10-06 | Stop reason: SDUPTHER

## 2021-10-06 ENCOUNTER — PATIENT MESSAGE (OUTPATIENT)
Dept: NEUROLOGY | Facility: CLINIC | Age: 71
End: 2021-10-06

## 2021-10-07 RX ORDER — CARBIDOPA AND LEVODOPA 25; 100 MG/1; MG/1
TABLET ORAL
Qty: 45 TABLET | Refills: 2 | Status: SHIPPED | OUTPATIENT
Start: 2021-10-07 | End: 2021-12-07 | Stop reason: SDUPTHER

## 2021-10-11 ENCOUNTER — TELEPHONE (OUTPATIENT)
Dept: FAMILY MEDICINE | Facility: CLINIC | Age: 71
End: 2021-10-11

## 2021-10-26 ENCOUNTER — PATIENT MESSAGE (OUTPATIENT)
Dept: FAMILY MEDICINE | Facility: CLINIC | Age: 71
End: 2021-10-26
Payer: MEDICARE

## 2021-10-26 DIAGNOSIS — Z00.00 WELLNESS EXAMINATION: Primary | ICD-10-CM

## 2021-10-29 ENCOUNTER — PATIENT MESSAGE (OUTPATIENT)
Dept: FAMILY MEDICINE | Facility: CLINIC | Age: 71
End: 2021-10-29
Payer: MEDICARE

## 2021-10-31 ENCOUNTER — EXTERNAL CHRONIC CARE MANAGEMENT (OUTPATIENT)
Dept: PRIMARY CARE CLINIC | Facility: CLINIC | Age: 71
End: 2021-10-31
Payer: MEDICARE

## 2021-10-31 PROCEDURE — 99439 PR CHRONIC CARE MGMT, EA ADDTL 20 MIN: ICD-10-PCS | Mod: S$GLB,,, | Performed by: FAMILY MEDICINE

## 2021-10-31 PROCEDURE — 99439 CHRNC CARE MGMT STAF EA ADDL: CPT | Mod: S$GLB,,, | Performed by: FAMILY MEDICINE

## 2021-10-31 PROCEDURE — 99490 PR CHRONIC CARE MGMT, 1ST 20 MIN: ICD-10-PCS | Mod: S$GLB,,, | Performed by: FAMILY MEDICINE

## 2021-10-31 PROCEDURE — 99490 CHRNC CARE MGMT STAFF 1ST 20: CPT | Mod: S$GLB,,, | Performed by: FAMILY MEDICINE

## 2021-11-01 ENCOUNTER — TELEPHONE (OUTPATIENT)
Dept: FAMILY MEDICINE | Facility: CLINIC | Age: 71
End: 2021-11-01
Payer: MEDICARE

## 2021-11-01 RX ORDER — ZOLPIDEM TARTRATE 10 MG/1
TABLET ORAL
Qty: 90 TABLET | Refills: 0 | Status: SHIPPED | OUTPATIENT
Start: 2021-11-01 | End: 2021-11-17 | Stop reason: SDUPTHER

## 2021-11-09 ENCOUNTER — PES CALL (OUTPATIENT)
Dept: ADMINISTRATIVE | Facility: CLINIC | Age: 71
End: 2021-11-09
Payer: MEDICARE

## 2021-11-17 ENCOUNTER — OFFICE VISIT (OUTPATIENT)
Dept: FAMILY MEDICINE | Facility: CLINIC | Age: 71
End: 2021-11-17
Payer: MEDICARE

## 2021-11-17 VITALS
OXYGEN SATURATION: 100 % | WEIGHT: 116.75 LBS | DIASTOLIC BLOOD PRESSURE: 70 MMHG | SYSTOLIC BLOOD PRESSURE: 110 MMHG | HEART RATE: 83 BPM | TEMPERATURE: 98 F | HEIGHT: 60 IN | BODY MASS INDEX: 22.92 KG/M2

## 2021-11-17 DIAGNOSIS — F43.21 MOURNING: ICD-10-CM

## 2021-11-17 DIAGNOSIS — M51.36 LUMBAR DEGENERATIVE DISC DISEASE: ICD-10-CM

## 2021-11-17 DIAGNOSIS — F51.01 PRIMARY INSOMNIA: ICD-10-CM

## 2021-11-17 DIAGNOSIS — M54.16 LUMBAR RADICULAR PAIN: ICD-10-CM

## 2021-11-17 DIAGNOSIS — M25.551 HIP PAIN, RIGHT: Primary | ICD-10-CM

## 2021-11-17 PROCEDURE — 99214 OFFICE O/P EST MOD 30 MIN: CPT | Mod: S$GLB,,, | Performed by: FAMILY MEDICINE

## 2021-11-17 PROCEDURE — 99214 PR OFFICE/OUTPT VISIT, EST, LEVL IV, 30-39 MIN: ICD-10-PCS | Mod: S$GLB,,, | Performed by: FAMILY MEDICINE

## 2021-11-17 RX ORDER — ALPRAZOLAM 0.5 MG/1
0.5 TABLET ORAL NIGHTLY PRN
Qty: 30 TABLET | Refills: 5 | Status: SHIPPED | OUTPATIENT
Start: 2021-11-17 | End: 2022-04-07 | Stop reason: SDUPTHER

## 2021-11-17 RX ORDER — ALPRAZOLAM 0.5 MG/1
0.5 TABLET ORAL NIGHTLY PRN
COMMUNITY
Start: 2021-09-05 | End: 2021-11-17 | Stop reason: SDUPTHER

## 2021-11-17 RX ORDER — ZOLPIDEM TARTRATE 10 MG/1
TABLET ORAL
Qty: 90 TABLET | Refills: 0 | Status: SHIPPED | OUTPATIENT
Start: 2022-01-21 | End: 2022-01-21 | Stop reason: SDUPTHER

## 2021-11-17 RX ORDER — LEVOTHYROXINE SODIUM 100 UG/1
100 TABLET ORAL DAILY
COMMUNITY
Start: 2021-06-03 | End: 2021-11-17

## 2021-11-18 ENCOUNTER — OFFICE VISIT (OUTPATIENT)
Dept: OPTOMETRY | Facility: CLINIC | Age: 71
End: 2021-11-18
Payer: MEDICARE

## 2021-11-18 DIAGNOSIS — H04.123 DRY EYE SYNDROME OF BOTH EYES: ICD-10-CM

## 2021-11-18 DIAGNOSIS — H52.4 MYOPIA WITH ASTIGMATISM AND PRESBYOPIA, BILATERAL: ICD-10-CM

## 2021-11-18 DIAGNOSIS — H52.203 MYOPIA WITH ASTIGMATISM AND PRESBYOPIA, BILATERAL: ICD-10-CM

## 2021-11-18 DIAGNOSIS — H52.13 MYOPIA WITH ASTIGMATISM AND PRESBYOPIA, BILATERAL: ICD-10-CM

## 2021-11-18 DIAGNOSIS — Z00.00 WELLNESS EXAMINATION: ICD-10-CM

## 2021-11-18 DIAGNOSIS — H25.13 NUCLEAR SCLEROSIS OF BOTH EYES: Primary | ICD-10-CM

## 2021-11-18 DIAGNOSIS — Z97.3 WEARS CONTACT LENSES: ICD-10-CM

## 2021-11-18 DIAGNOSIS — Z46.0 FITTING AND ADJUSTMENT OF SPECTACLES AND CONTACT LENSES: Primary | ICD-10-CM

## 2021-11-18 PROCEDURE — 99999 PR PBB SHADOW E&M-EST. PATIENT-LVL III: ICD-10-PCS | Mod: PBBFAC,,, | Performed by: OPTOMETRIST

## 2021-11-18 PROCEDURE — 99213 OFFICE O/P EST LOW 20 MIN: CPT | Mod: PBBFAC,PN | Performed by: OPTOMETRIST

## 2021-11-18 PROCEDURE — 99999 PR PBB SHADOW E&M-EST. PATIENT-LVL III: CPT | Mod: PBBFAC,,, | Performed by: OPTOMETRIST

## 2021-11-18 PROCEDURE — 99499 NO LOS: ICD-10-PCS | Mod: S$PBB,,, | Performed by: OPTOMETRIST

## 2021-11-18 PROCEDURE — 92310 PR CONTACT LENS FITTING (NO CHANGE): ICD-10-PCS | Mod: CSM,,, | Performed by: OPTOMETRIST

## 2021-11-18 PROCEDURE — 92004 PR EYE EXAM, NEW PATIENT,COMPREHESV: ICD-10-PCS | Mod: S$PBB,,, | Performed by: OPTOMETRIST

## 2021-11-18 PROCEDURE — 92015 PR REFRACTION: ICD-10-PCS | Mod: ,,, | Performed by: OPTOMETRIST

## 2021-11-18 PROCEDURE — 92004 COMPRE OPH EXAM NEW PT 1/>: CPT | Mod: S$PBB,,, | Performed by: OPTOMETRIST

## 2021-11-18 PROCEDURE — 99499 UNLISTED E&M SERVICE: CPT | Mod: S$PBB,,, | Performed by: OPTOMETRIST

## 2021-11-18 PROCEDURE — 92015 DETERMINE REFRACTIVE STATE: CPT | Mod: ,,, | Performed by: OPTOMETRIST

## 2021-11-18 PROCEDURE — 92310 CONTACT LENS FITTING OU: CPT | Mod: CSM,,, | Performed by: OPTOMETRIST

## 2021-11-23 ENCOUNTER — IMMUNIZATION (OUTPATIENT)
Dept: PRIMARY CARE CLINIC | Facility: CLINIC | Age: 71
End: 2021-11-23
Payer: MEDICARE

## 2021-11-23 DIAGNOSIS — Z23 NEED FOR VACCINATION: Primary | ICD-10-CM

## 2021-11-23 PROCEDURE — 0064A COVID-19, MRNA, LNP-S, PF, 100 MCG/0.25 ML DOSE VACCINE (MODERNA BOOSTER): CPT | Mod: CV19,PBBFAC | Performed by: INTERNAL MEDICINE

## 2021-11-24 ENCOUNTER — OFFICE VISIT (OUTPATIENT)
Dept: OPTOMETRY | Facility: CLINIC | Age: 71
End: 2021-11-24
Payer: MEDICARE

## 2021-11-24 DIAGNOSIS — H52.203 MYOPIA WITH ASTIGMATISM AND PRESBYOPIA, BILATERAL: Primary | ICD-10-CM

## 2021-11-24 DIAGNOSIS — Z97.3 WEARS CONTACT LENSES: ICD-10-CM

## 2021-11-24 DIAGNOSIS — H04.123 DRY EYE SYNDROME OF BOTH EYES: ICD-10-CM

## 2021-11-24 DIAGNOSIS — H25.13 NUCLEAR SCLEROSIS OF BOTH EYES: ICD-10-CM

## 2021-11-24 DIAGNOSIS — H52.4 MYOPIA WITH ASTIGMATISM AND PRESBYOPIA, BILATERAL: Primary | ICD-10-CM

## 2021-11-24 DIAGNOSIS — H52.13 MYOPIA WITH ASTIGMATISM AND PRESBYOPIA, BILATERAL: Primary | ICD-10-CM

## 2021-11-24 PROCEDURE — 92012 PR EYE EXAM, EST PATIENT,INTERMED: ICD-10-PCS | Mod: S$PBB,,, | Performed by: OPTOMETRIST

## 2021-11-24 PROCEDURE — 92012 INTRM OPH EXAM EST PATIENT: CPT | Mod: S$PBB,,, | Performed by: OPTOMETRIST

## 2021-11-30 ENCOUNTER — EXTERNAL CHRONIC CARE MANAGEMENT (OUTPATIENT)
Dept: PRIMARY CARE CLINIC | Facility: CLINIC | Age: 71
End: 2021-11-30
Payer: MEDICARE

## 2021-11-30 PROCEDURE — 99490 PR CHRONIC CARE MGMT, 1ST 20 MIN: ICD-10-PCS | Mod: S$GLB,,, | Performed by: FAMILY MEDICINE

## 2021-11-30 PROCEDURE — 99490 CHRNC CARE MGMT STAFF 1ST 20: CPT | Mod: S$GLB,,, | Performed by: FAMILY MEDICINE

## 2021-12-01 ENCOUNTER — PATIENT MESSAGE (OUTPATIENT)
Dept: FAMILY MEDICINE | Facility: CLINIC | Age: 71
End: 2021-12-01
Payer: MEDICARE

## 2021-12-02 ENCOUNTER — TELEPHONE (OUTPATIENT)
Dept: FAMILY MEDICINE | Facility: CLINIC | Age: 71
End: 2021-12-02
Payer: MEDICARE

## 2021-12-02 DIAGNOSIS — R93.5 ABNORMAL MRI, PELVIS: Primary | ICD-10-CM

## 2021-12-07 ENCOUNTER — OFFICE VISIT (OUTPATIENT)
Dept: NEUROLOGY | Facility: CLINIC | Age: 71
End: 2021-12-07
Payer: MEDICARE

## 2021-12-07 VITALS
HEART RATE: 77 BPM | SYSTOLIC BLOOD PRESSURE: 116 MMHG | BODY MASS INDEX: 23.81 KG/M2 | DIASTOLIC BLOOD PRESSURE: 77 MMHG | WEIGHT: 121.25 LBS | HEIGHT: 60 IN

## 2021-12-07 DIAGNOSIS — M54.16 LUMBAR RADICULOPATHY: ICD-10-CM

## 2021-12-07 DIAGNOSIS — G20.C PARKINSONISM, UNSPECIFIED PARKINSONISM TYPE: Primary | ICD-10-CM

## 2021-12-07 DIAGNOSIS — G43.009 MIGRAINE WITHOUT AURA AND WITHOUT STATUS MIGRAINOSUS, NOT INTRACTABLE: ICD-10-CM

## 2021-12-07 PROCEDURE — 99215 PR OFFICE/OUTPT VISIT, EST, LEVL V, 40-54 MIN: ICD-10-PCS | Mod: S$PBB,,, | Performed by: PSYCHIATRY & NEUROLOGY

## 2021-12-07 PROCEDURE — 99213 OFFICE O/P EST LOW 20 MIN: CPT | Mod: PBBFAC,PN | Performed by: PSYCHIATRY & NEUROLOGY

## 2021-12-07 PROCEDURE — 99999 PR PBB SHADOW E&M-EST. PATIENT-LVL III: ICD-10-PCS | Mod: PBBFAC,,, | Performed by: PSYCHIATRY & NEUROLOGY

## 2021-12-07 PROCEDURE — 99999 PR PBB SHADOW E&M-EST. PATIENT-LVL III: CPT | Mod: PBBFAC,,, | Performed by: PSYCHIATRY & NEUROLOGY

## 2021-12-07 PROCEDURE — 99215 OFFICE O/P EST HI 40 MIN: CPT | Mod: S$PBB,,, | Performed by: PSYCHIATRY & NEUROLOGY

## 2021-12-07 RX ORDER — CARBIDOPA AND LEVODOPA 25; 100 MG/1; MG/1
TABLET ORAL
Qty: 45 TABLET | Refills: 3 | Status: SHIPPED | OUTPATIENT
Start: 2021-12-07 | End: 2022-04-07 | Stop reason: SDUPTHER

## 2021-12-14 ENCOUNTER — TELEPHONE (OUTPATIENT)
Dept: FAMILY MEDICINE | Facility: CLINIC | Age: 71
End: 2021-12-14
Payer: MEDICARE

## 2021-12-31 ENCOUNTER — EXTERNAL CHRONIC CARE MANAGEMENT (OUTPATIENT)
Dept: PRIMARY CARE CLINIC | Facility: CLINIC | Age: 71
End: 2021-12-31
Payer: MEDICARE

## 2021-12-31 PROCEDURE — 99490 PR CHRONIC CARE MGMT, 1ST 20 MIN: ICD-10-PCS | Mod: S$GLB,,, | Performed by: FAMILY MEDICINE

## 2021-12-31 PROCEDURE — 99490 CHRNC CARE MGMT STAFF 1ST 20: CPT | Mod: S$GLB,,, | Performed by: FAMILY MEDICINE

## 2022-01-10 ENCOUNTER — PATIENT MESSAGE (OUTPATIENT)
Dept: ADMINISTRATIVE | Facility: HOSPITAL | Age: 72
End: 2022-01-10
Payer: MEDICARE

## 2022-01-21 ENCOUNTER — PATIENT MESSAGE (OUTPATIENT)
Dept: ADMINISTRATIVE | Facility: OTHER | Age: 72
End: 2022-01-21
Payer: MEDICARE

## 2022-01-21 ENCOUNTER — PATIENT MESSAGE (OUTPATIENT)
Dept: FAMILY MEDICINE | Facility: CLINIC | Age: 72
End: 2022-01-21
Payer: MEDICARE

## 2022-01-21 DIAGNOSIS — I77.9 BILATERAL CAROTID ARTERY DISEASE, UNSPECIFIED TYPE: ICD-10-CM

## 2022-01-21 DIAGNOSIS — Z00.00 WELLNESS EXAMINATION: ICD-10-CM

## 2022-01-21 DIAGNOSIS — E03.4 HYPOTHYROIDISM DUE TO ACQUIRED ATROPHY OF THYROID: ICD-10-CM

## 2022-01-21 NOTE — TELEPHONE ENCOUNTER
No new care gaps identified.  Powered by [employer] by [employer]. Reference number: [id number].   1/21/2022 1:07:44 PM CST

## 2022-01-23 RX ORDER — ZOLPIDEM TARTRATE 10 MG/1
TABLET ORAL
Qty: 90 TABLET | Refills: 0 | Status: SHIPPED | OUTPATIENT
Start: 2022-01-23 | End: 2022-04-07

## 2022-01-24 DIAGNOSIS — Z00.00 WELLNESS EXAMINATION: ICD-10-CM

## 2022-01-24 DIAGNOSIS — I77.9 BILATERAL CAROTID ARTERY DISEASE, UNSPECIFIED TYPE: ICD-10-CM

## 2022-01-24 DIAGNOSIS — E03.4 HYPOTHYROIDISM DUE TO ACQUIRED ATROPHY OF THYROID: ICD-10-CM

## 2022-01-24 NOTE — TELEPHONE ENCOUNTER
No new care gaps identified.  Powered by [employer] by [employer]. Reference number: [id number].   1/24/2022 3:52:48 PM CST

## 2022-01-26 DIAGNOSIS — E03.4 HYPOTHYROIDISM DUE TO ACQUIRED ATROPHY OF THYROID: ICD-10-CM

## 2022-01-26 DIAGNOSIS — I77.9 BILATERAL CAROTID ARTERY DISEASE, UNSPECIFIED TYPE: ICD-10-CM

## 2022-01-26 DIAGNOSIS — Z00.00 WELLNESS EXAMINATION: ICD-10-CM

## 2022-01-26 NOTE — TELEPHONE ENCOUNTER
No new care gaps identified.  Powered by [employer] by [employer]. Reference number: [id number].   1/26/2022 12:10:52 AM CST

## 2022-01-27 RX ORDER — LIOTHYRONINE SODIUM 5 UG/1
5 TABLET ORAL DAILY
Qty: 90 TABLET | Refills: 3 | Status: SHIPPED | OUTPATIENT
Start: 2022-01-27 | End: 2022-10-21

## 2022-01-27 RX ORDER — LEVOTHYROXINE SODIUM 88 UG/1
88 TABLET ORAL DAILY
Qty: 90 TABLET | Refills: 3 | Status: SHIPPED | OUTPATIENT
Start: 2022-01-27 | End: 2022-07-18

## 2022-01-30 RX ORDER — LIOTHYRONINE SODIUM 5 UG/1
TABLET ORAL
Qty: 90 TABLET | Refills: 0 | Status: SHIPPED | OUTPATIENT
Start: 2022-01-30 | End: 2022-07-19 | Stop reason: SDUPTHER

## 2022-02-01 RX ORDER — LEVOTHYROXINE SODIUM 88 UG/1
TABLET ORAL
Qty: 90 TABLET | Refills: 0 | OUTPATIENT
Start: 2022-02-01

## 2022-02-01 NOTE — TELEPHONE ENCOUNTER
Quick DC. Request already responded to by other means (e.g. phone or fax)   Refill Authorization Note   Florentino Bowman  is requesting a refill authorization.  Brief Assessment and Rationale for Refill:  Quick Discontinue  Medication Therapy Plan:  PER PT JEF MAYENG REQUESTING SCRIPTS TO GO TO [facility]    Medication Reconciliation Completed:  No      Comments:   Pended Medication(s)       Requested Prescriptions     Refused Prescriptions Disp Refills    levothyroxine (SYNTHROID) 88 MCG tablet [Pharmacy Med Name: LEVOTHYROXINE 88 MCG TABLET] 90 tablet 0     Sig: TAKE 1 TABLET BY MOUTH EVERY DAY     Refused By: FRANCI EVANGELISTA     Reason for Refusal: Request already responded to by other means (e.g. phone or fax)        Duplicate Pended Encounter(s)/ Last Prescribed Details: (includes pharmacy & prescriber details)   [id number] [place] - [address]   [address] [address], [place] [address]   Phone:  [phone]  Fax:  [phone]   ALEC #:  --   LIZZETTE Reason: --       Outpatient Medication Detail     Disp Refills Start End LIZZETTE   levothyroxine (SYNTHROID) 88 MCG tablet 90 tablet 3 1/27/2022  No   Sig - Route: Take 1 tablet (88 mcg total) by mouth once daily. - Oral   Sent to pharmacy as: levothyroxine (SYNTHROID) 88 MCG tablet   Class: Normal   Order: [id number]   Date/Time Signed: 1/27/2022 05:20       E-Prescribing Status: Receipt confirmed by pharmacy (1/27/2022  5:20 AM CST)     Ordering Encounter Report    Associated Reports   View Encounter                Note composed:10:42 AM 02/01/2022

## 2022-02-22 ENCOUNTER — OFFICE VISIT (OUTPATIENT)
Dept: OPHTHALMOLOGY | Facility: CLINIC | Age: 72
End: 2022-02-22
Payer: MEDICARE

## 2022-02-22 DIAGNOSIS — H52.13 MYOPIA OF BOTH EYES WITH ASTIGMATISM AND PRESBYOPIA: ICD-10-CM

## 2022-02-22 DIAGNOSIS — H52.203 MYOPIA OF BOTH EYES WITH ASTIGMATISM AND PRESBYOPIA: ICD-10-CM

## 2022-02-22 DIAGNOSIS — H04.123 DRY EYE SYNDROME OF BOTH EYES: ICD-10-CM

## 2022-02-22 DIAGNOSIS — Z97.3 WEARS CONTACT LENSES: ICD-10-CM

## 2022-02-22 DIAGNOSIS — Z98.890 STATUS POST LASIK SURGERY: ICD-10-CM

## 2022-02-22 DIAGNOSIS — H52.4 MYOPIA OF BOTH EYES WITH ASTIGMATISM AND PRESBYOPIA: ICD-10-CM

## 2022-02-22 DIAGNOSIS — H25.813 COMBINED FORMS OF AGE-RELATED CATARACT OF BOTH EYES: Primary | ICD-10-CM

## 2022-02-22 PROCEDURE — 99999 PR PBB SHADOW E&M-EST. PATIENT-LVL III: CPT | Mod: PBBFAC,,, | Performed by: STUDENT IN AN ORGANIZED HEALTH CARE EDUCATION/TRAINING PROGRAM

## 2022-02-22 PROCEDURE — 99204 OFFICE O/P NEW MOD 45 MIN: CPT | Mod: S$PBB,,, | Performed by: STUDENT IN AN ORGANIZED HEALTH CARE EDUCATION/TRAINING PROGRAM

## 2022-02-22 PROCEDURE — 99213 OFFICE O/P EST LOW 20 MIN: CPT | Mod: PBBFAC,PN | Performed by: STUDENT IN AN ORGANIZED HEALTH CARE EDUCATION/TRAINING PROGRAM

## 2022-02-22 PROCEDURE — 99999 PR PBB SHADOW E&M-EST. PATIENT-LVL III: ICD-10-PCS | Mod: PBBFAC,,, | Performed by: STUDENT IN AN ORGANIZED HEALTH CARE EDUCATION/TRAINING PROGRAM

## 2022-02-22 PROCEDURE — 99204 PR OFFICE/OUTPT VISIT, NEW, LEVL IV, 45-59 MIN: ICD-10-PCS | Mod: S$PBB,,, | Performed by: STUDENT IN AN ORGANIZED HEALTH CARE EDUCATION/TRAINING PROGRAM

## 2022-02-22 NOTE — PROGRESS NOTES
"Subjective:       Patient ID: Florentino Bowman is a 71 y.o. female.    Chief Complaint: Cataract    HPI     Pt was referred by Dr. Saez for a Cataract Evaluation.  She states that she has noticed increased difficulty when driving at night   due to bright headlights.    Has a Hx LASIK 2000 - Dr. Merrill - myopic     No current eye meds.    Last edited by Prachi Quintanilla MD on 2/22/2022 11:37 AM. (History)               Assessment & Plan   Combined forms of age-related cataract of both eyes    Wears contact lenses    Myopia of both eyes with astigmatism and presbyopia    Dry eye syndrome of both eyes    Status post LASIK surgery     Combined forms of age-related cataract OU  Visually significant and interfering with activities of daily living including driving/reading  Patient desires cataract surgery for visual rehabilitation.     NEED IOL CALCULATIONS AND HEIDI      [-] Blood thinning agents  [+] Claustrophobia / anx  [+] Prior ocular surgery or trauma - MYOPIC LASIK OU  [-] Alpha-agonist / Small pupil  [-] DM  [-] History of uveitis    Need to be out of CTL for 2 weeks prior to IOL calculations    Long discussion about risk of refractive surprise post LASIK, risk of decreased contrast sensitivity that potentially could worsen with unexpected age-related ocular conditions, discussed inability to provide "perfect" vision at distance or near, discussed potential need for refractive touch up in the future and explained that I do not do this procedure. Also discussed more serious risk of intraocular surgery like loss of eye, infection, blindness, need for additional surgery, irritation, posterior capsule opacification. Discussed may still get age-related eye conditions like glaucoma or macular degeneration. Patient expressed understandings of the above risks and still wishes to proceed with surgery due to night time glare and difficulty with night time driving and persistent haze over vision.              PLAN    RTC IOL " calcs post myopic LASIK, topography, consent    Prachi Quintanilla M.D., M.S.  Department of Ophthalmology   Division of Glaucoma Surgery  [facility]

## 2022-03-09 ENCOUNTER — OFFICE VISIT (OUTPATIENT)
Dept: OPHTHALMOLOGY | Facility: CLINIC | Age: 72
End: 2022-03-09
Payer: MEDICARE

## 2022-03-09 DIAGNOSIS — Z98.890 STATUS POST LASIK SURGERY: ICD-10-CM

## 2022-03-09 DIAGNOSIS — H25.813 COMBINED FORMS OF AGE-RELATED CATARACT OF BOTH EYES: Primary | ICD-10-CM

## 2022-03-09 PROCEDURE — 99499 UNLISTED E&M SERVICE: CPT | Mod: S$PBB,,, | Performed by: STUDENT IN AN ORGANIZED HEALTH CARE EDUCATION/TRAINING PROGRAM

## 2022-03-09 PROCEDURE — 92025 CPTRIZED CORNEAL TOPOGRAPHY: CPT | Mod: PBBFAC | Performed by: STUDENT IN AN ORGANIZED HEALTH CARE EDUCATION/TRAINING PROGRAM

## 2022-03-09 PROCEDURE — 99499 NO LOS: ICD-10-PCS | Mod: S$PBB,,, | Performed by: STUDENT IN AN ORGANIZED HEALTH CARE EDUCATION/TRAINING PROGRAM

## 2022-03-09 PROCEDURE — 92025 COMPUTERIZED CORNEAL TOPOGRAPHY: ICD-10-PCS | Mod: 26,S$PBB,, | Performed by: STUDENT IN AN ORGANIZED HEALTH CARE EDUCATION/TRAINING PROGRAM

## 2022-03-09 PROCEDURE — 92136 OPHTHALMIC BIOMETRY: CPT | Mod: PBBFAC | Performed by: STUDENT IN AN ORGANIZED HEALTH CARE EDUCATION/TRAINING PROGRAM

## 2022-03-09 NOTE — PROGRESS NOTES
Subjective:       Patient ID: Florentino Bowman is a 71 y.o. female.    Chief Complaint: No chief complaint on file.               Assessment & Plan   Combined forms of age-related cataract of both eyes  -     IOL Master - OU - Both Eyes; Future  -     Computerized corneal topography    Status post LASIK surgery  -     IOL Master - OU - Both Eyes; Future  -     Computerized corneal topography       Combined forms of age-related cataract OU  Visually significant and interfering with activities of daily living including driving/reading  Hx LASIK and again explained potential for post operative refractive surprise  Patient expectations exceed what is able to be promised in surgery   Recommend CE IOL once patient more bothered by vision    LEFT EYE DIBOO +19.50 target -0.15  RIGHT EYE DIBOO +19.50 target -0.24      Irregular astigmatism  Explained may still have post operative blur due to astigmatism in post refractive eye    Hx LASIK OU  Has had touch up in the past, pt unsure which eye    RTC 1 year re-evaluate cataracts        Prachi Quintanilla M.D., M.S.  Department of Ophthalmology   Division of Glaucoma Surgery  [facility]

## 2022-03-29 NOTE — TELEPHONE ENCOUNTER
No new care gaps identified.  Powered by [employer] by [employer]. Reference number: [id number].   3/29/2022 6:40:51 AM CDT

## 2022-03-31 ENCOUNTER — TELEPHONE (OUTPATIENT)
Dept: OPTOMETRY | Facility: CLINIC | Age: 72
End: 2022-03-31
Payer: MEDICARE

## 2022-03-31 RX ORDER — ATORVASTATIN CALCIUM 40 MG/1
40 TABLET, FILM COATED ORAL DAILY
Qty: 90 TABLET | Refills: 3 | Status: SHIPPED | OUTPATIENT
Start: 2022-03-31 | End: 2023-02-16 | Stop reason: SDUPTHER

## 2022-03-31 NOTE — TELEPHONE ENCOUNTER
----- Message from Karen Lo sent at 3/31/2022  3:20 PM CDT -----  Patient is wondering if she can get her contact prescription for single vision and not the multifocal.  She states that the multifocal contacts are not working for her.    Florentino  @ [phone]

## 2022-04-04 ENCOUNTER — TELEPHONE (OUTPATIENT)
Dept: OPTOMETRY | Facility: CLINIC | Age: 72
End: 2022-04-04
Payer: MEDICARE

## 2022-04-04 NOTE — TELEPHONE ENCOUNTER
----- Message from Zamzam Saez, OD sent at 4/4/2022  8:50 AM CDT -----  I see she saw [facility]... From what I can see in the notes she isnt doing cat surgery? Just wanted to confirm that!     If she isnt following through with surgery and she wants distance only OU Cls, then I will need to see her back in clinic for CL f/u. Can you see if we have Acuvue oasys 1 day for astigmatism, [place] - 0.75 x 180. I will also need an acuvue oasys 1 day sphere -1.25. If we have those then set them aside and you can schedule her for a CL f/u. Thank you!   ----- Message -----  From: Bear Hernandez  Sent: 3/31/2022   4:02 PM CDT  To: Zamzam Saez, OD    Hello,    I already left a message stating you were out of office and will give instruction on next steps per your return :). Happy Monday lol  ----- Message -----  From: Karen Lo  Sent: 3/31/2022   3:21 PM CDT  To: Se Wyman Staff    Patient is wondering if she can get her contact prescription for single vision and not the multifocal.  She states that the multifocal contacts are not working for her.    Florentino  @ [phone]

## 2022-04-07 ENCOUNTER — OFFICE VISIT (OUTPATIENT)
Dept: NEUROLOGY | Facility: CLINIC | Age: 72
End: 2022-04-07
Payer: MEDICARE

## 2022-04-07 ENCOUNTER — PATIENT MESSAGE (OUTPATIENT)
Dept: FAMILY MEDICINE | Facility: CLINIC | Age: 72
End: 2022-04-07
Payer: MEDICARE

## 2022-04-07 ENCOUNTER — OFFICE VISIT (OUTPATIENT)
Dept: OPTOMETRY | Facility: CLINIC | Age: 72
End: 2022-04-07
Payer: MEDICARE

## 2022-04-07 VITALS
HEART RATE: 86 BPM | TEMPERATURE: 98 F | WEIGHT: 120.81 LBS | BODY MASS INDEX: 23.72 KG/M2 | HEIGHT: 60 IN | DIASTOLIC BLOOD PRESSURE: 63 MMHG | SYSTOLIC BLOOD PRESSURE: 108 MMHG | RESPIRATION RATE: 18 BRPM

## 2022-04-07 DIAGNOSIS — H52.203 MYOPIA WITH ASTIGMATISM AND PRESBYOPIA, BILATERAL: Primary | ICD-10-CM

## 2022-04-07 DIAGNOSIS — H52.13 MYOPIA WITH ASTIGMATISM AND PRESBYOPIA, BILATERAL: Primary | ICD-10-CM

## 2022-04-07 DIAGNOSIS — Z97.3 WEARS CONTACT LENSES: ICD-10-CM

## 2022-04-07 DIAGNOSIS — G20.C PARKINSONISM, UNSPECIFIED PARKINSONISM TYPE: Primary | ICD-10-CM

## 2022-04-07 DIAGNOSIS — R20.2 NUMBNESS AND TINGLING: ICD-10-CM

## 2022-04-07 DIAGNOSIS — R20.0 NUMBNESS AND TINGLING: ICD-10-CM

## 2022-04-07 DIAGNOSIS — M54.16 LUMBAR RADICULOPATHY: ICD-10-CM

## 2022-04-07 DIAGNOSIS — H52.4 MYOPIA WITH ASTIGMATISM AND PRESBYOPIA, BILATERAL: Primary | ICD-10-CM

## 2022-04-07 PROCEDURE — 99214 PR OFFICE/OUTPT VISIT, EST, LEVL IV, 30-39 MIN: ICD-10-PCS | Mod: S$PBB,,, | Performed by: PSYCHIATRY & NEUROLOGY

## 2022-04-07 PROCEDURE — 99499 NO LOS: ICD-10-PCS | Mod: S$PBB,,, | Performed by: OPTOMETRIST

## 2022-04-07 PROCEDURE — 99214 OFFICE O/P EST MOD 30 MIN: CPT | Mod: S$PBB,,, | Performed by: PSYCHIATRY & NEUROLOGY

## 2022-04-07 PROCEDURE — 99214 OFFICE O/P EST MOD 30 MIN: CPT | Mod: PBBFAC,PN | Performed by: PSYCHIATRY & NEUROLOGY

## 2022-04-07 PROCEDURE — 99999 PR PBB SHADOW E&M-EST. PATIENT-LVL IV: ICD-10-PCS | Mod: PBBFAC,,, | Performed by: PSYCHIATRY & NEUROLOGY

## 2022-04-07 PROCEDURE — 99999 PR PBB SHADOW E&M-EST. PATIENT-LVL IV: CPT | Mod: PBBFAC,,, | Performed by: PSYCHIATRY & NEUROLOGY

## 2022-04-07 PROCEDURE — 99499 UNLISTED E&M SERVICE: CPT | Mod: S$PBB,,, | Performed by: OPTOMETRIST

## 2022-04-07 PROCEDURE — 92310 PR CONTACT LENS FITTING (NO CHANGE): ICD-10-PCS | Mod: CSM,,, | Performed by: OPTOMETRIST

## 2022-04-07 PROCEDURE — 92310 CONTACT LENS FITTING OU: CPT | Mod: CSM,,, | Performed by: OPTOMETRIST

## 2022-04-07 RX ORDER — CARBIDOPA AND LEVODOPA 25; 100 MG/1; MG/1
TABLET ORAL
Qty: 60 TABLET | Refills: 3 | Status: SHIPPED | OUTPATIENT
Start: 2022-04-07 | End: 2022-08-11

## 2022-04-07 NOTE — PROGRESS NOTES
HPI     Pt is here today for a Contact lens f/u.    Last edited by Bear Hernandez on 4/7/2022  3:40 PM. (History)            Assessment /Plan     For exam results, see Encounter Report.    Myopia with astigmatism and presbyopia, bilateral    Wears contact lenses      Updated CL Rx. Monovision, OD distance --- corrected with Acuvue Oasys 1 Day sphere and OS near --- uncorrected. Initially good comfort and vision. Given CL trials to take home. If happy with comfort and vision of trial lenses, may order annual supply. If issues arise, RTC for CL f/u. Reviewed proper CL care and hygiene. Monitor yearly unless changes noted sooner.       RTC for next annual (11/2022) or sooner if needed.     Addendum 06/26/2023  Extended pt's CL Rx due to difficulty scheduling exam. Annual scheduled for August 2023.

## 2022-04-07 NOTE — PROGRESS NOTES
Subjective:       Patient ID: Florentino Bowman is a 72 y.o. female.    Chief Complaint: Neurologic Problem (Lumbar radiculopathy)      New symptoms---migratory pin pricks either leg, not arms, lasts a split second.     Overall, she recognizes she is not quite herself, a little slow, and has days when she feels tired and apathetic    Still with Lt foot pain and numbness, since  surgery.  Slightly off balance, ohad one fall during exercise. Does note sometimes can't fully raise the toes of the Lt foot.   Recently fast danced at a wedding.         EXAMINATION:  MRI LUMBAR SPINE WITHOUT CONTRAST     CLINICAL HISTORY:  chronic LBP, Rt radiculopathy; Lumbago with sciatica, right side     TECHNIQUE:  Multiplanar, multisequence MR images were acquired from the thoracolumbar junction to the sacrum without the administration of contrast.     FINDINGS:  The lumbar spinal alignment is significant for minimal anterolisthesis of L3 in relation to L4.  The vertebral body heights are satisfactorily preserved.  There is loss of disc space height with degenerative endplate change identified at L3-4 and L4-5 most pronounced at L4-5.  The cord ends at L1.  The terminal cord, conus, and cauda equina have a normal appearance.  There is no intradural abnormality.  There is no evidence of an acute marrow replacement process such as tumor or infection.  There is no fracture.  The visualized surrounding soft tissues and vascular structures are unremarkable.     L1-2: Unremarkable.     L2-3: Unremarkable.     L3-4: Facet hypertrophy with a mild disc bulge which creates mild to moderate left-sided neural foraminal stenosis.     L4-5: Facet hypertrophy with a disc bulge which creates mild bilateral neural foraminal stenosis.     L5-S1: Facet hypertrophy with a disc bulge which creates mild bilateral neural foraminal stenosis.     Impression:     Multilevel degenerative change as outlined above.        Electronically signed by: Remington Tran  MD  Date:                                            2021  Time:                                           13:32                      Past Medical History:   Diagnosis Date    Arthritis     Carotid stenosis, asymptomatic     Headache(784.0)     Hypothyroid     Movement disorder     Neuropathy       Past Surgical History:   Procedure Laterality Date    BREAST BIOPSY Right      SECTION      x2    COLONOSCOPY N/A 2017    Procedure: COLONOSCOPY Miralax;  Surgeon: Quentin Mercado Jr., MD;  Location: [facility];  Service: Endoscopy;  Laterality: N/A;    COLONOSCOPY N/A 2021    Procedure: COLONOSCOPY;  Surgeon: Andrew Parekh MD;  Location: [facility];  Service: Endoscopy;  Laterality: N/A;    ESOPHAGOGASTRODUODENOSCOPY N/A 2021    Procedure: EGD (ESOPHAGOGASTRODUODENOSCOPY);  Surgeon: Andrew Parekh MD;  Location: [facility];  Service: Endoscopy;  Laterality: N/A;    HYSTERECTOMY      INJECTION OF ANESTHETIC AGENT AROUND MEDIAL BRANCH NERVES INNERVATING LUMBAR FACET JOINT Right 2019    Procedure: BLOCK, NERVE, FACET JOINT, LUMBAR, MEDIAL BRANCH ;  Surgeon: Sixto Augustin III, MD;  Location: [facility];  Service: Pain Management;  Laterality: Right;  - L4/5 & L5/S1  PATIENT NEEDS TO BE THE FIRST PATIENT OF THE DAY, PER DR. AUGUSTIN!!!    LAPAROSCOPIC HYSTERECTOMY  2010    supracervical/BSO    OOPHORECTOMY      SPINE SURGERY      TONSILLECTOMY      TRANSFORAMINAL EPIDURAL INJECTION OF STEROID Right 2019    Procedure: INJECTION, STEROID, EPIDURAL, TRANSFORAMINAL APPROACH;  Surgeon: Sixto Augustin III, MD;  Location: [facility];  Service: Pain Management;  Laterality: Right;  -right TFESI L4/5    TRANSFORAMINAL EPIDURAL INJECTION OF STEROID Bilateral 2021    Procedure: INJECTION, STEROID, EPIDURAL, TRANSFORAMINAL APPROACH;  Surgeon: Sixto Augustin III, MD;  Location: [facility];  Service: Pain Management;  Laterality: Bilateral;  L5         Current Outpatient Medications:     ALPRAZolam (XANAX) 0.5 MG tablet, Take 1 tablet (0.5 mg total) by mouth nightly as needed., Disp: 30 tablet, Rfl: 5    atorvastatin (LIPITOR) 40 MG tablet, Take 1 tablet (40 mg total) by mouth once daily., Disp: 90 tablet, Rfl: 3    carbidopa-levodopa  mg (SINEMET)  mg per tablet, 1/2 po tid, Disp: 45 tablet, Rfl: 3    estradioL (ESTRACE) 1 MG tablet, Take 1 tablet (1 mg total) by mouth every evening., Disp: 90 tablet, Rfl: 0    HYDROcodone-acetaminophen (NORCO) 5-325 mg per tablet, Take 1 tablet by mouth every 6 (six) hours as needed for Pain., Disp: 10 tablet, Rfl: 0    levothyroxine (SYNTHROID) 88 MCG tablet, Take 1 tablet (88 mcg total) by mouth once daily., Disp: 90 tablet, Rfl: 3    liothyronine (CYTOMEL) 5 MCG Tab, TAKE 1 TABLET BY MOUTH ONCE DAILY., Disp: 90 tablet, Rfl: 0    liothyronine (CYTOMEL) 5 MCG Tab, Take 1 tablet (5 mcg total) by mouth once daily., Disp: 90 tablet, Rfl: 3    pregabalin (LYRICA) 100 MG capsule, , Disp: , Rfl:     sumatriptan (IMITREX) 100 MG tablet, TAKE 1 TABLET BY MOUTH AS NEEDED HEADACHE,REPEAT IN 2 HOURS IF NEEDED, Disp: 12 tablet, Rfl: 14    zolpidem (AMBIEN) 10 mg Tab, TAKE ONE TABLET BY MOUTH EVERY EVENING, Disp: 90 tablet, Rfl: 0    DULoxetine (CYMBALTA) 30 MG capsule, Take 1 capsule (30 mg total) by mouth 2 (two) times daily., Disp: 60 capsule, Rfl: 2   Review of patient's allergies indicates:  No Known Allergies     Review of Systems   HENT: Negative for drooling and trouble swallowing.    Gastrointestinal: Positive for constipation. Negative for nausea.   Neurological: Positive for dizziness and tremors (left hand only). Negative for syncope.   Psychiatric/Behavioral: Positive for sleep disturbance (with ambienhas never acted out dreams.).           Objective:      Physical Exam  Constitutional:       General: She is not in acute distress.     Appearance: She is not ill-appearing.   Neurological:      Mental  Status: She is alert.      Cranial Nerves: No dysarthria.      Motor: Abnormal muscle tone (mild increased tone of legs) present. No tremor.      Gait: Gait abnormal (very subtle, reduced movement of the arms. Stride is normal.).      Comments: Soft voice    Mild weakness of toe extensors 2-4, both feet   Psychiatric:         Thought Content: Thought content normal.           Assessment:       1. Parkinsonism, unspecified Parkinsonism type    2. Lumbar radiculopathy        Plan:        PD--she self increased sinemet to 1.0/.5/.5 bc of Lt foot and Lt hand pins and needles; I clarified that I do not have an objection to the increase but that the medication will not help those symptoms     I reminded pt she has 2 neurologic problems, PD and Lt foot N/T due to LS radic ( s/p L Stearns 2018)  I do think the foot cramps are more likely due to PD  Add magnesium qhs and use topical magnesium gel     Her chief concern today is Lt foot, numbness and pain-plan NCS/EMG  Had a NCS 20 yrs ago bc of Lt calf pain; I do not have that report.               Carla Mendoza MD   04/07/2022   2:22 PM

## 2022-04-07 NOTE — TELEPHONE ENCOUNTER
No new care gaps identified.  Powered by [employer]. Reference number: [id number].   4/07/2022 10:49:51 AM CDT

## 2022-04-08 ENCOUNTER — TELEPHONE (OUTPATIENT)
Dept: ADMINISTRATIVE | Facility: OTHER | Age: 72
End: 2022-04-08
Payer: MEDICARE

## 2022-04-10 RX ORDER — ZOLPIDEM TARTRATE 10 MG/1
TABLET ORAL
Qty: 90 TABLET | Refills: 0 | Status: SHIPPED | OUTPATIENT
Start: 2022-04-10 | End: 2022-06-22 | Stop reason: SDUPTHER

## 2022-04-10 RX ORDER — ALPRAZOLAM 0.5 MG/1
0.5 TABLET ORAL NIGHTLY PRN
Qty: 30 TABLET | Refills: 5 | Status: SHIPPED | OUTPATIENT
Start: 2022-04-10 | End: 2022-06-22 | Stop reason: SDUPTHER

## 2022-04-24 NOTE — TELEPHONE ENCOUNTER
No new care gaps identified.  Powered by [employer]. Reference number: [id number].   4/24/2022 12:11:47 PM CDT

## 2022-04-25 RX ORDER — SUMATRIPTAN SUCCINATE 100 MG/1
TABLET ORAL
Qty: 27 TABLET | Refills: 1 | Status: SHIPPED | OUTPATIENT
Start: 2022-04-25 | End: 2022-08-11 | Stop reason: SDUPTHER

## 2022-04-25 NOTE — TELEPHONE ENCOUNTER
Refill Authorization Note   Florentino Bowman  is requesting a refill authorization.  Brief Assessment and Rationale for Refill:  Approve     Medication Therapy Plan:  Sig matches. Rx sent to home pharmacy.    Medication Reconciliation Completed: No   Comments:     No Care Gaps recommended.     Note composed:4:53 PM 04/25/2022           0

## 2022-04-27 ENCOUNTER — PES CALL (OUTPATIENT)
Dept: ADMINISTRATIVE | Facility: CLINIC | Age: 72
End: 2022-04-27
Payer: MEDICARE

## 2022-05-04 ENCOUNTER — OFFICE VISIT (OUTPATIENT)
Dept: NEUROLOGY | Facility: CLINIC | Age: 72
End: 2022-05-04
Payer: MEDICARE

## 2022-05-04 ENCOUNTER — PROCEDURE VISIT (OUTPATIENT)
Dept: NEUROLOGY | Facility: CLINIC | Age: 72
End: 2022-05-04
Payer: MEDICARE

## 2022-05-04 VITALS
SYSTOLIC BLOOD PRESSURE: 112 MMHG | DIASTOLIC BLOOD PRESSURE: 73 MMHG | DIASTOLIC BLOOD PRESSURE: 73 MMHG | BODY MASS INDEX: 24.2 KG/M2 | HEART RATE: 96 BPM | RESPIRATION RATE: 18 BRPM | HEIGHT: 60 IN | WEIGHT: 123.25 LBS | HEART RATE: 96 BPM | BODY MASS INDEX: 24.2 KG/M2 | HEIGHT: 60 IN | RESPIRATION RATE: 18 BRPM | SYSTOLIC BLOOD PRESSURE: 112 MMHG | WEIGHT: 123.25 LBS

## 2022-05-04 DIAGNOSIS — M79.604 PAIN IN BOTH LOWER EXTREMITIES: Primary | ICD-10-CM

## 2022-05-04 DIAGNOSIS — R20.2 NUMBNESS AND TINGLING: ICD-10-CM

## 2022-05-04 DIAGNOSIS — R20.0 NUMBNESS AND TINGLING: ICD-10-CM

## 2022-05-04 DIAGNOSIS — R20.0 NUMBNESS OF UPPER LIMB: ICD-10-CM

## 2022-05-04 DIAGNOSIS — M79.605 PAIN IN BOTH LOWER EXTREMITIES: Primary | ICD-10-CM

## 2022-05-04 DIAGNOSIS — R20.0 NUMBNESS OF LOWER LIMB: ICD-10-CM

## 2022-05-04 DIAGNOSIS — M54.16 LUMBAR RADICULOPATHY: ICD-10-CM

## 2022-05-04 DIAGNOSIS — M79.602 PAIN OF LEFT UPPER EXTREMITY: ICD-10-CM

## 2022-05-04 PROCEDURE — 95886 PR EMG COMPLETE, W/ NERVE CONDUCTION STUDIES, 5+ MUSCLES: ICD-10-PCS | Mod: 26,S$PBB,, | Performed by: PSYCHIATRY & NEUROLOGY

## 2022-05-04 PROCEDURE — 95913 NRV CNDJ TEST 13/> STUDIES: CPT | Mod: PBBFAC,PN | Performed by: PSYCHIATRY & NEUROLOGY

## 2022-05-04 PROCEDURE — 99213 OFFICE O/P EST LOW 20 MIN: CPT | Mod: PBBFAC,PN,25 | Performed by: PSYCHIATRY & NEUROLOGY

## 2022-05-04 PROCEDURE — 99999 PR PBB SHADOW E&M-EST. PATIENT-LVL III: CPT | Mod: PBBFAC,,, | Performed by: PSYCHIATRY & NEUROLOGY

## 2022-05-04 PROCEDURE — 95913 PR NERVE CONDUCTION STUDY; 13 OR MORE STUDIES: ICD-10-PCS | Mod: 26,S$PBB,, | Performed by: PSYCHIATRY & NEUROLOGY

## 2022-05-04 PROCEDURE — 95886 MUSC TEST DONE W/N TEST COMP: CPT | Mod: PBBFAC,PN | Performed by: PSYCHIATRY & NEUROLOGY

## 2022-05-04 PROCEDURE — 95913 NRV CNDJ TEST 13/> STUDIES: CPT | Mod: 26,S$PBB,, | Performed by: PSYCHIATRY & NEUROLOGY

## 2022-05-04 PROCEDURE — 99213 OFFICE O/P EST LOW 20 MIN: CPT | Mod: 25,S$PBB,, | Performed by: PSYCHIATRY & NEUROLOGY

## 2022-05-04 PROCEDURE — 99213 PR OFFICE/OUTPT VISIT, EST, LEVL III, 20-29 MIN: ICD-10-PCS | Mod: 25,S$PBB,, | Performed by: PSYCHIATRY & NEUROLOGY

## 2022-05-04 PROCEDURE — 99999 PR PBB SHADOW E&M-EST. PATIENT-LVL III: ICD-10-PCS | Mod: PBBFAC,,, | Performed by: PSYCHIATRY & NEUROLOGY

## 2022-05-04 PROCEDURE — 95886 MUSC TEST DONE W/N TEST COMP: CPT | Mod: 26,S$PBB,, | Performed by: PSYCHIATRY & NEUROLOGY

## 2022-05-04 NOTE — PROGRESS NOTES
[facility] [address]       Patient received an EMG and nerve conduction test today.  Please refer to the full procedure report which is found in the media section of chart review.    Tony Lomax MD, FAAN  Neurology

## 2022-05-04 NOTE — PROGRESS NOTES
[facility] [address]     Subjective:       Patient ID: Florentino Bowman is a 72 y.o. female here for a EMG focused evaluation for arm and leg pain. Previous visits and diagnostic evaluation reviewed.  Patient describes some occasional numbness and tremor of the left upper extremity.  She also has a history of 2 previous back surgeries.  She states her left foot has continued to be painfully numb since a few days after the 2nd back surgery.  She continues with low back pain which radiates down the left leg.  Symptoms have been progressively worsening and severe at times.   HPI  Review of patient's allergies indicates:  No Known Allergies   Vitals:    05/04/22 1433   BP: 112/73   Pulse: 96   Resp: 18      Chief Complaint: Neurologic Problem (Lumbar radiculopathy)    Past Medical History:   Diagnosis Date    Arthritis     Carotid stenosis, asymptomatic     Headache(784.0)     Hypothyroid     Movement disorder     Neuropathy       Social History     Socioeconomic History    Marital status:    Occupational History    Occupation: retired    Tobacco Use    Smoking status: Never Smoker    Smokeless tobacco: Never Used   Substance and Sexual Activity    Alcohol use: Yes     Comment: social    Drug use: No    Sexual activity: Yes     Partners: Male   Social History Narrative    , 2 adult children and exercises regularly-rides bike on the [place],      Review of Systems:   No Fever  No SOB  No vomiting  No visual disturbance      Objective:      Physical Exam    Constitutional: Patient appears well-nourished.   Head: Normocephalic and atraumatic.   Mouth/Throat: Oropharynx is clear and moist.   Pulmonary/Chest: Effort normal.   Abdominal: Soft.   Skin: Skin is warm and dry.    General:  Patient is alert and cooperative.  Affect:  Patient is appropriate to surroundings and environment.  Language:  Speech is fluent.  HEENT:  There  are no outward signs of trauma to head or face.  Cranial Nerves:  Pupils are equal round and reactive to light. Extra-ocular movements are intact. Face, tongue, and palate are symmetrical.  Motor:  Patient exhibits normal strength testing in bilateral proximal and distal upper and lower extremities.  Reflexes:  Symmetrical in bilateral upper and lower extremities.  Absent ankle reflexes.  Cerebellar:  Normal finger to nose testing without dysmetria.  Sensory:  Intact to sensory modalities tested.  Musculoskeletal:  There is no severe tenderness to palpation and manipulation of cervical and lumbar spine regions.   Assessment:       We reviewed and discussed at length results of EMG of the left upper extremity as well as bilateral lower extremities performed today notable only for a mild chronic left L5 radiculopathy. These findings are available via media section of chart review.   1. Pain in both lower extremities    2. Pain of left upper extremity    3. Numbness of lower limb    4. Numbness of upper limb              Plan:       We discussed treatment options at length. Recommend patient keep appointment with referring provider.         Tony Lomax MD   05/04/2022   3:21 PM

## 2022-05-26 ENCOUNTER — PATIENT MESSAGE (OUTPATIENT)
Dept: FAMILY MEDICINE | Facility: CLINIC | Age: 72
End: 2022-05-26
Payer: MEDICARE

## 2022-05-27 RX ORDER — MUPIROCIN 20 MG/G
OINTMENT TOPICAL DAILY
Qty: 30 G | Refills: 1 | Status: SHIPPED | OUTPATIENT
Start: 2022-05-27 | End: 2022-06-06

## 2022-05-27 RX ORDER — SULFAMETHOXAZOLE AND TRIMETHOPRIM 800; 160 MG/1; MG/1
1 TABLET ORAL 2 TIMES DAILY
Qty: 20 TABLET | Refills: 0 | Status: SHIPPED | OUTPATIENT
Start: 2022-05-27 | End: 2022-06-06

## 2022-05-31 ENCOUNTER — EXTERNAL CHRONIC CARE MANAGEMENT (OUTPATIENT)
Dept: PRIMARY CARE CLINIC | Facility: CLINIC | Age: 72
End: 2022-05-31
Payer: MEDICARE

## 2022-05-31 PROCEDURE — 99490 CHRNC CARE MGMT STAFF 1ST 20: CPT | Mod: S$GLB,,, | Performed by: FAMILY MEDICINE

## 2022-05-31 PROCEDURE — 99490 PR CHRONIC CARE MGMT, 1ST 20 MIN: ICD-10-PCS | Mod: S$GLB,,, | Performed by: FAMILY MEDICINE

## 2022-06-23 NOTE — TELEPHONE ENCOUNTER
No new care gaps identified.  [facility] Embedded Care Gaps. Reference number: [id number]. 6/23/2022   3:05:54 PM CDT

## 2022-06-27 ENCOUNTER — TELEPHONE (OUTPATIENT)
Dept: FAMILY MEDICINE | Facility: CLINIC | Age: 72
End: 2022-06-27
Payer: MEDICARE

## 2022-06-27 RX ORDER — ALPRAZOLAM 0.5 MG/1
TABLET ORAL
Qty: 30 TABLET | Refills: 2 | Status: SHIPPED | OUTPATIENT
Start: 2022-06-27 | End: 2022-07-12 | Stop reason: SDUPTHER

## 2022-06-27 NOTE — TELEPHONE ENCOUNTER
----- Message from Bettie Wyman sent at 6/27/2022  1:44 PM CDT -----  Regarding: returning a call  Contact: [phone]  Type:  Patient Returning Call    Who Called: self   Who Left Message for Patient: Elsa Dooley MA   Does the patient know what this is regarding?: Her Care team wanting her to be seen sooner.   Would the patient rather a call back or a [facility]?  Call   Best Call Back Number: [phone]  Additional Information:  was directly exposed to covid from her granddaughter.

## 2022-06-27 NOTE — TELEPHONE ENCOUNTER
Attempted patient. LVM stating to call the office back due to the message from [facility].

## 2022-06-27 NOTE — TELEPHONE ENCOUNTER
Bettie Otto Staff  Caller: [phone] (Today,  1:44 PM)  Type:  Patient Returning Call     Who Called: self   Who Left Message for Patient: Elsa Dooley MA   Does the patient know what this is regarding?: Her Care team wanting her to be seen sooner.   Would the patient rather a call back or a response via [facility]?  Call   Best Call Back Number: [phone]   Additional Information:  was directly exposed to covid from her granddaughter.

## 2022-06-27 NOTE — TELEPHONE ENCOUNTER
----- Message from Dalia Anguiano sent at 6/27/2022 11:07 AM CDT -----  Contact: [facility]  Type:  Sooner Appointment Request    Name of Caller:Antonella Clanton    When is the first available appointment?10/06/2022  Symptoms:anxiety /Recent separation from spouse / general condition /PHQ 9 score 13   Would the patient rather a call back or a response via [facility]? Call Back   Best Call Back Number:[phone]  Additional Information: Please assist     If any question you can reach at [facility] [phone]

## 2022-06-28 ENCOUNTER — TELEPHONE (OUTPATIENT)
Dept: NEUROLOGY | Facility: CLINIC | Age: 72
End: 2022-06-28
Payer: MEDICARE

## 2022-06-28 NOTE — TELEPHONE ENCOUNTER
----- Message from Carla Mendoza MD sent at 6/27/2022  5:23 PM CDT -----  Contact: Antonella Resendiz  I can't help without knowing the name of the medication, and what symptoms is not being relieved. cg  ----- Message -----  From: Justine Miles MA  Sent: 6/27/2022  11:18 AM CDT  To: Carla Mendoza MD      ----- Message -----  From: Dalia Anguiano  Sent: 6/27/2022  11:13 AM CDT  To: Reji Aguirre Staff    Care harmony call in regarding patient current medication not working for patient    Care Astrid stated patient could not named medication     Patient want to office be aware for upcoming appointment       Please advice    Patient can be reach at [phone]

## 2022-06-28 NOTE — TELEPHONE ENCOUNTER
Spoke with pt who states she currently has COVID. Pt states her rx Sinemet works on some days and not on others. Pt current dosage is one whole tablet and three halves daily.

## 2022-06-29 ENCOUNTER — TELEPHONE (OUTPATIENT)
Dept: NEUROLOGY | Facility: CLINIC | Age: 72
End: 2022-06-29
Payer: MEDICARE

## 2022-06-29 NOTE — TELEPHONE ENCOUNTER
Spoke with pt to notify dr would like her to change the sinemet from what she is taking to one whole pill 3 times a day and if no better after 2 weeks then increase to one whole pill 4 times a day. I also reminded her it can cause nausea and low blood pressure, and if is very sick with covid, tere if not eating well then should wait until she feels better to make the change. Reminded her to stay well hydrated.    Pt states she is having low blood pressure and will increase her sinemet dosage after she feels better from COVID.

## 2022-06-30 ENCOUNTER — EXTERNAL CHRONIC CARE MANAGEMENT (OUTPATIENT)
Dept: PRIMARY CARE CLINIC | Facility: CLINIC | Age: 72
End: 2022-06-30
Payer: MEDICARE

## 2022-06-30 PROCEDURE — 99490 CHRNC CARE MGMT STAFF 1ST 20: CPT | Mod: S$GLB,,, | Performed by: FAMILY MEDICINE

## 2022-06-30 PROCEDURE — 99439 CHRNC CARE MGMT STAF EA ADDL: CPT | Mod: S$GLB,,, | Performed by: FAMILY MEDICINE

## 2022-06-30 PROCEDURE — 99490 PR CHRONIC CARE MGMT, 1ST 20 MIN: ICD-10-PCS | Mod: S$GLB,,, | Performed by: FAMILY MEDICINE

## 2022-06-30 PROCEDURE — 99439 PR CHRONIC CARE MGMT, EA ADDTL 20 MIN: ICD-10-PCS | Mod: S$GLB,,, | Performed by: FAMILY MEDICINE

## 2022-07-12 ENCOUNTER — OFFICE VISIT (OUTPATIENT)
Dept: FAMILY MEDICINE | Facility: CLINIC | Age: 72
End: 2022-07-12
Payer: MEDICARE

## 2022-07-12 VITALS
SYSTOLIC BLOOD PRESSURE: 116 MMHG | OXYGEN SATURATION: 95 % | WEIGHT: 118.19 LBS | HEIGHT: 60 IN | DIASTOLIC BLOOD PRESSURE: 68 MMHG | BODY MASS INDEX: 23.2 KG/M2 | TEMPERATURE: 98 F | HEART RATE: 96 BPM

## 2022-07-12 DIAGNOSIS — Z86.16 HISTORY OF COVID-19: Primary | ICD-10-CM

## 2022-07-12 DIAGNOSIS — R09.89 OTHER SPECIFIED SYMPTOMS AND SIGNS INVOLVING THE CIRCULATORY AND RESPIRATORY SYSTEMS: ICD-10-CM

## 2022-07-12 DIAGNOSIS — I77.9 BILATERAL CAROTID ARTERY DISEASE, UNSPECIFIED TYPE: ICD-10-CM

## 2022-07-12 DIAGNOSIS — R55 NEAR SYNCOPE: ICD-10-CM

## 2022-07-12 DIAGNOSIS — E03.4 HYPOTHYROIDISM DUE TO ACQUIRED ATROPHY OF THYROID: ICD-10-CM

## 2022-07-12 DIAGNOSIS — M94.9 DISORDER OF CARTILAGE, UNSPECIFIED: ICD-10-CM

## 2022-07-12 DIAGNOSIS — F32.A DEPRESSION, UNSPECIFIED DEPRESSION TYPE: ICD-10-CM

## 2022-07-12 PROCEDURE — 99214 PR OFFICE/OUTPT VISIT, EST, LEVL IV, 30-39 MIN: ICD-10-PCS | Mod: S$GLB,,, | Performed by: FAMILY MEDICINE

## 2022-07-12 PROCEDURE — 99214 OFFICE O/P EST MOD 30 MIN: CPT | Mod: S$GLB,,, | Performed by: FAMILY MEDICINE

## 2022-07-12 NOTE — PROGRESS NOTES
Subjective:      Patient ID: Florentino Bowman is a 72 y.o. female.    Chief Complaint: Annual Exam      Vitals:    07/12/22 1619   BP: 116/68   Pulse: 96   Temp: 98 °F (36.7 °C)   TempSrc: Oral   SpO2: 95%   Weight: 53.6 kg (118 lb 2.7 oz)   Height: 5' (1.524 m)        HPI   Episodes of near syncope/faint like and sees dots and has to sit before she falls  No recent falls  Lives alone  Worries about Parkinsons Dx      Problem List  Patient Active Problem List   Diagnosis    Headache    Hypothyroid    Depression    Insomnia    Anxiety    Chronic back pain    Bilateral carotid artery disease    Calcific tendonitis    Left shoulder pain    DDD (degenerative disc disease), lumbar    Change in bowel habit    Screening for colorectal cancer    S/P lumbar laminectomy    Chronic right-sided low back pain with right-sided sciatica    Lumbar radiculopathy    Lumbar radicular pain    Spondylolisthesis    Lumbar spondylosis    Chest pain    Change in bowel habits    Lumbar degenerative disc disease    Wears contact lenses        ALLERGIES: Review of patient's allergies indicates:  No Known Allergies    MEDS:   Current Outpatient Medications:     ALPRAZolam (XANAX) 0.5 MG tablet, Take 1 tablet (0.5 mg total) by mouth nightly as needed., Disp: 30 tablet, Rfl: 5    atorvastatin (LIPITOR) 40 MG tablet, Take 1 tablet (40 mg total) by mouth once daily., Disp: 90 tablet, Rfl: 3    carbidopa-levodopa  mg (SINEMET)  mg per tablet, One po q am and 1/2 po bid, Disp: 60 tablet, Rfl: 3    DULoxetine (CYMBALTA) 30 MG capsule, Take 1 capsule (30 mg total) by mouth 2 (two) times daily., Disp: 60 capsule, Rfl: 2    estradioL (ESTRACE) 1 MG tablet, Take 1 tablet (1 mg total) by mouth every evening., Disp: 90 tablet, Rfl: 0    HYDROcodone-acetaminophen (NORCO) 5-325 mg per tablet, Take 1 tablet by mouth every 6 (six) hours as needed for Pain., Disp: 10 tablet, Rfl: 0    levothyroxine (SYNTHROID) 88 MCG  tablet, Take 1 tablet (88 mcg total) by mouth once daily., Disp: 90 tablet, Rfl: 3    liothyronine (CYTOMEL) 5 MCG Tab, Take 1 tablet (5 mcg total) by mouth once daily., Disp: 90 tablet, Rfl: 3    pregabalin (LYRICA) 100 MG capsule, , Disp: , Rfl:     sumatriptan (IMITREX) 100 MG tablet, TAKE ONE TABLET BY MOUTH AS NEEDED FOR HEADACHE REPEAT IN 2 HOURS IF NEEDED, Disp: 27 tablet, Rfl: 1    zolpidem (AMBIEN) 10 mg Tab, TAKE ONE TABLET BY MOUTH EVERY EVENING, Disp: 90 tablet, Rfl: 0    liothyronine (CYTOMEL) 5 MCG Tab, TAKE 1 TABLET BY MOUTH ONCE DAILY. (Patient not taking: Reported on 2022), Disp: 90 tablet, Rfl: 0      History:  Current Providers as of 2022  PCP: Fam Chou MD  Care Team Provider: Lan Reid MD  Care Team Provider: Karley Kmuar MD  Care Team Provider: Tony Castle MD  Care Team Provider: Carla Mendoza MD  Encounter Provider: Fam Chou MD, starting on  12:00 AM  Referring Provider: not found, starting on  12:00 AM  Consulting Physician: Fam Chou MD, starting on   4:16 PM (Active)   Past Medical History:   Diagnosis Date    Arthritis     Carotid stenosis, asymptomatic     Headache(784.0)     Hypothyroid     Movement disorder     Neuropathy      Past Surgical History:   Procedure Laterality Date    BREAST BIOPSY Right      SECTION      x2    COLONOSCOPY N/A 2017    Procedure: COLONOSCOPY Miralax;  Surgeon: Quentin Mercado Jr., MD;  Location: [facility];  Service: Endoscopy;  Laterality: N/A;    COLONOSCOPY N/A 2021    Procedure: COLONOSCOPY;  Surgeon: Andrew Parekh MD;  Location: [facility];  Service: Endoscopy;  Laterality: N/A;    ESOPHAGOGASTRODUODENOSCOPY N/A 2021    Procedure: EGD (ESOPHAGOGASTRODUODENOSCOPY);  Surgeon: Andrew Parekh MD;  Location: [facility];  Service: Endoscopy;  Laterality: N/A;    HYSTERECTOMY      INJECTION OF ANESTHETIC AGENT AROUND  MEDIAL BRANCH NERVES INNERVATING LUMBAR FACET JOINT Right 1/16/2019    Procedure: BLOCK, NERVE, FACET JOINT, LUMBAR, MEDIAL BRANCH ;  Surgeon: Sixto Moya III, MD;  Location: [facility] OR;  Service: Pain Management;  Laterality: Right;  - L4/5 & L5/S1  PATIENT NEEDS TO BE THE FIRST PATIENT OF THE DAY, PER DR. MOYA!!!    LAPAROSCOPIC HYSTERECTOMY  2010    supracervical/BSO    OOPHORECTOMY      SPINE SURGERY      TONSILLECTOMY      TRANSFORAMINAL EPIDURAL INJECTION OF STEROID Right 6/5/2019    Procedure: INJECTION, STEROID, EPIDURAL, TRANSFORAMINAL APPROACH;  Surgeon: Sixto Moya III, MD;  Location: [facility] OR;  Service: Pain Management;  Laterality: Right;  -right TFESI L4/5    TRANSFORAMINAL EPIDURAL INJECTION OF STEROID Bilateral 2/19/2021    Procedure: INJECTION, STEROID, EPIDURAL, TRANSFORAMINAL APPROACH;  Surgeon: Sixto Moya III, MD;  Location: [facility] OR;  Service: Pain Management;  Laterality: Bilateral;  L5     Social History     Tobacco Use    Smoking status: Never Smoker    Smokeless tobacco: Never Used   Substance Use Topics    Alcohol use: Yes     Comment: social    Drug use: No         Review of Systems   Constitutional: Negative.    HENT: Negative.    Respiratory: Negative.    Cardiovascular: Negative.    Gastrointestinal: Negative.    Endocrine: Negative.    Genitourinary: Negative.    Musculoskeletal: Negative.    Neurological: Positive for weakness.   Psychiatric/Behavioral: Negative.    All other systems reviewed and are negative.    Objective:     Physical Exam  Vitals and nursing note reviewed.   Constitutional:       Appearance: She is well-developed.   HENT:      Head: Normocephalic.   Eyes:      Conjunctiva/sclera: Conjunctivae normal.      Pupils: Pupils are equal, round, and reactive to light.   Cardiovascular:      Rate and Rhythm: Normal rate and regular rhythm.      Heart sounds: Normal heart sounds.   Pulmonary:      Effort: Pulmonary effort is  normal.      Breath sounds: Normal breath sounds.   Musculoskeletal:         General: Normal range of motion.      Cervical back: Normal range of motion and neck supple.   Skin:     General: Skin is warm and dry.   Neurological:      Mental Status: She is alert and oriented to person, place, and time.      Deep Tendon Reflexes: Reflexes are normal and symmetric.   Psychiatric:         Behavior: Behavior normal.         Thought Content: Thought content normal.         Judgment: Judgment normal.      Comments: Flat affect             Assessment:     1. History of COVID-19    2. Near syncope    3. Hypothyroidism due to acquired atrophy of thyroid    4. Disorder of cartilage, unspecified     5. Depression, unspecified depression type    6. Bilateral carotid artery disease, unspecified type    7. Other specified symptoms and signs involving the circulatory and respiratory systems       Plan:        Medication List          Accurate as of July 12, 2022  5:46 PM. If you have any questions, ask your nurse or doctor.            CONTINUE taking these medications    ALPRAZolam 0.5 MG tablet  Commonly known as: XANAX  Take 1 tablet (0.5 mg total) by mouth nightly as needed.     atorvastatin 40 MG tablet  Commonly known as: LIPITOR  Take 1 tablet (40 mg total) by mouth once daily.     carbidopa-levodopa  mg  mg per tablet  Commonly known as: SINEMET  One po q am and 1/2 po bid     DULoxetine 30 MG capsule  Commonly known as: CYMBALTA  Take 1 capsule (30 mg total) by mouth 2 (two) times daily.     estradioL 1 MG tablet  Commonly known as: ESTRACE  Take 1 tablet (1 mg total) by mouth every evening.     HYDROcodone-acetaminophen 5-325 mg per tablet  Commonly known as: NORCO  Take 1 tablet by mouth every 6 (six) hours as needed for Pain.     levothyroxine 88 MCG tablet  Commonly known as: SYNTHROID  Take 1 tablet (88 mcg total) by mouth once daily.     * liothyronine 5 MCG Tab  Commonly known as: CYTOMEL  Take 1 tablet  (5 mcg total) by mouth once daily.     * liothyronine 5 MCG Tab  Commonly known as: CYTOMEL  TAKE 1 TABLET BY MOUTH ONCE DAILY.     pregabalin 100 MG capsule  Commonly known as: LYRICA     sumatriptan 100 MG tablet  Commonly known as: IMITREX  TAKE ONE TABLET BY MOUTH AS NEEDED FOR HEADACHE REPEAT IN 2 HOURS IF NEEDED     zolpidem 10 mg Tab  Commonly known as: AMBIEN  TAKE ONE TABLET BY MOUTH EVERY EVENING         * This list has 2 medication(s) that are the same as other medications prescribed for you. Read the directions carefully, and ask your doctor or other care provider to review them with you.              History of COVID-19  -     CBC Auto Differential; Future; Expected date: 07/12/2022  -     Comprehensive Metabolic Panel; Future; Expected date: 07/12/2022  -     Lipid Panel; Future  -     Sedimentation rate; Future  -     TSH; Future  -     Urinalysis; Future  -     Vitamin D; Future    Near syncope  -     CBC Auto Differential; Future; Expected date: 07/12/2022  -     Comprehensive Metabolic Panel; Future; Expected date: 07/12/2022  -     Lipid Panel; Future  -     Sedimentation rate; Future  -     TSH; Future  -     Urinalysis; Future  -     Vitamin D; Future    Hypothyroidism due to acquired atrophy of thyroid  -     CBC Auto Differential; Future; Expected date: 07/12/2022  -     Comprehensive Metabolic Panel; Future; Expected date: 07/12/2022  -     Lipid Panel; Future  -     Sedimentation rate; Future  -     TSH; Future  -     Urinalysis; Future  -     Vitamin D; Future    Disorder of cartilage, unspecified   -     Vitamin D; Future    Depression, unspecified depression type  -     Ambulatory referral/consult to Psychiatry; Future; Expected date: 07/19/2022    Bilateral carotid artery disease, unspecified type  -     US Carotid Bilateral; Future; Expected date: 07/12/2022    Other specified symptoms and signs involving the circulatory and respiratory systems   -     US Carotid Bilateral; Future;  Expected date: 07/12/2022

## 2022-07-18 ENCOUNTER — TELEPHONE (OUTPATIENT)
Dept: FAMILY MEDICINE | Facility: CLINIC | Age: 72
End: 2022-07-18
Payer: MEDICARE

## 2022-07-18 DIAGNOSIS — E03.4 HYPOTHYROIDISM DUE TO ACQUIRED ATROPHY OF THYROID: ICD-10-CM

## 2022-07-18 DIAGNOSIS — I77.9 BILATERAL CAROTID ARTERY DISEASE, UNSPECIFIED TYPE: ICD-10-CM

## 2022-07-18 DIAGNOSIS — Z00.00 WELLNESS EXAMINATION: ICD-10-CM

## 2022-07-18 RX ORDER — LEVOTHYROXINE SODIUM 100 UG/1
100 TABLET ORAL DAILY
Qty: 90 TABLET | Refills: 3 | Status: SHIPPED | OUTPATIENT
Start: 2022-07-18 | End: 2022-10-21

## 2022-07-31 ENCOUNTER — EXTERNAL CHRONIC CARE MANAGEMENT (OUTPATIENT)
Dept: PRIMARY CARE CLINIC | Facility: CLINIC | Age: 72
End: 2022-07-31
Payer: MEDICARE

## 2022-07-31 PROCEDURE — 99490 CHRNC CARE MGMT STAFF 1ST 20: CPT | Mod: S$GLB,,, | Performed by: FAMILY MEDICINE

## 2022-07-31 PROCEDURE — 99490 PR CHRONIC CARE MGMT, 1ST 20 MIN: ICD-10-PCS | Mod: S$GLB,,, | Performed by: FAMILY MEDICINE

## 2022-08-01 ENCOUNTER — PATIENT MESSAGE (OUTPATIENT)
Dept: OPTOMETRY | Facility: CLINIC | Age: 72
End: 2022-08-01
Payer: MEDICARE

## 2022-08-11 ENCOUNTER — OFFICE VISIT (OUTPATIENT)
Dept: NEUROLOGY | Facility: CLINIC | Age: 72
End: 2022-08-11
Payer: MEDICARE

## 2022-08-11 VITALS
DIASTOLIC BLOOD PRESSURE: 70 MMHG | HEIGHT: 60 IN | HEART RATE: 93 BPM | WEIGHT: 120.94 LBS | SYSTOLIC BLOOD PRESSURE: 106 MMHG | BODY MASS INDEX: 23.74 KG/M2

## 2022-08-11 DIAGNOSIS — G43.009 MIGRAINE WITHOUT AURA AND WITHOUT STATUS MIGRAINOSUS, NOT INTRACTABLE: ICD-10-CM

## 2022-08-11 DIAGNOSIS — M79.2 NEUROPATHIC PAIN OF LEFT FOOT: ICD-10-CM

## 2022-08-11 DIAGNOSIS — G20.A1 PARKINSON'S DISEASE: Primary | ICD-10-CM

## 2022-08-11 DIAGNOSIS — F32.1 CURRENT MODERATE EPISODE OF MAJOR DEPRESSIVE DISORDER WITHOUT PRIOR EPISODE: ICD-10-CM

## 2022-08-11 PROCEDURE — 99215 OFFICE O/P EST HI 40 MIN: CPT | Mod: S$PBB,,, | Performed by: PSYCHIATRY & NEUROLOGY

## 2022-08-11 PROCEDURE — 99215 PR OFFICE/OUTPT VISIT, EST, LEVL V, 40-54 MIN: ICD-10-PCS | Mod: S$PBB,,, | Performed by: PSYCHIATRY & NEUROLOGY

## 2022-08-11 PROCEDURE — 99999 PR PBB SHADOW E&M-EST. PATIENT-LVL IV: ICD-10-PCS | Mod: PBBFAC,,, | Performed by: PSYCHIATRY & NEUROLOGY

## 2022-08-11 PROCEDURE — 99999 PR PBB SHADOW E&M-EST. PATIENT-LVL IV: CPT | Mod: PBBFAC,,, | Performed by: PSYCHIATRY & NEUROLOGY

## 2022-08-11 PROCEDURE — 99214 OFFICE O/P EST MOD 30 MIN: CPT | Mod: PBBFAC,PN | Performed by: PSYCHIATRY & NEUROLOGY

## 2022-08-11 RX ORDER — SUMATRIPTAN SUCCINATE 100 MG/1
TABLET ORAL
Qty: 27 TABLET | Refills: 1 | Status: SHIPPED | OUTPATIENT
Start: 2022-08-11 | End: 2022-10-19

## 2022-08-11 RX ORDER — DULOXETIN HYDROCHLORIDE 30 MG/1
30 CAPSULE, DELAYED RELEASE ORAL 2 TIMES DAILY
Qty: 60 CAPSULE | Refills: 2 | Status: SHIPPED | OUTPATIENT
Start: 2022-08-11 | End: 2022-11-08 | Stop reason: SDUPTHER

## 2022-08-11 RX ORDER — OXYCODONE HYDROCHLORIDE AND ACETAMINOPHEN 5; 325 MG/1; MG/1
0.5 TABLET ORAL 2 TIMES DAILY PRN
COMMUNITY
Start: 2022-08-10 | End: 2023-02-16

## 2022-08-11 NOTE — PROGRESS NOTES
Subjective:       Patient ID: Florentino Bowman is a 72 y.o. female.    Chief Complaint: Peripheral Neuropathy        Conversed by phone with her daughter Aurea during the visit; she  is a CRNA in [place]. She and her  who is also a CRNA doubts the dx of PD.     Pt admits to feeling slow on left side, hard to get out of a car  Rarely has a tremor, Lt hand only  Minor dysphagia  No falls  No dysautonomia  Cognition, very mild isolated to short term memory, not worse. Remains independent.         EMG 5-4-22, Dr Lomax  LBP and LLE pain  We reviewed and discussed at length results of EMG of the left upper extremity as well as bilateral lower extremities performed today notable only for a mild chronic left L5 radiculopathy. These findings are available via media section of chart review.   1. Pain in both lower extremities   2. Pain of left upper extremity   3. Numbness of lower limb   4. Numbness of upper limb         EXAMINATION:  MRI LUMBAR SPINE WITHOUT CONTRAST     CLINICAL HISTORY:  Low back pain, trauma; Dorsalgia, unspecified     TECHNIQUE:  Multiplanar, multisequence MR images were acquired from the thoracolumbar junction to the sacrum without the administration of contrast.     FINDINGS:  The lumbar spinal alignment is significant for minimal anterolisthesis of L3 in relation to L4.  The vertebral body heights are satisfactorily preserved.  There is loss of disc space height with degenerative endplate change and disc desiccation identified from L3-4 through L5-S1.  The cord ends at L1.  The terminal cord, conus, and cauda equina have a normal appearance.  There is no intradural abnormality.  There is no evidence of an acute marrow replacement process such as tumor or infection.  There is no fracture.  The visualized surrounding soft tissues and vascular structures are unremarkable.     L1-2: Unremarkable.     L2-3: Facet hypertrophy.     L3-4: Facet hypertrophy with a disc bulge which creates mild  left-sided neural foraminal stenosis and mild central canal stenosis.     L4-5: Facet hypertrophy with a disc bulge which creates mild bilateral neural foraminal stenosis.     L5-S1: Facet hypertrophy with a disc bulge which creates mild bilateral neural foraminal stenosis.     Impression:     Multilevel degenerative change as outlined above.        Electronically signed by: Remington Tran MD  Date:                                            2022  Time:                                           16:11          Exam Ended: 22 15:04            Past Medical History:   Diagnosis Date    Arthritis     Carotid stenosis, asymptomatic     Headache(784.0)     Hypothyroid     Movement disorder     Neuropathy       Past Surgical History:   Procedure Laterality Date    BREAST BIOPSY Right      SECTION      x2    COLONOSCOPY N/A 2017    Procedure: COLONOSCOPY Miralax;  Surgeon: Quentin Mercado Jr., MD;  Location: [facility];  Service: Endoscopy;  Laterality: N/A;    COLONOSCOPY N/A 2021    Procedure: COLONOSCOPY;  Surgeon: Andrew Parekh MD;  Location: [facility];  Service: Endoscopy;  Laterality: N/A;    ESOPHAGOGASTRODUODENOSCOPY N/A 2021    Procedure: EGD (ESOPHAGOGASTRODUODENOSCOPY);  Surgeon: Andrew Parekh MD;  Location: [facility];  Service: Endoscopy;  Laterality: N/A;    HYSTERECTOMY      INJECTION OF ANESTHETIC AGENT AROUND MEDIAL BRANCH NERVES INNERVATING LUMBAR FACET JOINT Right 2019    Procedure: BLOCK, NERVE, FACET JOINT, LUMBAR, MEDIAL BRANCH ;  Surgeon: Sixto Moya III, MD;  Location: [facility];  Service: Pain Management;  Laterality: Right;  - L4/5 & L5/S1  PATIENT NEEDS TO BE THE FIRST PATIENT OF THE DAY, PER DR. MOYA!!!    LAPAROSCOPIC HYSTERECTOMY  2010    supracervical/BSO    OOPHORECTOMY      SPINE SURGERY      TONSILLECTOMY      TRANSFORAMINAL EPIDURAL INJECTION OF STEROID Right 2019    Procedure: INJECTION, STEROID, EPIDURAL,  TRANSFORAMINAL APPROACH;  Surgeon: Sixto Moya III, MD;  Location: [facility];  Service: Pain Management;  Laterality: Right;  -right TFESI L4/5    TRANSFORAMINAL EPIDURAL INJECTION OF STEROID Bilateral 2/19/2021    Procedure: INJECTION, STEROID, EPIDURAL, TRANSFORAMINAL APPROACH;  Surgeon: Sixto Moya III, MD;  Location: [facility];  Service: Pain Management;  Laterality: Bilateral;  L5        Current Outpatient Medications:     ALPRAZolam (XANAX) 0.5 MG tablet, Take 1 tablet (0.5 mg total) by mouth nightly as needed., Disp: 30 tablet, Rfl: 5    atorvastatin (LIPITOR) 40 MG tablet, Take 1 tablet (40 mg total) by mouth once daily., Disp: 90 tablet, Rfl: 3    docosahexaenoic acid/epa (EPA-DHA ORAL), Take 1 tablet by mouth once daily at 6am., Disp: , Rfl:     DULoxetine (CYMBALTA) 30 MG capsule, Take 1 capsule (30 mg total) by mouth 2 (two) times daily., Disp: 60 capsule, Rfl: 2    estradioL (ESTRACE) 1 MG tablet, Take 1 tablet (1 mg total) by mouth every evening., Disp: 90 tablet, Rfl: 0    levothyroxine (SYNTHROID) 100 MCG tablet, Take 1 tablet (100 mcg total) by mouth once daily. For thyroid, Disp: 90 tablet, Rfl: 3    liothyronine (CYTOMEL) 5 MCG Tab, Take 1 tablet (5 mcg total) by mouth once daily., Disp: 90 tablet, Rfl: 3    PERCOCET 5-325 mg per tablet, Take 0.5 tablets by mouth 2 (two) times daily as needed., Disp: , Rfl:     pregabalin (LYRICA) 100 MG capsule, Take 100 mg by mouth 2 (two) times daily., Disp: , Rfl:     sumatriptan (IMITREX) 100 MG tablet, TAKE ONE TABLET BY MOUTH AS NEEDED FOR HEADACHE REPEAT IN 2 HOURS IF NEEDED, Disp: 27 tablet, Rfl: 1    zolpidem (AMBIEN) 10 mg Tab, TAKE ONE TABLET BY MOUTH EVERY EVENING, Disp: 90 tablet, Rfl: 0   Review of patient's allergies indicates:  No Known Allergies     Review of Systems   HENT: Positive for trouble swallowing (very minor issues with needing to wash down food if is hurried and did now chew food enough).     Musculoskeletal: Negative for gait problem (and no falls. ).   Neurological: Positive for memory loss (mild short term memroy loss, not worsening.).   Psychiatric/Behavioral: Negative for hallucinations.           Objective:      Physical Exam  Neurological:      Mental Status: She is alert.      Cranial Nerves: No dysarthria.      Motor: Abnormal muscle tone (mild increased tone LUE) present. No tremor.      Gait: Gait and tandem walk normal.      Comments: Voice has better volume but slightly low, scratchy, and intermittently mildly dysarthric    Mild bradykinesia    Does not move LUE when walks     Psychiatric:         Behavior: Behavior normal.         Thought Content: Thought content normal.           Assessment:       1. Parkinson's disease    2. Migraine without aura and without status migrainosus, not intractable    3. Neuropathic pain of left foot        Plan:            Pt and her family question the dx of PD  ( her daughter and son in law are both CRNA and both do not see signs of PD)/  She does fulfill criteria for dx ( tremor and bradykinesia) unilateral in onset, soft voice and my impression is that she improved on sinemet. However her sx have been mild and as such pharmacotherapy is not mandatory. Will taper off of sinemet over 3 weeks. Then consider a DaTscan depending on clinical course.   If Luma positive but chooses to remain off of sinemet, will then recc and offer Rx for selegiline.     LBP and LLE radicular pain; she Is scheduled for an epidural tomorrow    Migraines--PRN oral imitrex    Note case complicated by grief due to the loss of her  who succumbed to sepsis-- she is doing well. Does agree to a referral to psychologist.           Carla Mendoza MD   08/11/2022   3:30 PM

## 2022-08-11 NOTE — PATIENT INSTRUCTIONS
Reduce sinemet to 1/2  pill 3 times a day for one week, then 1/2 pill twice a day for one week then 1/2 pill every morning for one week then discontinue it.     I will consider a Luma scan if and when you want to, after we see how you do off of the sinemet

## 2022-08-15 RX ORDER — CARBIDOPA AND LEVODOPA 25; 100 MG/1; MG/1
TABLET ORAL
Qty: 21 TABLET | Refills: 0 | Status: SHIPPED | OUTPATIENT
Start: 2022-08-15 | End: 2022-09-06

## 2022-09-02 ENCOUNTER — PES CALL (OUTPATIENT)
Dept: ADMINISTRATIVE | Facility: OTHER | Age: 72
End: 2022-09-02
Payer: MEDICARE

## 2022-09-02 ENCOUNTER — PATIENT MESSAGE (OUTPATIENT)
Dept: NEUROLOGY | Facility: CLINIC | Age: 72
End: 2022-09-02
Payer: MEDICARE

## 2022-09-06 RX ORDER — CARBIDOPA AND LEVODOPA 25; 100 MG/1; MG/1
0.5 TABLET ORAL 3 TIMES DAILY
Qty: 45 TABLET | Refills: 5 | Status: SHIPPED | OUTPATIENT
Start: 2022-09-06 | End: 2022-11-08 | Stop reason: SDUPTHER

## 2022-10-18 ENCOUNTER — PATIENT MESSAGE (OUTPATIENT)
Dept: NEUROLOGY | Facility: CLINIC | Age: 72
End: 2022-10-18
Payer: MEDICARE

## 2022-10-19 ENCOUNTER — PATIENT MESSAGE (OUTPATIENT)
Dept: FAMILY MEDICINE | Facility: CLINIC | Age: 72
End: 2022-10-19
Payer: MEDICARE

## 2022-10-19 RX ORDER — SUMATRIPTAN SUCCINATE 100 MG/1
TABLET ORAL
Qty: 27 TABLET | Refills: 1 | Status: SHIPPED | OUTPATIENT
Start: 2022-10-19 | End: 2023-02-16 | Stop reason: SDUPTHER

## 2022-10-19 NOTE — TELEPHONE ENCOUNTER
No new care gaps identified.  [facility] Embedded Care Gaps. Reference number: [id number]. 10/19/2022   1:46:21 PM CDT

## 2022-10-21 ENCOUNTER — TELEPHONE (OUTPATIENT)
Dept: FAMILY MEDICINE | Facility: CLINIC | Age: 72
End: 2022-10-21
Payer: MEDICARE

## 2022-10-21 ENCOUNTER — PATIENT MESSAGE (OUTPATIENT)
Dept: FAMILY MEDICINE | Facility: CLINIC | Age: 72
End: 2022-10-21
Payer: MEDICARE

## 2022-10-21 DIAGNOSIS — I77.9 BILATERAL CAROTID ARTERY DISEASE, UNSPECIFIED TYPE: ICD-10-CM

## 2022-10-21 DIAGNOSIS — Z00.00 WELLNESS EXAMINATION: ICD-10-CM

## 2022-10-21 DIAGNOSIS — G57.62 MORTON'S NEUROMA OF LEFT FOOT: Primary | ICD-10-CM

## 2022-10-21 DIAGNOSIS — E03.4 HYPOTHYROIDISM DUE TO ACQUIRED ATROPHY OF THYROID: ICD-10-CM

## 2022-10-21 RX ORDER — LEVOTHYROXINE SODIUM 125 UG/1
125 TABLET ORAL DAILY
Qty: 90 TABLET | Refills: 3 | Status: SHIPPED | OUTPATIENT
Start: 2022-10-21 | End: 2023-02-16

## 2022-10-21 NOTE — TELEPHONE ENCOUNTER
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

## 2022-10-25 DIAGNOSIS — M79.672 LEFT FOOT PAIN: Primary | ICD-10-CM

## 2022-10-26 ENCOUNTER — OFFICE VISIT (OUTPATIENT)
Dept: PODIATRY | Facility: CLINIC | Age: 72
End: 2022-10-26
Payer: MEDICARE

## 2022-10-26 VITALS — HEIGHT: 60 IN | BODY MASS INDEX: 23.81 KG/M2 | RESPIRATION RATE: 18 BRPM | WEIGHT: 121.25 LBS

## 2022-10-26 DIAGNOSIS — M54.16 LUMBAR RADICULOPATHY: ICD-10-CM

## 2022-10-26 DIAGNOSIS — G57.62 MORTON'S NEUROMA OF LEFT FOOT: ICD-10-CM

## 2022-10-26 DIAGNOSIS — M77.52 BURSITIS OF INTERMETATARSAL BURSA OF LEFT FOOT: ICD-10-CM

## 2022-10-26 DIAGNOSIS — R20.2 PARESTHESIA OF LEFT FOOT: Primary | ICD-10-CM

## 2022-10-26 DIAGNOSIS — M21.862 ACQUIRED POSTERIOR EQUINUS OF LEFT LOWER EXTREMITY: ICD-10-CM

## 2022-10-26 PROCEDURE — 99999 PR PBB SHADOW E&M-EST. PATIENT-LVL V: ICD-10-PCS | Mod: PBBFAC,,, | Performed by: PODIATRIST

## 2022-10-26 PROCEDURE — 99999 PR PBB SHADOW E&M-EST. PATIENT-LVL V: CPT | Mod: PBBFAC,,, | Performed by: PODIATRIST

## 2022-10-26 PROCEDURE — 99203 PR OFFICE/OUTPT VISIT, NEW, LEVL III, 30-44 MIN: ICD-10-PCS | Mod: S$PBB,,, | Performed by: PODIATRIST

## 2022-10-26 PROCEDURE — 99203 OFFICE O/P NEW LOW 30 MIN: CPT | Mod: S$PBB,,, | Performed by: PODIATRIST

## 2022-10-26 PROCEDURE — 99215 OFFICE O/P EST HI 40 MIN: CPT | Mod: PBBFAC,PO | Performed by: PODIATRIST

## 2022-10-26 NOTE — PROGRESS NOTES
[facility] - PODIATRY  [id number] [place] [address], [address]  [facility] [place] [address]  Dept: [phone]  Dept Fax: [phone]    Donnie Porter Jr., YEU     Assessment:   MDM    Coding  1. Paresthesia of left foot  MRI Foot (Forefoot) Left W W/O Contrast      2. De Jesus's neuroma of left foot  Ambulatory referral/consult to Podiatry    MRI Foot (Forefoot) Left W W/O Contrast      3. Acquired posterior equinus of left lower extremity        4. Bursitis of intermetatarsal bursa of left foot  MRI Foot (Forefoot) Left W W/O Contrast      5. Lumbar radiculopathy            Plan:     Colby Good was seen today for foot problem.    Diagnoses and all orders for this visit:    Paresthesia of left foot  -     MRI Foot (Forefoot) Left W W/O Contrast; Future    De Jesus's neuroma of left foot  -     Ambulatory referral/consult to Podiatry  -     MRI Foot (Forefoot) Left W W/O Contrast; Future    Acquired posterior equinus of left lower extremity    Bursitis of intermetatarsal bursa of left foot  -     MRI Foot (Forefoot) Left W W/O Contrast; Future    Lumbar radiculopathy      -pt seen, evaluated, and managed  -dx discussed in detail. All questions/concerns addressed  -all tx options discussed. All alternatives, risks, benefits of all txs discussed  -the patient was educated about the diagnosis  -We discussed conservative care options possible including but not limited to shoe wear and/or padding, bracing/strapping, at home ROM, formal PT, medical therapy, injection therapy  - The utilization of NSAIDs can be considered but their benefit has to be tempered against the risk of GI/ concerns  - A steroid injection can be undertaken.  We did discuss the potential mechanism of action of this shot.  Understanding that multiple injections at the same anatomic site do have deleterious effects on the soft tissue.  Generic risks include: steroid flare (advised to ice if necessary), skin hypo-pgimentation (which can  be permanent and unsightly), elevation of blood sugar, subcutaneous atrophy (can be permanent) and infection.   -XR/imaging reviewed by me: agree with read  -implemented icing/stretching regimen  -offloading pads dispensed  - A steroid injection can be undertaken.  We did discuss the potential mechanism of action of this shot.  Understanding that multiple injections at the same anatomic site do have deleterious effects on the soft tissue.  Generic risks include: steroid flare (advised to ice if necessary), skin hypo-pgimentation (which can be permanent and unsightly), elevation of blood sugar, subcutaneous atrophy (can be permanent) and infection.   -clinically pt Does have some radicular symptoms in addition to mild neuroma findings  -MRI ordered to better characterize     -rxs dispensed: none  -referrals: PT  -WB: wbat      Follow up in about 4 weeks (around 11/23/2022).    Subjective:      Patient ID: Florentino Bowman is a 72 y.o. female.    Chief Complaint:   Chief Complaint   Patient presents with    Foot Problem     Left foot pain       CC - foot pain: patient presents to the podiatry clinic  with complaint of  left foot pain. Precipitating event: unk, but She has a history of 2 previous back surgeries.  She states her left foot has continued to be painfully numb since a few days after the 2nd back surgery.  She continues with low back pain which radiates down the left leg.  Symptoms have been progressively worsening and severe at times.   Current symptoms include: ability to bear weight, but with some pain, feels like stepping on rock in ball of foot, also burning/numbness/tingling down back of leg, heel, and bottom of foot. Symptoms have gradually worsened. Evaluation to date:  as below . Treatment to date:  as below . Patients rates pain 7/10 on pain scale.  Pt is here as 2nd opinion p b&j. Failed 3 injs. Obtained emg. Unsure if related to back, PD, or all 3 dx.    HPI    Last Podiatry Enc: Visit date not  found  Last Enc w/ Me: Visit date not found    Outside reports reviewed: historical medical records.  Family hx: as below  Past Medical History:   Diagnosis Date    Arthritis     Carotid stenosis, asymptomatic     Headache(784.0)     Hypothyroid     Movement disorder     Neuropathy      Past Surgical History:   Procedure Laterality Date    BREAST BIOPSY Right     CAUDAL EPIDURAL STEROID INJECTION N/A 2022    Procedure: INJECTION, STEROID, SPINE, EPIDURAL, CAUDAL;  Surgeon: Tony Castle MD;  Location: [facility] OR;  Service: Pain Management;  Laterality: N/A;     SECTION      x2    COLONOSCOPY N/A 2017    Procedure: COLONOSCOPY Miralax;  Surgeon: Quentin Mercado Jr., MD;  Location: [facility];  Service: Endoscopy;  Laterality: N/A;    COLONOSCOPY N/A 2021    Procedure: COLONOSCOPY;  Surgeon: Andrew Parekh MD;  Location: [facility];  Service: Endoscopy;  Laterality: N/A;    ESOPHAGOGASTRODUODENOSCOPY N/A 2021    Procedure: EGD (ESOPHAGOGASTRODUODENOSCOPY);  Surgeon: Andrew Parekh MD;  Location: [facility];  Service: Endoscopy;  Laterality: N/A;    HYSTERECTOMY      INJECTION OF ANESTHETIC AGENT AROUND MEDIAL BRANCH NERVES INNERVATING LUMBAR FACET JOINT Right 2019    Procedure: BLOCK, NERVE, FACET JOINT, LUMBAR, MEDIAL BRANCH ;  Surgeon: Sixto Moya III, MD;  Location: [facility] OR;  Service: Pain Management;  Laterality: Right;  - L4/5 & L5/S1  PATIENT NEEDS TO BE THE FIRST PATIENT OF THE DAY, PER DR. MOYA!!!    LAPAROSCOPIC HYSTERECTOMY  2010    supracervical/BSO    OOPHORECTOMY      SPINE SURGERY      TONSILLECTOMY      TRANSFORAMINAL EPIDURAL INJECTION OF STEROID Right 2019    Procedure: INJECTION, STEROID, EPIDURAL, TRANSFORAMINAL APPROACH;  Surgeon: Sixto Moya III, MD;  Location: [facility] OR;  Service: Pain Management;  Laterality: Right;  -right TFESI L4/5    TRANSFORAMINAL EPIDURAL INJECTION OF STEROID Bilateral 2021    Procedure: INJECTION,  STEROID, EPIDURAL, TRANSFORAMINAL APPROACH;  Surgeon: Sixto Moya III, MD;  Location: [facility] OR;  Service: Pain Management;  Laterality: Bilateral;  L5    TRANSFORAMINAL EPIDURAL INJECTION OF STEROID Bilateral 9/30/2022    Procedure: INJECTION, STEROID, EPIDURAL, TRANSFORAMINAL APPROACH;  Surgeon: Tony Castle MD;  Location: [facility] OR;  Service: Pain Management;  Laterality: Bilateral;     Family History   Problem Relation Age of Onset    Dementia Mother     Hypertension Mother     Heart disease Mother     Cancer Father         colon    Colon cancer Father     Cancer Sister         breast    Breast cancer Sister     Heart disease Sister     No Known Problems Daughter     No Known Problems Son     No Known Problems Sister     Parkinsonism Maternal Uncle     Stroke Maternal Grandmother     Aneurysm Neg Hx     Clotting disorder Neg Hx     Diabetes Neg Hx     Fainting Neg Hx     Hyperlipidemia Neg Hx     Kidney disease Neg Hx     Liver disease Neg Hx     Migraines Neg Hx     Neuropathy Neg Hx     Seizures Neg Hx     Tremor Neg Hx      Current Outpatient Medications   Medication Sig Dispense Refill    ALPRAZolam (XANAX) 0.5 MG tablet Take 1 tablet (0.5 mg total) by mouth nightly as needed. 30 tablet 5    atorvastatin (LIPITOR) 40 MG tablet Take 1 tablet (40 mg total) by mouth once daily. 90 tablet 3    DULoxetine (CYMBALTA) 30 MG capsule Take 1 capsule (30 mg total) by mouth 2 (two) times daily. 60 capsule 2    estradioL (ESTRACE) 1 MG tablet Take 1 tablet (1 mg total) by mouth every evening. 90 tablet 0    levothyroxine (SYNTHROID) 125 MCG tablet Take 1 tablet (125 mcg total) by mouth once daily. For thyroid 90 tablet 3    omega-3 fatty acids/fish oil (FISH OIL-OMEGA-3 FATTY ACIDS) 300-1,000 mg capsule Take by mouth once daily.      PERCOCET 5-325 mg per tablet Take 0.5 tablets by mouth 2 (two) times daily as needed.      pregabalin (LYRICA) 100 MG capsule Take 100 mg by mouth 2 (two) times daily.       sumatriptan (IMITREX) 100 MG tablet TAKE ONE TABLET BY MOUTH AS NEEDED FOR HEADACHE REPEAT IN 2 HOURS IF NEEDED 27 tablet 2    sumatriptan (IMITREX) 100 MG tablet TAKE ONE TABLET BY MOUTH AS NEEDED FOR HEADACHE REPEAT IN 2 HOURS IF NEEDED 27 tablet 1    tumeric-ging-olive-oreg-capryl 100 mg-150 mg- 50 mg-150 mg Cap Take by mouth.      zolpidem (AMBIEN) 10 mg Tab TAKE ONE TABLET BY MOUTH EVERY EVENING 90 tablet 0    carbidopa-levodopa  mg (SINEMET)  mg per tablet Take 0.5 tablets by mouth 3 (three) times daily. 45 tablet 5    docosahexaenoic acid/epa (EPA-DHA ORAL) Take 1 tablet by mouth once daily at 6am.       No current facility-administered medications for this visit.     Facility-Administered Medications Ordered in Other Visits   Medication Dose Route Frequency Provider Last Rate Last Admin    0.9%  NaCl infusion   Intravenous Continuous Tony Castle MD   New Bag at 09/30/22 0822    0.9%  NaCl infusion   Intravenous Continuous Tony Castle MD        sodium chloride 0.9% flush 10 mL  10 mL Intravenous PRN Tony Castle MD        sodium chloride 0.9% flush 10 mL  10 mL Intravenous PRN Tony Castle MD         Review of patient's allergies indicates:  No Known Allergies  Social History     Socioeconomic History    Marital status:    Occupational History    Occupation: retired    Tobacco Use    Smoking status: Never    Smokeless tobacco: Never   Substance and Sexual Activity    Alcohol use: Yes     Comment: social    Drug use: Never    Sexual activity: Not Currently     Partners: Male   Social History Narrative    , 2 adult children and exercises regularly-rides bike on the [employer],     Social Determinants of Health     Financial Resource Strain: Unknown    Difficulty of Paying Living Expenses: Patient refused   Food Insecurity: Unknown    Worried About Running Out of Food in the Last Year: Patient refused    Ran Out of Food in the Last Year:  Patient refused   Transportation Needs: No Transportation Needs    Lack of Transportation (Medical): No    Lack of Transportation (Non-Medical): No   Physical Activity: Insufficiently Active    Days of Exercise per Week: 2 days    Minutes of Exercise per Session: 40 min   Stress: Stress Concern Present    Feeling of Stress : Rather much   Social Connections: Unknown    Frequency of Communication with Friends and Family: More than three times a week    Frequency of Social Gatherings with Friends and Family: Twice a week    Active Member of Clubs or Organizations: Yes    Attends Club or Organization Meetings: Patient refused    Marital Status:    Housing Stability: Unknown    Unable to Pay for Housing in the Last Year: No    Unstable Housing in the Last Year: No       ROS    REVIEW OF SYSTEMS: Negative as documented below as well as positive findings in bold.       Constitutional  Respiratory  Gastrointestinal  Skin   - Fever - Cough - Heartburn - Rash   - Chills - Spit blood - Nausea - Itching   - Weight Loss - Shortness of breath - Vomiting - Nail pain   - Malaise/Fatigue - Wheezing - Abdominal Pain  Wound/Ulcer   - Weight Gain   - Blood in Stool  Poor wound healing       - Diarrhea          Cardiovascular  Genitourinary  Neurological  HEENT   - Chest Pain - Dysuria - Burning Sensation of feet - Headache   - Palpitations - Hematuria - Tingling / Paresthesia - Congestion   - Pain at night in legs - Flank Pain - Dizziness - Sore Throat   - Cramping   - Tremor - Blurred Vision   - Leg Swelling   - Sensory Change - Double Vision   - Dizzy when standing   - Speech Change - Eye Redness       - Focal Weakness - Dry Eyes       - Loss of Consciousness          Endocrine  Musculoskeletal  Psychiatric   - Cold intolerance - Muscle Pain - Depression   - Heat intolerance - Neck Pain - Insomnia   - Anemia - Joint Pain - Memory Loss   -  Easy bruising, bleeding - Heel pain - Anxiety      Toe Pain        Leg/Ankle/Foot Pain          Objective:     Resp 18   Ht 5' (1.524 m)   Wt 55 kg (121 lb 4.1 oz)   LMP 01/01/2001   BMI 23.68 kg/m²   Vitals:    10/26/22 0906   Resp: 18   Weight: 55 kg (121 lb 4.1 oz)   Height: 5' (1.524 m)   PainSc: 0-No pain       Physical Exam    General Appearance:   Patient appears well developed, well nourished  Patient appears stated age    Psychiatric:   Patient is oriented to time, place, and person.  Patient has appropriate mood and affect    Neck:  Trachea Midline  No visible masses    Respiratory/Ears:  No distress or labored breathing.  Able to differentiate between normal talking voice and whisper.  Able to follow commands    Eyes:  Visual Acuity intact  Lids and conjunctivae normal. No discoloration noted.    Foot Exam  Physical Exam  Ortho Exam  Ortho/SPM Exam  Foot/Ankle Musculoskeletal Exam    L LE exam con't:  V:  DP 2/4, PT 2/4   CRT< 3s to all digits tested   Tibial and popliteal lymph nodes are w/o abnormality   Edema: absent, varicosities: present    N:  Patient displays abnormal ankle reflexes (absent)   SILT in SP/DP/T/Veronique/Saph distributions    Ortho: +Motor EHL/FHL/TA/GA   Mild weakness of flexors noted   equinus deformity present  There is moderate pain with palpation of 3rd IS  There is is moderate pain at 3rd IS with lateral compression of metatarsal heads  Compartments soft/compressible. No pain on passive stretch of big toe. No calf  Pain.    Derm:  skin intact, skin warm and dry, skin without ulcers or lesions, skin without induration, nails normal,      Imaging / Labs:      X-Ray Foot Complete Left    Result Date: 10/26/2022  EXAMINATION: XR FOOT COMPLETE 3 VIEW LEFT CLINICAL HISTORY: .  Pain in left foot TECHNIQUE: AP, lateral and oblique views of the left foot were performed. COMPARISON: None FINDINGS: No acute osseous abnormality.  Joint spaces maintained without significant degenerative findings.  Enthesophyte changes of the distal Achilles tendon insertion noted.  Soft tissues  otherwise unremarkable.     As above Electronically signed by: Pete Jones MD Date:    10/26/2022 Time:    09:15        Note: This was dictated using a computer transcription program. Although proofread, it may contain computer transcription errors and phonetic errors. Other human proofreading errors may also exist. Corrections may be performed at a later time. Please contact us for any clarification if needed.    Donnie Porter DPM  [facility]

## 2022-10-26 NOTE — PATIENT INSTRUCTIONS
What Are Neuromas of the Foot?  The ball of your foot is the bottom part just behind your toes. Bands of tissue (ligaments) connect the bones in the ball of your foot. Nerves run between the bones and underneath the ligaments. When a nerve becomes pinched, this causes it to swell and become painful due to the thickening of the tissue that surrounds the nerve. The painful, swollen nerve is called a neuroma (also called De Jesus's neuroma).     A neuroma most often occurs at the base of either the third and fourth toes or the second and third toes.   What causes a neuroma?  Wearing tight or high-heeled shoes can cause a neuroma. Shoes that are too narrow or too pointed squeeze the bones in the ball of the foot. Shoes with high heels put extra pressure on the ends of the bones. When the bones are squeezed together, they pinch the nerve that runs between them.  Symptoms  The most common symptom of a neuroma is pain in the ball of the foot between two toes. The pain may be dull or sharp. It may feel as if you have a stone in your shoe. You may also have tingling or numbness in one or both of the toes. Symptoms may occur after you have been walking or standing for a while. Taking off your shoes and rubbing the ball of your foot may decrease or relieve the pain.  Preventing future problems  To prevent a future neuroma, buy shoes with plenty of room across the ball of the foot and in the toes. This keeps the bones from being squeezed together. Wearing low-heeled shoes (less than 2 inches) also puts less pressure on the bones and nerves in the ball of the foot.   Date Last Reviewed: 9/10/2015  © 7318-0776 [employer]. [address] [place] [address], [place] [address]. All rights reserved. This information is not intended as a substitute for professional medical care. Always follow your healthcare professional's instructions.    De Jesus's Neuroma (Intermetatarsal Neuroma)        What Is a Neuroma?    A neuroma is a  thickening of nerve tissue that may develop in various parts of the body. The most common neuroma in the foot is a De Jesuss neuroma, which occurs between the third and fourth toes. It is sometimes referred to as an intermetatarsal neuroma. Intermetatarsal describes its location in the ball of the foot between the metatarsal bones. Neuromas may also occur in other locations in the foot.    De Jesus's NeuromaThe thickening of the nerve that defines a neuroma is the result of compression and irritation of the nerve. This compression creates enlargement of the nerve, eventually leading to permanent nerve damage.    Causes  Anything that causes compression or irritation of the nerve can lead to the development of a neuroma. One of the most common offenders is wearing shoes that have a tapered toe box or high-heeled shoes that cause the toes to be forced into the toe box. People with certain foot deformities--bunions, hammertoes, flatfeet or more flexible feet--are at higher risk for developing a neuroma. Other potential causes are activities that involve repetitive irritation to the ball of the foot, such as running or court sports. An injury or other type of trauma to the area may also lead to a neuroma.    Symptoms  If you have a De Jesuss neuroma, you may have one or more of these symptoms where the nerve damage is occurring:    Tingling, burning or numbness  Pain  A feeling that something is inside the ball of the foot  A feeling that there is something in the shoe or a sock is bunched up     The progression of a De Jesuss neuroma often follows this pattern:    The symptoms begin gradually. At first, they occur only occasionally when wearing narrow-toed shoes or performing certain aggravating activities.  The symptoms may go away temporarily by removing the shoe, massaging the foot or avoiding aggravating shoes or activities.  Over time, the symptoms progressively worsen and may persist for several days or weeks.  The  symptoms become more intense as the neuroma enlarges and the temporary changes in the nerve become permanent.     Diagnosis  To arrive at a diagnosis, the foot and ankle surgeon will obtain a thorough history of your symptoms and examine your foot. During the physical examination, the doctor attempts to reproduce your symptoms by manipulating your foot. Other tests or imaging studies may be performed.    The best time to see your foot and ankle surgeon is early in the development of symptoms. Early diagnosis of a De Jesuss neuroma greatly lessens the need for more invasive treatments and may help you avoid surgery.    Nonsurgical Treatment  In developing a treatment plan, your foot and ankle surgeon will first determine how long you have had the neuroma and will evaluate its stage of development. Treatment approaches vary according to the severity of the problem.    For mild to moderate neuromas, treatment options may include:    -Padding. Padding techniques provide support for the metatarsal arch, thereby lessening the pressure on the nerve and decreasing the compression when walking.  -Icing. Placing an icepack on the affected area helps reduce swelling.  -Orthotic devices. Custom orthotic devices provided by your foot and ankle surgeon provide the support needed to reduce pressure and compression on the nerve.  -Activity modifications. Activities that put repetitive pressure on the neuroma should be avoided until the condition improves.  -Shoe modifications. Wear shoes with a wide toe box and avoid narrow-toed shoes or shoes with high heels.  -Medications. Oral nonsteroidal anti-inflammatory drugs (NSAIDs), such as ibuprofen, may be recommended to reduce pain and inflammation.  -Injection therapy. Treatment may include injections of cortisone, local anesthetics or other agents.     When Is Surgery Needed?  Surgery may be considered in patients who have not responded adequately to nonsurgical treatments. Your foot  and ankle surgeon will determine the approach that is best for your condition. The length of the recovery period will vary depending on the procedure performed.    Regardless of whether you have undergone surgical or nonsurgical treatment, your surgeon will recommend long-term measures to help keep your symptoms from returning. These include appropriate footwear and modification of activities to reduce the repetitive pressure on the foot.        Recommended OTC orthotics:  -powerstep  -superfeet    Recommended shoegear:  -new balance  -ascics  -ivoryo  -boyer        Equinus          What Is Equinus?    Equinus is a condition in which the upward bending motion of the ankle joint is limited. Someone with equinus lacks the flexibility to bring the top of the foot toward the front of the leg. Equinus can occur in one or both feet. When it involves both feet, the limitation of motion is sometimes worse in one foot than in the other.    People with equinus develop ways to compensate for their limited ankle motion, and this often leads to other foot, leg or back problems. The most common methods of compensation are flattening of the arch or picking up the heel early when walking, placing increased pressure on the ball of the foot. Other patients compensate by toe walking, while a smaller number take steps by bending abnormally at the hip or knee.    Causes  There are several possible causes for the limited range of ankle motion. Often, it is due to tightness in the Achilles tendon or calf muscles (the soleus muscle and/or gastrocnemius muscle). In some patients, this tightness is congenital (present at birth), and sometimes it is an inherited trait. Other patients acquire the tightness from being in a cast, being on crutches or frequently wearing high-heeled shoes. In addition, diabetes can affect the fibers of the Achilles tendon and cause tightness. Sometimes equinus is related to a bone blocking the ankle motion. For  example, a fragment of a broken bone following an ankle injury, or bone block, can get in the way and restrict motion. Equinus may also result from one leg being shorter than the other. Less often, equinus is caused by spasms in the calf muscle. These spasms may be signs of an underlying neurologic disorder.      Foot Problems Related to Equinus  Depending on how a patient compensates for the inability to bend properly at the ankle, a variety of foot conditions can develop, including:    Plantar fasciitis (arch/heel pain)  Calf cramping  Tendonitis (inflammation in the Achilles tendon)  Metatarsalgia (pain and/or callusing on the ball of the foot)  Flatfoot  Arthritis of the midfoot (middle area of the foot)  Pressure sores on the ball of the foot or the arch  Bunions and hammertoes  Ankle pain  Shin splints     Diagnosis  Most patients with equinus are unaware they have this condition when they first visit the doctor. Instead, they come to the doctor seeking relief for foot problems associated with equinus.    To diagnose equinus, the foot and ankle surgeon will evaluate the ankle's range of motion when the knee is flexed (bent) as well as extended (straightened). This enables the surgeon to identify whether the tendon or muscle is tight and to assess whether bone is interfering with ankle motion. X-rays may also be ordered. In some cases, the foot and ankle surgeon may refer the patient for neurologic evaluation.    Nonsurgical Treatment  Treatment includes strategies aimed at relieving the symptoms and conditions associated with equinus. In addition, the patient is treated for the equinus itself through one or more of the following options:    Night splint. The foot may be placed in a splint at night to keep it in a position that helps reduce tightness of the calf muscle.  Heel lifts. Placing heel lifts inside the shoes or wearing shoes with a moderate heel takes stress off the Achilles tendon when walking and  may reduce symptoms.  Arch supports or orthotic devices. Custom orthotic devices that fit into the shoe are often prescribed to keep weight distributed properly and to help control muscle/tendon imbalance.  Physical therapy. To help remedy muscle tightness, exercises that stretch the calf muscle(s) are recommended.     When Is Surgery Needed?  In some cases, surgery may be needed to correct the cause of equinus if it is related to a tight tendon or a bone blocking the ankle motion. The foot and ankle surgeon will determine the type of procedure that is best suited to the individual patient.        Recommended OTC orthotics:  -powerstep  -superfeet    Recommended shoegear:  -new balance  -ascics  -mizuno  -boyer      Equinus          What Is Equinus?    Equinus is a condition in which the upward bending motion of the ankle joint is limited. Someone with equinus lacks the flexibility to bring the top of the foot toward the front of the leg. Equinus can occur in one or both feet. When it involves both feet, the limitation of motion is sometimes worse in one foot than in the other.    People with equinus develop ways to compensate for their limited ankle motion, and this often leads to other foot, leg or back problems. The most common methods of compensation are flattening of the arch or picking up the heel early when walking, placing increased pressure on the ball of the foot. Other patients compensate by toe walking, while a smaller number take steps by bending abnormally at the hip or knee.    Causes  There are several possible causes for the limited range of ankle motion. Often, it is due to tightness in the Achilles tendon or calf muscles (the soleus muscle and/or gastrocnemius muscle). In some patients, this tightness is congenital (present at birth), and sometimes it is an inherited trait. Other patients acquire the tightness from being in a cast, being on crutches or frequently wearing high-heeled shoes. In  addition, diabetes can affect the fibers of the Achilles tendon and cause tightness. Sometimes equinus is related to a bone blocking the ankle motion. For example, a fragment of a broken bone following an ankle injury, or bone block, can get in the way and restrict motion. Equinus may also result from one leg being shorter than the other. Less often, equinus is caused by spasms in the calf muscle. These spasms may be signs of an underlying neurologic disorder.      Foot Problems Related to Equinus  Depending on how a patient compensates for the inability to bend properly at the ankle, a variety of foot conditions can develop, including:    Plantar fasciitis (arch/heel pain)  Calf cramping  Tendonitis (inflammation in the Achilles tendon)  Metatarsalgia (pain and/or callusing on the ball of the foot)  Flatfoot  Arthritis of the midfoot (middle area of the foot)  Pressure sores on the ball of the foot or the arch  Bunions and hammertoes  Ankle pain  Shin splints     Diagnosis  Most patients with equinus are unaware they have this condition when they first visit the doctor. Instead, they come to the doctor seeking relief for foot problems associated with equinus.    To diagnose equinus, the foot and ankle surgeon will evaluate the ankle's range of motion when the knee is flexed (bent) as well as extended (straightened). This enables the surgeon to identify whether the tendon or muscle is tight and to assess whether bone is interfering with ankle motion. X-rays may also be ordered. In some cases, the foot and ankle surgeon may refer the patient for neurologic evaluation.    Nonsurgical Treatment  Treatment includes strategies aimed at relieving the symptoms and conditions associated with equinus. In addition, the patient is treated for the equinus itself through one or more of the following options:    Night splint. The foot may be placed in a splint at night to keep it in a position that helps reduce tightness of the  calf muscle.  Heel lifts. Placing heel lifts inside the shoes or wearing shoes with a moderate heel takes stress off the Achilles tendon when walking and may reduce symptoms.  Arch supports or orthotic devices. Custom orthotic devices that fit into the shoe are often prescribed to keep weight distributed properly and to help control muscle/tendon imbalance.  Physical therapy. To help remedy muscle tightness, exercises that stretch the calf muscle(s) are recommended.    When Is Surgery Needed?  In some cases, surgery may be needed to correct the cause of equinus if it is related to a tight tendon or a bone blocking the ankle motion. The foot and ankle surgeon will determine the type of procedure that is best suited to the individual patient.    What Is a Gastrocnemius Release?  The gastrocnemius (gastroc) and the soleus are two muscles that make up the calf. The gastroc is the larger and more superficial of the two muscles. The soleus is a deeper muscle within the lower leg. The gastroc tendon combines with the soleus tendon to form the Achilles tendon.  Tightness in the calf can limit how for the ankle can flex up. This may make it difficult to walk with the heel on the floor. Over time this can cause problems such as pain and deformity. Calf tightness may contribute to many foot problems, including heel pain, Achilles tendon pain, flatfoot deformity, toe pain, and bunions.  A gastrocnemius release lengthens the gastrocnemius tendon. This is done to increase the flexibility of the calf muscle, which can decrease pressure at the front of the foot, improve function, and decrease deformity.    Diagnosis  This surgery is done in patients with tightness of the gastroc that has not improved with stretching exercises. This procedure can be combined with other reconstructive procedures or be performed by itself.  Surgery can be avoided when appropriate range of motion and flexibility can be obtained with conservative  treatment (stretching). It should also be avoided if there are contractures of multiple tendons in the leg, and not just the gastroc.     Treatment  The surgery can be performed through several different incisions. Most commonly, a small incision is made on the inner side of the lower leg. Sometimes an incision directly in the back of the calf is used, or even an endoscopic incision, which is about ½ inch. Once the gastroc tendon is identified, it is  from the underlying muscle belly of the soleus, then cut straight across. Once the tendon is released, the ankle is flexed up and an increased range of motion is noted intra-operatively.     Recovery  For the first two weeks after surgery, the patient typically is immobilized in a splint or boot. It is important to keep the ankle in a proper position while the tendon is healing. A cramping feeling in the back of the calf is normal. Gentle range of motion and stretching begin once the ankle is removed from the splint/boot. Timing can vary depending upon what other procedures are done.     Risks and Complications  After a gastroc release, some patients experience nerve injury that results in irritation or numbness over the outside of the heel. This usually is temporary. In addition, some patients may notice a difference in the appearance of one calf compared to the other and temporary calf weakness.    FAQs  Why are my calf muscles tight?   Most frequently a tight calf muscle is an inherited problem that only causes problems later in life. Other reasons for calf tightness are nerve injuries, muscle problems, and other medical problems like stroke and diabetes. People can also get tight calf muscles after trauma to the leg, ankle, or foot.    Will a gastrocnemius lengthening affect my strength or ability to walk?  This procedure will cause some weakness but most patients will not notice it. Some patients may have a subtle limp, but this typically resolves within  six months of surgery.

## 2022-10-31 ENCOUNTER — TELEPHONE (OUTPATIENT)
Dept: PODIATRY | Facility: CLINIC | Age: 72
End: 2022-10-31
Payer: MEDICARE

## 2022-10-31 ENCOUNTER — TELEPHONE (OUTPATIENT)
Dept: ORTHOPEDICS | Facility: CLINIC | Age: 72
End: 2022-10-31
Payer: MEDICARE

## 2022-10-31 ENCOUNTER — EXTERNAL CHRONIC CARE MANAGEMENT (OUTPATIENT)
Dept: PRIMARY CARE CLINIC | Facility: CLINIC | Age: 72
End: 2022-10-31
Payer: MEDICARE

## 2022-10-31 PROCEDURE — 99490 CHRNC CARE MGMT STAFF 1ST 20: CPT | Mod: S$GLB,,, | Performed by: FAMILY MEDICINE

## 2022-10-31 PROCEDURE — 99490 PR CHRONIC CARE MGMT, 1ST 20 MIN: ICD-10-PCS | Mod: S$GLB,,, | Performed by: FAMILY MEDICINE

## 2022-10-31 PROCEDURE — 99439 CHRNC CARE MGMT STAF EA ADDL: CPT | Mod: S$GLB,,, | Performed by: FAMILY MEDICINE

## 2022-10-31 PROCEDURE — 99439 PR CHRONIC CARE MGMT, EA ADDTL 20 MIN: ICD-10-PCS | Mod: S$GLB,,, | Performed by: FAMILY MEDICINE

## 2022-10-31 NOTE — TELEPHONE ENCOUNTER
Spoke with pt in regards to Dr. Porter message on the portal. No further concerns was expressed. Called ended ----- Message from Donnie Porter Jr., DPM sent at 10/26/2022 11:21 AM CDT -----  Reviewed result. Please call patient to inform them result has no acute abnormalities.

## 2022-10-31 NOTE — TELEPHONE ENCOUNTER
Returned pt call. Patient stated she just spoke to someone. just got off the phone. I replied. Ok Thank you

## 2022-10-31 NOTE — TELEPHONE ENCOUNTER
----- Message from Angélica Vazquez sent at 10/31/2022 10:53 AM CDT -----  Type:  Needs Medical Advice    Who Called: pt  Symptoms (please be specific): pt has some questions and concerns about a MRI that was ordered     Would the patient rather a call back or a response via [facility]? call  Best Call Back Number:[phone]   Additional Information:

## 2022-11-05 ENCOUNTER — HOSPITAL ENCOUNTER (OUTPATIENT)
Dept: RADIOLOGY | Facility: HOSPITAL | Age: 72
Discharge: HOME | End: 2022-11-05
Attending: PODIATRIST
Payer: MEDICARE

## 2022-11-05 DIAGNOSIS — R20.2 PARESTHESIA OF LEFT FOOT: ICD-10-CM

## 2022-11-05 DIAGNOSIS — M77.52 BURSITIS OF INTERMETATARSAL BURSA OF LEFT FOOT: ICD-10-CM

## 2022-11-05 DIAGNOSIS — G57.62 MORTON'S NEUROMA OF LEFT FOOT: ICD-10-CM

## 2022-11-05 PROCEDURE — 73720 MRI FOOT (FOREFOOT) LEFT W W/O CONTRAST: ICD-10-PCS | Mod: 26,LT,, | Performed by: RADIOLOGY

## 2022-11-05 PROCEDURE — 25500020 PHARM REV CODE 255: Performed by: PODIATRIST

## 2022-11-05 PROCEDURE — A9585 GADOBUTROL INJECTION: HCPCS | Performed by: PODIATRIST

## 2022-11-05 PROCEDURE — 73720 MRI LWR EXTREMITY W/O&W/DYE: CPT | Mod: 26,LT,, | Performed by: RADIOLOGY

## 2022-11-05 PROCEDURE — 73720 MRI LWR EXTREMITY W/O&W/DYE: CPT | Mod: TC,LT

## 2022-11-05 RX ORDER — GADOBUTROL 604.72 MG/ML
5 INJECTION INTRAVENOUS
Status: COMPLETED | OUTPATIENT
Start: 2022-11-05 | End: 2022-11-05

## 2022-11-05 RX ADMIN — GADOBUTROL 5 ML: 604.72 INJECTION INTRAVENOUS at 04:11

## 2022-11-07 ENCOUNTER — PATIENT MESSAGE (OUTPATIENT)
Dept: PODIATRY | Facility: CLINIC | Age: 72
End: 2022-11-07
Payer: MEDICARE

## 2022-11-08 ENCOUNTER — PATIENT MESSAGE (OUTPATIENT)
Dept: FAMILY MEDICINE | Facility: CLINIC | Age: 72
End: 2022-11-08
Payer: MEDICARE

## 2022-11-08 ENCOUNTER — OFFICE VISIT (OUTPATIENT)
Dept: NEUROLOGY | Facility: CLINIC | Age: 72
End: 2022-11-08
Payer: MEDICARE

## 2022-11-08 VITALS
WEIGHT: 123.44 LBS | HEART RATE: 87 BPM | BODY MASS INDEX: 24.23 KG/M2 | SYSTOLIC BLOOD PRESSURE: 107 MMHG | HEIGHT: 60 IN | RESPIRATION RATE: 18 BRPM | DIASTOLIC BLOOD PRESSURE: 72 MMHG

## 2022-11-08 DIAGNOSIS — M79.2 NEUROPATHIC PAIN OF LEFT FOOT: ICD-10-CM

## 2022-11-08 DIAGNOSIS — F41.9 ANXIETY: ICD-10-CM

## 2022-11-08 DIAGNOSIS — G20.A1 PARKINSON'S DISEASE: Primary | ICD-10-CM

## 2022-11-08 DIAGNOSIS — G43.009 MIGRAINE WITHOUT AURA AND WITHOUT STATUS MIGRAINOSUS, NOT INTRACTABLE: ICD-10-CM

## 2022-11-08 PROCEDURE — 99214 PR OFFICE/OUTPT VISIT, EST, LEVL IV, 30-39 MIN: ICD-10-PCS | Mod: S$PBB,,, | Performed by: PSYCHIATRY & NEUROLOGY

## 2022-11-08 PROCEDURE — 99214 OFFICE O/P EST MOD 30 MIN: CPT | Mod: PBBFAC,PN | Performed by: PSYCHIATRY & NEUROLOGY

## 2022-11-08 PROCEDURE — 99214 OFFICE O/P EST MOD 30 MIN: CPT | Mod: S$PBB,,, | Performed by: PSYCHIATRY & NEUROLOGY

## 2022-11-08 PROCEDURE — 99999 PR PBB SHADOW E&M-EST. PATIENT-LVL IV: ICD-10-PCS | Mod: PBBFAC,,, | Performed by: PSYCHIATRY & NEUROLOGY

## 2022-11-08 PROCEDURE — 99999 PR PBB SHADOW E&M-EST. PATIENT-LVL IV: CPT | Mod: PBBFAC,,, | Performed by: PSYCHIATRY & NEUROLOGY

## 2022-11-08 RX ORDER — CARBIDOPA AND LEVODOPA 25; 100 MG/1; MG/1
0.5 TABLET ORAL 3 TIMES DAILY
Qty: 45 TABLET | Refills: 5 | Status: SHIPPED | OUTPATIENT
Start: 2022-11-08 | End: 2023-03-07 | Stop reason: SDUPTHER

## 2022-11-08 RX ORDER — SUMATRIPTAN SUCCINATE 100 MG/1
TABLET ORAL
Qty: 27 TABLET | Refills: 2 | Status: SHIPPED | OUTPATIENT
Start: 2022-11-08 | End: 2023-02-16 | Stop reason: SDUPTHER

## 2022-11-08 RX ORDER — DULOXETIN HYDROCHLORIDE 30 MG/1
30 CAPSULE, DELAYED RELEASE ORAL 2 TIMES DAILY
Qty: 180 CAPSULE | Refills: 1 | Status: SHIPPED | OUTPATIENT
Start: 2022-11-08 | End: 2023-08-16 | Stop reason: SDUPTHER

## 2022-11-08 NOTE — PROGRESS NOTES
Subjective:       Patient ID: Florentino Bowman is a 72 y.o. female.    Chief Complaint: Tremors and Peripheral Neuropathy      HPI  Past Medical History:   Diagnosis Date    Arthritis     Carotid stenosis, asymptomatic     Headache(784.0)     Hypothyroid     Movement disorder     Neuropathy       Past Surgical History:   Procedure Laterality Date    BREAST BIOPSY Right     CAUDAL EPIDURAL STEROID INJECTION N/A 2022    Procedure: INJECTION, STEROID, SPINE, EPIDURAL, CAUDAL;  Surgeon: Tony Castle MD;  Location: [facility] OR;  Service: Pain Management;  Laterality: N/A;     SECTION      x2    COLONOSCOPY N/A 2017    Procedure: COLONOSCOPY Miralax;  Surgeon: Quentin Mercado Jr., MD;  Location: [facility];  Service: Endoscopy;  Laterality: N/A;    COLONOSCOPY N/A 2021    Procedure: COLONOSCOPY;  Surgeon: Andrew Parekh MD;  Location: [facility];  Service: Endoscopy;  Laterality: N/A;    ESOPHAGOGASTRODUODENOSCOPY N/A 2021    Procedure: EGD (ESOPHAGOGASTRODUODENOSCOPY);  Surgeon: Andrew Parekh MD;  Location: [facility];  Service: Endoscopy;  Laterality: N/A;    HYSTERECTOMY      INJECTION OF ANESTHETIC AGENT AROUND MEDIAL BRANCH NERVES INNERVATING LUMBAR FACET JOINT Right 2019    Procedure: BLOCK, NERVE, FACET JOINT, LUMBAR, MEDIAL BRANCH ;  Surgeon: Sixto Moya III, MD;  Location: [facility] OR;  Service: Pain Management;  Laterality: Right;  - L4/5 & L5/S1  PATIENT NEEDS TO BE THE FIRST PATIENT OF THE DAY, PER DR. MOYA!!!    LAPAROSCOPIC HYSTERECTOMY  2010    supracervical/BSO    OOPHORECTOMY      SPINE SURGERY      TONSILLECTOMY      TRANSFORAMINAL EPIDURAL INJECTION OF STEROID Right 2019    Procedure: INJECTION, STEROID, EPIDURAL, TRANSFORAMINAL APPROACH;  Surgeon: Sixto Moya III, MD;  Location: [facility] OR;  Service: Pain Management;  Laterality: Right;  -right TFESI L4/5    TRANSFORAMINAL EPIDURAL INJECTION OF STEROID Bilateral 2021    Procedure:  INJECTION, STEROID, EPIDURAL, TRANSFORAMINAL APPROACH;  Surgeon: Sixto Moya III, MD;  Location: [facility] OR;  Service: Pain Management;  Laterality: Bilateral;  L5    TRANSFORAMINAL EPIDURAL INJECTION OF STEROID Bilateral 9/30/2022    Procedure: INJECTION, STEROID, EPIDURAL, TRANSFORAMINAL APPROACH;  Surgeon: Tony Castle MD;  Location: [facility] OR;  Service: Pain Management;  Laterality: Bilateral;        Current Outpatient Medications:     ALPRAZolam (XANAX) 0.5 MG tablet, Take 1 tablet (0.5 mg total) by mouth nightly as needed., Disp: 30 tablet, Rfl: 5    atorvastatin (LIPITOR) 40 MG tablet, Take 1 tablet (40 mg total) by mouth once daily., Disp: 90 tablet, Rfl: 3    docosahexaenoic acid/epa (EPA-DHA ORAL), Take 1 tablet by mouth once daily at 6am., Disp: , Rfl:     estradioL (ESTRACE) 1 MG tablet, Take 1 tablet (1 mg total) by mouth every evening., Disp: 90 tablet, Rfl: 0    levothyroxine (SYNTHROID) 125 MCG tablet, Take 1 tablet (125 mcg total) by mouth once daily. For thyroid, Disp: 90 tablet, Rfl: 3    omega-3 fatty acids/fish oil (FISH OIL-OMEGA-3 FATTY ACIDS) 300-1,000 mg capsule, Take by mouth once daily., Disp: , Rfl:     PERCOCET 5-325 mg per tablet, Take 0.5 tablets by mouth 2 (two) times daily as needed., Disp: , Rfl:     pregabalin (LYRICA) 100 MG capsule, Take 100 mg by mouth 2 (two) times daily., Disp: , Rfl:     sumatriptan (IMITREX) 100 MG tablet, TAKE ONE TABLET BY MOUTH AS NEEDED FOR HEADACHE REPEAT IN 2 HOURS IF NEEDED, Disp: 27 tablet, Rfl: 1    tumeric-ging-olive-oreg-capryl 100 mg-150 mg- 50 mg-150 mg Cap, Take by mouth., Disp: , Rfl:     zolpidem (AMBIEN) 10 mg Tab, TAKE ONE TABLET BY MOUTH EVERY EVENING, Disp: 90 tablet, Rfl: 0    carbidopa-levodopa  mg (SINEMET)  mg per tablet, Take 0.5 tablets by mouth 3 (three) times daily., Disp: 45 tablet, Rfl: 5    DULoxetine (CYMBALTA) 30 MG capsule, Take 1 capsule (30 mg total) by mouth 2 (two) times daily., Disp: 180  capsule, Rfl: 1    sumatriptan (IMITREX) 100 MG tablet, One po at onset of migraine. May repeat after 2 hours. No more than 200 mg in 24 hours, Disp: 27 tablet, Rfl: 2  No current facility-administered medications for this visit.    Facility-Administered Medications Ordered in Other Visits:     0.9%  NaCl infusion, , Intravenous, Continuous, Tony Castle MD, New Bag at 09/30/22 0822    0.9%  NaCl infusion, , Intravenous, Continuous, Tony Castle MD    sodium chloride 0.9% flush 10 mL, 10 mL, Intravenous, PRN, Tony Castle MD    sodium chloride 0.9% flush 10 mL, 10 mL, Intravenous, PRN, Tony Castle MD   Review of patient's allergies indicates:  No Known Allergies     Review of Systems   HENT:  Positive for voice change (intermittent hoarsenss). Negative for trouble swallowing.          Objective:      Physical Exam  Neurological:      Motor: No tremor.      Comments: Minimal bradykinesia         Assessment:       1. Parkinson's disease    2. Neuropathic pain of left foot    3. Migraine without aura and without status migrainosus, not intractable        Plan:            Mild PD--When pt attempted to taper off of sinemet, which was performed bc pt and family doubted dx of PD, she developed anxiety and worsening of pain in the LLE and foot and then resumed the sinemet.  She does not feel her mobility worsened. She is back on sinemet 1/2 of 25/100 TID. LOF remains very good. She does exercise. She is aware of LLE heaviness and stiffness and when she exercises; the left hand feels awkward.  For right now, she is uncomfortable with pursuing a DaTScan.  If she decides to remain off of sinemet again, can consider MAO-B inhibitor at that time.         Lt foot pain and numbness, and infrequent cramping, tere with weightbearing. She has LS radic, complicated by a fracture and is now being evaluated for a neuroma.   She is interested in a SCS and we discussed that. ( I agree with her seeing a pain   to discuss; did ask that she ask for a device that is MRI compatible)      Migraines--imitrex is very effective for her. Will renew              Carla Mendoza MD   11/08/2022   11:29 AM

## 2022-11-09 ENCOUNTER — PATIENT MESSAGE (OUTPATIENT)
Dept: OPHTHALMOLOGY | Facility: CLINIC | Age: 72
End: 2022-11-09
Payer: MEDICARE

## 2022-11-09 ENCOUNTER — PATIENT MESSAGE (OUTPATIENT)
Dept: FAMILY MEDICINE | Facility: CLINIC | Age: 72
End: 2022-11-09
Payer: MEDICARE

## 2022-11-09 PROBLEM — M77.52 BURSITIS OF INTERMETATARSAL BURSA OF LEFT FOOT: Status: ACTIVE | Noted: 2022-11-09

## 2022-11-09 PROBLEM — G57.62 MORTON'S NEUROMA OF LEFT FOOT: Status: ACTIVE | Noted: 2022-11-09

## 2022-11-09 PROBLEM — M21.862 ACQUIRED POSTERIOR EQUINUS OF LEFT LOWER EXTREMITY: Status: ACTIVE | Noted: 2022-11-09

## 2022-11-09 NOTE — TELEPHONE ENCOUNTER
Dosage changed on 10/19/2022  When do you want her to recheck tsh ( which has already been ordered)

## 2022-11-10 ENCOUNTER — OFFICE VISIT (OUTPATIENT)
Dept: PODIATRY | Facility: CLINIC | Age: 72
End: 2022-11-10
Payer: MEDICARE

## 2022-11-10 ENCOUNTER — PATIENT MESSAGE (OUTPATIENT)
Dept: PODIATRY | Facility: CLINIC | Age: 72
End: 2022-11-10

## 2022-11-10 VITALS
BODY MASS INDEX: 24 KG/M2 | DIASTOLIC BLOOD PRESSURE: 75 MMHG | WEIGHT: 122.25 LBS | SYSTOLIC BLOOD PRESSURE: 105 MMHG | HEART RATE: 80 BPM | HEIGHT: 60 IN | RESPIRATION RATE: 18 BRPM

## 2022-11-10 DIAGNOSIS — M21.862 ACQUIRED POSTERIOR EQUINUS OF LEFT LOWER EXTREMITY: ICD-10-CM

## 2022-11-10 DIAGNOSIS — G57.62 MORTON'S NEUROMA OF LEFT FOOT: Primary | ICD-10-CM

## 2022-11-10 DIAGNOSIS — M77.52 BURSITIS OF INTERMETATARSAL BURSA OF LEFT FOOT: ICD-10-CM

## 2022-11-10 DIAGNOSIS — M54.16 LUMBAR RADICULOPATHY: ICD-10-CM

## 2022-11-10 DIAGNOSIS — R20.2 PARESTHESIA OF LEFT FOOT: ICD-10-CM

## 2022-11-10 PROCEDURE — 99215 OFFICE O/P EST HI 40 MIN: CPT | Mod: PBBFAC,PO | Performed by: PODIATRIST

## 2022-11-10 PROCEDURE — 99213 OFFICE O/P EST LOW 20 MIN: CPT | Mod: S$PBB,,, | Performed by: PODIATRIST

## 2022-11-10 PROCEDURE — 99999 PR PBB SHADOW E&M-EST. PATIENT-LVL V: CPT | Mod: PBBFAC,,, | Performed by: PODIATRIST

## 2022-11-10 PROCEDURE — 99213 PR OFFICE/OUTPT VISIT, EST, LEVL III, 20-29 MIN: ICD-10-PCS | Mod: S$PBB,,, | Performed by: PODIATRIST

## 2022-11-10 PROCEDURE — 99999 PR PBB SHADOW E&M-EST. PATIENT-LVL V: ICD-10-PCS | Mod: PBBFAC,,, | Performed by: PODIATRIST

## 2022-11-10 NOTE — PATIENT INSTRUCTIONS
What Are Neuromas of the Foot?  The ball of your foot is the bottom part just behind your toes. Bands of tissue (ligaments) connect the bones in the ball of your foot. Nerves run between the bones and underneath the ligaments. When a nerve becomes pinched, this causes it to swell and become painful due to the thickening of the tissue that surrounds the nerve. The painful, swollen nerve is called a neuroma (also called De Jesus's neuroma).     A neuroma most often occurs at the base of either the third and fourth toes or the second and third toes.   What causes a neuroma?  Wearing tight or high-heeled shoes can cause a neuroma. Shoes that are too narrow or too pointed squeeze the bones in the ball of the foot. Shoes with high heels put extra pressure on the ends of the bones. When the bones are squeezed together, they pinch the nerve that runs between them.  Symptoms  The most common symptom of a neuroma is pain in the ball of the foot between two toes. The pain may be dull or sharp. It may feel as if you have a stone in your shoe. You may also have tingling or numbness in one or both of the toes. Symptoms may occur after you have been walking or standing for a while. Taking off your shoes and rubbing the ball of your foot may decrease or relieve the pain.  Preventing future problems  To prevent a future neuroma, buy shoes with plenty of room across the ball of the foot and in the toes. This keeps the bones from being squeezed together. Wearing low-heeled shoes (less than 2 inches) also puts less pressure on the bones and nerves in the ball of the foot.   Date Last Reviewed: 9/10/2015  © 2872-8431 [employer]. [address] [place] [address], [place] [address]. All rights reserved. This information is not intended as a substitute for professional medical care. Always follow your healthcare professional's instructions.    De Jesus's Neuroma (Intermetatarsal Neuroma)        What Is a Neuroma?    A neuroma is a  thickening of nerve tissue that may develop in various parts of the body. The most common neuroma in the foot is a De Jesuss neuroma, which occurs between the third and fourth toes. It is sometimes referred to as an intermetatarsal neuroma. Intermetatarsal describes its location in the ball of the foot between the metatarsal bones. Neuromas may also occur in other locations in the foot.    De Jesus's NeuromaThe thickening of the nerve that defines a neuroma is the result of compression and irritation of the nerve. This compression creates enlargement of the nerve, eventually leading to permanent nerve damage.    Causes  Anything that causes compression or irritation of the nerve can lead to the development of a neuroma. One of the most common offenders is wearing shoes that have a tapered toe box or high-heeled shoes that cause the toes to be forced into the toe box. People with certain foot deformities--bunions, hammertoes, flatfeet or more flexible feet--are at higher risk for developing a neuroma. Other potential causes are activities that involve repetitive irritation to the ball of the foot, such as running or court sports. An injury or other type of trauma to the area may also lead to a neuroma.    Symptoms  If you have a De Jesuss neuroma, you may have one or more of these symptoms where the nerve damage is occurring:    Tingling, burning or numbness  Pain  A feeling that something is inside the ball of the foot  A feeling that there is something in the shoe or a sock is bunched up     The progression of a De Jesuss neuroma often follows this pattern:    The symptoms begin gradually. At first, they occur only occasionally when wearing narrow-toed shoes or performing certain aggravating activities.  The symptoms may go away temporarily by removing the shoe, massaging the foot or avoiding aggravating shoes or activities.  Over time, the symptoms progressively worsen and may persist for several days or weeks.  The  symptoms become more intense as the neuroma enlarges and the temporary changes in the nerve become permanent.     Diagnosis  To arrive at a diagnosis, the foot and ankle surgeon will obtain a thorough history of your symptoms and examine your foot. During the physical examination, the doctor attempts to reproduce your symptoms by manipulating your foot. Other tests or imaging studies may be performed.    The best time to see your foot and ankle surgeon is early in the development of symptoms. Early diagnosis of a De Jesuss neuroma greatly lessens the need for more invasive treatments and may help you avoid surgery.    Nonsurgical Treatment  In developing a treatment plan, your foot and ankle surgeon will first determine how long you have had the neuroma and will evaluate its stage of development. Treatment approaches vary according to the severity of the problem.    For mild to moderate neuromas, treatment options may include:    -Padding. Padding techniques provide support for the metatarsal arch, thereby lessening the pressure on the nerve and decreasing the compression when walking.  -Icing. Placing an icepack on the affected area helps reduce swelling.  -Orthotic devices. Custom orthotic devices provided by your foot and ankle surgeon provide the support needed to reduce pressure and compression on the nerve.  -Activity modifications. Activities that put repetitive pressure on the neuroma should be avoided until the condition improves.  -Shoe modifications. Wear shoes with a wide toe box and avoid narrow-toed shoes or shoes with high heels.  -Medications. Oral nonsteroidal anti-inflammatory drugs (NSAIDs), such as ibuprofen, may be recommended to reduce pain and inflammation.  -Injection therapy. Treatment may include injections of cortisone, local anesthetics or other agents.     When Is Surgery Needed?  Surgery may be considered in patients who have not responded adequately to nonsurgical treatments. Your foot  and ankle surgeon will determine the approach that is best for your condition. The length of the recovery period will vary depending on the procedure performed.    Regardless of whether you have undergone surgical or nonsurgical treatment, your surgeon will recommend long-term measures to help keep your symptoms from returning. These include appropriate footwear and modification of activities to reduce the repetitive pressure on the foot.        Recommended OTC orthotics:  -powerstep  -superfeet    Recommended shoegear:  -new balance  -ascics  -ivoryo  -boyer        Equinus          What Is Equinus?    Equinus is a condition in which the upward bending motion of the ankle joint is limited. Someone with equinus lacks the flexibility to bring the top of the foot toward the front of the leg. Equinus can occur in one or both feet. When it involves both feet, the limitation of motion is sometimes worse in one foot than in the other.    People with equinus develop ways to compensate for their limited ankle motion, and this often leads to other foot, leg or back problems. The most common methods of compensation are flattening of the arch or picking up the heel early when walking, placing increased pressure on the ball of the foot. Other patients compensate by toe walking, while a smaller number take steps by bending abnormally at the hip or knee.    Causes  There are several possible causes for the limited range of ankle motion. Often, it is due to tightness in the Achilles tendon or calf muscles (the soleus muscle and/or gastrocnemius muscle). In some patients, this tightness is congenital (present at birth), and sometimes it is an inherited trait. Other patients acquire the tightness from being in a cast, being on crutches or frequently wearing high-heeled shoes. In addition, diabetes can affect the fibers of the Achilles tendon and cause tightness. Sometimes equinus is related to a bone blocking the ankle motion. For  example, a fragment of a broken bone following an ankle injury, or bone block, can get in the way and restrict motion. Equinus may also result from one leg being shorter than the other. Less often, equinus is caused by spasms in the calf muscle. These spasms may be signs of an underlying neurologic disorder.      Foot Problems Related to Equinus  Depending on how a patient compensates for the inability to bend properly at the ankle, a variety of foot conditions can develop, including:    Plantar fasciitis (arch/heel pain)  Calf cramping  Tendonitis (inflammation in the Achilles tendon)  Metatarsalgia (pain and/or callusing on the ball of the foot)  Flatfoot  Arthritis of the midfoot (middle area of the foot)  Pressure sores on the ball of the foot or the arch  Bunions and hammertoes  Ankle pain  Shin splints     Diagnosis  Most patients with equinus are unaware they have this condition when they first visit the doctor. Instead, they come to the doctor seeking relief for foot problems associated with equinus.    To diagnose equinus, the foot and ankle surgeon will evaluate the ankle's range of motion when the knee is flexed (bent) as well as extended (straightened). This enables the surgeon to identify whether the tendon or muscle is tight and to assess whether bone is interfering with ankle motion. X-rays may also be ordered. In some cases, the foot and ankle surgeon may refer the patient for neurologic evaluation.    Nonsurgical Treatment  Treatment includes strategies aimed at relieving the symptoms and conditions associated with equinus. In addition, the patient is treated for the equinus itself through one or more of the following options:    Night splint. The foot may be placed in a splint at night to keep it in a position that helps reduce tightness of the calf muscle.  Heel lifts. Placing heel lifts inside the shoes or wearing shoes with a moderate heel takes stress off the Achilles tendon when walking and  may reduce symptoms.  Arch supports or orthotic devices. Custom orthotic devices that fit into the shoe are often prescribed to keep weight distributed properly and to help control muscle/tendon imbalance.  Physical therapy. To help remedy muscle tightness, exercises that stretch the calf muscle(s) are recommended.     When Is Surgery Needed?  In some cases, surgery may be needed to correct the cause of equinus if it is related to a tight tendon or a bone blocking the ankle motion. The foot and ankle surgeon will determine the type of procedure that is best suited to the individual patient.        Recommended OTC orthotics:  -powerstep  -superfeet    Recommended shoegear:  -new balance  -ascics  -mizuno  -boyer      Equinus          What Is Equinus?    Equinus is a condition in which the upward bending motion of the ankle joint is limited. Someone with equinus lacks the flexibility to bring the top of the foot toward the front of the leg. Equinus can occur in one or both feet. When it involves both feet, the limitation of motion is sometimes worse in one foot than in the other.    People with equinus develop ways to compensate for their limited ankle motion, and this often leads to other foot, leg or back problems. The most common methods of compensation are flattening of the arch or picking up the heel early when walking, placing increased pressure on the ball of the foot. Other patients compensate by toe walking, while a smaller number take steps by bending abnormally at the hip or knee.    Causes  There are several possible causes for the limited range of ankle motion. Often, it is due to tightness in the Achilles tendon or calf muscles (the soleus muscle and/or gastrocnemius muscle). In some patients, this tightness is congenital (present at birth), and sometimes it is an inherited trait. Other patients acquire the tightness from being in a cast, being on crutches or frequently wearing high-heeled shoes. In  addition, diabetes can affect the fibers of the Achilles tendon and cause tightness. Sometimes equinus is related to a bone blocking the ankle motion. For example, a fragment of a broken bone following an ankle injury, or bone block, can get in the way and restrict motion. Equinus may also result from one leg being shorter than the other. Less often, equinus is caused by spasms in the calf muscle. These spasms may be signs of an underlying neurologic disorder.      Foot Problems Related to Equinus  Depending on how a patient compensates for the inability to bend properly at the ankle, a variety of foot conditions can develop, including:    Plantar fasciitis (arch/heel pain)  Calf cramping  Tendonitis (inflammation in the Achilles tendon)  Metatarsalgia (pain and/or callusing on the ball of the foot)  Flatfoot  Arthritis of the midfoot (middle area of the foot)  Pressure sores on the ball of the foot or the arch  Bunions and hammertoes  Ankle pain  Shin splints     Diagnosis  Most patients with equinus are unaware they have this condition when they first visit the doctor. Instead, they come to the doctor seeking relief for foot problems associated with equinus.    To diagnose equinus, the foot and ankle surgeon will evaluate the ankle's range of motion when the knee is flexed (bent) as well as extended (straightened). This enables the surgeon to identify whether the tendon or muscle is tight and to assess whether bone is interfering with ankle motion. X-rays may also be ordered. In some cases, the foot and ankle surgeon may refer the patient for neurologic evaluation.    Nonsurgical Treatment  Treatment includes strategies aimed at relieving the symptoms and conditions associated with equinus. In addition, the patient is treated for the equinus itself through one or more of the following options:    Night splint. The foot may be placed in a splint at night to keep it in a position that helps reduce tightness of the  calf muscle.  Heel lifts. Placing heel lifts inside the shoes or wearing shoes with a moderate heel takes stress off the Achilles tendon when walking and may reduce symptoms.  Arch supports or orthotic devices. Custom orthotic devices that fit into the shoe are often prescribed to keep weight distributed properly and to help control muscle/tendon imbalance.  Physical therapy. To help remedy muscle tightness, exercises that stretch the calf muscle(s) are recommended.    When Is Surgery Needed?  In some cases, surgery may be needed to correct the cause of equinus if it is related to a tight tendon or a bone blocking the ankle motion. The foot and ankle surgeon will determine the type of procedure that is best suited to the individual patient.    What Is a Gastrocnemius Release?  The gastrocnemius (gastroc) and the soleus are two muscles that make up the calf. The gastroc is the larger and more superficial of the two muscles. The soleus is a deeper muscle within the lower leg. The gastroc tendon combines with the soleus tendon to form the Achilles tendon.  Tightness in the calf can limit how for the ankle can flex up. This may make it difficult to walk with the heel on the floor. Over time this can cause problems such as pain and deformity. Calf tightness may contribute to many foot problems, including heel pain, Achilles tendon pain, flatfoot deformity, toe pain, and bunions.  A gastrocnemius release lengthens the gastrocnemius tendon. This is done to increase the flexibility of the calf muscle, which can decrease pressure at the front of the foot, improve function, and decrease deformity.    Diagnosis  This surgery is done in patients with tightness of the gastroc that has not improved with stretching exercises. This procedure can be combined with other reconstructive procedures or be performed by itself.  Surgery can be avoided when appropriate range of motion and flexibility can be obtained with conservative  treatment (stretching). It should also be avoided if there are contractures of multiple tendons in the leg, and not just the gastroc.     Treatment  The surgery can be performed through several different incisions. Most commonly, a small incision is made on the inner side of the lower leg. Sometimes an incision directly in the back of the calf is used, or even an endoscopic incision, which is about ½ inch. Once the gastroc tendon is identified, it is  from the underlying muscle belly of the soleus, then cut straight across. Once the tendon is released, the ankle is flexed up and an increased range of motion is noted intra-operatively.     Recovery  For the first two weeks after surgery, the patient typically is immobilized in a splint or boot. It is important to keep the ankle in a proper position while the tendon is healing. A cramping feeling in the back of the calf is normal. Gentle range of motion and stretching begin once the ankle is removed from the splint/boot. Timing can vary depending upon what other procedures are done.     Risks and Complications  After a gastroc release, some patients experience nerve injury that results in irritation or numbness over the outside of the heel. This usually is temporary. In addition, some patients may notice a difference in the appearance of one calf compared to the other and temporary calf weakness.    FAQs  Why are my calf muscles tight?   Most frequently a tight calf muscle is an inherited problem that only causes problems later in life. Other reasons for calf tightness are nerve injuries, muscle problems, and other medical problems like stroke and diabetes. People can also get tight calf muscles after trauma to the leg, ankle, or foot.    Will a gastrocnemius lengthening affect my strength or ability to walk?  This procedure will cause some weakness but most patients will not notice it. Some patients may have a subtle limp, but this typically resolves within  six months of surgery.

## 2022-11-10 NOTE — PROGRESS NOTES
[facility] - PODIATRY  [id number] [place] [address], [address]  [place] [address]  Dept: [phone]  Dept Fax: [phone]    Donnie Porter Jr., YUE     Assessment:   MDM    Coding  1. De Jesus's neuroma of left foot        2. Paresthesia of left foot        3. Bursitis of intermetatarsal bursa of left foot        4. Acquired posterior equinus of left lower extremity        5. Lumbar radiculopathy            Plan:     Colby Good was seen today for foot problem.    Diagnoses and all orders for this visit:    De Jesus's neuroma of left foot    Paresthesia of left foot    Bursitis of intermetatarsal bursa of left foot    Acquired posterior equinus of left lower extremity    Lumbar radiculopathy      -pt seen, evaluated, and managed  -dx discussed in detail. All questions/concerns addressed  -all tx options discussed. All alternatives, risks, benefits of all txs discussed  -the patient was educated about the diagnosis  -We discussed conservative care options possible including but not limited to shoe wear and/or padding, bracing/strapping, at home ROM, formal PT, medical therapy, injection therapy  - The utilization of NSAIDs can be considered but their benefit has to be tempered against the risk of GI/ concerns  - A steroid injection can be undertaken.  We did discuss the potential mechanism of action of this shot.  Understanding that multiple injections at the same anatomic site do have deleterious effects on the soft tissue.  Generic risks include: steroid flare (advised to ice if necessary), skin hypo-pgimentation (which can be permanent and unsightly), elevation of blood sugar, subcutaneous atrophy (can be permanent) and infection.   -would not rec repeat steroid inj given she has failed 3  -XR/imaging reviewed by me: agree with read  -MRI reviewed  -clinically pt does have some radicular symptoms in addition to mild neuroma findings  -cont icing/stretching regimen  -offloading pads -  cont  -given failure of conservative measures, we discussed surgical intervention  -pt would benefit from operative intervention: we discussed the following procedures: L gastrocnemius recession, endoscopic decompression of interdigital nerve 3rd interspace, harvest and application of bone marrow aspirate and indicated procedures  -we discussed approx postop course  -would be out pt, elective surgery  -would be GA+ pop saph block, supine position  -would need PCP clearance before proceeding  -potential DOS: TBD  -pt will call. CR ordered    -rxs dispensed: none  -referrals: PT  -WB: wbat      Follow up in about 2 months (around 1/10/2023).    Subjective:      Patient ID: Florentino Bowman is a 72 y.o. female.    Chief Complaint:   Chief Complaint   Patient presents with    Foot Problem     Left foot        CC - foot pain: patient presents to the podiatry clinic  with complaint of  left foot pain. Precipitating event: unk, but She has a history of 2 previous back surgeries.  She states her left foot has continued to be painfully numb since a few days after the 2nd back surgery.  She continues with low back pain which radiates down the left leg.  Symptoms have been progressively worsening and severe at times.   Current symptoms include: ability to bear weight, but with some pain, feels like stepping on rock in ball of foot, also burning/numbness/tingling down back of leg, heel, and bottom of foot. Symptoms have gradually worsened. Evaluation to date:  as below . Treatment to date:  as below . Patients rates pain 7/10 on pain scale.  Pt is here as 2nd opinion ponch bone&joint. Failed 3 injs. Obtained emg. Unsure if related to back, PD, or all 3 dx.    11/10/22:  Hx as above. Obtain MRI. Still has pain.       Last Podiatry Enc: Visit date not found  Last Enc w/ Me: Visit date not found    Outside reports reviewed: historical medical records.  Family hx: as below  Past Medical History:   Diagnosis Date    Arthritis      Carotid stenosis, asymptomatic     Headache(784.0)     Hypothyroid     Movement disorder     Neuropathy      Past Surgical History:   Procedure Laterality Date    BREAST BIOPSY Right     CAUDAL EPIDURAL STEROID INJECTION N/A 2022    Procedure: INJECTION, STEROID, SPINE, EPIDURAL, CAUDAL;  Surgeon: Tony Castle MD;  Location: [facility];  Service: Pain Management;  Laterality: N/A;     SECTION      x2    COLONOSCOPY N/A 2017    Procedure: COLONOSCOPY Miralax;  Surgeon: Quentin Mercado Jr., MD;  Location: [facility] ENDO;  Service: Endoscopy;  Laterality: N/A;    COLONOSCOPY N/A 2021    Procedure: COLONOSCOPY;  Surgeon: Andrew Parekh MD;  Location: [facility] ENDO;  Service: Endoscopy;  Laterality: N/A;    ESOPHAGOGASTRODUODENOSCOPY N/A 2021    Procedure: EGD (ESOPHAGOGASTRODUODENOSCOPY);  Surgeon: Andrew Parekh MD;  Location: [facility];  Service: Endoscopy;  Laterality: N/A;    HYSTERECTOMY      INJECTION OF ANESTHETIC AGENT AROUND MEDIAL BRANCH NERVES INNERVATING LUMBAR FACET JOINT Right 2019    Procedure: BLOCK, NERVE, FACET JOINT, LUMBAR, MEDIAL BRANCH ;  Surgeon: Sixto Moya III, MD;  Location: [facility] OR;  Service: Pain Management;  Laterality: Right;  - L4/5 & L5/S1  PATIENT NEEDS TO BE THE FIRST PATIENT OF THE DAY, PER DR. MOYA!!!    LAPAROSCOPIC HYSTERECTOMY  2010    supracervical/BSO    OOPHORECTOMY      SPINE SURGERY      TONSILLECTOMY      TRANSFORAMINAL EPIDURAL INJECTION OF STEROID Right 2019    Procedure: INJECTION, STEROID, EPIDURAL, TRANSFORAMINAL APPROACH;  Surgeon: Sixto Moya III, MD;  Location: [facility] OR;  Service: Pain Management;  Laterality: Right;  -right TFESI L4/5    TRANSFORAMINAL EPIDURAL INJECTION OF STEROID Bilateral 2021    Procedure: INJECTION, STEROID, EPIDURAL, TRANSFORAMINAL APPROACH;  Surgeon: Sixto Moya III, MD;  Location: [facility] OR;  Service: Pain Management;  Laterality: Bilateral;  L5    TRANSFORAMINAL  EPIDURAL INJECTION OF STEROID Bilateral 9/30/2022    Procedure: INJECTION, STEROID, EPIDURAL, TRANSFORAMINAL APPROACH;  Surgeon: Tony Castle MD;  Location: [facility];  Service: Pain Management;  Laterality: Bilateral;     Family History   Problem Relation Age of Onset    Dementia Mother     Hypertension Mother     Heart disease Mother     Cancer Father         colon    Colon cancer Father     Cancer Sister         breast    Breast cancer Sister     Heart disease Sister     No Known Problems Daughter     No Known Problems Son     No Known Problems Sister     Parkinsonism Maternal Uncle     Stroke Maternal Grandmother     Aneurysm Neg Hx     Clotting disorder Neg Hx     Diabetes Neg Hx     Fainting Neg Hx     Hyperlipidemia Neg Hx     Kidney disease Neg Hx     Liver disease Neg Hx     Migraines Neg Hx     Neuropathy Neg Hx     Seizures Neg Hx     Tremor Neg Hx      Current Outpatient Medications   Medication Sig Dispense Refill    ALPRAZolam (XANAX) 0.5 MG tablet Take 1 tablet (0.5 mg total) by mouth nightly as needed. 30 tablet 5    atorvastatin (LIPITOR) 40 MG tablet Take 1 tablet (40 mg total) by mouth once daily. 90 tablet 3    carbidopa-levodopa  mg (SINEMET)  mg per tablet Take 0.5 tablets by mouth 3 (three) times daily. 45 tablet 5    docosahexaenoic acid/epa (EPA-DHA ORAL) Take 1 tablet by mouth once daily at 6am.      DULoxetine (CYMBALTA) 30 MG capsule Take 1 capsule (30 mg total) by mouth 2 (two) times daily. 180 capsule 1    estradioL (ESTRACE) 1 MG tablet Take 1 tablet (1 mg total) by mouth every evening. 90 tablet 0    levothyroxine (SYNTHROID) 125 MCG tablet Take 1 tablet (125 mcg total) by mouth once daily. For thyroid 90 tablet 3    omega-3 fatty acids/fish oil (FISH OIL-OMEGA-3 FATTY ACIDS) 300-1,000 mg capsule Take by mouth once daily.      PERCOCET 5-325 mg per tablet Take 0.5 tablets by mouth 2 (two) times daily as needed.      pregabalin (LYRICA) 100 MG capsule Take 100 mg by  mouth 2 (two) times daily.      sumatriptan (IMITREX) 100 MG tablet TAKE ONE TABLET BY MOUTH AS NEEDED FOR HEADACHE REPEAT IN 2 HOURS IF NEEDED 27 tablet 1    sumatriptan (IMITREX) 100 MG tablet One po at onset of migraine. May repeat after 2 hours. No more than 200 mg in 24 hours 27 tablet 2    tumeric-ging-olive-oreg-capryl 100 mg-150 mg- 50 mg-150 mg Cap Take by mouth.      zolpidem (AMBIEN) 10 mg Tab TAKE ONE TABLET BY MOUTH EVERY EVENING 90 tablet 0     No current facility-administered medications for this visit.     Facility-Administered Medications Ordered in Other Visits   Medication Dose Route Frequency Provider Last Rate Last Admin    0.9%  NaCl infusion   Intravenous Continuous Tony Castle MD   New Bag at 09/30/22 0822    0.9%  NaCl infusion   Intravenous Continuous Tony Castle MD        sodium chloride 0.9% flush 10 mL  10 mL Intravenous PRN Tony Castle MD        sodium chloride 0.9% flush 10 mL  10 mL Intravenous PRN Tony Castle MD         Review of patient's allergies indicates:  No Known Allergies  Social History     Socioeconomic History    Marital status:    Occupational History    Occupation: retired    Tobacco Use    Smoking status: Never    Smokeless tobacco: Never   Substance and Sexual Activity    Alcohol use: Yes     Comment: social    Drug use: Never    Sexual activity: Not Currently     Partners: Male   Social History Narrative    , 2 adult children and exercises regularly-rides bike on the [email or web address],     Social Determinants of Health     Financial Resource Strain: Unknown    Difficulty of Paying Living Expenses: Patient refused   Food Insecurity: Unknown    Worried About Running Out of Food in the Last Year: Patient refused    Ran Out of Food in the Last Year: Patient refused   Transportation Needs: No Transportation Needs    Lack of Transportation (Medical): No    Lack of Transportation (Non-Medical): No   Physical Activity:  Insufficiently Active    Days of Exercise per Week: 2 days    Minutes of Exercise per Session: 40 min   Stress: Stress Concern Present    Feeling of Stress : Rather much   Social Connections: Unknown    Frequency of Communication with Friends and Family: More than three times a week    Frequency of Social Gatherings with Friends and Family: Twice a week    Active Member of Clubs or Organizations: Yes    Attends Club or Organization Meetings: Patient refused    Marital Status:    Housing Stability: Unknown    Unable to Pay for Housing in the Last Year: No    Unstable Housing in the Last Year: No       ROS    REVIEW OF SYSTEMS: Negative as documented below as well as positive findings in bold.       Constitutional  Respiratory  Gastrointestinal  Skin   - Fever - Cough - Heartburn - Rash   - Chills - Spit blood - Nausea - Itching   - Weight Loss - Shortness of breath - Vomiting - Nail pain   - Malaise/Fatigue - Wheezing - Abdominal Pain  Wound/Ulcer   - Weight Gain   - Blood in Stool  Poor wound healing       - Diarrhea          Cardiovascular  Genitourinary  Neurological  HEENT   - Chest Pain - Dysuria - Burning Sensation of feet - Headache   - Palpitations - Hematuria - Tingling / Paresthesia - Congestion   - Pain at night in legs - Flank Pain - Dizziness - Sore Throat   - Cramping   - Tremor - Blurred Vision   - Leg Swelling   - Sensory Change - Double Vision   - Dizzy when standing   - Speech Change - Eye Redness       - Focal Weakness - Dry Eyes       - Loss of Consciousness          Endocrine  Musculoskeletal  Psychiatric   - Cold intolerance - Muscle Pain - Depression   - Heat intolerance - Neck Pain - Insomnia   - Anemia - Joint Pain - Memory Loss   -  Easy bruising, bleeding - Heel pain - Anxiety      Toe Pain        Leg/Ankle/Foot Pain         Objective:     /75 (BP Location: Right leg, Patient Position: Sitting, BP Method: Medium (Automatic))   Pulse 80   Resp 18   Ht 5' (1.524 m)   Wt 55.4  kg (122 lb 3.9 oz)   LMP 01/01/2001   BMI 23.87 kg/m²   Vitals:    11/10/22 1049   BP: 105/75   Pulse: 80   Resp: 18   Weight: 55.4 kg (122 lb 3.9 oz)   Height: 5' (1.524 m)   PainSc: 0-No pain       Physical Exam    General Appearance:   Patient appears well developed, well nourished  Patient appears stated age    Psychiatric:   Patient is oriented to time, place, and person.  Patient has appropriate mood and affect    Neck:  Trachea Midline  No visible masses    Respiratory/Ears:  No distress or labored breathing.  Able to differentiate between normal talking voice and whisper.  Able to follow commands    Eyes:  Visual Acuity intact  Lids and conjunctivae normal. No discoloration noted.    Foot Exam  Physical Exam  Ortho Exam  Ortho/SPM Exam  Foot/Ankle Musculoskeletal Exam    L LE exam con't:  V:  DP 2/4, PT 2/4   CRT< 3s to all digits tested   Tibial and popliteal lymph nodes are w/o abnormality   Edema: absent, varicosities: present    N:  Patient displays abnormal ankle reflexes (absent)   SILT in SP/DP/T/Veronique/Saph distributions    Ortho: +Motor EHL/FHL/TA/GA   Mild weakness of flexors noted   equinus deformity present  There is moderate pain with palpation of 3rd IS  There is is moderate pain at 3rd IS with lateral compression of metatarsal heads  Compartments soft/compressible. No pain on passive stretch of big toe. No calf  Pain.    Derm:  skin intact, skin warm and dry, skin without ulcers or lesions, skin without induration, nails normal,      Imaging / Labs:      X-Ray Foot Complete Left    Result Date: 10/26/2022  EXAMINATION: XR FOOT COMPLETE 3 VIEW LEFT CLINICAL HISTORY: .  Pain in left foot TECHNIQUE: AP, lateral and oblique views of the left foot were performed. COMPARISON: None FINDINGS: No acute osseous abnormality.  Joint spaces maintained without significant degenerative findings.  Enthesophyte changes of the distal Achilles tendon insertion noted.  Soft tissues otherwise unremarkable.     As  above Electronically signed by: Pete Jones MD Date:    10/26/2022 Time:    09:15      MRI Foot (Forefoot) Left W W/O Contrast    Result Date: 11/6/2022  EXAMINATION: MRI FOOT (FOREFOOT) LEFT W W/O CONTRAST CLINICAL HISTORY: Foot pain, chronic, metatarsalgia;r/o neuroma 3rd IS. place marker in area of pain;  Lesion of plantar nerve, left lower limb TECHNIQUE: Routine MRI evaluation of the left forefoot performed with and without the use of 5 cc of Gadavist IV contrast. COMPARISON: Radiograph 10/26/2022. FINDINGS: BONES: No fracture.  No avascular necrosis.  No marrow infiltrative process. TENDONS: No tendinosis or tenosynovitis. MUSCLES: There is ill-defined edema signal at the interossei and lumbrical muscles at the level of the 3rd intermetatarsal space.  No corresponding fatty degeneration.  Remaining visualized musculature demonstrates preserved bulk and signal intensity. JOINTS: No joint effusion.  No enhancing synovitis.  No osteochondral lesions.  Lisfranc ligament is intact. MISCELLANEOUS: There is a 1.5 x 0.5 cm lobulated area of increased STIR signal along the plantar aspect of the 3rd metatarsal shaft subjacent to the flexor digitorum brevis along the expected location of the neurovascular bundle.  No corresponding enhancement.  Plantar aponeurosis is normal.  Intermetatarsal bursae are normal.     1. Lobulated area of signal abnormality at the plantar aspect of the 3rd metatarsal shaft immediately subjacent to the flexor digitorum brevis that demonstrates ill-defined edema of the adjacent lumbrical and interossei muscles.  Findings are nonspecific but could represent sequela of a recent soft tissue contusion or reactive inflammation.  While a small neuroma is possible, this cannot be confirmed noting atypical location and lack of masslike enhancement. 2. No stress reaction or fracture.  No convincing evidence of intermetatarsal bursitis This report was flagged in Epic as abnormal. Electronically  signed by: Sam Castro MD Date:    11/06/2022 Time:    08:56           Note: This was dictated using a computer transcription program. Although proofread, it may contain computer transcription errors and phonetic errors. Other human proofreading errors may also exist. Corrections may be performed at a later time. Please contact us for any clarification if needed.    Donnie Porter DPM  [facility]

## 2022-11-15 ENCOUNTER — TELEPHONE (OUTPATIENT)
Dept: PAIN MEDICINE | Facility: CLINIC | Age: 72
End: 2022-11-15
Payer: MEDICARE

## 2022-11-15 NOTE — TELEPHONE ENCOUNTER
----- Message from Argenis Ramey sent at 11/15/2022 10:46 AM CST -----  Contact: pt  Type:  Sooner Appointment Request    Caller is requesting a sooner appointment.  Caller declined first available appointment listed below.  Caller will not accept being placed on the waitlist and is requesting a message be sent to doctor.    Name of Caller:  Patient   When is the first available appointment?    Symptoms:  this will be a new patient she is asking to be seen in the office to discuss a cord stimulator she is seeing a pain management Dr outside of [facility] Call Back Number:  [phone] (home)     Additional Information:  Please call back to advise

## 2022-11-15 NOTE — TELEPHONE ENCOUNTER
Patient was contact and schedule on 11/21/22 with Doctor Roger, for 9a    Verbalized understanding

## 2022-11-21 ENCOUNTER — OFFICE VISIT (OUTPATIENT)
Dept: PAIN MEDICINE | Facility: CLINIC | Age: 72
End: 2022-11-21
Payer: MEDICARE

## 2022-11-21 ENCOUNTER — PATIENT MESSAGE (OUTPATIENT)
Dept: PAIN MEDICINE | Facility: OTHER | Age: 72
End: 2022-11-21
Payer: MEDICARE

## 2022-11-21 VITALS
HEART RATE: 76 BPM | DIASTOLIC BLOOD PRESSURE: 83 MMHG | BODY MASS INDEX: 24.54 KG/M2 | HEIGHT: 60 IN | RESPIRATION RATE: 18 BRPM | SYSTOLIC BLOOD PRESSURE: 145 MMHG | WEIGHT: 125 LBS

## 2022-11-21 DIAGNOSIS — G89.4 CHRONIC PAIN SYNDROME: ICD-10-CM

## 2022-11-21 DIAGNOSIS — Z98.890 S/P LUMBAR LAMINECTOMY: Primary | ICD-10-CM

## 2022-11-21 DIAGNOSIS — M54.6 PAIN IN THORACIC SPINE: ICD-10-CM

## 2022-11-21 DIAGNOSIS — M96.1 POST LAMINECTOMY SYNDROME: ICD-10-CM

## 2022-11-21 PROCEDURE — 99999 PR PBB SHADOW E&M-EST. PATIENT-LVL IV: CPT | Mod: PBBFAC,,, | Performed by: ANESTHESIOLOGY

## 2022-11-21 PROCEDURE — 99214 PR OFFICE/OUTPT VISIT, EST, LEVL IV, 30-39 MIN: ICD-10-PCS | Mod: S$PBB,GC,, | Performed by: ANESTHESIOLOGY

## 2022-11-21 PROCEDURE — 99214 OFFICE O/P EST MOD 30 MIN: CPT | Mod: PBBFAC | Performed by: ANESTHESIOLOGY

## 2022-11-21 PROCEDURE — 99999 PR PBB SHADOW E&M-EST. PATIENT-LVL IV: ICD-10-PCS | Mod: PBBFAC,,, | Performed by: ANESTHESIOLOGY

## 2022-11-21 PROCEDURE — 99214 OFFICE O/P EST MOD 30 MIN: CPT | Mod: S$PBB,GC,, | Performed by: ANESTHESIOLOGY

## 2022-11-21 NOTE — PROGRESS NOTES
Chronic Pain - New Consult    Referring Physician: No ref. provider found    Chief Complaint:   Chief Complaint   Patient presents with    Leg Pain     B/l    Foot Pain     B/l          SUBJECTIVE:    Florentino Bowman presents to the clinic for the evaluation of low back and Right thigh pain. Patient here by referral to discuss SCS. Hx of L4/5 laminectomy x2 (2017 and 2018). The pain started in 2015 with development of radicular symptoms in 2017 ago following no specific inciting event, however, she does attribute the back pain to lifting. Patient followed by neurosurgery and pain management since 2017. Symptoms have been worsening.The pain is located in the low lumbar spine area and radiates to the right leg and anterior thigh in L5 distribution. Patient also endorses bilateral calf and foot pain (L >R), w/ possible neuroma in Left great toe that is being followed by podiatry (procedure planned for January 2023). The pain is described as aching, sharp, stabbing, and tingling and is rated as 1/10. The pain is rated with a score of  1/10 on the BEST day and a score of 9/10 on the WORST day.  Symptoms interfere with daily activity and work. The pain is exacerbated by Walking, Night Time, Lifting, and Getting out of bed/chair.  The pain is mitigated by ice, laying down, medications, physical therapy, rest, and sitting. The patient reports 8 hours of uninterrupted sleep per night.    Patient denies night fever/night sweats, urinary incontinence, bowel incontinence, and significant weight loss. Endorses weakness in bilateral quads and numbness in feet (L>R)    Physical Therapy/Home Exercise: yes      Pain Disability Index Review:  Last 3 PDI Scores 11/21/2022 2/13/2019 1/8/2019   Pain Disability Index (PDI) 35 18 39       Pain Medications:    - Alprazolam (XANAX) 0.5 mg   - Duloxetine (CYMBALTA) 30mg BID  - Pregabalin (LYRICA) 100mg BID  - Percocet 5-325mg PRN (patient reports taking half at a time, maybe one per day)  -  Zolpidem (AMBIEN) 10mg QHS  - Ibuprofen PRN    Therapies:  PT: actively in PT (started 11/2022)  Acupuncture: minimal relief  Dry needling: minimal relief  Massage: some relief  TENS: Helpful  Chiropractor: No     report:  Reviewed and consistent with medication use as prescribed.    Pain Procedures:     9/30/2022 - Bilateral L5 TFESI - Tony Castle M.D.  8/12/2022 - Caudal MICHAEL -  Tony Castle M.D.   2/19/2021 - Bilateral L5/S1 TFESI - Sixto Moya III, MD   6/5/2019 - Right L4/5 TFESI - Sixto Moya III, MD   1/16/2019 - RIGHT L4/5 AND L5/S1 MEDIAL BRANCH NERVE BLOCK - Sixto Moya III, MD  6/15/2018 - Right L4/5 TFESI - Sixto Moya III, MD  5/30/2017 - Right L4/5 TFESI - Sixto Moya III, MD    Imaging:     EXAMINATION:  MRI LUMBAR SPINE WITHOUT CONTRAST (5/13/22)     CLINICAL HISTORY:  Low back pain, trauma; Dorsalgia, unspecified     TECHNIQUE:  Multiplanar, multisequence MR images were acquired from the thoracolumbar junction to the sacrum without the administration of contrast.     FINDINGS:  The lumbar spinal alignment is significant for minimal anterolisthesis of L3 in relation to L4.  The vertebral body heights are satisfactorily preserved.  There is loss of disc space height with degenerative endplate change and disc desiccation identified from L3-4 through L5-S1.  The cord ends at L1.  The terminal cord, conus, and cauda equina have a normal appearance.  There is no intradural abnormality.  There is no evidence of an acute marrow replacement process such as tumor or infection.  There is no fracture.  The visualized surrounding soft tissues and vascular structures are unremarkable.     L1-2: Unremarkable.     L2-3: Facet hypertrophy.     L3-4: Facet hypertrophy with a disc bulge which creates mild left-sided neural foraminal stenosis and mild central canal stenosis.     L4-5: Facet hypertrophy with a disc bulge which creates mild bilateral  neural foraminal stenosis.     L5-S1: Facet hypertrophy with a disc bulge which creates mild bilateral neural foraminal stenosis.     Impression:     Multilevel degenerative change as outlined above.    Past Medical History:   Diagnosis Date    Arthritis     Carotid stenosis, asymptomatic     Headache(784.0)     Hypothyroid     Movement disorder     Neuropathy      Past Surgical History:   Procedure Laterality Date    BREAST BIOPSY Right     CAUDAL EPIDURAL STEROID INJECTION N/A 2022    Procedure: INJECTION, STEROID, SPINE, EPIDURAL, CAUDAL;  Surgeon: Tony Castle MD;  Location: [facility] OR;  Service: Pain Management;  Laterality: N/A;     SECTION      x2    COLONOSCOPY N/A 2017    Procedure: COLONOSCOPY Miralax;  Surgeon: Quentin Mercado Jr., MD;  Location: [facility] ENDO;  Service: Endoscopy;  Laterality: N/A;    COLONOSCOPY N/A 2021    Procedure: COLONOSCOPY;  Surgeon: Andrew Parekh MD;  Location: [facility];  Service: Endoscopy;  Laterality: N/A;    ESOPHAGOGASTRODUODENOSCOPY N/A 2021    Procedure: EGD (ESOPHAGOGASTRODUODENOSCOPY);  Surgeon: Andrew Parekh MD;  Location: [facility];  Service: Endoscopy;  Laterality: N/A;    HYSTERECTOMY      INJECTION OF ANESTHETIC AGENT AROUND MEDIAL BRANCH NERVES INNERVATING LUMBAR FACET JOINT Right 2019    Procedure: BLOCK, NERVE, FACET JOINT, LUMBAR, MEDIAL BRANCH ;  Surgeon: Sixto Moya III, MD;  Location: [facility] OR;  Service: Pain Management;  Laterality: Right;  - L4/5 & L5/S1  PATIENT NEEDS TO BE THE FIRST PATIENT OF THE DAY, PER DR. MOYA!!!    LAPAROSCOPIC HYSTERECTOMY  2010    supracervical/BSO    OOPHORECTOMY      SPINE SURGERY      TONSILLECTOMY      TRANSFORAMINAL EPIDURAL INJECTION OF STEROID Right 2019    Procedure: INJECTION, STEROID, EPIDURAL, TRANSFORAMINAL APPROACH;  Surgeon: Sixto Moya III, MD;  Location: [facility] OR;  Service: Pain Management;  Laterality: Right;  -right TFESI L4/5     TRANSFORAMINAL EPIDURAL INJECTION OF STEROID Bilateral 2/19/2021    Procedure: INJECTION, STEROID, EPIDURAL, TRANSFORAMINAL APPROACH;  Surgeon: Sixto Moya III, MD;  Location: [facility] OR;  Service: Pain Management;  Laterality: Bilateral;  L5    TRANSFORAMINAL EPIDURAL INJECTION OF STEROID Bilateral 9/30/2022    Procedure: INJECTION, STEROID, EPIDURAL, TRANSFORAMINAL APPROACH;  Surgeon: Tony Castle MD;  Location: [facility] OR;  Service: Pain Management;  Laterality: Bilateral;     Social History     Socioeconomic History    Marital status:    Occupational History    Occupation: retired    Tobacco Use    Smoking status: Never    Smokeless tobacco: Never   Substance and Sexual Activity    Alcohol use: Yes     Comment: social    Drug use: Never    Sexual activity: Not Currently     Partners: Male   Social History Narrative    , 2 adult children and exercises regularly-rides bike on the [place],     Social Determinants of Health     Financial Resource Strain: Unknown    Difficulty of Paying Living Expenses: Patient refused   Food Insecurity: Unknown    Worried About Running Out of Food in the Last Year: Patient refused    Ran Out of Food in the Last Year: Patient refused   Transportation Needs: No Transportation Needs    Lack of Transportation (Medical): No    Lack of Transportation (Non-Medical): No   Physical Activity: Insufficiently Active    Days of Exercise per Week: 2 days    Minutes of Exercise per Session: 40 min   Stress: Stress Concern Present    Feeling of Stress : Rather much   Social Connections: Unknown    Frequency of Communication with Friends and Family: More than three times a week    Frequency of Social Gatherings with Friends and Family: Twice a week    Active Member of Clubs or Organizations: Yes    Attends Club or Organization Meetings: Patient refused    Marital Status:    Housing Stability: Unknown    Unable to Pay for Housing in the Last Year: No     Unstable Housing in the Last Year: No     Family History   Problem Relation Age of Onset    Dementia Mother     Hypertension Mother     Heart disease Mother     Cancer Father         colon    Colon cancer Father     Cancer Sister         breast    Breast cancer Sister     Heart disease Sister     No Known Problems Daughter     No Known Problems Son     No Known Problems Sister     Parkinsonism Maternal Uncle     Stroke Maternal Grandmother     Aneurysm Neg Hx     Clotting disorder Neg Hx     Diabetes Neg Hx     Fainting Neg Hx     Hyperlipidemia Neg Hx     Kidney disease Neg Hx     Liver disease Neg Hx     Migraines Neg Hx     Neuropathy Neg Hx     Seizures Neg Hx     Tremor Neg Hx        Review of patient's allergies indicates:  No Known Allergies    Current Outpatient Medications   Medication Sig    ALPRAZolam (XANAX) 0.5 MG tablet Take 1 tablet (0.5 mg total) by mouth nightly as needed.    atorvastatin (LIPITOR) 40 MG tablet Take 1 tablet (40 mg total) by mouth once daily.    carbidopa-levodopa  mg (SINEMET)  mg per tablet Take 0.5 tablets by mouth 3 (three) times daily.    docosahexaenoic acid/epa (EPA-DHA ORAL) Take 1 tablet by mouth once daily at 6am.    DULoxetine (CYMBALTA) 30 MG capsule Take 1 capsule (30 mg total) by mouth 2 (two) times daily.    estradioL (ESTRACE) 1 MG tablet Take 1 tablet (1 mg total) by mouth every evening.    levothyroxine (SYNTHROID) 125 MCG tablet Take 1 tablet (125 mcg total) by mouth once daily. For thyroid    omega-3 fatty acids/fish oil (FISH OIL-OMEGA-3 FATTY ACIDS) 300-1,000 mg capsule Take by mouth once daily.    PERCOCET 5-325 mg per tablet Take 0.5 tablets by mouth 2 (two) times daily as needed.    pregabalin (LYRICA) 100 MG capsule Take 100 mg by mouth 2 (two) times daily.    sumatriptan (IMITREX) 100 MG tablet TAKE ONE TABLET BY MOUTH AS NEEDED FOR HEADACHE REPEAT IN 2 HOURS IF NEEDED    sumatriptan (IMITREX) 100 MG tablet One po at onset of migraine. May  repeat after 2 hours. No more than 200 mg in 24 hours    tumeric-ging-olive-oreg-capryl 100 mg-150 mg- 50 mg-150 mg Cap Take by mouth.    zolpidem (AMBIEN) 10 mg Tab TAKE ONE TABLET BY MOUTH EVERY EVENING     No current facility-administered medications for this visit.     Facility-Administered Medications Ordered in Other Visits   Medication    0.9%  NaCl infusion    0.9%  NaCl infusion    sodium chloride 0.9% flush 10 mL    sodium chloride 0.9% flush 10 mL       REVIEW OF SYSTEMS:    GENERAL:  No weight loss, malaise or fevers.  HEENT:  Negative for frequent or significant headaches.  NECK:  Negative for lumps, goiter, pain and significant neck swelling.  RESPIRATORY:  Negative for cough, wheezing or shortness of breath.  CARDIOVASCULAR:  Negative for chest pain, leg swelling or palpitations.  GI:  Negative for abdominal discomfort, blood in stools or black stools or change in bowel habits.  MUSCULOSKELETAL:  See HPI.  SKIN:  Negative for lesions, rash, and itching.  PSYCH:  Negative for sleep disturbance, mood disorder and recent psychosocial stressors.  HEMATOLOGY/LYMPHOLOGY:  Negative for prolonged bleeding, bruising easily or swollen nodes.  NEURO:   No history of headaches, syncope, paralysis, seizures or tremors.  All other reviewed and negative other than HPI.    OBJECTIVE:    BP (!) 145/83   Pulse 76   Resp 18   Ht 5' (1.524 m)   Wt 56.7 kg (125 lb)   LMP 01/01/2001   BMI 24.41 kg/m²     PHYSICAL EXAMINATION:    General appearance: Well appearing, in no acute distress, alert and oriented x3.  Psych:  Mood and affect appropriate.  Skin: Skin color, texture, turgor normal, no rashes or lesions, in both upper and lower body.  Head/face:  Normocephalic, atraumatic. No palpable lymph nodes.  Neck: No pain to palpation over the cervical paraspinous muscles. Spurling Negative. No pain with neck flexion, extension, or lateral flexion.   Cor: RRR  Pulm: CTA  GI:  Soft and non-tender.  Back: Straight leg  raising in the sitting and supine positions is negative to radicular pain. No pain to palpation over the spine or costovertebral angles. Normal range of motion without pain reproduction.  Extremities: Peripheral joint ROM is full and pain free without obvious instability or laxity in all four extremities. No deformities, edema, or skin discoloration. Good capillary refill.  Musculoskeletal: Shoulder, hip, sacroiliac and knee provocative maneuvers are negative. Bilateral upper and lower extremity strength is normal and symmetric.  No atrophy or tone abnormalities are noted.  Neuro: Bilateral upper and lower extremity coordination and muscle stretch reflexes are physiologic and symmetric.  Plantar response are downgoing. No loss of sensation is noted.  Gait: normal.    ASSESSMENT: 72 y.o. year old female with low back and right leg pain, consistent with post laminectomy syndrome     1. S/P lumbar laminectomy  MRI Thoracic Spine Without Contrast    Ambulatory referral/consult to Psychology      2. Post laminectomy syndrome  MRI Thoracic Spine Without Contrast    Ambulatory referral/consult to Psychology      3. Chronic pain syndrome  MRI Thoracic Spine Without Contrast    Ambulatory referral/consult to Psychology      4. Pain in thoracic spine  MRI Thoracic Spine Without Contrast            PLAN:     - Schedule for MRI thoracic spine  - Schedule for SCS psych evaluation  - Pending results of evaluation and imaging, will proceed with SCS trial  - RTC 4 weeks  - Counseled patient regarding the importance of activity modification, constant sleeping habits, and physical therapy.    The above plan and management options were discussed at length with patient. Patient is in agreement with the above and verbalized understanding. It will be communicated with the referring physician via electronic record, fax, or mail.    Catalino Smart MD  11/21/2022    I have reviewed and concur with the resident's history, physical,  assessment, and plan.  I have personally interviewed and examined the patient at bedside.  See below addendum for my evaluation and additional findings.    Darrian Duran MD

## 2022-11-30 ENCOUNTER — EXTERNAL CHRONIC CARE MANAGEMENT (OUTPATIENT)
Dept: PRIMARY CARE CLINIC | Facility: CLINIC | Age: 72
End: 2022-11-30
Payer: MEDICARE

## 2022-11-30 PROCEDURE — 99490 PR CHRONIC CARE MGMT, 1ST 20 MIN: ICD-10-PCS | Mod: S$GLB,,, | Performed by: FAMILY MEDICINE

## 2022-11-30 PROCEDURE — 99490 CHRNC CARE MGMT STAFF 1ST 20: CPT | Mod: S$GLB,,, | Performed by: FAMILY MEDICINE

## 2022-12-02 ENCOUNTER — TELEPHONE (OUTPATIENT)
Dept: CARDIOLOGY | Facility: CLINIC | Age: 72
End: 2022-12-02

## 2022-12-05 ENCOUNTER — PATIENT MESSAGE (OUTPATIENT)
Dept: FAMILY MEDICINE | Facility: CLINIC | Age: 72
End: 2022-12-05
Payer: MEDICARE

## 2022-12-06 ENCOUNTER — HOSPITAL ENCOUNTER (OUTPATIENT)
Dept: RADIOLOGY | Facility: HOSPITAL | Age: 72
Discharge: HOME | End: 2022-12-06
Attending: ANESTHESIOLOGY
Payer: MEDICARE

## 2022-12-06 ENCOUNTER — PATIENT MESSAGE (OUTPATIENT)
Dept: PAIN MEDICINE | Facility: CLINIC | Age: 72
End: 2022-12-06
Payer: MEDICARE

## 2022-12-06 ENCOUNTER — PATIENT MESSAGE (OUTPATIENT)
Dept: PODIATRY | Facility: CLINIC | Age: 72
End: 2022-12-06
Payer: MEDICARE

## 2022-12-06 ENCOUNTER — PATIENT MESSAGE (OUTPATIENT)
Dept: NEUROLOGY | Facility: CLINIC | Age: 72
End: 2022-12-06
Payer: MEDICARE

## 2022-12-06 DIAGNOSIS — M96.1 POST LAMINECTOMY SYNDROME: ICD-10-CM

## 2022-12-06 DIAGNOSIS — M54.6 PAIN IN THORACIC SPINE: ICD-10-CM

## 2022-12-06 DIAGNOSIS — G89.4 CHRONIC PAIN SYNDROME: ICD-10-CM

## 2022-12-06 DIAGNOSIS — Z98.890 S/P LUMBAR LAMINECTOMY: ICD-10-CM

## 2022-12-06 PROCEDURE — 72146 MRI CHEST SPINE W/O DYE: CPT | Mod: 26,,, | Performed by: INTERNAL MEDICINE

## 2022-12-06 PROCEDURE — 72146 MRI THORACIC SPINE WITHOUT CONTRAST: ICD-10-PCS | Mod: 26,,, | Performed by: INTERNAL MEDICINE

## 2022-12-06 PROCEDURE — 72146 MRI CHEST SPINE W/O DYE: CPT | Mod: TC

## 2022-12-06 RX ORDER — ZOLPIDEM TARTRATE 10 MG/1
10 TABLET ORAL NIGHTLY
Qty: 90 TABLET | Refills: 0 | Status: SHIPPED | OUTPATIENT
Start: 2022-12-06 | End: 2023-02-16 | Stop reason: SDUPTHER

## 2022-12-06 NOTE — TELEPHONE ENCOUNTER
No new care gaps identified.  [facility] Embedded Care Gaps. Reference number: [id number]. 12/06/2022   9:39:30 AM CST

## 2022-12-08 ENCOUNTER — TELEPHONE (OUTPATIENT)
Dept: PAIN MEDICINE | Facility: CLINIC | Age: 72
End: 2022-12-08
Payer: MEDICARE

## 2022-12-08 NOTE — TELEPHONE ENCOUNTER
----- Message from Darrian Duran MD sent at 12/7/2022  3:50 PM CST -----  Regarding: RE: left foot pain  Did we get any answer from this patient on her new leg pain? We can set up an appointment for evaluation or order lower back MRI.      ----- Message -----  From: Cara Chairez MA  Sent: 12/6/2022   4:07 PM CST  To: Darrian Duran MD  Subject: left foot pain                                   Patient had MRI done on today.

## 2022-12-31 ENCOUNTER — EXTERNAL CHRONIC CARE MANAGEMENT (OUTPATIENT)
Dept: PRIMARY CARE CLINIC | Facility: CLINIC | Age: 72
End: 2022-12-31
Payer: MEDICARE

## 2022-12-31 PROCEDURE — 99490 CHRNC CARE MGMT STAFF 1ST 20: CPT | Mod: S$GLB,,, | Performed by: FAMILY MEDICINE

## 2022-12-31 PROCEDURE — 99490 PR CHRONIC CARE MGMT, 1ST 20 MIN: ICD-10-PCS | Mod: S$GLB,,, | Performed by: FAMILY MEDICINE

## 2023-01-12 ENCOUNTER — OFFICE VISIT (OUTPATIENT)
Dept: PODIATRY | Facility: CLINIC | Age: 73
End: 2023-01-12
Payer: MEDICARE

## 2023-01-12 VITALS
SYSTOLIC BLOOD PRESSURE: 104 MMHG | WEIGHT: 121.25 LBS | HEART RATE: 79 BPM | HEIGHT: 60 IN | RESPIRATION RATE: 18 BRPM | DIASTOLIC BLOOD PRESSURE: 66 MMHG | BODY MASS INDEX: 23.81 KG/M2

## 2023-01-12 DIAGNOSIS — M77.52 BURSITIS OF INTERMETATARSAL BURSA OF LEFT FOOT: ICD-10-CM

## 2023-01-12 DIAGNOSIS — M54.16 LUMBAR RADICULOPATHY: ICD-10-CM

## 2023-01-12 DIAGNOSIS — G57.62 MORTON'S NEUROMA OF LEFT FOOT: Primary | ICD-10-CM

## 2023-01-12 DIAGNOSIS — M21.862 ACQUIRED POSTERIOR EQUINUS OF LEFT LOWER EXTREMITY: ICD-10-CM

## 2023-01-12 PROCEDURE — 99999 PR PBB SHADOW E&M-EST. PATIENT-LVL IV: ICD-10-PCS | Mod: PBBFAC,,, | Performed by: PODIATRIST

## 2023-01-12 PROCEDURE — 99214 OFFICE O/P EST MOD 30 MIN: CPT | Mod: PBBFAC,PO | Performed by: PODIATRIST

## 2023-01-12 PROCEDURE — 99213 OFFICE O/P EST LOW 20 MIN: CPT | Mod: S$PBB,,, | Performed by: PODIATRIST

## 2023-01-12 PROCEDURE — 99999 PR PBB SHADOW E&M-EST. PATIENT-LVL IV: CPT | Mod: PBBFAC,,, | Performed by: PODIATRIST

## 2023-01-12 PROCEDURE — 99213 PR OFFICE/OUTPT VISIT, EST, LEVL III, 20-29 MIN: ICD-10-PCS | Mod: S$PBB,,, | Performed by: PODIATRIST

## 2023-01-12 NOTE — PROGRESS NOTES
[facility]  [id number] [place] [address], [address]  [facility] [place] [address]  Dept: [phone]  Dept Fax: [phone]    Donnie Porter Jr., DPBYRON     Assessment:   MDM    Coding  1. De Jesus's neuroma of left foot  EKG 12-lead    X-Ray Chest PA And Lateral    Comprehensive Metabolic Panel    Prealbumin    CBC Auto Differential      2. Bursitis of intermetatarsal bursa of left foot  EKG 12-lead    X-Ray Chest PA And Lateral    Comprehensive Metabolic Panel    Prealbumin    CBC Auto Differential      3. Acquired posterior equinus of left lower extremity  EKG 12-lead    X-Ray Chest PA And Lateral    Comprehensive Metabolic Panel    Prealbumin    CBC Auto Differential      4. Lumbar radiculopathy  EKG 12-lead    X-Ray Chest PA And Lateral    Comprehensive Metabolic Panel    Prealbumin    CBC Auto Differential          Plan:     Colby Good was seen today for foot pain.    Diagnoses and all orders for this visit:    De Jesus's neuroma of left foot  -     EKG 12-lead; Future  -     X-Ray Chest PA And Lateral; Future  -     Comprehensive Metabolic Panel; Future  -     Prealbumin; Future  -     CBC Auto Differential; Future    Bursitis of intermetatarsal bursa of left foot  -     EKG 12-lead; Future  -     X-Ray Chest PA And Lateral; Future  -     Comprehensive Metabolic Panel; Future  -     Prealbumin; Future  -     CBC Auto Differential; Future    Acquired posterior equinus of left lower extremity  -     EKG 12-lead; Future  -     X-Ray Chest PA And Lateral; Future  -     Comprehensive Metabolic Panel; Future  -     Prealbumin; Future  -     CBC Auto Differential; Future    Lumbar radiculopathy  -     EKG 12-lead; Future  -     X-Ray Chest PA And Lateral; Future  -     Comprehensive Metabolic Panel; Future  -     Prealbumin; Future  -     CBC Auto Differential; Future        -pt seen, evaluated, and managed  -dx discussed in detail. All questions/concerns addressed  -all tx options discussed. All  alternatives, risks, benefits of all txs discussed  -the patient was educated about the diagnosis  -We discussed conservative care options possible including but not limited to shoe wear and/or padding, bracing/strapping, at home ROM, formal PT, medical therapy, injection therapy  - The utilization of NSAIDs can be considered but their benefit has to be tempered against the risk of GI/ concerns  - A steroid injection can be undertaken.  We did discuss the potential mechanism of action of this shot.  Understanding that multiple injections at the same anatomic site do have deleterious effects on the soft tissue.  Generic risks include: steroid flare (advised to ice if necessary), skin hypo-pgimentation (which can be permanent and unsightly), elevation of blood sugar, subcutaneous atrophy (can be permanent) and infection.   -would not rec repeat steroid inj given she has failed 3  -XR/imaging reviewed by me: agree with read  -MRI reviewed  -clinically pt does have some radicular symptoms in addition to mild neuroma findings  -cont icing/stretching regimen  -offloading pads - cont  -given failure of conservative measures, we discussed surgical intervention  -pt would benefit from operative intervention: we discussed the following procedures: L gastrocnemius recession, endoscopic decompression of interdigital nerve 3rd interspace, harvest and application of bone marrow aspirate and indicated procedures  -we discussed approx postop course  -would be out pt, elective surgery  -would be GA+ pop saph block, supine position  -would need PCP clearance before proceeding  -labs and XR on way out  -potential DOS: TBD  -CR already ordered  -Pt to think over options and will call    -rxs dispensed: none  -referrals: PT  -WB: wbat      Follow up if symptoms worsen or fail to improve.    Subjective:      Patient ID: Florentino Bowman is a 72 y.o. female.    Chief Complaint:   Chief Complaint   Patient presents with    Foot Pain      Left        CC - foot pain: patient presents to the podiatry clinic  with complaint of  left foot pain. Precipitating event: unk, but She has a history of 2 previous back surgeries.  She states her left foot has continued to be painfully numb since a few days after the 2nd back surgery.  She continues with low back pain which radiates down the left leg.  Symptoms have been progressively worsening and severe at times.   Current symptoms include: ability to bear weight, but with some pain, feels like stepping on rock in ball of foot, also burning/numbness/tingling down back of leg, heel, and bottom of foot. Symptoms have gradually worsened. Evaluation to date:  as below . Treatment to date:  as below . Patients rates pain 7/10 on pain scale.  Pt is here as 2nd opinion ponch bone&joint. Failed 3 injs. Obtained emg. Unsure if related to back, PD, or all 3 dx.    23:  Hx as above. Havnt seen since 11/10/22. F/u neuroma on MRI and pain resistant to conservative measures. Pt interested in surgery.      Last Podiatry Enc: Visit date not found  Last Enc w/ Me: Visit date not found    Outside reports reviewed: historical medical records.  Family hx: as below  Past Medical History:   Diagnosis Date    Arthritis     Carotid stenosis, asymptomatic     Headache(784.0)     Hypothyroid     Movement disorder     Neuropathy      Past Surgical History:   Procedure Laterality Date    BREAST BIOPSY Right     CAUDAL EPIDURAL STEROID INJECTION N/A 2022    Procedure: INJECTION, STEROID, SPINE, EPIDURAL, CAUDAL;  Surgeon: Tony Castle MD;  Location: [facility] OR;  Service: Pain Management;  Laterality: N/A;     SECTION      x2    COLONOSCOPY N/A 2017    Procedure: COLONOSCOPY Miralax;  Surgeon: Quentin Mercado Jr., MD;  Location: [facility] ENDO;  Service: Endoscopy;  Laterality: N/A;    COLONOSCOPY N/A 2021    Procedure: COLONOSCOPY;  Surgeon: Andrew Parekh MD;  Location: [facility] ENDO;  Service: Endoscopy;   Laterality: N/A;    ESOPHAGOGASTRODUODENOSCOPY N/A 2/11/2021    Procedure: EGD (ESOPHAGOGASTRODUODENOSCOPY);  Surgeon: Andrew Parekh MD;  Location: [facility];  Service: Endoscopy;  Laterality: N/A;    HYSTERECTOMY      INJECTION OF ANESTHETIC AGENT AROUND MEDIAL BRANCH NERVES INNERVATING LUMBAR FACET JOINT Right 1/16/2019    Procedure: BLOCK, NERVE, FACET JOINT, LUMBAR, MEDIAL BRANCH ;  Surgeon: Sixto Augustin III, MD;  Location: [facility] OR;  Service: Pain Management;  Laterality: Right;  - L4/5 & L5/S1  PATIENT NEEDS TO BE THE FIRST PATIENT OF THE DAY, PER DR. AUGUSTIN!!!    LAPAROSCOPIC HYSTERECTOMY  2010    supracervical/BSO    OOPHORECTOMY      SPINE SURGERY      TONSILLECTOMY      TRANSFORAMINAL EPIDURAL INJECTION OF STEROID Right 6/5/2019    Procedure: INJECTION, STEROID, EPIDURAL, TRANSFORAMINAL APPROACH;  Surgeon: Sixto Augustin III, MD;  Location: [facility] OR;  Service: Pain Management;  Laterality: Right;  -right TFESI L4/5    TRANSFORAMINAL EPIDURAL INJECTION OF STEROID Bilateral 2/19/2021    Procedure: INJECTION, STEROID, EPIDURAL, TRANSFORAMINAL APPROACH;  Surgeon: Sixto Augustin III, MD;  Location: [facility] OR;  Service: Pain Management;  Laterality: Bilateral;  L5    TRANSFORAMINAL EPIDURAL INJECTION OF STEROID Bilateral 9/30/2022    Procedure: INJECTION, STEROID, EPIDURAL, TRANSFORAMINAL APPROACH;  Surgeon: Tony Castle MD;  Location: [facility] OR;  Service: Pain Management;  Laterality: Bilateral;     Family History   Problem Relation Age of Onset    Dementia Mother     Hypertension Mother     Heart disease Mother     Cancer Father         colon    Colon cancer Father     Cancer Sister         breast    Breast cancer Sister     Heart disease Sister     No Known Problems Daughter     No Known Problems Son     No Known Problems Sister     Parkinsonism Maternal Uncle     Stroke Maternal Grandmother     Aneurysm Neg Hx     Clotting disorder Neg Hx     Diabetes Neg Hx     Fainting  Neg Hx     Hyperlipidemia Neg Hx     Kidney disease Neg Hx     Liver disease Neg Hx     Migraines Neg Hx     Neuropathy Neg Hx     Seizures Neg Hx     Tremor Neg Hx      Current Outpatient Medications   Medication Sig Dispense Refill    atorvastatin (LIPITOR) 40 MG tablet Take 1 tablet (40 mg total) by mouth once daily. 90 tablet 3    DULoxetine (CYMBALTA) 30 MG capsule Take 1 capsule (30 mg total) by mouth 2 (two) times daily. 180 capsule 1    estradioL (ESTRACE) 1 MG tablet Take 1 tablet (1 mg total) by mouth every evening. 90 tablet 0    levothyroxine (SYNTHROID) 125 MCG tablet Take 1 tablet (125 mcg total) by mouth once daily. For thyroid 90 tablet 3    omega-3 fatty acids/fish oil (FISH OIL-OMEGA-3 FATTY ACIDS) 300-1,000 mg capsule Take by mouth once daily.      PERCOCET 5-325 mg per tablet Take 0.5 tablets by mouth 2 (two) times daily as needed.      pregabalin (LYRICA) 100 MG capsule Take 100 mg by mouth 2 (two) times daily.      sumatriptan (IMITREX) 100 MG tablet TAKE ONE TABLET BY MOUTH AS NEEDED FOR HEADACHE REPEAT IN 2 HOURS IF NEEDED 27 tablet 1    sumatriptan (IMITREX) 100 MG tablet One po at onset of migraine. May repeat after 2 hours. No more than 200 mg in 24 hours 27 tablet 2    tumeric-ging-olive-oreg-capryl 100 mg-150 mg- 50 mg-150 mg Cap Take by mouth.      zolpidem (AMBIEN) 10 mg Tab Take 1 tablet (10 mg total) by mouth every evening. 90 tablet 0    zolpidem (AMBIEN) 10 mg Tab TAKE ONE TABLET BY MOUTH EVERY EVENING 90 tablet 0    ALPRAZolam (XANAX) 0.5 MG tablet TAKE ONE TABLET BY MOUTH AS NEEDED FOR SLEEP. 30 tablet 2    carbidopa-levodopa  mg (SINEMET)  mg per tablet Take 0.5 tablets by mouth 3 (three) times daily. 45 tablet 5    docosahexaenoic acid/epa (EPA-DHA ORAL) Take 1 tablet by mouth once daily at 6am.       No current facility-administered medications for this visit.     Facility-Administered Medications Ordered in Other Visits   Medication Dose Route Frequency Provider  Last Rate Last Admin    0.9%  NaCl infusion   Intravenous Continuous Tony Castle MD   New Bag at 09/30/22 0822    0.9%  NaCl infusion   Intravenous Continuous Tony Castle MD        sodium chloride 0.9% flush 10 mL  10 mL Intravenous PRN Tony Castle MD        sodium chloride 0.9% flush 10 mL  10 mL Intravenous PRN Tony Castle MD         Review of patient's allergies indicates:  No Known Allergies  Social History     Socioeconomic History    Marital status:    Occupational History    Occupation: retired    Tobacco Use    Smoking status: Never    Smokeless tobacco: Never   Substance and Sexual Activity    Alcohol use: Yes     Comment: social    Drug use: Never    Sexual activity: Not Currently     Partners: Male   Social History Narrative    , 2 adult children and exercises regularly-rides bike on the [place],     Social Determinants of Health     Financial Resource Strain: Unknown    Difficulty of Paying Living Expenses: Patient refused   Food Insecurity: Unknown    Worried About Running Out of Food in the Last Year: Patient refused    Ran Out of Food in the Last Year: Patient refused   Transportation Needs: No Transportation Needs    Lack of Transportation (Medical): No    Lack of Transportation (Non-Medical): No   Physical Activity: Insufficiently Active    Days of Exercise per Week: 2 days    Minutes of Exercise per Session: 40 min   Stress: Stress Concern Present    Feeling of Stress : Rather much   Social Connections: Unknown    Frequency of Communication with Friends and Family: More than three times a week    Frequency of Social Gatherings with Friends and Family: Twice a week    Active Member of Clubs or Organizations: Yes    Attends Club or Organization Meetings: Patient refused    Marital Status:    Housing Stability: Unknown    Unable to Pay for Housing in the Last Year: No    Unstable Housing in the Last Year: No       ROS    REVIEW OF  SYSTEMS: Negative as documented below as well as positive findings in bold.       Constitutional  Respiratory  Gastrointestinal  Skin   - Fever - Cough - Heartburn - Rash   - Chills - Spit blood - Nausea - Itching   - Weight Loss - Shortness of breath - Vomiting - Nail pain   - Malaise/Fatigue - Wheezing - Abdominal Pain  Wound/Ulcer   - Weight Gain   - Blood in Stool  Poor wound healing       - Diarrhea          Cardiovascular  Genitourinary  Neurological  HEENT   - Chest Pain - Dysuria - Burning Sensation of feet - Headache   - Palpitations - Hematuria - Tingling / Paresthesia - Congestion   - Pain at night in legs - Flank Pain - Dizziness - Sore Throat   - Cramping   - Tremor - Blurred Vision   - Leg Swelling   - Sensory Change - Double Vision   - Dizzy when standing   - Speech Change - Eye Redness       - Focal Weakness - Dry Eyes       - Loss of Consciousness          Endocrine  Musculoskeletal  Psychiatric   - Cold intolerance - Muscle Pain - Depression   - Heat intolerance - Neck Pain - Insomnia   - Anemia - Joint Pain - Memory Loss   -  Easy bruising, bleeding - Heel pain - Anxiety      Toe Pain        Leg/Ankle/Foot Pain         Objective:     /66 (BP Location: Left arm, Patient Position: Sitting, BP Method: Large (Automatic))   Pulse 79   Resp 18   Ht 5' (1.524 m)   Wt 55 kg (121 lb 4.1 oz)   LMP 01/01/2001   BMI 23.68 kg/m²   Vitals:    01/12/23 1416   BP: 104/66   Pulse: 79   Resp: 18   Weight: 55 kg (121 lb 4.1 oz)   Height: 5' (1.524 m)   PainSc: 0-No pain         Physical Exam    General Appearance:   Patient appears well developed, well nourished  Patient appears stated age    Psychiatric:   Patient is oriented to time, place, and person.  Patient has appropriate mood and affect    Neck:  Trachea Midline  No visible masses    Respiratory/Ears:  No distress or labored breathing.  Able to differentiate between normal talking voice and whisper.  Able to follow commands    Eyes:  Visual  Acuity intact  Lids and conjunctivae normal. No discoloration noted.    Foot Exam  Physical Exam  Ortho Exam  Ortho/SPM Exam  Foot/Ankle Musculoskeletal Exam    L LE exam con't:  V:  DP 2/4, PT 2/4   CRT< 3s to all digits tested   Tibial and popliteal lymph nodes are w/o abnormality   Edema: absent, varicosities: present    N:  Patient displays abnormal ankle reflexes (absent)   SILT in SP/DP/T/Veronique/Saph distributions    Ortho: +Motor EHL/FHL/TA/GA   Mild weakness of flexors noted   equinus deformity present  There is moderate pain with palpation of 3rd IS  There is is moderate pain at 3rd IS with lateral compression of metatarsal heads  Compartments soft/compressible. No pain on passive stretch of big toe. No calf  Pain.    Derm:  skin intact, skin warm and dry, skin without ulcers or lesions, skin without induration, nails normal,      Imaging / Labs:      X-Ray Foot Complete Left    Result Date: 10/26/2022  EXAMINATION: XR FOOT COMPLETE 3 VIEW LEFT CLINICAL HISTORY: .  Pain in left foot TECHNIQUE: AP, lateral and oblique views of the left foot were performed. COMPARISON: None FINDINGS: No acute osseous abnormality.  Joint spaces maintained without significant degenerative findings.  Enthesophyte changes of the distal Achilles tendon insertion noted.  Soft tissues otherwise unremarkable.     As above Electronically signed by: Pete Jones MD Date:    10/26/2022 Time:    09:15      MRI Foot (Forefoot) Left W W/O Contrast    Result Date: 11/6/2022  EXAMINATION: MRI FOOT (FOREFOOT) LEFT W W/O CONTRAST CLINICAL HISTORY: Foot pain, chronic, metatarsalgia;r/o neuroma 3rd IS. place marker in area of pain;  Lesion of plantar nerve, left lower limb TECHNIQUE: Routine MRI evaluation of the left forefoot performed with and without the use of 5 cc of Gadavist IV contrast. COMPARISON: Radiograph 10/26/2022. FINDINGS: BONES: No fracture.  No avascular necrosis.  No marrow infiltrative process. TENDONS: No tendinosis or  tenosynovitis. MUSCLES: There is ill-defined edema signal at the interossei and lumbrical muscles at the level of the 3rd intermetatarsal space.  No corresponding fatty degeneration.  Remaining visualized musculature demonstrates preserved bulk and signal intensity. JOINTS: No joint effusion.  No enhancing synovitis.  No osteochondral lesions.  Lisfranc ligament is intact. MISCELLANEOUS: There is a 1.5 x 0.5 cm lobulated area of increased STIR signal along the plantar aspect of the 3rd metatarsal shaft subjacent to the flexor digitorum brevis along the expected location of the neurovascular bundle.  No corresponding enhancement.  Plantar aponeurosis is normal.  Intermetatarsal bursae are normal.     1. Lobulated area of signal abnormality at the plantar aspect of the 3rd metatarsal shaft immediately subjacent to the flexor digitorum brevis that demonstrates ill-defined edema of the adjacent lumbrical and interossei muscles.  Findings are nonspecific but could represent sequela of a recent soft tissue contusion or reactive inflammation.  While a small neuroma is possible, this cannot be confirmed noting atypical location and lack of masslike enhancement. 2. No stress reaction or fracture.  No convincing evidence of intermetatarsal bursitis This report was flagged in Epic as abnormal. Electronically signed by: Sam Castro MD Date:    11/06/2022 Time:    08:56           Note: This was dictated using a computer transcription program. Although proofread, it may contain computer transcription errors and phonetic errors. Other human proofreading errors may also exist. Corrections may be performed at a later time. Please contact us for any clarification if needed.    Donnie Porter DPM  [facility]

## 2023-01-12 NOTE — PATIENT INSTRUCTIONS
What Are Neuromas of the Foot?  The ball of your foot is the bottom part just behind your toes. Bands of tissue (ligaments) connect the bones in the ball of your foot. Nerves run between the bones and underneath the ligaments. When a nerve becomes pinched, this causes it to swell and become painful due to the thickening of the tissue that surrounds the nerve. The painful, swollen nerve is called a neuroma (also called De Jesus's neuroma).     A neuroma most often occurs at the base of either the third and fourth toes or the second and third toes.   What causes a neuroma?  Wearing tight or high-heeled shoes can cause a neuroma. Shoes that are too narrow or too pointed squeeze the bones in the ball of the foot. Shoes with high heels put extra pressure on the ends of the bones. When the bones are squeezed together, they pinch the nerve that runs between them.  Symptoms  The most common symptom of a neuroma is pain in the ball of the foot between two toes. The pain may be dull or sharp. It may feel as if you have a stone in your shoe. You may also have tingling or numbness in one or both of the toes. Symptoms may occur after you have been walking or standing for a while. Taking off your shoes and rubbing the ball of your foot may decrease or relieve the pain.  Preventing future problems  To prevent a future neuroma, buy shoes with plenty of room across the ball of the foot and in the toes. This keeps the bones from being squeezed together. Wearing low-heeled shoes (less than 2 inches) also puts less pressure on the bones and nerves in the ball of the foot.   Date Last Reviewed: 9/10/2015  © 7220-0886 [employer]. [address] [place] [address], [place] [address]. All rights reserved. This information is not intended as a substitute for professional medical care. Always follow your healthcare professional's instructions.    De Jesus's Neuroma (Intermetatarsal Neuroma)        What Is a Neuroma?    A neuroma is a  thickening of nerve tissue that may develop in various parts of the body. The most common neuroma in the foot is a De Jesuss neuroma, which occurs between the third and fourth toes. It is sometimes referred to as an intermetatarsal neuroma. Intermetatarsal describes its location in the ball of the foot between the metatarsal bones. Neuromas may also occur in other locations in the foot.    De Jesus's NeuromaThe thickening of the nerve that defines a neuroma is the result of compression and irritation of the nerve. This compression creates enlargement of the nerve, eventually leading to permanent nerve damage.    Causes  Anything that causes compression or irritation of the nerve can lead to the development of a neuroma. One of the most common offenders is wearing shoes that have a tapered toe box or high-heeled shoes that cause the toes to be forced into the toe box. People with certain foot deformities--bunions, hammertoes, flatfeet or more flexible feet--are at higher risk for developing a neuroma. Other potential causes are activities that involve repetitive irritation to the ball of the foot, such as running or court sports. An injury or other type of trauma to the area may also lead to a neuroma.    Symptoms  If you have a De Jesuss neuroma, you may have one or more of these symptoms where the nerve damage is occurring:    Tingling, burning or numbness  Pain  A feeling that something is inside the ball of the foot  A feeling that there is something in the shoe or a sock is bunched up     The progression of a De Jesuss neuroma often follows this pattern:    The symptoms begin gradually. At first, they occur only occasionally when wearing narrow-toed shoes or performing certain aggravating activities.  The symptoms may go away temporarily by removing the shoe, massaging the foot or avoiding aggravating shoes or activities.  Over time, the symptoms progressively worsen and may persist for several days or weeks.  The  symptoms become more intense as the neuroma enlarges and the temporary changes in the nerve become permanent.     Diagnosis  To arrive at a diagnosis, the foot and ankle surgeon will obtain a thorough history of your symptoms and examine your foot. During the physical examination, the doctor attempts to reproduce your symptoms by manipulating your foot. Other tests or imaging studies may be performed.    The best time to see your foot and ankle surgeon is early in the development of symptoms. Early diagnosis of a De Jesuss neuroma greatly lessens the need for more invasive treatments and may help you avoid surgery.    Nonsurgical Treatment  In developing a treatment plan, your foot and ankle surgeon will first determine how long you have had the neuroma and will evaluate its stage of development. Treatment approaches vary according to the severity of the problem.    For mild to moderate neuromas, treatment options may include:    -Padding. Padding techniques provide support for the metatarsal arch, thereby lessening the pressure on the nerve and decreasing the compression when walking.  -Icing. Placing an icepack on the affected area helps reduce swelling.  -Orthotic devices. Custom orthotic devices provided by your foot and ankle surgeon provide the support needed to reduce pressure and compression on the nerve.  -Activity modifications. Activities that put repetitive pressure on the neuroma should be avoided until the condition improves.  -Shoe modifications. Wear shoes with a wide toe box and avoid narrow-toed shoes or shoes with high heels.  -Medications. Oral nonsteroidal anti-inflammatory drugs (NSAIDs), such as ibuprofen, may be recommended to reduce pain and inflammation.  -Injection therapy. Treatment may include injections of cortisone, local anesthetics or other agents.     When Is Surgery Needed?  Surgery may be considered in patients who have not responded adequately to nonsurgical treatments. Your foot  and ankle surgeon will determine the approach that is best for your condition. The length of the recovery period will vary depending on the procedure performed.    Regardless of whether you have undergone surgical or nonsurgical treatment, your surgeon will recommend long-term measures to help keep your symptoms from returning. These include appropriate footwear and modification of activities to reduce the repetitive pressure on the foot.        Recommended OTC orthotics:  -powerstep  -superfeet    Recommended shoegear:  -new balance  -ascics  -ivoryo  -boyer        Equinus          What Is Equinus?    Equinus is a condition in which the upward bending motion of the ankle joint is limited. Someone with equinus lacks the flexibility to bring the top of the foot toward the front of the leg. Equinus can occur in one or both feet. When it involves both feet, the limitation of motion is sometimes worse in one foot than in the other.    People with equinus develop ways to compensate for their limited ankle motion, and this often leads to other foot, leg or back problems. The most common methods of compensation are flattening of the arch or picking up the heel early when walking, placing increased pressure on the ball of the foot. Other patients compensate by toe walking, while a smaller number take steps by bending abnormally at the hip or knee.    Causes  There are several possible causes for the limited range of ankle motion. Often, it is due to tightness in the Achilles tendon or calf muscles (the soleus muscle and/or gastrocnemius muscle). In some patients, this tightness is congenital (present at birth), and sometimes it is an inherited trait. Other patients acquire the tightness from being in a cast, being on crutches or frequently wearing high-heeled shoes. In addition, diabetes can affect the fibers of the Achilles tendon and cause tightness. Sometimes equinus is related to a bone blocking the ankle motion. For  example, a fragment of a broken bone following an ankle injury, or bone block, can get in the way and restrict motion. Equinus may also result from one leg being shorter than the other. Less often, equinus is caused by spasms in the calf muscle. These spasms may be signs of an underlying neurologic disorder.      Foot Problems Related to Equinus  Depending on how a patient compensates for the inability to bend properly at the ankle, a variety of foot conditions can develop, including:    Plantar fasciitis (arch/heel pain)  Calf cramping  Tendonitis (inflammation in the Achilles tendon)  Metatarsalgia (pain and/or callusing on the ball of the foot)  Flatfoot  Arthritis of the midfoot (middle area of the foot)  Pressure sores on the ball of the foot or the arch  Bunions and hammertoes  Ankle pain  Shin splints     Diagnosis  Most patients with equinus are unaware they have this condition when they first visit the doctor. Instead, they come to the doctor seeking relief for foot problems associated with equinus.    To diagnose equinus, the foot and ankle surgeon will evaluate the ankle's range of motion when the knee is flexed (bent) as well as extended (straightened). This enables the surgeon to identify whether the tendon or muscle is tight and to assess whether bone is interfering with ankle motion. X-rays may also be ordered. In some cases, the foot and ankle surgeon may refer the patient for neurologic evaluation.    Nonsurgical Treatment  Treatment includes strategies aimed at relieving the symptoms and conditions associated with equinus. In addition, the patient is treated for the equinus itself through one or more of the following options:    Night splint. The foot may be placed in a splint at night to keep it in a position that helps reduce tightness of the calf muscle.  Heel lifts. Placing heel lifts inside the shoes or wearing shoes with a moderate heel takes stress off the Achilles tendon when walking and  may reduce symptoms.  Arch supports or orthotic devices. Custom orthotic devices that fit into the shoe are often prescribed to keep weight distributed properly and to help control muscle/tendon imbalance.  Physical therapy. To help remedy muscle tightness, exercises that stretch the calf muscle(s) are recommended.     When Is Surgery Needed?  In some cases, surgery may be needed to correct the cause of equinus if it is related to a tight tendon or a bone blocking the ankle motion. The foot and ankle surgeon will determine the type of procedure that is best suited to the individual patient.        Recommended OTC orthotics:  -powerstep  -superfeet    Recommended shoegear:  -new balance  -ascics  -mizuno  -boyer      Equinus          What Is Equinus?    Equinus is a condition in which the upward bending motion of the ankle joint is limited. Someone with equinus lacks the flexibility to bring the top of the foot toward the front of the leg. Equinus can occur in one or both feet. When it involves both feet, the limitation of motion is sometimes worse in one foot than in the other.    People with equinus develop ways to compensate for their limited ankle motion, and this often leads to other foot, leg or back problems. The most common methods of compensation are flattening of the arch or picking up the heel early when walking, placing increased pressure on the ball of the foot. Other patients compensate by toe walking, while a smaller number take steps by bending abnormally at the hip or knee.    Causes  There are several possible causes for the limited range of ankle motion. Often, it is due to tightness in the Achilles tendon or calf muscles (the soleus muscle and/or gastrocnemius muscle). In some patients, this tightness is congenital (present at birth), and sometimes it is an inherited trait. Other patients acquire the tightness from being in a cast, being on crutches or frequently wearing high-heeled shoes. In  addition, diabetes can affect the fibers of the Achilles tendon and cause tightness. Sometimes equinus is related to a bone blocking the ankle motion. For example, a fragment of a broken bone following an ankle injury, or bone block, can get in the way and restrict motion. Equinus may also result from one leg being shorter than the other. Less often, equinus is caused by spasms in the calf muscle. These spasms may be signs of an underlying neurologic disorder.      Foot Problems Related to Equinus  Depending on how a patient compensates for the inability to bend properly at the ankle, a variety of foot conditions can develop, including:    Plantar fasciitis (arch/heel pain)  Calf cramping  Tendonitis (inflammation in the Achilles tendon)  Metatarsalgia (pain and/or callusing on the ball of the foot)  Flatfoot  Arthritis of the midfoot (middle area of the foot)  Pressure sores on the ball of the foot or the arch  Bunions and hammertoes  Ankle pain  Shin splints     Diagnosis  Most patients with equinus are unaware they have this condition when they first visit the doctor. Instead, they come to the doctor seeking relief for foot problems associated with equinus.    To diagnose equinus, the foot and ankle surgeon will evaluate the ankle's range of motion when the knee is flexed (bent) as well as extended (straightened). This enables the surgeon to identify whether the tendon or muscle is tight and to assess whether bone is interfering with ankle motion. X-rays may also be ordered. In some cases, the foot and ankle surgeon may refer the patient for neurologic evaluation.    Nonsurgical Treatment  Treatment includes strategies aimed at relieving the symptoms and conditions associated with equinus. In addition, the patient is treated for the equinus itself through one or more of the following options:    Night splint. The foot may be placed in a splint at night to keep it in a position that helps reduce tightness of the  calf muscle.  Heel lifts. Placing heel lifts inside the shoes or wearing shoes with a moderate heel takes stress off the Achilles tendon when walking and may reduce symptoms.  Arch supports or orthotic devices. Custom orthotic devices that fit into the shoe are often prescribed to keep weight distributed properly and to help control muscle/tendon imbalance.  Physical therapy. To help remedy muscle tightness, exercises that stretch the calf muscle(s) are recommended.    When Is Surgery Needed?  In some cases, surgery may be needed to correct the cause of equinus if it is related to a tight tendon or a bone blocking the ankle motion. The foot and ankle surgeon will determine the type of procedure that is best suited to the individual patient.    What Is a Gastrocnemius Release?  The gastrocnemius (gastroc) and the soleus are two muscles that make up the calf. The gastroc is the larger and more superficial of the two muscles. The soleus is a deeper muscle within the lower leg. The gastroc tendon combines with the soleus tendon to form the Achilles tendon.  Tightness in the calf can limit how for the ankle can flex up. This may make it difficult to walk with the heel on the floor. Over time this can cause problems such as pain and deformity. Calf tightness may contribute to many foot problems, including heel pain, Achilles tendon pain, flatfoot deformity, toe pain, and bunions.  A gastrocnemius release lengthens the gastrocnemius tendon. This is done to increase the flexibility of the calf muscle, which can decrease pressure at the front of the foot, improve function, and decrease deformity.    Diagnosis  This surgery is done in patients with tightness of the gastroc that has not improved with stretching exercises. This procedure can be combined with other reconstructive procedures or be performed by itself.  Surgery can be avoided when appropriate range of motion and flexibility can be obtained with conservative  treatment (stretching). It should also be avoided if there are contractures of multiple tendons in the leg, and not just the gastroc.     Treatment  The surgery can be performed through several different incisions. Most commonly, a small incision is made on the inner side of the lower leg. Sometimes an incision directly in the back of the calf is used, or even an endoscopic incision, which is about ½ inch. Once the gastroc tendon is identified, it is  from the underlying muscle belly of the soleus, then cut straight across. Once the tendon is released, the ankle is flexed up and an increased range of motion is noted intra-operatively.     Recovery  For the first two weeks after surgery, the patient typically is immobilized in a splint or boot. It is important to keep the ankle in a proper position while the tendon is healing. A cramping feeling in the back of the calf is normal. Gentle range of motion and stretching begin once the ankle is removed from the splint/boot. Timing can vary depending upon what other procedures are done.     Risks and Complications  After a gastroc release, some patients experience nerve injury that results in irritation or numbness over the outside of the heel. This usually is temporary. In addition, some patients may notice a difference in the appearance of one calf compared to the other and temporary calf weakness.    FAQs  Why are my calf muscles tight?   Most frequently a tight calf muscle is an inherited problem that only causes problems later in life. Other reasons for calf tightness are nerve injuries, muscle problems, and other medical problems like stroke and diabetes. People can also get tight calf muscles after trauma to the leg, ankle, or foot.    Will a gastrocnemius lengthening affect my strength or ability to walk?  This procedure will cause some weakness but most patients will not notice it. Some patients may have a subtle limp, but this typically resolves within  six months of surgery.

## 2023-01-18 RX ORDER — ALPRAZOLAM 0.5 MG/1
TABLET ORAL
Qty: 30 TABLET | Refills: 2 | Status: SHIPPED | OUTPATIENT
Start: 2023-01-18 | End: 2023-02-16

## 2023-01-18 NOTE — TELEPHONE ENCOUNTER
No new care gaps identified.  [facility] Embedded Care Gaps. Reference number: [id number]. 1/18/2023   12:17:52 PM CST

## 2023-01-20 ENCOUNTER — DOCUMENTATION ONLY (OUTPATIENT)
Dept: PSYCHIATRY | Facility: CLINIC | Age: 73
End: 2023-01-20
Payer: MEDICARE

## 2023-01-20 ENCOUNTER — OFFICE VISIT (OUTPATIENT)
Dept: PSYCHIATRY | Facility: CLINIC | Age: 73
End: 2023-01-20
Payer: MEDICARE

## 2023-01-20 ENCOUNTER — PATIENT MESSAGE (OUTPATIENT)
Dept: PSYCHIATRY | Facility: CLINIC | Age: 73
End: 2023-01-20
Payer: MEDICARE

## 2023-01-20 DIAGNOSIS — Z98.890 S/P LUMBAR LAMINECTOMY: ICD-10-CM

## 2023-01-20 DIAGNOSIS — Z01.818 PREOPERATIVE EVALUATION TO RULE OUT SURGICAL CONTRAINDICATION: Primary | ICD-10-CM

## 2023-01-20 DIAGNOSIS — M96.1 POST LAMINECTOMY SYNDROME: ICD-10-CM

## 2023-01-20 DIAGNOSIS — G89.4 CHRONIC PAIN SYNDROME: ICD-10-CM

## 2023-01-20 DIAGNOSIS — F41.9 ANXIETY: ICD-10-CM

## 2023-01-20 DIAGNOSIS — F32.89 OTHER DEPRESSION: ICD-10-CM

## 2023-01-20 DIAGNOSIS — Z87.898 HISTORY OF INSOMNIA: ICD-10-CM

## 2023-01-20 PROCEDURE — 96130 PR PSYCHOLOGIC TEST EVAL SVCS, 1ST HR: ICD-10-PCS | Mod: 95,,, | Performed by: PSYCHOLOGIST

## 2023-01-20 PROCEDURE — 96131 PR PSYCHOLOGIC TEST EVAL SVCS, EA ADDTL HR: ICD-10-PCS | Mod: 95,,, | Performed by: PSYCHOLOGIST

## 2023-01-20 PROCEDURE — 96130 PSYCL TST EVAL PHYS/QHP 1ST: CPT | Mod: 95,,, | Performed by: PSYCHOLOGIST

## 2023-01-20 PROCEDURE — 96131 PSYCL TST EVAL PHYS/QHP EA: CPT | Mod: 95,,, | Performed by: PSYCHOLOGIST

## 2023-01-20 PROCEDURE — 96139 PR PSYCH/NEUROPSYCH TEST ADMIN/SCORING, BY TECH, 2+ TESTS, EA ADDTL 30 MIN: ICD-10-PCS | Mod: 95,,, | Performed by: PSYCHOLOGIST

## 2023-01-20 PROCEDURE — 96138 PR PSYCH/NEUROPSYCH TEST ADMIN/SCORING, BY TECH, 2+ TESTS, 1ST 30 MIN: ICD-10-PCS | Mod: 95,,, | Performed by: PSYCHOLOGIST

## 2023-01-20 PROCEDURE — 90791 PSYCH DIAGNOSTIC EVALUATION: CPT | Mod: 95,,, | Performed by: PSYCHOLOGIST

## 2023-01-20 PROCEDURE — 90791 PR PSYCHIATRIC DIAGNOSTIC EVALUATION: ICD-10-PCS | Mod: 95,,, | Performed by: PSYCHOLOGIST

## 2023-01-20 PROCEDURE — 96138 PSYCL/NRPSYC TECH 1ST: CPT | Mod: 95,,, | Performed by: PSYCHOLOGIST

## 2023-01-20 PROCEDURE — 96139 PSYCL/NRPSYC TST TECH EA: CPT | Mod: 95,,, | Performed by: PSYCHOLOGIST

## 2023-01-20 NOTE — PROGRESS NOTES
Based on this evaluation, Ms. Bowman may be considered a suitable candidate to proceed with SCS from a psychological perspective despite the presence of testing risks. There were no absolute psychological contraindications to surgery, and she is willing to seek resources and use strategies to help mitigate risks and improve outcomes. She has multiple protective factors that should help her during surgery and recovery, and she is highly motivated to improve pain and engagement in activities. She will be provided with resources but they are not required prior to surgery, and she was encouraged to reach out in the future if she needs additional resources.

## 2023-01-20 NOTE — PROGRESS NOTES
Virtual Visit  The patient location is: Home ([place])  The chief complaint leading to consultation is: Presurgical psychological evaluation    Visit type: audiovisual    Face to Face time with patient: 60 minutes of total time spent on the encounter, which includes face to face time and non-face to face time preparing to see the patient (eg, review of tests), Obtaining and/or reviewing separately obtained history, Documenting clinical information in the electronic or other health record, Independently interpreting results (not separately reported) and communicating results to the patient/family/caregiver, or Care coordination (not separately reported). Testing codes and times in report.    Each patient to whom he or she provides medical services by telemedicine is:  (1) informed of the relationship between the physician and patient and the respective role of any other health care provider with respect to management of the patient; and (2) notified that he or she may decline to receive medical services by telemedicine and may withdraw from such care at any time.    Notes: N/A      Psychiatry Initial Visit (PhD/LCSW)  Presurgical Psychological Evaluation  Psychological Intake    NAME: Florentino Bowman  MRN: [id number]  : 1950     Date: 2023  Site: [facility]   CPT Code: 68255  Site: [facility]  Clinical status of patient: Outpatient  Met with: Patient  Referred by: Darrian Duran M.D.    Chief complaint/reason for encounter: Psychological Evaluation prior to surgical implantation of a spinal cord stimulator (SCS) to address chronic pain.     Before this evaluation was initiated, the purposes and process of the assessment and the limits of confidentiality were discussed with the patient who expressed understanding of these issues and verbally consented to proceed with the evaluation.    History of present illness: Ms. Florentino Bowman is a 72-year-old female referred for Psychological Evaluation  prior to surgical implantation of a spinal cord stimulator (SCS) to address chronic pain. She has chronic pain in her lower back and right thigh. She also has pain in her left foot, and right and left calves. Her pain has been present since 2017 with no specific triggering incident (the triggering incident was small I don't think it caused everything, but I think it started this fighting my granddaughter to get in her car seat). She has tried physical therapy, medications, injections, and surgery without receiving sufficient relief. Her activities are greatly limited. She reports, It totally gets in the way of my life now. I turn down social invitations Saturday I was crying because I was in so much pain, and I had to call her at the last minute Instead of sitting here, I'd be out shopping or out doing something. I can't garden anymore, I can't ride my bike. [place] She has not been as active in the past year-and-a-half.    Ms. Bowman is now interested in a trial of SCS. She has reviewed the educational materials and is familiar with the procedures involved (There's a trial that they put in first. And it involves a small cut in the back and they insert the pain stimulator where you want the pain to be treated. You have a remote control system that controls it. That may be a week of the trial and after that, if it gave you relief they would put the permanent one in.). She understood risks of surgery including bleeding, infection, failure of surgery, misplaced hardware, migration of hardware, need for reoperation, etc. When asked about potential concerns regarding SCS, she indicated no significant concerns. Her expectations regarding SCS include reducing pain. She is motivated to move forward, engage in more activities, and be more involved. If SCS is not effective for her she noted she would not consider suicide as an option and would look forward to future treatment  options. Her friends will be available to help her during recovery after surgery.    When asked how pain affects her mood, Ms. Bowman reported, Sure. Because I can't do the things I want to do or I'm used to doing, I'm not the person I used to be. I'm not happy. So it kind of gets you depressed to just sit home and not do the things you want to do or need to do. Regarding mental health history, Ms. Bowman noted a history of non-clinical depression and anxiety that has been adequately managed with psychotropic medication over the last 10 years. She has never attended formal psychiatric treatment, but has been prescribed medication by her primary care physician. 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 She started experiencing anxiety after the loss of her  while simultaneously experiencing chronic pain and health concerns. Currently, Ms. Bowman reports mild symptoms of depression and anxiety that are mostly related to health concerns, pain limitations, and adjusting to life since her  passed away. She has good social support, adaptive coping, and engagement in recreation to help her manage stress.    Relevant Medical History: S/P lumbar laminectomy; Post laminectomy syndrome; Chronic pain syndrome; Pain in thoracic spine    Pain Scales:   Current level of pain: 7/10  Worst pain ratin/10  Best pain ratin/10    Current Health Behaviors:  Compliant with medical regimens and appointments: Yes  Prescription medication misuse: No  Exercise: Yes - 3x/week at the gym or at her house  Adequate sleep: Yes with medication  Adequate cognitive functioning: Yes    Current and Past Substance Use/Abuse:  Alcohol: She drinks a few glasses of wine once or twice per week; denied history of abuse or dependency.   Drugs: Denied current use; denied history of abuse or dependency.  Tobacco: None.   Caffeine: She drinks one cup of coffee  each morning.    Past Psychiatric History:  Previous diagnosis: Depression, Anxiety  Inpatient treatment: Denied.  Prior substance abuse treatment: Denied.  Outpatient treatment: Denied.  Suicide attempt: Denied.  Non-suicidal self-injury: Denied.    Family History:  Psychiatric illness: Her mother had Alzheimer's.   Substance abuse: Denied.  Suicide: Her father decided to end his life when having terminal colon cancer.    Trauma/Stressors History:   She reports that her father's death in  was unexpected and shocking. She found him after he shot himself while dealing with terminal cancer. She notes, This didn't affect my life I just shy away from talking about him.  She reports her 's death in  was unexpected and created big changes to her life.    Psychosocial History and Current Social Situation:    Ms. Everett was born in [place], [place] and raised in [place], [place] by biological mother and father along with two older sisters and one younger sister. She described her childhood as having pretty strict discipline. She denied childhood trauma, abuse, and neglect. She did good in school and earned a college degree. She is currently retired. She denied  service. She is not on disability and finances are stable. She is  after 48 years of marriage (that's my main problem he  at [facility] after having COVID and sepsis that was hard to go through by myself). Her  passed away 2021. She has one adult daughter and one adult son. She currently lives by herself. Yarsani is very important to her.  Legal history: She denied history of arrests and convictions. She denied current involvement in litigation.  Access to guns: Yes, in a safe.    Current Psychiatric Treatment:  Medications: Xanax 0.5 mg, Ambien 10 mg, Cymbalta 30 mg prescribed by her PCP (for 10 years)  Psychotherapy: Denied.    Current Psychiatric Symptoms:  Depression: She reports, It's not like a clinical  depression it's just a weary feeling. 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 It's work at home. It never ends. She denied experiencing a major depressive episode. Her PHQ-8 is a 9, which indicates mild depression. She anticipates the number would decrease if she were able to be more active.  Monae/Hypomania: Denied. She denied periods of elevated mood or abnormally increased energy or goal-directed activity.  Anxiety:   Generalized Anxiety: She reports that anxiety started after the death of her . She takes ½ Xanax three times per week. She feels nervous about her health issues and the pain. She also feels anxious about living on her own, especially with a potential mobility disorder.  Panic Disorder: Denied.  OCD: Denied.  Insomnia: She has history of insomnia that is adequately managed with Ambien.  Thought dysfunction: Denied delusions, hallucinations.  Non-suicidal or Suicidal thoughts/behaviors: Denied.  Personality functioning: The patient does not display any personality characteristics which would be an impediment to pursuing SCS.    Current Stress Management:  Current psychosocial stressors: Loss of , Health issues  Report of coping skills: Talk to daughter or , Stretching, Riding indoor bicycle, Walking  Recreational activities: She used to enjoy gardening, gardening, and playing golf. She still goes out to eat with her friends once per month and spends time with her grandchildren.  Support system: Daughter, Friends,   Strengths and liabilities: Strength: Patient accepts guidance/feedback, Strength: Patient is expressive/articulate., Strength: Patient is intelligent., Strength: Patient is motivated for change., Strength: Patient has positive support network., Strength: Patient has reasonable judgment., Strength: Patient is stable.       Mental Status Exam:  General appearance: appears stated age, neatly dressed, well groomed  Speech: normal rate and tone  Level of cooperation:  cooperative  Thought processes: logical, goal-directed  Mood: dysthymic  Thought content: no illusions, no visual hallucinations, no auditory hallucinations, no delusions, no active or passive homicidal thoughts, no active or passive suicidal ideation, no obsessions, no compulsions, no violence  Affect: appropriate  Orientation: oriented to person, place, and date  Memory:  Recent memory: 3 of 3 objects after brief delay.   Remote memory - intact  Attention span and concentration: spelled HOUSE forward and backwards  Fund of general knowledge: 3 of 3 recent presidents  Abstract reasoning:   Similarities: abstract.   Proverbs: abstract.  Judgment and insight: fair  Language: intact    Diagnostic impressions:    ICD-10-CM ICD-9-CM   1. Preoperative evaluation to rule out surgical contraindication  Z01.818 V72.83   2. S/P lumbar laminectomy  Z98.890 V45.89   3. Post laminectomy syndrome  M96.1 722.80   4. Chronic pain syndrome  G89.4 338.4   5. Anxiety  F41.9 300.00   6. Other depression  F32.89 311   7. History of insomnia  Z87.898 V13.89        Summary: Ms. Florentino Bowman is a 72-year-old female referred for presurgical psychological evaluation prior to surgical implantation of a spinal cord stimulator (SCS) to address chronic pain.  There are no indications of disabling psychopathology, substance use/abuse, cognitive problems, or disabilities that would prevent understanding and competence with medical treatment. There are no reports or major psychosocial stressors that would interfere with her engagement in treatment. There is no evidence of suicidality.  She exhibits high social stability and excellent social support. She has good coping strategies to deal with stress and the demands of surgery and recovery.  She has good knowledge about SCS, appropriate expectations for surgery and recovery, adequate understanding of possible risks of this treatment option, and a high willingness to sustain effort for lifestyle  changes and health adaptations required. She reports adequate compliance with prior medical regimens.      Plan: Ms. Bowman completed psychological testing. The report of this psychological evaluation will follow in the Notes folder in the patient's chart in the encounter titled Psychological Testing. Overall impressions and recommendations will be included in the final report.        Length of time: 60 minutes              Yumiko Pabon, PhD  Clinical Psychologist

## 2023-01-20 NOTE — PSYCH TESTING
[facility] [address] [place] [address]  [phone]    REPORT OF PRESURGICAL PSYCHOLOGICAL EVALUATION    NAME: Florentino Bowman  MRN: [id number]  : 1950     REFERRED BY: Darrian Duran M.D.    REASON FOR REFERRAL: Psychological Evaluation prior to surgical implantation of a spinal cord stimulator (SCS) to address chronic pain    EVALUATED BY:  Yumiko Pabon, Ph.D., Clinical Psychologist  CRUZ Kay, Psychometrician    DATES OF EVALUATION: 2023, 2023    EVALUATION PROCEDURES AND TIMES:  Conducted by Psychologist (2 hours):  Integration of patient data, interpretation of standardized test results and clinical data, clinical decision-making, treatment planning and report, and interactive feedback to the patient  CPT Codes: 56385 - 1 hour; 53594 - 1 hour  Conducted by Technician (1 hour, 30 minutes):  Psychological test administration and scoring by technician, two or more tests, any method: [place] Multiphasic Personality Inventory - 3 (MMPI-3); Pain and Impairment Relationship Scale (PAIRS); Carr Pain Catastrophizing Scale (PCS)  CPT Codes: 01948 - 30 minutes; 92469 - 30 minutes, 27209 - 30 minutes    EVALUATION FINDINGS:  The diagnostic interview revealed that Ms. Florentino Bowman is a 72-year-old female referred for Psychological Evaluation prior to surgical implantation of a spinal cord stimulator (SCS) to address chronic pain in her lower back and right thigh. Her pain has been present since 2017 without sufficient relief despite multiple pain management treatments. Her activities are greatly limited. Ms. Bowman is now interested in a trial of SCS. She understood risks of surgery and indicated no significant concerns. She has reasonable expectations for SCS and is motivated to use it as a tool to assist in long-term pain management. She would not consider suicide as an option if it is ineffective.    Regarding mental health history, Ms. Bowman reports a history  of pharmacotherapy with her PCP for depression, anxiety, and insomnia. She has never attended formal psychiatric treatment. Currently, she reports mild symptoms of depression and anxiety that are related primarily to her stressors of health concerns, pain limitations, and grief (loss of her  in 2021). She has multiple protective factors to help her manage stress including good social support, adaptive coping, engagement in recreation, spiritual daly, and financial stability. She denied problems with substance abuse, suicidal or homicidal ideation, psychotic symptoms, and serious cognitive impairment.    The medical record also revealed the following diagnoses relevant to this evaluation: S/P lumbar laminectomy; Post laminectomy syndrome; Chronic pain syndrome; Pain in thoracic spine.    TEST DATA:  All tests were administered according to standardized procedures and were selected based on the reason for referral. Effort on all tests was satisfactory to produce interpretable results.    MMPI-3. The MMPI-3 provides an assessment of personality and psychopathology with specific evaluation of psychosocial risk factors associated with outcomes of spinal cord stimulation. Ms. Bowman produced an interpretable MMPI-3 profile despite evidence of potential under-reporting. This profile should be interpreted with these issues in mind, in which these results do not rule out symptoms or the possibility that certain treatment approaches could prove helpful for optimizing the candidate's outcomes.  TEST RESULTS. Ms. Bowman reports emotional distress, somatic complaints, and interpersonal issues. Although there are no indications of disordered thinking or behavioral issues, these problems cannot be ruled-out due to indications of under-reporting described earlier.  Emotional. She reports significant emotional distress including a lack of positive emotional experiences, lack of interest, and heightened stress.  Somatic. She  reports poor health, feeling weak or tired, and neurological symptoms. She is likely preoccupied with poor health and may be prone to developing worsened physical symptoms when stressed.  Interpersonal. She describes herself as lacking in positive qualities.  GROUP COMPARISONS. Compared to spine surgery candidates, Ms. Bowman reports greater emotional distress (low positive emotions, demoralization, stress, anxiety), malaise, neurological symptoms, and introversion. Items that are bolded are at a clinically significant level and included in test results above.  RISK ASSESSMENTS. The following likelihoods are based on test results and research, and are correlational rather than causational. Her profile is associated with mild to moderate risks for adverse postsurgical outcomes due to elevations in emotional distress, low positive emotions, and stress. Post-surgery, Ms. Bowman is at increased risk of reporting greater levels of disability, unmet expectations with SCS, and dissatisfaction with SCS.    PAIRS and PCS. The PAIRS and PCS reveal beliefs and attitudes related to pain that may impact outcomes of spinal cord stimulation. Elevated scores indicate the need to query the ability to cope with the pain experience, high levels of emotional distress when pain occurs, and a negative mental set and persistent attention brought to bear during the experience of pain.   The PAIRS indicated non-significant complications related to perceptions of impairment with a total score of 73 (a score over 75 is clinically significant). These results do NOT suggest a tendency toward negative beliefs and attitudes about the ability to function and enjoy life despite discomfort from pain.  The PCS indicated significant catastrophizing of pain with a total score of 40, which is in the 92nd percentile (a score over 30 is in the 75th percentile and is clinically significant). Her subscale scores indicated significant Rumination (91st  percentile), significant Magnification (86th percentile), and significant Helplessness (95th percentile). These results suggest a tendency toward elevated pain perception and a tendency to focus on one's pain, increased perception of the seriousness of pain, and pessimism toward one's ability to cope with the pain experience. Catastrophic thinking is a risk factor for chronicity.     FEEDBACK. Ms. Bowman was provided with test results, and offered the opportunity to respond to feedback and clarify results if needed. Ms. Bowman acknowledged the test results as being largely consistent with her overall functioning and well-being, except she clarified that she feels very positive and hopeful about SCS (rather than what the PCS represented). She feels confident in her abilities to mitigate risks, especially with the help of her daughter (who is a CRNA and very involved in her treatment).    DIAGNOSTIC IMPRESSIONS:    ICD-10-CM ICD-9-CM   1. Preoperative evaluation to rule out surgical contraindication  Z01.818 V72.83   2. S/P lumbar laminectomy  Z98.890 V45.89   3. Post laminectomy syndrome  M96.1 722.80   4. Chronic pain syndrome  G89.4 338.4   5. Anxiety  F41.9 300.00   6. Other depression  F32.89 311   7. History of insomnia  Z87.898 V13.89        SUMMARY AND RECOMMENDATIONS:  Ms. Bowman has a long history of severe pain and is pursuing the spinal cord stimulator (SCS) in an effort to improve pain and quality of life. Test results revealed mild to moderate risks for adverse postsurgical outcomes due to elevations in emotional distress, low positive emotions, stress, and pain catastrophizing. Similar profiles were linked to reports of disability, unmet expectations, and dissatisfaction with SCS. Ms. Bowman was open to feedback and recommendations regarding her test profile, and is motivated and willing to mitigate risks with additional strategies and support.      Ms. Bowman's testing profile was largely consistent  with her reports in the clinical interview. In the clinical interview, she acknowledged a history of pharmacotherapy with her PCP for depression, anxiety, and insomnia. She rated her symptoms as mild in the clinical interview, and was hopeful that improvements in pain and limitations would help her improve engagement in activities and mood. She continues to be engaged in her life (spending time with friends and family, exercising, attending [employer]) and has good social support to help her manage stress and pain. She was open to feedback about pursuing additional support from her  (who has counseled her and offered additional ) and grief support group.    Based on this evaluation, Ms. Bowman may be considered a suitable candidate to proceed with SCS from a psychological perspective despite the presence of testing risks. There were no absolute psychological contraindications to surgery, and she is willing to seek resources and use strategies to help mitigate risks and improve outcomes. She has multiple protective factors that should help her during surgery and recovery, and she is highly motivated to improve pain and engagement in activities. She will be provided with resources but they are not required prior to surgery, and she was encouraged to reach out in the future if she needs additional resources.        Report and interpretation and coding were completed on 01/20/2023.              Yumiko Pabon, PhD  Clinical Psychologist

## 2023-01-23 DIAGNOSIS — G89.4 CHRONIC PAIN SYNDROME: ICD-10-CM

## 2023-01-23 DIAGNOSIS — M96.1 POST LAMINECTOMY SYNDROME: Primary | ICD-10-CM

## 2023-01-25 ENCOUNTER — TELEPHONE (OUTPATIENT)
Dept: PAIN MEDICINE | Facility: OTHER | Age: 73
End: 2023-01-25
Payer: MEDICARE

## 2023-01-25 ENCOUNTER — PATIENT MESSAGE (OUTPATIENT)
Dept: PAIN MEDICINE | Facility: OTHER | Age: 73
End: 2023-01-25
Payer: MEDICARE

## 2023-01-25 DIAGNOSIS — M96.1 POST LAMINECTOMY SYNDROME: ICD-10-CM

## 2023-01-25 DIAGNOSIS — G89.4 CHRONIC PAIN SYNDROME: Primary | ICD-10-CM

## 2023-01-25 PROBLEM — R26.2 DIFFICULTY WALKING: Status: ACTIVE | Noted: 2023-01-25

## 2023-01-25 PROBLEM — M62.81 MUSCLE WEAKNESS (GENERALIZED): Status: ACTIVE | Noted: 2023-01-25

## 2023-01-31 ENCOUNTER — EXTERNAL CHRONIC CARE MANAGEMENT (OUTPATIENT)
Dept: PRIMARY CARE CLINIC | Facility: CLINIC | Age: 73
End: 2023-01-31
Payer: MEDICARE

## 2023-01-31 PROCEDURE — 99490 CHRNC CARE MGMT STAFF 1ST 20: CPT | Mod: S$GLB,,, | Performed by: FAMILY MEDICINE

## 2023-01-31 PROCEDURE — 99490 PR CHRONIC CARE MGMT, 1ST 20 MIN: ICD-10-PCS | Mod: S$GLB,,, | Performed by: FAMILY MEDICINE

## 2023-01-31 PROCEDURE — 99439 PR CHRONIC CARE MGMT, EA ADDTL 20 MIN: ICD-10-PCS | Mod: S$GLB,,, | Performed by: FAMILY MEDICINE

## 2023-01-31 PROCEDURE — 99439 CHRNC CARE MGMT STAF EA ADDL: CPT | Mod: S$GLB,,, | Performed by: FAMILY MEDICINE

## 2023-02-02 ENCOUNTER — TELEPHONE (OUTPATIENT)
Dept: FAMILY MEDICINE | Facility: CLINIC | Age: 73
End: 2023-02-02
Payer: MEDICARE

## 2023-02-02 DIAGNOSIS — I77.9 BILATERAL CAROTID ARTERY DISEASE, UNSPECIFIED TYPE: ICD-10-CM

## 2023-02-02 DIAGNOSIS — E03.4 HYPOTHYROIDISM DUE TO ACQUIRED ATROPHY OF THYROID: ICD-10-CM

## 2023-02-02 DIAGNOSIS — Z00.00 WELLNESS EXAMINATION: Primary | ICD-10-CM

## 2023-02-02 NOTE — TELEPHONE ENCOUNTER
Dr Chou - please order fasting labs and rtn to staff to schedule    Labs/Appt  Received: Today  China Otto Staff  Phone Number: [phone]     MRN: [id number]   : 1950   Pharmacy: [id number] - [place], [place] - [address] [facility], [address]   Phone: [phone]   Fax: [phone]     Hello,     I spoke with the patient today for a monthly check-in. Patient is wanting to complete lab work prior to Annual Exam appt on . Patient would like to complete labs at the [place] location if possible. Please follow up with patient on scheduling lab appt prior to appt on . Please let me know if I can assist any further. I can be reached via [employer] or at the number listed below. Thank you.     China MATTHEWS LPN   Care Coordinator, [facility]   [phone] ext [phone]

## 2023-02-03 NOTE — TELEPHONE ENCOUNTER
Spoke with pt and scheduled her lab appointment pt would like to have T4 drawn please place lab order and send back to staff so order can be linked

## 2023-02-07 RX ORDER — ZOLPIDEM TARTRATE 10 MG/1
TABLET ORAL
Qty: 30 TABLET | Refills: 0 | Status: SHIPPED | OUTPATIENT
Start: 2023-02-07 | End: 2023-02-16 | Stop reason: SDUPTHER

## 2023-02-07 NOTE — TELEPHONE ENCOUNTER
No new care gaps identified.  [facility] Embedded Care Gaps. Reference number: [id number]. 2/06/2023   6:28:37 PM CST

## 2023-02-10 ENCOUNTER — PATIENT MESSAGE (OUTPATIENT)
Dept: PAIN MEDICINE | Facility: OTHER | Age: 73
End: 2023-02-10
Payer: MEDICARE

## 2023-02-16 ENCOUNTER — OFFICE VISIT (OUTPATIENT)
Dept: FAMILY MEDICINE | Facility: CLINIC | Age: 73
End: 2023-02-16
Payer: MEDICARE

## 2023-02-16 VITALS
HEIGHT: 60 IN | TEMPERATURE: 97 F | BODY MASS INDEX: 24.68 KG/M2 | OXYGEN SATURATION: 98 % | DIASTOLIC BLOOD PRESSURE: 76 MMHG | HEART RATE: 91 BPM | WEIGHT: 125.69 LBS | SYSTOLIC BLOOD PRESSURE: 126 MMHG

## 2023-02-16 DIAGNOSIS — Z12.31 ENCOUNTER FOR SCREENING MAMMOGRAM FOR BREAST CANCER: ICD-10-CM

## 2023-02-16 DIAGNOSIS — Z12.31 SCREENING MAMMOGRAM FOR BREAST CANCER: Primary | ICD-10-CM

## 2023-02-16 DIAGNOSIS — E03.4 HYPOTHYROIDISM DUE TO ACQUIRED ATROPHY OF THYROID: ICD-10-CM

## 2023-02-16 DIAGNOSIS — F32.1 CURRENT MODERATE EPISODE OF MAJOR DEPRESSIVE DISORDER WITHOUT PRIOR EPISODE: ICD-10-CM

## 2023-02-16 DIAGNOSIS — R53.83 OTHER FATIGUE: ICD-10-CM

## 2023-02-16 DIAGNOSIS — Z00.00 WELLNESS EXAMINATION: ICD-10-CM

## 2023-02-16 DIAGNOSIS — G20.A1 PARKINSON'S DISEASE: ICD-10-CM

## 2023-02-16 DIAGNOSIS — I77.9 BILATERAL CAROTID ARTERY DISEASE, UNSPECIFIED TYPE: ICD-10-CM

## 2023-02-16 PROCEDURE — 99214 OFFICE O/P EST MOD 30 MIN: CPT | Mod: S$GLB,,, | Performed by: FAMILY MEDICINE

## 2023-02-16 PROCEDURE — 99214 PR OFFICE/OUTPT VISIT, EST, LEVL IV, 30-39 MIN: ICD-10-PCS | Mod: S$GLB,,, | Performed by: FAMILY MEDICINE

## 2023-02-16 RX ORDER — HYDROCODONE BITARTRATE AND ACETAMINOPHEN 5; 325 MG/1; MG/1
1 TABLET ORAL DAILY PRN
COMMUNITY
Start: 2023-02-01 | End: 2023-03-15 | Stop reason: CLARIF

## 2023-02-16 RX ORDER — ATORVASTATIN CALCIUM 40 MG/1
40 TABLET, FILM COATED ORAL DAILY
Qty: 90 TABLET | Refills: 3 | Status: SHIPPED | OUTPATIENT
Start: 2023-02-16

## 2023-02-16 RX ORDER — PRUCALOPRIDE 2 MG/1
2 TABLET, FILM COATED ORAL DAILY
Qty: 30 TABLET | Refills: 11 | Status: SHIPPED | OUTPATIENT
Start: 2023-02-16 | End: 2023-02-22

## 2023-02-16 RX ORDER — LEVOTHYROXINE SODIUM 150 UG/1
150 TABLET ORAL DAILY
Qty: 90 TABLET | Refills: 3 | Status: SHIPPED | OUTPATIENT
Start: 2023-02-16 | End: 2023-08-16 | Stop reason: SDUPTHER

## 2023-02-16 RX ORDER — SUMATRIPTAN SUCCINATE 100 MG/1
TABLET ORAL
Qty: 27 TABLET | Refills: 1 | Status: SHIPPED | OUTPATIENT
Start: 2023-02-16 | End: 2023-03-07

## 2023-02-16 RX ORDER — BUTYROSPERMUM PARKII(SHEA BUTTER), SIMMONDSIA CHINENSIS (JOJOBA) SEED OIL, ALOE BARBADENSIS LEAF EXTRACT .01; 1; 3.5 G/100G; G/100G; G/100G
LIQUID TOPICAL
COMMUNITY
Start: 2023-01-02 | End: 2023-04-18

## 2023-02-16 RX ORDER — ZOLPIDEM TARTRATE 10 MG/1
10 TABLET ORAL NIGHTLY
Qty: 90 TABLET | Refills: 1 | Status: SHIPPED | OUTPATIENT
Start: 2023-02-16 | End: 2023-06-21 | Stop reason: ALTCHOICE

## 2023-02-16 RX ORDER — ESTRADIOL 0.5 MG/1
0.5 TABLET ORAL NIGHTLY
Qty: 90 TABLET | Refills: 3 | Status: SHIPPED | OUTPATIENT
Start: 2023-02-16

## 2023-02-16 RX ORDER — ALPRAZOLAM 0.5 MG/1
0.5 TABLET ORAL DAILY PRN
Qty: 30 TABLET | Refills: 2 | Status: SHIPPED | OUTPATIENT
Start: 2023-02-16 | End: 2023-06-21 | Stop reason: ALTCHOICE

## 2023-02-16 NOTE — PROGRESS NOTES
Subjective:      Patient ID: Florentino Bowman is a 72 y.o. female.    Chief Complaint: Annual Exam      Vitals:    02/16/23 1000   BP: 126/76   Pulse: 91   Temp: 97.3 °F (36.3 °C)   SpO2: 98%   Weight: 57 kg (125 lb 10.6 oz)   Height: 5' (1.524 m)        HPI   Follow up of below Dx  Had labs  constipated  Problem List  Patient Active Problem List   Diagnosis    Headache    Hypothyroid    Depression    Insomnia    Anxiety    Chronic back pain    Bilateral carotid artery disease    Calcific tendonitis    Left shoulder pain    DDD (degenerative disc disease), lumbar    Change in bowel habit    Screening for colorectal cancer    S/P lumbar laminectomy    Chronic right-sided low back pain with right-sided sciatica    Lumbar radiculopathy    Lumbar radicular pain    Spondylolisthesis    Lumbar spondylosis    Chest pain    Change in bowel habits    Lumbar degenerative disc disease    Wears contact lenses    De Jesus's neuroma of left foot    Acquired posterior equinus of left lower extremity    Bursitis of intermetatarsal bursa of left foot    Muscle weakness (generalized)    Difficulty walking    Current moderate episode of major depressive disorder without prior episode    Parkinson's disease        ALLERGIES: Review of patient's allergies indicates:  No Known Allergies    MEDS:   Current Outpatient Medications:     carbidopa-levodopa  mg (SINEMET)  mg per tablet, Take 0.5 tablets by mouth 3 (three) times daily., Disp: 45 tablet, Rfl: 5    DULoxetine (CYMBALTA) 30 MG capsule, Take 1 capsule (30 mg total) by mouth 2 (two) times daily., Disp: 180 capsule, Rfl: 1    HYDROcodone-acetaminophen (NORCO) 5-325 mg per tablet, Take 1 tablet by mouth daily as needed., Disp: , Rfl:     magnesium citrate 100 mg Cap, , Disp: , Rfl:     pregabalin (LYRICA) 100 MG capsule, Take 100 mg by mouth 2 (two) times daily., Disp: , Rfl:     ALPRAZolam (XANAX) 0.5 MG tablet, Take 1 tablet (0.5 mg total) by mouth daily as needed for  Anxiety. PRN ANXIETY, Disp: 30 tablet, Rfl: 2    atorvastatin (LIPITOR) 40 MG tablet, Take 1 tablet (40 mg total) by mouth once daily., Disp: 90 tablet, Rfl: 3    estradioL (ESTRACE) 0.5 MG tablet, Take 1 tablet (0.5 mg total) by mouth every evening., Disp: 90 tablet, Rfl: 3    levothyroxine (SYNTHROID) 150 MCG tablet, Take 1 tablet (150 mcg total) by mouth once daily. For thyroid, Disp: 90 tablet, Rfl: 3    prucalopride (MOTEGRITY) 2 mg Tab, Take 2 mg by mouth Daily., Disp: 30 tablet, Rfl: 11    sumatriptan (IMITREX) 100 MG tablet, TAKE ONE TABLET BY MOUTH AS NEEDED FOR HEADACHE REPEAT IN 2 HOURS IF NEEDED, Disp: 27 tablet, Rfl: 1    tumeric-ging-olive-oreg-capryl 100 mg-150 mg- 50 mg-150 mg Cap, Take by mouth., Disp: , Rfl:     zolpidem (AMBIEN) 10 mg Tab, Take 1 tablet (10 mg total) by mouth every evening., Disp: 90 tablet, Rfl: 1  No current facility-administered medications for this visit.    Facility-Administered Medications Ordered in Other Visits:     0.9%  NaCl infusion, , Intravenous, Continuous, Tony Castle MD, New Bag at 09/30/22 0822    0.9%  NaCl infusion, , Intravenous, Continuous, Tony Castle MD    sodium chloride 0.9% flush 10 mL, 10 mL, Intravenous, PRN, Tony Castle MD    sodium chloride 0.9% flush 10 mL, 10 mL, Intravenous, PRN, Tony Castle MD      History:  Current Providers as of 2/16/2023  PCP: Fam Chou MD  Care Team Provider: Lan Reid MD  Care Team Provider: Karley Kumar MD  Care Team Provider: Tony Castle MD  Care Team Provider: Carla Mendoza MD  Encounter Provider: Fam Chou MD, starting on Thu Feb 16, 2023 12:00 AM  Referring Provider: not found, starting on Thu Feb 16, 2023 12:00 AM  Consulting Physician: Fam Chou MD, starting on Thu Feb 16, 2023 10:00 AM (Active)   Past Medical History:   Diagnosis Date    Arthritis     Carotid stenosis, asymptomatic     Headache(784.0)     Hypothyroid     Movement disorder      Neuropathy      Past Surgical History:   Procedure Laterality Date    BREAST BIOPSY Right     CAUDAL EPIDURAL STEROID INJECTION N/A 2022    Procedure: INJECTION, STEROID, SPINE, EPIDURAL, CAUDAL;  Surgeon: Tony Castle MD;  Location: [facility] OR;  Service: Pain Management;  Laterality: N/A;     SECTION      x2    COLONOSCOPY N/A 2017    Procedure: COLONOSCOPY Miralax;  Surgeon: Quentin Mercado Jr., MD;  Location: [facility] ENDO;  Service: Endoscopy;  Laterality: N/A;    COLONOSCOPY N/A 2021    Procedure: COLONOSCOPY;  Surgeon: Andrew Parekh MD;  Location: [facility] ENDO;  Service: Endoscopy;  Laterality: N/A;    ESOPHAGOGASTRODUODENOSCOPY N/A 2021    Procedure: EGD (ESOPHAGOGASTRODUODENOSCOPY);  Surgeon: Andrew Parekh MD;  Location: [facility];  Service: Endoscopy;  Laterality: N/A;    HYSTERECTOMY      INJECTION OF ANESTHETIC AGENT AROUND MEDIAL BRANCH NERVES INNERVATING LUMBAR FACET JOINT Right 2019    Procedure: BLOCK, NERVE, FACET JOINT, LUMBAR, MEDIAL BRANCH ;  Surgeon: Sixto Moya III, MD;  Location: [facility] OR;  Service: Pain Management;  Laterality: Right;  - L4/5 & L5/S1  PATIENT NEEDS TO BE THE FIRST PATIENT OF THE DAY, PER DR. MOYA!!!    LAPAROSCOPIC HYSTERECTOMY  2010    supracervical/BSO    OOPHORECTOMY      SPINE SURGERY      TONSILLECTOMY      TRANSFORAMINAL EPIDURAL INJECTION OF STEROID Right 2019    Procedure: INJECTION, STEROID, EPIDURAL, TRANSFORAMINAL APPROACH;  Surgeon: Sixto Moya III, MD;  Location: [facility] OR;  Service: Pain Management;  Laterality: Right;  -right TFESI L4/5    TRANSFORAMINAL EPIDURAL INJECTION OF STEROID Bilateral 2021    Procedure: INJECTION, STEROID, EPIDURAL, TRANSFORAMINAL APPROACH;  Surgeon: Sixto Moya III, MD;  Location: [facility] OR;  Service: Pain Management;  Laterality: Bilateral;  L5    TRANSFORAMINAL EPIDURAL INJECTION OF STEROID Bilateral 2022    Procedure: INJECTION, STEROID,  EPIDURAL, TRANSFORAMINAL APPROACH;  Surgeon: Tony Castle MD;  Location: [facility] OR;  Service: Pain Management;  Laterality: Bilateral;     Social History     Tobacco Use    Smoking status: Never    Smokeless tobacco: Never   Substance Use Topics    Alcohol use: Yes     Comment: social    Drug use: Never         Review of Systems   Constitutional:  Positive for activity change. Negative for unexpected weight change.   HENT:  Positive for rhinorrhea. Negative for hearing loss and trouble swallowing.    Eyes:  Positive for visual disturbance. Negative for discharge.   Respiratory: Negative.  Negative for chest tightness and wheezing.    Cardiovascular: Negative.  Negative for chest pain and palpitations.   Gastrointestinal:  Positive for constipation. Negative for blood in stool, diarrhea and vomiting.   Endocrine: Negative.  Negative for polydipsia and polyuria.   Genitourinary: Negative.  Negative for difficulty urinating, dysuria, hematuria and menstrual problem.   Musculoskeletal:  Positive for arthralgias and back pain. Negative for joint swelling and neck pain.   Neurological:  Positive for weakness and headaches.   Psychiatric/Behavioral:  Positive for dysphoric mood. Negative for confusion.    All other systems reviewed and are negative.  Objective:     Physical Exam  Vitals and nursing note reviewed.   Constitutional:       Appearance: She is well-developed.   HENT:      Head: Normocephalic.   Eyes:      Conjunctiva/sclera: Conjunctivae normal.      Pupils: Pupils are equal, round, and reactive to light.   Cardiovascular:      Rate and Rhythm: Normal rate and regular rhythm.      Heart sounds: Normal heart sounds.   Pulmonary:      Effort: Pulmonary effort is normal.      Breath sounds: Normal breath sounds.   Musculoskeletal:         General: Normal range of motion.      Cervical back: Normal range of motion and neck supple.   Skin:     General: Skin is warm and dry.   Neurological:      Mental Status:  She is alert and oriented to person, place, and time.      Deep Tendon Reflexes: Reflexes are normal and symmetric.   Psychiatric:         Behavior: Behavior normal.         Thought Content: Thought content normal.         Judgment: Judgment normal.           Assessment:     1. Screening mammogram for breast cancer    2. Encounter for screening mammogram for breast cancer    3. Wellness examination    4. Bilateral carotid artery disease, unspecified type    5. Hypothyroidism due to acquired atrophy of thyroid    6. Current moderate episode of major depressive disorder without prior episode    7. Parkinson's disease    8. Other fatigue      Plan:        Medication List            Accurate as of February 16, 2023 11:17 AM. If you have any questions, ask your nurse or doctor.                START taking these medications      MOTEGRITY 2 mg Tab  Generic drug: prucalopride  Take 2 mg by mouth Daily.  Started by: Fam Chou MD            CHANGE how you take these medications      ALPRAZolam 0.5 MG tablet  Commonly known as: XANAX  Take 1 tablet (0.5 mg total) by mouth daily as needed for Anxiety. PRN ANXIETY  What changed: See the new instructions.  Changed by: Fam Chou MD     estradioL 0.5 MG tablet  Commonly known as: ESTRACE  Take 1 tablet (0.5 mg total) by mouth every evening.  What changed:   medication strength  how much to take  Changed by: Fam Chou MD     levothyroxine 150 MCG tablet  Commonly known as: SYNTHROID  Take 1 tablet (150 mcg total) by mouth once daily. For thyroid  What changed:   medication strength  how much to take  Changed by: Fam Chou MD            CONTINUE taking these medications      atorvastatin 40 MG tablet  Commonly known as: LIPITOR  Take 1 tablet (40 mg total) by mouth once daily.     carbidopa-levodopa  mg  mg per tablet  Commonly known as: SINEMET  Take 0.5 tablets by mouth 3 (three) times daily.     DULoxetine 30 MG capsule  Commonly known as:  CYMBALTA  Take 1 capsule (30 mg total) by mouth 2 (two) times daily.     HYDROcodone-acetaminophen 5-325 mg per tablet  Commonly known as: NORCO     magnesium citrate 100 mg Cap     pregabalin 100 MG capsule  Commonly known as: LYRICA     sumatriptan 100 MG tablet  Commonly known as: IMITREX  TAKE ONE TABLET BY MOUTH AS NEEDED FOR HEADACHE REPEAT IN 2 HOURS IF NEEDED     tumeric-ging-olive-oreg-capryl 100 mg-150 mg- 50 mg-150 mg Cap     zolpidem 10 mg Tab  Commonly known as: AMBIEN  Take 1 tablet (10 mg total) by mouth every evening.            STOP taking these medications      EPA-DHA ORAL  Stopped by: Fam Chou MD     fish oil-omega-3 fatty acids 300-1,000 mg capsule  Stopped by: Fam Chou MD     PERCOCET 5-325 mg per tablet  Generic drug: oxyCODONE-acetaminophen  Stopped by: Fam Chou MD               Where to Get Your Medications        These medications were sent to [id number] - [place], [place] - [address] [address], [address]  [address], [address], [facility] [address]      Phone: [phone]   ALPRAZolam 0.5 MG tablet  atorvastatin 40 MG tablet  estradioL 0.5 MG tablet  levothyroxine 150 MCG tablet  MOTEGRITY 2 mg Tab  sumatriptan 100 MG tablet  zolpidem 10 mg Tab       Screening mammogram for breast cancer  -     Mammo Digital Screening Bilat w/ Yordan; Future; Expected date: 02/16/2023    Encounter for screening mammogram for breast cancer  -     Mammo Digital Screening Bilat w/ Yordan; Future; Expected date: 02/16/2023    Wellness examination  -     levothyroxine (SYNTHROID) 150 MCG tablet; Take 1 tablet (150 mcg total) by mouth once daily. For thyroid  Dispense: 90 tablet; Refill: 3  -     estradioL (ESTRACE) 0.5 MG tablet; Take 1 tablet (0.5 mg total) by mouth every evening.  Dispense: 90 tablet; Refill: 3    Bilateral carotid artery disease, unspecified type  -     levothyroxine (SYNTHROID) 150 MCG tablet; Take 1 tablet (150 mcg total) by mouth once daily. For thyroid  Dispense: 90  tablet; Refill: 3  -     estradioL (ESTRACE) 0.5 MG tablet; Take 1 tablet (0.5 mg total) by mouth every evening.  Dispense: 90 tablet; Refill: 3    Hypothyroidism due to acquired atrophy of thyroid  -     TSH; Standing  -     estradioL (ESTRACE) 0.5 MG tablet; Take 1 tablet (0.5 mg total) by mouth every evening.  Dispense: 90 tablet; Refill: 3    Current moderate episode of major depressive disorder without prior episode    Parkinson's disease    Other fatigue  -     Ambulatory referral/consult to Cardiology; Future; Expected date: 02/23/2023    Other orders  -     prucalopride (MOTEGRITY) 2 mg Tab; Take 2 mg by mouth Daily.  Dispense: 30 tablet; Refill: 11  -     ALPRAZolam (XANAX) 0.5 MG tablet; Take 1 tablet (0.5 mg total) by mouth daily as needed for Anxiety. PRN ANXIETY  Dispense: 30 tablet; Refill: 2  -     zolpidem (AMBIEN) 10 mg Tab; Take 1 tablet (10 mg total) by mouth every evening.  Dispense: 90 tablet; Refill: 1  -     sumatriptan (IMITREX) 100 MG tablet; TAKE ONE TABLET BY MOUTH AS NEEDED FOR HEADACHE REPEAT IN 2 HOURS IF NEEDED  Dispense: 27 tablet; Refill: 1  -     atorvastatin (LIPITOR) 40 MG tablet; Take 1 tablet (40 mg total) by mouth once daily.  Dispense: 90 tablet; Refill: 3

## 2023-02-21 ENCOUNTER — PATIENT MESSAGE (OUTPATIENT)
Dept: FAMILY MEDICINE | Facility: CLINIC | Age: 73
End: 2023-02-21
Payer: MEDICARE

## 2023-02-21 ENCOUNTER — PATIENT MESSAGE (OUTPATIENT)
Dept: ADMINISTRATIVE | Facility: OTHER | Age: 73
End: 2023-02-21
Payer: MEDICARE

## 2023-02-22 ENCOUNTER — PATIENT MESSAGE (OUTPATIENT)
Dept: PAIN MEDICINE | Facility: OTHER | Age: 73
End: 2023-02-22
Payer: MEDICARE

## 2023-02-22 RX ORDER — LUBIPROSTONE 24 UG/1
24 CAPSULE ORAL 2 TIMES DAILY WITH MEALS
Qty: 60 CAPSULE | Refills: 5 | Status: SHIPPED | OUTPATIENT
Start: 2023-02-22 | End: 2023-03-15 | Stop reason: CLARIF

## 2023-02-24 ENCOUNTER — HOSPITAL ENCOUNTER (OUTPATIENT)
Facility: OTHER | Age: 73
Discharge: HOME | End: 2023-02-24
Attending: ANESTHESIOLOGY | Admitting: ANESTHESIOLOGY
Payer: MEDICARE

## 2023-02-24 VITALS
WEIGHT: 125 LBS | RESPIRATION RATE: 16 BRPM | SYSTOLIC BLOOD PRESSURE: 133 MMHG | BODY MASS INDEX: 24.54 KG/M2 | DIASTOLIC BLOOD PRESSURE: 71 MMHG | TEMPERATURE: 98 F | OXYGEN SATURATION: 99 % | HEIGHT: 60 IN | HEART RATE: 67 BPM

## 2023-02-24 DIAGNOSIS — G89.4 CHRONIC PAIN SYNDROME: ICD-10-CM

## 2023-02-24 DIAGNOSIS — M96.1 POST LAMINECTOMY SYNDROME: Primary | ICD-10-CM

## 2023-02-24 DIAGNOSIS — G89.29 CHRONIC PAIN: ICD-10-CM

## 2023-02-24 PROCEDURE — C1778 LEAD, NEUROSTIMULATOR: HCPCS | Performed by: ANESTHESIOLOGY

## 2023-02-24 PROCEDURE — 63650 PR PERCUT IMPLNT NEUROELECT,EPIDURAL: ICD-10-PCS | Mod: ,,, | Performed by: ANESTHESIOLOGY

## 2023-02-24 PROCEDURE — 63650 IMPLANT NEUROELECTRODES: CPT | Mod: ,,, | Performed by: ANESTHESIOLOGY

## 2023-02-24 PROCEDURE — 63600175 PHARM REV CODE 636 W HCPCS: Performed by: ANESTHESIOLOGY

## 2023-02-24 PROCEDURE — 63650 IMPLANT NEUROELECTRODES: CPT | Performed by: ANESTHESIOLOGY

## 2023-02-24 DEVICE — IMPLANTABLE DEVICE: Type: IMPLANTABLE DEVICE | Site: BACK | Status: FUNCTIONAL

## 2023-02-24 RX ORDER — SODIUM CHLORIDE 9 MG/ML
500 INJECTION, SOLUTION INTRAVENOUS CONTINUOUS
Status: DISCONTINUED | OUTPATIENT
Start: 2023-02-24 | End: 2023-02-24 | Stop reason: HOSPADM

## 2023-02-24 RX ORDER — FENTANYL CITRATE 50 UG/ML
INJECTION, SOLUTION INTRAMUSCULAR; INTRAVENOUS
Status: DISCONTINUED | OUTPATIENT
Start: 2023-02-24 | End: 2023-02-24 | Stop reason: HOSPADM

## 2023-02-24 RX ORDER — MIDAZOLAM HYDROCHLORIDE 1 MG/ML
INJECTION INTRAMUSCULAR; INTRAVENOUS
Status: DISCONTINUED | OUTPATIENT
Start: 2023-02-24 | End: 2023-02-24 | Stop reason: HOSPADM

## 2023-02-24 RX ORDER — CEFAZOLIN SODIUM 1 G/3ML
2 INJECTION, POWDER, FOR SOLUTION INTRAMUSCULAR; INTRAVENOUS ONCE
Status: COMPLETED | OUTPATIENT
Start: 2023-02-24 | End: 2023-02-24

## 2023-02-24 RX ADMIN — CEFAZOLIN 2 G: 1 INJECTION, POWDER, FOR SOLUTION INTRAMUSCULAR; INTRAVENOUS at 08:02

## 2023-02-24 NOTE — DISCHARGE SUMMARY
Discharge Note  Short Stay      SUMMARY     Admit Date: 2/24/2023    Attending Physician: Darrian Duran      Discharge Physician: Darrian Duran      Discharge Date: 2/24/2023 8:52 AM    Procedure(s) (LRB):  SPINAL CORD STIMULATOR TRIAL [place] REP *[facility]ED ONLY* (N/A)    Final Diagnosis: Chronic pain syndrome [G89.4]  Post laminectomy syndrome [M96.1]    Disposition: Home or self care    Patient Instructions:   Current Discharge Medication List        CONTINUE these medications which have NOT CHANGED    Details   ALPRAZolam (XANAX) 0.5 MG tablet Take 1 tablet (0.5 mg total) by mouth daily as needed for Anxiety. PRN ANXIETY  Qty: 30 tablet, Refills: 2      atorvastatin (LIPITOR) 40 MG tablet Take 1 tablet (40 mg total) by mouth once daily.  Qty: 90 tablet, Refills: 3      carbidopa-levodopa  mg (SINEMET)  mg per tablet Take 0.5 tablets by mouth 3 (three) times daily.  Qty: 45 tablet, Refills: 5      DULoxetine (CYMBALTA) 30 MG capsule Take 1 capsule (30 mg total) by mouth 2 (two) times daily.  Qty: 180 capsule, Refills: 1    Comments: Take 1 cap daily x 7 days. After 7 days, increase to 1 cap BID.  Associated Diagnoses: Neuropathic pain of left foot      estradioL (ESTRACE) 0.5 MG tablet Take 1 tablet (0.5 mg total) by mouth every evening.  Qty: 90 tablet, Refills: 3    Associated Diagnoses: Wellness examination; Bilateral carotid artery disease, unspecified type; Hypothyroidism due to acquired atrophy of thyroid      HYDROcodone-acetaminophen (NORCO) 5-325 mg per tablet Take 1 tablet by mouth daily as needed.      levothyroxine (SYNTHROID) 150 MCG tablet Take 1 tablet (150 mcg total) by mouth once daily. For thyroid  Qty: 90 tablet, Refills: 3    Associated Diagnoses: Wellness examination; Bilateral carotid artery disease, unspecified type      lubiprostone (AMITIZA) 24 MCG Cap Take 1 capsule (24 mcg total) by mouth 2 (two) times daily with meals. For constipation  Qty: 60 capsule, Refills: 5       magnesium citrate 100 mg Cap       pregabalin (LYRICA) 100 MG capsule Take 100 mg by mouth 2 (two) times daily.      sumatriptan (IMITREX) 100 MG tablet TAKE ONE TABLET BY MOUTH AS NEEDED FOR HEADACHE REPEAT IN 2 HOURS IF NEEDED  Qty: 27 tablet, Refills: 1      tumeric-ging-olive-oreg-capryl 100 mg-150 mg- 50 mg-150 mg Cap Take by mouth.      zolpidem (AMBIEN) 10 mg Tab Take 1 tablet (10 mg total) by mouth every evening.  Qty: 90 tablet, Refills: 1                 Discharge Diagnosis: Chronic pain syndrome [G89.4]  Post laminectomy syndrome [M96.1]  Condition on Discharge: Stable with no complications to procedure   Diet on Discharge: Same as before.  Activity: as per instruction sheet.  Discharge to: Home with a responsible adult.  Follow up: 2-4 weeks

## 2023-02-24 NOTE — OP NOTE
Spinal Cord Stimulator Trial Fluoroscopic Guidance    The procedure, risks, benefits, and options were discussed with the patient. There are no contraindications to the procedure. The patent expressed understanding and agreed to the procedure. Informed written consent was obtained prior to the start of the procedure and can be found in the patient's chart.    PATIENT NAME: Florentino Bowman   MRN: [id number]     DATE OF PROCEDURE: 02/24/2023    PROCEDURE:   1) Placement of Octad Electrode by 2   2) Intraoperative and Postoperative Programming, Simple   3) Spinal Cord Stimulator Trial     PRE-OP DIAGNOSIS: Chronic pain syndrome [G89.4]  Post laminectomy syndrome [M96.1] Chronic pain syndrome [G89.4]    POST-OP DIAGNOSIS: Chronic pain syndrome [G89.4]  Post laminectomy syndrome [M96.1]    PHYSICIAN: Darrian Duran MD    ASSISTANTS: None     MEDICATIONS INJECTED: Bupivacaine 0.25%     LOCAL ANESTHETIC INJECTED: Xylocaine 2%     SEDATION: Versed 2mg and Fentanyl 50mcg                                                                                                                                                                                     Conscious sedation ordered by M.D. Patient re-evaluation prior to administration of conscious sedation. No changes noted in patient's status from initial evaluation. The patient's vital signs were monitored by RN and patient remained hemodynamically stable throughout the procedure.    Event Time In   Sedation Start 0825   Sedation End 0847       ESTIMATED BLOOD LOSS: None    COMPLICATIONS: None    TECHNIQUE: Time-out was performed to identify the patient and procedure to be performed. The patient was placed in the prone position on the OR table, the area overlying the  lumbar spine was prepped and draped in usual sterile fashion using chlorhexidine. The entry site of the  lumbar spine was identified at the T12/L1 interspace under fluoroscopy guidance. The entry site was anesthetized  with Lidocaine 2%. A 18 gauge, 3.5 inch Tuohy needle was then advanced to contact the right L1 lamina.  It was walked off in a superior medial direction until it entered the epidural space using loss of resistance to air. The spinal cord stimulator lead was advanced through the Tuohy needle and directed to rest the tip at the T7 vertebral body.  The same procedure was repeated in detail for the left side to insert a second lead within the epidural space to reside adjacent to the first lead.  The patient was allowed to fully awaken from anesthesia.  Testing was carried out by the device representative under the guidance of Darrian Duran MD. The patient reported having sensation in the areas of usual pain. Once the leads were adjusted to by within the proper positioning, needles were withdrawn leaving the leads in place and were then secured to the patients skin using Stay-Fix adhesive bandages and tegaderm. The patients back was cleaned. No specimens collected. The patient tolerated the procedure well.     The patient was monitored after the procedure in the recovery area. They were given post-procedure and discharge instructions to follow at home. The patient was discharged in a stable condition.        Darrian Duran MD

## 2023-02-24 NOTE — DISCHARGE INSTRUCTIONS

## 2023-02-24 NOTE — H&P
HPI  Patient presents for Spinal Cord Stimulator Trial with [employer]        Past Medical History:   Diagnosis Date    Arthritis     Carotid stenosis, asymptomatic     Headache(784.0)     Hypothyroid     Movement disorder     Neuropathy      Past Surgical History:   Procedure Laterality Date    BREAST BIOPSY Right     CAUDAL EPIDURAL STEROID INJECTION N/A 2022    Procedure: INJECTION, STEROID, SPINE, EPIDURAL, CAUDAL;  Surgeon: Tony Castle MD;  Location: [facility] OR;  Service: Pain Management;  Laterality: N/A;     SECTION      x2    COLONOSCOPY N/A 2017    Procedure: COLONOSCOPY Miralax;  Surgeon: Quentin Mercado Jr., MD;  Location: [facility] ENDO;  Service: Endoscopy;  Laterality: N/A;    COLONOSCOPY N/A 2021    Procedure: COLONOSCOPY;  Surgeon: Andrew Parekh MD;  Location: [facility];  Service: Endoscopy;  Laterality: N/A;    ESOPHAGOGASTRODUODENOSCOPY N/A 2021    Procedure: EGD (ESOPHAGOGASTRODUODENOSCOPY);  Surgeon: Andrew Parekh MD;  Location: [facility];  Service: Endoscopy;  Laterality: N/A;    HYSTERECTOMY      INJECTION OF ANESTHETIC AGENT AROUND MEDIAL BRANCH NERVES INNERVATING LUMBAR FACET JOINT Right 2019    Procedure: BLOCK, NERVE, FACET JOINT, LUMBAR, MEDIAL BRANCH ;  Surgeon: Sixto Moya III, MD;  Location: [facility] OR;  Service: Pain Management;  Laterality: Right;  - L4/5 & L5/S1  PATIENT NEEDS TO BE THE FIRST PATIENT OF THE DAY, PER DR. MOYA!!!    LAPAROSCOPIC HYSTERECTOMY  2010    supracervical/BSO    OOPHORECTOMY      SPINE SURGERY      TONSILLECTOMY      TRANSFORAMINAL EPIDURAL INJECTION OF STEROID Right 2019    Procedure: INJECTION, STEROID, EPIDURAL, TRANSFORAMINAL APPROACH;  Surgeon: Sixto Moya III, MD;  Location: [facility] OR;  Service: Pain Management;  Laterality: Right;  -right TFESI L4/5    TRANSFORAMINAL EPIDURAL INJECTION OF STEROID Bilateral 2021    Procedure: INJECTION, STEROID, EPIDURAL, TRANSFORAMINAL  APPROACH;  Surgeon: Sixto Moya III, MD;  Location: [facility] OR;  Service: Pain Management;  Laterality: Bilateral;  L5    TRANSFORAMINAL EPIDURAL INJECTION OF STEROID Bilateral 9/30/2022    Procedure: INJECTION, STEROID, EPIDURAL, TRANSFORAMINAL APPROACH;  Surgeon: Tony Castle MD;  Location: [facility] OR;  Service: Pain Management;  Laterality: Bilateral;     Review of patient's allergies indicates:  No Known Allergies     PMHx, PSHx, Allergies, Medications reviewed in epic      ROS negative except pain complaints in HPI    OBJECTIVE:    /69 (BP Location: Right arm, Patient Position: Lying)   Pulse 98   Temp 98.3 °F (36.8 °C) (Oral)   Resp 16   Ht 5' (1.524 m)   Wt 56.7 kg (125 lb)   LMP 01/01/2001   SpO2 99%   Breastfeeding No   BMI 24.41 kg/m²     PHYSICAL EXAMINATION:    GENERAL: Well appearing, in no acute distress, alert and oriented x3.  PSYCH:  Mood and affect appropriate.  SKIN: Skin color, texture, turgor normal, no rashes or lesions.  CV: RRR with palpation of the radial artery.  PULM: No evidence of respiratory difficulty, symmetric chest rise. Clear to auscultation.  NEURO: Cranial nerves grossly intact.    Plan:    Proceed with procedure as planned    Kwesi Herrera  02/24/2023

## 2023-02-27 ENCOUNTER — TELEPHONE (OUTPATIENT)
Dept: PODIATRY | Facility: CLINIC | Age: 73
End: 2023-02-27
Payer: MEDICARE

## 2023-02-27 NOTE — TELEPHONE ENCOUNTER
Called pt back and she said she isn't sure why she got scheduled for this appt tomorrow to see Dr. Porter. Explained to her if she thinks she don't need to be seen that appointment can be cancelled and if she is in need of a new appointment she always can call and schedule a new appointment. She was happy with that and said she will do

## 2023-02-27 NOTE — TELEPHONE ENCOUNTER
----- Message from Lucretia Thomas sent at 2/27/2023 11:04 AM CST -----  Pt Requesting Call Back    Who called: pt  Who called for pt:  Best call back #: [phone]  Add notes: pt said it's concerning if should she go to the appt tomorrow or not

## 2023-02-28 ENCOUNTER — EXTERNAL CHRONIC CARE MANAGEMENT (OUTPATIENT)
Dept: PRIMARY CARE CLINIC | Facility: CLINIC | Age: 73
End: 2023-02-28
Payer: MEDICARE

## 2023-02-28 PROCEDURE — 99490 PR CHRONIC CARE MGMT, 1ST 20 MIN: ICD-10-PCS | Mod: S$GLB,,, | Performed by: FAMILY MEDICINE

## 2023-02-28 PROCEDURE — 99490 CHRNC CARE MGMT STAFF 1ST 20: CPT | Mod: S$GLB,,, | Performed by: FAMILY MEDICINE

## 2023-02-28 PROCEDURE — 99439 CHRNC CARE MGMT STAF EA ADDL: CPT | Mod: S$GLB,,, | Performed by: FAMILY MEDICINE

## 2023-02-28 PROCEDURE — 99439 PR CHRONIC CARE MGMT, EA ADDTL 20 MIN: ICD-10-PCS | Mod: S$GLB,,, | Performed by: FAMILY MEDICINE

## 2023-03-03 ENCOUNTER — PATIENT MESSAGE (OUTPATIENT)
Dept: ADMINISTRATIVE | Facility: OTHER | Age: 73
End: 2023-03-03
Payer: MEDICARE

## 2023-03-03 ENCOUNTER — OFFICE VISIT (OUTPATIENT)
Dept: PAIN MEDICINE | Facility: CLINIC | Age: 73
End: 2023-03-03
Payer: MEDICARE

## 2023-03-03 ENCOUNTER — HOSPITAL ENCOUNTER (OUTPATIENT)
Dept: RADIOLOGY | Facility: OTHER | Age: 73
Discharge: HOME | End: 2023-03-03
Attending: ANESTHESIOLOGY
Payer: MEDICARE

## 2023-03-03 VITALS
BODY MASS INDEX: 24.23 KG/M2 | DIASTOLIC BLOOD PRESSURE: 67 MMHG | HEART RATE: 81 BPM | SYSTOLIC BLOOD PRESSURE: 112 MMHG | WEIGHT: 123.44 LBS | HEIGHT: 60 IN | TEMPERATURE: 98 F | RESPIRATION RATE: 18 BRPM

## 2023-03-03 DIAGNOSIS — Z98.890 S/P LUMBAR LAMINECTOMY: ICD-10-CM

## 2023-03-03 DIAGNOSIS — M96.1 POST LAMINECTOMY SYNDROME: ICD-10-CM

## 2023-03-03 DIAGNOSIS — G89.4 CHRONIC PAIN SYNDROME: Primary | ICD-10-CM

## 2023-03-03 DIAGNOSIS — M54.6 PAIN IN THORACIC SPINE: ICD-10-CM

## 2023-03-03 DIAGNOSIS — G89.4 CHRONIC PAIN SYNDROME: ICD-10-CM

## 2023-03-03 PROCEDURE — 72100 X-RAY EXAM L-S SPINE 2/3 VWS: CPT | Mod: 26,,, | Performed by: RADIOLOGY

## 2023-03-03 PROCEDURE — 72100 X-RAY EXAM L-S SPINE 2/3 VWS: CPT | Mod: TC,FY

## 2023-03-03 PROCEDURE — 72070 X-RAY EXAM THORAC SPINE 2VWS: CPT | Mod: TC,FY

## 2023-03-03 PROCEDURE — 72070 X-RAY EXAM THORAC SPINE 2VWS: CPT | Mod: 26,,, | Performed by: RADIOLOGY

## 2023-03-03 PROCEDURE — 99999 PR PBB SHADOW E&M-EST. PATIENT-LVL IV: CPT | Mod: PBBFAC,,, | Performed by: NURSE PRACTITIONER

## 2023-03-03 PROCEDURE — 99214 OFFICE O/P EST MOD 30 MIN: CPT | Mod: PBBFAC | Performed by: NURSE PRACTITIONER

## 2023-03-03 PROCEDURE — 72100 XR LUMBAR SPINE AP AND LATERAL: ICD-10-PCS | Mod: 26,,, | Performed by: RADIOLOGY

## 2023-03-03 PROCEDURE — 99024 POSTOP FOLLOW-UP VISIT: CPT | Mod: POP,,, | Performed by: NURSE PRACTITIONER

## 2023-03-03 PROCEDURE — 99024 PR POST-OP FOLLOW-UP VISIT: ICD-10-PCS | Mod: POP,,, | Performed by: NURSE PRACTITIONER

## 2023-03-03 PROCEDURE — 72070 XR THORACIC SPINE AP LATERAL: ICD-10-PCS | Mod: 26,,, | Performed by: RADIOLOGY

## 2023-03-03 PROCEDURE — 99999 PR PBB SHADOW E&M-EST. PATIENT-LVL IV: ICD-10-PCS | Mod: PBBFAC,,, | Performed by: NURSE PRACTITIONER

## 2023-03-03 NOTE — H&P (VIEW-ONLY)
Chronic Pain - Established Visit    Referring Physician: No ref. provider found    Chief Complaint:   Chief Complaint   Patient presents with    Low-back Pain          SUBJECTIVE:    Interval History 3/3/2023:  The patient returns to clinic today for follow up of low back pain. She is s/p [employer] SCS trial on 2/24/2023. She reports her pain has improved 60-70% since this. She reports increased activity during trial without pain. She denies any fevers, chills, or drainage at the site. She is interested in moving forward with the SCS implant. Her pain today is 2/10.    HPI:  Florentino Bowman presents to the clinic for the evaluation of low back and Right thigh pain. Patient here by referral to discuss SCS. Hx of L4/5 laminectomy x2 (2017 and 2018). The pain started in 2015 with development of radicular symptoms in 2017 ago following no specific inciting event, however, she does attribute the back pain to lifting. Patient followed by neurosurgery and pain management since 2017. Symptoms have been worsening.The pain is located in the low lumbar spine area and radiates to the right leg and anterior thigh in L5 distribution. Patient also endorses bilateral calf and foot pain (L >R), w/ possible neuroma in Left great toe that is being followed by podiatry (procedure planned for January 2023). The pain is described as aching, sharp, stabbing, and tingling and is rated as 1/10. The pain is rated with a score of  1/10 on the BEST day and a score of 9/10 on the WORST day.  Symptoms interfere with daily activity and work. The pain is exacerbated by Walking, Night Time, Lifting, and Getting out of bed/chair.  The pain is mitigated by ice, laying down, medications, physical therapy, rest, and sitting. The patient reports 8 hours of uninterrupted sleep per night.    Patient denies night fever/night sweats, urinary incontinence, bowel incontinence, and significant weight loss. Endorses weakness in bilateral quads and  numbness in feet (L>R)    Physical Therapy/Home Exercise: yes      Pain Disability Index Review:  Last 3 PDI Scores 3/3/2023 11/21/2022 2/13/2019   Pain Disability Index (PDI) 10 35 18       Pain Medications:  Current  - Alprazolam (XANAX) 0.5 mg   - Duloxetine (CYMBALTA) 30mg BID  - Pregabalin (LYRICA) 100mg BID  - Hydrocodone (NORCO) 5-325 mg QD  - Zolpidem (AMBIEN) 10mg QHS  - Ibuprofen PRN    Previous;  - Percocet    Therapies:  PT: actively in PT (started 11/2022)  Acupuncture: minimal relief  Dry needling: minimal relief  Massage: some relief  TENS: Helpful  Chiropractor: No     report:  Reviewed and consistent with medication use as prescribed.    Pain Procedures:     2/24/2023 - [employer] SCS trial - Darrian Duran MD  9/30/2022 - Bilateral L5 TFESI - Tony Castle M.D.  8/12/2022 - Caudal MICHAEL -  Tony Castle M.D.   2/19/2021 - Bilateral L5/S1 TFESI - Sixto Moya III, MD   6/5/2019 - Right L4/5 DRAKEESI - Sixto Moya III, MD   1/16/2019 - RIGHT L4/5 AND L5/S1 MEDIAL BRANCH NERVE BLOCK - Sixto Moya III, MD  6/15/2018 - Right L4/5 APOORVA Moya III, MD  5/30/2017 - Right L4/5 APOORVA Moya III, MD    Imaging:   EXAMINATION:  MRI THORACIC SPINE WITHOUT CONTRAST (12/06/22)     CLINICAL HISTORY:  Mid-back pain;  Other specified postprocedural states     TECHNIQUE:  Multiplanar, multisequence images were performed through the thoracic spine.  Contrast was not administered.     COMPARISON:  MRI lumbar spine 05/13/2022     FINDINGS:  ALIGNMENT: Normal.     BONE: No compression fractures.  Few fat containing lesions, the largest in the L2 vertebral body, likely hemangiomas.  No aggressive marrow replacing lesion.     JOINT: Multilevel disc desiccation.  Intervertebral disc heights are relatively preserved.  No bone marrow edema.  No significant canal or foraminal stenosis.     SPINAL CANAL: The thoracic spinal cord is unremarkable.   The conus medullaris has a normal appearance and terminates at the L1 level.  No epidural mass or collection.     PARASPINAL SOFT TISSUES: Unremarkable.     Impression:     No specific findings to explain mid-back pain.      EXAMINATION:  MRI LUMBAR SPINE WITHOUT CONTRAST (5/13/22)     CLINICAL HISTORY:  Low back pain, trauma; Dorsalgia, unspecified     TECHNIQUE:  Multiplanar, multisequence MR images were acquired from the thoracolumbar junction to the sacrum without the administration of contrast.     FINDINGS:  The lumbar spinal alignment is significant for minimal anterolisthesis of L3 in relation to L4.  The vertebral body heights are satisfactorily preserved.  There is loss of disc space height with degenerative endplate change and disc desiccation identified from L3-4 through L5-S1.  The cord ends at L1.  The terminal cord, conus, and cauda equina have a normal appearance.  There is no intradural abnormality.  There is no evidence of an acute marrow replacement process such as tumor or infection.  There is no fracture.  The visualized surrounding soft tissues and vascular structures are unremarkable.     L1-2: Unremarkable.     L2-3: Facet hypertrophy.     L3-4: Facet hypertrophy with a disc bulge which creates mild left-sided neural foraminal stenosis and mild central canal stenosis.     L4-5: Facet hypertrophy with a disc bulge which creates mild bilateral neural foraminal stenosis.     L5-S1: Facet hypertrophy with a disc bulge which creates mild bilateral neural foraminal stenosis.     Impression:     Multilevel degenerative change as outlined above.    Past Medical History:   Diagnosis Date    Arthritis     Carotid stenosis, asymptomatic     Headache(784.0)     Hypothyroid     Movement disorder     Neuropathy      Past Surgical History:   Procedure Laterality Date    BREAST BIOPSY Right     CAUDAL EPIDURAL STEROID INJECTION N/A 8/12/2022    Procedure: INJECTION, STEROID, SPINE, EPIDURAL, CAUDAL;   Surgeon: Tony Castle MD;  Location: [facility] OR;  Service: Pain Management;  Laterality: N/A;     SECTION      x2    COLONOSCOPY N/A 2017    Procedure: COLONOSCOPY Miralax;  Surgeon: Quentin Mercado Jr., MD;  Location: [facility];  Service: Endoscopy;  Laterality: N/A;    COLONOSCOPY N/A 2021    Procedure: COLONOSCOPY;  Surgeon: Andrew Parekh MD;  Location: [facility] ENDO;  Service: Endoscopy;  Laterality: N/A;    ESOPHAGOGASTRODUODENOSCOPY N/A 2021    Procedure: EGD (ESOPHAGOGASTRODUODENOSCOPY);  Surgeon: Andrew Parekh MD;  Location: [facility];  Service: Endoscopy;  Laterality: N/A;    HYSTERECTOMY      INJECTION OF ANESTHETIC AGENT AROUND MEDIAL BRANCH NERVES INNERVATING LUMBAR FACET JOINT Right 2019    Procedure: BLOCK, NERVE, FACET JOINT, LUMBAR, MEDIAL BRANCH ;  Surgeon: Sixto Moya III, MD;  Location: [facility] OR;  Service: Pain Management;  Laterality: Right;  - L4/5 & L5/S1  PATIENT NEEDS TO BE THE FIRST PATIENT OF THE DAY, PER DR. MOYA!!!    LAPAROSCOPIC HYSTERECTOMY  2010    supracervical/BSO    OOPHORECTOMY      SPINE SURGERY      TONSILLECTOMY      TRANSFORAMINAL EPIDURAL INJECTION OF STEROID Right 2019    Procedure: INJECTION, STEROID, EPIDURAL, TRANSFORAMINAL APPROACH;  Surgeon: Sixto Moya III, MD;  Location: [facility] OR;  Service: Pain Management;  Laterality: Right;  -right TFESI L4/5    TRANSFORAMINAL EPIDURAL INJECTION OF STEROID Bilateral 2021    Procedure: INJECTION, STEROID, EPIDURAL, TRANSFORAMINAL APPROACH;  Surgeon: Sixto Moya III, MD;  Location: [facility] OR;  Service: Pain Management;  Laterality: Bilateral;  L5    TRANSFORAMINAL EPIDURAL INJECTION OF STEROID Bilateral 2022    Procedure: INJECTION, STEROID, EPIDURAL, TRANSFORAMINAL APPROACH;  Surgeon: Tony Castle MD;  Location: [facility] OR;  Service: Pain Management;  Laterality: Bilateral;    TRIAL OF SPINAL CORD NERVE STIMULATOR N/A 2023    Procedure:  SPINAL CORD STIMULATOR TRIAL [place] REP *OBED ONLY*;  Surgeon: Darrian Duran MD;  Location: [facility] PAIN MGT;  Service: Pain Management;  Laterality: N/A;     Social History     Socioeconomic History    Marital status:    Occupational History    Occupation: retired    Tobacco Use    Smoking status: Never    Smokeless tobacco: Never   Substance and Sexual Activity    Alcohol use: Yes     Comment: social    Drug use: Never    Sexual activity: Not Currently     Partners: Male   Social History Narrative    , 2 adult children and exercises regularly-rides bike on the [employer],     Social Determinants of Health     Financial Resource Strain: Low Risk     Difficulty of Paying Living Expenses: Not very hard   Food Insecurity: No Food Insecurity    Worried About Running Out of Food in the Last Year: Never true    Ran Out of Food in the Last Year: Never true   Transportation Needs: No Transportation Needs    Lack of Transportation (Medical): No    Lack of Transportation (Non-Medical): No   Physical Activity: Insufficiently Active    Days of Exercise per Week: 1 day    Minutes of Exercise per Session: 20 min   Stress: Stress Concern Present    Feeling of Stress : To some extent   Social Connections: Unknown    Frequency of Communication with Friends and Family: Patient refused    Frequency of Social Gatherings with Friends and Family: Once a week    Active Member of Clubs or Organizations: Yes    Attends Club or Organization Meetings: More than 4 times per year    Marital Status:    Housing Stability: Low Risk     Unable to Pay for Housing in the Last Year: No    Number of Places Lived in the Last Year: 1    Unstable Housing in the Last Year: No     Family History   Problem Relation Age of Onset    Dementia Mother     Hypertension Mother     Heart disease Mother     Cancer Father         colon    Colon cancer Father     Cancer Sister         breast    Breast cancer Sister     Heart disease  Sister     No Known Problems Daughter     No Known Problems Son     No Known Problems Sister     Parkinsonism Maternal Uncle     Stroke Maternal Grandmother     Aneurysm Neg Hx     Clotting disorder Neg Hx     Diabetes Neg Hx     Fainting Neg Hx     Hyperlipidemia Neg Hx     Kidney disease Neg Hx     Liver disease Neg Hx     Migraines Neg Hx     Neuropathy Neg Hx     Seizures Neg Hx     Tremor Neg Hx        Review of patient's allergies indicates:  No Known Allergies    Current Outpatient Medications   Medication Sig    ALPRAZolam (XANAX) 0.5 MG tablet Take 1 tablet (0.5 mg total) by mouth daily as needed for Anxiety. PRN ANXIETY    atorvastatin (LIPITOR) 40 MG tablet Take 1 tablet (40 mg total) by mouth once daily.    DULoxetine (CYMBALTA) 30 MG capsule Take 1 capsule (30 mg total) by mouth 2 (two) times daily.    estradioL (ESTRACE) 0.5 MG tablet Take 1 tablet (0.5 mg total) by mouth every evening.    HYDROcodone-acetaminophen (NORCO) 5-325 mg per tablet Take 1 tablet by mouth daily as needed.    levothyroxine (SYNTHROID) 150 MCG tablet Take 1 tablet (150 mcg total) by mouth once daily. For thyroid    lubiprostone (AMITIZA) 24 MCG Cap Take 1 capsule (24 mcg total) by mouth 2 (two) times daily with meals. For constipation    magnesium citrate 100 mg Cap     pregabalin (LYRICA) 100 MG capsule Take 100 mg by mouth 2 (two) times daily.    sumatriptan (IMITREX) 100 MG tablet TAKE ONE TABLET BY MOUTH AS NEEDED FOR HEADACHE REPEAT IN 2 HOURS IF NEEDED    tumeric-ging-olive-oreg-capryl 100 mg-150 mg- 50 mg-150 mg Cap Take by mouth.    zolpidem (AMBIEN) 10 mg Tab Take 1 tablet (10 mg total) by mouth every evening.    carbidopa-levodopa  mg (SINEMET)  mg per tablet Take 0.5 tablets by mouth 3 (three) times daily.     No current facility-administered medications for this visit.     Facility-Administered Medications Ordered in Other Visits   Medication    0.9%  NaCl infusion    0.9%  NaCl infusion    sodium  chloride 0.9% flush 10 mL    sodium chloride 0.9% flush 10 mL       REVIEW OF SYSTEMS:    GENERAL:  No weight loss, malaise or fevers.  HEENT:  Negative for frequent or significant headaches.  NECK:  Negative for lumps, goiter, pain and significant neck swelling.  RESPIRATORY:  Negative for cough, wheezing or shortness of breath.  CARDIOVASCULAR:  Negative for chest pain, leg swelling or palpitations. Carotid stenosis  GI:  Negative for abdominal discomfort, blood in stools or black stools or change in bowel habits.  ENDO: Hypothyroid  MUSCULOSKELETAL:  See HPI.  SKIN:  Negative for lesions, rash, and itching.  PSYCH:  Negative for sleep disturbance, mood disorder and recent psychosocial stressors.  HEMATOLOGY/LYMPHOLOGY:  Negative for prolonged bleeding, bruising easily or swollen nodes.  NEURO:   No history of headaches, syncope, paralysis, seizures or tremors. Headaches  All other reviewed and negative other than HPI.    OBJECTIVE:    Resp 18   Ht 5' (1.524 m)   Wt 56 kg (123 lb 7.3 oz)   LMP 01/01/2001   BMI 24.11 kg/m²     PHYSICAL EXAMINATION:    General appearance: Well appearing, in no acute distress, alert and oriented x3.  Psych:  Mood and affect appropriate.  Skin: Skin color, texture, turgor normal, no rashes or lesions, in both upper and lower body. SCS site without drainage or signs of infection.  Leads removed without difficulty with tips intact.  Area cleaned with alcohol and Bacitracin applied.  Gait: normal.    ASSESSMENT: 72 y.o. year old female with low back and right leg pain, consistent with post laminectomy syndrome     1. Pain in thoracic spine        2. S/P lumbar laminectomy        3. Post laminectomy syndrome        4. Chronic pain syndrome          PLAN:     - Imaging from today reviewed. Leads terminate at top of T8    - S/P [employer] SCS trial with 60-70% relief. Leads removed today.    -Schedule [employer] SCS implant     - Continue current medication regimen    -  Counseled patient regarding the importance of activity modification, constant sleeping habits, and physical therapy.    -- RTC 1 week after SCS implant    The above plan and management options were discussed at length with patient. Patient is in agreement with the above and verbalized understanding. It will be communicated with the referring physician via electronic record, fax, or mail.      Donna Castillo NP  03/03/2023

## 2023-03-03 NOTE — PROGRESS NOTES
Chronic Pain - Established Visit    Referring Physician: No ref. provider found    Chief Complaint:   Chief Complaint   Patient presents with    Low-back Pain          SUBJECTIVE:    Interval History 3/3/2023:  The patient returns to clinic today for follow up of low back pain. She is s/p [employer] SCS trial on 2/24/2023. She reports her pain has improved 60-70% since this. She reports increased activity during trial without pain. She denies any fevers, chills, or drainage at the site. She is interested in moving forward with the SCS implant. Her pain today is 2/10.    HPI:  Florentino Bowman presents to the clinic for the evaluation of low back and Right thigh pain. Patient here by referral to discuss SCS. Hx of L4/5 laminectomy x2 (2017 and 2018). The pain started in 2015 with development of radicular symptoms in 2017 ago following no specific inciting event, however, she does attribute the back pain to lifting. Patient followed by neurosurgery and pain management since 2017. Symptoms have been worsening.The pain is located in the low lumbar spine area and radiates to the right leg and anterior thigh in L5 distribution. Patient also endorses bilateral calf and foot pain (L >R), w/ possible neuroma in Left great toe that is being followed by podiatry (procedure planned for January 2023). The pain is described as aching, sharp, stabbing, and tingling and is rated as 1/10. The pain is rated with a score of  1/10 on the BEST day and a score of 9/10 on the WORST day.  Symptoms interfere with daily activity and work. The pain is exacerbated by Walking, Night Time, Lifting, and Getting out of bed/chair.  The pain is mitigated by ice, laying down, medications, physical therapy, rest, and sitting. The patient reports 8 hours of uninterrupted sleep per night.    Patient denies night fever/night sweats, urinary incontinence, bowel incontinence, and significant weight loss. Endorses weakness in bilateral quads and  numbness in feet (L>R)    Physical Therapy/Home Exercise: yes      Pain Disability Index Review:  Last 3 PDI Scores 3/3/2023 11/21/2022 2/13/2019   Pain Disability Index (PDI) 10 35 18       Pain Medications:  Current  - Alprazolam (XANAX) 0.5 mg   - Duloxetine (CYMBALTA) 30mg BID  - Pregabalin (LYRICA) 100mg BID  - Hydrocodone (NORCO) 5-325 mg QD  - Zolpidem (AMBIEN) 10mg QHS  - Ibuprofen PRN    Previous;  - Percocet    Therapies:  PT: actively in PT (started 11/2022)  Acupuncture: minimal relief  Dry needling: minimal relief  Massage: some relief  TENS: Helpful  Chiropractor: No     report:  Reviewed and consistent with medication use as prescribed.    Pain Procedures:     2/24/2023 - [employer] SCS trial - Darrian Duran MD  9/30/2022 - Bilateral L5 TFESI - Tony Castle M.D.  8/12/2022 - Caudal MICHAEL -  Tony Castle M.D.   2/19/2021 - Bilateral L5/S1 TFESI - Sixto Moya III, MD   6/5/2019 - Right L4/5 DRAKEESI - Sixto Moya III, MD   1/16/2019 - RIGHT L4/5 AND L5/S1 MEDIAL BRANCH NERVE BLOCK - Sixto Moya III, MD  6/15/2018 - Right L4/5 APOORVA Moya III, MD  5/30/2017 - Right L4/5 APOORVA Moya III, MD    Imaging:   EXAMINATION:  MRI THORACIC SPINE WITHOUT CONTRAST (12/06/22)     CLINICAL HISTORY:  Mid-back pain;  Other specified postprocedural states     TECHNIQUE:  Multiplanar, multisequence images were performed through the thoracic spine.  Contrast was not administered.     COMPARISON:  MRI lumbar spine 05/13/2022     FINDINGS:  ALIGNMENT: Normal.     BONE: No compression fractures.  Few fat containing lesions, the largest in the L2 vertebral body, likely hemangiomas.  No aggressive marrow replacing lesion.     JOINT: Multilevel disc desiccation.  Intervertebral disc heights are relatively preserved.  No bone marrow edema.  No significant canal or foraminal stenosis.     SPINAL CANAL: The thoracic spinal cord is unremarkable.   The conus medullaris has a normal appearance and terminates at the L1 level.  No epidural mass or collection.     PARASPINAL SOFT TISSUES: Unremarkable.     Impression:     No specific findings to explain mid-back pain.      EXAMINATION:  MRI LUMBAR SPINE WITHOUT CONTRAST (5/13/22)     CLINICAL HISTORY:  Low back pain, trauma; Dorsalgia, unspecified     TECHNIQUE:  Multiplanar, multisequence MR images were acquired from the thoracolumbar junction to the sacrum without the administration of contrast.     FINDINGS:  The lumbar spinal alignment is significant for minimal anterolisthesis of L3 in relation to L4.  The vertebral body heights are satisfactorily preserved.  There is loss of disc space height with degenerative endplate change and disc desiccation identified from L3-4 through L5-S1.  The cord ends at L1.  The terminal cord, conus, and cauda equina have a normal appearance.  There is no intradural abnormality.  There is no evidence of an acute marrow replacement process such as tumor or infection.  There is no fracture.  The visualized surrounding soft tissues and vascular structures are unremarkable.     L1-2: Unremarkable.     L2-3: Facet hypertrophy.     L3-4: Facet hypertrophy with a disc bulge which creates mild left-sided neural foraminal stenosis and mild central canal stenosis.     L4-5: Facet hypertrophy with a disc bulge which creates mild bilateral neural foraminal stenosis.     L5-S1: Facet hypertrophy with a disc bulge which creates mild bilateral neural foraminal stenosis.     Impression:     Multilevel degenerative change as outlined above.    Past Medical History:   Diagnosis Date    Arthritis     Carotid stenosis, asymptomatic     Headache(784.0)     Hypothyroid     Movement disorder     Neuropathy      Past Surgical History:   Procedure Laterality Date    BREAST BIOPSY Right     CAUDAL EPIDURAL STEROID INJECTION N/A 8/12/2022    Procedure: INJECTION, STEROID, SPINE, EPIDURAL, CAUDAL;   Surgeon: Tony Castle MD;  Location: On license of [facility] OR;  Service: Pain Management;  Laterality: N/A;     SECTION      x2    COLONOSCOPY N/A 2017    Procedure: COLONOSCOPY Miralax;  Surgeon: Quentin Mercado Jr., MD;  Location: [facility];  Service: Endoscopy;  Laterality: N/A;    COLONOSCOPY N/A 2021    Procedure: COLONOSCOPY;  Surgeon: Andrew Parekh MD;  Location: [facility] ENDO;  Service: Endoscopy;  Laterality: N/A;    ESOPHAGOGASTRODUODENOSCOPY N/A 2021    Procedure: EGD (ESOPHAGOGASTRODUODENOSCOPY);  Surgeon: Andrew Parekh MD;  Location: [facility];  Service: Endoscopy;  Laterality: N/A;    HYSTERECTOMY      INJECTION OF ANESTHETIC AGENT AROUND MEDIAL BRANCH NERVES INNERVATING LUMBAR FACET JOINT Right 2019    Procedure: BLOCK, NERVE, FACET JOINT, LUMBAR, MEDIAL BRANCH ;  Surgeon: Sixto Moya III, MD;  Location: On license of [facility] OR;  Service: Pain Management;  Laterality: Right;  - L4/5 & L5/S1  PATIENT NEEDS TO BE THE FIRST PATIENT OF THE DAY, PER DR. MOYA!!!    LAPAROSCOPIC HYSTERECTOMY  2010    supracervical/BSO    OOPHORECTOMY      SPINE SURGERY      TONSILLECTOMY      TRANSFORAMINAL EPIDURAL INJECTION OF STEROID Right 2019    Procedure: INJECTION, STEROID, EPIDURAL, TRANSFORAMINAL APPROACH;  Surgeon: Sixto Moya III, MD;  Location: On license of [facility] OR;  Service: Pain Management;  Laterality: Right;  -right TFESI L4/5    TRANSFORAMINAL EPIDURAL INJECTION OF STEROID Bilateral 2021    Procedure: INJECTION, STEROID, EPIDURAL, TRANSFORAMINAL APPROACH;  Surgeon: Sixto Moya III, MD;  Location: On license of [facility] OR;  Service: Pain Management;  Laterality: Bilateral;  L5    TRANSFORAMINAL EPIDURAL INJECTION OF STEROID Bilateral 2022    Procedure: INJECTION, STEROID, EPIDURAL, TRANSFORAMINAL APPROACH;  Surgeon: Tony Castle MD;  Location: On license of [facility] OR;  Service: Pain Management;  Laterality: Bilateral;    TRIAL OF SPINAL CORD NERVE STIMULATOR N/A 2023    Procedure:  SPINAL CORD STIMULATOR TRIAL [place] REP *[facility]ED ONLY*;  Surgeon: Darrian Duran MD;  Location: [facility] PAIN MGT;  Service: Pain Management;  Laterality: N/A;     Social History     Socioeconomic History    Marital status:    Occupational History    Occupation: retired    Tobacco Use    Smoking status: Never    Smokeless tobacco: Never   Substance and Sexual Activity    Alcohol use: Yes     Comment: social    Drug use: Never    Sexual activity: Not Currently     Partners: Male   Social History Narrative    , 2 adult children and exercises regularly-rides bike on the [email or web address],     Social Determinants of Health     Financial Resource Strain: Low Risk     Difficulty of Paying Living Expenses: Not very hard   Food Insecurity: No Food Insecurity    Worried About Running Out of Food in the Last Year: Never true    Ran Out of Food in the Last Year: Never true   Transportation Needs: No Transportation Needs    Lack of Transportation (Medical): No    Lack of Transportation (Non-Medical): No   Physical Activity: Insufficiently Active    Days of Exercise per Week: 1 day    Minutes of Exercise per Session: 20 min   Stress: Stress Concern Present    Feeling of Stress : To some extent   Social Connections: Unknown    Frequency of Communication with Friends and Family: Patient refused    Frequency of Social Gatherings with Friends and Family: Once a week    Active Member of Clubs or Organizations: Yes    Attends Club or Organization Meetings: More than 4 times per year    Marital Status:    Housing Stability: Low Risk     Unable to Pay for Housing in the Last Year: No    Number of Places Lived in the Last Year: 1    Unstable Housing in the Last Year: No     Family History   Problem Relation Age of Onset    Dementia Mother     Hypertension Mother     Heart disease Mother     Cancer Father         colon    Colon cancer Father     Cancer Sister         breast    Breast cancer Sister     Heart disease  Sister     No Known Problems Daughter     No Known Problems Son     No Known Problems Sister     Parkinsonism Maternal Uncle     Stroke Maternal Grandmother     Aneurysm Neg Hx     Clotting disorder Neg Hx     Diabetes Neg Hx     Fainting Neg Hx     Hyperlipidemia Neg Hx     Kidney disease Neg Hx     Liver disease Neg Hx     Migraines Neg Hx     Neuropathy Neg Hx     Seizures Neg Hx     Tremor Neg Hx        Review of patient's allergies indicates:  No Known Allergies    Current Outpatient Medications   Medication Sig    ALPRAZolam (XANAX) 0.5 MG tablet Take 1 tablet (0.5 mg total) by mouth daily as needed for Anxiety. PRN ANXIETY    atorvastatin (LIPITOR) 40 MG tablet Take 1 tablet (40 mg total) by mouth once daily.    DULoxetine (CYMBALTA) 30 MG capsule Take 1 capsule (30 mg total) by mouth 2 (two) times daily.    estradioL (ESTRACE) 0.5 MG tablet Take 1 tablet (0.5 mg total) by mouth every evening.    HYDROcodone-acetaminophen (NORCO) 5-325 mg per tablet Take 1 tablet by mouth daily as needed.    levothyroxine (SYNTHROID) 150 MCG tablet Take 1 tablet (150 mcg total) by mouth once daily. For thyroid    lubiprostone (AMITIZA) 24 MCG Cap Take 1 capsule (24 mcg total) by mouth 2 (two) times daily with meals. For constipation    magnesium citrate 100 mg Cap     pregabalin (LYRICA) 100 MG capsule Take 100 mg by mouth 2 (two) times daily.    sumatriptan (IMITREX) 100 MG tablet TAKE ONE TABLET BY MOUTH AS NEEDED FOR HEADACHE REPEAT IN 2 HOURS IF NEEDED    tumeric-ging-olive-oreg-capryl 100 mg-150 mg- 50 mg-150 mg Cap Take by mouth.    zolpidem (AMBIEN) 10 mg Tab Take 1 tablet (10 mg total) by mouth every evening.    carbidopa-levodopa  mg (SINEMET)  mg per tablet Take 0.5 tablets by mouth 3 (three) times daily.     No current facility-administered medications for this visit.     Facility-Administered Medications Ordered in Other Visits   Medication    0.9%  NaCl infusion    0.9%  NaCl infusion    sodium  chloride 0.9% flush 10 mL    sodium chloride 0.9% flush 10 mL       REVIEW OF SYSTEMS:    GENERAL:  No weight loss, malaise or fevers.  HEENT:  Negative for frequent or significant headaches.  NECK:  Negative for lumps, goiter, pain and significant neck swelling.  RESPIRATORY:  Negative for cough, wheezing or shortness of breath.  CARDIOVASCULAR:  Negative for chest pain, leg swelling or palpitations. Carotid stenosis  GI:  Negative for abdominal discomfort, blood in stools or black stools or change in bowel habits.  ENDO: Hypothyroid  MUSCULOSKELETAL:  See HPI.  SKIN:  Negative for lesions, rash, and itching.  PSYCH:  Negative for sleep disturbance, mood disorder and recent psychosocial stressors.  HEMATOLOGY/LYMPHOLOGY:  Negative for prolonged bleeding, bruising easily or swollen nodes.  NEURO:   No history of headaches, syncope, paralysis, seizures or tremors. Headaches  All other reviewed and negative other than HPI.    OBJECTIVE:    Resp 18   Ht 5' (1.524 m)   Wt 56 kg (123 lb 7.3 oz)   LMP 01/01/2001   BMI 24.11 kg/m²     PHYSICAL EXAMINATION:    General appearance: Well appearing, in no acute distress, alert and oriented x3.  Psych:  Mood and affect appropriate.  Skin: Skin color, texture, turgor normal, no rashes or lesions, in both upper and lower body. SCS site without drainage or signs of infection.  Leads removed without difficulty with tips intact.  Area cleaned with alcohol and Bacitracin applied.  Gait: normal.    ASSESSMENT: 72 y.o. year old female with low back and right leg pain, consistent with post laminectomy syndrome     1. Pain in thoracic spine        2. S/P lumbar laminectomy        3. Post laminectomy syndrome        4. Chronic pain syndrome          PLAN:     - Imaging from today reviewed. Leads terminate at top of T8    - S/P [employer] SCS trial with 60-70% relief. Leads removed today.    -Schedule [employer] SCS implant     - Continue current medication regimen    -  Counseled patient regarding the importance of activity modification, constant sleeping habits, and physical therapy.    -- RTC 1 week after SCS implant    The above plan and management options were discussed at length with patient. Patient is in agreement with the above and verbalized understanding. It will be communicated with the referring physician via electronic record, fax, or mail.      Donna Castillo NP  03/03/2023

## 2023-03-05 ENCOUNTER — PATIENT MESSAGE (OUTPATIENT)
Dept: PAIN MEDICINE | Facility: OTHER | Age: 73
End: 2023-03-05
Payer: MEDICARE

## 2023-03-06 ENCOUNTER — PATIENT MESSAGE (OUTPATIENT)
Dept: PAIN MEDICINE | Facility: OTHER | Age: 73
End: 2023-03-06
Payer: MEDICARE

## 2023-03-06 DIAGNOSIS — G89.4 CHRONIC PAIN SYNDROME: Primary | ICD-10-CM

## 2023-03-06 DIAGNOSIS — M96.1 POSTLAMINECTOMY SYNDROME: ICD-10-CM

## 2023-03-06 RX ORDER — HYDROCODONE BITARTRATE AND ACETAMINOPHEN 5; 325 MG/1; MG/1
1 TABLET ORAL EVERY 12 HOURS PRN
Qty: 60 TABLET | Refills: 0 | Status: SHIPPED | OUTPATIENT
Start: 2023-03-06 | End: 2023-04-05

## 2023-03-07 ENCOUNTER — OFFICE VISIT (OUTPATIENT)
Dept: NEUROLOGY | Facility: CLINIC | Age: 73
End: 2023-03-07
Payer: MEDICARE

## 2023-03-07 ENCOUNTER — TELEPHONE (OUTPATIENT)
Dept: FAMILY MEDICINE | Facility: CLINIC | Age: 73
End: 2023-03-07
Payer: MEDICARE

## 2023-03-07 VITALS — DIASTOLIC BLOOD PRESSURE: 86 MMHG | SYSTOLIC BLOOD PRESSURE: 142 MMHG | HEART RATE: 75 BPM

## 2023-03-07 DIAGNOSIS — I65.23 BILATERAL CAROTID ARTERY STENOSIS: ICD-10-CM

## 2023-03-07 DIAGNOSIS — G20.A1 PARKINSON'S DISEASE: Primary | ICD-10-CM

## 2023-03-07 DIAGNOSIS — G43.009 MIGRAINE WITHOUT AURA AND WITHOUT STATUS MIGRAINOSUS, NOT INTRACTABLE: ICD-10-CM

## 2023-03-07 DIAGNOSIS — R92.8 ABNORMAL MAMMOGRAM OF LEFT BREAST: Primary | ICD-10-CM

## 2023-03-07 PROCEDURE — 99999 PR PBB SHADOW E&M-EST. PATIENT-LVL III: CPT | Mod: PBBFAC,,, | Performed by: PSYCHIATRY & NEUROLOGY

## 2023-03-07 PROCEDURE — 99214 PR OFFICE/OUTPT VISIT, EST, LEVL IV, 30-39 MIN: ICD-10-PCS | Mod: S$PBB,,, | Performed by: PSYCHIATRY & NEUROLOGY

## 2023-03-07 PROCEDURE — 99213 OFFICE O/P EST LOW 20 MIN: CPT | Mod: PBBFAC,PN | Performed by: PSYCHIATRY & NEUROLOGY

## 2023-03-07 PROCEDURE — 99214 OFFICE O/P EST MOD 30 MIN: CPT | Mod: S$PBB,,, | Performed by: PSYCHIATRY & NEUROLOGY

## 2023-03-07 PROCEDURE — 99999 PR PBB SHADOW E&M-EST. PATIENT-LVL III: ICD-10-PCS | Mod: PBBFAC,,, | Performed by: PSYCHIATRY & NEUROLOGY

## 2023-03-07 RX ORDER — CARBIDOPA AND LEVODOPA 25; 100 MG/1; MG/1
0.5 TABLET ORAL 3 TIMES DAILY
Qty: 45 TABLET | Refills: 5 | Status: SHIPPED | OUTPATIENT
Start: 2023-03-07 | End: 2023-06-21 | Stop reason: SDUPTHER

## 2023-03-07 RX ORDER — RIMEGEPANT SULFATE 75 MG/75MG
TABLET, ORALLY DISINTEGRATING ORAL
Qty: 8 TABLET | Refills: 5 | Status: SHIPPED | OUTPATIENT
Start: 2023-03-07 | End: 2023-06-21

## 2023-03-07 NOTE — PROGRESS NOTES
Subjective:       Patient ID: Florentino Bowman is a 72 y.o. female.    Chief Complaint: Parkinson's disease, Migraine, and Peripheral Neuropathy      HPI  Past Medical History:   Diagnosis Date    Arthritis     Carotid stenosis, asymptomatic     Headache(784.0)     Hypothyroid     Movement disorder     Neuropathy       Past Surgical History:   Procedure Laterality Date    BREAST BIOPSY Right     CAUDAL EPIDURAL STEROID INJECTION N/A 2022    Procedure: INJECTION, STEROID, SPINE, EPIDURAL, CAUDAL;  Surgeon: Tony Castle MD;  Location: [facility] OR;  Service: Pain Management;  Laterality: N/A;     SECTION      x2    COLONOSCOPY N/A 2017    Procedure: COLONOSCOPY Miralax;  Surgeon: Quentin Mercado Jr., MD;  Location: [facility] ENDO;  Service: Endoscopy;  Laterality: N/A;    COLONOSCOPY N/A 2021    Procedure: COLONOSCOPY;  Surgeon: Andrew Parekh MD;  Location: [facility];  Service: Endoscopy;  Laterality: N/A;    ESOPHAGOGASTRODUODENOSCOPY N/A 2021    Procedure: EGD (ESOPHAGOGASTRODUODENOSCOPY);  Surgeon: Andrew Parekh MD;  Location: [facility];  Service: Endoscopy;  Laterality: N/A;    HYSTERECTOMY      INJECTION OF ANESTHETIC AGENT AROUND MEDIAL BRANCH NERVES INNERVATING LUMBAR FACET JOINT Right 2019    Procedure: BLOCK, NERVE, FACET JOINT, LUMBAR, MEDIAL BRANCH ;  Surgeon: Sixto Moya III, MD;  Location: [facility] OR;  Service: Pain Management;  Laterality: Right;  - L4/5 & L5/S1  PATIENT NEEDS TO BE THE FIRST PATIENT OF THE DAY, PER DR. MOYA!!!    LAPAROSCOPIC HYSTERECTOMY  2010    supracervical/BSO    OOPHORECTOMY      SPINE SURGERY      TONSILLECTOMY      TRANSFORAMINAL EPIDURAL INJECTION OF STEROID Right 2019    Procedure: INJECTION, STEROID, EPIDURAL, TRANSFORAMINAL APPROACH;  Surgeon: Sixto Moya III, MD;  Location: [facility] OR;  Service: Pain Management;  Laterality: Right;  -right TFESI L4/5    TRANSFORAMINAL EPIDURAL INJECTION OF STEROID Bilateral  2/19/2021    Procedure: INJECTION, STEROID, EPIDURAL, TRANSFORAMINAL APPROACH;  Surgeon: Sixto Moya III, MD;  [facility]: [facility] OR;  Service: Pain Management;  Laterality: Bilateral;  L5    TRANSFORAMINAL EPIDURAL INJECTION OF STEROID Bilateral 9/30/2022    Procedure: INJECTION, STEROID, EPIDURAL, TRANSFORAMINAL APPROACH;  Surgeon: Tony Castle MD;  Location: [facility] OR;  Service: Pain Management;  Laterality: Bilateral;    TRIAL OF SPINAL CORD NERVE STIMULATOR N/A 2/24/2023    Procedure: SPINAL CORD STIMULATOR TRIAL [place] REP *[facility] ONLY*;  Surgeon: Darrian Duran MD;  Location: [facility] [place] PAIN MGT;  Service: Pain Management;  Laterality: N/A;        Current Outpatient Medications:     ALPRAZolam (XANAX) 0.5 MG tablet, Take 1 tablet (0.5 mg total) by mouth daily as needed for Anxiety. PRN ANXIETY, Disp: 30 tablet, Rfl: 2    atorvastatin (LIPITOR) 40 MG tablet, Take 1 tablet (40 mg total) by mouth once daily., Disp: 90 tablet, Rfl: 3    DULoxetine (CYMBALTA) 30 MG capsule, Take 1 capsule (30 mg total) by mouth 2 (two) times daily., Disp: 180 capsule, Rfl: 1    estradioL (ESTRACE) 0.5 MG tablet, Take 1 tablet (0.5 mg total) by mouth every evening., Disp: 90 tablet, Rfl: 3    HYDROcodone-acetaminophen (NORCO) 5-325 mg per tablet, Take 1 tablet by mouth daily as needed., Disp: , Rfl:     HYDROcodone-acetaminophen (NORCO) 5-325 mg per tablet, Take 1 tablet by mouth every 12 (twelve) hours as needed for Pain., Disp: 60 tablet, Rfl: 0    levothyroxine (SYNTHROID) 150 MCG tablet, Take 1 tablet (150 mcg total) by mouth once daily. For thyroid, Disp: 90 tablet, Rfl: 3    lubiprostone (AMITIZA) 24 MCG Cap, Take 1 capsule (24 mcg total) by mouth 2 (two) times daily with meals. For constipation, Disp: 60 capsule, Rfl: 5    pregabalin (LYRICA) 100 MG capsule, Take 100 mg by mouth 2 (two) times daily., Disp: , Rfl:     zolpidem (AMBIEN) 10 mg Tab, Take 1 tablet (10 mg total) by mouth every evening., Disp: 90 tablet,  Rfl: 1    carbidopa-levodopa  mg (SINEMET)  mg per tablet, Take 0.5 tablets by mouth 3 (three) times daily., Disp: 45 tablet, Rfl: 5    magnesium citrate 100 mg Cap, , Disp: , Rfl:     rimegepant (NURTEC) 75 mg odt, One on tongue PRN migraine. No more than once a day., Disp: 8 tablet, Rfl: 5    tumeric-ging-olive-oreg-capryl 100 mg-150 mg- 50 mg-150 mg Cap, Take by mouth., Disp: , Rfl:   No current facility-administered medications for this visit.    Facility-Administered Medications Ordered in Other Visits:     0.9%  NaCl infusion, , Intravenous, Continuous, Tony Castle MD, New Bag at 09/30/22 0822    0.9%  NaCl infusion, , Intravenous, Continuous, Tony Castle MD    sodium chloride 0.9% flush 10 mL, 10 mL, Intravenous, PRN, Tony Castle MD    sodium chloride 0.9% flush 10 mL, 10 mL, Intravenous, PRN, Tony Castle MD   Review of patient's allergies indicates:  No Known Allergies     Review of Systems   HENT:  Negative for trouble swallowing.    Neurological:  Negative for syncope.         Objective:      Physical Exam      Assessment:       1. Parkinson's disease    2. Migraine without aura and without status migrainosus, not intractable    3. Bilateral carotid artery stenosis        Plan:            PD, stable-no falls.  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   Episodic exacerbation of Lt hand and foot tremor associated with a feeling of heaviness in the left foot.  She was hoping for a rescue agent like Inbrija for these off periods, as these episodes sometime affect her social life (when they occur she cancels functions).  Paradoxically she states the episodes only last for about 30 minutes.  Given the brevity of the duration of the events and given that she is on such a low dose of Sinemet I think the best breakthrough for her is to simply take an extra half a pill when needed.  We did discuss the option of increasing  her medication dosage or frequency on a regular basis but she is not interested in doing so.   If the Prn additional sinemet not beneficial will prescribe the inbrija.   Could certainly add selegiline and will discuss at [facility].   Multiple questions answered      S/p SCS trial times one week; she did very well ( resolution of LBP, Rt thigh pain and calf pain). She is now scheduled for the implantation. Her stimulator will be MRI compatible.     Migraines---imitrex works after today's discussion decided to change to gepant as she does have at least one carotid US ( in 2018) that showed 50-69% stenosis bilaterally        Carla Mendoza MD   03/07/2023   11:58 AM

## 2023-03-09 ENCOUNTER — HOSPITAL ENCOUNTER (OUTPATIENT)
Dept: RADIOLOGY | Facility: HOSPITAL | Age: 73
Discharge: HOME | End: 2023-03-09
Attending: FAMILY MEDICINE
Payer: MEDICARE

## 2023-03-09 ENCOUNTER — PATIENT MESSAGE (OUTPATIENT)
Dept: NEUROLOGY | Facility: CLINIC | Age: 73
End: 2023-03-09
Payer: MEDICARE

## 2023-03-09 DIAGNOSIS — R92.8 ABNORMAL MAMMOGRAM OF LEFT BREAST: ICD-10-CM

## 2023-03-09 PROCEDURE — 77061 MAMMO DIGITAL DIAGNOSTIC LEFT WITH TOMO: ICD-10-PCS | Mod: 26,LT,, | Performed by: RADIOLOGY

## 2023-03-09 PROCEDURE — 77065 DX MAMMO INCL CAD UNI: CPT | Mod: TC,PO,LT

## 2023-03-09 PROCEDURE — 77065 DX MAMMO INCL CAD UNI: CPT | Mod: 26,LT,, | Performed by: RADIOLOGY

## 2023-03-09 PROCEDURE — 77065 MAMMO DIGITAL DIAGNOSTIC LEFT WITH TOMO: ICD-10-PCS | Mod: 26,LT,, | Performed by: RADIOLOGY

## 2023-03-09 PROCEDURE — 77061 BREAST TOMOSYNTHESIS UNI: CPT | Mod: 26,LT,, | Performed by: RADIOLOGY

## 2023-03-14 ENCOUNTER — ANESTHESIA EVENT (OUTPATIENT)
Dept: SURGERY | Facility: OTHER | Age: 73
End: 2023-03-14
Payer: MEDICARE

## 2023-03-15 ENCOUNTER — HOSPITAL ENCOUNTER (OUTPATIENT)
Dept: PREADMISSION TESTING | Facility: OTHER | Age: 73
Discharge: HOME | End: 2023-03-15
Attending: ANESTHESIOLOGY
Payer: MEDICARE

## 2023-03-15 VITALS
WEIGHT: 123 LBS | TEMPERATURE: 98 F | HEART RATE: 80 BPM | BODY MASS INDEX: 24.02 KG/M2 | DIASTOLIC BLOOD PRESSURE: 56 MMHG | SYSTOLIC BLOOD PRESSURE: 118 MMHG | OXYGEN SATURATION: 100 %

## 2023-03-15 RX ORDER — SODIUM CHLORIDE, SODIUM LACTATE, POTASSIUM CHLORIDE, CALCIUM CHLORIDE 600; 310; 30; 20 MG/100ML; MG/100ML; MG/100ML; MG/100ML
INJECTION, SOLUTION INTRAVENOUS CONTINUOUS
Status: CANCELLED | OUTPATIENT
Start: 2023-03-15

## 2023-03-15 NOTE — DISCHARGE INSTRUCTIONS
Information to Prepare you for your Surgery    PRE-ADMIT TESTING -  [phone]    [address] [facility]          Your surgery has been scheduled at [facility]. We are pleased to have the opportunity to serve you. For Further Information please call [phone].    On the day of surgery please report to the Information Desk on the 1st floor.    CONTACT YOUR PHYSICIAN'S OFFICE THE DAY PRIOR TO YOUR SURGERY TO OBTAIN YOUR ARRIVAL TIME.     The evening before surgery do not eat anything after 9 p.m. ( this includes hard candy, chewing gum and mints).  You may only have GATORADE, POWERADE AND WATER  from 9 p.m. until you leave your home.   DO NOT DRINK ANY LIQUIDS ON THE WAY TO THE HOSPITAL.      Why does your anesthesiologist allow you to drink Gatorade/Powerade before surgery?  Gatorade/Powerade helps to increase your comfort before surgery and to decrease your nausea after surgery. The carbohydrates in Gatorade/Powerade help reduce your body's stress response to surgery.  If you are a diabetic-drink only water prior to surgery.      Current Visitor policy(12/27/2021) - Patients may have 2 visitors pre and post procedure. Only 2 visitors will be allowed in the Surgical building with the patient. No one under the age of 12 will be allowed into the facility.    SPECIAL MEDICATION INSTRUCTIONS: TAKE medications checked off by the Anesthesiologist on your Medication List.    Angiogram Patients: Take medications as instructed by your physician, including aspirin.     Surgery Patients:    If you take ASPIRIN - Your PHYSICIAN/SURGEON will need to inform you IF/OR when you need to stop taking aspirin prior to your surgery.     The week prior to surgery do not ot take any medications containing IBUPROFEN or NSAIDS ( Advil, Motrin, Goodys, BC, Aleve, Naproxen etc) If you are not sure if you should take a medicine please call your surgeon's office.  Ok to take  Tylenol    Do Not Wear any make-up (especially eye make-up) to surgery. Please remove any false eyelashes or eyelash extensions. If you arrive the day of surgery with makeup/eyelashes on you will be required to remove prior to surgery. (There is a risk of corneal abrasions if eye makeup/eyelash extensions are not removed)      Leave all valuables at home.   Do Not wear any jewelry or watches, including any metal in body piercings. Jewelry must be removed prior to coming to the hospital.  There is a possibility that rings that are unable to be removed may be cut off if they are on the surgical extremity.    Please remove all hair extensions, wigs, clips and any other metal accessories/ ornaments from your hair.  These items may pose a flammable/fire risk in Surgery and must be removed.    Do not shave your surgical area at least 5 days prior to your surgery. The surgical prep will be performed at the hospital according to Infection Control regulations.    Contact Lens must be removed before surgery. Either do not wear the contact lens or bring a case and solution for storage.  Please bring a container for eyeglasses or dentures as required.  Bring any paperwork your physician has provided, such as consent forms,  history and physicals, doctor's orders, etc.   Bring comfortable clothes that are loose fitting to wear upon discharge. Take into consideration the type of surgery being performed.  Maintain your diet as advised per your physician the day prior to surgery.      Adequate rest the night before surgery is advised.   Park in the Parking lot behind the hospital or in the [place] [facility] across the street from the parking lot. Parking is complimentary.  If you will be discharged the same day as your procedure, please arrange for a responsible adult to drive you home or to accompany you if traveling by taxi.   YOU WILL NOT BE PERMITTED TO DRIVE OR TO LEAVE THE HOSPITAL ALONE AFTER SURGERY.   If you are  being discharged the same day, it is strongly recommended that you arrange for someone to remain with you for the first 24 hrs following your surgery.    The Surgeon will speak to your family/visitor after your surgery regarding the outcome of your surgery and post op care.  The Surgeon may speak to you after your surgery, but there is a possibility you may not remember the details.  Please check with your family members regarding the conversation with the Surgeon.    We strongly recommend whoever is bringing you home be present for discharge instructions.  This will ensure a thorough understanding for your post op home care.    ALL CHILDREN MUST ALWAYS BE ACCOMPANIED BY AN ADULT.    Visitors-Refer to current Visitor policy handouts.    Thank you for your cooperation.  The Staff of [facility].            Bathing Instructions with Hibiclens    Shower the evening before and morning of your procedure with Chlorhexidine (Hibiclens)  do not use Chlorhexidine on your face or genitals. Do not get in your eyes.  Wash your face with water and your regular face wash/soap  Use your regular shampoo  Apply Chlorhexidine (Hibiclens) directly on your skin or on a wet washcloth and wash gently. When showering: Move away from the shower stream when applying Chlorhexidine (Hibiclens) to avoid rinsing off too soon.  Rinse thoroughly with warm water  Do not dilute Chlorhexidine (Hibiclens)   Dry off as usual, do not use any deodorant, powder, body lotions, perfume, after shave or cologne.

## 2023-03-15 NOTE — ANESTHESIA PREPROCEDURE EVALUATION
03/15/2023  Florentino Bowman is a 72 y.o., female.      Pre-op Assessment    I have reviewed the Patient Summary Reports.     I have reviewed the Nursing Notes.    I have reviewed the Medications.     Review of Systems  Anesthesia Hx:  No problems with previous Anesthesia  Denies Family Hx of Anesthesia complications.   Denies Personal Hx of Anesthesia complications.   Social:  Non-Smoker, Social Alcohol Use    Hematology/Oncology:  Hematology Normal   Oncology Normal     Cardiovascular:   Exercise tolerance: good Denies Hypertension.  PVD Carotid artery dz (Not significant on recent U/S)  11/2019 - neg stress echo (EF 60%)    Pulmonary:  Pulmonary Normal    Renal/:  Renal/ Normal     Hepatic/GI:  Hepatic/GI Normal    Musculoskeletal:   Arthritis   Spine Disorders: lumbar Degenerative disease and Chronic Pain    Neurological:   Headaches Parkinson's  Peripheral Neuropathy    Endocrine:   Hypothyroidism    Psych:   Psychiatric History depression          Physical Exam  General: Well nourished and Cooperative    Airway:  Mallampati: II   Mouth Opening: Normal  TM Distance: Normal  Neck ROM: Normal ROM    Dental:  Intact        Anesthesia Plan  Type of Anesthesia, risks & benefits discussed:    Anesthesia Type: Gen ETT, Gen Natural Airway  Intra-op Monitoring Plan: Standard ASA Monitors  Post Op Pain Control Plan: multimodal analgesia  Induction:  IV  Airway Plan: Video  Informed Consent: Informed consent signed with the Patient and all parties understand the risks and agree with anesthesia plan.  All questions answered.   ASA Score: 3  Day of Surgery Review of History & Physical: H&P Update referred to the surgeon/provider.  Anesthesia Plan Notes: Recent labs - ok    Ready For Surgery From Anesthesia Perspective.     .

## 2023-03-16 ENCOUNTER — PATIENT MESSAGE (OUTPATIENT)
Dept: SURGERY | Facility: OTHER | Age: 73
End: 2023-03-16
Payer: MEDICARE

## 2023-03-20 ENCOUNTER — ANESTHESIA (OUTPATIENT)
Dept: SURGERY | Facility: OTHER | Age: 73
End: 2023-03-20
Payer: MEDICARE

## 2023-03-20 ENCOUNTER — HOSPITAL ENCOUNTER (OUTPATIENT)
Facility: OTHER | Age: 73
Discharge: HOME | End: 2023-03-20
Attending: ANESTHESIOLOGY | Admitting: ANESTHESIOLOGY
Payer: MEDICARE

## 2023-03-20 DIAGNOSIS — G89.29 CHRONIC PAIN: ICD-10-CM

## 2023-03-20 DIAGNOSIS — Z96.89 S/P INSERTION OF SPINAL CORD STIMULATOR: ICD-10-CM

## 2023-03-20 DIAGNOSIS — M96.1 FAILED BACK SURGICAL SYNDROME: Primary | ICD-10-CM

## 2023-03-20 DIAGNOSIS — G89.29 CHRONIC LOW BACK PAIN, UNSPECIFIED BACK PAIN LATERALITY, UNSPECIFIED WHETHER SCIATICA PRESENT: ICD-10-CM

## 2023-03-20 DIAGNOSIS — M54.50 CHRONIC LOW BACK PAIN, UNSPECIFIED BACK PAIN LATERALITY, UNSPECIFIED WHETHER SCIATICA PRESENT: ICD-10-CM

## 2023-03-20 PROCEDURE — 37000009 HC ANESTHESIA EA ADD 15 MINS: Performed by: ANESTHESIOLOGY

## 2023-03-20 PROCEDURE — 63650 IMPLANT NEUROELECTRODES: CPT | Mod: ,,, | Performed by: ANESTHESIOLOGY

## 2023-03-20 PROCEDURE — C1778 LEAD, NEUROSTIMULATOR: HCPCS | Performed by: ANESTHESIOLOGY

## 2023-03-20 PROCEDURE — C1713 ANCHOR/SCREW BN/BN,TIS/BN: HCPCS | Performed by: ANESTHESIOLOGY

## 2023-03-20 PROCEDURE — 63685 INS/RPLC SPI NPG/RCVR POCKET: CPT | Mod: 59,,, | Performed by: ANESTHESIOLOGY

## 2023-03-20 PROCEDURE — 71000039 HC RECOVERY, EACH ADD'L HOUR: Performed by: ANESTHESIOLOGY

## 2023-03-20 PROCEDURE — 63650 PR PERCUT IMPLNT NEUROELECT,EPIDURAL: ICD-10-PCS | Mod: ,,, | Performed by: ANESTHESIOLOGY

## 2023-03-20 PROCEDURE — 63600175 PHARM REV CODE 636 W HCPCS: Performed by: NURSE ANESTHETIST, CERTIFIED REGISTERED

## 2023-03-20 PROCEDURE — C1787 PATIENT PROGR, NEUROSTIM: HCPCS | Performed by: ANESTHESIOLOGY

## 2023-03-20 PROCEDURE — 25000003 PHARM REV CODE 250: Performed by: ANESTHESIOLOGY

## 2023-03-20 PROCEDURE — 37000008 HC ANESTHESIA 1ST 15 MINUTES: Performed by: ANESTHESIOLOGY

## 2023-03-20 PROCEDURE — 71000015 HC POSTOP RECOV 1ST HR: Performed by: ANESTHESIOLOGY

## 2023-03-20 PROCEDURE — 71000016 HC POSTOP RECOV ADDL HR: Performed by: ANESTHESIOLOGY

## 2023-03-20 PROCEDURE — 63600175 PHARM REV CODE 636 W HCPCS: Performed by: EMERGENCY MEDICINE

## 2023-03-20 PROCEDURE — 25000003 PHARM REV CODE 250: Performed by: NURSE ANESTHETIST, CERTIFIED REGISTERED

## 2023-03-20 PROCEDURE — 36000707: Performed by: ANESTHESIOLOGY

## 2023-03-20 PROCEDURE — 36000706: Performed by: ANESTHESIOLOGY

## 2023-03-20 PROCEDURE — 63600175 PHARM REV CODE 636 W HCPCS: Performed by: ANESTHESIOLOGY

## 2023-03-20 PROCEDURE — 27800903 OPTIME MED/SURG SUP & DEVICES OTHER IMPLANTS: Performed by: ANESTHESIOLOGY

## 2023-03-20 PROCEDURE — C1820 GENERATOR NEURO RECHG BAT SY: HCPCS | Performed by: ANESTHESIOLOGY

## 2023-03-20 PROCEDURE — 63685 PR IMPLANT SPINAL NEUROSTIM/RECEIVER: ICD-10-PCS | Mod: 59,,, | Performed by: ANESTHESIOLOGY

## 2023-03-20 PROCEDURE — 27201423 OPTIME MED/SURG SUP & DEVICES STERILE SUPPLY: Performed by: ANESTHESIOLOGY

## 2023-03-20 PROCEDURE — 71000033 HC RECOVERY, INTIAL HOUR: Performed by: ANESTHESIOLOGY

## 2023-03-20 DEVICE — SYS WAVEWRITER ALPH SCS 21.6CC: Type: IMPLANTABLE DEVICE | Site: BACK | Status: FUNCTIONAL

## 2023-03-20 DEVICE — LEAD ST LINEAR 70CM: Type: IMPLANTABLE DEVICE | Site: BACK | Status: FUNCTIONAL

## 2023-03-20 DEVICE — ANCHOR LEAD NEROSTIMLTR CLIK X: Type: IMPLANTABLE DEVICE | Site: BACK | Status: FUNCTIONAL

## 2023-03-20 RX ORDER — OXYCODONE HYDROCHLORIDE 5 MG/1
5 TABLET ORAL
Status: DISCONTINUED | OUTPATIENT
Start: 2023-03-20 | End: 2023-03-20 | Stop reason: HOSPADM

## 2023-03-20 RX ORDER — LIDOCAINE HYDROCHLORIDE AND EPINEPHRINE 10; 10 MG/ML; UG/ML
INJECTION, SOLUTION INFILTRATION; PERINEURAL
Status: DISCONTINUED | OUTPATIENT
Start: 2023-03-20 | End: 2023-03-20 | Stop reason: HOSPADM

## 2023-03-20 RX ORDER — PROPOFOL 10 MG/ML
VIAL (ML) INTRAVENOUS
Status: DISCONTINUED | OUTPATIENT
Start: 2023-03-20 | End: 2023-03-20

## 2023-03-20 RX ORDER — CEFAZOLIN SODIUM 1 G/3ML
2 INJECTION, POWDER, FOR SOLUTION INTRAMUSCULAR; INTRAVENOUS
Status: COMPLETED | OUTPATIENT
Start: 2023-03-20 | End: 2023-03-20

## 2023-03-20 RX ORDER — BUPIVACAINE HYDROCHLORIDE 2.5 MG/ML
INJECTION, SOLUTION EPIDURAL; INFILTRATION; INTRACAUDAL
Status: DISCONTINUED | OUTPATIENT
Start: 2023-03-20 | End: 2023-03-20 | Stop reason: HOSPADM

## 2023-03-20 RX ORDER — BACITRACIN ZINC 500 UNIT/G
OINTMENT (GRAM) TOPICAL
Status: DISCONTINUED | OUTPATIENT
Start: 2023-03-20 | End: 2023-03-20 | Stop reason: HOSPADM

## 2023-03-20 RX ORDER — VANCOMYCIN HYDROCHLORIDE 1 G/20ML
INJECTION, POWDER, LYOPHILIZED, FOR SOLUTION INTRAVENOUS
Status: DISCONTINUED | OUTPATIENT
Start: 2023-03-20 | End: 2023-03-20 | Stop reason: HOSPADM

## 2023-03-20 RX ORDER — SODIUM CHLORIDE 0.9 % (FLUSH) 0.9 %
3 SYRINGE (ML) INJECTION
Status: DISCONTINUED | OUTPATIENT
Start: 2023-03-20 | End: 2023-03-20 | Stop reason: HOSPADM

## 2023-03-20 RX ORDER — ROCURONIUM BROMIDE 10 MG/ML
INJECTION, SOLUTION INTRAVENOUS
Status: DISCONTINUED | OUTPATIENT
Start: 2023-03-20 | End: 2023-03-20

## 2023-03-20 RX ORDER — HYDROMORPHONE HYDROCHLORIDE 2 MG/ML
0.4 INJECTION, SOLUTION INTRAMUSCULAR; INTRAVENOUS; SUBCUTANEOUS EVERY 5 MIN PRN
Status: DISCONTINUED | OUTPATIENT
Start: 2023-03-20 | End: 2023-03-20 | Stop reason: HOSPADM

## 2023-03-20 RX ORDER — PROCHLORPERAZINE EDISYLATE 5 MG/ML
5 INJECTION INTRAMUSCULAR; INTRAVENOUS EVERY 30 MIN PRN
Status: DISCONTINUED | OUTPATIENT
Start: 2023-03-20 | End: 2023-03-20 | Stop reason: HOSPADM

## 2023-03-20 RX ORDER — PHENYLEPHRINE HYDROCHLORIDE 10 MG/ML
INJECTION INTRAVENOUS
Status: DISCONTINUED | OUTPATIENT
Start: 2023-03-20 | End: 2023-03-20

## 2023-03-20 RX ORDER — DEXAMETHASONE SODIUM PHOSPHATE 4 MG/ML
INJECTION, SOLUTION INTRA-ARTICULAR; INTRALESIONAL; INTRAMUSCULAR; INTRAVENOUS; SOFT TISSUE
Status: DISCONTINUED | OUTPATIENT
Start: 2023-03-20 | End: 2023-03-20

## 2023-03-20 RX ORDER — SODIUM CHLORIDE, SODIUM LACTATE, POTASSIUM CHLORIDE, CALCIUM CHLORIDE 600; 310; 30; 20 MG/100ML; MG/100ML; MG/100ML; MG/100ML
INJECTION, SOLUTION INTRAVENOUS CONTINUOUS
Status: DISCONTINUED | OUTPATIENT
Start: 2023-03-20 | End: 2023-03-20 | Stop reason: HOSPADM

## 2023-03-20 RX ORDER — SULFAMETHOXAZOLE AND TRIMETHOPRIM 400; 80 MG/1; MG/1
1 TABLET ORAL 2 TIMES DAILY
Qty: 10 TABLET | Refills: 0 | Status: SHIPPED | OUTPATIENT
Start: 2023-03-20 | End: 2023-03-25

## 2023-03-20 RX ORDER — FENTANYL CITRATE 50 UG/ML
INJECTION, SOLUTION INTRAMUSCULAR; INTRAVENOUS
Status: DISCONTINUED | OUTPATIENT
Start: 2023-03-20 | End: 2023-03-20

## 2023-03-20 RX ORDER — ONDANSETRON 2 MG/ML
INJECTION INTRAMUSCULAR; INTRAVENOUS
Status: DISCONTINUED | OUTPATIENT
Start: 2023-03-20 | End: 2023-03-20

## 2023-03-20 RX ORDER — LIDOCAINE HCL/PF 100 MG/5ML
SYRINGE (ML) INTRAVENOUS
Status: DISCONTINUED | OUTPATIENT
Start: 2023-03-20 | End: 2023-03-20

## 2023-03-20 RX ORDER — SODIUM CHLORIDE 9 MG/ML
500 INJECTION, SOLUTION INTRAVENOUS CONTINUOUS
Status: DISCONTINUED | OUTPATIENT
Start: 2023-03-20 | End: 2023-03-20 | Stop reason: HOSPADM

## 2023-03-20 RX ORDER — DIPHENHYDRAMINE HYDROCHLORIDE 50 MG/ML
25 INJECTION INTRAMUSCULAR; INTRAVENOUS EVERY 6 HOURS PRN
Status: DISCONTINUED | OUTPATIENT
Start: 2023-03-20 | End: 2023-03-20 | Stop reason: HOSPADM

## 2023-03-20 RX ORDER — HYDROCODONE BITARTRATE AND ACETAMINOPHEN 5; 325 MG/1; MG/1
1 TABLET ORAL EVERY 8 HOURS PRN
Qty: 21 TABLET | Refills: 0 | Status: SHIPPED | OUTPATIENT
Start: 2023-03-20 | End: 2023-03-27

## 2023-03-20 RX ORDER — KETAMINE HCL IN 0.9 % NACL 50 MG/5 ML
SYRINGE (ML) INTRAVENOUS
Status: DISCONTINUED | OUTPATIENT
Start: 2023-03-20 | End: 2023-03-20

## 2023-03-20 RX ORDER — MEPERIDINE HYDROCHLORIDE 25 MG/ML
12.5 INJECTION INTRAMUSCULAR; INTRAVENOUS; SUBCUTANEOUS ONCE AS NEEDED
Status: DISCONTINUED | OUTPATIENT
Start: 2023-03-20 | End: 2023-03-20 | Stop reason: HOSPADM

## 2023-03-20 RX ADMIN — PROPOFOL 50 MG: 10 INJECTION, EMULSION INTRAVENOUS at 10:03

## 2023-03-20 RX ADMIN — ROCURONIUM BROMIDE 10 MG: 10 SOLUTION INTRAVENOUS at 10:03

## 2023-03-20 RX ADMIN — DEXAMETHASONE SODIUM PHOSPHATE 8 MG: 4 INJECTION, SOLUTION INTRAMUSCULAR; INTRAVENOUS at 09:03

## 2023-03-20 RX ADMIN — SODIUM CHLORIDE, SODIUM LACTATE, POTASSIUM CHLORIDE, AND CALCIUM CHLORIDE: .6; .31; .03; .02 INJECTION, SOLUTION INTRAVENOUS at 10:03

## 2023-03-20 RX ADMIN — PROPOFOL 200 MG: 10 INJECTION, EMULSION INTRAVENOUS at 09:03

## 2023-03-20 RX ADMIN — PHENYLEPHRINE HYDROCHLORIDE 100 MCG: 10 INJECTION INTRAVENOUS at 09:03

## 2023-03-20 RX ADMIN — CEFAZOLIN 2 G: 330 INJECTION, POWDER, FOR SOLUTION INTRAMUSCULAR; INTRAVENOUS at 09:03

## 2023-03-20 RX ADMIN — HYDROMORPHONE HYDROCHLORIDE 0.4 MG: 2 INJECTION INTRAMUSCULAR; INTRAVENOUS; SUBCUTANEOUS at 11:03

## 2023-03-20 RX ADMIN — SUGAMMADEX 200 MG: 100 INJECTION, SOLUTION INTRAVENOUS at 10:03

## 2023-03-20 RX ADMIN — ROCURONIUM BROMIDE 40 MG: 10 SOLUTION INTRAVENOUS at 09:03

## 2023-03-20 RX ADMIN — LIDOCAINE HYDROCHLORIDE 100 MG: 20 INJECTION, SOLUTION INTRAVENOUS at 09:03

## 2023-03-20 RX ADMIN — ROCURONIUM BROMIDE 10 MG: 10 SOLUTION INTRAVENOUS at 09:03

## 2023-03-20 RX ADMIN — FENTANYL CITRATE 100 MCG: 0.05 INJECTION, SOLUTION INTRAMUSCULAR; INTRAVENOUS at 09:03

## 2023-03-20 RX ADMIN — SODIUM CHLORIDE, SODIUM LACTATE, POTASSIUM CHLORIDE, AND CALCIUM CHLORIDE: .6; .31; .03; .02 INJECTION, SOLUTION INTRAVENOUS at 08:03

## 2023-03-20 RX ADMIN — OXYCODONE HYDROCHLORIDE 5 MG: 5 TABLET ORAL at 11:03

## 2023-03-20 RX ADMIN — ONDANSETRON HYDROCHLORIDE 4 MG: 2 INJECTION INTRAMUSCULAR; INTRAVENOUS at 09:03

## 2023-03-20 RX ADMIN — PHENYLEPHRINE HYDROCHLORIDE 100 MCG: 10 INJECTION INTRAVENOUS at 10:03

## 2023-03-20 RX ADMIN — PHENYLEPHRINE HYDROCHLORIDE 200 MCG: 10 INJECTION INTRAVENOUS at 10:03

## 2023-03-20 RX ADMIN — Medication 25 MG: at 09:03

## 2023-03-20 NOTE — DISCHARGE SUMMARY
Discharge Note  Short Stay      SUMMARY     Admit Date: 3/20/2023    Attending Physician: Darrian Duran    Discharge Physician: JORGE HEAD      Discharge Date: 3/20/2023 10:55 AM    Procedure(s) (LRB):  INSERTION, SPINAL CORD STIMULATOR IMPLANT BOSTON REP (N/A)    Final Diagnosis: Chronic pain syndrome [G89.4]  Postlaminectomy syndrome [M96.1]    Disposition: Home or self care    Patient Instructions:   Current Discharge Medication List        CONTINUE these medications which have NOT CHANGED    Details   atorvastatin (LIPITOR) 40 MG tablet Take 1 tablet (40 mg total) by mouth once daily.  Qty: 90 tablet, Refills: 3      carbidopa-levodopa  mg (SINEMET)  mg per tablet Take 0.5 tablets by mouth 3 (three) times daily.  Qty: 45 tablet, Refills: 5      DULoxetine (CYMBALTA) 30 MG capsule Take 1 capsule (30 mg total) by mouth 2 (two) times daily.  Qty: 180 capsule, Refills: 1    Comments: Take 1 cap daily x 7 days. After 7 days, increase to 1 cap BID.  Associated Diagnoses: Neuropathic pain of left foot      estradioL (ESTRACE) 0.5 MG tablet Take 1 tablet (0.5 mg total) by mouth every evening.  Qty: 90 tablet, Refills: 3    Associated Diagnoses: Wellness examination; Bilateral carotid artery disease, unspecified type; Hypothyroidism due to acquired atrophy of thyroid      HYDROcodone-acetaminophen (NORCO) 5-325 mg per tablet Take 1 tablet by mouth every 12 (twelve) hours as needed for Pain.  Qty: 60 tablet, Refills: 0    Comments: Quantity prescribed more than 7 day supply? Yes, quantity medically necessary      levothyroxine (SYNTHROID) 150 MCG tablet Take 1 tablet (150 mcg total) by mouth once daily. For thyroid  Qty: 90 tablet, Refills: 3    Associated Diagnoses: Wellness examination; Bilateral carotid artery disease, unspecified type      magnesium citrate 100 mg Cap       pregabalin (LYRICA) 100 MG capsule Take 100 mg by mouth 2 (two) times daily.      zolpidem (AMBIEN) 10 mg Tab Take 1 tablet  (10 mg total) by mouth every evening.  Qty: 90 tablet, Refills: 1      ALPRAZolam (XANAX) 0.5 MG tablet Take 1 tablet (0.5 mg total) by mouth daily as needed for Anxiety. PRN ANXIETY  Qty: 30 tablet, Refills: 2      rimegepant (NURTEC) 75 mg odt One on tongue PRN migraine. No more than once a day.  Qty: 8 tablet, Refills: 5    Associated Diagnoses: Migraine without aura and without status migrainosus, not intractable      tumeric-ging-olive-oreg-capryl 100 mg-150 mg- 50 mg-150 mg Cap Take by mouth.                 Discharge Diagnosis: Chronic pain syndrome [G89.4]  Postlaminectomy syndrome [M96.1]  Condition on Discharge: Stable with no complications to procedure   Diet on Discharge: Same as before.  Activity: as per instruction sheet.  Discharge to: Home with a responsible adult.  Follow up: 2-4 weeks       Please call my office or pager at [phone] if experienced any weakness or loss of sensation, fever > 101.5, pain uncontrolled with oral medications, persistent nausea/vomiting/or diarrhea, redness or drainage from the incisions, or any other worrisome concerns. If physician on call was not reached or could not communicate with our office for any reason please go to the nearest emergency department        Darrian Duran

## 2023-03-20 NOTE — OP NOTE
Spinal cord stimulator implant     Preoperative diagnosis: Chronic pain syndrome [G89.4]  Postlaminectomy syndrome [M96.1]     Postoperative diagnosis: Chronic pain syndrome [G89.4]  Postlaminectomy syndrome [M96.1]     Procedure: 1) placement of Octad electrode x2   2) placement of pulse generator 3) intraoperative and post operative programming simple     Surgeon: Darrian Duran MD    Assistant: None    EBL: Minimal     Complications: None     Specimens: None     Anesthesia: General    Antibiotic: 2gm Ancef    Description of procedure:     After written consent was obtained the patient was brought into the OR and placed in the prone position with all pressure points padded appropriately. The area overlying the skin of the back was prepped and draped in usual sterile fashion using chlorhexidine with an Ioban dressing over the skin. A time out was performed and there was confirmation of preoperative antibiotics.     Fluoroscopy was used to identify the T12/L1 intralaminar space this area was marked and an approximately 6 cm incision was planned and this area. The tract was anesthetized at the level of the skin and deeper tissues leading to the prevertebral fascia with 20 mL of a equal mixture of 0.25% bupivacaine   +1% lidocaine with 1:200,000 epinephrine through a 25-gauge needle. This was followed with the skin incision with a 10 blade scalpel, followed by sharp and blunt dissection to the prevertebral fascia using cautery for hemostasis.  A modified Touhy needle was then advanced under fluoroscopy and walked off of the lamina at L1/2 on each side followed by loss of resistance to air to enter the epidural space. Once the epidural space was entered the octad electrode was advanced under live fluoroscopy in the AP and lateral view to the T8 level  in the posterior aspect of the epidural space. The electrodes were then connected with the  and intraoperative programming was performed to confirm that the  electrodes were placed appropriately. After there was confirmation of the appropriate placement the modified Touhy needles were withdrawn and the stylette were removed from the electrodes. The electrodes were then anchored into place using an anchor provided by the stimulator company and the anchor was secured to the prevertebral fascia using fixate suture. A pulse generator pocket was created on the right side superior to the iliac crest. An approximately 6 cm incision was planned the area overlying the skin was anesthetized with an additional 10 mL of local anesthetic to a 25 gauge needle. This was followed by incising the skin with a 15 blade scalpel and sharp and blunt dissection to create a pocket approximately the size of the pulse generator for the spinal cord stimulator system approximately 1 inch deep to the skin. Electrocautery was used for hemostasis. The tunneler provided by the company was used to create a tunnel between the midline incision and a pulse generator pocket the electrodes were then threaded through with a stress relief loop being maintained in the midline incision. The electrodes were connected to the battery and the battery was tested so that the entire system was confirmed to be working appropriately. Both sites were irrigated with bacitracin solution and there was care to confirm hemostasis. Both incisions were then closed with interrupted 2-0 Vicryl followed by 4-0 Monocryl at the skin.  This was followed by bacitracin ointment being placed over the staples after wet and dry cleaning. Dressing was placed over the skin and an abdominal binder was placed around the patient.     The patient was taken to recovery in stable condition and the stimulator was programmed postoperatively to capture all the usual painful areas.     The patient was discharged from the hospital in stable condition.      JORGE HEAD MD      I have reviewed the notes, assessments, and/or procedures performed by  fellow, I concur with her/his documentation of Florentino Bowman.    Darrian Duran MD

## 2023-03-20 NOTE — PLAN OF CARE
Florentino Bowman has met all discharge criteria from Phase II. Vital Signs are stable, ambulating  without difficulty. Discharge instructions given, patient verbalized understanding. Discharged from facility via wheelchair in stable condition.

## 2023-03-20 NOTE — TRANSFER OF CARE
Anesthesia Transfer of Care Note    Patient: Florentino Bowman    Procedure(s) Performed: Procedure(s) (LRB):  INSERTION, SPINAL CORD STIMULATOR IMPLANT BOSTON REP (N/A)    Patient location: PACU    Anesthesia Type: general    Transport from OR: Transported from OR on 2-3 L/min O2 by NC with adequate spontaneous ventilation    Post pain: adequate analgesia    Post assessment: no apparent anesthetic complications and tolerated procedure well    Post vital signs: stable    Level of consciousness: awake    Nausea/Vomiting: no nausea/vomiting    Complications: none    Transfer of care protocol was followed      Last vitals:   Visit Vitals  BP (!) 152/72   Pulse 71   Temp 36.6 °C (97.9 °F) (Oral)   Resp 18   LMP 01/01/2001   SpO2 100%   Breastfeeding No

## 2023-03-20 NOTE — ANESTHESIA POSTPROCEDURE EVALUATION
Anesthesia Post Evaluation    Patient: Florentino Bowman    Procedure(s) Performed: Procedure(s) (LRB):  INSERTION, SPINAL CORD STIMULATOR IMPLANT BOSTON REP (N/A)    Final Anesthesia Type: general      Patient location during evaluation: PACU  Patient participation: Yes- Able to Participate  Level of consciousness: awake and alert  Post-procedure vital signs: reviewed and stable  Pain management: adequate  Airway patency: patent    PONV status at discharge: No PONV  Anesthetic complications: no      Cardiovascular status: blood pressure returned to baseline and stable  Respiratory status: room air  Hydration status: euvolemic  Follow-up not needed.          Vitals Value Taken Time   /67 03/20/23 1103   Temp 36.5 °C (97.7 °F) 03/20/23 1100   Pulse 78 03/20/23 1112   Resp 18 03/20/23 1100   SpO2 100 % 03/20/23 1112   Vitals shown include unvalidated device data.      No case tracking events are documented in the log.      Pain/Юлия Score: No data recorded

## 2023-03-20 NOTE — ANESTHESIA PROCEDURE NOTES
Intubation    Date/Time: 3/20/2023 9:08 AM  Performed by: Latasha Camarillo CRNA  Authorized by: Mik Romero MD     Intubation:     Induction:  Intravenous    Intubated:  Postinduction    Mask Ventilation:  Easy mask    Attempts:  1    Attempted By:  CRNA    Method of Intubation:  Video laryngoscopy    Blade:  Banks 3    Laryngeal View Grade: Grade I - full view of cords      Difficult Airway Encountered?: No      Complications:  None    Airway Device:  Oral endotracheal tube    Airway Device Size:  7.0    Style/Cuff Inflation:  Cuffed (inflated to minimal occlusive pressure)    Tube secured:  21    Secured at:  The lips    Placement Verified By:  Capnometry    Complicating Factors:  None    Findings Post-Intubation:  BS equal bilateral and atraumatic/condition of teeth unchanged

## 2023-03-20 NOTE — PATIENT INSTRUCTIONS
Pain Post Surgery Instructions    If you have SEVERE headache with nausea, bleeding, weakness and extreme pain, please call office ([phone]). Unless you usually have this type of migraine headache.   If you develop fever (greater than 101 F) or have any redness, swelling, or drainage at the injection site(s), please call off ([phone]). This may be a sign of infection.   If a rash or whelps (like hives) occur, please call the office ([phone]).  If you are a heart patient, please watch for symptoms such as swelling in your hands and feet and shortness of breath. If any of these symptoms occur, please notify your primary care/ heart doctor and go to the nearest emergency room.  If you have high blood pressure, you may experience an increase in your blood pressure over the next two weeks. Please continue to monitor your blood pressure and contact your primary care provider if your blood pressure does not return to baseline.    NO HEAT TO THE SURGERY SITE for the next 7 days including: bath or shower, heating pad, moist heat, and / or hot tubs.   DO NOT DRIVE OR OPERATE HEAVY MACHINERY UNTIL FOLLOW UP.   Avoid heavy lifting and excessive physical activity until follow up appointment.  OK to resume all medications unless otherwise directed by your doctor.

## 2023-03-21 VITALS
DIASTOLIC BLOOD PRESSURE: 60 MMHG | HEART RATE: 84 BPM | TEMPERATURE: 98 F | OXYGEN SATURATION: 94 % | SYSTOLIC BLOOD PRESSURE: 107 MMHG | RESPIRATION RATE: 16 BRPM

## 2023-03-24 ENCOUNTER — TELEPHONE (OUTPATIENT)
Dept: PAIN MEDICINE | Facility: CLINIC | Age: 73
End: 2023-03-24
Payer: MEDICARE

## 2023-03-24 ENCOUNTER — PATIENT MESSAGE (OUTPATIENT)
Dept: PAIN MEDICINE | Facility: CLINIC | Age: 73
End: 2023-03-24
Payer: MEDICARE

## 2023-03-27 ENCOUNTER — OFFICE VISIT (OUTPATIENT)
Dept: PAIN MEDICINE | Facility: CLINIC | Age: 73
End: 2023-03-27
Payer: MEDICARE

## 2023-03-27 VITALS
RESPIRATION RATE: 18 BRPM | BODY MASS INDEX: 23.81 KG/M2 | WEIGHT: 121.25 LBS | HEART RATE: 64 BPM | HEIGHT: 60 IN | SYSTOLIC BLOOD PRESSURE: 99 MMHG | TEMPERATURE: 97 F | DIASTOLIC BLOOD PRESSURE: 61 MMHG

## 2023-03-27 DIAGNOSIS — G89.4 CHRONIC PAIN SYNDROME: Primary | ICD-10-CM

## 2023-03-27 DIAGNOSIS — M96.1 POSTLAMINECTOMY SYNDROME: ICD-10-CM

## 2023-03-27 DIAGNOSIS — Z96.89 S/P INSERTION OF SPINAL CORD STIMULATOR: ICD-10-CM

## 2023-03-27 PROCEDURE — 99024 POSTOP FOLLOW-UP VISIT: CPT | Mod: POP,,, | Performed by: NURSE PRACTITIONER

## 2023-03-27 PROCEDURE — 99999 PR PBB SHADOW E&M-EST. PATIENT-LVL V: ICD-10-PCS | Mod: PBBFAC,,, | Performed by: NURSE PRACTITIONER

## 2023-03-27 PROCEDURE — 99024 PR POST-OP FOLLOW-UP VISIT: ICD-10-PCS | Mod: POP,,, | Performed by: NURSE PRACTITIONER

## 2023-03-27 PROCEDURE — 99215 OFFICE O/P EST HI 40 MIN: CPT | Mod: PBBFAC | Performed by: NURSE PRACTITIONER

## 2023-03-27 PROCEDURE — 99999 PR PBB SHADOW E&M-EST. PATIENT-LVL V: CPT | Mod: PBBFAC,,, | Performed by: NURSE PRACTITIONER

## 2023-03-27 RX ORDER — CLINDAMYCIN HYDROCHLORIDE 300 MG/1
300 CAPSULE ORAL 3 TIMES DAILY
Qty: 21 CAPSULE | Refills: 0 | Status: SHIPPED | OUTPATIENT
Start: 2023-03-27 | End: 2023-04-18

## 2023-03-27 RX ORDER — MUPIROCIN 20 MG/G
OINTMENT TOPICAL DAILY
Qty: 1 EACH | Refills: 0 | Status: SHIPPED | OUTPATIENT
Start: 2023-03-27 | End: 2023-06-24

## 2023-03-27 NOTE — PROGRESS NOTES
Chronic Pain - Established Visit    Referring Physician: No ref. provider found    Chief Complaint:   Chief Complaint   Patient presents with    Back Pain          SUBJECTIVE:    Interval History 3/27/2023:  The patient returns to clinic today for follow up of low back pain. She is s/p [employer] SCS implant on 3/20/2023. She reports relief with the device. She does report soreness to the area. She denies any fever, chills, or drainage. She did develop a rash while taking Bactrim. This appeared on her chest and abdomen, blistered and itchy. She did stop the antibiotics. She is following her activity restrictions. She denies any other health changes. Her pain today is 2/10.    Interval History 3/3/2023:  The patient returns to clinic today for follow up of low back pain. She is s/p [employer] SCS trial on 2/24/2023. She reports her pain has improved 60-70% since this. She reports increased activity during trial without pain. She denies any fevers, chills, or drainage at the site. She is interested in moving forward with the SCS implant. Her pain today is 2/10.    HPI:  Florentino Bowman presents to the clinic for the evaluation of low back and Right thigh pain. Patient here by referral to discuss SCS. Hx of L4/5 laminectomy x2 (2017 and 2018). The pain started in 2015 with development of radicular symptoms in 2017 ago following no specific inciting event, however, she does attribute the back pain to lifting. Patient followed by neurosurgery and pain management since 2017. Symptoms have been worsening.The pain is located in the low lumbar spine area and radiates to the right leg and anterior thigh in L5 distribution. Patient also endorses bilateral calf and foot pain (L >R), w/ possible neuroma in Left great toe that is being followed by podiatry (procedure planned for January 2023). The pain is described as aching, sharp, stabbing, and tingling and is rated as 1/10. The pain is rated with a score of  1/10  on the BEST day and a score of 9/10 on the WORST day.  Symptoms interfere with daily activity and work. The pain is exacerbated by Walking, Night Time, Lifting, and Getting out of bed/chair.  The pain is mitigated by ice, laying down, medications, physical therapy, rest, and sitting. The patient reports 8 hours of uninterrupted sleep per night.    Patient denies night fever/night sweats, urinary incontinence, bowel incontinence, and significant weight loss. Endorses weakness in bilateral quads and numbness in feet (L>R)    Physical Therapy/Home Exercise: yes      Pain Disability Index Review:  Last 3 PDI Scores 3/27/2023 3/3/2023 11/21/2022   Pain Disability Index (PDI) 10 10 35       Pain Medications:  Current  - Alprazolam (XANAX) 0.5 mg   - Duloxetine (CYMBALTA) 30mg BID  - Pregabalin (LYRICA) 100mg BID  - Hydrocodone (NORCO) 5-325 mg QD  - Zolpidem (AMBIEN) 10mg QHS  - Ibuprofen PRN    Previous;  - Percocet    Therapies:  PT: actively in PT (started 11/2022)  Acupuncture: minimal relief  Dry needling: minimal relief  Massage: some relief  TENS: Helpful  Chiropractor: No     report:  Reviewed and consistent with medication use as prescribed.    Pain Procedures:   3/20/2023- [employer] SCS implant  2/24/2023 - [employer] SCS trial   9/30/2022 - Bilateral L5 TFESI - Tony Castle M.D.  8/12/2022 - Caudal MICHAEL -  Tony Castle M.D.   2/19/2021 - Bilateral L5/S1 TFESI   6/5/2019 - Right L4/5 TFESI   1/16/2019 - RIGHT L4/5 AND L5/S1 MBB  6/15/2018 - Right L4/5 TFESI   5/30/2017 - Right L4/5 TFESI     Imaging:   EXAMINATION:  MRI THORACIC SPINE WITHOUT CONTRAST (12/06/22)     CLINICAL HISTORY:  Mid-back pain;  Other specified postprocedural states     TECHNIQUE:  Multiplanar, multisequence images were performed through the thoracic spine.  Contrast was not administered.     COMPARISON:  MRI lumbar spine 05/13/2022     FINDINGS:  ALIGNMENT: Normal.     BONE: No compression fractures.  Few fat  containing lesions, the largest in the L2 vertebral body, likely hemangiomas.  No aggressive marrow replacing lesion.     JOINT: Multilevel disc desiccation.  Intervertebral disc heights are relatively preserved.  No bone marrow edema.  No significant canal or foraminal stenosis.     SPINAL CANAL: The thoracic spinal cord is unremarkable.  The conus medullaris has a normal appearance and terminates at the L1 level.  No epidural mass or collection.     PARASPINAL SOFT TISSUES: Unremarkable.     Impression:     No specific findings to explain mid-back pain.      EXAMINATION:  MRI LUMBAR SPINE WITHOUT CONTRAST (5/13/22)     CLINICAL HISTORY:  Low back pain, trauma; Dorsalgia, unspecified     TECHNIQUE:  Multiplanar, multisequence MR images were acquired from the thoracolumbar junction to the sacrum without the administration of contrast.     FINDINGS:  The lumbar spinal alignment is significant for minimal anterolisthesis of L3 in relation to L4.  The vertebral body heights are satisfactorily preserved.  There is loss of disc space height with degenerative endplate change and disc desiccation identified from L3-4 through L5-S1.  The cord ends at L1.  The terminal cord, conus, and cauda equina have a normal appearance.  There is no intradural abnormality.  There is no evidence of an acute marrow replacement process such as tumor or infection.  There is no fracture.  The visualized surrounding soft tissues and vascular structures are unremarkable.     L1-2: Unremarkable.     L2-3: Facet hypertrophy.     L3-4: Facet hypertrophy with a disc bulge which creates mild left-sided neural foraminal stenosis and mild central canal stenosis.     L4-5: Facet hypertrophy with a disc bulge which creates mild bilateral neural foraminal stenosis.     L5-S1: Facet hypertrophy with a disc bulge which creates mild bilateral neural foraminal stenosis.     Impression:     Multilevel degenerative change as outlined above.    Past Medical  History:   Diagnosis Date    Arthritis     Carotid stenosis, asymptomatic     50%    Headache(784.0)     Hypothyroid     Movement disorder     Neuropathy      Past Surgical History:   Procedure Laterality Date    BREAST BIOPSY Right     CAUDAL EPIDURAL STEROID INJECTION N/A 2022    Procedure: INJECTION, STEROID, SPINE, EPIDURAL, CAUDAL;  Surgeon: Tony Castle MD;  Location: [facility] OR;  Service: Pain Management;  Laterality: N/A;     SECTION      x2    COLONOSCOPY N/A 2017    Procedure: COLONOSCOPY Miralax;  Surgeon: Quentin Mercado Jr., MD;  Location: [facility] ENDO;  Service: Endoscopy;  Laterality: N/A;    COLONOSCOPY N/A 2021    Procedure: COLONOSCOPY;  Surgeon: Andrew Parekh MD;  Location: [facility];  Service: Endoscopy;  Laterality: N/A;    ESOPHAGOGASTRODUODENOSCOPY N/A 2021    Procedure: EGD (ESOPHAGOGASTRODUODENOSCOPY);  Surgeon: Andrew Parekh MD;  Location: [facility];  Service: Endoscopy;  Laterality: N/A;    FLUOROSCOPY  3/20/2023    Procedure: FLUOROSCOPY;  Surgeon: Darrian Duran MD;  Location: [facility] OR;  Service: Pain Management;;    HYSTERECTOMY      INJECTION OF ANESTHETIC AGENT AROUND MEDIAL BRANCH NERVES INNERVATING LUMBAR FACET JOINT Right 2019    Procedure: BLOCK, NERVE, FACET JOINT, LUMBAR, MEDIAL BRANCH ;  Surgeon: Sixto Moya III, MD;  Location: [facility] OR;  Service: Pain Management;  Laterality: Right;  - L4/5 & L5/S1  PATIENT NEEDS TO BE THE FIRST PATIENT OF THE DAY, PER DR. MOYA!!!    INSERTION, NEUROSTIMULATOR, SPINAL CORD N/A 3/20/2023    Procedure: INSERTION, SPINAL CORD STIMULATOR IMPLANT [facility];  Surgeon: Darrian Duran MD;  Location: [facility] OR;  Service: Pain Management;  Laterality: N/A;    LAPAROSCOPIC HYSTERECTOMY  2010    supracervical/BSO    OOPHORECTOMY      SPINE SURGERY      TONSILLECTOMY      TRANSFORAMINAL EPIDURAL INJECTION OF STEROID Right 2019    Procedure: INJECTION, STEROID, EPIDURAL, TRANSFORAMINAL  APPROACH;  Surgeon: Sixto Moya III, MD;  Location: [facility] OR;  Service: Pain Management;  Laterality: Right;  -right TFESI L4/5    TRANSFORAMINAL EPIDURAL INJECTION OF STEROID Bilateral 2/19/2021    Procedure: INJECTION, STEROID, EPIDURAL, TRANSFORAMINAL APPROACH;  Surgeon: Sixto Moya III, MD;  Location: [facility] OR;  Service: Pain Management;  Laterality: Bilateral;  L5    TRANSFORAMINAL EPIDURAL INJECTION OF STEROID Bilateral 9/30/2022    Procedure: INJECTION, STEROID, EPIDURAL, TRANSFORAMINAL APPROACH;  Surgeon: Tony Castle MD;  Location: [facility] OR;  Service: Pain Management;  Laterality: Bilateral;    TRIAL OF SPINAL CORD NERVE STIMULATOR N/A 2/24/2023    Procedure: SPINAL CORD STIMULATOR TRIAL [facility] REP *ESHRAGHI ONLY*;  Surgeon: Darrian Duran MD;  Location: [facility] PAIN MGT;  Service: Pain Management;  Laterality: N/A;     Social History     Socioeconomic History    Marital status:    Occupational History    Occupation: retired    Tobacco Use    Smoking status: Never    Smokeless tobacco: Never   Substance and Sexual Activity    Alcohol use: Yes     Comment: social    Drug use: Never    Sexual activity: Not Currently     Partners: Male   Social History Narrative    , 2 adult children and exercises regularly-rides bike on the [place],     Social Determinants of Health     Financial Resource Strain: Low Risk     Difficulty of Paying Living Expenses: Not very hard   Food Insecurity: No Food Insecurity    Worried About Running Out of Food in the Last Year: Never true    Ran Out of Food in the Last Year: Never true   Transportation Needs: No Transportation Needs    Lack of Transportation (Medical): No    Lack of Transportation (Non-Medical): No   Physical Activity: Insufficiently Active    Days of Exercise per Week: 1 day    Minutes of Exercise per Session: 20 min   Stress: Stress Concern Present    Feeling of Stress : To some extent   Social Connections: Unknown     Frequency of Communication with Friends and Family: Patient refused    Frequency of Social Gatherings with Friends and Family: Once a week    Active Member of Clubs or Organizations: Yes    Attends Club or Organization Meetings: More than 4 times per year    Marital Status:    Housing Stability: Low Risk     Unable to Pay for Housing in the Last Year: No    Number of Places Lived in the Last Year: 1    Unstable Housing in the Last Year: No     Family History   Problem Relation Age of Onset    Dementia Mother     Hypertension Mother     Heart disease Mother     Cancer Father         colon    Colon cancer Father     Cancer Sister         breast    Breast cancer Sister     Heart disease Sister     No Known Problems Daughter     No Known Problems Son     No Known Problems Sister     Parkinsonism Maternal Uncle     Stroke Maternal Grandmother     Aneurysm Neg Hx     Clotting disorder Neg Hx     Diabetes Neg Hx     Fainting Neg Hx     Hyperlipidemia Neg Hx     Kidney disease Neg Hx     Liver disease Neg Hx     Migraines Neg Hx     Neuropathy Neg Hx     Seizures Neg Hx     Tremor Neg Hx        Review of patient's allergies indicates:  No Known Allergies    Current Outpatient Medications   Medication Sig    ALPRAZolam (XANAX) 0.5 MG tablet Take 1 tablet (0.5 mg total) by mouth daily as needed for Anxiety. PRN ANXIETY    atorvastatin (LIPITOR) 40 MG tablet Take 1 tablet (40 mg total) by mouth once daily.    carbidopa-levodopa  mg (SINEMET)  mg per tablet Take 0.5 tablets by mouth 3 (three) times daily.    DULoxetine (CYMBALTA) 30 MG capsule Take 1 capsule (30 mg total) by mouth 2 (two) times daily.    estradioL (ESTRACE) 0.5 MG tablet Take 1 tablet (0.5 mg total) by mouth every evening.    HYDROcodone-acetaminophen (NORCO) 5-325 mg per tablet Take 1 tablet by mouth every 12 (twelve) hours as needed for Pain.    HYDROcodone-acetaminophen (NORCO) 5-325 mg per tablet Take 1 tablet by mouth every 8 (eight)  hours as needed for Pain.    levothyroxine (SYNTHROID) 150 MCG tablet Take 1 tablet (150 mcg total) by mouth once daily. For thyroid    magnesium citrate 100 mg Cap     pregabalin (LYRICA) 100 MG capsule Take 100 mg by mouth 2 (two) times daily.    tumeric-ging-olive-oreg-capryl 100 mg-150 mg- 50 mg-150 mg Cap Take by mouth.    zolpidem (AMBIEN) 10 mg Tab Take 1 tablet (10 mg total) by mouth every evening.    rimegepant (NURTEC) 75 mg odt One on tongue PRN migraine. No more than once a day. (Patient not taking: Reported on 3/27/2023)     No current facility-administered medications for this visit.     Facility-Administered Medications Ordered in Other Visits   Medication    0.9%  NaCl infusion    0.9%  NaCl infusion    sodium chloride 0.9% flush 10 mL    sodium chloride 0.9% flush 10 mL       REVIEW OF SYSTEMS:    GENERAL:  No weight loss, malaise or fevers.  HEENT:  Negative for frequent or significant headaches.  NECK:  Negative for lumps, goiter, pain and significant neck swelling.  RESPIRATORY:  Negative for cough, wheezing or shortness of breath.  CARDIOVASCULAR:  Negative for chest pain, leg swelling or palpitations. Carotid stenosis  GI:  Negative for abdominal discomfort, blood in stools or black stools or change in bowel habits.  ENDO: Hypothyroid  MUSCULOSKELETAL:  See HPI.  SKIN:  Negative for lesions, rash, and itching.  PSYCH:  Negative for sleep disturbance, mood disorder and recent psychosocial stressors.  HEMATOLOGY/LYMPHOLOGY:  Negative for prolonged bleeding, bruising easily or swollen nodes.  NEURO:   No history of headaches, syncope, paralysis, seizures or tremors. Headaches  All other reviewed and negative other than HPI.    OBJECTIVE:    BP 99/61   Pulse 64   Temp 97.3 °F (36.3 °C)   Resp 18   Ht 5' (1.524 m)   Wt 55 kg (121 lb 4.1 oz)   LMP 01/01/2001   BMI 23.68 kg/m²     PHYSICAL EXAMINATION:    General appearance: Well appearing, in no acute distress, alert and oriented x3.  Psych:   Mood and affect appropriate.  Skin: Skin color, texture, turgor normal, no rashes or lesions, in both upper and lower body. SCS site without drainage or signs of infection. Edges well approximated.   Gait: normal.          ASSESSMENT: 73 y.o. year old female with low back and right leg pain, consistent with post laminectomy syndrome     1. Chronic pain syndrome        2. Postlaminectomy syndrome        3. S/P insertion of spinal cord stimulator  mupirocin (BACTROBAN) 2 % ointment    clindamycin (CLEOCIN) 300 MG capsule          PLAN:     - Previous imaging reviewed.     - She is s/p [employer] SCS implant with benefit. She did meet with Jelani today for programming.     - Discontinue Bactrim. Clindamycin 300 mg TID for 7 days.     - Apply Bactroban to incision once daily for 5 days.     - She may shower, no tub soaks.     - Continue activity restrictions.     - Notify clinic for any fever, chills, or drainage.     - RTC in 1 week for wound check.     - Dr. Duran was consulted on the patient and agrees with this plan.    The above plan and management options were discussed at length with patient. Patient is in agreement with the above and verbalized understanding.     Donna Castillo NP  03/27/2023

## 2023-03-30 ENCOUNTER — OFFICE VISIT (OUTPATIENT)
Dept: CARDIOLOGY | Facility: CLINIC | Age: 73
End: 2023-03-30
Payer: MEDICARE

## 2023-03-30 VITALS
SYSTOLIC BLOOD PRESSURE: 117 MMHG | WEIGHT: 127 LBS | DIASTOLIC BLOOD PRESSURE: 62 MMHG | HEART RATE: 72 BPM | HEIGHT: 60 IN | BODY MASS INDEX: 24.94 KG/M2

## 2023-03-30 DIAGNOSIS — R53.83 OTHER FATIGUE: Primary | ICD-10-CM

## 2023-03-30 DIAGNOSIS — G20.A1 PARKINSON'S DISEASE: ICD-10-CM

## 2023-03-30 DIAGNOSIS — I77.9 BILATERAL CAROTID ARTERY DISEASE, UNSPECIFIED TYPE: ICD-10-CM

## 2023-03-30 DIAGNOSIS — M62.81 MUSCLE WEAKNESS (GENERALIZED): ICD-10-CM

## 2023-03-30 DIAGNOSIS — E03.8 OTHER SPECIFIED HYPOTHYROIDISM: ICD-10-CM

## 2023-03-30 DIAGNOSIS — R55 PRE-SYNCOPE: ICD-10-CM

## 2023-03-30 PROCEDURE — 99204 OFFICE O/P NEW MOD 45 MIN: CPT | Mod: S$PBB,,, | Performed by: INTERNAL MEDICINE

## 2023-03-30 PROCEDURE — 99204 PR OFFICE/OUTPT VISIT, NEW, LEVL IV, 45-59 MIN: ICD-10-PCS | Mod: S$PBB,,, | Performed by: INTERNAL MEDICINE

## 2023-03-30 PROCEDURE — 99999 PR PBB SHADOW E&M-EST. PATIENT-LVL III: ICD-10-PCS | Mod: PBBFAC,,, | Performed by: INTERNAL MEDICINE

## 2023-03-30 PROCEDURE — 99999 PR PBB SHADOW E&M-EST. PATIENT-LVL III: CPT | Mod: PBBFAC,,, | Performed by: INTERNAL MEDICINE

## 2023-03-30 PROCEDURE — 99213 OFFICE O/P EST LOW 20 MIN: CPT | Mod: PBBFAC,PO | Performed by: INTERNAL MEDICINE

## 2023-03-30 NOTE — PROGRESS NOTES
"Subjective:    Patient ID:  Florentino Bowman is a 73 y.o. female who presents for evaluation of Fatigue      HPI    72 y/o female last seen in cardiology clinic 2017 for abnormal ECG and lucia-operative cardiac evaluation. She has a hx of hypothyroidism, chronic back pain mild bilateral carotid artery disease per carotid artery US. Initially had ECG performed as part of pre-op evaluation prior to back surgery. ECG with NSR, poss LAE, IRBBB, prolonged QT. On my interpretation showed NSR, poss IRBBB. Cleared for surgery. She has been seen by vascular surgery for asymptomatic GLENDY and statin increased to moderate dose. Has not been taking ASA.   Has been having left sided, intermittent, non exertional, mild, sharp, chest pain. Denies CP, SOB/DIETZ, orthopnea, PND, palps, syncope, LE edema. Is very active, does not smoke, and drinks about 2 glasses of wine every night.     3/30/2023:  Last seen in Cards clinic 2019 at which time had a normal stress test. She presents today as a referral from PCP for evaluation of cardiac cause of fatigue. She has chronic fatigue and has no energy. Not exertional and has been presents for a "long time." Has episodes of pre-syncope once weekly. Denies CP, DIETZ, orthopnea, PND, palps, LE edema, syncope. Compliant with meds. Has hx of hypothyroidism which has been uncontrolled. Also has hx of Parkinson's and is on medical management.     Review of Systems   Constitutional: Positive for malaise/fatigue.   HENT:  Negative for congestion.    Eyes:  Negative for blurred vision.   Cardiovascular:  Positive for near-syncope. Negative for chest pain, claudication, cyanosis, dyspnea on exertion, irregular heartbeat, leg swelling, orthopnea, palpitations, paroxysmal nocturnal dyspnea and syncope.   Respiratory:  Negative for shortness of breath.    Endocrine: Negative for polyuria.   Hematologic/Lymphatic: Negative for bleeding problem.   Skin:  Negative for itching and rash.   Musculoskeletal:  Negative " for joint swelling, muscle cramps and muscle weakness.   Gastrointestinal:  Negative for abdominal pain, hematemesis, hematochezia, melena, nausea and vomiting.   Genitourinary:  Negative for dysuria and hematuria.   Neurological:  Negative for dizziness, focal weakness, headaches, light-headedness, loss of balance and weakness.   Psychiatric/Behavioral:  Negative for depression. The patient is not nervous/anxious.       Objective:    Physical Exam  Constitutional:       Appearance: She is well-developed.   HENT:      Head: Normocephalic and atraumatic.   Neck:      Vascular: No JVD.   Cardiovascular:      Rate and Rhythm: Normal rate and regular rhythm.      Pulses:           Carotid pulses are 2+ on the right side and 2+ on the left side.       Radial pulses are 2+ on the right side and 2+ on the left side.        Femoral pulses are 2+ on the right side and 2+ on the left side.       Dorsalis pedis pulses are 2+ on the right side and 2+ on the left side.        Posterior tibial pulses are 2+ on the right side and 2+ on the left side.      Heart sounds: Normal heart sounds.   Pulmonary:      Effort: Pulmonary effort is normal.      Breath sounds: Normal breath sounds.   Abdominal:      General: Bowel sounds are normal.      Palpations: Abdomen is soft.   Musculoskeletal:      Cervical back: Neck supple.   Skin:     General: Skin is warm and dry.   Neurological:      Mental Status: She is alert and oriented to person, place, and time.   Psychiatric:         Behavior: Behavior normal.         Thought Content: Thought content normal.         Assessment:       1. Other fatigue    2. Pre-syncope    3. Other specified hypothyroidism    4. Bilateral carotid artery disease, unspecified type    5. Parkinson's disease    6. Muscle weakness (generalized)      72 y/o pt with hx and presentation as above. Doing well from a cardiac perspective and compensated from a HF perspective. Will evaluate for cardiac causes of current  symptoms with 2DE and Holter. Ultimately less likely cardiac and likely more related to thyroid and Parkinson's conditions and treatments. Discussed the etiology, evaluation, and management of fatigue, hypothyroid, PD, GLENDY. Discussed the importance of med compliance, heart healthy diet, and regular exercise.      Plan:       -Holter x 48 hours  -2DE  -f/u phone review

## 2023-03-31 ENCOUNTER — TELEPHONE (OUTPATIENT)
Dept: PAIN MEDICINE | Facility: CLINIC | Age: 73
End: 2023-03-31
Payer: MEDICARE

## 2023-03-31 ENCOUNTER — EXTERNAL CHRONIC CARE MANAGEMENT (OUTPATIENT)
Dept: PRIMARY CARE CLINIC | Facility: CLINIC | Age: 73
End: 2023-03-31
Payer: MEDICARE

## 2023-03-31 PROCEDURE — 99490 PR CHRONIC CARE MGMT, 1ST 20 MIN: ICD-10-PCS | Mod: S$GLB,,, | Performed by: FAMILY MEDICINE

## 2023-03-31 PROCEDURE — 99490 CHRNC CARE MGMT STAFF 1ST 20: CPT | Mod: S$GLB,,, | Performed by: FAMILY MEDICINE

## 2023-03-31 NOTE — TELEPHONE ENCOUNTER
Staff contacted and spoke with patient in regards to confirming her schedule 920a appt on Monday 4/3 with NP Donna Castillo.      Pt verbalized understanding and has confirmed appt.

## 2023-04-03 ENCOUNTER — OFFICE VISIT (OUTPATIENT)
Dept: PAIN MEDICINE | Facility: CLINIC | Age: 73
End: 2023-04-03
Payer: MEDICARE

## 2023-04-03 VITALS
SYSTOLIC BLOOD PRESSURE: 105 MMHG | DIASTOLIC BLOOD PRESSURE: 72 MMHG | HEART RATE: 78 BPM | WEIGHT: 127 LBS | BODY MASS INDEX: 23.98 KG/M2 | HEIGHT: 61 IN

## 2023-04-03 DIAGNOSIS — M96.1 POSTLAMINECTOMY SYNDROME: ICD-10-CM

## 2023-04-03 DIAGNOSIS — G89.4 CHRONIC PAIN SYNDROME: Primary | ICD-10-CM

## 2023-04-03 DIAGNOSIS — Z96.89 S/P INSERTION OF SPINAL CORD STIMULATOR: ICD-10-CM

## 2023-04-03 PROCEDURE — 99024 POSTOP FOLLOW-UP VISIT: CPT | Mod: POP,,, | Performed by: NURSE PRACTITIONER

## 2023-04-03 PROCEDURE — 99999 PR PBB SHADOW E&M-EST. PATIENT-LVL IV: CPT | Mod: PBBFAC,,, | Performed by: NURSE PRACTITIONER

## 2023-04-03 PROCEDURE — 99024 PR POST-OP FOLLOW-UP VISIT: ICD-10-PCS | Mod: POP,,, | Performed by: NURSE PRACTITIONER

## 2023-04-03 PROCEDURE — 99214 OFFICE O/P EST MOD 30 MIN: CPT | Mod: PBBFAC | Performed by: NURSE PRACTITIONER

## 2023-04-03 PROCEDURE — 99999 PR PBB SHADOW E&M-EST. PATIENT-LVL IV: ICD-10-PCS | Mod: PBBFAC,,, | Performed by: NURSE PRACTITIONER

## 2023-04-03 NOTE — PROGRESS NOTES
Chronic Pain - Established Visit    Referring Physician: No ref. provider found    Chief Complaint:   Chief Complaint   Patient presents with    Back Pain          SUBJECTIVE:    Interval History 4/3/2023:  The patient returns to clinic today for follow up of pain. She reports some relief with SCS. She is meeting Jelani today for programming. She denies any fever, chills, or drainage. She did not complete her antibiotics due to diarrhea. She is using Bactroban ointment. She denies any fever, chills, or drainage. She is following her activity restrictions. She denies any other health changes. Her pain today is 4/10.    Interval History 3/27/2023:  The patient returns to clinic today for follow up of low back pain. She is s/p [employer] SCS implant on 3/20/2023. She reports relief with the device. She does report soreness to the area. She denies any fever, chills, or drainage. She did develop a rash while taking Bactrim. This appeared on her chest and abdomen, blistered and itchy. She did stop the antibiotics. She is following her activity restrictions. She denies any other health changes. Her pain today is 2/10.    Interval History 3/3/2023:  The patient returns to clinic today for follow up of low back pain. She is s/p [employer] SCS trial on 2/24/2023. She reports her pain has improved 60-70% since this. She reports increased activity during trial without pain. She denies any fevers, chills, or drainage at the site. She is interested in moving forward with the SCS implant. Her pain today is 2/10.    HPI:  Florentino Bowman presents to the clinic for the evaluation of low back and Right thigh pain. Patient here by referral to discuss SCS. Hx of L4/5 laminectomy x2 (2017 and 2018). The pain started in 2015 with development of radicular symptoms in 2017 ago following no specific inciting event, however, she does attribute the back pain to lifting. Patient followed by neurosurgery and pain management since  2017. Symptoms have been worsening.The pain is located in the low lumbar spine area and radiates to the right leg and anterior thigh in L5 distribution. Patient also endorses bilateral calf and foot pain (L >R), w/ possible neuroma in Left great toe that is being followed by podiatry (procedure planned for January 2023). The pain is described as aching, sharp, stabbing, and tingling and is rated as 1/10. The pain is rated with a score of  1/10 on the BEST day and a score of 9/10 on the WORST day.  Symptoms interfere with daily activity and work. The pain is exacerbated by Walking, Night Time, Lifting, and Getting out of bed/chair.  The pain is mitigated by ice, laying down, medications, physical therapy, rest, and sitting. The patient reports 8 hours of uninterrupted sleep per night.    Patient denies night fever/night sweats, urinary incontinence, bowel incontinence, and significant weight loss. Endorses weakness in bilateral quads and numbness in feet (L>R)    Physical Therapy/Home Exercise: yes      Pain Disability Index Review:  Last 3 PDI Scores 4/3/2023 3/27/2023 3/3/2023   Pain Disability Index (PDI) 20 10 10       Pain Medications:  Current  - Alprazolam (XANAX) 0.5 mg   - Duloxetine (CYMBALTA) 30mg BID  - Pregabalin (LYRICA) 100mg BID  - Hydrocodone (NORCO) 5-325 mg QD  - Zolpidem (AMBIEN) 10mg QHS  - Ibuprofen PRN    Previous;  - Percocet    Therapies:  PT: actively in PT (started 11/2022)  Acupuncture: minimal relief  Dry needling: minimal relief  Massage: some relief  TENS: Helpful  Chiropractor: No     report:  Reviewed and consistent with medication use as prescribed.    Pain Procedures:   3/20/2023- [employer] SCS implant  2/24/2023 - [employer] SCS trial   9/30/2022 - Bilateral L5 TFESI - Tony Castle M.D.  8/12/2022 - Caudal MICHAEL -  Tony Castle M.D.   2/19/2021 - Bilateral L5/S1 TFESI   6/5/2019 - Right L4/5 TFESI   1/16/2019 - RIGHT L4/5 AND L5/S1 MBB  6/15/2018 - Right  L4/5 TFESI   5/30/2017 - Right L4/5 TFESI     Imaging:   EXAMINATION:  MRI THORACIC SPINE WITHOUT CONTRAST (12/06/22)     CLINICAL HISTORY:  Mid-back pain;  Other specified postprocedural states     TECHNIQUE:  Multiplanar, multisequence images were performed through the thoracic spine.  Contrast was not administered.     COMPARISON:  MRI lumbar spine 05/13/2022     FINDINGS:  ALIGNMENT: Normal.     BONE: No compression fractures.  Few fat containing lesions, the largest in the L2 vertebral body, likely hemangiomas.  No aggressive marrow replacing lesion.     JOINT: Multilevel disc desiccation.  Intervertebral disc heights are relatively preserved.  No bone marrow edema.  No significant canal or foraminal stenosis.     SPINAL CANAL: The thoracic spinal cord is unremarkable.  The conus medullaris has a normal appearance and terminates at the L1 level.  No epidural mass or collection.     PARASPINAL SOFT TISSUES: Unremarkable.     Impression:     No specific findings to explain mid-back pain.      EXAMINATION:  MRI LUMBAR SPINE WITHOUT CONTRAST (5/13/22)     CLINICAL HISTORY:  Low back pain, trauma; Dorsalgia, unspecified     TECHNIQUE:  Multiplanar, multisequence MR images were acquired from the thoracolumbar junction to the sacrum without the administration of contrast.     FINDINGS:  The lumbar spinal alignment is significant for minimal anterolisthesis of L3 in relation to L4.  The vertebral body heights are satisfactorily preserved.  There is loss of disc space height with degenerative endplate change and disc desiccation identified from L3-4 through L5-S1.  The cord ends at L1.  The terminal cord, conus, and cauda equina have a normal appearance.  There is no intradural abnormality.  There is no evidence of an acute marrow replacement process such as tumor or infection.  There is no fracture.  The visualized surrounding soft tissues and vascular structures are unremarkable.     L1-2: Unremarkable.     L2-3:  Facet hypertrophy.     L3-4: Facet hypertrophy with a disc bulge which creates mild left-sided neural foraminal stenosis and mild central canal stenosis.     L4-5: Facet hypertrophy with a disc bulge which creates mild bilateral neural foraminal stenosis.     L5-S1: Facet hypertrophy with a disc bulge which creates mild bilateral neural foraminal stenosis.     Impression:     Multilevel degenerative change as outlined above.    Past Medical History:   Diagnosis Date    Arthritis     Carotid stenosis, asymptomatic     50%    Headache(784.0)     Hypothyroid     Movement disorder     Neuropathy      Past Surgical History:   Procedure Laterality Date    BREAST BIOPSY Right     CAUDAL EPIDURAL STEROID INJECTION N/A 2022    Procedure: INJECTION, STEROID, SPINE, EPIDURAL, CAUDAL;  Surgeon: Tony Castle MD;  Location: [facility] OR;  Service: Pain Management;  Laterality: N/A;     SECTION      x2    COLONOSCOPY N/A 2017    Procedure: COLONOSCOPY Miralax;  Surgeon: Quentin Mercado Jr., MD;  Location: [facility];  Service: Endoscopy;  Laterality: N/A;    COLONOSCOPY N/A 2021    Procedure: COLONOSCOPY;  Surgeon: Andrew Parekh MD;  Location: [facility];  Service: Endoscopy;  Laterality: N/A;    ESOPHAGOGASTRODUODENOSCOPY N/A 2021    Procedure: EGD (ESOPHAGOGASTRODUODENOSCOPY);  Surgeon: Andrew Parekh MD;  Location: [facility];  Service: Endoscopy;  Laterality: N/A;    FLUOROSCOPY  3/20/2023    Procedure: FLUOROSCOPY;  Surgeon: Darrian Duran MD;  Location: [facility] OR;  Service: Pain Management;;    HYSTERECTOMY      INJECTION OF ANESTHETIC AGENT AROUND MEDIAL BRANCH NERVES INNERVATING LUMBAR FACET JOINT Right 2019    Procedure: BLOCK, NERVE, FACET JOINT, LUMBAR, MEDIAL BRANCH ;  Surgeon: Sixto Moya III, MD;  Location: [facility] OR;  Service: Pain Management;  Laterality: Right;  - L4/5 & L5/S1  PATIENT NEEDS TO BE THE FIRST PATIENT OF THE DAY, PER DR. MOYA!!!    INSERTION,  NEUROSTIMULATOR, SPINAL CORD N/A 3/20/2023    Procedure: INSERTION, SPINAL CORD STIMULATOR IMPLANT BOSTON REP;  Surgeon: Darrian Duran MD;  Location: [facility] OR;  Service: Pain Management;  Laterality: N/A;    LAPAROSCOPIC HYSTERECTOMY  2010    supracervical/BSO    OOPHORECTOMY      SPINE SURGERY      TONSILLECTOMY      TRANSFORAMINAL EPIDURAL INJECTION OF STEROID Right 6/5/2019    Procedure: INJECTION, STEROID, EPIDURAL, TRANSFORAMINAL APPROACH;  Surgeon: Sixto Moya III, MD;  Location: [facility] OR;  Service: Pain Management;  Laterality: Right;  -right TFESI L4/5    TRANSFORAMINAL EPIDURAL INJECTION OF STEROID Bilateral 2/19/2021    Procedure: INJECTION, STEROID, EPIDURAL, TRANSFORAMINAL APPROACH;  Surgeon: Sixto Moya III, MD;  Location: [facility] OR;  Service: Pain Management;  Laterality: Bilateral;  L5    TRANSFORAMINAL EPIDURAL INJECTION OF STEROID Bilateral 9/30/2022    Procedure: INJECTION, STEROID, EPIDURAL, TRANSFORAMINAL APPROACH;  Surgeon: Tony Castle MD;  Location: [facility] OR;  Service: Pain Management;  Laterality: Bilateral;    TRIAL OF SPINAL CORD NERVE STIMULATOR N/A 2/24/2023    Procedure: SPINAL CORD STIMULATOR TRIAL BOSTON REP *ESNELLA ONLY*;  Surgeon: Darrian Duran MD;  Location: [facility] MGT;  Service: Pain Management;  Laterality: N/A;     Social History     Socioeconomic History    Marital status:    Occupational History    Occupation: retired    Tobacco Use    Smoking status: Never    Smokeless tobacco: Never   Substance and Sexual Activity    Alcohol use: Yes     Comment: social    Drug use: Never    Sexual activity: Not Currently     Partners: Male   Social History Narrative    , 2 adult children and exercises regularly-rides bike on the [place],     Social Determinants of Health     Financial Resource Strain: Low Risk     Difficulty of Paying Living Expenses: Not hard at all   Food Insecurity: No Food Insecurity    Worried About Running  Out of Food in the Last Year: Never true    Ran Out of Food in the Last Year: Never true   Transportation Needs: No Transportation Needs    Lack of Transportation (Medical): No    Lack of Transportation (Non-Medical): No   Physical Activity: Insufficiently Active    Days of Exercise per Week: 2 days    Minutes of Exercise per Session: 20 min   Stress: Stress Concern Present    Feeling of Stress : To some extent   Social Connections: Unknown    Frequency of Communication with Friends and Family: Three times a week    Frequency of Social Gatherings with Friends and Family: Twice a week    Active Member of Clubs or Organizations: Yes    Attends Club or Organization Meetings: More than 4 times per year    Marital Status:    Housing Stability: Low Risk     Unable to Pay for Housing in the Last Year: No    Number of Places Lived in the Last Year: 1    Unstable Housing in the Last Year: No     Family History   Problem Relation Age of Onset    Dementia Mother     Hypertension Mother     Heart disease Mother     Cancer Father         colon    Colon cancer Father     Cancer Sister         breast    Breast cancer Sister     Heart disease Sister     No Known Problems Daughter     No Known Problems Son     No Known Problems Sister     Parkinsonism Maternal Uncle     Stroke Maternal Grandmother     Aneurysm Neg Hx     Clotting disorder Neg Hx     Diabetes Neg Hx     Fainting Neg Hx     Hyperlipidemia Neg Hx     Kidney disease Neg Hx     Liver disease Neg Hx     Migraines Neg Hx     Neuropathy Neg Hx     Seizures Neg Hx     Tremor Neg Hx        Review of patient's allergies indicates:   Allergen Reactions    Bactrim [sulfamethoxazole-trimethoprim] Rash       Current Outpatient Medications   Medication Sig    ALPRAZolam (XANAX) 0.5 MG tablet Take 1 tablet (0.5 mg total) by mouth daily as needed for Anxiety. PRN ANXIETY    atorvastatin (LIPITOR) 40 MG tablet Take 1 tablet (40 mg total) by mouth once daily.     carbidopa-levodopa  mg (SINEMET)  mg per tablet Take 0.5 tablets by mouth 3 (three) times daily.    clindamycin (CLEOCIN) 300 MG capsule Take 1 capsule (300 mg total) by mouth 3 (three) times daily. for 7 days    DULoxetine (CYMBALTA) 30 MG capsule Take 1 capsule (30 mg total) by mouth 2 (two) times daily.    estradioL (ESTRACE) 0.5 MG tablet Take 1 tablet (0.5 mg total) by mouth every evening.    HYDROcodone-acetaminophen (NORCO) 5-325 mg per tablet Take 1 tablet by mouth every 12 (twelve) hours as needed for Pain.    levothyroxine (SYNTHROID) 150 MCG tablet Take 1 tablet (150 mcg total) by mouth once daily. For thyroid    magnesium citrate 100 mg Cap     mupirocin (BACTROBAN) 2 % ointment Apply topically once daily.    pregabalin (LYRICA) 100 MG capsule Take 100 mg by mouth 2 (two) times daily.    zolpidem (AMBIEN) 10 mg Tab Take 1 tablet (10 mg total) by mouth every evening.    rimegepant (NURTEC) 75 mg odt One on tongue PRN migraine. No more than once a day. (Patient not taking: Reported on 3/27/2023)    tumeric-ging-olive-oreg-capryl 100 mg-150 mg- 50 mg-150 mg Cap Take by mouth.     No current facility-administered medications for this visit.     Facility-Administered Medications Ordered in Other Visits   Medication    0.9%  NaCl infusion    0.9%  NaCl infusion    sodium chloride 0.9% flush 10 mL    sodium chloride 0.9% flush 10 mL       REVIEW OF SYSTEMS:    GENERAL:  No weight loss, malaise or fevers.  HEENT:  Negative for frequent or significant headaches.  NECK:  Negative for lumps, goiter, pain and significant neck swelling.  RESPIRATORY:  Negative for cough, wheezing or shortness of breath.  CARDIOVASCULAR:  Negative for chest pain, leg swelling or palpitations. Carotid stenosis  GI:  Negative for abdominal discomfort, blood in stools or black stools or change in bowel habits.  ENDO: Hypothyroid  MUSCULOSKELETAL:  See HPI.  SKIN:  Negative for lesions, rash, and itching.  PSYCH:  Negative for  "sleep disturbance, mood disorder and recent psychosocial stressors.  HEMATOLOGY/LYMPHOLOGY:  Negative for prolonged bleeding, bruising easily or swollen nodes.  NEURO:   No history of headaches, syncope, paralysis, seizures or tremors. Headaches  All other reviewed and negative other than HPI.    OBJECTIVE:    /72 (BP Location: Right arm, Patient Position: Sitting, BP Method: Medium (Automatic))   Pulse 78   Ht 5' 1" (1.549 m)   Wt 57.6 kg (126 lb 15.8 oz)   LMP 01/01/2001   BMI 23.99 kg/m²     PHYSICAL EXAMINATION:    General appearance: Well appearing, in no acute distress, alert and oriented x3.  Psych:  Mood and affect appropriate.  Skin: Skin color, texture, turgor normal, no rashes or lesions, in both upper and lower body. SCS site without drainage or signs of infection. Edges well approximated.   Gait: normal.            ASSESSMENT: 73 y.o. year old female with low back and right leg pain, consistent with post laminectomy syndrome     1. Chronic pain syndrome        2. Postlaminectomy syndrome        3. S/P insertion of spinal cord stimulator              PLAN:     - Previous imaging reviewed.     - She is s/p [employer] SCS implant with benefit. She did meet with Jelani today for programming.     - Continue Bactroban ointment once daily for one week.     - Continue activity restrictions.     - Notify clinic for any fever, chills, or drainage.     - RTC in 2 weeks for wound check.     The above plan and management options were discussed at length with patient. Patient is in agreement with the above and verbalized understanding.     Donna Castillo NP  04/03/2023            "

## 2023-04-06 ENCOUNTER — PATIENT MESSAGE (OUTPATIENT)
Dept: NEUROLOGY | Facility: CLINIC | Age: 73
End: 2023-04-06
Payer: MEDICARE

## 2023-04-11 DIAGNOSIS — R25.2 FOOT CRAMPS: Primary | ICD-10-CM

## 2023-04-11 RX ORDER — CLONAZEPAM 0.5 MG/1
0.5 TABLET ORAL DAILY PRN
Qty: 10 TABLET | Refills: 0 | Status: SHIPPED | OUTPATIENT
Start: 2023-04-11 | End: 2023-08-08 | Stop reason: SDUPTHER

## 2023-04-17 ENCOUNTER — TELEPHONE (OUTPATIENT)
Dept: PAIN MEDICINE | Facility: CLINIC | Age: 73
End: 2023-04-17
Payer: MEDICARE

## 2023-04-18 ENCOUNTER — OFFICE VISIT (OUTPATIENT)
Dept: PAIN MEDICINE | Facility: CLINIC | Age: 73
End: 2023-04-18
Payer: MEDICARE

## 2023-04-18 VITALS
RESPIRATION RATE: 19 BRPM | HEIGHT: 61 IN | HEART RATE: 81 BPM | TEMPERATURE: 97 F | SYSTOLIC BLOOD PRESSURE: 138 MMHG | DIASTOLIC BLOOD PRESSURE: 81 MMHG | WEIGHT: 121.94 LBS | BODY MASS INDEX: 23.02 KG/M2

## 2023-04-18 DIAGNOSIS — G89.4 CHRONIC PAIN SYNDROME: Primary | ICD-10-CM

## 2023-04-18 DIAGNOSIS — Z96.89 S/P INSERTION OF SPINAL CORD STIMULATOR: ICD-10-CM

## 2023-04-18 DIAGNOSIS — M96.1 POSTLAMINECTOMY SYNDROME: ICD-10-CM

## 2023-04-18 PROCEDURE — 99215 OFFICE O/P EST HI 40 MIN: CPT | Mod: PBBFAC | Performed by: NURSE PRACTITIONER

## 2023-04-18 PROCEDURE — 99213 PR OFFICE/OUTPT VISIT, EST, LEVL III, 20-29 MIN: ICD-10-PCS | Mod: S$PBB,,, | Performed by: NURSE PRACTITIONER

## 2023-04-18 PROCEDURE — 99213 OFFICE O/P EST LOW 20 MIN: CPT | Mod: S$PBB,,, | Performed by: NURSE PRACTITIONER

## 2023-04-18 PROCEDURE — 99999 PR PBB SHADOW E&M-EST. PATIENT-LVL V: CPT | Mod: PBBFAC,,, | Performed by: NURSE PRACTITIONER

## 2023-04-18 PROCEDURE — 99999 PR PBB SHADOW E&M-EST. PATIENT-LVL V: ICD-10-PCS | Mod: PBBFAC,,, | Performed by: NURSE PRACTITIONER

## 2023-04-18 NOTE — PROGRESS NOTES
Chronic Pain - Established Visit    Referring Physician: No ref. provider found    Chief Complaint:   Chief Complaint   Patient presents with    Low-back Pain    Leg Pain          SUBJECTIVE:    Interval History 4/18/2023:  The patient returns to clinic today for follow up of pain. She reports improved benefit with [employer] [place] SCS programming. She is meeting Jelani today. She denies any fever, chills, or drainage. She continues to follow her activity restrictions. She denies any other health changes. Her pain today is 2/10.    Interval History 4/3/2023:  The patient returns to clinic today for follow up of pain. She reports some relief with SCS. She is meeting Jelani today for programming. She denies any fever, chills, or drainage. She did not complete her antibiotics due to diarrhea. She is using Bactroban ointment. She denies any fever, chills, or drainage. She is following her activity restrictions. She denies any other health changes. Her pain today is 4/10.    Interval History 3/27/2023:  The patient returns to clinic today for follow up of low back pain. She is s/p [employer] SCS implant on 3/20/2023. She reports relief with the device. She does report soreness to the area. She denies any fever, chills, or drainage. She did develop a rash while taking Bactrim. This appeared on her chest and abdomen, blistered and itchy. She did stop the antibiotics. She is following her activity restrictions. She denies any other health changes. Her pain today is 2/10.    Interval History 3/3/2023:  The patient returns to clinic today for follow up of low back pain. She is s/p [employer] SCS trial on 2/24/2023. She reports her pain has improved 60-70% since this. She reports increased activity during trial without pain. She denies any fevers, chills, or drainage at the site. She is interested in moving forward with the SCS implant. Her pain today is 2/10.    HPI:  Florentino Bowman presents to the clinic for the evaluation  of low back and Right thigh pain. Patient here by referral to discuss SCS. Hx of L4/5 laminectomy x2 (2017 and 2018). The pain started in 2015 with development of radicular symptoms in 2017 ago following no specific inciting event, however, she does attribute the back pain to lifting. Patient followed by neurosurgery and pain management since 2017. Symptoms have been worsening.The pain is located in the low lumbar spine area and radiates to the right leg and anterior thigh in L5 distribution. Patient also endorses bilateral calf and foot pain (L >R), w/ possible neuroma in Left great toe that is being followed by podiatry (procedure planned for January 2023). The pain is described as aching, sharp, stabbing, and tingling and is rated as 1/10. The pain is rated with a score of  1/10 on the BEST day and a score of 9/10 on the WORST day.  Symptoms interfere with daily activity and work. The pain is exacerbated by Walking, Night Time, Lifting, and Getting out of bed/chair.  The pain is mitigated by ice, laying down, medications, physical therapy, rest, and sitting. The patient reports 8 hours of uninterrupted sleep per night.    Patient denies night fever/night sweats, urinary incontinence, bowel incontinence, and significant weight loss. Endorses weakness in bilateral quads and numbness in feet (L>R)    Physical Therapy/Home Exercise: yes      Pain Disability Index Review:  Last 3 PDI Scores 4/18/2023 4/3/2023 3/27/2023   Pain Disability Index (PDI) 6 20 10       Pain Medications:  Current  - Alprazolam (XANAX) 0.5 mg   - Duloxetine (CYMBALTA) 30mg BID  - Pregabalin (LYRICA) 100mg BID  - Hydrocodone (NORCO) 5-325 mg QD  - Zolpidem (AMBIEN) 10mg QHS  - Ibuprofen PRN    Previous;  - Percocet    Therapies:  PT: actively in PT (started 11/2022)  Acupuncture: minimal relief  Dry needling: minimal relief  Massage: some relief  TENS: Helpful  Chiropractor: No     report:  Reviewed and consistent with medication use as  prescribed.    Pain Procedures:   3/20/2023- [employer] SCS implant  2/24/2023 - [employer] SCS trial   9/30/2022 - Bilateral L5 TFESI - Tony Castle M.D.  8/12/2022 - Caudal MICHAEL -  Tony Castle M.D.   2/19/2021 - Bilateral L5/S1 TFESI   6/5/2019 - Right L4/5 TFESI   1/16/2019 - RIGHT L4/5 AND L5/S1 MBB  6/15/2018 - Right L4/5 TFESI   5/30/2017 - Right L4/5 TFESI     Imaging:   EXAMINATION:  MRI THORACIC SPINE WITHOUT CONTRAST (12/06/22)     CLINICAL HISTORY:  Mid-back pain;  Other specified postprocedural states     TECHNIQUE:  Multiplanar, multisequence images were performed through the thoracic spine.  Contrast was not administered.     COMPARISON:  MRI lumbar spine 05/13/2022     FINDINGS:  ALIGNMENT: Normal.     BONE: No compression fractures.  Few fat containing lesions, the largest in the L2 vertebral body, likely hemangiomas.  No aggressive marrow replacing lesion.     JOINT: Multilevel disc desiccation.  Intervertebral disc heights are relatively preserved.  No bone marrow edema.  No significant canal or foraminal stenosis.     SPINAL CANAL: The thoracic spinal cord is unremarkable.  The conus medullaris has a normal appearance and terminates at the L1 level.  No epidural mass or collection.     PARASPINAL SOFT TISSUES: Unremarkable.     Impression:     No specific findings to explain mid-back pain.      EXAMINATION:  MRI LUMBAR SPINE WITHOUT CONTRAST (5/13/22)     CLINICAL HISTORY:  Low back pain, trauma; Dorsalgia, unspecified     TECHNIQUE:  Multiplanar, multisequence MR images were acquired from the thoracolumbar junction to the sacrum without the administration of contrast.     FINDINGS:  The lumbar spinal alignment is significant for minimal anterolisthesis of L3 in relation to L4.  The vertebral body heights are satisfactorily preserved.  There is loss of disc space height with degenerative endplate change and disc desiccation identified from L3-4 through L5-S1.  The cord ends  at L1.  The terminal cord, conus, and cauda equina have a normal appearance.  There is no intradural abnormality.  There is no evidence of an acute marrow replacement process such as tumor or infection.  There is no fracture.  The visualized surrounding soft tissues and vascular structures are unremarkable.     L1-2: Unremarkable.     L2-3: Facet hypertrophy.     L3-4: Facet hypertrophy with a disc bulge which creates mild left-sided neural foraminal stenosis and mild central canal stenosis.     L4-5: Facet hypertrophy with a disc bulge which creates mild bilateral neural foraminal stenosis.     L5-S1: Facet hypertrophy with a disc bulge which creates mild bilateral neural foraminal stenosis.     Impression:     Multilevel degenerative change as outlined above.    Past Medical History:   Diagnosis Date    Arthritis     Carotid stenosis, asymptomatic     50%    Headache(784.0)     Hypothyroid     Movement disorder     Neuropathy      Past Surgical History:   Procedure Laterality Date    BREAST BIOPSY Right     CAUDAL EPIDURAL STEROID INJECTION N/A 2022    Procedure: INJECTION, STEROID, SPINE, EPIDURAL, CAUDAL;  Surgeon: Tony Castle MD;  Location: [facility] OR;  Service: Pain Management;  Laterality: N/A;     SECTION      x2    COLONOSCOPY N/A 2017    Procedure: COLONOSCOPY Miralax;  Surgeon: Quentin Mercado Jr., MD;  Location: [facility];  Service: Endoscopy;  Laterality: N/A;    COLONOSCOPY N/A 2021    Procedure: COLONOSCOPY;  Surgeon: Andrew Parekh MD;  Location: [facility];  Service: Endoscopy;  Laterality: N/A;    ESOPHAGOGASTRODUODENOSCOPY N/A 2021    Procedure: EGD (ESOPHAGOGASTRODUODENOSCOPY);  Surgeon: Andrew Parekh MD;  Location: [facility];  Service: Endoscopy;  Laterality: N/A;    FLUOROSCOPY  3/20/2023    Procedure: FLUOROSCOPY;  Surgeon: Darrian Duran MD;  Location: [facility] OR;  Service: Pain Management;;    HYSTERECTOMY      INJECTION OF ANESTHETIC AGENT AROUND  MEDIAL BRANCH NERVES INNERVATING LUMBAR FACET JOINT Right 1/16/2019    Procedure: BLOCK, NERVE, FACET JOINT, LUMBAR, MEDIAL BRANCH ;  Surgeon: Sixto Moya III, MD;  Location: [facility] OR;  Service: Pain Management;  Laterality: Right;  - L4/5 & L5/S1  PATIENT NEEDS TO BE THE FIRST PATIENT OF THE DAY, PER DR. MOYA!!!    INSERTION, NEUROSTIMULATOR, SPINAL CORD N/A 3/20/2023    Procedure: INSERTION, SPINAL CORD STIMULATOR IMPLANT BOSTON REP;  Surgeon: Darrian Duran MD;  Location: [facility] OR;  Service: Pain Management;  Laterality: N/A;    LAPAROSCOPIC HYSTERECTOMY  2010    supracervical/BSO    OOPHORECTOMY      SPINE SURGERY      TONSILLECTOMY      TRANSFORAMINAL EPIDURAL INJECTION OF STEROID Right 6/5/2019    Procedure: INJECTION, STEROID, EPIDURAL, TRANSFORAMINAL APPROACH;  Surgeon: Sixto Moya III, MD;  Location: [facility] OR;  Service: Pain Management;  Laterality: Right;  -right TFESI L4/5    TRANSFORAMINAL EPIDURAL INJECTION OF STEROID Bilateral 2/19/2021    Procedure: INJECTION, STEROID, EPIDURAL, TRANSFORAMINAL APPROACH;  Surgeon: Sixto Moya III, MD;  Location: [facility] OR;  Service: Pain Management;  Laterality: Bilateral;  L5    TRANSFORAMINAL EPIDURAL INJECTION OF STEROID Bilateral 9/30/2022    Procedure: INJECTION, STEROID, EPIDURAL, TRANSFORAMINAL APPROACH;  Surgeon: Tony Castle MD;  Location: [facility] OR;  Service: Pain Management;  Laterality: Bilateral;    TRIAL OF SPINAL CORD NERVE STIMULATOR N/A 2/24/2023    Procedure: SPINAL CORD STIMULATOR TRIAL BOSTON REP *ESZHENAGHI ONLY*;  Surgeon: Darrian Duran MD;  Location: [facility] PAIN MGT;  Service: Pain Management;  Laterality: N/A;     Social History     Socioeconomic History    Marital status:    Occupational History    Occupation: retired    Tobacco Use    Smoking status: Never    Smokeless tobacco: Never   Substance and Sexual Activity    Alcohol use: Yes     Comment: social    Drug use: Never     Sexual activity: Not Currently     Partners: Male   Social History Narrative    , 2 adult children and exercises regularly-rides bike on the [employer],     Social Determinants of Health     Financial Resource Strain: Low Risk     Difficulty of Paying Living Expenses: Not hard at all   Food Insecurity: No Food Insecurity    Worried About Running Out of Food in the Last Year: Never true    Ran Out of Food in the Last Year: Never true   Transportation Needs: No Transportation Needs    Lack of Transportation (Medical): No    Lack of Transportation (Non-Medical): No   Physical Activity: Insufficiently Active    Days of Exercise per Week: 2 days    Minutes of Exercise per Session: 20 min   Stress: Stress Concern Present    Feeling of Stress : To some extent   Social Connections: Unknown    Frequency of Communication with Friends and Family: Three times a week    Frequency of Social Gatherings with Friends and Family: Twice a week    Active Member of Clubs or Organizations: Yes    Attends Club or Organization Meetings: More than 4 times per year    Marital Status:    Housing Stability: Low Risk     Unable to Pay for Housing in the Last Year: No    Number of Places Lived in the Last Year: 1    Unstable Housing in the Last Year: No     Family History   Problem Relation Age of Onset    Dementia Mother     Hypertension Mother     Heart disease Mother     Cancer Father         colon    Colon cancer Father     Cancer Sister         breast    Breast cancer Sister     Heart disease Sister     No Known Problems Daughter     No Known Problems Son     No Known Problems Sister     Parkinsonism Maternal Uncle     Stroke Maternal Grandmother     Aneurysm Neg Hx     Clotting disorder Neg Hx     Diabetes Neg Hx     Fainting Neg Hx     Hyperlipidemia Neg Hx     Kidney disease Neg Hx     Liver disease Neg Hx     Migraines Neg Hx     Neuropathy Neg Hx     Seizures Neg Hx     Tremor Neg Hx        Review of patient's allergies  indicates:   Allergen Reactions    Bactrim [sulfamethoxazole-trimethoprim] Rash       Current Outpatient Medications   Medication Sig    ALPRAZolam (XANAX) 0.5 MG tablet Take 1 tablet (0.5 mg total) by mouth daily as needed for Anxiety. PRN ANXIETY    atorvastatin (LIPITOR) 40 MG tablet Take 1 tablet (40 mg total) by mouth once daily.    clonazePAM (KLONOPIN) 0.5 MG tablet Take 1 tablet (0.5 mg total) by mouth daily as needed for Anxiety.    DULoxetine (CYMBALTA) 30 MG capsule Take 1 capsule (30 mg total) by mouth 2 (two) times daily.    estradioL (ESTRACE) 0.5 MG tablet Take 1 tablet (0.5 mg total) by mouth every evening.    levothyroxine (SYNTHROID) 150 MCG tablet Take 1 tablet (150 mcg total) by mouth once daily. For thyroid    mupirocin (BACTROBAN) 2 % ointment Apply topically once daily.    rimegepant (NURTEC) 75 mg odt One on tongue PRN migraine. No more than once a day.    zolpidem (AMBIEN) 10 mg Tab Take 1 tablet (10 mg total) by mouth every evening.    carbidopa-levodopa  mg (SINEMET)  mg per tablet Take 0.5 tablets by mouth 3 (three) times daily.    magnesium citrate 100 mg Cap     pregabalin (LYRICA) 100 MG capsule Take 100 mg by mouth 2 (two) times daily.    tumeric-ging-olive-oreg-capryl 100 mg-150 mg- 50 mg-150 mg Cap Take by mouth.     No current facility-administered medications for this visit.     Facility-Administered Medications Ordered in Other Visits   Medication    0.9%  NaCl infusion    0.9%  NaCl infusion    sodium chloride 0.9% flush 10 mL    sodium chloride 0.9% flush 10 mL       REVIEW OF SYSTEMS:    GENERAL:  No weight loss, malaise or fevers.  HEENT:  Negative for frequent or significant headaches.  NECK:  Negative for lumps, goiter, pain and significant neck swelling.  RESPIRATORY:  Negative for cough, wheezing or shortness of breath.  CARDIOVASCULAR:  Negative for chest pain, leg swelling or palpitations. Carotid stenosis  GI:  Negative for abdominal discomfort, blood in  "stools or black stools or change in bowel habits.  ENDO: Hypothyroid  MUSCULOSKELETAL:  See HPI.  SKIN:  Negative for lesions, rash, and itching.  PSYCH:  Negative for sleep disturbance, mood disorder and recent psychosocial stressors.  HEMATOLOGY/LYMPHOLOGY:  Negative for prolonged bleeding, bruising easily or swollen nodes.  NEURO:   No history of headaches, syncope, paralysis, seizures or tremors. Headaches  All other reviewed and negative other than HPI.    OBJECTIVE:    /81 (BP Location: Right arm, Patient Position: Sitting)   Pulse 81   Temp 97.1 °F (36.2 °C) (Oral)   Resp 19   Ht 5' 1" (1.549 m)   Wt 55.3 kg (121 lb 14.6 oz)   LMP 01/01/2001   BMI 23.04 kg/m²     PHYSICAL EXAMINATION:    General appearance: Well appearing, in no acute distress, alert and oriented x3.  Psych:  Mood and affect appropriate.  Skin: Skin color, texture, turgor normal, no rashes or lesions, in both upper and lower body. SCS site without drainage or signs of infection. Edges well approximated.   Gait: normal.              ASSESSMENT: 73 y.o. year old female with low back and right leg pain, consistent with:    1. Chronic pain syndrome        2. Postlaminectomy syndrome        3. S/P insertion of spinal cord stimulator                PLAN:     - Previous imaging reviewed.     - She is s/p [employer] SCS implant with benefit. She did meet with Jeal[facility] today for programming.     - She may discontinue Bactroban ointment.      - Continue activity restrictions until May 1st.      - Notify clinic for any fever, chills, or drainage.     - RTC in 1 month.     The above plan and management options were discussed at length with patient. Patient is in agreement with the above and verbalized understanding.     Donna Castillo NP  04/18/2023              "

## 2023-04-19 ENCOUNTER — HOSPITAL ENCOUNTER (OUTPATIENT)
Dept: CARDIOLOGY | Facility: HOSPITAL | Age: 73
Discharge: HOME | End: 2023-04-19
Attending: INTERNAL MEDICINE
Payer: MEDICARE

## 2023-04-19 VITALS — BODY MASS INDEX: 22.84 KG/M2 | WEIGHT: 121 LBS | HEIGHT: 61 IN

## 2023-04-19 DIAGNOSIS — R53.83 OTHER FATIGUE: ICD-10-CM

## 2023-04-19 DIAGNOSIS — R55 PRE-SYNCOPE: ICD-10-CM

## 2023-04-19 LAB
AV INDEX (PROSTH): 0.79
AV MEAN GRADIENT: 3 MMHG
AV PEAK GRADIENT: 6 MMHG
AV VALVE AREA: 2.07 CM2
AV VELOCITY RATIO: 0.75
BSA FOR ECHO PROCEDURE: 1.54 M2
CV ECHO LV RWT: 0.28 CM
DOP CALC AO PEAK VEL: 1.18 M/S
DOP CALC AO VTI: 27.4 CM
DOP CALC LVOT AREA: 2.6 CM2
DOP CALC LVOT DIAMETER: 1.83 CM
DOP CALC LVOT PEAK VEL: 0.89 M/S
DOP CALC LVOT STROKE VOLUME: 56.78 CM3
DOP CALC MV VTI: 18.5 CM
DOP CALCLVOT PEAK VEL VTI: 21.6 CM
E WAVE DECELERATION TIME: 193.96 MSEC
E/A RATIO: 0.66
E/E' RATIO: 8.86 M/S
ECHO LV POSTERIOR WALL: 0.63 CM (ref 0.6–1.1)
EJECTION FRACTION: 65 %
FRACTIONAL SHORTENING: 36 % (ref 28–44)
INTERVENTRICULAR SEPTUM: 0.68 CM (ref 0.6–1.1)
IVC DIAMETER: 1.33 CM
LA MAJOR: 3.8 CM
LA MINOR: 4.21 CM
LA WIDTH: 3.5 CM
LEFT ATRIUM SIZE: 3.29 CM
LEFT ATRIUM VOLUME INDEX MOD: 21.8 ML/M2
LEFT ATRIUM VOLUME INDEX: 25.6 ML/M2
LEFT ATRIUM VOLUME MOD: 33.3 CM3
LEFT ATRIUM VOLUME: 39.1 CM3
LEFT INTERNAL DIMENSION IN SYSTOLE: 2.87 CM (ref 2.1–4)
LEFT VENTRICLE DIASTOLIC VOLUME INDEX: 59.02 ML/M2
LEFT VENTRICLE DIASTOLIC VOLUME: 90.3 ML
LEFT VENTRICLE MASS INDEX: 57 G/M2
LEFT VENTRICLE SYSTOLIC VOLUME INDEX: 20.5 ML/M2
LEFT VENTRICLE SYSTOLIC VOLUME: 31.31 ML
LEFT VENTRICULAR INTERNAL DIMENSION IN DIASTOLE: 4.46 CM (ref 3.5–6)
LEFT VENTRICULAR MASS: 86.62 G
LV LATERAL E/E' RATIO: 7.75 M/S
LV SEPTAL E/E' RATIO: 10.33 M/S
LVOT MG: 1.88 MMHG
LVOT MV: 0.65 CM/S
MV A" WAVE DURATION": 111.32 MSEC
MV MEAN GRADIENT: 1 MMHG
MV PEAK A VEL: 0.94 M/S
MV PEAK E VEL: 0.62 M/S
MV PEAK GRADIENT: 4 MMHG
MV STENOSIS PRESSURE HALF TIME: 56.25 MS
MV VALVE AREA BY CONTINUITY EQUATION: 3.07 CM2
MV VALVE AREA P 1/2 METHOD: 3.91 CM2
OHS LV EJECTION FRACTION SIMPSONS BIPLANE MOD: 7 %
PISA TR MAX VEL: 2.54 M/S
PULM VEIN S/D RATIO: 1.7
PV MV: 0.77 M/S
PV PEAK D VEL: 0.43 M/S
PV PEAK S VEL: 0.73 M/S
PV PEAK VELOCITY: 1.09 CM/S
RA MAJOR: 4.04 CM
RA PRESSURE: 3 MMHG
RA WIDTH: 3.7 CM
RIGHT VENTRICULAR END-DIASTOLIC DIMENSION: 2.02 CM
SINUS: 2.8 CM
TDI LATERAL: 0.08 M/S
TDI SEPTAL: 0.06 M/S
TDI: 0.07 M/S
TR MAX PG: 26 MMHG
TV REST PULMONARY ARTERY PRESSURE: 29 MMHG

## 2023-04-19 PROCEDURE — 93227 HOLTER MONITOR - 48 HOUR (CUPID ONLY): ICD-10-PCS | Mod: ,,, | Performed by: INTERNAL MEDICINE

## 2023-04-19 PROCEDURE — 93306 TTE W/DOPPLER COMPLETE: CPT

## 2023-04-19 PROCEDURE — 93227 XTRNL ECG REC<48 HR R&I: CPT | Mod: ,,, | Performed by: INTERNAL MEDICINE

## 2023-04-19 PROCEDURE — 93306 ECHO (CUPID ONLY): ICD-10-PCS | Mod: 26,,, | Performed by: INTERNAL MEDICINE

## 2023-04-19 PROCEDURE — 93226 XTRNL ECG REC<48 HR SCAN A/R: CPT

## 2023-04-19 PROCEDURE — 93306 TTE W/DOPPLER COMPLETE: CPT | Mod: 26,,, | Performed by: INTERNAL MEDICINE

## 2023-04-21 ENCOUNTER — PATIENT MESSAGE (OUTPATIENT)
Dept: CARDIOLOGY | Facility: CLINIC | Age: 73
End: 2023-04-21
Payer: MEDICARE

## 2023-04-24 LAB
OHS CV EVENT MONITOR DAY: 0
OHS CV HOLTER LENGTH DECIMAL HOURS: 26
OHS CV HOLTER LENGTH HOURS: 26
OHS CV HOLTER LENGTH MINUTES: 0
OHS CV HOLTER SINUS AVERAGE HR: 78
OHS CV HOLTER SINUS MAX HR: 113
OHS CV HOLTER SINUS MIN HR: 64

## 2023-04-30 ENCOUNTER — EXTERNAL CHRONIC CARE MANAGEMENT (OUTPATIENT)
Dept: PRIMARY CARE CLINIC | Facility: CLINIC | Age: 73
End: 2023-04-30
Payer: MEDICARE

## 2023-04-30 PROCEDURE — 99490 CHRNC CARE MGMT STAFF 1ST 20: CPT | Mod: S$GLB,,, | Performed by: FAMILY MEDICINE

## 2023-04-30 PROCEDURE — 99490 PR CHRONIC CARE MGMT, 1ST 20 MIN: ICD-10-PCS | Mod: S$GLB,,, | Performed by: FAMILY MEDICINE

## 2023-05-03 ENCOUNTER — PATIENT MESSAGE (OUTPATIENT)
Dept: CARDIOLOGY | Facility: CLINIC | Age: 73
End: 2023-05-03
Payer: MEDICARE

## 2023-05-12 ENCOUNTER — TELEPHONE (OUTPATIENT)
Dept: CARDIOLOGY | Facility: CLINIC | Age: 73
End: 2023-05-12
Payer: MEDICARE

## 2023-05-12 NOTE — TELEPHONE ENCOUNTER
Returned call     She is requesting results of HM and Echo completed in April.    Please advise

## 2023-05-12 NOTE — TELEPHONE ENCOUNTER
----- Message from Colette Hoffman RN sent at 5/11/2023  5:02 PM CDT -----  Contact: pt    ----- Message -----  From: Ck Alvarenga  Sent: 5/11/2023   1:34 PM CDT  To: Fortino Du Staff    .Type:  Test Results    Who Called: pt  Name of Test (Lab/Mammo/Etc):  holter monitor  and TTE   Date of Test: 04/11/2023  Ordering Provider:   Where the test was performed: [facility]  Would the patient rather a call back or a response via [facility]?  Call back  Best Call Back Number: [phone]  Additional Information:   Pt. Is requesting a call back to discuss her test results

## 2023-05-16 ENCOUNTER — PATIENT MESSAGE (OUTPATIENT)
Dept: FAMILY MEDICINE | Facility: CLINIC | Age: 73
End: 2023-05-16
Payer: MEDICARE

## 2023-05-16 ENCOUNTER — TELEPHONE (OUTPATIENT)
Dept: FAMILY MEDICINE | Facility: CLINIC | Age: 73
End: 2023-05-16
Payer: MEDICARE

## 2023-05-16 NOTE — TELEPHONE ENCOUNTER
"Reached back to with test results per Dr Freitas as below:    Florian Freitas MD         Your heart monitor showed normal heart rhythm and no abnormality in the time it was placed.   Your echocardiogram shows normal heart and valve function. It mentions that there is mild degree of valve regurgitation or "leakiness" in some of your heart valves, which is not clinically relevant and would not cause symptoms. No further evaluation needed.        Verballized understanding, no questions asked at this time    "

## 2023-05-17 ENCOUNTER — TELEPHONE (OUTPATIENT)
Dept: FAMILY MEDICINE | Facility: CLINIC | Age: 73
End: 2023-05-17
Payer: MEDICARE

## 2023-05-17 DIAGNOSIS — E03.4 HYPOTHYROIDISM DUE TO ACQUIRED ATROPHY OF THYROID: Primary | ICD-10-CM

## 2023-05-18 ENCOUNTER — TELEPHONE (OUTPATIENT)
Dept: FAMILY MEDICINE | Facility: CLINIC | Age: 73
End: 2023-05-18
Payer: MEDICARE

## 2023-05-18 NOTE — TELEPHONE ENCOUNTER
----- Message from Fam Chou MD sent at 5/17/2023  7:04 AM CDT -----  So 150 mcg is too much thyroid medicine for you; and apparently the 125 mcg was not enough    So, alternate 125 with 150 every day and repeat TSH 2 months

## 2023-05-31 ENCOUNTER — EXTERNAL CHRONIC CARE MANAGEMENT (OUTPATIENT)
Dept: PRIMARY CARE CLINIC | Facility: CLINIC | Age: 73
End: 2023-05-31
Payer: MEDICARE

## 2023-05-31 PROCEDURE — 99490 CHRNC CARE MGMT STAFF 1ST 20: CPT | Mod: S$GLB,,, | Performed by: FAMILY MEDICINE

## 2023-05-31 PROCEDURE — 99490 PR CHRONIC CARE MGMT, 1ST 20 MIN: ICD-10-PCS | Mod: S$GLB,,, | Performed by: FAMILY MEDICINE

## 2023-06-21 ENCOUNTER — OFFICE VISIT (OUTPATIENT)
Dept: NEUROLOGY | Facility: CLINIC | Age: 73
End: 2023-06-21
Payer: MEDICARE

## 2023-06-21 VITALS
HEIGHT: 61 IN | BODY MASS INDEX: 24.15 KG/M2 | WEIGHT: 127.88 LBS | DIASTOLIC BLOOD PRESSURE: 80 MMHG | HEART RATE: 87 BPM | SYSTOLIC BLOOD PRESSURE: 131 MMHG

## 2023-06-21 DIAGNOSIS — I65.23 BILATERAL CAROTID ARTERY STENOSIS: Primary | ICD-10-CM

## 2023-06-21 DIAGNOSIS — G43.009 MIGRAINE WITHOUT AURA AND WITHOUT STATUS MIGRAINOSUS, NOT INTRACTABLE: ICD-10-CM

## 2023-06-21 PROCEDURE — 99213 OFFICE O/P EST LOW 20 MIN: CPT | Mod: PBBFAC,PN | Performed by: PSYCHIATRY & NEUROLOGY

## 2023-06-21 PROCEDURE — 99214 OFFICE O/P EST MOD 30 MIN: CPT | Mod: S$PBB,,, | Performed by: PSYCHIATRY & NEUROLOGY

## 2023-06-21 PROCEDURE — 99999 PR PBB SHADOW E&M-EST. PATIENT-LVL III: CPT | Mod: PBBFAC,,, | Performed by: PSYCHIATRY & NEUROLOGY

## 2023-06-21 PROCEDURE — 99999 PR PBB SHADOW E&M-EST. PATIENT-LVL III: ICD-10-PCS | Mod: PBBFAC,,, | Performed by: PSYCHIATRY & NEUROLOGY

## 2023-06-21 PROCEDURE — 99214 PR OFFICE/OUTPT VISIT, EST, LEVL IV, 30-39 MIN: ICD-10-PCS | Mod: S$PBB,,, | Performed by: PSYCHIATRY & NEUROLOGY

## 2023-06-21 RX ORDER — AMITRIPTYLINE HYDROCHLORIDE 10 MG/1
20 TABLET, FILM COATED ORAL NIGHTLY
COMMUNITY
Start: 2023-06-16 | End: 2023-08-08

## 2023-06-21 RX ORDER — RIMEGEPANT SULFATE 75 MG/75MG
TABLET, ORALLY DISINTEGRATING ORAL
Qty: 8 TABLET | Refills: 5 | Status: SHIPPED | OUTPATIENT
Start: 2023-06-21 | End: 2023-08-16

## 2023-06-21 RX ORDER — CARBIDOPA AND LEVODOPA 25; 100 MG/1; MG/1
0.5 TABLET ORAL 3 TIMES DAILY
Qty: 45 TABLET | Refills: 5 | Status: SHIPPED | OUTPATIENT
Start: 2023-06-21 | End: 2023-11-16 | Stop reason: SDUPTHER

## 2023-06-21 RX ORDER — SUMATRIPTAN SUCCINATE 100 MG/1
100 TABLET ORAL
COMMUNITY
Start: 2023-05-17 | End: 2023-06-21

## 2023-06-22 ENCOUNTER — TELEPHONE (OUTPATIENT)
Dept: NEUROLOGY | Facility: CLINIC | Age: 73
End: 2023-06-22
Payer: MEDICARE

## 2023-06-22 NOTE — TELEPHONE ENCOUNTER
Called and spoke to the pharmacist at [facility] regarding [place] approval of nurtec. He stated they got the approval they are going to process it and fill it when they get a chance.

## 2023-06-23 NOTE — PROGRESS NOTES
Subjective:       Patient ID: Florentino Bowman is a 73 y.o. female.    Chief Complaint: Pain and Follow-up      Ms. Bowman is doing very well due to marked resolution of her chronic left lower extremity pain since the spinal cord stimulator implant.  This has also helped her mobility and fluidity of movement a bit and has lessened her anxiety.    She still feels a bit dizzy with changing from sit to stand but is satisfied overall.      She remains active going to a gym doing yoga and cardio.    She takes OTC Calm for sleep.    Past Medical History:   Diagnosis Date    Arthritis     Carotid stenosis, asymptomatic     50%    Headache(784.0)     Hypothyroid     Movement disorder     Neuropathy       Past Surgical History:   Procedure Laterality Date    BREAST BIOPSY Right     CAUDAL EPIDURAL STEROID INJECTION N/A 2022    Procedure: INJECTION, STEROID, SPINE, EPIDURAL, CAUDAL;  Surgeon: Tony Castle MD;  [facility]: [facility] OR;  Service: Pain Management;  Laterality: N/A;     SECTION      x2    COLONOSCOPY N/A 2017    Procedure: COLONOSCOPY Miralax;  Surgeon: Quentin Mercado Jr., MD;  Location: [facility];  Service: Endoscopy;  Laterality: N/A;    COLONOSCOPY N/A 2021    Procedure: COLONOSCOPY;  Surgeon: Andrew Parekh MD;  Location: [facility];  Service: Endoscopy;  Laterality: N/A;    ESOPHAGOGASTRODUODENOSCOPY N/A 2021    Procedure: EGD (ESOPHAGOGASTRODUODENOSCOPY);  Surgeon: Andrew Parekh MD;  Location: [facility];  Service: Endoscopy;  Laterality: N/A;    FLUOROSCOPY  3/20/2023    Procedure: FLUOROSCOPY;  Surgeon: Darrian Duran MD;  Location: [facility] OR;  Service: Pain Management;;    HYSTERECTOMY      INJECTION OF ANESTHETIC AGENT AROUND MEDIAL BRANCH NERVES INNERVATING LUMBAR FACET JOINT Right 2019    Procedure: BLOCK, NERVE, FACET JOINT, LUMBAR, MEDIAL BRANCH ;  Surgeon: Sixto Moya III, MD;  Location: [facility] OR;  Service: Pain Management;  Laterality: Right;  -  L4/5 & L5/S1  PATIENT NEEDS TO BE THE FIRST PATIENT OF THE DAY, PER DR. AUGUSTIN!!!    INSERTION, NEUROSTIMULATOR, SPINAL CORD N/A 3/20/2023    Procedure: INSERTION, SPINAL CORD STIMULATOR IMPLANT BOSTON REP;  Surgeon: Darrian Duran MD;  Location: [facility] OR;  Service: Pain Management;  Laterality: N/A;    LAPAROSCOPIC HYSTERECTOMY  2010    supracervical/BSO    OOPHORECTOMY      SPINE SURGERY      TONSILLECTOMY      TRANSFORAMINAL EPIDURAL INJECTION OF STEROID Right 6/5/2019    Procedure: INJECTION, STEROID, EPIDURAL, TRANSFORAMINAL APPROACH;  Surgeon: Sixto Augustin III, MD;  Location: [facility] OR;  Service: Pain Management;  Laterality: Right;  -right TFESI L4/5    TRANSFORAMINAL EPIDURAL INJECTION OF STEROID Bilateral 2/19/2021    Procedure: INJECTION, STEROID, EPIDURAL, TRANSFORAMINAL APPROACH;  Surgeon: Sixto Augustin III, MD;  Location: [facility] OR;  Service: Pain Management;  Laterality: Bilateral;  L5    TRANSFORAMINAL EPIDURAL INJECTION OF STEROID Bilateral 9/30/2022    Procedure: INJECTION, STEROID, EPIDURAL, TRANSFORAMINAL APPROACH;  Surgeon: Tony Castle MD;  Location: [facility] OR;  Service: Pain Management;  Laterality: Bilateral;    TRIAL OF SPINAL CORD NERVE STIMULATOR N/A 2/24/2023    Procedure: SPINAL CORD STIMULATOR TRIAL BOSTON REP *ESNELLA ONLY*;  Surgeon: Darrian Duran MD;  Location: [facility] PAIN MGT;  Service: Pain Management;  Laterality: N/A;        Current Outpatient Medications:     atorvastatin (LIPITOR) 40 MG tablet, Take 1 tablet (40 mg total) by mouth once daily., Disp: 90 tablet, Rfl: 3    estradioL (ESTRACE) 0.5 MG tablet, Take 1 tablet (0.5 mg total) by mouth every evening., Disp: 90 tablet, Rfl: 3    levothyroxine (SYNTHROID) 150 MCG tablet, Take 1 tablet (150 mcg total) by mouth once daily. For thyroid, Disp: 90 tablet, Rfl: 3    amitriptyline (ELAVIL) 10 MG tablet, Take 20 mg by mouth every evening., Disp: , Rfl:     carbidopa-levodopa  mg (SINEMET)  mg  per tablet, Take 0.5 tablets by mouth 3 (three) times daily., Disp: 45 tablet, Rfl: 5    clonazePAM (KLONOPIN) 0.5 MG tablet, Take 1 tablet (0.5 mg total) by mouth daily as needed for Anxiety., Disp: 10 tablet, Rfl: 0    DULoxetine (CYMBALTA) 30 MG capsule, Take 1 capsule (30 mg total) by mouth 2 (two) times daily. (Patient taking differently: Take 60 mg by mouth once daily.), Disp: 180 capsule, Rfl: 1    mupirocin (BACTROBAN) 2 % ointment, Apply topically once daily., Disp: 1 each, Rfl: 0    rimegepant (NURTEC) 75 mg odt, Place ODT tablet on the tongue; alternatively the ODT tablet may be placed under the tongue. PRN migraine., Disp: 8 tablet, Rfl: 5  No current facility-administered medications for this visit.    Facility-Administered Medications Ordered in Other Visits:     0.9%  NaCl infusion, , Intravenous, Continuous, Tony Castle MD, New Bag at 09/30/22 0822    0.9%  NaCl infusion, , Intravenous, Continuous, Tony Castle MD    sodium chloride 0.9% flush 10 mL, 10 mL, Intravenous, PRN, Tony Csatle MD    sodium chloride 0.9% flush 10 mL, 10 mL, Intravenous, PRN, Tony Castle MD   Review of patient's allergies indicates:   Allergen Reactions    Clindamycin Diarrhea    Bactrim [sulfamethoxazole-trimethoprim] Rash        Review of Systems        Objective:      Physical Exam      Assessment:       1. Bilateral carotid artery stenosis    2. Migraine without aura and without status migrainosus, not intractable        Plan:          PD---mild and stable, and overall much better since spinal cord stimulator placement 3-20-23, which is helping her left lower extremity pain considerably.    She we will try to attempt to reduce amitriptyline to 10 mg q.h.s. as pain has improved and she does have chronic constipation and mild orthostasis..    In lieu of Xanax can use Klonopin 0.25 roughly twice a week p.r.n. Had considered the option of adding selegiline but she is currently prescribed  duloxetine and will therefore avoid selegiline for now as it is relatively contraindicated in patients taking SNRIs.    We will continue Sinemet unchanged and return to clinic in 5 months.      Episodic migraine--continue Prn nurtec. Triptans CI due to presence of carotid stenosis on ICA US 2018                  Carla Mendoza MD   06/23/2023   3:46 PM

## 2023-06-26 ENCOUNTER — TELEPHONE (OUTPATIENT)
Dept: OPTOMETRY | Facility: CLINIC | Age: 73
End: 2023-06-26
Payer: MEDICARE

## 2023-06-27 ENCOUNTER — TELEPHONE (OUTPATIENT)
Dept: NEUROLOGY | Facility: CLINIC | Age: 73
End: 2023-06-27
Payer: MEDICARE

## 2023-06-27 NOTE — TELEPHONE ENCOUNTER
Called and spoke to patient stating the nurtec is approved and her pharmacy should contact her to get it. Pt voiced understanding.

## 2023-06-27 NOTE — TELEPHONE ENCOUNTER
----- Message from Carla Mendoza MD sent at 6/26/2023  5:46 PM CDT -----  What medication?   ----- Message -----  From: Olga Dale MA  Sent: 6/23/2023   3:01 PM CDT  To: Carla Mendoza MD    Please advise  ----- Message -----  From: Ayesha Dwyer  Sent: 6/23/2023   2:59 PM CDT  To: Reji Aguirre Staff    Type:  Needs Medical Advice    Who Called: All an  Symptoms (please be specific):  [facility] [employer] pays for 1 tablet every other day  want to know if its medically necessary that the patient takes one every day   Would the patient rather a call back or a response via [facility]? Call   Best Call Back Number: [phone]  Additional Information:

## 2023-06-30 ENCOUNTER — EXTERNAL CHRONIC CARE MANAGEMENT (OUTPATIENT)
Dept: PRIMARY CARE CLINIC | Facility: CLINIC | Age: 73
End: 2023-06-30
Payer: MEDICARE

## 2023-06-30 PROCEDURE — 99439 PR CHRONIC CARE MGMT, EA ADDTL 20 MIN: ICD-10-PCS | Mod: S$GLB,,, | Performed by: FAMILY MEDICINE

## 2023-06-30 PROCEDURE — 99439 CHRNC CARE MGMT STAF EA ADDL: CPT | Mod: S$GLB,,, | Performed by: FAMILY MEDICINE

## 2023-06-30 PROCEDURE — 99490 PR CHRONIC CARE MGMT, 1ST 20 MIN: ICD-10-PCS | Mod: S$GLB,,, | Performed by: FAMILY MEDICINE

## 2023-06-30 PROCEDURE — 99490 CHRNC CARE MGMT STAFF 1ST 20: CPT | Mod: S$GLB,,, | Performed by: FAMILY MEDICINE

## 2023-07-25 ENCOUNTER — PATIENT MESSAGE (OUTPATIENT)
Dept: FAMILY MEDICINE | Facility: CLINIC | Age: 73
End: 2023-07-25
Payer: MEDICARE

## 2023-07-25 DIAGNOSIS — R53.83 OTHER FATIGUE: ICD-10-CM

## 2023-07-25 DIAGNOSIS — G20.A1 PARKINSON'S DISEASE: ICD-10-CM

## 2023-07-25 DIAGNOSIS — M94.9 DISORDER OF CARTILAGE, UNSPECIFIED: ICD-10-CM

## 2023-07-25 DIAGNOSIS — I77.9 BILATERAL CAROTID ARTERY DISEASE, UNSPECIFIED TYPE: ICD-10-CM

## 2023-07-25 DIAGNOSIS — E03.4 HYPOTHYROIDISM DUE TO ACQUIRED ATROPHY OF THYROID: Primary | ICD-10-CM

## 2023-07-25 DIAGNOSIS — Z00.00 ANNUAL PHYSICAL EXAM: ICD-10-CM

## 2023-07-25 DIAGNOSIS — F32.1 CURRENT MODERATE EPISODE OF MAJOR DEPRESSIVE DISORDER WITHOUT PRIOR EPISODE: ICD-10-CM

## 2023-07-25 DIAGNOSIS — Z86.16 HISTORY OF COVID-19: ICD-10-CM

## 2023-07-28 ENCOUNTER — PATIENT MESSAGE (OUTPATIENT)
Dept: NEUROLOGY | Facility: CLINIC | Age: 73
End: 2023-07-28
Payer: MEDICARE

## 2023-07-31 ENCOUNTER — EXTERNAL CHRONIC CARE MANAGEMENT (OUTPATIENT)
Dept: PRIMARY CARE CLINIC | Facility: CLINIC | Age: 73
End: 2023-07-31
Payer: MEDICARE

## 2023-07-31 PROCEDURE — 99490 CHRNC CARE MGMT STAFF 1ST 20: CPT | Mod: S$GLB,,, | Performed by: FAMILY MEDICINE

## 2023-07-31 PROCEDURE — 99490 PR CHRONIC CARE MGMT, 1ST 20 MIN: ICD-10-PCS | Mod: S$GLB,,, | Performed by: FAMILY MEDICINE

## 2023-08-08 RX ORDER — RASAGILINE 0.5 MG/1
0.5 TABLET ORAL DAILY
Qty: 30 TABLET | Refills: 3 | Status: SHIPPED | OUTPATIENT
Start: 2023-08-08 | End: 2023-11-22 | Stop reason: SDUPTHER

## 2023-08-08 RX ORDER — CLONAZEPAM 0.5 MG/1
0.5 TABLET ORAL DAILY PRN
Qty: 10 TABLET | Refills: 0 | Status: SHIPPED | OUTPATIENT
Start: 2023-08-08 | End: 2023-08-16

## 2023-08-09 ENCOUNTER — OFFICE VISIT (OUTPATIENT)
Dept: OPTOMETRY | Facility: CLINIC | Age: 73
End: 2023-08-09
Payer: MEDICARE

## 2023-08-09 DIAGNOSIS — H52.4 MYOPIA WITH ASTIGMATISM AND PRESBYOPIA, BILATERAL: ICD-10-CM

## 2023-08-09 DIAGNOSIS — H25.13 NUCLEAR SCLEROSIS OF BOTH EYES: ICD-10-CM

## 2023-08-09 DIAGNOSIS — H16.223 KERATOCONJUNCTIVITIS SICCA NOT SPECIFIED AS SJOGREN'S, BILATERAL: Primary | ICD-10-CM

## 2023-08-09 DIAGNOSIS — H52.203 MYOPIA WITH ASTIGMATISM AND PRESBYOPIA, BILATERAL: ICD-10-CM

## 2023-08-09 DIAGNOSIS — H52.13 MYOPIA WITH ASTIGMATISM AND PRESBYOPIA, BILATERAL: ICD-10-CM

## 2023-08-09 DIAGNOSIS — Z98.890 HX OF LASIK: ICD-10-CM

## 2023-08-09 PROCEDURE — 99999 PR PBB SHADOW E&M-EST. PATIENT-LVL II: CPT | Mod: PBBFAC,,, | Performed by: OPTOMETRIST

## 2023-08-09 PROCEDURE — 92014 COMPRE OPH EXAM EST PT 1/>: CPT | Mod: S$PBB,,, | Performed by: OPTOMETRIST

## 2023-08-09 PROCEDURE — 99212 OFFICE O/P EST SF 10 MIN: CPT | Mod: PBBFAC,PN | Performed by: OPTOMETRIST

## 2023-08-09 PROCEDURE — 92014 PR EYE EXAM, EST PATIENT,COMPREHESV: ICD-10-PCS | Mod: S$PBB,,, | Performed by: OPTOMETRIST

## 2023-08-09 PROCEDURE — 99999 PR PBB SHADOW E&M-EST. PATIENT-LVL II: ICD-10-PCS | Mod: PBBFAC,,, | Performed by: OPTOMETRIST

## 2023-08-09 NOTE — PROGRESS NOTES
AROM clear small amount, vocalizing labor pain more but states she is coping and declines pain medication at this time.      SVE:  MD Raji   2.5cm  80%  -2     HPI    CC: Presents today for routine eye exam with contact lens fitting. Pt   states fuzzy vision, denies eye pain.    ROGER: 4/7/2022 with Dr. Saez    (+) Changes in vision   (-) Pain  (-) Irritation   (-) Itching   (-) Flashes  (+) Floaters, Longstanding  (+) Glasses wearer  (-) CL wearer  (-) Uses eye gtts    Does patient want a refraction today? yes    (-) Eye injury  (+) Eye surgery, LASIK OU 2000  (-)POHx  (+)FOHx, Glaucoma    (-)DM         Last edited by Zamzam Saez, OD on 8/9/2023  4:40 PM.            Assessment /Plan     For exam results, see Encounter Report.    Keratoconjunctivitis sicca not specified as Sjogren's, bilateral    Hx of LASIK    Nuclear sclerosis of both eyes    Myopia with astigmatism and presbyopia, bilateral      Educated pt on findings. Significant dry eyes OU. Recommended ATs QID for added lubrication and comfort. If symptoms worsen or dont improve, RTC. Monitor 3-4 weeks.     2. Stable. Recommend ATs for added lubrication. Monitor yearly.     3. Educated pt on findings. Mildly visually significant, however, no need for removal at this time. Monitor yearly.       4. Will hold updated SRx and re-refract at dry eye f/u. Will fit with Cls (Acuvue Max MF) at dry eye f/u. Monitor 3-4 weeks.       RTC in 3-4 weeks for dry eye f/u + refraction + CL f/u or sooner if needed.

## 2023-08-16 ENCOUNTER — OFFICE VISIT (OUTPATIENT)
Dept: FAMILY MEDICINE | Facility: CLINIC | Age: 73
End: 2023-08-16
Payer: MEDICARE

## 2023-08-16 VITALS
BODY MASS INDEX: 24.97 KG/M2 | HEIGHT: 61 IN | HEART RATE: 90 BPM | TEMPERATURE: 98 F | WEIGHT: 132.25 LBS | SYSTOLIC BLOOD PRESSURE: 110 MMHG | DIASTOLIC BLOOD PRESSURE: 70 MMHG | OXYGEN SATURATION: 95 %

## 2023-08-16 DIAGNOSIS — I77.9 BILATERAL CAROTID ARTERY DISEASE, UNSPECIFIED TYPE: ICD-10-CM

## 2023-08-16 DIAGNOSIS — M85.852 OSTEOPENIA OF NECK OF LEFT FEMUR: ICD-10-CM

## 2023-08-16 DIAGNOSIS — G20.A1 PARKINSON'S DISEASE: ICD-10-CM

## 2023-08-16 DIAGNOSIS — I38 VALVULAR HEART DISEASE: ICD-10-CM

## 2023-08-16 DIAGNOSIS — E03.4 HYPOTHYROIDISM DUE TO ACQUIRED ATROPHY OF THYROID: ICD-10-CM

## 2023-08-16 DIAGNOSIS — Z00.00 WELLNESS EXAMINATION: Primary | ICD-10-CM

## 2023-08-16 DIAGNOSIS — M79.2 NEUROPATHIC PAIN OF LEFT FOOT: ICD-10-CM

## 2023-08-16 PROCEDURE — 99214 PR OFFICE/OUTPT VISIT, EST, LEVL IV, 30-39 MIN: ICD-10-PCS | Mod: S$GLB,,, | Performed by: FAMILY MEDICINE

## 2023-08-16 PROCEDURE — 99214 OFFICE O/P EST MOD 30 MIN: CPT | Mod: S$GLB,,, | Performed by: FAMILY MEDICINE

## 2023-08-16 RX ORDER — DULOXETIN HYDROCHLORIDE 60 MG/1
60 CAPSULE, DELAYED RELEASE ORAL EVERY MORNING
Qty: 90 CAPSULE | Refills: 3 | Status: SHIPPED | OUTPATIENT
Start: 2023-08-16 | End: 2024-08-15

## 2023-08-16 RX ORDER — DULOXETIN HYDROCHLORIDE 30 MG/1
30 CAPSULE, DELAYED RELEASE ORAL NIGHTLY
Qty: 90 CAPSULE | Refills: 3 | Status: SHIPPED | OUTPATIENT
Start: 2023-08-16 | End: 2023-11-14

## 2023-08-16 RX ORDER — LEVOTHYROXINE SODIUM 150 UG/1
150 TABLET ORAL EVERY OTHER DAY
Qty: 45 TABLET | Refills: 3 | Status: SHIPPED | OUTPATIENT
Start: 2023-08-16 | End: 2023-12-08

## 2023-08-16 RX ORDER — LEVOTHYROXINE SODIUM 125 UG/1
125 TABLET ORAL EVERY OTHER DAY
Qty: 45 TABLET | Refills: 3 | Status: SHIPPED | OUTPATIENT
Start: 2023-08-16 | End: 2023-12-08

## 2023-08-16 RX ORDER — ALPRAZOLAM 0.5 MG/1
0.5 TABLET ORAL DAILY PRN
Qty: 30 TABLET | Refills: 5 | Status: SHIPPED | OUTPATIENT
Start: 2023-08-16 | End: 2024-01-08 | Stop reason: SDUPTHER

## 2023-08-16 NOTE — PROGRESS NOTES
"Subjective:      Patient ID: Florentino Bowman is a 73 y.o. female.    Chief Complaint: Follow-up (6 mon)      Vitals:    08/16/23 1048   BP: 110/70   Pulse: 90   Temp: 97.8 °F (36.6 °C)   TempSrc: Oral   SpO2: 95%   Weight: 60 kg (132 lb 4.4 oz)   Height: 5' 0.98" (1.549 m)        HPI   Check up; goes to neurology for poss Parkinsons,  Went to psych, adjusted meds  Off opioids  Off ambien  Had labs  Echo 3 leaky valves      Problem List  Patient Active Problem List   Diagnosis    Headache    Hypothyroid    Depression    Insomnia    Anxiety    Chronic back pain    Bilateral carotid artery disease    Calcific tendonitis    Left shoulder pain    DDD (degenerative disc disease), lumbar    Change in bowel habit    Screening for colorectal cancer    S/P lumbar laminectomy    Chronic right-sided low back pain with right-sided sciatica    Lumbar radiculopathy    Lumbar radicular pain    Spondylolisthesis    Lumbar spondylosis    Chest pain    Change in bowel habits    Lumbar degenerative disc disease    Wears contact lenses    De Jesus's neuroma of left foot    Acquired posterior equinus of left lower extremity    Bursitis of intermetatarsal bursa of left foot    Muscle weakness (generalized)    Difficulty walking    Current moderate episode of major depressive disorder without prior episode    Parkinson's disease        ALLERGIES:   Review of patient's allergies indicates:   Allergen Reactions    Clindamycin Diarrhea    Adhesive      Holter monitor pads    Bactrim [sulfamethoxazole-trimethoprim] Rash       MEDS:   Current Outpatient Medications:     atorvastatin (LIPITOR) 40 MG tablet, Take 1 tablet (40 mg total) by mouth once daily., Disp: 90 tablet, Rfl: 3    carbidopa-levodopa  mg (SINEMET)  mg per tablet, Take 0.5 tablets by mouth 3 (three) times daily., Disp: 45 tablet, Rfl: 5    estradioL (ESTRACE) 0.5 MG tablet, Take 1 tablet (0.5 mg total) by mouth every evening., Disp: 90 tablet, Rfl: 3    rasagiline " (AZILECT) 0.5 MG Tab, Take 1 tablet (0.5 mg total) by mouth once daily., Disp: 30 tablet, Rfl: 3    ALPRAZolam (XANAX) 0.5 MG tablet, Take 1 tablet (0.5 mg total) by mouth daily as needed for Anxiety. PRN ANXIETY, Disp: 30 tablet, Rfl: 5    DULoxetine (CYMBALTA) 30 MG capsule, Take 1 capsule (30 mg total) by mouth every evening., Disp: 90 capsule, Rfl: 3    DULoxetine (CYMBALTA) 60 MG capsule, Take 1 capsule (60 mg total) by mouth every morning., Disp: 90 capsule, Rfl: 3    levothyroxine (SYNTHROID) 125 MCG tablet, Take 1 tablet (125 mcg total) by mouth every other day. Alternating with 150 mcg, Disp: 45 tablet, Rfl: 3    levothyroxine (SYNTHROID) 150 MCG tablet, Take 1 tablet (150 mcg total) by mouth every other day. Alternating with 125 mcg, Disp: 45 tablet, Rfl: 3  No current facility-administered medications for this visit.    Facility-Administered Medications Ordered in Other Visits:     0.9%  NaCl infusion, , Intravenous, Continuous, Tony Castle MD, New Bag at 09/30/22 0822    0.9%  NaCl infusion, , Intravenous, Continuous, Tony Castle MD    sodium chloride 0.9% flush 10 mL, 10 mL, Intravenous, PRN, Tony Castle MD    sodium chloride 0.9% flush 10 mL, 10 mL, Intravenous, PRN, Tony Castle MD      History:  Current Providers as of 8/16/2023  PCP: Fam Chou MD  Care Team Provider: Karley Kumar MD  Care Team Provider: Carla Mendoza MD  Care Team Provider: Shanika Arellano MD  Care Team Provider: Darrian Duran MD  Care Team Provider: Florian Freitas MD  Encounter Provider: Fam Chou MD, starting on Wed Aug 16, 2023 12:00 AM  Referring Provider: not found, starting on Wed Aug 16, 2023 12:00 AM  Consulting Physician: Fam Chou MD, starting on Wed Aug 16, 2023 10:40 AM (Active)   Past Medical History:   Diagnosis Date    Arthritis     Carotid stenosis, asymptomatic     50%    Headache(784.0)     Hypothyroid     Movement disorder     Neuropathy       Past Surgical History:   Procedure Laterality Date    BREAST BIOPSY Right     CAUDAL EPIDURAL STEROID INJECTION N/A 2022    Procedure: INJECTION, STEROID, SPINE, EPIDURAL, CAUDAL;  Surgeon: Tony Castle MD;  Location: [facility] OR;  Service: Pain Management;  Laterality: N/A;     SECTION      x2    COLONOSCOPY N/A 2017    Procedure: COLONOSCOPY Miralax;  Surgeon: Quentin Mercado Jr., MD;  Location: [facility] ENDO;  Service: Endoscopy;  Laterality: N/A;    COLONOSCOPY N/A 2021    Procedure: COLONOSCOPY;  Surgeon: Andrew Parekh MD;  Location: [facility] ENDO;  Service: Endoscopy;  Laterality: N/A;    ESOPHAGOGASTRODUODENOSCOPY N/A 2021    Procedure: EGD (ESOPHAGOGASTRODUODENOSCOPY);  Surgeon: Andrew Parekh MD;  Location: [facility] ENDO;  Service: Endoscopy;  Laterality: N/A;    EYE SURGERY      FLUOROSCOPY  2023    Procedure: FLUOROSCOPY;  Surgeon: Darrian Duran MD;  Location: [facility] [place] OR;  Service: Pain Management;;    HYSTERECTOMY      INJECTION OF ANESTHETIC AGENT AROUND MEDIAL BRANCH NERVES INNERVATING LUMBAR FACET JOINT Right 2019    Procedure: BLOCK, NERVE, FACET JOINT, LUMBAR, MEDIAL BRANCH ;  Surgeon: Sixto Moya III, MD;  Location: [facility] OR;  Service: Pain Management;  Laterality: Right;  - L4/5 & L5/S1  PATIENT NEEDS TO BE THE FIRST PATIENT OF THE DAY, PER DR. MOYA!!!    INSERTION, NEUROSTIMULATOR, SPINAL CORD N/A 2023    Procedure: INSERTION, SPINAL CORD STIMULATOR IMPLANT BOSTON [facility];  Surgeon: Darrian Duran MD;  Location: [facility] [place] OR;  Service: Pain Management;  Laterality: N/A;    LAPAROSCOPIC HYSTERECTOMY  2010    supracervical/BSO    OOPHORECTOMY      SPINE SURGERY      TONSILLECTOMY      TRANSFORAMINAL EPIDURAL INJECTION OF STEROID Right 2019    Procedure: INJECTION, STEROID, EPIDURAL, TRANSFORAMINAL APPROACH;  Surgeon: Sixto Moya III, MD;  Location: [facility] OR;  Service: Pain Management;  Laterality: Right;  -right  TFESI L4/5    TRANSFORAMINAL EPIDURAL INJECTION OF STEROID Bilateral 02/19/2021    Procedure: INJECTION, STEROID, EPIDURAL, TRANSFORAMINAL APPROACH;  Surgeon: Sixto Moya III, MD;  Location: [facility] OR;  Service: Pain Management;  Laterality: Bilateral;  L5    TRANSFORAMINAL EPIDURAL INJECTION OF STEROID Bilateral 09/30/2022    Procedure: INJECTION, STEROID, EPIDURAL, TRANSFORAMINAL APPROACH;  Surgeon: Tony Castle MD;  Location: [facility] OR;  Service: Pain Management;  Laterality: Bilateral;    TRIAL OF SPINAL CORD NERVE STIMULATOR N/A 02/24/2023    Procedure: SPINAL CORD STIMULATOR TRIAL BOSTON REP *ESHRAGHI ONLY*;  Surgeon: Darrian Duran MD;  [facility];  Service: Pain Management;  Laterality: N/A;    TUBAL LIGATION       Social History     Tobacco Use    Smoking status: Never     Passive exposure: Never    Smokeless tobacco: Never   Substance Use Topics    Alcohol use: Not Currently     Alcohol/week: 4.0 standard drinks of alcohol     Types: 4 Glasses of wine per week     Comment: social    Drug use: No         Review of Systems   Gastrointestinal:  Positive for constipation.   Musculoskeletal:  Positive for gait problem.   Neurological:  Positive for weakness.   Psychiatric/Behavioral:  Positive for sleep disturbance.    All other systems reviewed and are negative.    Objective:     Physical Exam  Vitals and nursing note reviewed.   Constitutional:       Appearance: She is well-developed.   HENT:      Head: Normocephalic.   Eyes:      Conjunctiva/sclera: Conjunctivae normal.      Pupils: Pupils are equal, round, and reactive to light.   Cardiovascular:      Rate and Rhythm: Normal rate and regular rhythm.      Heart sounds: Normal heart sounds.   Pulmonary:      Effort: Pulmonary effort is normal.      Breath sounds: Normal breath sounds.   Musculoskeletal:         General: Normal range of motion.      Cervical back: Normal range of motion and neck supple.   Skin:     General: Skin  is warm and dry.   Neurological:      General: No focal deficit present.      Mental Status: She is alert and oriented to person, place, and time. Mental status is at baseline.      Motor: No weakness.      Gait: Gait abnormal.      Deep Tendon Reflexes: Reflexes are normal and symmetric.      Comments: Left arm doesn't swing with walking   Psychiatric:         Mood and Affect: Mood normal.         Behavior: Behavior normal.         Thought Content: Thought content normal.         Judgment: Judgment normal.             Assessment:     1. Wellness examination    2. Valvular heart disease    3. Bilateral carotid artery disease, unspecified type    4. Hypothyroidism due to acquired atrophy of thyroid    5. Osteopenia of neck of left femur    6. Neuropathic pain of left foot    7. Parkinson's disease      Plan:        Medication List            Accurate as of August 16, 2023 11:59 PM. If you have any questions, ask your nurse or doctor.                START taking these medications      ALPRAZolam 0.5 MG tablet  Commonly known as: XANAX  Take 1 tablet (0.5 mg total) by mouth daily as needed for Anxiety. PRN ANXIETY  Started by: Fam Chou MD            CHANGE how you take these medications      * DULoxetine 30 MG capsule  Commonly known as: CYMBALTA  Take 1 capsule (30 mg total) by mouth every evening.  What changed: when to take this  Changed by: Fam Chou MD     * DULoxetine 60 MG capsule  Commonly known as: CYMBALTA  Take 1 capsule (60 mg total) by mouth every morning.  What changed: You were already taking a medication with the same name, and this prescription was added. Make sure you understand how and when to take each.  Changed by: Fam Chou MD     * levothyroxine 125 MCG tablet  Commonly known as: SYNTHROID  Take 1 tablet (125 mcg total) by mouth every other day. Alternating with 150 mcg  What changed:   medication strength  how much to take  when to take this  additional instructions  Changed  by: Fam Chou MD     * levothyroxine 150 MCG tablet  Commonly known as: SYNTHROID  Take 1 tablet (150 mcg total) by mouth every other day. Alternating with 125 mcg  What changed: You were already taking a medication with the same name, and this prescription was added. Make sure you understand how and when to take each.  Changed by: Fam Chou MD           * This list has 4 medication(s) that are the same as other medications prescribed for you. Read the directions carefully, and ask your doctor or other care provider to review them with you.                CONTINUE taking these medications      atorvastatin 40 MG tablet  Commonly known as: LIPITOR  Take 1 tablet (40 mg total) by mouth once daily.     carbidopa-levodopa  mg  mg per tablet  Commonly known as: SINEMET  Take 0.5 tablets by mouth 3 (three) times daily.     estradioL 0.5 MG tablet  Commonly known as: ESTRACE  Take 1 tablet (0.5 mg total) by mouth every evening.     rasagiline 0.5 MG Tab  Commonly known as: AZILECT  Take 1 tablet (0.5 mg total) by mouth once daily.            STOP taking these medications      clonazePAM 0.5 MG tablet  Commonly known as: KlonoPIN  Stopped by: Fam Chou MD               Where to Get Your Medications        These medications were sent to [id number] - [place], [place] - [address], [address]  [address] [address], [facility] [address], [facility] [address]      Phone: [phone]   ALPRAZolam 0.5 MG tablet  DULoxetine 30 MG capsule  DULoxetine 60 MG capsule  levothyroxine 125 MCG tablet  levothyroxine 150 MCG tablet       Wellness examination  -     levothyroxine (SYNTHROID) 125 MCG tablet; Take 1 tablet (125 mcg total) by mouth every other day. Alternating with 150 mcg  Dispense: 45 tablet; Refill: 3    Valvular heart disease    Bilateral carotid artery disease, unspecified type  -     levothyroxine (SYNTHROID) 125 MCG tablet; Take 1 tablet (125 mcg total) by mouth every other day. Alternating with 150  mcg  Dispense: 45 tablet; Refill: 3    Hypothyroidism due to acquired atrophy of thyroid  -     TSH; Future; Expected date: 11/16/2023    Osteopenia of neck of left femur  -     DXA Bone Density Axial Skeleton 1 or more sites; Future; Expected date: 08/16/2023    Neuropathic pain of left foot  -     DULoxetine (CYMBALTA) 30 MG capsule; Take 1 capsule (30 mg total) by mouth every evening.  Dispense: 90 capsule; Refill: 3    Parkinson's disease    Other orders  -     levothyroxine (SYNTHROID) 150 MCG tablet; Take 1 tablet (150 mcg total) by mouth every other day. Alternating with 125 mcg  Dispense: 45 tablet; Refill: 3  -     DULoxetine (CYMBALTA) 60 MG capsule; Take 1 capsule (60 mg total) by mouth every morning.  Dispense: 90 capsule; Refill: 3  -     ALPRAZolam (XANAX) 0.5 MG tablet; Take 1 tablet (0.5 mg total) by mouth daily as needed for Anxiety. PRN ANXIETY  Dispense: 30 tablet; Refill: 5    Check tsh 3 momnths  Yearly echo  Every other year on carotids  DEXA end of the year    Pt taking xanax and not clonazepam,, wants a refill of the xanax

## 2023-08-30 ENCOUNTER — OFFICE VISIT (OUTPATIENT)
Dept: OPTOMETRY | Facility: CLINIC | Age: 73
End: 2023-08-30
Payer: MEDICARE

## 2023-08-30 DIAGNOSIS — H16.223 KERATOCONJUNCTIVITIS SICCA NOT SPECIFIED AS SJOGREN'S, BILATERAL: Primary | ICD-10-CM

## 2023-08-30 DIAGNOSIS — H52.7 REFRACTIVE ERROR: ICD-10-CM

## 2023-08-30 PROCEDURE — 99999 PR PBB SHADOW E&M-EST. PATIENT-LVL II: ICD-10-PCS | Mod: PBBFAC,,, | Performed by: OPTOMETRIST

## 2023-08-30 PROCEDURE — 92310 PR CONTACT LENS FITTING (NO CHANGE): ICD-10-PCS | Mod: CSM,S$GLB,, | Performed by: OPTOMETRIST

## 2023-08-30 PROCEDURE — 92015 DETERMINE REFRACTIVE STATE: CPT | Mod: S$PBB,,, | Performed by: OPTOMETRIST

## 2023-08-30 PROCEDURE — 92012 PR EYE EXAM, EST PATIENT,INTERMED: ICD-10-PCS | Mod: S$PBB,,, | Performed by: OPTOMETRIST

## 2023-08-30 PROCEDURE — 92015 PR REFRACTION: ICD-10-PCS | Mod: S$PBB,,, | Performed by: OPTOMETRIST

## 2023-08-30 PROCEDURE — 92310 CONTACT LENS FITTING OU: CPT | Mod: CSM,S$GLB,, | Performed by: OPTOMETRIST

## 2023-08-30 PROCEDURE — 99212 OFFICE O/P EST SF 10 MIN: CPT | Mod: PBBFAC,PN | Performed by: OPTOMETRIST

## 2023-08-30 PROCEDURE — 92012 INTRM OPH EXAM EST PATIENT: CPT | Mod: S$PBB,,, | Performed by: OPTOMETRIST

## 2023-08-30 PROCEDURE — 99999 PR PBB SHADOW E&M-EST. PATIENT-LVL II: CPT | Mod: PBBFAC,,, | Performed by: OPTOMETRIST

## 2023-08-31 ENCOUNTER — EXTERNAL CHRONIC CARE MANAGEMENT (OUTPATIENT)
Dept: PRIMARY CARE CLINIC | Facility: CLINIC | Age: 73
End: 2023-08-31
Payer: MEDICARE

## 2023-08-31 PROCEDURE — 99490 PR CHRONIC CARE MGMT, 1ST 20 MIN: ICD-10-PCS | Mod: S$GLB,,, | Performed by: FAMILY MEDICINE

## 2023-08-31 PROCEDURE — 99490 CHRNC CARE MGMT STAFF 1ST 20: CPT | Mod: S$GLB,,, | Performed by: FAMILY MEDICINE

## 2023-09-01 NOTE — PROGRESS NOTES
HPI    Presents today for dry eye f/u + refraction + CL f/u. Pt states right eye   seems to be okay since last visit, but left eye still has some irritation.   Pt reports to be using the ATs 2-3x a day.   Last edited by Zamzam Saez, OD on 8/30/2023  3:59 PM.            Assessment /Plan     For exam results, see Encounter Report.    Keratoconjunctivitis sicca not specified as Sjogren's, bilateral    Refractive error      Educated pt on findings. Improvement in dry eyes since ROGER. Recommend to continue ATs QID + add jerri/gel QHS for added lubrication and comfort. If symptoms worsen or dont improve, RTC. Monitor.      2. Updated SRx. Mild change from habitual. Monitor yearly.    Given CL trials to take home. Initially good comfort. Pt to portal message which trials she likes better. Will then finalize CL Rx. If issues arise, RTC for CL f/u. Reviewed proper CL care and hygiene. Monitor yearly unless changes noted sooner.        RTC for next annual (08/2024) or sooner if needed.     Addendum 09/18/2023  Pt happy with previous Cl Rx (od only). She did not like the MF Cls. Will finalize CL Rx in chart. Pt okay to order annual supply.

## 2023-09-08 ENCOUNTER — PATIENT MESSAGE (OUTPATIENT)
Dept: OPTOMETRY | Facility: CLINIC | Age: 73
End: 2023-09-08
Payer: MEDICARE

## 2023-09-30 ENCOUNTER — EXTERNAL CHRONIC CARE MANAGEMENT (OUTPATIENT)
Dept: PRIMARY CARE CLINIC | Facility: CLINIC | Age: 73
End: 2023-09-30
Payer: MEDICARE

## 2023-09-30 PROCEDURE — 99490 CHRNC CARE MGMT STAFF 1ST 20: CPT | Mod: S$GLB,,, | Performed by: FAMILY MEDICINE

## 2023-09-30 PROCEDURE — 99490 PR CHRONIC CARE MGMT, 1ST 20 MIN: ICD-10-PCS | Mod: S$GLB,,, | Performed by: FAMILY MEDICINE

## 2023-10-12 ENCOUNTER — PATIENT MESSAGE (OUTPATIENT)
Dept: RESPIRATORY THERAPY | Facility: HOSPITAL | Age: 73
End: 2023-10-12
Payer: MEDICARE

## 2023-10-31 ENCOUNTER — EXTERNAL CHRONIC CARE MANAGEMENT (OUTPATIENT)
Dept: PRIMARY CARE CLINIC | Facility: CLINIC | Age: 73
End: 2023-10-31
Payer: MEDICARE

## 2023-10-31 PROCEDURE — 99490 PR CHRONIC CARE MGMT, 1ST 20 MIN: ICD-10-PCS | Mod: S$GLB,,, | Performed by: FAMILY MEDICINE

## 2023-10-31 PROCEDURE — 99490 CHRNC CARE MGMT STAFF 1ST 20: CPT | Mod: S$GLB,,, | Performed by: FAMILY MEDICINE

## 2023-11-15 RX ORDER — SUMATRIPTAN SUCCINATE 100 MG/1
TABLET ORAL
Qty: 27 TABLET | Refills: 0 | OUTPATIENT
Start: 2023-11-15

## 2023-11-15 NOTE — TELEPHONE ENCOUNTER
No care due was identified.  [facility] Embedded Care Due Messages. Reference number: [id number].   11/15/2023 10:26:01 AM CST

## 2023-11-15 NOTE — TELEPHONE ENCOUNTER
Refill Decision Note   Florentino Bowman  is requesting a refill authorization.  Brief Assessment and Rationale for Refill:  Quick Discontinue     Medication Therapy Plan:  The original prescription was discontinued on 3/7/2023 by Carla Mendoza MD.      Comments:     Note composed:1:37 PM 11/15/2023             Appointments     Last Visit   8/16/2023 Fam Chou MD   Next Visit   2/16/2024 Fam Chou MD           Appointments     Last Visit   8/16/2023 Fam Chou MD   Next Visit   2/16/2024 Fam Chou MD

## 2023-11-16 NOTE — TELEPHONE ENCOUNTER
----- Message from Alonzo Danielson sent at 11/16/2023  2:09 PM CST -----  Contact: Aliyah  Type: Requesting refill    Who Called: [facility]   Regarding: carbidopa-levodopa  mg (SINEMET)  mg per tablet 45 tablet 5 6/21/2023 12/18/2023 No  Sig - Route: Take 0.5 tablets by mouth 3 (three) times daily. - Oral      Would the patient rather a call back or a response via [facility]? Call back  Best Call Back Number: [phone]    Additional Information:  [id number] - [place], [place] - [address] [facility], [address]   Phone: [phone]  Fax: [phone]

## 2023-11-17 ENCOUNTER — PATIENT MESSAGE (OUTPATIENT)
Dept: FAMILY MEDICINE | Facility: CLINIC | Age: 73
End: 2023-11-17
Payer: MEDICARE

## 2023-11-17 RX ORDER — SUMATRIPTAN SUCCINATE 100 MG/1
100 TABLET ORAL ONCE
Qty: 12 TABLET | Refills: 0 | Status: SHIPPED | OUTPATIENT
Start: 2023-11-17 | End: 2023-12-15

## 2023-11-18 RX ORDER — CARBIDOPA AND LEVODOPA 25; 100 MG/1; MG/1
0.5 TABLET ORAL 3 TIMES DAILY
Qty: 45 TABLET | Refills: 5 | Status: SHIPPED | OUTPATIENT
Start: 2023-11-18 | End: 2023-11-22 | Stop reason: SDUPTHER

## 2023-11-22 ENCOUNTER — LAB VISIT (OUTPATIENT)
Dept: LAB | Facility: HOSPITAL | Age: 73
End: 2023-11-22
Attending: PSYCHIATRY & NEUROLOGY
Payer: MEDICARE

## 2023-11-22 ENCOUNTER — OFFICE VISIT (OUTPATIENT)
Dept: NEUROLOGY | Facility: CLINIC | Age: 73
End: 2023-11-22
Payer: MEDICARE

## 2023-11-22 VITALS
SYSTOLIC BLOOD PRESSURE: 102 MMHG | WEIGHT: 127.88 LBS | DIASTOLIC BLOOD PRESSURE: 68 MMHG | BODY MASS INDEX: 24.15 KG/M2 | HEIGHT: 61 IN | HEART RATE: 90 BPM

## 2023-11-22 DIAGNOSIS — G43.009 MIGRAINE WITHOUT AURA AND WITHOUT STATUS MIGRAINOSUS, NOT INTRACTABLE: ICD-10-CM

## 2023-11-22 DIAGNOSIS — R53.83 OTHER FATIGUE: ICD-10-CM

## 2023-11-22 DIAGNOSIS — I77.9 BILATERAL CAROTID ARTERY DISEASE, UNSPECIFIED TYPE: ICD-10-CM

## 2023-11-22 DIAGNOSIS — E03.4 HYPOTHYROIDISM DUE TO ACQUIRED ATROPHY OF THYROID: ICD-10-CM

## 2023-11-22 DIAGNOSIS — Z86.16 HISTORY OF COVID-19: ICD-10-CM

## 2023-11-22 DIAGNOSIS — Z00.00 ANNUAL PHYSICAL EXAM: ICD-10-CM

## 2023-11-22 DIAGNOSIS — R41.3 MEMORY CHANGE: ICD-10-CM

## 2023-11-22 DIAGNOSIS — F32.1 CURRENT MODERATE EPISODE OF MAJOR DEPRESSIVE DISORDER WITHOUT PRIOR EPISODE: ICD-10-CM

## 2023-11-22 DIAGNOSIS — G20.A1 PARKINSON'S DISEASE WITHOUT DYSKINESIA OR FLUCTUATING MANIFESTATIONS: Primary | ICD-10-CM

## 2023-11-22 DIAGNOSIS — G20.A1 PARKINSON'S DISEASE: ICD-10-CM

## 2023-11-22 LAB
T4 FREE SERPL-MCNC: 1.55 NG/DL (ref 0.71–1.51)
TSH SERPL DL<=0.005 MIU/L-ACNC: 0.01 UIU/ML (ref 0.4–4)
TSH SERPL DL<=0.005 MIU/L-ACNC: 0.01 UIU/ML (ref 0.4–4)
VIT B12 SERPL-MCNC: 1705 PG/ML (ref 210–950)

## 2023-11-22 PROCEDURE — 84443 ASSAY THYROID STIM HORMONE: CPT | Performed by: FAMILY MEDICINE

## 2023-11-22 PROCEDURE — 99214 PR OFFICE/OUTPT VISIT, EST, LEVL IV, 30-39 MIN: ICD-10-PCS | Mod: S$PBB,,, | Performed by: PSYCHIATRY & NEUROLOGY

## 2023-11-22 PROCEDURE — 36415 COLL VENOUS BLD VENIPUNCTURE: CPT | Performed by: PSYCHIATRY & NEUROLOGY

## 2023-11-22 PROCEDURE — 99999 PR PBB SHADOW E&M-EST. PATIENT-LVL IV: ICD-10-PCS | Mod: PBBFAC,,, | Performed by: PSYCHIATRY & NEUROLOGY

## 2023-11-22 PROCEDURE — 84439 ASSAY OF FREE THYROXINE: CPT | Performed by: FAMILY MEDICINE

## 2023-11-22 PROCEDURE — 82607 VITAMIN B-12: CPT | Performed by: PSYCHIATRY & NEUROLOGY

## 2023-11-22 PROCEDURE — 99214 OFFICE O/P EST MOD 30 MIN: CPT | Mod: PBBFAC,PN | Performed by: PSYCHIATRY & NEUROLOGY

## 2023-11-22 PROCEDURE — 99999 PR PBB SHADOW E&M-EST. PATIENT-LVL IV: CPT | Mod: PBBFAC,,, | Performed by: PSYCHIATRY & NEUROLOGY

## 2023-11-22 PROCEDURE — 99214 OFFICE O/P EST MOD 30 MIN: CPT | Mod: S$PBB,,, | Performed by: PSYCHIATRY & NEUROLOGY

## 2023-11-22 RX ORDER — RASAGILINE 0.5 MG/1
0.5 TABLET ORAL DAILY
Qty: 30 TABLET | Refills: 3 | Status: SHIPPED | OUTPATIENT
Start: 2023-11-22 | End: 2023-12-13

## 2023-11-22 RX ORDER — UBROGEPANT 100 MG/1
100 TABLET ORAL
Qty: 16 TABLET | Refills: 5 | Status: SHIPPED | OUTPATIENT
Start: 2023-11-22 | End: 2024-02-18

## 2023-11-22 RX ORDER — CARBIDOPA AND LEVODOPA 25; 100 MG/1; MG/1
0.5 TABLET ORAL 3 TIMES DAILY
Qty: 45 TABLET | Refills: 5 | Status: SHIPPED | OUTPATIENT
Start: 2023-11-22 | End: 2024-02-21 | Stop reason: SDUPTHER

## 2023-11-22 NOTE — PROGRESS NOTES
Patient ID: Florentino Bowman is a 73 y.o. female.    Chief Complaint: No chief complaint on file.      Follow-up for Parkinson's disease.  She remains stable with no significant setbacks; she has a few questions and concerns.      On the positive side she is completely off of Ambien.  She is sleeping well using CBD gummies.    She does note it is hard harder for her to get in and out of a car if it involves a step-up.  She continues to perceive slowness in her left foot and has pain and cramps of the left foot; the toes curl.  The ball of the foot is numb and this is worse in the morning.      She does have new soreness in multiple joints ( shoulders hips knees and feet).  Ibuprofen does help.      Note that she does take Cymbalta 60 mg q.a.m. 30 q.h.s..      Notes diagnosed with osteopenia by bone density.      Recent TSH is low.      She does exercise.      Takes vitamin D3 and B12.          MRI Brain Without Contrast  Order: [id number]  Status: Final result       Visible to patient: Yes (seen)       Next appt: 12/08/2023 at 10:30 AM in Family Medicine (Fam Chou MD)       Dx: Parkinson's disease    0 Result Notes       1 Patient Communication  Details      Reading Physician Reading Date Result Priority  Kwesi Dooley MD  [phone] 6/8/2021 Routine    Narrative & Impression  EXAMINATION:  MRI BRAIN WITHOUT CONTRAST     CLINICAL HISTORY:  Parkinson's disease; Parkinson's disease     TECHNIQUE:  Multiplanar multisequence MR imaging of the brain was performed without contrast.     COMPARISON:  None     FINDINGS:  Intracranial compartment:     Ventricles and sulci are normal in size for age without evidence of hydrocephalus.  No extra-axial blood or fluid collections     Mild generalized cerebral volume loss.  The brain parenchyma appears normal. No mass lesion, acute hemorrhage, edema or acute infarct.     Normal vascular flow voids are preserved.     Skull/extracranial contents (limited  evaluation): Bone marrow signal intensity is normal.     Impression:     No acute abnormality.     Mild generalized cerebral volume loss.        Electronically signed by: Kwesi Dooley MD  Date:                                            2021  Time:                                           09:35                     Past Medical History:   Diagnosis Date    Arthritis     Carotid stenosis, asymptomatic     50%    Headache(784.0)     Hypothyroid     Movement disorder     Neuropathy       Past Surgical History:   Procedure Laterality Date    BREAST BIOPSY Right     CAUDAL EPIDURAL STEROID INJECTION N/A 2022    Procedure: INJECTION, STEROID, SPINE, EPIDURAL, CAUDAL;  Surgeon: Tony Castle MD;  Location: [facility] OR;  Service: Pain Management;  Laterality: N/A;     SECTION      x2    COLONOSCOPY N/A 2017    Procedure: COLONOSCOPY Miralax;  Surgeon: Quentin Mercado Jr., MD;  Location: [facility];  Service: Endoscopy;  Laterality: N/A;    COLONOSCOPY N/A 2021    Procedure: COLONOSCOPY;  Surgeon: Andrew Parekh MD;  Location: [facility];  Service: Endoscopy;  Laterality: N/A;    ESOPHAGOGASTRODUODENOSCOPY N/A 2021    Procedure: EGD (ESOPHAGOGASTRODUODENOSCOPY);  Surgeon: Andrew Parekh MD;  Location: [facility];  Service: Endoscopy;  Laterality: N/A;    EYE SURGERY      FLUOROSCOPY  2023    Procedure: FLUOROSCOPY;  Surgeon: Darrian Duran MD;  Location: [facility] OR;  Service: Pain Management;;    HYSTERECTOMY      INJECTION OF ANESTHETIC AGENT AROUND MEDIAL BRANCH NERVES INNERVATING LUMBAR FACET JOINT Right 2019    Procedure: BLOCK, NERVE, FACET JOINT, LUMBAR, MEDIAL BRANCH ;  Surgeon: Sixto Moya III, MD;  Location: [facility] OR;  Service: Pain Management;  Laterality: Right;  - L4/5 & L5/S1  PATIENT NEEDS TO BE THE FIRST PATIENT OF THE DAY, PER DR. MOYA!!!    INSERTION, NEUROSTIMULATOR, SPINAL CORD N/A 2023    Procedure: INSERTION, SPINAL  CORD STIMULATOR IMPLANT BOSTON REP;  Surgeon: Darrian Duran MD;  Location: [facility] OR;  Service: Pain Management;  Laterality: N/A;    LAPAROSCOPIC HYSTERECTOMY  2010    supracervical/BSO    OOPHORECTOMY      SPINE SURGERY      TONSILLECTOMY      TRANSFORAMINAL EPIDURAL INJECTION OF STEROID Right 06/05/2019    Procedure: INJECTION, STEROID, EPIDURAL, TRANSFORAMINAL APPROACH;  Surgeon: Sixto Moya III, MD;  Location: [facility] OR;  Service: Pain Management;  Laterality: Right;  -right TFESI L4/5    TRANSFORAMINAL EPIDURAL INJECTION OF STEROID Bilateral 02/19/2021    Procedure: INJECTION, STEROID, EPIDURAL, TRANSFORAMINAL APPROACH;  Surgeon: Sixto Moya III, MD;  Location: [facility] OR;  Service: Pain Management;  Laterality: Bilateral;  L5    TRANSFORAMINAL EPIDURAL INJECTION OF STEROID Bilateral 09/30/2022    Procedure: INJECTION, STEROID, EPIDURAL, TRANSFORAMINAL APPROACH;  Surgeon: Tony Castle MD;  Location: [facility] OR;  Service: Pain Management;  Laterality: Bilateral;    TRIAL OF SPINAL CORD NERVE STIMULATOR N/A 02/24/2023    Procedure: SPINAL CORD STIMULATOR TRIAL BOSTON REP *ESHRAGHI ONLY*;  Surgeon: Darrian Duran MD;  Location: [facility] PAIN MGT;  Service: Pain Management;  Laterality: N/A;    TUBAL LIGATION          Current Outpatient Medications:     ALPRAZolam (XANAX) 0.5 MG tablet, Take 1 tablet (0.5 mg total) by mouth daily as needed for Anxiety. PRN ANXIETY, Disp: 30 tablet, Rfl: 5    atorvastatin (LIPITOR) 40 MG tablet, Take 1 tablet (40 mg total) by mouth once daily., Disp: 90 tablet, Rfl: 3    carbidopa-levodopa  mg (SINEMET)  mg per tablet, Take 0.5 tablets by mouth 3 (three) times daily., Disp: 45 tablet, Rfl: 5    DULoxetine (CYMBALTA) 60 MG capsule, Take 1 capsule (60 mg total) by mouth every morning., Disp: 90 capsule, Rfl: 3    estradioL (ESTRACE) 0.5 MG tablet, Take 1 tablet (0.5 mg total) by mouth every evening., Disp: 90 tablet, Rfl: 3    levothyroxine  (SYNTHROID) 125 MCG tablet, Take 1 tablet (125 mcg total) by mouth every other day. Alternating with 150 mcg, Disp: 45 tablet, Rfl: 3    levothyroxine (SYNTHROID) 150 MCG tablet, Take 1 tablet (150 mcg total) by mouth every other day. Alternating with 125 mcg, Disp: 45 tablet, Rfl: 3    rasagiline (AZILECT) 0.5 MG Tab, Take 1 tablet (0.5 mg total) by mouth once daily., Disp: 30 tablet, Rfl: 3    sumatriptan (IMITREX) 100 MG tablet, Take 1 tablet (100 mg total) by mouth once. for 1 dose, Disp: 12 tablet, Rfl: 0  No current facility-administered medications for this visit.    Facility-Administered Medications Ordered in Other Visits:     0.9%  NaCl infusion, , Intravenous, Continuous, Tony Castle MD, New Bag at 09/30/22 0822    0.9%  NaCl infusion, , Intravenous, Continuous, Tony Castle MD    sodium chloride 0.9% flush 10 mL, 10 mL, Intravenous, PRN, Tony Castle MD    sodium chloride 0.9% flush 10 mL, 10 mL, Intravenous, PRN, Tony Castle MD   Review of patient's allergies indicates:   Allergen Reactions    Clindamycin Diarrhea    Adhesive      Holter monitor pads    Bactrim [sulfamethoxazole-trimethoprim] Rash        Review of Systems   Eyes:  Positive for visual disturbance (can't see well at night, saw eye dr).   Musculoskeletal:  Positive for arthralgias, back pain and leg pain.        No joint swelling   Integumentary:         No rash   Neurological:  Positive for tremors (subtle tremor of Lt hand and foot, not often) and headaches. Negative for syncope.   Psychiatric/Behavioral:  Negative for sleep disturbance (doing well with sleep).            Objective:      Physical Exam  Neurological:      Mental Status: She is alert.      Cranial Nerves: No dysarthria.   Psychiatric:         Behavior: Behavior normal.         Thought Content: Thought content normal.           Assessment:       1. Parkinson's disease without dyskinesia or fluctuating manifestations    2. Migraine  without aura and without status migrainosus, not intractable        Plan:          Mild Parkinson's disease, asymmetric affecting the left side, and with superimposed left L5 and S1 radiculopathy with history of back surgery and spinal cord stimulator for chronic pain. Overall stable. Very mild subjective amnesic cognitive change. No dysautonomia.   Takes half a Sinemet 25/100 t.i.d. and rasagiline 0.5 q.d., no side effects     Encourage increase in aerobic exercise  Referred to movement disorders clinic at main [place] as patient is interested in knowing what MultiModal treatment options might be available to her and also frankly I am unsure thst she is convinced she has PD.    Episodic migraine, previously under excellent control but these have been worse lately.  Avoiding sumatriptan because of positive (asymptomatic) cerebrovascular disease by carotid ultrasound, although admittedly with conflicting/inconsistent results  Nurtec not well covered by her insurance   results Plan trial of Ubrelvy.              Carla Mendoza MD   11/22/2023   11:07 AM

## 2023-11-30 ENCOUNTER — EXTERNAL CHRONIC CARE MANAGEMENT (OUTPATIENT)
Dept: PRIMARY CARE CLINIC | Facility: CLINIC | Age: 73
End: 2023-11-30
Payer: MEDICARE

## 2023-11-30 PROCEDURE — 99490 PR CHRONIC CARE MGMT, 1ST 20 MIN: ICD-10-PCS | Mod: S$GLB,,, | Performed by: FAMILY MEDICINE

## 2023-11-30 PROCEDURE — 99490 CHRNC CARE MGMT STAFF 1ST 20: CPT | Mod: S$GLB,,, | Performed by: FAMILY MEDICINE

## 2023-12-05 ENCOUNTER — PATIENT MESSAGE (OUTPATIENT)
Dept: NEUROLOGY | Facility: CLINIC | Age: 73
End: 2023-12-05
Payer: MEDICARE

## 2023-12-08 ENCOUNTER — OFFICE VISIT (OUTPATIENT)
Dept: FAMILY MEDICINE | Facility: CLINIC | Age: 73
End: 2023-12-08
Payer: MEDICARE

## 2023-12-08 VITALS
DIASTOLIC BLOOD PRESSURE: 66 MMHG | TEMPERATURE: 98 F | HEART RATE: 78 BPM | BODY MASS INDEX: 24.6 KG/M2 | HEIGHT: 61 IN | SYSTOLIC BLOOD PRESSURE: 110 MMHG | OXYGEN SATURATION: 96 % | WEIGHT: 130.31 LBS

## 2023-12-08 DIAGNOSIS — Z00.00 WELLNESS EXAMINATION: ICD-10-CM

## 2023-12-08 DIAGNOSIS — E03.4 HYPOTHYROIDISM DUE TO ACQUIRED ATROPHY OF THYROID: Primary | ICD-10-CM

## 2023-12-08 DIAGNOSIS — I77.9 BILATERAL CAROTID ARTERY DISEASE, UNSPECIFIED TYPE: ICD-10-CM

## 2023-12-08 PROCEDURE — 99214 PR OFFICE/OUTPT VISIT, EST, LEVL IV, 30-39 MIN: ICD-10-PCS | Mod: S$GLB,,, | Performed by: FAMILY MEDICINE

## 2023-12-08 PROCEDURE — 99214 OFFICE O/P EST MOD 30 MIN: CPT | Mod: S$GLB,,, | Performed by: FAMILY MEDICINE

## 2023-12-08 RX ORDER — LEVOTHYROXINE SODIUM 125 UG/1
125 TABLET ORAL DAILY
Qty: 30 TABLET | Refills: 11 | Status: SHIPPED | OUTPATIENT
Start: 2023-12-08 | End: 2024-12-07

## 2023-12-08 NOTE — PROGRESS NOTES
"Subjective:      Patient ID: Florentino Bowman is a 73 y.o. female.    Chief Complaint: Results      Vitals:    12/08/23 1024   BP: 110/66   Pulse: 78   Temp: 98 °F (36.7 °C)   TempSrc: Oral   SpO2: 96%   Weight: 59.1 kg (130 lb 4.7 oz)   Height: 5' 0.98" (1.549 m)        HPI   TSH vacilitating since first of year; most recentg too low at 0.015    Was alternating 125/150 QOD  Symptms are having pain, spoinal cord stimulation, goes to neurologist and suspects parkinsons dis, will see movement dis doctor    Problem List  Patient Active Problem List   Diagnosis    Headache    Hypothyroid    Depression    Insomnia    Anxiety    Chronic back pain    Bilateral carotid artery disease    Calcific tendonitis    Left shoulder pain    DDD (degenerative disc disease), lumbar    Change in bowel habit    Screening for colorectal cancer    S/P lumbar laminectomy    Chronic right-sided low back pain with right-sided sciatica    Lumbar radiculopathy    Lumbar radicular pain    Spondylolisthesis    Lumbar spondylosis    Chest pain    Change in bowel habits    Lumbar degenerative disc disease    Wears contact lenses    De Jesus's neuroma of left foot    Acquired posterior equinus of left lower extremity    Bursitis of intermetatarsal bursa of left foot    Muscle weakness (generalized)    Difficulty walking    Current moderate episode of major depressive disorder without prior episode    Parkinson's disease        ALLERGIES:   Review of patient's allergies indicates:   Allergen Reactions    Clindamycin Diarrhea    Adhesive      Holter monitor pads    Bactrim [sulfamethoxazole-trimethoprim] Rash       MEDS:   Current Outpatient Medications:     ALPRAZolam (XANAX) 0.5 MG tablet, Take 1 tablet (0.5 mg total) by mouth daily as needed for Anxiety. PRN ANXIETY, Disp: 30 tablet, Rfl: 5    atorvastatin (LIPITOR) 40 MG tablet, Take 1 tablet (40 mg total) by mouth once daily., Disp: 90 tablet, Rfl: 3    carbidopa-levodopa  mg (SINEMET)  " mg per tablet, Take 0.5 tablets by mouth 3 (three) times daily., Disp: 45 tablet, Rfl: 5    DULoxetine (CYMBALTA) 60 MG capsule, Take 1 capsule (60 mg total) by mouth every morning., Disp: 90 capsule, Rfl: 3    estradioL (ESTRACE) 0.5 MG tablet, Take 1 tablet (0.5 mg total) by mouth every evening., Disp: 90 tablet, Rfl: 3    rasagiline (AZILECT) 0.5 MG Tab, Take 1 tablet (0.5 mg total) by mouth once daily., Disp: 30 tablet, Rfl: 3    sumatriptan (IMITREX) 100 MG tablet, Take 1 tablet (100 mg total) by mouth once. for 1 dose, Disp: 12 tablet, Rfl: 0    levothyroxine (SYNTHROID) 125 MCG tablet, Take 1 tablet (125 mcg total) by mouth once daily. For thyroid, Disp: 30 tablet, Rfl: 11    ubrogepant (UBRELVY) 100 mg tablet, Take 1 tablet (100 mg total) by mouth as needed for Migraine. If symptoms persist or return, may repeat dose after 2 hours. Maximum: 200 mg per 24 hours (Patient not taking: Reported on 12/8/2023), Disp: 16 tablet, Rfl: 5  No current facility-administered medications for this visit.    Facility-Administered Medications Ordered in Other Visits:     0.9%  NaCl infusion, , Intravenous, Continuous, Tony Castle MD, New Bag at 09/30/22 0822    0.9%  NaCl infusion, , Intravenous, Continuous, Tony Castle MD    sodium chloride 0.9% flush 10 mL, 10 mL, Intravenous, PRN, Tony Castle MD    sodium chloride 0.9% flush 10 mL, 10 mL, Intravenous, PRN, Tony Castle MD      History:  Current Providers as of 12/8/2023  PCP: Fam Chou MD  Care Team Provider: Karley Kumar MD  Care Team Provider: Carla Mendoza MD  Care Team Provider: Shanika Arellano MD  Care Team Provider: Darrian Duran MD  Care Team Provider: Florian Freitas MD  Encounter Provider: Fam Chou MD, starting on Fri Dec 8, 2023 12:00 AM  Referring Provider: not found, starting on Fri Dec 8, 2023 12:00 AM  Consulting Physician: Fam Chou MD, starting on Fri Dec 8, 2023 10:20 AM (Active)    Past Medical History:   Diagnosis Date    Arthritis     Carotid stenosis, asymptomatic     50%    Headache(784.0)     Hypothyroid     Movement disorder     Neuropathy      Past Surgical History:   Procedure Laterality Date    BREAST BIOPSY Right     CAUDAL EPIDURAL STEROID INJECTION N/A 2022    Procedure: INJECTION, STEROID, SPINE, EPIDURAL, CAUDAL;  Surgeon: Tony Castle MD;  Location: [facility] OR;  Service: Pain Management;  Laterality: N/A;     SECTION      x2    COLONOSCOPY N/A 2017    Procedure: COLONOSCOPY Miralax;  Surgeon: Quentin Mercado Jr., MD;  Location: [facility] ENDO;  Service: Endoscopy;  Laterality: N/A;    COLONOSCOPY N/A 2021    Procedure: COLONOSCOPY;  Surgeon: Andrew Parekh MD;  Location: [facility] ENDO;  Service: Endoscopy;  Laterality: N/A;    ESOPHAGOGASTRODUODENOSCOPY N/A 2021    Procedure: EGD (ESOPHAGOGASTRODUODENOSCOPY);  Surgeon: Andrew Parekh MD;  Location: [facility];  Service: Endoscopy;  Laterality: N/A;    EYE SURGERY      FLUOROSCOPY  2023    Procedure: FLUOROSCOPY;  Surgeon: Darrian Duran MD;  Location: [facility] OR;  Service: Pain Management;;    HYSTERECTOMY      INJECTION OF ANESTHETIC AGENT AROUND MEDIAL BRANCH NERVES INNERVATING LUMBAR FACET JOINT Right 2019    Procedure: BLOCK, NERVE, FACET JOINT, LUMBAR, MEDIAL BRANCH ;  Surgeon: Sixto Moya III, MD;  Location: [facility] OR;  Service: Pain Management;  Laterality: Right;  - L4/5 & L5/S1  PATIENT NEEDS TO BE THE FIRST PATIENT OF THE DAY, PER DR. MOYA!!!    INSERTION, NEUROSTIMULATOR, SPINAL CORD N/A 2023    Procedure: INSERTION, SPINAL CORD STIMULATOR IMPLANT BOSTON [facility];  Surgeon: Darrian Duran MD;  Location: [facility] OR;  Service: Pain Management;  Laterality: N/A;    LAPAROSCOPIC HYSTERECTOMY      supracervical/BSO    OOPHORECTOMY      SPINE SURGERY      TONSILLECTOMY      TRANSFORAMINAL EPIDURAL INJECTION OF STEROID Right 2019    Procedure:  INJECTION, STEROID, EPIDURAL, TRANSFORAMINAL APPROACH;  Surgeon: Sixto Moya III, MD;  Location: [facility] OR;  Service: Pain Management;  Laterality: Right;  -right TFESI L4/5    TRANSFORAMINAL EPIDURAL INJECTION OF STEROID Bilateral 02/19/2021    Procedure: INJECTION, STEROID, EPIDURAL, TRANSFORAMINAL APPROACH;  Surgeon: Sixto Moya III, MD;  Location: [facility] OR;  Service: Pain Management;  Laterality: Bilateral;  L5    TRANSFORAMINAL EPIDURAL INJECTION OF STEROID Bilateral 09/30/2022    Procedure: INJECTION, STEROID, EPIDURAL, TRANSFORAMINAL APPROACH;  Surgeon: Tony Castle MD;  Location: [facility] OR;  Service: Pain Management;  Laterality: Bilateral;    TRIAL OF SPINAL CORD NERVE STIMULATOR N/A 02/24/2023    Procedure: SPINAL CORD STIMULATOR TRIAL BOSTON REP *ESHRAGHI ONLY*;  Surgeon: Darrian Duran MD;  Location: [facility] PAIN MGT;  Service: Pain Management;  Laterality: N/A;    TUBAL LIGATION       Social History     Tobacco Use    Smoking status: Never     Passive exposure: Never    Smokeless tobacco: Never   Substance Use Topics    Alcohol use: Not Currently     Alcohol/week: 4.0 standard drinks of alcohol     Types: 4 Glasses of wine per week     Comment: social    Drug use: No         Review of Systems   Constitutional:  Positive for activity change and unexpected weight change.   HENT: Negative.  Negative for hearing loss, rhinorrhea and trouble swallowing.    Eyes:  Positive for visual disturbance. Negative for discharge.   Respiratory: Negative.  Negative for chest tightness and wheezing.    Cardiovascular: Negative.  Negative for chest pain and palpitations.   Gastrointestinal:  Positive for constipation. Negative for blood in stool, diarrhea and vomiting.   Endocrine: Negative.  Negative for polydipsia and polyuria.   Genitourinary: Negative.  Negative for difficulty urinating, dysuria, hematuria and menstrual problem.   Musculoskeletal:  Positive for arthralgias and neck pain.  Negative for joint swelling.   Neurological:  Positive for headaches. Negative for weakness.   Psychiatric/Behavioral: Negative.  Negative for confusion and dysphoric mood.    All other systems reviewed and are negative.    Objective:     Physical Exam  Vitals and nursing note reviewed.   Constitutional:       Appearance: She is well-developed.   HENT:      Head: Normocephalic.   Eyes:      Conjunctiva/sclera: Conjunctivae normal.      Pupils: Pupils are equal, round, and reactive to light.   Cardiovascular:      Rate and Rhythm: Normal rate and regular rhythm.      Heart sounds: Normal heart sounds.   Pulmonary:      Effort: Pulmonary effort is normal.      Breath sounds: Normal breath sounds.   Musculoskeletal:         General: Normal range of motion.      Cervical back: Normal range of motion and neck supple.   Skin:     General: Skin is warm and dry.   Neurological:      Mental Status: She is alert and oriented to person, place, and time. Mental status is at baseline.      Deep Tendon Reflexes: Reflexes are normal and symmetric.   Psychiatric:         Mood and Affect: Mood normal.         Behavior: Behavior normal.         Thought Content: Thought content normal.         Judgment: Judgment normal.             Assessment:     1. Hypothyroidism due to acquired atrophy of thyroid    2. Wellness examination    3. Bilateral carotid artery disease, unspecified type      Plan:        Medication List            Accurate as of December 8, 2023 11:59 PM. If you have any questions, ask your nurse or doctor.                CHANGE how you take these medications      levothyroxine 125 MCG tablet  Commonly known as: SYNTHROID  Take 1 tablet (125 mcg total) by mouth once daily. For thyroid  What changed:   when to take this  additional instructions  Another medication with the same name was removed. Continue taking this medication, and follow the directions you see here.  Changed by: Fam Chou MD            CONTINUE  taking these medications      ALPRAZolam 0.5 MG tablet  Commonly known as: XANAX  Take 1 tablet (0.5 mg total) by mouth daily as needed for Anxiety. PRN ANXIETY     atorvastatin 40 MG tablet  Commonly known as: LIPITOR  Take 1 tablet (40 mg total) by mouth once daily.     carbidopa-levodopa  mg  mg per tablet  Commonly known as: SINEMET  Take 0.5 tablets by mouth 3 (three) times daily.     DULoxetine 60 MG capsule  Commonly known as: CYMBALTA  Take 1 capsule (60 mg total) by mouth every morning.     estradioL 0.5 MG tablet  Commonly known as: ESTRACE  Take 1 tablet (0.5 mg total) by mouth every evening.     rasagiline 0.5 MG Tab  Commonly known as: AZILECT  Take 1 tablet (0.5 mg total) by mouth once daily.     sumatriptan 100 MG tablet  Commonly known as: IMITREX  Take 1 tablet (100 mg total) by mouth once. for 1 dose     UBRELVY 100 mg tablet  Generic drug: ubrogepant  Take 1 tablet (100 mg total) by mouth as needed for Migraine. If symptoms persist or return, may repeat dose after 2 hours. Maximum: 200 mg per 24 hours               Where to Get Your Medications        These medications were sent to [facility]/pharmacy #[id number] - [place] - [address]  [address] [place] [address]      Phone: [phone]   levothyroxine 125 MCG tablet       Hypothyroidism due to acquired atrophy of thyroid  -     TSH; Future; Expected date: 01/08/2024  -     T4, FREE; Future; Expected date: 01/08/2024    Wellness examination    Bilateral carotid artery disease, unspecified type    Other orders  -     levothyroxine (SYNTHROID) 125 MCG tablet; Take 1 tablet (125 mcg total) by mouth once daily. For thyroid  Dispense: 30 tablet; Refill: 11

## 2023-12-13 RX ORDER — RASAGILINE 0.5 MG/1
0.5 TABLET ORAL
Qty: 90 TABLET | Refills: 1 | Status: SHIPPED | OUTPATIENT
Start: 2023-12-13

## 2023-12-13 NOTE — TELEPHONE ENCOUNTER
No care due was identified.  [facility] Embedded Care Due Messages. Reference number: [id number].   12/13/2023 1:45:21 PM CST

## 2023-12-13 NOTE — TELEPHONE ENCOUNTER
Refill Routing Note   Medication(s) are not appropriate for processing by [facility] for the following reason(s):        New or recently adjusted medication    [facility] action(s):  Defer        Medication Therapy Plan: New start (11/17/23); Defer      Appointments  past 12m or future 3m with PCP    Date Provider   Last Visit   12/8/2023 Fam Chou MD   Next Visit   2/16/2024 Fam Chou MD   ED visits in past 90 days: 0        Note composed:11:35 AM 12/13/2023

## 2023-12-13 NOTE — TELEPHONE ENCOUNTER
No care due was identified.  [facility] Embedded Care Due Messages. Reference number: [id number].   12/13/2023 7:50:15 AM CST

## 2023-12-15 ENCOUNTER — PATIENT MESSAGE (OUTPATIENT)
Dept: FAMILY MEDICINE | Facility: CLINIC | Age: 73
End: 2023-12-15
Payer: MEDICARE

## 2023-12-15 ENCOUNTER — TELEPHONE (OUTPATIENT)
Dept: FAMILY MEDICINE | Facility: CLINIC | Age: 73
End: 2023-12-15
Payer: MEDICARE

## 2023-12-15 DIAGNOSIS — M62.81 MUSCLE WEAKNESS (GENERALIZED): ICD-10-CM

## 2023-12-15 DIAGNOSIS — E03.4 HYPOTHYROIDISM DUE TO ACQUIRED ATROPHY OF THYROID: Primary | ICD-10-CM

## 2023-12-15 DIAGNOSIS — M21.862 ACQUIRED POSTERIOR EQUINUS OF LEFT LOWER EXTREMITY: ICD-10-CM

## 2023-12-15 DIAGNOSIS — M54.50 CHRONIC LOW BACK PAIN, UNSPECIFIED BACK PAIN LATERALITY, UNSPECIFIED WHETHER SCIATICA PRESENT: ICD-10-CM

## 2023-12-15 DIAGNOSIS — G89.29 CHRONIC LOW BACK PAIN, UNSPECIFIED BACK PAIN LATERALITY, UNSPECIFIED WHETHER SCIATICA PRESENT: ICD-10-CM

## 2023-12-15 DIAGNOSIS — M77.52 BURSITIS OF INTERMETATARSAL BURSA OF LEFT FOOT: ICD-10-CM

## 2023-12-15 RX ORDER — SUMATRIPTAN SUCCINATE 100 MG/1
TABLET ORAL
Qty: 27 TABLET | Refills: 3 | Status: SHIPPED | OUTPATIENT
Start: 2023-12-15 | End: 2024-02-16 | Stop reason: SDUPTHER

## 2023-12-15 RX ORDER — SUMATRIPTAN SUCCINATE 100 MG/1
100 TABLET ORAL ONCE
Qty: 12 TABLET | Refills: 0 | OUTPATIENT
Start: 2023-12-15 | End: 2023-12-15

## 2023-12-15 NOTE — TELEPHONE ENCOUNTER
Spoke to pt she is stating she just did thyroid labs today, and pt thought she was getting blood work done for inflammation, and blood work for cancer. Please advise. She stated you have discussed this with her at her last visit.     ----- Message from Nancy Morrison sent at 12/15/2023 10:49 AM CST -----  Type:  Call back     Who Called:pt     Does the patient know what this is regarding?:Labs   Would the patient rather a call back or a response via [facility]? Call   Best Call Back Number: [phone]  Additional Information:     Pt is requesting a call back regarding her lab orders

## 2023-12-16 NOTE — TELEPHONE ENCOUNTER
We spoke; now on 125 mcg since last visit    Continue same dose  Repeat labs before feb appt, ordered

## 2023-12-28 ENCOUNTER — PATIENT MESSAGE (OUTPATIENT)
Dept: FAMILY MEDICINE | Facility: CLINIC | Age: 73
End: 2023-12-28
Payer: MEDICARE

## 2023-12-31 ENCOUNTER — EXTERNAL CHRONIC CARE MANAGEMENT (OUTPATIENT)
Dept: PRIMARY CARE CLINIC | Facility: CLINIC | Age: 73
End: 2023-12-31
Payer: MEDICARE

## 2023-12-31 PROCEDURE — 99490 CHRNC CARE MGMT STAFF 1ST 20: CPT | Mod: S$GLB,,, | Performed by: FAMILY MEDICINE

## 2024-01-02 ENCOUNTER — PATIENT MESSAGE (OUTPATIENT)
Dept: NEUROLOGY | Facility: CLINIC | Age: 74
End: 2024-01-02
Payer: MEDICARE

## 2024-01-03 ENCOUNTER — TELEPHONE (OUTPATIENT)
Dept: NEUROLOGY | Facility: CLINIC | Age: 74
End: 2024-01-03
Payer: MEDICARE

## 2024-01-03 NOTE — TELEPHONE ENCOUNTER
----- Message from Isa Dooley sent at 12/29/2023  6:19 PM CST -----  Type:  Sooner Apoointment Request    Caller is requesting a sooner appointment.  Caller declined first available appointment listed below.  Caller will not accept being placed on the waitlist and is requesting a message be sent to doctor.  Name of Caller: Pt  When is the first available appointment?  Symptoms: Referral from Dr. Mendoza  Would the patient rather a call back or [facility]? Call  Best Call Back Number: [phone]   Additional Information:

## 2024-01-08 NOTE — TELEPHONE ENCOUNTER
No care due was identified.  [facility] Embedded Care Due Messages. Reference number: [id number].   1/08/2024 9:35:40 AM CST

## 2024-01-09 RX ORDER — ALPRAZOLAM 0.5 MG/1
0.5 TABLET ORAL DAILY PRN
Qty: 90 TABLET | Refills: 1 | Status: SHIPPED | OUTPATIENT
Start: 2024-01-09 | End: 2024-02-16 | Stop reason: SDUPTHER

## 2024-01-11 DIAGNOSIS — Z00.00 ENCOUNTER FOR MEDICARE ANNUAL WELLNESS EXAM: ICD-10-CM

## 2024-01-15 DIAGNOSIS — R76.8 POSITIVE ANA (ANTINUCLEAR ANTIBODY): Primary | ICD-10-CM

## 2024-01-16 ENCOUNTER — PATIENT MESSAGE (OUTPATIENT)
Dept: FAMILY MEDICINE | Facility: CLINIC | Age: 74
End: 2024-01-16
Payer: MEDICARE

## 2024-01-31 ENCOUNTER — EXTERNAL CHRONIC CARE MANAGEMENT (OUTPATIENT)
Dept: PRIMARY CARE CLINIC | Facility: CLINIC | Age: 74
End: 2024-01-31
Payer: MEDICARE

## 2024-01-31 PROCEDURE — 99439 CHRNC CARE MGMT STAF EA ADDL: CPT | Mod: S$GLB,,, | Performed by: FAMILY MEDICINE

## 2024-01-31 PROCEDURE — 99490 CHRNC CARE MGMT STAFF 1ST 20: CPT | Mod: S$GLB,,, | Performed by: FAMILY MEDICINE

## 2024-02-14 ENCOUNTER — TELEPHONE (OUTPATIENT)
Dept: NEUROLOGY | Facility: CLINIC | Age: 74
End: 2024-02-14
Payer: MEDICARE

## 2024-02-14 NOTE — TELEPHONE ENCOUNTER
Called and spoke with patient.  Patient asked for any first available appointment in Neurology.   Mrs Bowman is going out of town, 04/26/24-05/13/24 and asked to reschedule her appointment but with any of the providers.

## 2024-02-14 NOTE — TELEPHONE ENCOUNTER
----- Message from Graham Spangler sent at 2/14/2024 10:17 AM CST -----  Regarding: Patient advice  Contact: Pt  Pt called in regards to rescheduling appointment , Unable to schedule Pt       Pt can be reached at [phone]

## 2024-02-16 ENCOUNTER — OFFICE VISIT (OUTPATIENT)
Dept: FAMILY MEDICINE | Facility: CLINIC | Age: 74
End: 2024-02-16
Payer: MEDICARE

## 2024-02-16 VITALS
DIASTOLIC BLOOD PRESSURE: 62 MMHG | SYSTOLIC BLOOD PRESSURE: 110 MMHG | TEMPERATURE: 98 F | OXYGEN SATURATION: 98 % | WEIGHT: 132.06 LBS | BODY MASS INDEX: 25.93 KG/M2 | HEART RATE: 80 BPM | HEIGHT: 60 IN

## 2024-02-16 DIAGNOSIS — E03.4 HYPOTHYROIDISM DUE TO ACQUIRED ATROPHY OF THYROID: Primary | ICD-10-CM

## 2024-02-16 PROCEDURE — 99214 OFFICE O/P EST MOD 30 MIN: CPT | Mod: S$GLB,,, | Performed by: FAMILY MEDICINE

## 2024-02-16 RX ORDER — SUMATRIPTAN SUCCINATE 100 MG/1
TABLET ORAL
Qty: 90 TABLET | Refills: 1 | Status: SHIPPED | OUTPATIENT
Start: 2024-02-16 | End: 2024-03-27

## 2024-02-16 RX ORDER — ALPRAZOLAM 0.5 MG/1
0.5 TABLET ORAL DAILY PRN
Qty: 90 TABLET | Refills: 1 | Status: SHIPPED | OUTPATIENT
Start: 2024-02-16 | End: 2024-04-30 | Stop reason: SDUPTHER

## 2024-02-16 NOTE — PROGRESS NOTES
Subjective:      Patient ID: Florentino Bowman is a 73 y.o. female.    Chief Complaint: Follow-up (6 month)      Vitals:    02/16/24 1020   BP: 110/62   Pulse: 80   Temp: 97.6 °F (36.4 °C)   SpO2: 98%   Weight: 59.9 kg (132 lb 0.9 oz)   Height: 5' (1.524 m)        HPI   Check up on below dx; goes to neuro and movement ds    Problem List  Patient Active Problem List   Diagnosis    Headache    Hypothyroid    Depression    Insomnia    Anxiety    Chronic back pain    Bilateral carotid artery disease    Calcific tendonitis    Left shoulder pain    DDD (degenerative disc disease), lumbar    Change in bowel habit    Screening for colorectal cancer    S/P lumbar laminectomy    Chronic right-sided low back pain with right-sided sciatica    Lumbar radiculopathy    Lumbar radicular pain    Spondylolisthesis    Lumbar spondylosis    Chest pain    Change in bowel habits    Lumbar degenerative disc disease    Wears contact lenses    De Jesus's neuroma of left foot    Acquired posterior equinus of left lower extremity    Bursitis of intermetatarsal bursa of left foot    Muscle weakness (generalized)    Difficulty walking    Current moderate episode of major depressive disorder without prior episode    Parkinson's disease        ALLERGIES:   Review of patient's allergies indicates:   Allergen Reactions    Clindamycin Diarrhea    Adhesive      Holter monitor pads    Bactrim [sulfamethoxazole-trimethoprim] Rash       MEDS:   Current Outpatient Medications:     atorvastatin (LIPITOR) 40 MG tablet, Take 1 tablet (40 mg total) by mouth once daily., Disp: 90 tablet, Rfl: 3    carbidopa-levodopa  mg (SINEMET)  mg per tablet, Take 0.5 tablets by mouth 3 (three) times daily., Disp: 45 tablet, Rfl: 5    DULoxetine (CYMBALTA) 60 MG capsule, Take 1 capsule (60 mg total) by mouth every morning., Disp: 90 capsule, Rfl: 3    estradioL (ESTRACE) 0.5 MG tablet, Take 1 tablet (0.5 mg total) by mouth every evening., Disp: 90 tablet, Rfl: 3     levothyroxine (SYNTHROID) 125 MCG tablet, Take 1 tablet (125 mcg total) by mouth once daily. For thyroid, Disp: 30 tablet, Rfl: 11    rasagiline (AZILECT) 0.5 MG Tab, TAKE 1 TABLET BY MOUTH ONCE DAILY., Disp: 90 tablet, Rfl: 1    ALPRAZolam (XANAX) 0.5 MG tablet, Take 1 tablet (0.5 mg total) by mouth daily as needed for Anxiety. PRN ANXIETY, Disp: 90 tablet, Rfl: 1    sumatriptan (IMITREX) 100 MG tablet, 1 bid prn migraine headache, Disp: 90 tablet, Rfl: 1  No current facility-administered medications for this visit.    Facility-Administered Medications Ordered in Other Visits:     0.9%  NaCl infusion, , Intravenous, Continuous, Tony Castle MD, New Bag at 22 0822    0.9%  NaCl infusion, , Intravenous, Continuous, Tony Castle MD    sodium chloride 0.9% flush 10 mL, 10 mL, Intravenous, PRN, Tony Castle MD    sodium chloride 0.9% flush 10 mL, 10 mL, Intravenous, PRN, Tony Castle MD      History:  Current Providers as of 2024  PCP: Fam Chou MD  Care Team Provider: Karley Kumar MD  Care Team Provider: Carla Mendoza MD  Care Team Provider: Shanika Arellano MD  Care Team Provider: Darrian Duran MD  Care Team Provider: Florian Freitas MD  Encounter Provider: Fam Chou MD, starting on  12:00 AM  Referring Provider: not found, starting on  12:00 AM  Consulting Physician: Fam Chou MD, starting on  10:17 AM (Active)   Past Medical History:   Diagnosis Date    Arthritis     Carotid stenosis, asymptomatic     50%    Headache(784.0)     Hypothyroid     Movement disorder     Neuropathy      Past Surgical History:   Procedure Laterality Date    BREAST BIOPSY Right     CAUDAL EPIDURAL STEROID INJECTION N/A 2022    Procedure: INJECTION, STEROID, SPINE, EPIDURAL, CAUDAL;  Surgeon: Tony Castle MD;  Location: [facility] OR;  Service: Pain Management;  Laterality: N/A;     SECTION      x2     COLONOSCOPY N/A 09/06/2017    Procedure: COLONOSCOPY Miralax;  Surgeon: Quentin Mercado Jr., MD;  Location: [facility] ENDO;  Service: Endoscopy;  Laterality: N/A;    COLONOSCOPY N/A 02/11/2021    Procedure: COLONOSCOPY;  Surgeon: Andrew Parekh MD;  Location: [facility] ENDO;  Service: Endoscopy;  Laterality: N/A;    ESOPHAGOGASTRODUODENOSCOPY N/A 02/11/2021    Procedure: EGD (ESOPHAGOGASTRODUODENOSCOPY);  Surgeon: Andrew Parekh MD;  Location: [facility] ENDO;  Service: Endoscopy;  Laterality: N/A;    EYE SURGERY  2001    FLUOROSCOPY  03/20/2023    Procedure: FLUOROSCOPY;  Surgeon: Darrian Duran MD;  Location: [facility] OR;  Service: Pain Management;;    HYSTERECTOMY      INJECTION OF ANESTHETIC AGENT AROUND MEDIAL BRANCH NERVES INNERVATING LUMBAR FACET JOINT Right 01/16/2019    Procedure: BLOCK, NERVE, FACET JOINT, LUMBAR, MEDIAL BRANCH ;  Surgeon: Sixto Moya III, MD;  Location: [facility] OR;  Service: Pain Management;  Laterality: Right;  - L4/5 & L5/S1  PATIENT NEEDS TO BE THE FIRST PATIENT OF THE DAY, PER DR. MOYA!!!    INSERTION, NEUROSTIMULATOR, SPINAL CORD N/A 03/20/2023    Procedure: INSERTION, SPINAL CORD STIMULATOR IMPLANT [facility];  Surgeon: Darrian Duran MD;  Location: [facility] OR;  Service: Pain Management;  Laterality: N/A;    LAPAROSCOPIC HYSTERECTOMY  2010    supracervical/BSO    OOPHORECTOMY      SPINE SURGERY      TONSILLECTOMY      TRANSFORAMINAL EPIDURAL INJECTION OF STEROID Right 06/05/2019    Procedure: INJECTION, STEROID, EPIDURAL, TRANSFORAMINAL APPROACH;  Surgeon: Sixto Moya III, MD;  Location: [facility] OR;  Service: Pain Management;  Laterality: Right;  -right TFESI L4/5    TRANSFORAMINAL EPIDURAL INJECTION OF STEROID Bilateral 02/19/2021    Procedure: INJECTION, STEROID, EPIDURAL, TRANSFORAMINAL APPROACH;  Surgeon: Sixto Moya III, MD;  Location: [facility] OR;  Service: Pain Management;  Laterality: Bilateral;  L5    TRANSFORAMINAL EPIDURAL INJECTION OF STEROID  Bilateral 09/30/2022    Procedure: INJECTION, STEROID, EPIDURAL, TRANSFORAMINAL APPROACH;  Surgeon: Tony Castle MD;  Location: [facility] OR;  Service: Pain Management;  Laterality: Bilateral;    TRIAL OF SPINAL CORD NERVE STIMULATOR N/A 02/24/2023    Procedure: SPINAL CORD STIMULATOR TRIAL [facility] *[facility] ONLY*;  Surgeon: Darrian Duran MD;  Location: [facility] PAIN MGT;  Service: Pain Management;  Laterality: N/A;    TUBAL LIGATION       Social History     Tobacco Use    Smoking status: Never     Passive exposure: Never    Smokeless tobacco: Never   Substance Use Topics    Alcohol use: Not Currently     Alcohol/week: 4.0 standard drinks of alcohol     Types: 4 Glasses of wine per week     Comment: social    Drug use: No         Review of Systems   Constitutional: Negative.    HENT: Negative.     Respiratory: Negative.     Cardiovascular: Negative.    Gastrointestinal: Negative.    Endocrine: Negative.    Genitourinary: Negative.    Musculoskeletal:  Positive for arthralgias.   Psychiatric/Behavioral: Negative.     All other systems reviewed and are negative.    Objective:     Physical Exam  Vitals and nursing note reviewed.   Constitutional:       General: She is not in acute distress.     Appearance: Normal appearance. She is well-developed and normal weight. She is ill-appearing. She is not toxic-appearing or diaphoretic.   HENT:      Head: Normocephalic.   Eyes:      Conjunctiva/sclera: Conjunctivae normal.      Pupils: Pupils are equal, round, and reactive to light.   Cardiovascular:      Rate and Rhythm: Normal rate and regular rhythm.      Heart sounds: Normal heart sounds.   Pulmonary:      Effort: Pulmonary effort is normal.      Breath sounds: Normal breath sounds.   Musculoskeletal:         General: Normal range of motion.      Cervical back: Normal range of motion and neck supple.   Skin:     General: Skin is warm and dry.   Neurological:      General: No focal deficit present.      Mental Status:  She is alert and oriented to person, place, and time. Mental status is at baseline.      Deep Tendon Reflexes: Reflexes are normal and symmetric.   Psychiatric:         Mood and Affect: Mood normal.         Behavior: Behavior normal.         Thought Content: Thought content normal.         Judgment: Judgment normal.             Assessment:     1. Hypothyroidism due to acquired atrophy of thyroid      Plan:        Medication List            Accurate as of February 16, 2024 11:59 PM. If you have any questions, ask your nurse or doctor.                CONTINUE taking these medications      ALPRAZolam 0.5 MG tablet  Commonly known as: XANAX  Take 1 tablet (0.5 mg total) by mouth daily as needed for Anxiety. PRN ANXIETY     atorvastatin 40 MG tablet  Commonly known as: LIPITOR  Take 1 tablet (40 mg total) by mouth once daily.     carbidopa-levodopa  mg  mg per tablet  Commonly known as: SINEMET  Take 0.5 tablets by mouth 3 (three) times daily.     DULoxetine 60 MG capsule  Commonly known as: CYMBALTA  Take 1 capsule (60 mg total) by mouth every morning.     estradioL 0.5 MG tablet  Commonly known as: ESTRACE  Take 1 tablet (0.5 mg total) by mouth every evening.     levothyroxine 125 MCG tablet  Commonly known as: SYNTHROID  Take 1 tablet (125 mcg total) by mouth once daily. For thyroid     rasagiline 0.5 MG Tab  Commonly known as: AZILECT  TAKE 1 TABLET BY MOUTH ONCE DAILY.     sumatriptan 100 MG tablet  Commonly known as: IMITREX  1 bid prn migraine headache               Where to Get Your Medications        These medications were sent to [facility]/pharmacy #[id number] - [place] - [address] [facility]  [address] [place] [address]      Phone: [phone]   ALPRAZolam 0.5 MG tablet  sumatriptan 100 MG tablet       Hypothyroidism due to acquired atrophy of thyroid  -     TSH; Future; Expected date: 04/11/2024    Other orders  -     ALPRAZolam (XANAX) 0.5 MG tablet; Take 1 tablet (0.5 mg total) by mouth daily  as needed for Anxiety. PRN ANXIETY  Dispense: 90 tablet; Refill: 1  -     sumatriptan (IMITREX) 100 MG tablet; 1 bid prn migraine headache  Dispense: 90 tablet; Refill: 1

## 2024-02-20 NOTE — PROGRESS NOTES
Name: Florentino Bowman  MRN: [id number]   CSN: [id number]      Date: 02/21/2024    Referring physician:  No referring provider defined for this encounter.    Chief Complaint: PD     History of Present Illness (HPI): Florentino Bowman is a R handed 73 y.o. female with a medical issues significant for PD, lumbar radiculopathy, MDD, who presents for PD. She has been following with Dr. Mendoza. Currently taking cd/ld 1/2 tab TID + rasagiline 0.5 mg daily. She is here for second opinion of PD (does not think she has PD). Back pain for many years, several laminectomies, injections that wasn't very helpful. Thinks she stopped swinging her L arm about 4-5 years ago. Hurt her left shoulder at some point. A little tremor in her L hand, toe curling in her feet. Mostly notices tremor at rest. She was on 1/2 tab TID of  cd/ld 25/100 -- she recently cut back to 1/2 tab BID. Not much difference with slight increase. Rasagiline 0.5 mg daily. No falls. No shuffling. Some trouble rolling over in bed. Bothered by slowness.       Retired. Math/.     Normally exercises 3 times a week.     Family History: thinks maternal uncle may have had PD     Neuroleptic Exposure: none     Nonmotor/Premotor ROS:  Anosmia: fair (not as good as it used to be)  Dysarthria/Hypophonia: more hoarse / weaker   Dysphagia/Sialorrhea: none   Depression: yes, on cymbalta   Urinary changes: none   Constipation: yes, chronic   Falls: none   Micrographia: not that she knows of   Sleep issues:  -RBD: not that she knows of       Review of Systems:   Review of Systems   Constitutional:  Negative for chills, fever and malaise/fatigue.   HENT:  Negative for hearing loss.    Eyes:  Negative for blurred vision and double vision.   Respiratory:  Negative for cough, shortness of breath and stridor.    Cardiovascular:  Negative for chest pain and leg swelling.   Gastrointestinal:  Positive for constipation. Negative for diarrhea and nausea.   Genitourinary:  Negative for  frequency and urgency.   Musculoskeletal:  Negative for falls.   Skin:  Negative for itching and rash.   Neurological:  Positive for tremors. Negative for dizziness, loss of consciousness and weakness.   Psychiatric/Behavioral:  Negative for hallucinations and memory loss.            Past Medical History: The patient  has a past medical history of Arthritis, Carotid stenosis, asymptomatic, Headache(784.0), Hypothyroid, Movement disorder, and Neuropathy.    Social History: The patient  reports that she has never smoked. She has never been exposed to tobacco smoke. She has never used smokeless tobacco. She reports that she does not currently use alcohol after a past usage of about 4.0 standard drinks of alcohol per week. She reports that she does not use drugs.    Family History: Their family history includes Arthritis in her mother; Breast cancer in her sister; Cancer in her father and sister; Colon cancer in her father; Dementia in her mother; Depression in her mother; Hearing loss in her sister; Heart disease in her mother and sister; Hypertension in her mother; No Known Problems in her daughter, sister, and son; Parkinsonism in her maternal uncle; Stroke in her maternal grandmother.    Allergies: Clindamycin, Adhesive, and Bactrim [sulfamethoxazole-trimethoprim]     Meds:   Current Outpatient Medications on File Prior to Visit   Medication Sig Dispense Refill    ALPRAZolam (XANAX) 0.5 MG tablet Take 1 tablet (0.5 mg total) by mouth daily as needed for Anxiety. PRN ANXIETY 90 tablet 1    atorvastatin (LIPITOR) 40 MG tablet Take 1 tablet (40 mg total) by mouth once daily. 90 tablet 3    DULoxetine (CYMBALTA) 60 MG capsule Take 1 capsule (60 mg total) by mouth every morning. 90 capsule 3    estradioL (ESTRACE) 0.5 MG tablet Take 1 tablet (0.5 mg total) by mouth every evening. 90 tablet 3    levothyroxine (SYNTHROID) 125 MCG tablet Take 1 tablet (125 mcg total) by mouth once daily. For thyroid 30 tablet 11     rasagiline (AZILECT) 0.5 MG Tab TAKE 1 TABLET BY MOUTH ONCE DAILY. 90 tablet 1    sumatriptan (IMITREX) 100 MG tablet 1 bid prn migraine headache 90 tablet 1    [DISCONTINUED] carbidopa-levodopa  mg (SINEMET)  mg per tablet Take 0.5 tablets by mouth 3 (three) times daily. 45 tablet 5     Current Facility-Administered Medications on File Prior to Visit   Medication Dose Route Frequency Provider Last Rate Last Admin    0.9%  NaCl infusion   Intravenous Continuous Tony Castle MD   New Bag at 09/30/22 0822    0.9%  NaCl infusion   Intravenous Continuous Tony Castle MD        sodium chloride 0.9% flush 10 mL  10 mL Intravenous PRN Tony Castle MD        sodium chloride 0.9% flush 10 mL  10 mL Intravenous PRN Tony Castle MD           Exam:  /75   Pulse 92   Ht 5' (1.524 m)   Wt 59 kg (130 lb)   LMP 01/01/2001   BMI 25.39 kg/m²     Constitutional  Well-developed, well-nourished, appears stated age   Ophthalmoscopic  No papilledema with no hemorrhages or exudates bilaterally   Cardiovascular  Radial pulses 2+ and symmetric, no LE edema bilaterally   Neurological    * Mental status  MOCA =      - Orientation  Oriented to person, place, time, and situation     - Memory   Intact recent and remote     - Attention/concentration  Attentive, vigilant during exam     - Language  Naming & repetition intact, +2-step commands     - Fund of knowledge  Aware of current events     - Executive  Well-organized thoughts     - Other     * Cranial nerves       - CN II  PERRL, visual fields full to confrontation     - CN III, IV, VI  Extraocular movements full, normal pursuits and saccades     - CN V  Sensation V1 - V3 intact     - CN VII  Face strong and symmetric bilaterally     - CN VIII  Hearing intact bilaterally     - CN IX, X  Palate raises midline and symmetric     - CN XI  SCM and trapezius 5/5 bilaterally     - CN XII  Tongue midline   * Motor  Muscle bulk normal, strength  5/5 throughout   * Sensory   Intact to light touch    * Coordination  No dysmetria with finger-to-nose or heel-to-shin   * Gait  See below.   * Deep tendon reflexes  2+ and symmetric throughout   Babinski downgoing bilaterally   * Specialized movement exam  No hypophonic speech.    No facial masking   L > R cogwheel rigidity.     L > R bradykinesia.   L resting tremor    Dystonic posturing of L hand    No other dystonia, chorea, athetosis, myoclonus, or tics.   No motor impersistence.   Normal-based gait.   No shortened stride length.   Decreased L arm swing    No postural instability.      L head tilt, R tort      Laboratory/Radiological:  - Results:  Lab Visit on 01/11/2024   Component Date Value Ref Range Status    TSH 01/11/2024 0.266 (L)  0.400 - 4.000 uIU/mL Final    Sed Rate 01/11/2024 8  0 - 20 mm/Hr Final    JOHN Screen 01/11/2024 Positive (A)  Negative <1:80 Final    CRP 01/11/2024 0.5  0.0 - 8.2 mg/L Final    Rheumatoid Factor 01/11/2024 <13.0  0.0 - 15.0 IU/mL Final    Free T4 01/11/2024 1.24  0.71 - 1.51 ng/dL Final    JOHN Titer 1 01/11/2024 1:640   Final    Anti Sm Antibody 01/11/2024 0.36  0.00 - 0.99 Ratio Final    Anti-Sm Interpretation 01/11/2024 Negative  Negative Final    Anti-SSA Antibody 01/11/2024 0.31  0.00 - 0.99 Ratio Final    Anti-SSA Interpretation 01/11/2024 Negative  Negative Final    Anti-SSB Antibody 01/11/2024 0.13  0.00 - 0.99 Ratio Final    Anti-SSB Interpretation 01/11/2024 Negative  Negative Final    ds DNA Ab 01/11/2024 Negative 1:10  Negative 1:10 Final    Anti Sm/RNP Antibody 01/11/2024 0.17  0.00 - 0.99 Ratio Final    Anti-Sm/RNP Interpretation 01/11/2024 Negative  Negative Final    JOHN PATTERN 1 01/11/2024 Homogeneous   Final   Lab Visit on 12/15/2023   Component Date Value Ref Range Status    TSH 12/15/2023 12.400 (H)  0.400 - 4.000 uIU/mL Final    Free T4 12/15/2023 0.89  0.71 - 1.51 ng/dL Final   Lab Visit on 11/22/2023   Component Date Value Ref Range Status    TSH  11/22/2023 0.015 (L)  0.400 - 4.000 uIU/mL Final    TSH 11/22/2023 0.015 (L)  0.400 - 4.000 uIU/mL Final    Vitamin B-12 11/22/2023 1705 (H)  210 - 950 pg/mL Final    Free T4 11/22/2023 1.55 (H)  0.71 - 1.51 ng/dL Final       - Independent review of images:      - Independent review of consultant's notes: Reji Chou     ASSESSMENT/PLAN:  PD  - iPD, L-side predominant -- dystonic features (toe curling, limb dystonia most bothersome to her)   - currently taking cd/ld 1/2 tab BID and rasagiline 0.5 mg daily   - rec'd titrating up to 1 tab TID of cd/ld   - likely will go ahead and increase rasagiline to 1 mg after that   - increase physical activity         2. MDD  - stable on cymbalta     Orders Placed This Encounter    carbidopa-levodopa  mg (SINEMET)  mg per tablet           Follow up: in 3 months with [facility]     Collaborating Physician, Dr. Tipton, was available during today's encounter. Any change to plan along with cosign to appear in the EMR.       Total time spent with the patient: 53 minutes.  45 minutes of face-to-face consultation and 8 minutes of chart review and coordination of care, on the day of the visit. This includes face to face time and non-face to face time preparing to see the patient (eg, review of tests), obtaining and/or reviewing separately obtained history, documenting clinical information in the electronic or other health record, independently interpreting resultsand communicating results to the patient/family/caregiver, or care coordination.         Laura Delgado PA-C   [facility]  Department of Neurology  Movement Disorders

## 2024-02-21 ENCOUNTER — OFFICE VISIT (OUTPATIENT)
Dept: NEUROLOGY | Facility: CLINIC | Age: 74
End: 2024-02-21
Payer: MEDICARE

## 2024-02-21 VITALS
BODY MASS INDEX: 25.52 KG/M2 | HEIGHT: 60 IN | SYSTOLIC BLOOD PRESSURE: 124 MMHG | WEIGHT: 130 LBS | DIASTOLIC BLOOD PRESSURE: 75 MMHG | HEART RATE: 92 BPM

## 2024-02-21 DIAGNOSIS — R25.2 FOOT CRAMPS: ICD-10-CM

## 2024-02-21 DIAGNOSIS — Z71.89 COUNSELING REGARDING GOALS OF CARE: ICD-10-CM

## 2024-02-21 DIAGNOSIS — F32.1 CURRENT MODERATE EPISODE OF MAJOR DEPRESSIVE DISORDER WITHOUT PRIOR EPISODE: ICD-10-CM

## 2024-02-21 DIAGNOSIS — G20.A1 PARKINSON'S DISEASE WITHOUT DYSKINESIA OR FLUCTUATING MANIFESTATIONS: Primary | ICD-10-CM

## 2024-02-21 PROCEDURE — 99215 OFFICE O/P EST HI 40 MIN: CPT | Mod: S$PBB,,, | Performed by: PHYSICIAN ASSISTANT

## 2024-02-21 PROCEDURE — 99213 OFFICE O/P EST LOW 20 MIN: CPT | Mod: PBBFAC | Performed by: PHYSICIAN ASSISTANT

## 2024-02-21 PROCEDURE — 99999 PR PBB SHADOW E&M-EST. PATIENT-LVL III: CPT | Mod: PBBFAC,,, | Performed by: PHYSICIAN ASSISTANT

## 2024-02-21 RX ORDER — CARBIDOPA AND LEVODOPA 25; 100 MG/1; MG/1
1 TABLET ORAL 3 TIMES DAILY
Qty: 270 TABLET | Refills: 3 | Status: SHIPPED | OUTPATIENT
Start: 2024-02-21

## 2024-02-21 NOTE — PATIENT INSTRUCTIONS
Carbidopa/levodopa 25/100 titration     Week 1:   Take 1/2 tab in AM and 1/2 tab at noon and 1/2 tab in PM     Week 2:   Take 1 tab in AM and 1/2 tab at noon and 1/2 tab in PM     Week 3:   Take 1 tab in AM and 1 tab at noon and 1/2 tab in PM     Week 4+: Take 1 tab in AM and 1 tab at noon and 1 tab in PM     If adequately improved, can stop and maintain at any level of the titration. Call me with an update anytime.

## 2024-02-29 ENCOUNTER — EXTERNAL CHRONIC CARE MANAGEMENT (OUTPATIENT)
Dept: PRIMARY CARE CLINIC | Facility: CLINIC | Age: 74
End: 2024-02-29
Payer: MEDICARE

## 2024-02-29 PROCEDURE — 99490 CHRNC CARE MGMT STAFF 1ST 20: CPT | Mod: S$GLB,,, | Performed by: FAMILY MEDICINE

## 2024-02-29 PROCEDURE — 99439 CHRNC CARE MGMT STAF EA ADDL: CPT | Mod: S$GLB,,, | Performed by: FAMILY MEDICINE

## 2024-03-12 ENCOUNTER — TELEPHONE (OUTPATIENT)
Dept: RHEUMATOLOGY | Facility: CLINIC | Age: 74
End: 2024-03-12

## 2024-03-12 ENCOUNTER — OFFICE VISIT (OUTPATIENT)
Dept: RHEUMATOLOGY | Facility: CLINIC | Age: 74
End: 2024-03-12
Payer: MEDICARE

## 2024-03-12 DIAGNOSIS — M25.542 ARTHRALGIA OF BOTH HANDS: Primary | ICD-10-CM

## 2024-03-12 DIAGNOSIS — M25.541 ARTHRALGIA OF BOTH HANDS: Primary | ICD-10-CM

## 2024-03-12 DIAGNOSIS — Z71.89 COUNSELING AND COORDINATION OF CARE: ICD-10-CM

## 2024-03-12 DIAGNOSIS — R76.8 POSITIVE ANA (ANTINUCLEAR ANTIBODY): ICD-10-CM

## 2024-03-12 PROCEDURE — 99213 OFFICE O/P EST LOW 20 MIN: CPT | Mod: PBBFAC,PN | Performed by: STUDENT IN AN ORGANIZED HEALTH CARE EDUCATION/TRAINING PROGRAM

## 2024-03-12 PROCEDURE — 99204 OFFICE O/P NEW MOD 45 MIN: CPT | Mod: S$PBB,,, | Performed by: STUDENT IN AN ORGANIZED HEALTH CARE EDUCATION/TRAINING PROGRAM

## 2024-03-12 PROCEDURE — 99999 PR PBB SHADOW E&M-EST. PATIENT-LVL III: CPT | Mod: PBBFAC,,, | Performed by: STUDENT IN AN ORGANIZED HEALTH CARE EDUCATION/TRAINING PROGRAM

## 2024-03-12 NOTE — PROGRESS NOTES
3/9/2024     1:12 PM   Rapid3 Question Responses and Scores   MDHAQ Score 0.6   Psychologic Score 3.3   Pain Score 3   When you awakened in the morning OVER THE LAST WEEK, did you feel stiff? Yes   If Yes, please indicate the number of hours until you are as limber as you will be for the day 1   Fatigue Score 2   Global Health Score 3.5   RAPID3 Score 2.83

## 2024-03-12 NOTE — TELEPHONE ENCOUNTER
----- Message from Sharifa Godwin MD sent at 3/12/2024  3:23 PM CDT -----  Please contact the patient with the attached results. Other antibody for rheumatoid arthritis was negative. Complements were normal

## 2024-03-12 NOTE — PROGRESS NOTES
RHEUMATOLOGY OUTPATIENT CLINIC NOTE    3/12/2024    Attending Rheumatologist: Sharifa Godwin  Primary Care Provider: Fam Chou MD   Physician Requesting Consultation: Fam Chou MD  [address] [place] [address]  Chief Complaint/Reason For Consultation:  Positive JOHN      Subjective:       HPI  Florentino Bowman is a 73 y.o. White female with Parkinson's Disease, hypothyroidism, lumbar spondylosis with several laminectomies and epidural injections, had spinal cord stimulator in 3/2023.      6 months ago, she started noticing intermittent pain in bilateral lateral hips, knees, ankles, fingers, shoulders. She wakes up in the morning and feels that fingers are puffy but not overt swollen. Takes ibuprofen 400 mg and it helps. Morning stiffness is not significant. Symptoms are intermittent and do not happen every day,     She denies any skin rashes, photosensitivity, oral ulcers, swollen glands, raynaud's, chest pain, shortness of breath, dry mouth.   Has dry eyes, had lasik surgery many years ago and may need cataract surgery. Uses systane drops which helped.     Has been taking atorvastatin for many years.     Labs  JOHN 1:640 homogeneous  Negative DSDNA, SSA, SSB, Sm, SM/RNP  RF negative  ESR and CRP negative        Review of Systems   Constitutional:  Positive for unexpected weight change. Negative for fever.   HENT:  Negative for mouth sores and trouble swallowing.    Eyes:  Positive for redness.   Respiratory:  Negative for cough and shortness of breath.    Cardiovascular:  Negative for chest pain.   Gastrointestinal:  Positive for constipation. Negative for diarrhea.   Genitourinary:  Negative for dysuria and genital sores.   Integumentary:  Negative for rash.   Neurological:  Positive for headaches.   Hematological:  Does not bruise/bleed easily.        Chronic comorbid conditions affecting medical decision making today:  Past Medical History:   Diagnosis Date    Allergy     Arthritis      Carotid stenosis, asymptomatic     50%    Headache(784.0)     Hypothyroid     Movement disorder     Neuropathy      Past Surgical History:   Procedure Laterality Date    BREAST BIOPSY Right     CAUDAL EPIDURAL STEROID INJECTION N/A 2022    Procedure: INJECTION, STEROID, SPINE, EPIDURAL, CAUDAL;  Surgeon: Tony Castle MD;  Location: [facility] OR;  Service: Pain Management;  Laterality: N/A;     SECTION      x2    COLONOSCOPY N/A 2017    Procedure: COLONOSCOPY Miralax;  Surgeon: Quentin Mercado Jr., MD;  Location: [facility] ENDO;  Service: Endoscopy;  Laterality: N/A;    COLONOSCOPY N/A 2021    Procedure: COLONOSCOPY;  Surgeon: Andrew Parekh MD;  Location: [facility] ENDO;  Service: Endoscopy;  Laterality: N/A;    ESOPHAGOGASTRODUODENOSCOPY N/A 2021    Procedure: EGD (ESOPHAGOGASTRODUODENOSCOPY);  Surgeon: Andrew Parekh MD;  Location: [facility] ENDO;  Service: Endoscopy;  Laterality: N/A;    EYE SURGERY      FLUOROSCOPY  2023    Procedure: FLUOROSCOPY;  Surgeon: Darrian Duran MD;  Location: [facility] OR;  Service: Pain Management;;    HYSTERECTOMY      INJECTION OF ANESTHETIC AGENT AROUND MEDIAL BRANCH NERVES INNERVATING LUMBAR FACET JOINT Right 2019    Procedure: BLOCK, NERVE, FACET JOINT, LUMBAR, MEDIAL BRANCH ;  Surgeon: Sixto Moya III, MD;  Location: [facility] OR;  Service: Pain Management;  Laterality: Right;  - L4/5 & L5/S1  PATIENT NEEDS TO BE THE FIRST PATIENT OF THE DAY, PER DR. MOYA!!!    INSERTION, NEUROSTIMULATOR, SPINAL CORD N/A 2023    Procedure: INSERTION, SPINAL CORD STIMULATOR IMPLANT [facility];  Surgeon: Darrian Duran MD;  Location: [facility] OR;  Service: Pain Management;  Laterality: N/A;    LAPAROSCOPIC HYSTERECTOMY      supracervical/BSO    OOPHORECTOMY      SPINE SURGERY      TONSILLECTOMY      TRANSFORAMINAL EPIDURAL INJECTION OF STEROID Right 2019    Procedure: INJECTION, STEROID, EPIDURAL, TRANSFORAMINAL APPROACH;  Surgeon:  Sixto Moya III, MD;  Location: [facility] OR;  Service: Pain Management;  Laterality: Right;  -right TFESI L4/5    TRANSFORAMINAL EPIDURAL INJECTION OF STEROID Bilateral 02/19/2021    Procedure: INJECTION, STEROID, EPIDURAL, TRANSFORAMINAL APPROACH;  Surgeon: Sixto Moya III, MD;  Location: [facility] OR;  Service: Pain Management;  Laterality: Bilateral;  L5    TRANSFORAMINAL EPIDURAL INJECTION OF STEROID Bilateral 09/30/2022    Procedure: INJECTION, STEROID, EPIDURAL, TRANSFORAMINAL APPROACH;  Surgeon: Tony Castle MD;  Location: [facility] OR;  Service: Pain Management;  Laterality: Bilateral;    TRIAL OF SPINAL CORD NERVE STIMULATOR N/A 02/24/2023    Procedure: SPINAL CORD STIMULATOR TRIAL [place]OST[place] REP *ESHRAGHI ONLY*;  Surgeon: Darrian Duran MD;  Location: [facility] PAIN MGT;  Service: Pain Management;  Laterality: N/A;    TUBAL LIGATION       Family History   Problem Relation Age of Onset    Dementia Mother     Hypertension Mother     Heart disease Mother     Arthritis Mother     Depression Mother     Cancer Father         colon    Colon cancer Father     Cancer Sister         breast    Breast cancer Sister     Heart disease Sister     No Known Problems Daughter     No Known Problems Son     No Known Problems Sister     Parkinsonism Maternal Uncle     Stroke Maternal Grandmother     Hearing loss Sister     Aneurysm Neg Hx     Clotting disorder Neg Hx     Diabetes Neg Hx     Fainting Neg Hx     Hyperlipidemia Neg Hx     Kidney disease Neg Hx     Liver disease Neg Hx     Migraines Neg Hx     Neuropathy Neg Hx     Seizures Neg Hx     Tremor Neg Hx      Social History     Substance and Sexual Activity   Alcohol Use Yes    Alcohol/week: 4.0 standard drinks of alcohol    Types: 4 Glasses of wine per week    Comment: 3 glasses weekly     Social History     Tobacco Use   Smoking Status Never    Passive exposure: Never   Smokeless Tobacco Never     Social History     Substance and Sexual Activity   Drug Use No        Current Outpatient Medications:     ALPRAZolam (XANAX) 0.5 MG tablet, Take 1 tablet (0.5 mg total) by mouth daily as needed for Anxiety. PRN ANXIETY, Disp: 90 tablet, Rfl: 1    atorvastatin (LIPITOR) 40 MG tablet, Take 1 tablet (40 mg total) by mouth once daily., Disp: 90 tablet, Rfl: 3    carbidopa-levodopa  mg (SINEMET)  mg per tablet, Take 1 tablet by mouth 3 (three) times daily., Disp: 270 tablet, Rfl: 3    DULoxetine (CYMBALTA) 60 MG capsule, Take 1 capsule (60 mg total) by mouth every morning., Disp: 90 capsule, Rfl: 3    estradioL (ESTRACE) 0.5 MG tablet, Take 1 tablet (0.5 mg total) by mouth every evening., Disp: 90 tablet, Rfl: 3    levothyroxine (SYNTHROID) 125 MCG tablet, Take 1 tablet (125 mcg total) by mouth once daily. For thyroid, Disp: 30 tablet, Rfl: 11    rasagiline (AZILECT) 0.5 MG Tab, TAKE 1 TABLET BY MOUTH ONCE DAILY., Disp: 90 tablet, Rfl: 1    sumatriptan (IMITREX) 100 MG tablet, 1 bid prn migraine headache, Disp: 90 tablet, Rfl: 1  No current facility-administered medications for this visit.    Facility-Administered Medications Ordered in Other Visits:     0.9%  NaCl infusion, , Intravenous, Continuous, Tony Castle MD, New Bag at 09/30/22 0822    0.9%  NaCl infusion, , Intravenous, Continuous, Tony Castle MD    sodium chloride 0.9% flush 10 mL, 10 mL, Intravenous, PRN, Tony Castle MD    sodium chloride 0.9% flush 10 mL, 10 mL, Intravenous, PRN, Tony Castle MD     Objective:         There were no vitals filed for this visit.  Physical Exam   Constitutional: She is oriented to person, place, and time. She appears well-developed.   HENT:   Head: Normocephalic.   Mouth/Throat: Mucous membranes are moist.   Mouth/Throat: No ulcerations  Cardiovascular: Normal rate and normal heart sounds.   Pulmonary/Chest: Effort normal and breath sounds normal.   Musculoskeletal:      Cervical back: Neck supple.      Comments: Cspine FROM no  tenderness  Tspine FROM no tenderness  Lspine FROM no tenderness.  Shoulders: FROM; no synovitis;  Elbows: FROM; no synovitis; no tophi or nodules  Wrists: FROM; no synovitis;    MCPs: FROM; no synovitis;   PIPs:FROM; no synovitis;   DIPs: FROM; no synovitis;   HIPS: FROM  Knees: FROM; no synovitis; no instability  Ankles: FROM: no synovitis   Toes: no tenderness on palpation.     Lymphadenopathy:     She has no cervical adenopathy.   Neurological: She is alert and oriented to person, place, and time.   Skin: No rash noted.   No Heliotrope rash  No Gottron papules  No sclerodactyly   No Raynaud's  No Petechiae  No discoid lesions  No alopecia  Normal Capillaroscopy   Vitals reviewed.      Reviewed old and all outside pertinent medical records available.    All lab results personally reviewed and interpreted by me.  Lab Results   Component Value Date    WBC 5.75 08/11/2023    WBC 5.75 08/11/2023    HGB 13.5 08/11/2023    HGB 13.5 08/11/2023    HCT 40.2 08/11/2023    HCT 40.2 08/11/2023    MCV 98 08/11/2023    MCV 98 08/11/2023    MCH 32.8 (H) 08/11/2023    MCH 32.8 (H) 08/11/2023    MCHC 33.6 08/11/2023    MCHC 33.6 08/11/2023    RDW 13.4 08/11/2023    RDW 13.4 08/11/2023     08/11/2023     08/11/2023    MPV 9.7 08/11/2023    MPV 9.7 08/11/2023       Lab Results   Component Value Date     08/11/2023     08/11/2023    K 4.2 08/11/2023    K 4.2 08/11/2023    CL 99 08/11/2023    CL 99 08/11/2023    CO2 28 08/11/2023    CO2 28 08/11/2023    GLU 92 08/11/2023    GLU 92 08/11/2023    BUN 21 (H) 08/11/2023    BUN 21 (H) 08/11/2023    CALCIUM 9.4 08/11/2023    CALCIUM 9.4 08/11/2023    PROT 7.9 08/11/2023    PROT 7.9 08/11/2023    ALBUMIN 4.8 08/11/2023    ALBUMIN 4.8 08/11/2023    BILITOT 1.0 08/11/2023    BILITOT 1.0 08/11/2023    AST 27 08/11/2023    AST 27 08/11/2023    ALKPHOS 80 08/11/2023    ALKPHOS 80 08/11/2023    ALT 11 08/11/2023    ALT 11 08/11/2023       Lab Results   Component Value  "Date    COLORU Yellow 08/11/2023    APPEARANCEUA Clear 08/11/2023    SPECGRAV 1.015 08/11/2023    PHUR 7.0 08/11/2023    PROTEINUA Negative 08/11/2023    KETONESU Negative 08/11/2023    LEUKOCYTESUR Negative 08/11/2023    NITRITE Negative 08/11/2023    UROBILINOGEN Negative 08/11/2023       Lab Results   Component Value Date    CRP 0.5 01/11/2024       Lab Results   Component Value Date    RF <13.0 01/11/2024    SEDRATE 8 01/11/2024       No components found for: "25OHVITDTOT", "64UFINWG7", "51ITBPFM0", "METHODNOTE"    No results found for: "URICACID"    No results found for: "QUANTIFERON", "HEPBCOREIGG", "HEPBSAB", "HEPBSAG", "HEPCAB"    Imaging:  All imaging reviewed and independently interpreted by me.         ASSESSMENT / PLAN:     Florentino Bowman is a 73 y.o. White female with:      1. Positive JOHN (antinuclear antibody)  -JOHN 1:640 homogeneous, Negative DSDNA, SSA, SSB, Sm, SM/RNP. UA with no proteinuria.   -No current evidence of an active connective tissue disease (I.e. SLE or Sjogren).  JOHN can be positive for a number of reasons including: medications, other autoimmune conditions (I.e. thyroid), pulmonary disease, malignancy, chronic infections (I.e. Tuberculosis. HIV, Hepatitis), and in those with relatives who have a CTD.  It can also be positive in up to 30% of healthy individuals.   -In her case, thyroid disease is likely the cause of positive JOHN.  -reassurance  -discussed with patient that if she develops any worsening joint pain with swelling, prolonged morning stiffness, unexplained skin rashes, oral ulcers, swollen glands in the absence of infection to let me know  -complete workup with C3 and C4.     2. Arthralgia of both hands  -No synovitis on exam  -joint pain does not appear to be inflammatory in nature  -negative RF.   -Obtain CCP  -Obtain baseline XR of hands.     3. Other specified counseling  - over 10 minutes spent regarding below topics:  - Regular exercise:  Aerobic and resistance.  - " Medication counseling provided.      Follow up if symptoms worsen or fail to improve.    Method of contact with patient concerns: [facility]    Disclaimer:  This note is prepared using voice recognition software and as such is likely to have errors and has not been proof read. Please contact me for questions.     Time spent: 35 minutes in face to face discussion concerning diagnosis, prognosis, review of lab and test results, benefits of treatment as well as management of disease, counseling of patient and coordination of care between various health care providers.  Greater than half the time spent was used for coordination of care and counseling of patient.    Sharifa Godwin M.D.  Rheumatology  [facility]

## 2024-03-18 ENCOUNTER — PATIENT MESSAGE (OUTPATIENT)
Dept: ADMINISTRATIVE | Facility: HOSPITAL | Age: 74
End: 2024-03-18
Payer: MEDICARE

## 2024-03-27 ENCOUNTER — OFFICE VISIT (OUTPATIENT)
Dept: NEUROLOGY | Facility: CLINIC | Age: 74
End: 2024-03-27
Payer: MEDICARE

## 2024-03-27 VITALS
BODY MASS INDEX: 25.11 KG/M2 | HEIGHT: 60 IN | WEIGHT: 127.88 LBS | DIASTOLIC BLOOD PRESSURE: 92 MMHG | SYSTOLIC BLOOD PRESSURE: 143 MMHG | HEART RATE: 68 BPM

## 2024-03-27 DIAGNOSIS — G43.709 CHRONIC MIGRAINE W/O AURA W/O STATUS MIGRAINOSUS, NOT INTRACTABLE: ICD-10-CM

## 2024-03-27 DIAGNOSIS — G20.A1 PARKINSON'S DISEASE WITHOUT DYSKINESIA OR FLUCTUATING MANIFESTATIONS: ICD-10-CM

## 2024-03-27 DIAGNOSIS — R51.9 CHRONIC DAILY HEADACHE: Primary | ICD-10-CM

## 2024-03-27 PROCEDURE — 99999 PR PBB SHADOW E&M-EST. PATIENT-LVL IV: CPT | Mod: PBBFAC,,, | Performed by: PSYCHIATRY & NEUROLOGY

## 2024-03-27 PROCEDURE — 99214 OFFICE O/P EST MOD 30 MIN: CPT | Mod: S$PBB,,, | Performed by: PSYCHIATRY & NEUROLOGY

## 2024-03-27 PROCEDURE — 99214 OFFICE O/P EST MOD 30 MIN: CPT | Mod: PBBFAC,PN | Performed by: PSYCHIATRY & NEUROLOGY

## 2024-03-27 RX ORDER — UBROGEPANT 100 MG/1
100 TABLET ORAL
Qty: 16 TABLET | Refills: 5 | Status: SHIPPED | OUTPATIENT
Start: 2024-03-27

## 2024-03-27 RX ORDER — TOPIRAMATE 100 MG/1
100 TABLET, FILM COATED ORAL NIGHTLY
Qty: 30 TABLET | Refills: 5 | Status: SHIPPED | OUTPATIENT
Start: 2024-03-27 | End: 2024-04-29 | Stop reason: SINTOL

## 2024-03-27 RX ORDER — TOPIRAMATE 25 MG/1
TABLET ORAL
Qty: 42 TABLET | Refills: 0 | Status: SHIPPED | OUTPATIENT
Start: 2024-03-27

## 2024-03-27 NOTE — PROGRESS NOTES
Subjective:       Patient ID: Florentino Bowman is a 74 y.o. female.    Chief Complaint: No chief complaint on file.      Ms. Bowman has decided to pursue treatment of Parkinson's with a different provider.  I think in part she wanted a 2nd opinion because she continued to doubt the diagnosis.  At that visit her Parkinson's medications were increased.      She is here today to talk about migraines.    She has had migraines since high school.  She is prescribed  100 mg of sumatriptan PRN and is in the habit of cutting them in half and taking 50 mg pretty much every morning.  She states that she has done that for nearly a year.  She has accumulated more than the usual amount that is prescribed and is clear that she is taking this every day.      Regarding prophylactics she has never had Topamax.      Amitriptyline was prescribed for about 6 months after her  passed away because of trouble with insomnia and during that time it did not help her headaches.      She awakens in the morning with the headaches.  She describes a deep bitemporal pressure-type sensation not associated with nausea or noise sensitivity.  She is light sensitive but she is not sure if this is due to her baseline ocular problems.            MRI Brain Without Contrast    Reading Physician Reading Date Result Priority  Kwesi Dooley MD  [phone] 6/8/2021 Routine    Narrative & Impression  EXAMINATION:  MRI BRAIN WITHOUT CONTRAST     CLINICAL HISTORY:  Parkinson's disease; Parkinson's disease     TECHNIQUE:  Multiplanar multisequence MR imaging of the brain was performed without contrast.     COMPARISON:  None     FINDINGS:  Intracranial compartment:     Ventricles and sulci are normal in size for age without evidence of hydrocephalus.  No extra-axial blood or fluid collections     Mild generalized cerebral volume loss.  The brain parenchyma appears normal. No mass lesion, acute hemorrhage, edema or acute infarct.     Normal vascular  flow voids are preserved.     Skull/extracranial contents (limited evaluation): Bone marrow signal intensity is normal.     Impression:     No acute abnormality.     Mild generalized cerebral volume loss.        Electronically signed by: Kwesi Dooley MD  Date:                                            2021  Time:                                           09:35                         Past Medical History:   Diagnosis Date    Allergy     Arthritis     Carotid stenosis, asymptomatic     50%    Headache(784.0)     Hypothyroid     Movement disorder     Neuropathy       Past Surgical History:   Procedure Laterality Date    BREAST BIOPSY Right     CAUDAL EPIDURAL STEROID INJECTION N/A 2022    Procedure: INJECTION, STEROID, SPINE, EPIDURAL, CAUDAL;  Surgeon: Tony Castle MD;  Location: [facility] OR;  Service: Pain Management;  Laterality: N/A;     SECTION      x2    COLONOSCOPY N/A 2017    Procedure: COLONOSCOPY Miralax;  Surgeon: Quentin Mercado Jr., MD;  Location: [facility];  Service: Endoscopy;  Laterality: N/A;    COLONOSCOPY N/A 2021    Procedure: COLONOSCOPY;  Surgeon: Andrew Parekh MD;  Location: [facility];  Service: Endoscopy;  Laterality: N/A;    ESOPHAGOGASTRODUODENOSCOPY N/A 2021    Procedure: EGD (ESOPHAGOGASTRODUODENOSCOPY);  Surgeon: Andrew Parekh MD;  Location: [facility];  Service: Endoscopy;  Laterality: N/A;    EYE SURGERY      FLUOROSCOPY  2023    Procedure: FLUOROSCOPY;  Surgeon: Darrian Duran MD;  Location: [facility] OR;  Service: Pain Management;;    HYSTERECTOMY      INJECTION OF ANESTHETIC AGENT AROUND MEDIAL BRANCH NERVES INNERVATING LUMBAR FACET JOINT Right 2019    Procedure: BLOCK, NERVE, FACET JOINT, LUMBAR, MEDIAL BRANCH ;  Surgeon: Sixto Moya III, MD;  Location: [facility] OR;  Service: Pain Management;  Laterality: Right;  - L4/5 & L5/S1  PATIENT NEEDS TO BE THE FIRST PATIENT OF THE DAY, PER DR. MOYA!!!     INSERTION, NEUROSTIMULATOR, SPINAL CORD N/A 03/20/2023    Procedure: INSERTION, SPINAL CORD STIMULATOR IMPLANT [place] REP;  Surgeon: Darrian Duran MD;  Location: [facility] OR;  Service: Pain Management;  Laterality: N/A;    LAPAROSCOPIC HYSTERECTOMY  2010    supracervical/BSO    OOPHORECTOMY      SPINE SURGERY      TONSILLECTOMY      TRANSFORAMINAL EPIDURAL INJECTION OF STEROID Right 06/05/2019    Procedure: INJECTION, STEROID, EPIDURAL, TRANSFORAMINAL APPROACH;  Surgeon: Sixto Moya III, MD;  Location: [facility] OR;  Service: Pain Management;  Laterality: Right;  -right TFESI L4/5    TRANSFORAMINAL EPIDURAL INJECTION OF STEROID Bilateral 02/19/2021    Procedure: INJECTION, STEROID, EPIDURAL, TRANSFORAMINAL APPROACH;  Surgeon: Sixto Moya III, MD;  Location: [facility] OR;  Service: Pain Management;  Laterality: Bilateral;  L5    TRANSFORAMINAL EPIDURAL INJECTION OF STEROID Bilateral 09/30/2022    Procedure: INJECTION, STEROID, EPIDURAL, TRANSFORAMINAL APPROACH;  Surgeon: Tony Castle MD;  Location: [facility] OR;  Service: Pain Management;  Laterality: Bilateral;    TRIAL OF SPINAL CORD NERVE STIMULATOR N/A 02/24/2023    Procedure: SPINAL CORD STIMULATOR TRIAL [place] REP *ESHRAGHI ONLY*;  Surgeon: Darrian Duran MD;  Location: [facility] PAIN MGT;  Service: Pain Management;  Laterality: N/A;    TUBAL LIGATION          Current Outpatient Medications:     ALPRAZolam (XANAX) 0.5 MG tablet, Take 1 tablet (0.5 mg total) by mouth daily as needed for Anxiety. PRN ANXIETY, Disp: 90 tablet, Rfl: 1    atorvastatin (LIPITOR) 40 MG tablet, Take 1 tablet (40 mg total) by mouth once daily., Disp: 90 tablet, Rfl: 3    carbidopa-levodopa  mg (SINEMET)  mg per tablet, Take 1 tablet by mouth 3 (three) times daily., Disp: 270 tablet, Rfl: 3    DULoxetine (CYMBALTA) 60 MG capsule, Take 1 capsule (60 mg total) by mouth every morning., Disp: 90 capsule, Rfl: 3    estradioL (ESTRACE) 0.5 MG tablet, Take 1 tablet (0.5  mg total) by mouth every evening., Disp: 90 tablet, Rfl: 3    levothyroxine (SYNTHROID) 125 MCG tablet, Take 1 tablet (125 mcg total) by mouth once daily. For thyroid, Disp: 30 tablet, Rfl: 11    rasagiline (AZILECT) 0.5 MG Tab, TAKE 1 TABLET BY MOUTH ONCE DAILY., Disp: 90 tablet, Rfl: 1    topiramate (TOPAMAX) 100 MG tablet, Take 1 tablet (100 mg total) by mouth every evening., Disp: 30 tablet, Rfl: 5    topiramate (TOPAMAX) 25 MG tablet, One po qhs for one week then 2 po qhs for one week then 3 po qhs for one week, Disp: 42 tablet, Rfl: 0    ubrogepant (UBRELVY) 100 mg tablet, Take 1 tablet (100 mg total) by mouth as needed for Migraine. If symptoms persist or return, may repeat dose after 2 hours. Maximum: 200 mg per 24 hours, Disp: 16 tablet, Rfl: 5  No current facility-administered medications for this visit.    Facility-Administered Medications Ordered in Other Visits:     0.9%  NaCl infusion, , Intravenous, Continuous, Tony Castle MD, New Bag at 09/30/22 0822    0.9%  NaCl infusion, , Intravenous, Continuous, Tony Castle MD    sodium chloride 0.9% flush 10 mL, 10 mL, Intravenous, PRN, Tony Castle MD    sodium chloride 0.9% flush 10 mL, 10 mL, Intravenous, PRN, Tony Castle MD   Review of patient's allergies indicates:   Allergen Reactions    Clindamycin Diarrhea    Adhesive      Holter monitor pads    Bactrim [sulfamethoxazole-trimethoprim] Rash        Review of Systems   Constitutional:  Positive for appetite change (low appetite). Negative for unexpected weight change.   Musculoskeletal:  Positive for neck pain (minor, sore).   Neurological:  Positive for memory loss (minor short term memory loss, similar to her friends).   Psychiatric/Behavioral:  Negative for sleep disturbance (no nocturnal apnea or loud snoring that she is aware of).            Objective:      Physical Exam  Neurological:      Mental Status: Mental status is at baseline.   Psychiatric:          Thought Content: Thought content normal.           Assessment:       1. Chronic daily headache    2. Parkinson's disease without dyskinesia or fluctuating manifestations    3. Chronic migraine w/o aura w/o status migrainosus, not intractable        Plan:              [facility]---  I suspect that she has burned out migraine, complicated by medication overuse with rebound.  However there are no clear migrainous features and her headaches have changed and thus, given her age and given that these are occurring in the morning, I do plan an MRI brain and it is most appropriate to performed with contrast in this context.      At this time I recommend that she discontinue sumatriptan and I will replace it with UBRELVY   For prophylaxis will initiate Topamax.  (no history of kidney stones)   If ineffective or not tolerated we will then proceed with either Botox or a monthly CGRP monoclonal antibody     Parkinson's disease, now managed at [facility]       She did see a rheumatologist as she had a positive JOHN and there was no diagnosis of an autoimmune condition.          Carla Mendoza MD   03/27/2024   12:08 PM

## 2024-03-27 NOTE — PATIENT INSTRUCTIONS
Topiramate is a preventative and Ubrelvy is an abortive.    Options if topiramate fails...    1) Botox or    2) Aimovig, emgality, or ajovy      If Ubrelvy denied ( with a prior auth) then let me know and I will send in rizatriptan.

## 2024-03-31 ENCOUNTER — EXTERNAL CHRONIC CARE MANAGEMENT (OUTPATIENT)
Dept: PRIMARY CARE CLINIC | Facility: CLINIC | Age: 74
End: 2024-03-31
Payer: MEDICARE

## 2024-03-31 PROCEDURE — 99490 CHRNC CARE MGMT STAFF 1ST 20: CPT | Mod: S$GLB,,, | Performed by: FAMILY MEDICINE

## 2024-04-17 ENCOUNTER — TELEPHONE (OUTPATIENT)
Dept: FAMILY MEDICINE | Facility: CLINIC | Age: 74
End: 2024-04-17
Payer: MEDICARE

## 2024-04-17 NOTE — TELEPHONE ENCOUNTER
Spoke to pt in regards to sx clearance. I explained that she needs an appt within 30 days of the sx. Pt verbally understood. S/c pt with Margarita Lala PA-C on 4/18/24 at 11:20 am.    Surgeons office is only requesting the latest EKG we have (last one completed 10/29/19). No labs have been requested.

## 2024-04-18 ENCOUNTER — OFFICE VISIT (OUTPATIENT)
Dept: FAMILY MEDICINE | Facility: CLINIC | Age: 74
End: 2024-04-18
Payer: MEDICARE

## 2024-04-18 ENCOUNTER — TELEPHONE (OUTPATIENT)
Dept: FAMILY MEDICINE | Facility: CLINIC | Age: 74
End: 2024-04-18

## 2024-04-18 VITALS
TEMPERATURE: 98 F | SYSTOLIC BLOOD PRESSURE: 138 MMHG | HEIGHT: 60 IN | BODY MASS INDEX: 24.57 KG/M2 | OXYGEN SATURATION: 96 % | DIASTOLIC BLOOD PRESSURE: 62 MMHG | HEART RATE: 84 BPM | WEIGHT: 125.13 LBS

## 2024-04-18 DIAGNOSIS — I77.9 BILATERAL CAROTID ARTERY DISEASE, UNSPECIFIED TYPE: ICD-10-CM

## 2024-04-18 DIAGNOSIS — H25.9 AGE-RELATED CATARACT OF BOTH EYES, UNSPECIFIED AGE-RELATED CATARACT TYPE: ICD-10-CM

## 2024-04-18 DIAGNOSIS — Z01.818 PRE-OPERATIVE CLEARANCE: Primary | ICD-10-CM

## 2024-04-18 DIAGNOSIS — L29.9 PRURITUS: ICD-10-CM

## 2024-04-18 LAB
OHS QRS DURATION: 94 MS
OHS QTC CALCULATION: 444 MS

## 2024-04-18 PROCEDURE — 99214 OFFICE O/P EST MOD 30 MIN: CPT | Mod: S$GLB,,, | Performed by: PHYSICIAN ASSISTANT

## 2024-04-18 PROCEDURE — 93005 ELECTROCARDIOGRAM TRACING: CPT | Mod: S$GLB,,, | Performed by: PHYSICIAN ASSISTANT

## 2024-04-18 PROCEDURE — 93010 ELECTROCARDIOGRAM REPORT: CPT | Mod: S$GLB,,, | Performed by: STUDENT IN AN ORGANIZED HEALTH CARE EDUCATION/TRAINING PROGRAM

## 2024-04-18 RX ORDER — HYDROXYZINE HYDROCHLORIDE 25 MG/1
25 TABLET, FILM COATED ORAL 3 TIMES DAILY PRN
Qty: 90 TABLET | Refills: 3 | Status: SHIPPED | OUTPATIENT
Start: 2024-04-18 | End: 2024-06-04

## 2024-04-18 NOTE — PROGRESS NOTES
Patient ID: Florentino Bowman is a 74 y.o. female.     Chief Complaint: Pre-op Exam (Dr Louise Leigh/5/13(1st) 6/3(2nd/Cartaract)    74 year old female with history of HLD, hypothyroidism, migraines, anxiety/depression, movement disorders, and chronic back pain presents to clinic for surgery clearance of bilateral cataract with implant, first on 5/13/2024.. She denies complaints of dizziness, worsening headaches, fever, cough, CP, SOB, palpitations, N/V/D. Admits to some pruritus to hands at night. Has tried no OTC therapies.         Review of Systems  Review of Systems   Constitutional:  Negative for fever.   HENT:  Negative for ear pain and sinus pain.    Eyes:  Negative for discharge.   Respiratory:  Negative for cough and wheezing.    Cardiovascular:  Negative for chest pain and leg swelling.   Gastrointestinal:  Negative for diarrhea, nausea and vomiting.   Genitourinary:  Negative for urgency.   Musculoskeletal:  Negative for myalgias.   Skin:  Negative for rash.   Neurological:  Negative for weakness and headaches.   Psychiatric/Behavioral:  Negative for depression.        Currently Medications  Current Outpatient Medications on File Prior to Visit   Medication Sig Dispense Refill    ALPRAZolam (XANAX) 0.5 MG tablet Take 1 tablet (0.5 mg total) by mouth daily as needed for Anxiety. PRN ANXIETY 90 tablet 1    atorvastatin (LIPITOR) 40 MG tablet Take 1 tablet (40 mg total) by mouth once daily. 90 tablet 3    carbidopa-levodopa  mg (SINEMET)  mg per tablet Take 1 tablet by mouth 3 (three) times daily. 270 tablet 3    DULoxetine (CYMBALTA) 60 MG capsule Take 1 capsule (60 mg total) by mouth every morning. 90 capsule 3    estradioL (ESTRACE) 0.5 MG tablet Take 1 tablet (0.5 mg total) by mouth every evening. 90 tablet 3    levothyroxine (SYNTHROID) 125 MCG tablet Take 1 tablet (125 mcg total) by mouth once daily. For thyroid 30 tablet 11    rasagiline (AZILECT) 0.5 MG Tab TAKE 1 TABLET BY MOUTH ONCE  DAILY. 90 tablet 1    topiramate (TOPAMAX) 100 MG tablet Take 1 tablet (100 mg total) by mouth every evening. 30 tablet 5    topiramate (TOPAMAX) 25 MG tablet One po qhs for one week then 2 po qhs for one week then 3 po qhs for one week (Patient not taking: Reported on 4/18/2024) 42 tablet 0    ubrogepant (UBRELVY) 100 mg tablet Take 1 tablet (100 mg total) by mouth as needed for Migraine. If symptoms persist or return, may repeat dose after 2 hours. Maximum: 200 mg per 24 hours (Patient not taking: Reported on 4/18/2024) 16 tablet 5     Current Facility-Administered Medications on File Prior to Visit   Medication Dose Route Frequency Provider Last Rate Last Admin    0.9%  NaCl infusion   Intravenous Continuous Tony Castle MD   New Bag at 09/30/22 0822    0.9%  NaCl infusion   Intravenous Continuous Tony Castle MD        sodium chloride 0.9% flush 10 mL  10 mL Intravenous PRN Tony Castle MD        sodium chloride 0.9% flush 10 mL  10 mL Intravenous PRN Tony Castle MD           Physical  Exam  Vitals:    04/18/24 1144   BP: 138/62   BP Location: Right arm   Patient Position: Sitting   Pulse: 84   Temp: 97.5 °F (36.4 °C)   SpO2: 96%   Weight: 56.8 kg (125 lb 1.8 oz)   Height: 5' (1.524 m)      Body mass index is 24.43 kg/m².  Wt Readings from Last 3 Encounters:   04/18/24 56.8 kg (125 lb 1.8 oz)   03/27/24 58 kg (127 lb 13.9 oz)   03/18/24 59 kg (130 lb)       Physical Exam  Vitals and nursing note reviewed.   Constitutional:       General: She is not in acute distress.     Appearance: She is not ill-appearing.   HENT:      Head: Normocephalic and atraumatic.      Right Ear: External ear normal.      Left Ear: External ear normal.      Nose: Nose normal.      Mouth/Throat:      Mouth: Mucous membranes are moist.   Eyes:      Extraocular Movements: Extraocular movements intact.      Conjunctiva/sclera: Conjunctivae normal.   Cardiovascular:      Rate and Rhythm: Normal rate and  regular rhythm.      Pulses: Normal pulses.      Heart sounds: No murmur heard.  Pulmonary:      Effort: Pulmonary effort is normal. No respiratory distress.      Breath sounds: No wheezing.   Abdominal:      General: There is no distension.      Palpations: Abdomen is soft. There is no mass.      Tenderness: There is no abdominal tenderness.   Musculoskeletal:         General: No swelling.      Cervical back: Normal range of motion.      Comments: Spinal stimulator in place L spine   Skin:     Coloration: Skin is not jaundiced.      Findings: No rash.   Neurological:      General: No focal deficit present.      Mental Status: She is alert and oriented to person, place, and time.   Psychiatric:         Mood and Affect: Mood normal.         Thought Content: Thought content normal.         Labs:    Complete Blood Count  Lab Results   Component Value Date    RBC 4.12 08/11/2023    RBC 4.12 08/11/2023    HGB 13.5 08/11/2023    HGB 13.5 08/11/2023    HCT 40.2 08/11/2023    HCT 40.2 08/11/2023    MCV 98 08/11/2023    MCV 98 08/11/2023    MCH 32.8 (H) 08/11/2023    MCH 32.8 (H) 08/11/2023    MCHC 33.6 08/11/2023    MCHC 33.6 08/11/2023    RDW 13.4 08/11/2023    RDW 13.4 08/11/2023     08/11/2023     08/11/2023    MPV 9.7 08/11/2023    MPV 9.7 08/11/2023    GRAN 3.4 08/11/2023    GRAN 59.5 08/11/2023    GRAN 3.4 08/11/2023    GRAN 59.5 08/11/2023    LYMPH 1.6 08/11/2023    LYMPH 27.0 08/11/2023    LYMPH 1.6 08/11/2023    LYMPH 27.0 08/11/2023    MONO 0.4 08/11/2023    MONO 7.5 08/11/2023    MONO 0.4 08/11/2023    MONO 7.5 08/11/2023    EOS 0.3 08/11/2023    EOS 0.3 08/11/2023    BASO 0.07 08/11/2023    BASO 0.07 08/11/2023    EOSINOPHIL 4.5 08/11/2023    EOSINOPHIL 4.5 08/11/2023    BASOPHIL 1.2 08/11/2023    BASOPHIL 1.2 08/11/2023    DIFFMETHOD Automated 08/11/2023    DIFFMETHOD Automated 08/11/2023       Comprehensive Metabolic Panel  Lab Results   Component Value Date    GLU 82 04/03/2024    BUN 16  04/03/2024    CREATININE 0.7 04/03/2024    CREATININE 0.7 04/03/2024     04/03/2024    K 4.0 04/03/2024     04/03/2024    ALBUMIN 4.1 04/03/2024    CO2 28 04/03/2024    ANIONGAP 6 (L) 04/03/2024       TSH  Lab Results   Component Value Date    TSH 0.266 (L) 01/11/2024       Imaging:  MRI Brain W WO Contrast  Narrative: EXAMINATION:  MRI BRAIN W WO CONTRAST    CLINICAL HISTORY:  Headache, new or worsening (Age >= 50y); Headache, unspecified    TECHNIQUE:  Multiplanar multisequence MR imaging of the brain was performed before and after the administration of 7 mL Gadavist intravenous contrast.    COMPARISON:  06/08/2021    FINDINGS:  Ventricles are stable in size.  Modest generalized cerebral volume loss, without evidence of hydrocephalus.    The brain parenchyma appears within normal limits.  No evidence of recent or remote major vascular distribution infarct. No recent or remote hemorrhage. No mass effect or midline shift.    No abnormal parenchymal or leptomeningeal enhancement.    No extra-axial blood or fluid collections.    The T2 skull base flow voids are preserved.    Bone marrow signal intensity is unremarkable.  Impression: Modest generalized cerebral volume loss, similar prior.    No evidence of acute intracranial pathology.    Electronically signed by: Catalino Hernandez MD  Date:    04/09/2024  Time:    11:57      Assessment/Plan:    1. Pre-operative clearance  -     Cancel: SCHEDULED EKG 12-LEAD (to Muse); Future  -     IN OFFICE EKG 12-LEAD (to Muse)    2. Age-related cataract of both eyes, unspecified age-related cataract type  Comments:  - Cleared for surgery    3. Bilateral carotid artery disease, unspecified type  Overview:  50% blockage on US 6/14      4. Pruritus  -     hydrOXYzine HCL (ATARAX) 25 MG tablet; Take 1 tablet (25 mg total) by mouth 3 (three) times daily as needed for Itching.  Dispense: 90 tablet; Refill: 3         Discussed how to stay healthy including: diet, exercise,  refraining from smoking and discussed screening exams / tests needed for age, sex and family Hx.    RTC PRN    Margarita Lala PA-C

## 2024-04-27 ENCOUNTER — PATIENT MESSAGE (OUTPATIENT)
Dept: NEUROLOGY | Facility: CLINIC | Age: 74
End: 2024-04-27
Payer: MEDICARE

## 2024-04-30 ENCOUNTER — EXTERNAL CHRONIC CARE MANAGEMENT (OUTPATIENT)
Dept: PRIMARY CARE CLINIC | Facility: CLINIC | Age: 74
End: 2024-04-30
Payer: MEDICARE

## 2024-04-30 DIAGNOSIS — E03.4 HYPOTHYROIDISM DUE TO ACQUIRED ATROPHY OF THYROID: ICD-10-CM

## 2024-04-30 DIAGNOSIS — Z00.00 WELLNESS EXAMINATION: ICD-10-CM

## 2024-04-30 DIAGNOSIS — I77.9 BILATERAL CAROTID ARTERY DISEASE, UNSPECIFIED TYPE: ICD-10-CM

## 2024-04-30 PROCEDURE — 99490 CHRNC CARE MGMT STAFF 1ST 20: CPT | Mod: S$GLB,,, | Performed by: FAMILY MEDICINE

## 2024-04-30 RX ORDER — ESTRADIOL 0.5 MG/1
0.5 TABLET ORAL NIGHTLY
Qty: 90 TABLET | Refills: 3 | Status: SHIPPED | OUTPATIENT
Start: 2024-04-30

## 2024-04-30 RX ORDER — ALPRAZOLAM 0.5 MG/1
0.5 TABLET ORAL DAILY PRN
Qty: 90 TABLET | Refills: 1 | Status: SHIPPED | OUTPATIENT
Start: 2024-04-30

## 2024-04-30 NOTE — TELEPHONE ENCOUNTER
No care due was identified.  [facility] Embedded Care Due Messages. Reference number: [id number].   4/30/2024 4:15:38 PM CDT

## 2024-04-30 NOTE — TELEPHONE ENCOUNTER
No care due was identified.  [facility] Embedded Care Due Messages. Reference number: [id number].   4/30/2024 2:48:04 PM CDT

## 2024-05-01 RX ORDER — ATORVASTATIN CALCIUM 40 MG/1
40 TABLET, FILM COATED ORAL DAILY
Qty: 90 TABLET | Refills: 3 | Status: SHIPPED | OUTPATIENT
Start: 2024-05-01

## 2024-05-04 ENCOUNTER — PATIENT MESSAGE (OUTPATIENT)
Dept: NEUROLOGY | Facility: CLINIC | Age: 74
End: 2024-05-04
Payer: MEDICARE

## 2024-05-22 ENCOUNTER — PATIENT MESSAGE (OUTPATIENT)
Dept: NEUROLOGY | Facility: CLINIC | Age: 74
End: 2024-05-22
Payer: MEDICARE

## 2024-05-31 ENCOUNTER — EXTERNAL CHRONIC CARE MANAGEMENT (OUTPATIENT)
Dept: PRIMARY CARE CLINIC | Facility: CLINIC | Age: 74
End: 2024-05-31
Payer: MEDICARE

## 2024-05-31 PROCEDURE — 99490 CHRNC CARE MGMT STAFF 1ST 20: CPT | Mod: S$GLB,,, | Performed by: FAMILY MEDICINE

## 2024-06-03 NOTE — PROGRESS NOTES
Name: Florentino Bowman  MRN: [id number]   CSN: [id number]      Date: 06/04/2024    Referring physician:  No referring provider defined for this encounter.    Chief Complaint: PD     Interval History:  - taking cd/ld 1 tab TID, 0.5 mg rasagiline   - does not notice much difference   - no falls   - had cataract surgery yesterday   - some dizziness whenever she goes from sitting to standing, worse whenever she is out in the sun/outside in the heat   - has not checked BP at these times   - toe cramping seems to be a little bit better   - did PT   - she was exercising regularly up until she was preparing for cataract surgery      From Feb 2024: Florentino Bowman is a R handed 74 y.o. female with a medical issues significant for PD, lumbar radiculopathy, MDD, who presents for PD. She has been following with Dr. Mendoza. Currently taking cd/ld 1/2 tab TID + rasagiline 0.5 mg daily. She is here for second opinion of PD (does not think she has PD). Back pain for many years, several laminectomies, injections that wasn't very helpful. Thinks she stopped swinging her L arm about 4-5 years ago. Hurt her left shoulder at some point. A little tremor in her L hand, toe curling in her feet. Mostly notices tremor at rest. She was on 1/2 tab TID of  cd/ld 25/100 -- she recently cut back to 1/2 tab BID. Not much difference with slight increase. Rasagiline 0.5 mg daily. No falls. No shuffling. Some trouble rolling over in bed. Bothered by slowness.       Retired. Math/.     Normally exercises 3 times a week.     Family History: thinks maternal uncle may have had PD     Neuroleptic Exposure: none     Nonmotor/Premotor ROS:  Anosmia: fair (not as good as it used to be)  Dysarthria/Hypophonia: more hoarse / weaker   Dysphagia/Sialorrhea: none   Depression: yes, on cymbalta   Urinary changes: none   Constipation: yes, chronic   Falls: none   Micrographia: not that she knows of   Sleep issues:  -RBD: not that she knows of       Review of  Systems:   Review of Systems   Constitutional:  Negative for chills, fever and malaise/fatigue.   HENT:  Negative for hearing loss.    Eyes:  Negative for blurred vision and double vision.   Respiratory:  Negative for cough, shortness of breath and stridor.    Cardiovascular:  Negative for chest pain and leg swelling.   Gastrointestinal:  Positive for constipation. Negative for diarrhea and nausea.   Genitourinary:  Negative for frequency and urgency.   Musculoskeletal:  Negative for falls.   Skin:  Negative for itching and rash.   Neurological:  Positive for tremors. Negative for dizziness, loss of consciousness and weakness.   Psychiatric/Behavioral:  Negative for hallucinations and memory loss.            Past Medical History: The patient  has a past medical history of Allergy, Arthritis, Carotid stenosis, asymptomatic, Headache(784.0), Hypothyroid, Movement disorder, and Neuropathy.    Social History: The patient  reports that she has never smoked. She has never been exposed to tobacco smoke. She has never used smokeless tobacco. She reports current alcohol use of about 4.0 standard drinks of alcohol per week. She reports that she does not use drugs.    Family History: Their family history includes Arthritis in her mother; Breast cancer in her sister; Cancer in her father and sister; Colon cancer in her father; Dementia in her mother; Depression in her mother; Hearing loss in her sister; Heart disease in her mother and sister; Hypertension in her mother; No Known Problems in her daughter, sister, and son; Parkinsonism in her maternal uncle; Stroke in her maternal grandmother.    Allergies: Clindamycin, Topamax [topiramate], Adhesive, and Bactrim [sulfamethoxazole-trimethoprim]     Meds:   Current Outpatient Medications on File Prior to Visit   Medication Sig Dispense Refill    ALPRAZolam (XANAX) 0.5 MG tablet Take 1 tablet (0.5 mg total) by mouth daily as needed for Anxiety. PRN ANXIETY 90 tablet 1     atorvastatin (LIPITOR) 40 MG tablet Take 1 tablet (40 mg total) by mouth once daily. 90 tablet 3    carbidopa-levodopa  mg (SINEMET)  mg per tablet Take 1 tablet by mouth 3 (three) times daily. 270 tablet 3    DULoxetine (CYMBALTA) 60 MG capsule Take 1 capsule (60 mg total) by mouth every morning. 90 capsule 3    estradioL (ESTRACE) 0.5 MG tablet Take 1 tablet (0.5 mg total) by mouth every evening. 90 tablet 3    levothyroxine (SYNTHROID) 125 MCG tablet Take 1 tablet (125 mcg total) by mouth once daily. For thyroid 30 tablet 11    rasagiline (AZILECT) 0.5 MG Tab TAKE 1 TABLET BY MOUTH ONCE DAILY. 90 tablet 1    topiramate (TOPAMAX) 25 MG tablet One po qhs for one week then 2 po qhs for one week then 3 po qhs for one week (Patient not taking: Reported on 4/18/2024) 42 tablet 0    ubrogepant (UBRELVY) 100 mg tablet Take 1 tablet (100 mg total) by mouth as needed for Migraine. If symptoms persist or return, may repeat dose after 2 hours. Maximum: 200 mg per 24 hours (Patient not taking: Reported on 4/18/2024) 16 tablet 5    [DISCONTINUED] hydrOXYzine HCL (ATARAX) 25 MG tablet Take 1 tablet (25 mg total) by mouth 3 (three) times daily as needed for Itching. 90 tablet 3     Current Facility-Administered Medications on File Prior to Visit   Medication Dose Route Frequency Provider Last Rate Last Admin    0.9%  NaCl infusion   Intravenous Continuous Tony Castle MD   New Bag at 09/30/22 0822    0.9%  NaCl infusion   Intravenous Continuous Tony Castle MD        sodium chloride 0.9% flush 10 mL  10 mL Intravenous PRN Tony Castle MD        sodium chloride 0.9% flush 10 mL  10 mL Intravenous PRN Tony Castle MD           Exam:  BP (!) 148/82   Pulse 77   Ht 5' (1.524 m)   Wt 55.8 kg (123 lb)   LMP 01/01/2001   BMI 24.02 kg/m²     Constitutional  Well-developed, well-nourished, appears stated age   Ophthalmoscopic  No papilledema with no hemorrhages or exudates bilaterally    Cardiovascular  Radial pulses 2+ and symmetric, no LE edema bilaterally   Neurological    * Mental status  MOCA =      - Orientation  Oriented to person, place, time, and situation     - Memory   Intact recent and remote     - Attention/concentration  Attentive, vigilant during exam     - Language  Naming & repetition intact, +2-step commands     - Fund of knowledge  Aware of current events     - Executive  Well-organized thoughts     - Other     * Cranial nerves       - CN II  PERRL, visual fields full to confrontation     - CN III, IV, VI  Extraocular movements full, normal pursuits and saccades     - CN V  Sensation V1 - V3 intact     - CN VII  Face strong and symmetric bilaterally     - CN VIII  Hearing intact bilaterally     - CN IX, X  Palate raises midline and symmetric     - CN XI  SCM and trapezius 5/5 bilaterally     - CN XII  Tongue midline   * Motor  Muscle bulk normal, strength 5/5 throughout   * Sensory   Intact to light touch    * Coordination  No dysmetria with finger-to-nose or heel-to-shin   * Gait  See below.   * Deep tendon reflexes  2+ and symmetric throughout   Babinski downgoing bilaterally   * Specialized movement exam  No hypophonic speech.    No facial masking, appropriately animated    L > R cogwheel rigidity.     L > R bradykinesia.   L resting tremor    Dystonic posturing of L hand      LLE dyskinesia with distraction only    No other dystonia, chorea, athetosis, myoclonus, or tics.   No motor impersistence.   Normal-based gait.   No shortened stride length.   Decreased L arm swing    No postural instability.      L head tilt, R tort      Laboratory/Radiological:  - Results:  Office Visit on 04/18/2024   Component Date Value Ref Range Status    QRS Duration 04/18/2024 94  ms Final    [facility] QTC Calculation 04/18/2024 444  ms Final   Lab Visit on 04/03/2024   Component Date Value Ref Range Status    Glucose 04/03/2024 82  70 - 110 mg/dL Final    Sodium 04/03/2024 138  136 - 145 mmol/L  Final    Potassium 04/03/2024 4.0  3.5 - 5.1 mmol/L Final    Chloride 04/03/2024 104  95 - 110 mmol/L Final    CO2 04/03/2024 28  23 - 29 mmol/L Final    BUN 04/03/2024 16  8 - 23 mg/dL Final    Calcium 04/03/2024 9.6  8.7 - 10.5 mg/dL Final    Creatinine 04/03/2024 0.7  0.5 - 1.4 mg/dL Final    Albumin 04/03/2024 4.1  3.5 - 5.2 g/dL Final    Phosphorus 04/03/2024 3.8  2.7 - 4.5 mg/dL Final    eGFR 04/03/2024 >60.0  >60 mL/min/1.73 m^2 Final    Anion Gap 04/03/2024 6 (L)  8 - 16 mmol/L Final    Creatinine 04/03/2024 0.7  0.5 - 1.4 mg/dL Final    eGFR 04/03/2024 >60.0  >60 mL/min/1.73 m^2 Final   Lab Visit on 03/12/2024   Component Date Value Ref Range Status    Complement (C-4) 03/12/2024 20  11 - 44 mg/dL Final    Complement (C-3) 03/12/2024 115  50 - 180 mg/dL Final    CCP Antibodies 03/12/2024 1.1  <5.0 U/mL Final       - Independent review of images:      - Independent review of consultant's notes: Reji Chou     ASSESSMENT/PLAN:  PD  - iPD, L-side predominant -- dystonic features (toe curling, limb dystonia most bothersome to her)   - currently taking cd/ld 1 tab TID and rasagiline 0.5 mg daily, discussed increasing to 1 mg daily --wants to hold off   - increase physical activity   - discussed early addition of palliative care on board/ as part of her care team.   - rec'd CBT/talk therapy for anxiety about health/ PD         2. MDD  - stable on cymbalta   - rec'd CBT/talk therapy for anxiety about health/ PD       Orders Placed This Encounter    Ambulatory referral/consult to [facility] Palliative [facility]    Ambulatory consult to Psychology         Follow up: in 3 months with [facility]       This is a patient with a chronic and complex neurologic diagnosis whose overall, ongoing care is being managed and monitored by me and our Neurology clinic.   As such, since 2024,  is the appropriate add-on code to accompany the other E/M billing for this visit.      Collaborating Physician, Dr. Tipton, was available during  today's encounter. Any change to plan along with cosign to appear in the EMR.       Total time spent with the patient: 34 minutes.  27 minutes of face-to-face consultation and 7 minutes of chart review and coordination of care, on the day of the visit. This includes face to face time and non-face to face time preparing to see the patient (eg, review of tests), obtaining and/or reviewing separately obtained history, documenting clinical information in the electronic or other health record, independently interpreting resultsand communicating results to the patient/family/caregiver, or care coordination.         Laura Delgado PA-C   [facility]  Department of Neurology  Movement Disorders

## 2024-06-04 ENCOUNTER — PATIENT MESSAGE (OUTPATIENT)
Dept: PSYCHIATRY | Facility: CLINIC | Age: 74
End: 2024-06-04
Payer: MEDICARE

## 2024-06-04 ENCOUNTER — OFFICE VISIT (OUTPATIENT)
Dept: NEUROLOGY | Facility: CLINIC | Age: 74
End: 2024-06-04
Payer: MEDICARE

## 2024-06-04 VITALS
BODY MASS INDEX: 24.15 KG/M2 | HEIGHT: 60 IN | DIASTOLIC BLOOD PRESSURE: 82 MMHG | WEIGHT: 123 LBS | HEART RATE: 77 BPM | SYSTOLIC BLOOD PRESSURE: 148 MMHG

## 2024-06-04 DIAGNOSIS — F41.9 ANXIETY: ICD-10-CM

## 2024-06-04 DIAGNOSIS — R45.89 ANXIETY ABOUT HEALTH: ICD-10-CM

## 2024-06-04 DIAGNOSIS — F32.1 CURRENT MODERATE EPISODE OF MAJOR DEPRESSIVE DISORDER WITHOUT PRIOR EPISODE: ICD-10-CM

## 2024-06-04 DIAGNOSIS — G20.A1 PARKINSON'S DISEASE WITHOUT DYSKINESIA OR FLUCTUATING MANIFESTATIONS: Primary | ICD-10-CM

## 2024-06-04 PROCEDURE — 99214 OFFICE O/P EST MOD 30 MIN: CPT | Mod: S$PBB,,, | Performed by: PHYSICIAN ASSISTANT

## 2024-06-04 PROCEDURE — 99214 OFFICE O/P EST MOD 30 MIN: CPT | Mod: PBBFAC | Performed by: PHYSICIAN ASSISTANT

## 2024-06-04 PROCEDURE — 99999 PR PBB SHADOW E&M-EST. PATIENT-LVL IV: CPT | Mod: PBBFAC,,, | Performed by: PHYSICIAN ASSISTANT

## 2024-06-04 PROCEDURE — G2211 COMPLEX E/M VISIT ADD ON: HCPCS | Mod: S$PBB,,, | Performed by: PHYSICIAN ASSISTANT

## 2024-06-16 ENCOUNTER — PATIENT MESSAGE (OUTPATIENT)
Dept: NEUROLOGY | Facility: CLINIC | Age: 74
End: 2024-06-16
Payer: MEDICARE

## 2024-07-01 ENCOUNTER — TELEPHONE (OUTPATIENT)
Dept: FAMILY MEDICINE | Facility: CLINIC | Age: 74
End: 2024-07-01
Payer: MEDICARE

## 2024-07-01 NOTE — TELEPHONE ENCOUNTER
Pt notified of lab results. Pt is scheduled for repeat thyroid labs for 2 months.     Pt requested that LT4 be transferred to [facility] in [place]. I informed pt that she can contact [facility] in [place] and they can transfer the LT4 from [place] back to [place].     ----- Message from Fam Chou MD sent at 6/30/2024  6:29 PM CDT -----  Low TSH and high T4, too much levothyroxine  Decrease to 112 mcg and repeat 2 months

## 2024-07-02 ENCOUNTER — TELEPHONE (OUTPATIENT)
Dept: PALLIATIVE MEDICINE | Facility: CLINIC | Age: 74
End: 2024-07-02
Payer: MEDICARE

## 2024-08-12 ENCOUNTER — PATIENT MESSAGE (OUTPATIENT)
Dept: FAMILY MEDICINE | Facility: CLINIC | Age: 74
End: 2024-08-12
Payer: MEDICARE

## 2024-08-12 RX ORDER — NIRMATRELVIR AND RITONAVIR 300-100 MG
KIT ORAL
Qty: 30 TABLET | Refills: 0 | Status: SHIPPED | OUTPATIENT
Start: 2024-08-12

## 2024-08-19 ENCOUNTER — OFFICE VISIT (OUTPATIENT)
Dept: FAMILY MEDICINE | Facility: CLINIC | Age: 74
End: 2024-08-19
Payer: MEDICARE

## 2024-08-19 VITALS
HEIGHT: 60 IN | BODY MASS INDEX: 23.57 KG/M2 | TEMPERATURE: 98 F | OXYGEN SATURATION: 95 % | HEART RATE: 71 BPM | SYSTOLIC BLOOD PRESSURE: 110 MMHG | WEIGHT: 120.06 LBS | DIASTOLIC BLOOD PRESSURE: 86 MMHG

## 2024-08-19 DIAGNOSIS — S09.90XA TRAUMATIC INJURY OF HEAD, INITIAL ENCOUNTER: ICD-10-CM

## 2024-08-19 DIAGNOSIS — E03.4 HYPOTHYROIDISM DUE TO ACQUIRED ATROPHY OF THYROID: ICD-10-CM

## 2024-08-19 DIAGNOSIS — R27.0 ATAXIA, UNSPECIFIED: Primary | ICD-10-CM

## 2024-08-19 DIAGNOSIS — H60.11 CELLULITIS OF RIGHT PINNA: ICD-10-CM

## 2024-08-19 PROCEDURE — 99214 OFFICE O/P EST MOD 30 MIN: CPT | Mod: S$GLB,,, | Performed by: FAMILY MEDICINE

## 2024-08-19 RX ORDER — CEPHALEXIN 500 MG/1
500 CAPSULE ORAL EVERY 12 HOURS
Qty: 14 CAPSULE | Refills: 0 | Status: SHIPPED | OUTPATIENT
Start: 2024-08-19 | End: 2024-08-26

## 2024-08-19 NOTE — PROGRESS NOTES
How Severe Are Your Spot(S)?: mild Subjective:      Patient ID: Florentino Bowman is a 74 y.o. female.    Chief Complaint: Follow-up, Dizziness, Thyroid Problem, Fall, sore to right eye, and Otalgia      Vitals:    08/19/24 1148   BP: 110/86   Pulse: 71   Temp: 98.2 °F (36.8 °C)   TempSrc: Oral   SpO2: 95%   Weight: 54.5 kg (120 lb 0.7 oz)   Height: 5' (1.524 m)        HPI   Check up of below; lost 12 pounds in 6 months documented in chart  HAD Maurisio Pineda, hit her head  2 types of dizziness, spinning and can't walk straight line  May have had LOC    Took paxlovid, half of them  This was one week ago  No ER visit  Lives alone  Had been on a cruise      Problem List  Patient Active Problem List   Diagnosis    Headache    Hypothyroid    Depression    Insomnia    Anxiety    Chronic back pain    Bilateral carotid artery disease    Calcific tendonitis    Left shoulder pain    DDD (degenerative disc disease), lumbar    Change in bowel habit    Screening for colorectal cancer    S/P lumbar laminectomy    Chronic right-sided low back pain with right-sided sciatica    Lumbar radiculopathy    Lumbar radicular pain    Spondylolisthesis    Lumbar spondylosis    Chest pain    Change in bowel habits    Lumbar degenerative disc disease    Wears contact lenses    De Jesus's neuroma of left foot    Acquired posterior equinus of left lower extremity    Bursitis of intermetatarsal bursa of left foot    Muscle weakness (generalized)    Difficulty walking    Current moderate episode of major depressive disorder without prior episode    Parkinson's disease        ALLERGIES:   Review of patient's allergies indicates:   Allergen Reactions    Clindamycin Diarrhea    Topamax [topiramate] Other (See Comments)     itching    Adhesive      Holter monitor pads    Bactrim [sulfamethoxazole-trimethoprim] Rash       MEDS:   Current Outpatient Medications:     ALPRAZolam (XANAX) 0.5 MG tablet, Take 1 tablet (0.5 mg total) by mouth daily as needed for Anxiety. PRN ANXIETY, Disp: 90 tablet,  Rfl: 1    atorvastatin (LIPITOR) 40 MG tablet, Take 1 tablet (40 mg total) by mouth once daily., Disp: 90 tablet, Rfl: 3    carbidopa-levodopa  mg (SINEMET)  mg per tablet, Take 1 tablet by mouth 3 (three) times daily., Disp: 270 tablet, Rfl: 3    DULoxetine (CYMBALTA) 60 MG capsule, Take 1 capsule (60 mg total) by mouth every morning., Disp: 90 capsule, Rfl: 3    estradioL (ESTRACE) 0.5 MG tablet, Take 1 tablet (0.5 mg total) by mouth every evening., Disp: 90 tablet, Rfl: 3    levothyroxine (SYNTHROID) 112 MCG tablet, Take 1 tablet (112 mcg total) by mouth once daily. For thyroid, Disp: 30 tablet, Rfl: 11    rasagiline (AZILECT) 0.5 MG Tab, TAKE 1 TABLET BY MOUTH ONCE DAILY., Disp: 90 tablet, Rfl: 1    cephALEXin (KEFLEX) 500 MG capsule, Take 1 capsule (500 mg total) by mouth every 12 (twelve) hours. for 7 days, Disp: 14 capsule, Rfl: 0    meclizine (ANTIVERT) 25 mg tablet, Take 1 tablet (25 mg total) by mouth 3 (three) times daily as needed for Dizziness., Disp: 21 tablet, Rfl: 0    rimegepant (NURTEC) 75 mg odt, Place ODT tablet on the tongue PRN migraine; alternatively the ODT tablet may be placed under the tongue, Disp: 8 tablet, Rfl: 5    Current Facility-Administered Medications:     onabotulinumtoxina injection 200 Units, 200 Units, Intramuscular, Q90 Days,     Facility-Administered Medications Ordered in Other Visits:     0.9%  NaCl infusion, , Intravenous, Continuous, Tony Castle MD, New Bag at 09/30/22 0822    0.9%  NaCl infusion, , Intravenous, Continuous, Tony Castle MD    sodium chloride 0.9% flush 10 mL, 10 mL, Intravenous, PRN, Tony Castle MD    sodium chloride 0.9% flush 10 mL, 10 mL, Intravenous, PRN, Tony Castle MD      History:  Current Providers as of 8/19/2024  PCP: Fam Chou MD  Care Team Provider: Karley Kumar MD  Care Team Provider: Carla Mendoza MD  Care Team Provider: Shanika Arellano MD  Care Team Provider: Darrian Duran,  What Type Of Note Output Would You Prefer (Optional)?: Standard Output MD  Care Team Provider: Florian Freitas MD  Encounter Provider: Fam Chou MD, starting on Mon Aug 19, 2024 12:00 AM  Referring Provider: not found, starting on Mon Aug 19, 2024 12:00 AM  Consulting Physician: Fam Chou MD, starting on Mon Aug 19, 2024 11:43 AM (Active)   Past Medical History:   Diagnosis Date    Allergy     Arthritis     Carotid stenosis, asymptomatic     50%    Headache(784.0)     Hypothyroid     Movement disorder     Neuropathy      Past Surgical History:   Procedure Laterality Date    BREAST BIOPSY Right     CATARACT EXTRACTION      CAUDAL EPIDURAL STEROID INJECTION N/A 2022    Procedure: INJECTION, STEROID, SPINE, EPIDURAL, CAUDAL;  Surgeon: Tony Castle MD;  Location: [facility] OR;  Service: Pain Management;  Laterality: N/A;     SECTION      x2    COLONOSCOPY N/A 2017    Procedure: COLONOSCOPY Miralax;  Surgeon: Quentin Mercado Jr., MD;  Location: [facility];  Service: Endoscopy;  Laterality: N/A;    COLONOSCOPY N/A 2021    Procedure: COLONOSCOPY;  Surgeon: Andrew Parekh MD;  Location: [facility];  Service: Endoscopy;  Laterality: N/A;    ESOPHAGOGASTRODUODENOSCOPY N/A 2021    Procedure: EGD (ESOPHAGOGASTRODUODENOSCOPY);  Surgeon: Andrew Parekh MD;  Location: [facility];  Service: Endoscopy;  Laterality: N/A;    EYE SURGERY      FLUOROSCOPY  2023    Procedure: FLUOROSCOPY;  Surgeon: Darrian Duran MD;  Location: [facility] OR;  Service: Pain Management;;    HYSTERECTOMY      INJECTION OF ANESTHETIC AGENT AROUND MEDIAL BRANCH NERVES INNERVATING LUMBAR FACET JOINT Right 2019    Procedure: BLOCK, NERVE, FACET JOINT, LUMBAR, MEDIAL BRANCH ;  Surgeon: Sixto Moya III, MD;  Location: [facility] OR;  Service: Pain Management;  Laterality: Right;  - L4/5 & L5/S1  PATIENT NEEDS TO BE THE FIRST PATIENT OF THE DAY, PER DR. MOYA!!!    INSERTION, NEUROSTIMULATOR, SPINAL CORD N/A 2023    Procedure: INSERTION, SPINAL  What Is The Reason For Today's Visit?: Full Body Skin Examination with No Concerns CORD STIMULATOR IMPLANT BOSTON REP;  Surgeon: Darrian Duran MD;  Location: [facility] OR;  Service: Pain Management;  Laterality: N/A;    LAPAROSCOPIC HYSTERECTOMY  2010    supracervical/BSO    OOPHORECTOMY      SPINE SURGERY      TONSILLECTOMY      TRANSFORAMINAL EPIDURAL INJECTION OF STEROID Right 06/05/2019    Procedure: INJECTION, STEROID, EPIDURAL, TRANSFORAMINAL APPROACH;  Surgeon: Sixto Moya III, MD;  Location: [facility] OR;  Service: Pain Management;  Laterality: Right;  -right TFESI L4/5    TRANSFORAMINAL EPIDURAL INJECTION OF STEROID Bilateral 02/19/2021    Procedure: INJECTION, STEROID, EPIDURAL, TRANSFORAMINAL APPROACH;  Surgeon: Sixto Moya III, MD;  Location: [facility] OR;  Service: Pain Management;  Laterality: Bilateral;  L5    TRANSFORAMINAL EPIDURAL INJECTION OF STEROID Bilateral 09/30/2022    Procedure: INJECTION, STEROID, EPIDURAL, TRANSFORAMINAL APPROACH;  Surgeon: Tony Castle MD;  Location: [facility] OR;  Service: Pain Management;  Laterality: Bilateral;    TRIAL OF SPINAL CORD NERVE STIMULATOR N/A 02/24/2023    Procedure: SPINAL CORD STIMULATOR TRIAL BOSTON REP *ESHRAGHI ONLY*;  Surgeon: Darrian Duran MD;  Location: [facility] PAIN MGT;  Service: Pain Management;  Laterality: N/A;    TUBAL LIGATION       Social History     Tobacco Use    Smoking status: Never     Passive exposure: Never    Smokeless tobacco: Never   Substance Use Topics    Alcohol use: Not Currently     Alcohol/week: 4.0 standard drinks of alcohol     Types: 4 Glasses of wine per week     Comment: 3 glasses weekly    Drug use: No         Review of Systems   Constitutional: Negative.    HENT: Negative.     Respiratory: Negative.     Cardiovascular: Negative.    Gastrointestinal: Negative.    Endocrine: Negative.    Genitourinary: Negative.    Musculoskeletal: Negative.    Neurological:  Positive for dizziness.        Fell due to dizziness, big dizziness   Psychiatric/Behavioral: Negative.     All other systems  What Is The Reason For Today's Visit? (Being Monitored For X): the risk of recurrence of previously treated lesion(s) reviewed and are negative.    Objective:     Physical Exam  Vitals and nursing note reviewed.   Constitutional:       Appearance: She is well-developed.   HENT:      Head: Normocephalic.   Eyes:      Conjunctiva/sclera: Conjunctivae normal.      Pupils: Pupils are equal, round, and reactive to light.   Cardiovascular:      Rate and Rhythm: Normal rate and regular rhythm.      Heart sounds: Normal heart sounds.   Pulmonary:      Effort: Pulmonary effort is normal.      Breath sounds: Normal breath sounds.   Musculoskeletal:         General: Normal range of motion.      Cervical back: Normal range of motion and neck supple.   Skin:     General: Skin is warm and dry.   Neurological:      Mental Status: She is alert and oriented to person, place, and time.      Deep Tendon Reflexes: Reflexes are normal and symmetric.   Psychiatric:         Behavior: Behavior normal.         Thought Content: Thought content normal.         Judgment: Judgment normal.             Assessment:     1. Ataxia, unspecified    2. Traumatic injury of head, initial encounter    3. Cellulitis of right pinna    4. Hypothyroidism due to acquired atrophy of thyroid      Plan:        Medication List            Accurate as of August 19, 2024 11:59 PM. If you have any questions, ask your nurse or doctor.                START taking these medications      cephALEXin 500 MG capsule  Commonly known as: KEFLEX  Take 1 capsule (500 mg total) by mouth every 12 (twelve) hours. for 7 days  Started by: Fam Chou MD            CONTINUE taking these medications      ALPRAZolam 0.5 MG tablet  Commonly known as: XANAX  Take 1 tablet (0.5 mg total) by mouth daily as needed for Anxiety. PRN ANXIETY     atorvastatin 40 MG tablet  Commonly known as: LIPITOR  Take 1 tablet (40 mg total) by mouth once daily.     carbidopa-levodopa  mg  mg per tablet  Commonly known as: SINEMET  Take 1 tablet by mouth 3 (three) times daily.     DULoxetine 60 MG  capsule  Commonly known as: CYMBALTA  Take 1 capsule (60 mg total) by mouth every morning.     estradioL 0.5 MG tablet  Commonly known as: ESTRACE  Take 1 tablet (0.5 mg total) by mouth every evening.     levothyroxine 112 MCG tablet  Commonly known as: SYNTHROID  Take 1 tablet (112 mcg total) by mouth once daily. For thyroid     rasagiline 0.5 MG Tab  Commonly known as: AZILECT  TAKE 1 TABLET BY MOUTH ONCE DAILY.               Where to Get Your Medications        These medications were sent to [facility]/pharmacy #[id number] - [place] - [address] [facility]  [address] [place] [address]      Phone: [phone]   cephALEXin 500 MG capsule       Ataxia, unspecified  -     CT Head Without Contrast; Future; Expected date: 08/19/2024    Traumatic injury of head, initial encounter    Cellulitis of right pinna    Hypothyroidism due to acquired atrophy of thyroid  -     Ambulatory referral/consult to Endocrinology; Future; Expected date: 08/26/2024    Other orders  -     cephALEXin (KEFLEX) 500 MG capsule; Take 1 capsule (500 mg total) by mouth every 12 (twelve) hours. for 7 days  Dispense: 14 capsule; Refill: 0

## 2024-08-21 ENCOUNTER — OFFICE VISIT (OUTPATIENT)
Dept: NEUROLOGY | Facility: CLINIC | Age: 74
End: 2024-08-21
Payer: MEDICARE

## 2024-08-21 ENCOUNTER — PATIENT MESSAGE (OUTPATIENT)
Dept: PSYCHIATRY | Facility: CLINIC | Age: 74
End: 2024-08-21
Payer: MEDICARE

## 2024-08-21 VITALS
HEIGHT: 60 IN | HEART RATE: 77 BPM | BODY MASS INDEX: 23.36 KG/M2 | SYSTOLIC BLOOD PRESSURE: 122 MMHG | DIASTOLIC BLOOD PRESSURE: 70 MMHG | WEIGHT: 119 LBS

## 2024-08-21 DIAGNOSIS — G43.709 CHRONIC MIGRAINE W/O AURA W/O STATUS MIGRAINOSUS, NOT INTRACTABLE: Primary | ICD-10-CM

## 2024-08-21 DIAGNOSIS — I95.1 ORTHOSTASIS: ICD-10-CM

## 2024-08-21 DIAGNOSIS — R42 VERTIGO: ICD-10-CM

## 2024-08-21 PROCEDURE — 99999 PR PBB SHADOW E&M-EST. PATIENT-LVL III: CPT | Mod: PBBFAC,,, | Performed by: PSYCHIATRY & NEUROLOGY

## 2024-08-21 PROCEDURE — 99214 OFFICE O/P EST MOD 30 MIN: CPT | Mod: S$PBB,,, | Performed by: PSYCHIATRY & NEUROLOGY

## 2024-08-21 PROCEDURE — 99213 OFFICE O/P EST LOW 20 MIN: CPT | Mod: PBBFAC,PN | Performed by: PSYCHIATRY & NEUROLOGY

## 2024-08-21 RX ORDER — RIMEGEPANT SULFATE 75 MG/75MG
TABLET, ORALLY DISINTEGRATING ORAL
Qty: 8 TABLET | Refills: 5 | Status: SHIPPED | OUTPATIENT
Start: 2024-08-21

## 2024-08-21 RX ORDER — MECLIZINE HYDROCHLORIDE 25 MG/1
25 TABLET ORAL 3 TIMES DAILY PRN
Qty: 21 TABLET | Refills: 0 | Status: SHIPPED | OUTPATIENT
Start: 2024-08-21

## 2024-08-21 NOTE — PROGRESS NOTES
Subjective:       Patient ID: Florentino Bowman is a 74 y.o. female.    Chief Complaint: Headache      Here for f/up of [facility]. No better. Did not tolerate TPM ( itching of hands and feet)  Had COVID   Fell 8-11-24; got dizzy, fell and hit her head. No LOC. Later that day developed fever and did + for COVID. Started paxlovid the next day. No ER visit.  Still has a fair amount of dizziness. Denies any prior problem with dizziness and in fact had been on a cruise ship just days earlier, ie without difficulty.      Migraines --'forever', since first grade  Uses an ice pack every night.   No aura  No N/V ( did in the past)  Pain usually bitemporal.  OTC meds---rarely uses an advil          MRI Brain W WO Contrast  Order: [id number]  Status: Final result       Visible to patient: Yes (seen)       Next appt: 09/06/2024 at 11:00 AM in Palliative Medicine (Ivana Deluna MD)       Dx: Chronic daily headache    0 Result Notes       1 Patient Communication  Details      Reading Physician Reading Date Result Priority  Catalino Hernandez MD  [phone] 4/9/2024 Routine    Narrative & Impression  EXAMINATION:  MRI BRAIN W WO CONTRAST     CLINICAL HISTORY:  Headache, new or worsening (Age >= 50y); Headache, unspecified     TECHNIQUE:  Multiplanar multisequence MR imaging of the brain was performed before and after the administration of 7 mL Gadavist intravenous contrast.     COMPARISON:  06/08/2021     FINDINGS:  Ventricles are stable in size.  Modest generalized cerebral volume loss, without evidence of hydrocephalus.     The brain parenchyma appears within normal limits.  No evidence of recent or remote major vascular distribution infarct. No recent or remote hemorrhage. No mass effect or midline shift.     No abnormal parenchymal or leptomeningeal enhancement.     No extra-axial blood or fluid collections.     The T2 skull base flow voids are preserved.     Bone marrow signal intensity is unremarkable.     Impression:      Modest generalized cerebral volume loss, similar prior.     No evidence of acute intracranial pathology.        Electronically signed by:Catalino Hernandez MD  Date:                                            2024  Time:                                           11:57        Exam Ended: 24 11:42 CDT              Past Medical History:   Diagnosis Date    Allergy     Arthritis     Carotid stenosis, asymptomatic     50%    Headache(784.0)     Hypothyroid     Movement disorder     Neuropathy       Past Surgical History:   Procedure Laterality Date    BREAST BIOPSY Right     CATARACT EXTRACTION      CAUDAL EPIDURAL STEROID INJECTION N/A 2022    Procedure: INJECTION, STEROID, SPINE, EPIDURAL, CAUDAL;  Surgeon: Tony Castle MD;  Location: [facility] OR;  Service: Pain Management;  Laterality: N/A;     SECTION      x2    COLONOSCOPY N/A 2017    Procedure: COLONOSCOPY Miralax;  Surgeon: Quentin Mercado Jr., MD;  Location: [facility];  Service: Endoscopy;  Laterality: N/A;    COLONOSCOPY N/A 2021    Procedure: COLONOSCOPY;  Surgeon: Andrew Parekh MD;  Location: [facility];  Service: Endoscopy;  Laterality: N/A;    ESOPHAGOGASTRODUODENOSCOPY N/A 2021    Procedure: EGD (ESOPHAGOGASTRODUODENOSCOPY);  Surgeon: Andrew Parekh MD;  Location: [facility];  Service: Endoscopy;  Laterality: N/A;    EYE SURGERY      FLUOROSCOPY  2023    Procedure: FLUOROSCOPY;  Surgeon: Darrian Duran MD;  Location: [facility] OR;  Service: Pain Management;;    HYSTERECTOMY      INJECTION OF ANESTHETIC AGENT AROUND MEDIAL BRANCH NERVES INNERVATING LUMBAR FACET JOINT Right 2019    Procedure: BLOCK, NERVE, FACET JOINT, LUMBAR, MEDIAL BRANCH ;  Surgeon: Sixto Moya III, MD;  Location: [facility] OR;  Service: Pain Management;  Laterality: Right;  - L4/5 & L5/S1  PATIENT NEEDS TO BE THE FIRST PATIENT OF THE DAY, PER DR. MOYA!!!    INSERTION, NEUROSTIMULATOR, SPINAL CORD N/A  03/20/2023    Procedure: INSERTION, SPINAL CORD STIMULATOR IMPLANT BOSTON REP;  Surgeon: Darrian Duran MD;  Location: [facility] OR;  Service: Pain Management;  Laterality: N/A;    LAPAROSCOPIC HYSTERECTOMY  2010    supracervical/BSO    OOPHORECTOMY      SPINE SURGERY      TONSILLECTOMY      TRANSFORAMINAL EPIDURAL INJECTION OF STEROID Right 06/05/2019    Procedure: INJECTION, STEROID, EPIDURAL, TRANSFORAMINAL APPROACH;  Surgeon: Sixto Moya III, MD;  Location: [facility] OR;  Service: Pain Management;  Laterality: Right;  -right TFESI L4/5    TRANSFORAMINAL EPIDURAL INJECTION OF STEROID Bilateral 02/19/2021    Procedure: INJECTION, STEROID, EPIDURAL, TRANSFORAMINAL APPROACH;  Surgeon: Sixto Moya III, MD;  Location: [facility] OR;  Service: Pain Management;  Laterality: Bilateral;  L5    TRANSFORAMINAL EPIDURAL INJECTION OF STEROID Bilateral 09/30/2022    Procedure: INJECTION, STEROID, EPIDURAL, TRANSFORAMINAL APPROACH;  Surgeon: Tony Castle MD;  Location: [facility] OR;  Service: Pain Management;  Laterality: Bilateral;    TRIAL OF SPINAL CORD NERVE STIMULATOR N/A 02/24/2023    Procedure: SPINAL CORD STIMULATOR TRIAL BOSTON REP *ESHRAGHI ONLY*;  Surgeon: Darrian Duran MD;  Location: [facility] PAIN MGT;  Service: Pain Management;  Laterality: N/A;    TUBAL LIGATION          Current Outpatient Medications:     ALPRAZolam (XANAX) 0.5 MG tablet, Take 1 tablet (0.5 mg total) by mouth daily as needed for Anxiety. PRN ANXIETY, Disp: 90 tablet, Rfl: 1    atorvastatin (LIPITOR) 40 MG tablet, Take 1 tablet (40 mg total) by mouth once daily., Disp: 90 tablet, Rfl: 3    carbidopa-levodopa  mg (SINEMET)  mg per tablet, Take 1 tablet by mouth 3 (three) times daily., Disp: 270 tablet, Rfl: 3    cephALEXin (KEFLEX) 500 MG capsule, Take 1 capsule (500 mg total) by mouth every 12 (twelve) hours. for 7 days, Disp: 14 capsule, Rfl: 0    estradioL (ESTRACE) 0.5 MG tablet, Take 1 tablet (0.5 mg total) by mouth every  evening., Disp: 90 tablet, Rfl: 3    levothyroxine (SYNTHROID) 112 MCG tablet, Take 1 tablet (112 mcg total) by mouth once daily. For thyroid, Disp: 30 tablet, Rfl: 11    rasagiline (AZILECT) 0.5 MG Tab, TAKE 1 TABLET BY MOUTH ONCE DAILY., Disp: 90 tablet, Rfl: 1    DULoxetine (CYMBALTA) 60 MG capsule, Take 1 capsule (60 mg total) by mouth every morning., Disp: 90 capsule, Rfl: 3    meclizine (ANTIVERT) 25 mg tablet, Take 1 tablet (25 mg total) by mouth 3 (three) times daily as needed for Dizziness., Disp: 21 tablet, Rfl: 0    rimegepant (NURTEC) 75 mg odt, Place ODT tablet on the tongue PRN migraine; alternatively the ODT tablet may be placed under the tongue, Disp: 8 tablet, Rfl: 5    Current Facility-Administered Medications:     [START ON 8/22/2024] onabotulinumtoxina injection 200 Units, 200 Units, Intramuscular, Q90 Days,     Facility-Administered Medications Ordered in Other Visits:     0.9%  NaCl infusion, , Intravenous, Continuous, Tony Castle MD, New Bag at 09/30/22 0822    0.9%  NaCl infusion, , Intravenous, Continuous, Tony Castle MD    sodium chloride 0.9% flush 10 mL, 10 mL, Intravenous, PRN, Tony aCstle MD    sodium chloride 0.9% flush 10 mL, 10 mL, Intravenous, PRN, Tony Castle MD   Review of patient's allergies indicates:   Allergen Reactions    Clindamycin Diarrhea    Topamax [topiramate] Other (See Comments)     itching    Adhesive      Holter monitor pads    Bactrim [sulfamethoxazole-trimethoprim] Rash        Review of Systems        Objective:      Physical Exam  Neurological:      Mental Status: She is alert.      Cranial Nerves: No dysarthria.      Gait: Gait abnormal (a little slower than usual but dmits is being cautious bc feels dizzy).   Psychiatric:         Thought Content: Thought content normal.           Assessment:       1. Chronic migraine w/o aura w/o status migrainosus, not intractable    2. Vertigo    3. Orthostasis        Plan:               Chronic migraine, unchanged  Failed amitriptyline and TPM was not tolerated   Plan botox  Trial nurtec ( ubrelvy not covered)            Carla Mendoza MD   08/21/2024   8:57 AM

## 2024-08-24 ENCOUNTER — PATIENT MESSAGE (OUTPATIENT)
Dept: NEUROLOGY | Facility: CLINIC | Age: 74
End: 2024-08-24
Payer: MEDICARE

## 2024-08-24 ENCOUNTER — PATIENT MESSAGE (OUTPATIENT)
Dept: FAMILY MEDICINE | Facility: CLINIC | Age: 74
End: 2024-08-24
Payer: MEDICARE

## 2024-08-26 ENCOUNTER — TELEPHONE (OUTPATIENT)
Dept: FAMILY MEDICINE | Facility: CLINIC | Age: 74
End: 2024-08-26
Payer: MEDICARE

## 2024-08-26 DIAGNOSIS — M94.9 DISORDER OF CARTILAGE, UNSPECIFIED: ICD-10-CM

## 2024-08-26 DIAGNOSIS — R27.0 ATAXIA, UNSPECIFIED: Primary | ICD-10-CM

## 2024-08-26 RX ORDER — DULOXETIN HYDROCHLORIDE 30 MG/1
30 CAPSULE, DELAYED RELEASE ORAL DAILY
Qty: 90 CAPSULE | Refills: 3 | Status: SHIPPED | OUTPATIENT
Start: 2024-08-26 | End: 2025-08-26

## 2024-08-27 PROBLEM — Z74.09 IMPAIRED FUNCTIONAL MOBILITY AND ACTIVITY TOLERANCE: Status: ACTIVE | Noted: 2024-08-27

## 2024-08-29 ENCOUNTER — PATIENT MESSAGE (OUTPATIENT)
Dept: RHEUMATOLOGY | Facility: CLINIC | Age: 74
End: 2024-08-29
Payer: MEDICARE

## 2024-08-30 ENCOUNTER — TELEPHONE (OUTPATIENT)
Dept: FAMILY MEDICINE | Facility: CLINIC | Age: 74
End: 2024-08-30
Payer: MEDICARE

## 2024-08-30 DIAGNOSIS — G20.A1 PARKINSON'S DISEASE WITHOUT DYSKINESIA OR FLUCTUATING MANIFESTATIONS: ICD-10-CM

## 2024-08-30 RX ORDER — CEPHALEXIN 500 MG/1
500 CAPSULE ORAL EVERY 12 HOURS
Qty: 14 CAPSULE | Refills: 0 | Status: SHIPPED | OUTPATIENT
Start: 2024-08-30 | End: 2024-09-06

## 2024-08-30 RX ORDER — CARBIDOPA AND LEVODOPA 25; 100 MG/1; MG/1
1 TABLET ORAL 2 TIMES DAILY
Start: 2024-08-30

## 2024-09-03 ENCOUNTER — TELEPHONE (OUTPATIENT)
Dept: PALLIATIVE MEDICINE | Facility: CLINIC | Age: 74
End: 2024-09-03
Payer: MEDICARE

## 2024-09-06 ENCOUNTER — OFFICE VISIT (OUTPATIENT)
Dept: PALLIATIVE MEDICINE | Facility: CLINIC | Age: 74
End: 2024-09-06
Payer: MEDICARE

## 2024-09-06 VITALS
SYSTOLIC BLOOD PRESSURE: 129 MMHG | OXYGEN SATURATION: 97 % | BODY MASS INDEX: 23.81 KG/M2 | WEIGHT: 121.94 LBS | HEART RATE: 77 BPM | DIASTOLIC BLOOD PRESSURE: 69 MMHG

## 2024-09-06 DIAGNOSIS — K59.09 OTHER CONSTIPATION: Primary | ICD-10-CM

## 2024-09-06 DIAGNOSIS — R42 VERTIGO: ICD-10-CM

## 2024-09-06 DIAGNOSIS — G20.A1 PARKINSON'S DISEASE WITHOUT DYSKINESIA OR FLUCTUATING MANIFESTATIONS: ICD-10-CM

## 2024-09-06 DIAGNOSIS — F41.9 ANXIETY: ICD-10-CM

## 2024-09-06 PROCEDURE — 99213 OFFICE O/P EST LOW 20 MIN: CPT | Mod: PBBFAC | Performed by: STUDENT IN AN ORGANIZED HEALTH CARE EDUCATION/TRAINING PROGRAM

## 2024-09-06 PROCEDURE — 99999 PR PBB SHADOW E&M-EST. PATIENT-LVL III: CPT | Mod: PBBFAC,,, | Performed by: STUDENT IN AN ORGANIZED HEALTH CARE EDUCATION/TRAINING PROGRAM

## 2024-09-06 PROCEDURE — 99497 ADVNCD CARE PLAN 30 MIN: CPT | Mod: PBBFAC | Performed by: STUDENT IN AN ORGANIZED HEALTH CARE EDUCATION/TRAINING PROGRAM

## 2024-09-06 RX ORDER — ACETAMINOPHEN AND CODEINE PHOSPHATE 300; 30 MG/1; MG/1
1 TABLET ORAL EVERY 8 HOURS PRN
COMMUNITY
Start: 2024-08-30

## 2024-09-06 RX ORDER — SENNOSIDES 8.6 MG/1
2 CAPSULE, GELATIN COATED ORAL NIGHTLY
Qty: 30 EACH | Refills: 0 | Status: SHIPPED | OUTPATIENT
Start: 2024-09-06

## 2024-09-06 NOTE — PROGRESS NOTES
Palliative Medicine Clinic Note        Consult Requested By: Laura Delgado      Reason for Consult: Symptom management and ACP in the setting of Parkinson's disease     Chief Complaint:   Chief Complaint   Patient presents with    Insomnia           ASSESSMENT/PLAN:      Plan/Recommendations:    Florentino was seen today for insomnia.    Diagnoses and all orders for this visit:      Parkinson's disease without dyskinesia or fluctuating manifestations  -     Ambulatory referral/consult to [facility] Palliative Care  - Evaluated current Parkinson's management, noting early stage with mild symptoms primarily affecting left side.  - Discussed long-term nature of Parkinson's disease  - Emphasized importance of exercise and therapy in maintaining independence and potentially slowing Parkinson's progression.  - Patient to continue current exercise regimen, including senior gym classes and beginner yoga.  - carbidopa/levodopa from 3 times daily to 2 times daily.  - Decreased rasagiline.  - Referred to multi-disciplinary Parkinson's Clinic for baseline assessments (physical therapy, occupational therapy, speech therapy).  -referred to [facility] for support   -Plan for Botox injections for migraine      Vertigo  -     Ambulatory referral/consult to Physical/Occupational Therapy; Future - Vestibular therapy   - Assessed recent falls and dizziness, likely related to vertigo rather than Parkinson's progression.  - Patient to be cautious with positional changes to minimize risk of falls due to vertigo.  - Patient to resume physical therapy exercises for vertigo once recovered from recent fall.  - Continue Unisom for sleep, with potential discontinuation in 2 weeks if vertigo persists.  - Plan to refer for vestibular therapy once appropriate referral process is determined.  - Follow up in 2 months to reassess vertigo symptoms and medication management.        Other constipation  -     sennosides (SENNA) 8.6 mg Cap; Take 2 tablets by  mouth every evening.          Advance Care Planning   Advance Directives:   Living Will: No    LaPOST: No    Do Not Resuscitate Status: No    Medical Power of : Yes    Agent's Name:  Betty Will   Agent's Contact Number:      Decision Making:  Patient answered questions  Goals of Care: The patient endorses that what is most important right now is to focus on spending time at home and remaining as independent as possible  Accordingly, we have decided that the best plan to meet the patient's goals includes continuing with treatment      Date: 09/06/2024    I initiated the process of voluntary advance care planning today and explained the importance of this process to the patient.  I introduced the concept of advance directives to the patient, as well. Then the patient received detailed information about the importance of designating a Health Care Power of  (HCPOA). The patient has not previously appointed a HCPOA. After our discussion, the patient has decided to complete a HCPOA and has appointed her daughter, health care agent: Betty Peters & health care agent number: . I encouraged her to communicate with this person about their wishes for future healthcare, should she become sick and lose decision-making capacity. The patient expresses hope that she does not have Parkinson's disease. She is seeking to understand her future care options and maintain independence. The patient has a healthcare power of  document in place, naming her daughter, Betty Peters, as the primary decision-maker, with her son Jesus as the secondary choice. She lives alone in her home of 45 years and wants to stay there as long as possible. The patient's daughter, who is a nurse, lives in [place] and calls daily. Her son lives in the same town. The patient understands that moving closer to family might be necessary in the future for better care coordination, but is not ready for that  step yet. The patient has experience with end-of-life decisions, having made the choice to discontinue life support for her late  based on his wishes. She expresses a similar preference for herself, however she needs to think about her wishes and share them with her POA. Porovided ACP booklet         A total of 16 min was spent on advance care planning, goals of care discussion, emotional support, formulating and communicating prognosis and exploring burden/benefit of various approaches of treatment. This discussion occurred on a fully voluntary basis with the verbal consent of the patient and/or family.         Follow up: 3m      Plan discussed with: Neurology        SUBJECTIVE:      History of Present Illness / Interval History:  Florentino Bowman is 74 y.o. female with PD.  Presents to Palliative Care Clinic for advance care planning, and additional support.. Please see neuro note for more details on PD      9/6/24  History obtained from: patient   The patient, diagnosed with Parkinson's disease three years ago, reports experiencing dizziness and two recent falls. The first fall occurred after returning from a cruise around November 12th, resulting in a hard impact to the back of her head on a tile floor. The second fall happened last Friday, causing a fractured nose. She describes the dizziness as room-spinning episodes, typically triggered by postural changes such as standing up quickly. These symptoms began during or shortly after having COVID-19 2-3 weeks ago. Her arm swing has improved. She is currently taking carbidopa/levodopa (reduced from three to two doses daily) and has decreased her rasagiline dosage. The patient reports occasional constipation and some short-term memory issues. She also mentions experiencing migraines, for which she was previously taking sumatriptan but has stopped due to carotid blockage concerns. The patient expresses anxiety related to managing household responsibilities alone  since her 's death three years ago and uncertainty about her future care needs. She denies any progression of Parkinson's symptoms beyond the initial presentation.      ROS:  Review of Systems   Neurological:  Positive for vertigo.       Review of Symptoms      Symptom Assessment (ESAS 0-10 Scale)  Pain:  0  Dyspnea:  0  Anxiety:  4  Nausea:  0  Depression:  4  Anorexia:  4  Fatigue:  4  Insomnia:  5  Restlessness:  0  Agitation:  0     CAM / Delirium:  Negative  Constipation:  Positive  Diarrhea:  Negative      Performance Status:  90    Living Arrangements:  Lives alone    Psychosocial/Cultural:   See Palliative Psychosocial Note: Yes  She lives alone in her home of 45 years. She is  and has a daughter and a son who live out of town   **Primary  to Follow**  Palliative Care  Consult: Yes    Spiritual:  F - Raquel and Belief:  Hindu      Medications:    Current Outpatient Medications:     acetaminophen-codeine 300-30mg (TYLENOL #3) 300-30 mg Tab, Take 1 tablet by mouth every 8 (eight) hours as needed., Disp: , Rfl:     atorvastatin (LIPITOR) 40 MG tablet, Take 1 tablet (40 mg total) by mouth once daily., Disp: 90 tablet, Rfl: 3    carbidopa-levodopa  mg (SINEMET)  mg per tablet, Take 1 tablet by mouth 2 (two) times a day., Disp: , Rfl:     DULoxetine (CYMBALTA) 30 MG capsule, Take 1 capsule (30 mg total) by mouth once daily., Disp: 90 capsule, Rfl: 3    estradioL (ESTRACE) 0.5 MG tablet, Take 1 tablet (0.5 mg total) by mouth every evening., Disp: 90 tablet, Rfl: 3    levothyroxine (SYNTHROID) 112 MCG tablet, Take 1 tablet (112 mcg total) by mouth once daily. For thyroid, Disp: 30 tablet, Rfl: 11    sennosides (SENNA) 8.6 mg Cap, Take 2 tablets by mouth every evening., Disp: 30 each, Rfl: 0    Current Facility-Administered Medications:     [START ON 9/14/2024] onabotulinumtoxina injection 200 Units, 200 Units, Intramuscular, Q90 Days,     Facility-Administered  Medications Ordered in Other Visits:     0.9%  NaCl infusion, , Intravenous, Continuous, Tony Castle MD, New Bag at 09/30/22 0822    0.9%  NaCl infusion, , Intravenous, Continuous, Tony Castle MD    sodium chloride 0.9% flush 10 mL, 10 mL, Intravenous, PRN, Tony Castle MD    sodium chloride 0.9% flush 10 mL, 10 mL, Intravenous, PRN, Tony Castle MD      External  database queried on 09/13/2024  by Ivana CORDOVA :  8/30/2024 08/30/2024 1 Acetaminophen-Cod #3 Tablet 12.00 4 Ta Felipe [id number] Pam ([id number]) 0 13.50 MME Medicare [place]   05/13/2024 01/09/2024 1 Alprazolam 0.5 Mg Tablet 90.00 90 Co Audrey [id number] Pam ([id number]) 0 1.00 LME [place]   02/16/2024 02/15/2024 1 Alprazolam 0.5 Mg Tablet 90.00 90 Co Audrey [id number] Pam ([id number]) 0 1.00 LME [place]         Review of patient's allergies indicates:   Allergen Reactions    Clindamycin Diarrhea    Topamax [topiramate] Other (See Comments)     itching    Adhesive      Holter monitor pads    Bactrim [sulfamethoxazole-trimethoprim] Rash           OBJECTIVE:         Physical Exam:  Vitals: Pulse: 77 (09/06/24 1101)  BP: 129/69 (09/06/24 1101)  SpO2: 97 % (09/06/24 1101)    Physical Exam  Constitutional:       General: She is not in acute distress.  HENT:      Head: Atraumatic.   Pulmonary:      Effort: Pulmonary effort is normal. No respiratory distress.   Musculoskeletal:      Comments: Normal gait   Skin:     Findings: Bruising (face) present.   Neurological:      Mental Status: She is alert and oriented to person, place, and time.   Psychiatric:         Mood and Affect: Mood and affect normal.           Labs:TSG now normal 0.74      Imaging:   MRI Brain 4/2024: Modest generalized cerebral volume loss, similar prior.   CT Brain: Age appropriate CT appearance of the head with no acute intracranial abnormality identified.                Ivana Deluna MD

## 2024-09-06 NOTE — PROGRESS NOTES
"  Advance Care Planning   [facility]  Palliative Care   Psychosocial Assessment    Patient Name: Florentino Bowman  MRN: [id number]  Palliative Care Provider: Ivana Deluna MD   Primary Care Physician: Fam Chou MD  Principal Problem: Parkinson's Disease     Reason for Referral:  Advanced Care Planning and Psychosocial Support       Present during Interview: patient and friend .      Primary Language:English   Needed: no      Past Medical Situation:   PMH:   Past Medical History:   Diagnosis Date    Allergy     Arthritis     Carotid stenosis, asymptomatic     50%    Headache(784.0)     Hypothyroid     Movement disorder     Neuropathy      Mental Health/Substance Use History: Patient endorses experiencing difficulty coping with the loss of her spouse in 2021  Risk of Abuse, neglect or exploitation: none identified   Current or Previous Trauma and/or evidence of PTSD: Patient describes the loss of her spouse as "traumatic"   Non-traditional Health practices: none identified     Understanding of diagnosis and prognosis: Limited   Experience/Comfort level with health care system: Fair     Patients Mental Status: Alert and oriented to person, place, time and situation with stable mood     Socio-Economic Factors/Resources:  Address: [address] [place] [address]  Phone Number: [phone] (home)     Marital Status:    Household composition: Alone   Children: One daughter (residing in [place]) and one son (residing in [place])    Patient/Family perceptions about Caregiving Needs; availability and capacity: Patient independent with care needs.  Patient reports she has various options including moving into her daughter's home or using personal funds for in-home care.     Family Dynamics/Relationships: Healthy     Patient/Family Strengths/Resilience: Patient recognizes the importance of planning for her future care needs.   Patient/Family Coping: Fair.  Patient " "acknowledges having difficulty coping with the loss of her spouse. Patient amenable to psychotherapy through [facility].     Activities of Daily Living: Independent   Support Systems-Family & Community (Home Health, HME etc): n/a    Transportation:  yes    Work/Education History: retired  Self-Care Activities/Hobbies: Exercise, gardening, caring for new puppy,      History: no    Financial Resources:Medicare      Advanced Care Planning & Legal Concerns:   Advanced Directives/Living Will: no  LaPOST/POLST: no   Planning:  no    Power of : yes  Surrogate Decision Maker: Betty Bowman        Spirituality, Culture & Coping Mechanisms:  F- Raquel and Belief: Pentecostal     I - Importance: High     C - Community/Culture Values: Patient highly involved in activities held by [employer]      A - Address in Care: Needs met at this time       Goals/Hopes/Expectations: Patient hopes she "doesn't have" Parkinson's and if so wishes to plan for the future.   Fears/Anxiety/Concerns: Patient expresses concern regarding where she may stay if she cannot reside alone.         Complicated Bereavement Risk Assessment Tool (CBRAT)  Reference:  [facility] Clinical Practice Group (May 2016). Bereavement Risk Screening and Management Guidelines.  Retrieved from: http://www.[facility].com.au/wp-content/uploads//HTQWR-Nxvyogpssej-Ffjmquwng-and-Management-Guideline-2016.pdf      Bereaved Client Characteristics   Under 18      no  Was a Twin   no  Young Spouse   no  Elderly Spouse    no  Isolated    no  Lacks Meaningful Social Support   no  Dissatisfied with help available during illness   no  New to Financial [place] no  New to Decision-Making   no    Illness  Inherited Disorder   no  Stigmatized Disease in the family/community   no  Lengthy/Burdensome   no     Bereaved Client's History of Loss   Cumulative Multiple Losses   no  Previous Mental Health Illnesses   no  Current " Mental Health Illness   no  Other Significant Health Issues   no   Migrant/Refugee   no Death  Sudden or Unexpected   no  Traumatic Circumstances Associated with Death   no  Significant Cultural/Social Burdens as a result of Death   yes   Relationship with   Profound Lifelong Partner   no  Highly Dependent    no  Antagonistic   no  Ambivalent   no  Deeply Connected   yes  Culturally Defined   yes   Risk Factors Scores  0-2  Low  3-5  Moderate  5+  High  All persons scoring moderate to high presume to be at risk**    (** It is acknowledged that protective factors and resilience may outweigh apparent risk factors.      Total Risk Factors Score:   Low to moderate    Advance Care Planning     Date: 2024    Power of   I initiated the process of voluntary advance care planning today and explained the importance of this process to the patient.  I introduced the concept of advance directives to the patient, as well. Then the patient received detailed information about the importance of designating a Health Care Power of  (HCPOA). She was also instructed to communicate with this person about their wishes for future healthcare, should she become sick and lose decision-making capacity. The patient has not previously appointed a HCPOA. After our discussion, the patient has decided to complete a HCPOA and has appointed her  daughter and son , health care agent: Betty Bowman and Jesus Bowman & health care agent number: [phone](Betty) and [phone] (Jesus) I encouraged her to communicate with this person about their wishes for future healthcare, should she become sick and lose decision-making capacity.      A total of 10 min was spent on advance care planning, goals of care discussion, emotional support, formulating and communicating prognosis and exploring burden/benefit of various approaches of treatment. This discussion occurred on a fully voluntary basis with the verbal consent of the  patient and/or family.         Plan of Care: SW accompanied MD during initial visit with patient and friend.  Patient presents Oriented x4 with stable mood. Patient appears to have a limited understanding of their illness. Patient reports she has been diagnosed with Parkinson's however has difficulty believing this is a valid diagnosis.  Patient notes her hope is she does not have Parkinson's but if so, she wishes to plan ahead for her medical and personal care needs. Patient states she has funds to cover sitters or move in with her daughter who resides in [place]. Patient acknowledge she has experienced difficulty coping with the sudden loss of her spouse to covid in 2021.  Team offered support of [facility] DSW, patient accepts.  HCPOA completed with patient and scanned into [employer].      Patient denies further psychosocial concerns. SW remains available to provide support.    Joslyn Madison, MANGOW-BACS    Palliative Medicine

## 2024-09-13 ENCOUNTER — PATIENT MESSAGE (OUTPATIENT)
Dept: ORTHOPEDICS | Facility: CLINIC | Age: 74
End: 2024-09-13
Payer: MEDICARE

## 2024-09-13 DIAGNOSIS — G43.709 CHRONIC MIGRAINE W/O AURA W/O STATUS MIGRAINOSUS, NOT INTRACTABLE: Primary | ICD-10-CM

## 2024-09-16 ENCOUNTER — TELEPHONE (OUTPATIENT)
Dept: PALLIATIVE MEDICINE | Facility: CLINIC | Age: 74
End: 2024-09-16
Payer: MEDICARE

## 2024-09-23 ENCOUNTER — OFFICE VISIT (OUTPATIENT)
Dept: NEUROLOGY | Facility: CLINIC | Age: 74
End: 2024-09-23
Payer: MEDICARE

## 2024-09-23 ENCOUNTER — PATIENT MESSAGE (OUTPATIENT)
Dept: NEUROLOGY | Facility: CLINIC | Age: 74
End: 2024-09-23

## 2024-09-23 VITALS
HEIGHT: 60 IN | BODY MASS INDEX: 24.13 KG/M2 | SYSTOLIC BLOOD PRESSURE: 127 MMHG | HEART RATE: 90 BPM | WEIGHT: 122.94 LBS | DIASTOLIC BLOOD PRESSURE: 82 MMHG

## 2024-09-23 DIAGNOSIS — G20.A1 PARKINSON'S DISEASE WITHOUT DYSKINESIA OR FLUCTUATING MANIFESTATIONS: ICD-10-CM

## 2024-09-23 PROCEDURE — 99214 OFFICE O/P EST MOD 30 MIN: CPT | Mod: S$PBB,,, | Performed by: PSYCHIATRY & NEUROLOGY

## 2024-09-23 PROCEDURE — 99213 OFFICE O/P EST LOW 20 MIN: CPT | Mod: PBBFAC | Performed by: PSYCHIATRY & NEUROLOGY

## 2024-09-23 PROCEDURE — 99999 PR PBB SHADOW E&M-EST. PATIENT-LVL III: CPT | Mod: PBBFAC,,, | Performed by: PSYCHIATRY & NEUROLOGY

## 2024-09-23 PROCEDURE — G2211 COMPLEX E/M VISIT ADD ON: HCPCS | Mod: S$PBB,,, | Performed by: PSYCHIATRY & NEUROLOGY

## 2024-09-23 RX ORDER — CARBIDOPA AND LEVODOPA 25; 100 MG/1; MG/1
1 TABLET ORAL 3 TIMES DAILY
Qty: 90 TABLET | Refills: 3
Start: 2024-09-23

## 2024-09-23 NOTE — PROGRESS NOTES
Name: Florentino Bowman  MRN: [id number]   CSN: [id number]      Date: 09/23/2024    Referring physician:  No referring provider defined for this encounter.    Chief Complaint: PD     Interval History:  - saw Dr. Mendoza for migraines  - planning botox for that  - feels her PD symptoms are under control  - only issue now is her lower BP and has fallen three times from near-syncope (even broke her nose), worse in the AM near after she wakes up  - has been taking LMNT with sodium and potassium and mag  - has had nice new shows [employer] for cramps and works well!  - has seen Pall care and is pleased  - taking full CD/LD TID plus azilect  - not using stockings, hose, salty foods except element, or HOB elevated      From 6/4/24 RBR:  - taking cd/ld 1 tab TID, 0.5 mg rasagiline   - does not notice much difference   - no falls   - had cataract surgery yesterday   - some dizziness whenever she goes from sitting to standing, worse whenever she is out in the sun/outside in the heat   - has not checked BP at these times   - toe cramping seems to be a little bit better   - did PT   - she was exercising regularly up until she was preparing for cataract surgery      From Feb 2024: Florentino Bowman is a R handed 74 y.o. female with a medical issues significant for PD, lumbar radiculopathy, MDD, who presents for PD. She has been following with Dr. Mendoza. Currently taking cd/ld 1/2 tab TID + rasagiline 0.5 mg daily. She is here for second opinion of PD (does not think she has PD). Back pain for many years, several laminectomies, injections that wasn't very helpful. Thinks she stopped swinging her L arm about 4-5 years ago. Hurt her left shoulder at some point. A little tremor in her L hand, toe curling in her feet. Mostly notices tremor at rest. She was on 1/2 tab TID of  cd/ld 25/100 -- she recently cut back to 1/2 tab BID. Not much difference with slight increase. Rasagiline 0.5 mg daily. No falls. No shuffling. Some trouble rolling over in bed.  Bothered by slowness.       Retired. Math/.     Normally exercises 3 times a week.     Family History: thinks maternal uncle may have had PD     Neuroleptic Exposure: none     Nonmotor/Premotor ROS:  Anosmia: fair (not as good as it used to be)  Dysarthria/Hypophonia: more hoarse / weaker   Dysphagia/Sialorrhea: none   Depression: yes, on cymbalta   Urinary changes: none   Constipation: yes, chronic   Falls: none   Micrographia: not that she knows of   Sleep issues:  -RBD: not that she knows of       Review of Systems:   Review of Systems   Constitutional:  Negative for chills, fever and malaise/fatigue.   HENT:  Negative for hearing loss.    Eyes:  Negative for blurred vision and double vision.   Respiratory:  Negative for cough, shortness of breath and stridor.    Cardiovascular:  Negative for chest pain and leg swelling.   Gastrointestinal:  Positive for constipation. Negative for diarrhea and nausea.   Genitourinary:  Negative for frequency and urgency.   Musculoskeletal:  Negative for falls.   Skin:  Negative for itching and rash.   Neurological:  Positive for tremors. Negative for dizziness, loss of consciousness and weakness.   Psychiatric/Behavioral:  Negative for hallucinations and memory loss.            Past Medical History: The patient  has a past medical history of Allergy, Arthritis, Carotid stenosis, asymptomatic, Headache(784.0), Hypothyroid, Movement disorder, and Neuropathy.    Social History: The patient  reports that she has never smoked. She has never been exposed to tobacco smoke. She has never used smokeless tobacco. She reports that she does not currently use alcohol after a past usage of about 4.0 standard drinks of alcohol per week. She reports that she does not use drugs.    Family History: Their family history includes Arthritis in her mother; Breast cancer in her sister; Cancer in her father and sister; Colon cancer in her father; Dementia in her mother; Depression in her  mother; Hearing loss in her sister; Heart disease in her mother and sister; Hypertension in her mother; No Known Problems in her daughter, sister, and son; Parkinsonism in her maternal uncle; Stroke in her maternal grandmother.    Allergies: Clindamycin, Topamax [topiramate], Adhesive, and Bactrim [sulfamethoxazole-trimethoprim]     Meds:   Current Outpatient Medications on File Prior to Visit   Medication Sig Dispense Refill    acetaminophen-codeine 300-30mg (TYLENOL #3) 300-30 mg Tab Take 1 tablet by mouth every 8 (eight) hours as needed.      atorvastatin (LIPITOR) 40 MG tablet Take 1 tablet (40 mg total) by mouth once daily. 90 tablet 3    carbidopa-levodopa  mg (SINEMET)  mg per tablet Take 1 tablet by mouth 2 (two) times a day.      DULoxetine (CYMBALTA) 30 MG capsule Take 1 capsule (30 mg total) by mouth once daily. 90 capsule 3    estradioL (ESTRACE) 0.5 MG tablet Take 1 tablet (0.5 mg total) by mouth every evening. 90 tablet 3    levothyroxine (SYNTHROID) 112 MCG tablet Take 1 tablet (112 mcg total) by mouth once daily. For thyroid 30 tablet 11    sennosides (SENNA) 8.6 mg Cap Take 2 tablets by mouth every evening. 30 each 0     Current Facility-Administered Medications on File Prior to Visit   Medication Dose Route Frequency Provider Last Rate Last Admin    0.9%  NaCl infusion   Intravenous Continuous Tony Castle MD   New Bag at 09/30/22 0822    0.9%  NaCl infusion   Intravenous Continuous Tony Castle MD        onabotulinumtoxina injection 200 Units  200 Units Intramuscular Q90 Days         sodium chloride 0.9% flush 10 mL  10 mL Intravenous PRN Tony Castle MD        sodium chloride 0.9% flush 10 mL  10 mL Intravenous PRN oTny Castle MD           Exam:  /82 (BP Location: Left arm, Patient Position: Sitting, BP Method: Medium (Automatic))   Pulse 90   Ht 5' (1.524 m)   Wt 55.7 kg (122 lb 14.5 oz)   LMP 01/01/2001   BMI 24.00 kg/m²      Constitutional  Well-developed, well-nourished, appears stated age   Ophthalmoscopic  No papilledema with no hemorrhages or exudates bilaterally   Cardiovascular  Radial pulses 2+ and symmetric, no LE edema bilaterally   Neurological    * Mental status  MOCA =      - Orientation  Oriented to person, place, time, and situation     - Memory   Intact recent and remote     - Attention/concentration  Attentive, vigilant during exam     - Language  Naming & repetition intact, +2-step commands     - Fund of knowledge  Aware of current events     - Executive  Well-organized thoughts     - Other     * Cranial nerves       - CN II  PERRL, visual fields full to confrontation     - CN III, IV, VI  Extraocular movements full, normal pursuits and saccades     - CN V  Sensation V1 - V3 intact     - CN VII  Face strong and symmetric bilaterally     - CN VIII  Hearing intact bilaterally     - CN IX, X  Palate raises midline and symmetric     - CN XI  SCM and trapezius 5/5 bilaterally     - CN XII  Tongue midline   * Motor  Muscle bulk normal, strength 5/5 throughout   * Sensory   Intact to light touch    * Coordination  No dysmetria with finger-to-nose or heel-to-shin   * Gait  See below.   * Deep tendon reflexes  2+ and symmetric throughout   Babinski downgoing bilaterally   * Specialized movement exam  No hypophonic speech.    No facial masking, appropriately animated    L > R cogwheel rigidity.     L > R bradykinesia.   L resting tremor    Dystonic posturing of L hand      LLE dyskinesia with distraction only    No other dystonia, chorea, athetosis, myoclonus, or tics.   No motor impersistence.   Normal-based gait.   No shortened stride length.   Decreased L arm swing    No postural instability.      L head tilt, R tort      Laboratory/Radiological:  - Results:  Lab Visit on 08/27/2024   Component Date Value Ref Range Status    WBC 08/27/2024 6.45  3.90 - 12.70 K/uL Final    RBC 08/27/2024 4.17  4.00 - 5.40 M/uL Final     Hemoglobin 08/27/2024 13.3  12.0 - 16.0 g/dL Final    Hematocrit 08/27/2024 41.3  37.0 - 48.5 % Final    MCV 08/27/2024 99 (H)  82 - 98 fL Final    MCH 08/27/2024 31.9 (H)  27.0 - 31.0 pg Final    MCHC 08/27/2024 32.2  32.0 - 36.0 g/dL Final    RDW 08/27/2024 12.9  11.5 - 14.5 % Final    Platelets 08/27/2024 300  150 - 450 K/uL Final    MPV 08/27/2024 9.4  9.2 - 12.9 fL Final    Immature Granulocytes 08/27/2024 0.2  0.0 - 0.5 % Final    Gran # (ANC) 08/27/2024 4.0  1.8 - 7.7 K/uL Final    Immature Grans (Abs) 08/27/2024 0.01  0.00 - 0.04 K/uL Final    Lymph # 08/27/2024 1.7  1.0 - 4.8 K/uL Final    Mono # 08/27/2024 0.4  0.3 - 1.0 K/uL Final    Eos # 08/27/2024 0.2  0.0 - 0.5 K/uL Final    Baso # 08/27/2024 0.10  0.00 - 0.20 K/uL Final    nRBC 08/27/2024 0  0 /100 WBC Final    Gran % 08/27/2024 62.2  38.0 - 73.0 % Final    Lymph % 08/27/2024 25.9  18.0 - 48.0 % Final    Mono % 08/27/2024 6.7  4.0 - 15.0 % Final    Eosinophil % 08/27/2024 3.6  0.0 - 8.0 % Final    Basophil % 08/27/2024 1.6  0.0 - 1.9 % Final    Differential Method 08/27/2024 Automated   Final    Sodium 08/27/2024 140  136 - 145 mmol/L Final    Potassium 08/27/2024 4.1  3.5 - 5.1 mmol/L Final    Chloride 08/27/2024 104  95 - 110 mmol/L Final    CO2 08/27/2024 26  23 - 29 mmol/L Final    Glucose 08/27/2024 97  70 - 110 mg/dL Final    BUN 08/27/2024 12  8 - 23 mg/dL Final    Creatinine 08/27/2024 0.8  0.5 - 1.4 mg/dL Final    Calcium 08/27/2024 9.8  8.7 - 10.5 mg/dL Final    Total Protein 08/27/2024 7.3  6.0 - 8.4 g/dL Final    Albumin 08/27/2024 3.9  3.5 - 5.2 g/dL Final    Total Bilirubin 08/27/2024 0.5  0.1 - 1.0 mg/dL Final    Alkaline Phosphatase 08/27/2024 91  55 - 135 U/L Final    AST 08/27/2024 18  10 - 40 U/L Final    ALT 08/27/2024 8 (L)  10 - 44 U/L Final    eGFR 08/27/2024 >60.0  >60 mL/min/1.73 m^2 Final    Anion Gap 08/27/2024 10  8 - 16 mmol/L Final    Sed Rate 08/27/2024 19  0 - 20 mm/Hr Final    TSH 08/27/2024 0.714  0.400 - 4.000  uIU/mL Final    Vit D, 25-Hydroxy 08/27/2024 49  30 - 96 ng/mL Final    Specimen UA 08/27/2024 Urine, Clean Catch   Final    Color, UA 08/27/2024 Yellow  Yellow, Straw, Jocelyn Final    Appearance, UA 08/27/2024 Clear  Clear Final    pH, UA 08/27/2024 7.0  5.0 - 8.0 Final    Specific Gravity, UA 08/27/2024 1.020  1.005 - 1.030 Final    Protein, UA 08/27/2024 1+ (A)  Negative Final    Glucose, UA 08/27/2024 Negative  Negative Final    Ketones, UA 08/27/2024 1+ (A)  Negative Final    Bilirubin (UA) 08/27/2024 1+ (A)  Negative Final    Occult Blood UA 08/27/2024 Negative  Negative Final    Nitrite, UA 08/27/2024 Negative  Negative Final    Urobilinogen, UA 08/27/2024 Negative  <2.0 EU/dL Final    Leukocytes, UA 08/27/2024 1+ (A)  Negative Final    RBC, UA 08/27/2024 2  0 - 4 /hpf Final    WBC, UA 08/27/2024 28 (H)  0 - 5 /hpf Final    Bacteria 08/27/2024 Moderate (A)  None-Occ /hpf Final    Squam Epithel, UA 08/27/2024 30  /hpf Final    Hyaline Casts, UA 08/27/2024 0  0-1/lpf /lpf Final    Microscopic Comment 08/27/2024 SEE COMMENT   Final    Urine Culture, Routine 08/27/2024 No growth   Final   Lab Visit on 08/19/2024   Component Date Value Ref Range Status    TSH 08/19/2024 4.634 (H)  0.400 - 4.000 uIU/mL Final    Free T4 08/19/2024 1.28  0.71 - 1.51 ng/dL Final       - Independent review of images:      - Independent review of consultant's notes: Reji Chou     ASSESSMENT/PLAN:  PD  - iPD, L-side predominant -- dystonic features (toe curling, limb dystonia most bothersome to her)   - currently taking cd/ld 1 tab TID and rasagiline 0.5 mg daily, discussed increasing to 1 mg daily --wants to hold off   - increase physical activity   - discussed early addition of palliative care on board/ as part of her care team.   - rec'd CBT/talk therapy for anxiety about health/ PD  - discussed cr microbiome  - pre-biotics/pro-biotics as discussed  - may cut 1/2 dose of ldopa as trial  - hob 30, stockings, water      2. MDD  -  stable on cymbalta   - rec'd CBT/talk therapy for anxiety about health/ PD         Follow up: in 3 months with [facility]       This is a patient with a chronic and complex neurologic diagnosis whose overall, ongoing care is being managed and monitored by me and our Neurology clinic.   As such, since 2024,  is the appropriate add-on code to accompany the other E/M billing for this visit.

## 2024-09-24 ENCOUNTER — PATIENT MESSAGE (OUTPATIENT)
Dept: NEUROLOGY | Facility: CLINIC | Age: 74
End: 2024-09-24
Payer: MEDICARE

## 2024-09-25 ENCOUNTER — PATIENT MESSAGE (OUTPATIENT)
Dept: NEUROLOGY | Facility: CLINIC | Age: 74
End: 2024-09-25
Payer: MEDICARE

## 2024-09-25 RX ORDER — RASAGILINE 1 MG/1
1 TABLET ORAL DAILY
Qty: 30 TABLET | Refills: 11 | Status: SHIPPED | OUTPATIENT
Start: 2024-09-25 | End: 2024-09-27 | Stop reason: SDUPTHER

## 2024-09-26 ENCOUNTER — PATIENT MESSAGE (OUTPATIENT)
Dept: FAMILY MEDICINE | Facility: CLINIC | Age: 74
End: 2024-09-26
Payer: MEDICARE

## 2024-09-26 RX ORDER — SUMATRIPTAN SUCCINATE 100 MG/1
TABLET ORAL
Qty: 90 TABLET | Refills: 1 | Status: SHIPPED | OUTPATIENT
Start: 2024-09-26

## 2024-09-27 DIAGNOSIS — G20.A1 PARKINSON'S DISEASE, UNSPECIFIED WHETHER DYSKINESIA PRESENT, UNSPECIFIED WHETHER MANIFESTATIONS FLUCTUATE: Primary | ICD-10-CM

## 2024-09-27 RX ORDER — RASAGILINE 1 MG/1
1 TABLET ORAL DAILY
Qty: 30 TABLET | Refills: 11 | Status: SHIPPED | OUTPATIENT
Start: 2024-09-27 | End: 2025-09-27

## 2024-10-03 ENCOUNTER — PATIENT MESSAGE (OUTPATIENT)
Dept: NEUROLOGY | Facility: CLINIC | Age: 74
End: 2024-10-03
Payer: MEDICARE

## 2024-10-17 ENCOUNTER — PATIENT MESSAGE (OUTPATIENT)
Dept: FAMILY MEDICINE | Facility: CLINIC | Age: 74
End: 2024-10-17
Payer: MEDICARE

## 2024-10-17 RX ORDER — ALPRAZOLAM 0.5 MG/1
0.5 TABLET ORAL DAILY PRN
Qty: 90 TABLET | Refills: 1 | Status: SHIPPED | OUTPATIENT
Start: 2024-10-17

## 2024-10-20 DIAGNOSIS — G20.A1 PARKINSON'S DISEASE, UNSPECIFIED WHETHER DYSKINESIA PRESENT, UNSPECIFIED WHETHER MANIFESTATIONS FLUCTUATE: ICD-10-CM

## 2024-10-21 RX ORDER — RASAGILINE 1 MG/1
1 TABLET ORAL
Qty: 90 TABLET | Refills: 3 | Status: SHIPPED | OUTPATIENT
Start: 2024-10-21 | End: 2024-10-23

## 2024-10-22 ENCOUNTER — PATIENT MESSAGE (OUTPATIENT)
Dept: NEUROLOGY | Facility: CLINIC | Age: 74
End: 2024-10-22
Payer: MEDICARE

## 2024-10-22 DIAGNOSIS — G20.A1 PARKINSON'S DISEASE, UNSPECIFIED WHETHER DYSKINESIA PRESENT, UNSPECIFIED WHETHER MANIFESTATIONS FLUCTUATE: ICD-10-CM

## 2024-10-23 DIAGNOSIS — Z12.31 VISIT FOR SCREENING MAMMOGRAM: Primary | ICD-10-CM

## 2024-10-23 RX ORDER — RASAGILINE 0.5 MG/1
0.5 TABLET ORAL DAILY
Qty: 30 TABLET | Refills: 11 | Status: SHIPPED | OUTPATIENT
Start: 2024-10-23 | End: 2025-10-23

## 2024-11-21 ENCOUNTER — PATIENT MESSAGE (OUTPATIENT)
Dept: FAMILY MEDICINE | Facility: CLINIC | Age: 74
End: 2024-11-21
Payer: MEDICARE

## 2024-11-22 RX ORDER — DULOXETIN HYDROCHLORIDE 60 MG/1
60 CAPSULE, DELAYED RELEASE ORAL DAILY
Qty: 90 CAPSULE | Refills: 3 | Status: SHIPPED | OUTPATIENT
Start: 2024-11-22 | End: 2025-11-22

## 2024-11-27 PROBLEM — Z74.09 IMPAIRED FUNCTIONAL MOBILITY AND ACTIVITY TOLERANCE: Status: RESOLVED | Noted: 2024-08-27 | Resolved: 2024-11-27

## 2024-11-30 ENCOUNTER — EXTERNAL CHRONIC CARE MANAGEMENT (OUTPATIENT)
Dept: PRIMARY CARE CLINIC | Facility: CLINIC | Age: 74
End: 2024-11-30
Payer: MEDICARE

## 2024-11-30 PROCEDURE — 99490 CHRNC CARE MGMT STAFF 1ST 20: CPT | Mod: S$GLB,,, | Performed by: FAMILY MEDICINE

## 2024-12-02 ENCOUNTER — OFFICE VISIT (OUTPATIENT)
Dept: FAMILY MEDICINE | Facility: CLINIC | Age: 74
End: 2024-12-02
Payer: MEDICARE

## 2024-12-02 VITALS
HEART RATE: 56 BPM | DIASTOLIC BLOOD PRESSURE: 62 MMHG | HEIGHT: 60 IN | BODY MASS INDEX: 24.97 KG/M2 | OXYGEN SATURATION: 95 % | SYSTOLIC BLOOD PRESSURE: 116 MMHG | WEIGHT: 127.19 LBS | TEMPERATURE: 98 F

## 2024-12-02 DIAGNOSIS — Z12.31 ENCOUNTER FOR SCREENING MAMMOGRAM FOR MALIGNANT NEOPLASM OF BREAST: ICD-10-CM

## 2024-12-02 DIAGNOSIS — Z09 ENCOUNTER FOR FOLLOW-UP EXAMINATION AFTER COMPLETED TREATMENT FOR CONDITIONS OTHER THAN MALIGNANT NEOPLASM: ICD-10-CM

## 2024-12-02 DIAGNOSIS — R27.0 ATAXIA, UNSPECIFIED: ICD-10-CM

## 2024-12-02 DIAGNOSIS — Z23 NEED FOR INFLUENZA VACCINATION: Primary | ICD-10-CM

## 2024-12-02 DIAGNOSIS — E03.4 HYPOTHYROIDISM DUE TO ACQUIRED ATROPHY OF THYROID: ICD-10-CM

## 2024-12-02 PROCEDURE — G0008 ADMIN INFLUENZA VIRUS VAC: HCPCS | Mod: S$GLB,,, | Performed by: FAMILY MEDICINE

## 2024-12-02 PROCEDURE — 90653 IIV ADJUVANT VACCINE IM: CPT | Mod: S$GLB,,, | Performed by: FAMILY MEDICINE

## 2024-12-02 PROCEDURE — 99214 OFFICE O/P EST MOD 30 MIN: CPT | Mod: S$GLB,,, | Performed by: FAMILY MEDICINE

## 2024-12-02 NOTE — PROGRESS NOTES
Subjective:      Patient ID: Florentino Bowman is a 74 y.o. female.    Chief Complaint: Follow-up (3 mon)      Vitals:    12/02/24 1619   BP: 116/62   Pulse: (!) 56   Temp: 98 °F (36.7 °C)   TempSrc: Oral   SpO2: 95%   Weight: 57.7 kg (127 lb 3.3 oz)   Height: 5' (1.524 m)        HPI   Has repeated falls, fx nose, goes to [facility]; finished PT [facility], Robin Tregre  May go back some day  Referral placed  Was going to neurology for daily migraine headaches, botox helped with Dr Mendoza    May see ENT in [place]  Has left eye film aftger having cataract surgery  Problem List  Patient Active Problem List   Diagnosis    Headache    Hypothyroid    Depression    Insomnia    Anxiety    Chronic back pain    Bilateral carotid artery disease    Calcific tendonitis    Left shoulder pain    DDD (degenerative disc disease), lumbar    Change in bowel habit    Screening for colorectal cancer    S/P lumbar laminectomy    Chronic right-sided low back pain with right-sided sciatica    Lumbar radiculopathy    Lumbar radicular pain    Spondylolisthesis    Lumbar spondylosis    Chest pain    Change in bowel habits    Lumbar degenerative disc disease    Wears contact lenses    De Jesus's neuroma of left foot    Acquired posterior equinus of left lower extremity    Bursitis of intermetatarsal bursa of left foot    Muscle weakness (generalized)    Difficulty walking    Current moderate episode of major depressive disorder without prior episode    Parkinson's disease        ALLERGIES:   Review of patient's allergies indicates:   Allergen Reactions    Clindamycin Diarrhea    Topamax [topiramate] Other (See Comments)     itching    Adhesive      Holter monitor pads    Bactrim [sulfamethoxazole-trimethoprim] Rash       MEDS:   Current Outpatient Medications:     ALPRAZolam (XANAX) 0.5 MG tablet, Take 1 tablet (0.5 mg total) by mouth daily as needed for Anxiety. PRN ANXIETY, Disp: 90 tablet, Rfl: 1    atorvastatin (LIPITOR) 40 MG tablet,  Take 1 tablet (40 mg total) by mouth once daily., Disp: 90 tablet, Rfl: 3    carbidopa-levodopa  mg (SINEMET)  mg per tablet, Take 1 tablet by mouth 3 (three) times daily., Disp: 90 tablet, Rfl: 3    DULoxetine (CYMBALTA) 60 MG capsule, Take 1 capsule (60 mg total) by mouth once daily., Disp: 90 capsule, Rfl: 3    estradioL (ESTRACE) 0.5 MG tablet, Take 1 tablet (0.5 mg total) by mouth every evening., Disp: 90 tablet, Rfl: 3    levothyroxine (SYNTHROID) 112 MCG tablet, Take 1 tablet (112 mcg total) by mouth once daily. For thyroid, Disp: 30 tablet, Rfl: 11    rasagiline (AZILECT) 0.5 MG Tab, Take 1 tablet (0.5 mg total) by mouth once daily., Disp: 30 tablet, Rfl: 11    sumatriptan (IMITREX) 100 MG tablet, 1 bid prn migraine headache, Disp: 90 tablet, Rfl: 1    sennosides (SENNA) 8.6 mg Cap, Take 2 tablets by mouth every evening. (Patient not taking: Reported on 12/2/2024), Disp: 30 each, Rfl: 0    Current Facility-Administered Medications:     onabotulinumtoxina injection 200 Units, 200 Units, Intramuscular, Q90 Days,     Facility-Administered Medications Ordered in Other Visits:     0.9%  NaCl infusion, , Intravenous, Continuous, Tony Castle MD, New Bag at 09/30/22 0822    0.9%  NaCl infusion, , Intravenous, Continuous, Tony Castle MD    sodium chloride 0.9% flush 10 mL, 10 mL, Intravenous, PRN, Tony Castle MD    sodium chloride 0.9% flush 10 mL, 10 mL, Intravenous, PRN, Tony Castle MD      History:  Current Providers as of 12/2/2024  PCP: Fam Chou MD  Care Team Provider: Karley Kumar MD  Care Team Provider: Carla Mendoza MD  Care Team Provider: Shanika Arellano MD  Care Team Provider: Darrian Duran MD  Care Team Provider: Florian Freitas MD  Encounter Provider: Fam Chou MD, starting on Mon Dec 2, 2024 12:00 AM  Referring Provider: not found, starting on Mon Dec 2, 2024 12:00 AM  Consulting Physician: Fam Chou MD, starting on  Mon Dec 2, 2024  4:16 PM (Active)   Past Medical History:   Diagnosis Date    Allergy     Arthritis     Carotid stenosis, asymptomatic     50%    Headache(784.0)     Hypothyroid     Movement disorder     Neuropathy      Past Surgical History:   Procedure Laterality Date    BREAST BIOPSY Right     CATARACT EXTRACTION      CAUDAL EPIDURAL STEROID INJECTION N/A 2022    Procedure: INJECTION, STEROID, SPINE, EPIDURAL, CAUDAL;  Surgeon: Tony Castle MD;  Location: [facility] OR;  Service: Pain Management;  Laterality: N/A;     SECTION      x2    COLONOSCOPY N/A 2017    Procedure: COLONOSCOPY Miralax;  Surgeon: Quentin Mercado Jr., MD;  Location: [facility] ENDO;  Service: Endoscopy;  Laterality: N/A;    COLONOSCOPY N/A 2021    Procedure: COLONOSCOPY;  Surgeon: Andrew Parekh MD;  Location: [facility];  Service: Endoscopy;  Laterality: N/A;    ESOPHAGOGASTRODUODENOSCOPY N/A 2021    Procedure: EGD (ESOPHAGOGASTRODUODENOSCOPY);  Surgeon: Andrew Parekh MD;  Location: [facility];  Service: Endoscopy;  Laterality: N/A;    EYE SURGERY      FLUOROSCOPY  2023    Procedure: FLUOROSCOPY;  Surgeon: Darrian Duran MD;  Location: [facility] OR;  Service: Pain Management;;    HYSTERECTOMY      INJECTION OF ANESTHETIC AGENT AROUND MEDIAL BRANCH NERVES INNERVATING LUMBAR FACET JOINT Right 2019    Procedure: BLOCK, NERVE, FACET JOINT, LUMBAR, MEDIAL BRANCH ;  Surgeon: Sixto Moya III, MD;  Location: [facility] OR;  Service: Pain Management;  Laterality: Right;  - L4/5 & L5/S1  PATIENT NEEDS TO BE THE FIRST PATIENT OF THE DAY, PER DR. MOYA!!!    INSERTION, NEUROSTIMULATOR, SPINAL CORD N/A 2023    Procedure: INSERTION, SPINAL CORD STIMULATOR IMPLANT BOSTON REP;  Surgeon: Darrian Duran MD;  Location: [facility] OR;  Service: Pain Management;  Laterality: N/A;    LAPAROSCOPIC HYSTERECTOMY      supracervical/BSO    OOPHORECTOMY      SPINE SURGERY      TONSILLECTOMY       TRANSFORAMINAL EPIDURAL INJECTION OF STEROID Right 06/05/2019    Procedure: INJECTION, STEROID, EPIDURAL, TRANSFORAMINAL APPROACH;  Surgeon: Sixto Moya III, MD;  Location: [facility] OR;  Service: Pain Management;  Laterality: Right;  -right TFESI L4/5    TRANSFORAMINAL EPIDURAL INJECTION OF STEROID Bilateral 02/19/2021    Procedure: INJECTION, STEROID, EPIDURAL, TRANSFORAMINAL APPROACH;  Surgeon: Sixto Moya III, MD;  Location: [facility] OR;  Service: Pain Management;  Laterality: Bilateral;  L5    TRANSFORAMINAL EPIDURAL INJECTION OF STEROID Bilateral 09/30/2022    Procedure: INJECTION, STEROID, EPIDURAL, TRANSFORAMINAL APPROACH;  Surgeon: Tony Castle MD;  Location: [facility] OR;  Service: Pain Management;  Laterality: Bilateral;    TRIAL OF SPINAL CORD NERVE STIMULATOR N/A 02/24/2023    Procedure: SPINAL CORD STIMULATOR TRIAL BOSTON REP *ESHRAGHI ONLY*;  Surgeon: Darrian Duran MD;  Location: [facility] PAIN MGT;  Service: Pain Management;  Laterality: N/A;    TUBAL LIGATION       Social History     Tobacco Use    Smoking status: Never     Passive exposure: Never    Smokeless tobacco: Never   Substance Use Topics    Alcohol use: Not Currently     Alcohol/week: 4.0 standard drinks of alcohol     Types: 4 Glasses of wine per week     Comment: 3 glasses weekly    Drug use: No         Review of Systems   HENT:  Positive for nosebleeds.    All other systems reviewed and are negative.    Objective:     Physical Exam        Assessment:     1. Need for influenza vaccination      Plan:        Medication List            Accurate as of December 2, 2024  4:48 PM. If you have any questions, ask your nurse or doctor.                CONTINUE taking these medications      ALPRAZolam 0.5 MG tablet  Commonly known as: XANAX  Take 1 tablet (0.5 mg total) by mouth daily as needed for Anxiety. PRN ANXIETY     atorvastatin 40 MG tablet  Commonly known as: LIPITOR  Take 1 tablet (40 mg total) by mouth once daily.      carbidopa-levodopa  mg  mg per tablet  Commonly known as: SINEMET  Take 1 tablet by mouth 3 (three) times daily.     DULoxetine 60 MG capsule  Commonly known as: CYMBALTA  Take 1 capsule (60 mg total) by mouth once daily.     estradioL 0.5 MG tablet  Commonly known as: ESTRACE  Take 1 tablet (0.5 mg total) by mouth every evening.     levothyroxine 112 MCG tablet  Commonly known as: SYNTHROID  Take 1 tablet (112 mcg total) by mouth once daily. For thyroid     rasagiline 0.5 MG Tab  Commonly known as: AZILECT  Take 1 tablet (0.5 mg total) by mouth once daily.     SENNA 8.6 mg Cap  Generic drug: sennosides  Take 2 tablets by mouth every evening.     sumatriptan 100 MG tablet  Commonly known as: IMITREX  1 bid prn migraine headache            Need for influenza vaccination  -     influenza (adjuvanted) (Fluad) 45 mcg/0.5 mL IM vaccine (> or = 66 yo) 0.5 mL

## 2024-12-04 ENCOUNTER — HOSPITAL ENCOUNTER (OUTPATIENT)
Dept: RADIOLOGY | Facility: HOSPITAL | Age: 74
Discharge: HOME | End: 2024-12-04
Attending: FAMILY MEDICINE
Payer: MEDICARE

## 2024-12-04 DIAGNOSIS — R27.0 ATAXIA, UNSPECIFIED: ICD-10-CM

## 2024-12-04 DIAGNOSIS — Z09 ENCOUNTER FOR FOLLOW-UP EXAMINATION AFTER COMPLETED TREATMENT FOR CONDITIONS OTHER THAN MALIGNANT NEOPLASM: ICD-10-CM

## 2024-12-04 PROCEDURE — 93880 EXTRACRANIAL BILAT STUDY: CPT | Mod: TC

## 2024-12-05 NOTE — PROGRESS NOTES
Subjective:      Patient ID: Florentino Bowman is a 74 y.o. female.    Chief Complaint:  Hypothyroidism    History of Present Illness  Florentino Bowman is here for evaluation of hypothyroidism.  Referred by Dr. Chou.  This is their first visit with me.      With regards to hypothyroidism:    FH of thyroid disease: Mother and sisters have low thyroid  FH of thyroid cancer: Denies  Personal history of FNA, JAMES, thyroid surgery, or head/neck radiation treatment: Denies     Lab Results   Component Value Date    TSH 0.075 (L) 12/03/2024    N9FWGLM 107 11/02/2007    W3TEIDL 9.6 02/09/2023    FREET4 1.36 12/03/2024       Current Medication:  Levothyroxine 112 mcg daily  Patient has always used a type of generic.  Patient has used armour thyroid in the past.    Takes thyroid medication properly without food first thing in the morning.  Takes with other medications, coffee and creamer .  Has always done this.  Denies taking biotin, iron, or calcium supplements.  No MVI    Denies unexplained weight gain, fatigue, hair loss, brittle nails, skin changes, CP, palpitations, SOB, tremors, and heat intolerance.    Reports fatigue cold intolerance, and constipation.  These are not new symptoms    HRT: Taking estrogen.      Last BMD dated: 11/15/2023  FINDINGS:  The L1 to L4 vertebral bone mineral density is equal to 1.166 g/cm squared with a T score of -0.1.  The left femoral neck bone mineral density is equal to 0.763 g/cm squared with a T score of -2.0.  There is a 12.7% risk of a major osteoporotic fracture and a 2.8% risk of hip fracture in the next 10 years (FRAX).     Impression:  Lumbar spine normal bone density.  Left femoral neck osteopenia.     Dr. Chou recommended Fosamax.  Patient not currently taking due to conflicting information from providers.  Denies significant fracture history.  Taking Vitamin D3 2000 iu daily      Review of Systems  All Above    Objective:   Physical Exam    Visit Vitals  Ht 5' (1.524 m)   Wt 55.3  "kg (122 lb)   LMP 01/01/2001   BMI 23.83 kg/m²       Body mass index is 23.83 kg/m².    Lab Review:   No results found for: "HGBA1C"    Lab Results   Component Value Date    CHOL 162 08/11/2023    HDL 87 (H) 08/11/2023    LDLCALC 64.8 08/11/2023    TRIG 51 08/11/2023    CHOLHDL 53.7 (H) 08/11/2023     Lab Results   Component Value Date     08/27/2024    K 4.1 08/27/2024     08/27/2024    CO2 26 08/27/2024    GLU 97 08/27/2024    BUN 12 08/27/2024    CREATININE 0.8 08/27/2024    CALCIUM 9.8 08/27/2024    PROT 7.3 08/27/2024    ALBUMIN 3.9 08/27/2024    BILITOT 0.5 08/27/2024    ALKPHOS 91 08/27/2024    AST 18 08/27/2024    ALT 8 (L) 08/27/2024    ANIONGAP 10 08/27/2024    ESTGFRAFRICA >60.0 07/18/2022    EGFRNONAA >60.0 07/18/2022    TSH 0.075 (L) 12/03/2024     Vit D, 25-Hydroxy   Date Value Ref Range Status   08/27/2024 49 30 - 96 ng/mL Final     Comment:     Vitamin D deficiency.........<10 ng/mL                              Vitamin D insufficiency......10-29 ng/mL       Vitamin D sufficiency........> or equal to 30 ng/mL  Vitamin D toxicity............>100 ng/mL       Assessment and Plan     1. Osteopenia after menopause        2. Hypothyroidism due to acquired atrophy of thyroid  Ambulatory referral/consult to Endocrinology    SYNTHROID 100 mcg tablet    TSH    CANCELED: TSH          Hypothyroid  -- TSH below goal  -- Will work to avoid exogenous hyperthyroidism as this can accelerate bone loss and increase risk of CV complications.  -- Educated on how to appropriately take thyroid medication.  -- Calcium and iron need to be  from thyroid hormone replacement by 4 or > hours.  -- Please note: if you are taking biotin please hold it for 5 days prior to labs as it can interfere with the thyroid testing.  -- Discontinue Levothyroxine 112 mcg daily  -- Start: Synthroid 100 mcg daily      Patient Instructions   It is best to take the levothyroxine in the morning on an empty stomach, and not eat, " drink or take medications for at least ~30 minutes after taking it to maximize its absorption.  Please avoid taking any multi-vitamins (anything with iron) or calcium supplements for at least 4 hours after taking the medication.  If you miss the dose on one day, take two doses the next morning to make up for the missed dose          Osteopenia after menopause  -- Patient currently on estrogen replacement therapy  -- Recommend repeat BMD every 2 years  -- Continue Vitamin D supplementation      Follow up in about 6 months (around 6/6/2025).    TAWANDA Summers    This is a Lema21hart video visit.    The patient location is: [place]  The chief complaint leading to consultation is: Hypothyroidism  Visit type: Virtual visit with synchronous audio and video  Total time spent with patient: 20  Each patient to whom he or she provides medical services by telemedicine is:  (1) informed of the relationship between the physician and patient and the respective role of any other health care provider with respect to management of the patient; and (2) notified that he or she may decline to receive medical services by telemedicine and may withdraw from such care at any time.    Case discussed with Dr. Diaz  Recommendations were discussed with the patient in detail  The patient verbalized understanding and agrees with the plan outlined as above.     I spent 25 minutes face-to-face with the patient, over half of the visit was spent on counseling and/or coordinating the care of the patient.    Counseling includes:  Diagnostic results, impressions, recommendations   Prognosis   Risk and benefits of management/treatment options   Instructions for management treatment and or follow-up   Importance of compliance with management   Risk factor reduction   Patient education      I have reviewed and concur with Isa's history, physical, assessment, and plan.  I have personally interviewed and examined the patient.    Variable TSH likely due to  inconsistency in how she is taking LT4. Will change to brand Synthroid at reduced dose and discussed best way to take. Repeat TSH 6 weeks  DXA with osteopenia, FRAX not elevated and no fractures. She is on HRT. At this time no indication for further treatment for osteoporosis but would monitor DXA over time.     Irene Diaz MD

## 2024-12-06 ENCOUNTER — PATIENT MESSAGE (OUTPATIENT)
Dept: ENDOCRINOLOGY | Facility: CLINIC | Age: 74
End: 2024-12-06

## 2024-12-06 ENCOUNTER — OFFICE VISIT (OUTPATIENT)
Dept: ENDOCRINOLOGY | Facility: CLINIC | Age: 74
End: 2024-12-06
Payer: MEDICARE

## 2024-12-06 VITALS — BODY MASS INDEX: 23.95 KG/M2 | HEIGHT: 60 IN | WEIGHT: 122 LBS

## 2024-12-06 DIAGNOSIS — Z78.0 OSTEOPENIA AFTER MENOPAUSE: ICD-10-CM

## 2024-12-06 DIAGNOSIS — M85.80 OSTEOPENIA AFTER MENOPAUSE: ICD-10-CM

## 2024-12-06 DIAGNOSIS — E03.4 HYPOTHYROIDISM DUE TO ACQUIRED ATROPHY OF THYROID: Primary | ICD-10-CM

## 2024-12-06 RX ORDER — LEVOTHYROXINE SODIUM 100 UG/1
100 TABLET ORAL
Qty: 90 TABLET | Refills: 3 | Status: SHIPPED | OUTPATIENT
Start: 2024-12-06 | End: 2025-12-06

## 2024-12-06 NOTE — PATIENT INSTRUCTIONS
It is best to take the levothyroxine in the morning on an empty stomach, and not eat, drink or take medications for at least ~30 minutes after taking it to maximize its absorption.  Please avoid taking any multi-vitamins (anything with iron) or calcium supplements for at least 4 hours after taking the medication.  If you miss the dose on one day, take two doses the next morning to make up for the missed dose

## 2024-12-06 NOTE — ASSESSMENT & PLAN NOTE
-- Patient currently on estrogen replacement therapy  -- Recommend repeat BMD every 2 years  -- Continue Vitamin D supplementation

## 2024-12-06 NOTE — ASSESSMENT & PLAN NOTE
-- TSH below goal  -- Will work to avoid exogenous hyperthyroidism as this can accelerate bone loss and increase risk of CV complications.  -- Educated on how to appropriately take thyroid medication.  -- Calcium and iron need to be  from thyroid hormone replacement by 4 or > hours.  -- Please note: if you are taking biotin please hold it for 5 days prior to labs as it can interfere with the thyroid testing.  -- Discontinue Levothyroxine 112 mcg daily  -- Start: Synthroid 100 mcg daily      Patient Instructions   It is best to take the levothyroxine in the morning on an empty stomach, and not eat, drink or take medications for at least ~30 minutes after taking it to maximize its absorption.  Please avoid taking any multi-vitamins (anything with iron) or calcium supplements for at least 4 hours after taking the medication.  If you miss the dose on one day, take two doses the next morning to make up for the missed dose

## 2024-12-10 ENCOUNTER — OFFICE VISIT (OUTPATIENT)
Dept: PALLIATIVE MEDICINE | Facility: CLINIC | Age: 74
End: 2024-12-10
Payer: MEDICARE

## 2024-12-10 VITALS
WEIGHT: 127 LBS | BODY MASS INDEX: 24.8 KG/M2 | SYSTOLIC BLOOD PRESSURE: 141 MMHG | DIASTOLIC BLOOD PRESSURE: 60 MMHG | HEART RATE: 79 BPM

## 2024-12-10 DIAGNOSIS — G20.A1 PARKINSON'S DISEASE WITHOUT DYSKINESIA OR FLUCTUATING MANIFESTATIONS: Primary | ICD-10-CM

## 2024-12-10 DIAGNOSIS — Z51.5 PALLIATIVE CARE ENCOUNTER: ICD-10-CM

## 2024-12-10 PROCEDURE — 99497 ADVNCD CARE PLAN 30 MIN: CPT | Mod: PBBFAC | Performed by: STUDENT IN AN ORGANIZED HEALTH CARE EDUCATION/TRAINING PROGRAM

## 2024-12-10 PROCEDURE — 99999 PR PBB SHADOW E&M-EST. PATIENT-LVL III: CPT | Mod: PBBFAC,,, | Performed by: STUDENT IN AN ORGANIZED HEALTH CARE EDUCATION/TRAINING PROGRAM

## 2024-12-10 PROCEDURE — 99213 OFFICE O/P EST LOW 20 MIN: CPT | Mod: PBBFAC | Performed by: STUDENT IN AN ORGANIZED HEALTH CARE EDUCATION/TRAINING PROGRAM

## 2024-12-10 NOTE — PROGRESS NOTES
Palliative Medicine Clinic Note        Consult Requested By: No ref. provider found      Reason for Consult: Symptom management and ACP in the setting of Parkinson's disease     Chief Complaint:   No chief complaint on file.          ASSESSMENT/PLAN:      Plan/Recommendations:    Florentino was seen today for insomnia.    Diagnoses and all orders for this visit:      Parkinson's disease without dyskinesia or fluctuating manifestations  -L-side predominant -- dystonic features  -cd/ld 1 tab TID and rasagiline 0.5 mg daily  - Referred to multi-disciplinary Parkinson's Clinic for baseline assessments (physical therapy, occupational therapy, speech therapy).  -referred to [facility] for support   -Plan for Botox injections for migraine  - Patient diagnosed with Parkinson's disease approximately 2 years ago, currently stable on carbidopa/levodopa and rasagiline.  - Mild symptoms with minimal progression noted; patient remains highly functional (ECOG 0-1).  - Prognosis estimated at 10-15 years based on current status.  - Continue focus on maintaining physical activity and mobility to slow disease progression.  - Discussed importance of maintaining physical activity and exercise to slow Parkinson's disease progression.  - Patient to continue attending gym classes 3 times per week.  - Patient to maintain current level of independence, including driving and self-care activities.  - Continued carbidopa/levodopa 3 times daily.  - Continued rasagiline at current dose.  - Re-referred to multi-disciplinary Parkinson's clinic for baseline assessments in speech therapy, physical therapy, and social work.  - Consider care planning options for future, including potential for in-home care vs. relocation closer to family.  - Reviewed potential future care needs and importance of advance planning.  - Recommend discussing future care planning options with children during upcoming holiday visit.        Depression   - stable on cymbalta   - rec'd  CBT/talk therapy for anxiety about health/ PD   - No significant cognitive concerns at this time; memory complaints likely related to anxiety.  - Explained that current memory concerns likely related to anxiety rather than disease progression.  - Continued Xanax as needed, approximately 2 times per week for anxiety.  - Referred to Donna Mahmood, therapist within the department, for assistance with care planning and anxiety management.    constipation  -     sennosides (SENNA) 8.6 mg Cap; Take 2 tablets by mouth every evening.          FOLLOW-UP:      Advance Care Planning   Advance Directives:   Living Will: No    [place]POST: No    Do Not Resuscitate Status: No    Medical Power of : Yes    Agent's Name:  Betty HOFFMAN   Agent's Contact Number:  [phone]    Decision Making:  Patient answered questions  Goals of Care: Advance Care Planning    Date: 12/10/2024    I engaged the patient in a voluntary conversation about advance care planning and we specifically addressed what the goals of care would be moving forward.  We explored the patient's values and preferences for future care.  The patient endorses that what is most important right now is to focus on spending time at home and remaining as independent as possible. Patient expresses anxiety about planning for the future, particularly regarding where she will live as her condition progresses. She has concerns about being a burden to her children. Her daughter in [place] has offered for her to live with her, but the patient is hesitant due to the desire to maintain independence. She is concerned about the impact on her son's family life if she were to live with them locally. Patient prefers to stay in her current home with caregivers if needed. She made end-of-life decisions for her late  and indicates she would not want to be on life support for a prolonged period without a clear prognosis. Patient plans to discuss future care needs with her children  during the upcoming holidays. She is considering moving closer to her son or having a separate living space on his property in the future. Patient values maintaining her independence and current lifestyle as long as possible. No formal advance directives or power of  have been established yet.Accordingly, we have decided that the best plan to meet the patient's goals includes continuing with treatment      Date: 09/06/2024  I initiated the process of voluntary advance care planning today and explained the importance of this process to the patient.  I introduced the concept of advance directives to the patient, as well. Then the patient received detailed information about the importance of designating a Health Care Power of  (HCPOA). The patient has not previously appointed a HCPOA. After our discussion, the patient has decided to complete a HCPOA and has appointed her daughter, health care agent: Betty Peters & health care agent number: . I encouraged her to communicate with this person about their wishes for future healthcare, should she become sick and lose decision-making capacity. The patient expresses hope that she does not have Parkinson's disease. She is seeking to understand her future care options and maintain independence. The patient has a healthcare power of  document in place, naming her daughter, Betty Peters, as the primary decision-maker, with her son Jesus as the secondary choice. She lives alone in her home of 45 years and wants to stay there as long as possible. The patient's daughter, who is a nurse, lives in [place] and calls daily. Her son lives in the same town. The patient understands that moving closer to family might be necessary in the future for better care coordination, but is not ready for that step yet. The patient has experience with end-of-life decisions, having made the choice to discontinue life support for her late  based on  his wishes. She expresses a similar preference for herself, however she needs to think about her wishes and share them with her POA. Provided ACP booklet         A total of min was spent on advance care planning, goals of care discussion, emotional support, formulating and communicating prognosis and exploring burden/benefit of various approaches of treatment. This discussion occurred on a fully voluntary basis with the verbal consent of the patient and/or family.         Follow up:     - Follow up in 6 months.  - Contact the office if experiencing increased memory issues or changes in mobility.      Plan discussed with: Neurology        SUBJECTIVE:      History of Present Illness / Interval History:  Florentino Bowman is 74 y.o. female with PD.  Presents to Palliative Care Clinic for advance care planning, and additional support.. Please see neuro note for more details on PD      12/10/24  History obtained from: patient     Patient, diagnosed with Parkinson's disease in 2020, reports experiencing intermittent joint soreness, particularly in her neck. She had a few falls due to dizziness, resulting in a broken nose. The dizziness was most prominent when standing up and taking a few steps. She sought care from her primary care physician and underwent physical therapy for balance and vestibular issues, which has improved her condition. She no longer experiences dizziness.    Patient reports ongoing concerns about living alone, as her daughter is in [place]. She is considering palliative care to coordinate different aspects of her care. She continues to drive and perform daily activities independently, although she has stopped riding her bike and reduced her gardening activities due to soreness and decreased leg strength. She exercises at home and attends gym classes about 3 times per week.    Patient mentions experiencing headaches. There was a plan to try Botox injections, but insurance issues prevented this. She  reports adjusting her sleeping position to help with the headaches. Patient takes Carbidopa/Levodopa and Rasagiline for her Parkinson's disease, with no recent changes to this regimen. She uses Xanax a few times per week for anxiety, particularly related to worries about the future. Patient also reports some short-term memory issues, such as forgetting why she went into a room.    Regarding her appetite, the patient mentions not cooking for herself and relying mostly on protein bars, yogurt, and fruit. She has experienced some weight fluctuations, initially losing weight but now regaining some due to increased sweet consumption during the holiday season. Patient also reports fatigue, often feeling tired in the afternoons, but notes some recent improvement in energy levels.    Drives independently.  Exercises at home and attends gym classes about 3 days a week.  Had two recent falls due to dizziness, one resulting in a broken nose.  No longer rides a bike.  Has reduced gardening activities due to soreness and difficulty with up and down movements.  Can still get up from the floor independently, though with some difficulty.  Grocery shops independently.  Experiences joint soreness, particularly in the neck, which is sometimes present and sometimes not.  Has trouble breathing at night while sleeping, with dry mouth.  Experiences occasional lightheadedness when standing up.  Recently completed physical therapy for balance issues, which has improved.  Continues to use balance equipment at home.  Appetite has decreased; patient does not cook for herself and relies on protein bars, yogurt, and fruit.  Sleep is affected by joint soreness.      Please refer to prior appt for more details: 9/6/24    ROS:  Review of Systems   Neurological:  Positive for vertigo.       Review of Symptoms      Symptom Assessment (ESAS 0-10 Scale)  Pain:  4  Dyspnea:  0  Anxiety:  2  Nausea:  0  Depression:  3  Anorexia:  4  Fatigue:   4  Insomnia:  0  Restlessness:  0  Agitation:  0     CAM / Delirium:  Negative  Constipation:  Positive  Diarrhea:  Negative      Pain Assessment:    Location(s): back    Back       Location: lower        Quality: Aching        Quantity: 4/10 in intensity        Chronicity: Onset 0 week(s) ago, stable        Aggravating Factors: None        Alleviating Factors: None       Associated Symptoms: None    Performance Status:  90    Living Arrangements:  Lives alone    Psychosocial/Cultural:   See Palliative Psychosocial Note: Yes  She lives alone in her home of 45 years. She is  and has a daughter and a son who live out of town   **Primary  to Follow**  Palliative Care  Consult: Yes    Spiritual:  F - Raquel and Belief:  Spiritism      Medications:    Current Outpatient Medications:     ALPRAZolam (XANAX) 0.5 MG tablet, Take 1 tablet (0.5 mg total) by mouth daily as needed for Anxiety. PRN ANXIETY, Disp: 90 tablet, Rfl: 1    atorvastatin (LIPITOR) 40 MG tablet, Take 1 tablet (40 mg total) by mouth once daily., Disp: 90 tablet, Rfl: 3    carbidopa-levodopa  mg (SINEMET)  mg per tablet, Take 1 tablet by mouth 3 (three) times daily., Disp: 90 tablet, Rfl: 3    DULoxetine (CYMBALTA) 60 MG capsule, Take 1 capsule (60 mg total) by mouth once daily., Disp: 90 capsule, Rfl: 3    estradioL (ESTRACE) 0.5 MG tablet, Take 1 tablet (0.5 mg total) by mouth every evening., Disp: 90 tablet, Rfl: 3    levothyroxine (SYNTHROID) 112 MCG tablet, Take 1 tablet (112 mcg total) by mouth once daily. For thyroid, Disp: 30 tablet, Rfl: 11    rasagiline (AZILECT) 0.5 MG Tab, Take 1 tablet (0.5 mg total) by mouth once daily., Disp: 30 tablet, Rfl: 11    sumatriptan (IMITREX) 100 MG tablet, 1 bid prn migraine headache, Disp: 90 tablet, Rfl: 1    SYNTHROID 100 mcg tablet, Take 1 tablet (100 mcg total) by mouth before breakfast., Disp: 90 tablet, Rfl: 3    Current Facility-Administered Medications:      onabotulinumtoxina injection 200 Units, 200 Units, Intramuscular, Q90 Days,     Facility-Administered Medications Ordered in Other Visits:     0.9%  NaCl infusion, , Intravenous, Continuous, Tony Castle MD, New Bag at 09/30/22 0822    0.9%  NaCl infusion, , Intravenous, Continuous, Tony Castle MD    sodium chloride 0.9% flush 10 mL, 10 mL, Intravenous, PRN, Tony Castle MD    sodium chloride 0.9% flush 10 mL, 10 mL, Intravenous, PRN, Tony Castle MD      External  database queried on 12/10/2024  by Ivana CORDOVA :  10/17/2024 10/17/2024 1 Alprazolam 0.5 Mg Tablet 90.00 90 Co Audrey [id number] Pam ([id number]) 0 1.00 LME Medicare [place]   08/30/2024 08/30/2024 1 Acetaminophen-Cod #3 Tablet 12.00 4 Ta Felipe [id number] Pam ([id number]) 0 13.50 MME Medicare [place]   05/13/2024 01/09/2024 1 Alprazolam 0.5 Mg Tablet 90.00 90 Co Audrey [id number] Pam ([id number]) 0 1.00 LME [place]         Review of patient's allergies indicates:   Allergen Reactions    Clindamycin Diarrhea    Topamax [topiramate] Other (See Comments)     itching    Adhesive      Holter monitor pads    Bactrim [sulfamethoxazole-trimethoprim] Rash           OBJECTIVE:         Physical Exam:  Vitals:      Physical Exam  Constitutional:       General: She is not in acute distress.  HENT:      Head: Atraumatic.   Pulmonary:      Effort: Pulmonary effort is normal. No respiratory distress.   Musculoskeletal:      Comments: Normal gait   Skin:     Findings: No bruising.   Neurological:      Mental Status: She is alert and oriented to person, place, and time.   Psychiatric:         Mood and Affect: Mood and affect normal.       I spent a total of 42 minutes on the day of the visit. This includes face to face time in discussion of goals of care, symptom assessment, coordination of care and emotional support.  This also includes non-face to face time preparing to see the patient (eg, review of tests/imaging), obtaining and/or reviewing separately  obtained history, documenting clinical information in the electronic or other health record, independently interpreting results and communicating results to the patient/family/caregiver, or care coordinator.       16 minutes discussing ACP         Ivana Deluna MD

## 2024-12-30 DIAGNOSIS — E03.4 HYPOTHYROIDISM DUE TO ACQUIRED ATROPHY OF THYROID: ICD-10-CM

## 2024-12-30 RX ORDER — LEVOTHYROXINE SODIUM 100 UG/1
100 TABLET ORAL
Qty: 90 TABLET | Refills: 3 | Status: SHIPPED | OUTPATIENT
Start: 2024-12-30 | End: 2025-12-30

## 2024-12-31 ENCOUNTER — EXTERNAL CHRONIC CARE MANAGEMENT (OUTPATIENT)
Dept: PRIMARY CARE CLINIC | Facility: CLINIC | Age: 74
End: 2024-12-31
Payer: MEDICARE

## 2024-12-31 ENCOUNTER — TELEPHONE (OUTPATIENT)
Dept: FAMILY MEDICINE | Facility: CLINIC | Age: 74
End: 2024-12-31
Payer: MEDICARE

## 2024-12-31 DIAGNOSIS — Z00.00 WELLNESS EXAMINATION: ICD-10-CM

## 2024-12-31 DIAGNOSIS — E03.4 HYPOTHYROIDISM DUE TO ACQUIRED ATROPHY OF THYROID: ICD-10-CM

## 2024-12-31 DIAGNOSIS — I77.9 BILATERAL CAROTID ARTERY DISEASE, UNSPECIFIED TYPE: ICD-10-CM

## 2024-12-31 NOTE — TELEPHONE ENCOUNTER
Spoke to pt. Pt is currently taking 112 mcg of levothyroxine.    Pt states that the endocrinologist wants her to try 100 mcg of brand name synthroid. Pt has not yet started this.    Pt currently in [place] until 1/4/25

## 2024-12-31 NOTE — TELEPHONE ENCOUNTER
----- Message from Fam Chou MD sent at 12/28/2024  5:57 PM CST -----  TSH is too low again; 100 mcg of levothyroxine is too much for you; do you have 88 mcg at home?

## 2024-12-31 NOTE — TELEPHONE ENCOUNTER
Care Due:                  Date            Visit Type   Department     Provider  --------------------------------------------------------------------------------                                EP -                              PRIMARY      [facility]  Last Visit: 12-      CARE ([facility])   LEANNE Chou                              EP -                              PRIMARY      [facility]  Next Visit: 06-      CARE ([facility])   LEANNE Chou                                                            Last  Test          Frequency    Reason                     Performed    Due Date  --------------------------------------------------------------------------------    Lipid Panel.  12 months..  atorvastatin.............  08- 08-    [facility] Embedded Care Due Messages. Reference number: [id number].   12/31/2024 5:49:22 PM CST

## 2025-01-01 RX ORDER — ESTRADIOL 0.5 MG/1
0.5 TABLET ORAL NIGHTLY
Qty: 90 TABLET | Refills: 3 | Status: SHIPPED | OUTPATIENT
Start: 2025-01-01

## 2025-01-09 ENCOUNTER — TELEPHONE (OUTPATIENT)
Facility: CLINIC | Age: 75
End: 2025-01-09
Payer: MEDICARE

## 2025-01-09 NOTE — TELEPHONE ENCOUNTER
----- Message from Isis sent at 1/9/2025 10:13 AM CST -----  Contact: [phone]  Patient is calling to reschedule appointment. Please call to assist. Having SX on her nose

## 2025-01-09 NOTE — TELEPHONE ENCOUNTER
Called and spoke with patient.  She fell and broke her nose and is having surgery tomorrow in [place].  She asked to reschedule her appointment to a later date and time.     Appt scheduled and mailed out to patient.

## 2025-01-14 DIAGNOSIS — Z00.00 ENCOUNTER FOR MEDICARE ANNUAL WELLNESS EXAM: ICD-10-CM

## 2025-01-29 ENCOUNTER — TELEPHONE (OUTPATIENT)
Dept: FAMILY MEDICINE | Facility: CLINIC | Age: 75
End: 2025-01-29
Payer: MEDICARE

## 2025-01-29 NOTE — TELEPHONE ENCOUNTER
Dea Segovia Staff  Phone Number: [phone]     CC conducting monthly call.  Pt reports fall 1/2/25 playing tug of war with dog. Landed on her face. She was in [place]. Had to have surgery for broken nose. States this is the 2nd time she broke her nose. She states that she is planning to f/u with ENT, but has not made that appt yet.      Thank you for allowing us to be a part of the patient's care team.   Please let me know if I could be of any assistance.    Respectfully,  Dea HSU LVN  Care Coordinator/Care [place]  [phone] ext [phone]

## 2025-01-31 ENCOUNTER — EXTERNAL CHRONIC CARE MANAGEMENT (OUTPATIENT)
Dept: PRIMARY CARE CLINIC | Facility: CLINIC | Age: 75
End: 2025-01-31
Payer: MEDICARE

## 2025-01-31 PROCEDURE — 99439 CHRNC CARE MGMT STAF EA ADDL: CPT | Mod: S$GLB,,, | Performed by: FAMILY MEDICINE

## 2025-01-31 PROCEDURE — 99490 CHRNC CARE MGMT STAFF 1ST 20: CPT | Mod: S$GLB,,, | Performed by: FAMILY MEDICINE

## 2025-02-13 DIAGNOSIS — G20.A1 PARKINSON'S DISEASE WITHOUT DYSKINESIA OR FLUCTUATING MANIFESTATIONS: ICD-10-CM

## 2025-02-13 RX ORDER — CARBIDOPA AND LEVODOPA 25; 100 MG/1; MG/1
1 TABLET ORAL 3 TIMES DAILY
Qty: 270 TABLET | Refills: 3 | Status: SHIPPED | OUTPATIENT
Start: 2025-02-13

## 2025-02-28 ENCOUNTER — EXTERNAL CHRONIC CARE MANAGEMENT (OUTPATIENT)
Dept: PRIMARY CARE CLINIC | Facility: CLINIC | Age: 75
End: 2025-02-28
Payer: MEDICARE

## 2025-03-14 ENCOUNTER — PATIENT MESSAGE (OUTPATIENT)
Facility: CLINIC | Age: 75
End: 2025-03-14
Payer: MEDICARE

## 2025-03-14 DIAGNOSIS — G20.A1 PARKINSON'S DISEASE WITHOUT DYSKINESIA OR FLUCTUATING MANIFESTATIONS: ICD-10-CM

## 2025-03-14 RX ORDER — CARBIDOPA AND LEVODOPA 25; 100 MG/1; MG/1
1 TABLET ORAL 3 TIMES DAILY
Qty: 270 TABLET | Refills: 3 | Status: SHIPPED | OUTPATIENT
Start: 2025-03-14

## 2025-03-14 RX ORDER — CARBIDOPA AND LEVODOPA 25; 100 MG/1; MG/1
1 TABLET ORAL 3 TIMES DAILY
Qty: 270 TABLET | Refills: 3 | Status: CANCELLED | OUTPATIENT
Start: 2025-03-14

## 2025-03-14 NOTE — TELEPHONE ENCOUNTER
----- Message from Tia sent at 3/14/2025 12:03 PM CDT -----  Who Called: Antonella GarciamaniKimberly is the request in detail: Requesting call back to discuss New, pt lost RX, needs temporary order. Please advisecarbidopa-levodopa  mg (SINEMET)  mg per tablet 270 tablet 3 2/13/2025 - NoSig - Route: TAKE 1 TABLET BY MOUTH THREE TIMES A DAY - OralSent to pharmacy as: carbidopa-levodopa  mg (SINEMET)  mg per tabletClass: NormalOrder: [id number]/pharmacy #[id number] - [place] - [id number] [facility] [place] [address]Jvqai: [phone] Fax: [phone] the clinic reply by [facility]? Alejandra Call Back Number: [phone] Information:

## 2025-03-14 NOTE — TELEPHONE ENCOUNTER
Called [facility] in [place] per pt's report that she lost her bottle of carbidopa levopa. Spoke with pharmacist who stated there was an insurance override, so her meds are ready.     Called pt to inform.

## 2025-03-28 ENCOUNTER — RESULTS FOLLOW-UP (OUTPATIENT)
Dept: FAMILY MEDICINE | Facility: CLINIC | Age: 75
End: 2025-03-28

## 2025-03-31 ENCOUNTER — EXTERNAL CHRONIC CARE MANAGEMENT (OUTPATIENT)
Dept: PRIMARY CARE CLINIC | Facility: CLINIC | Age: 75
End: 2025-03-31
Payer: MEDICARE

## 2025-03-31 PROCEDURE — 99439 CHRNC CARE MGMT STAF EA ADDL: CPT | Mod: S$GLB,,, | Performed by: FAMILY MEDICINE

## 2025-03-31 PROCEDURE — 99490 CHRNC CARE MGMT STAFF 1ST 20: CPT | Mod: S$GLB,,, | Performed by: FAMILY MEDICINE

## 2025-04-09 NOTE — PROGRESS NOTES
Name: Florentino Bowman  MRN: [id number]   CSN: [id number]      Date: 04/09/2025    Referring physician:  No referring provider defined for this encounter.    Chief Complaint: PD     Interval History:  - here with friend  - currently taking cd/ld 1 tab TID + rasagiline 0.5 mg   - 8 am - 1 - and 10   - sometimes has soreness in her neck   - two falls, broke nose twice -- playing tug of war with big dog   - one fall while feeling lightheaded   - not drinking enough water  - she was drinking liquid IV   - unusually high BP for her today   - compression stockings were uncomfortable   - some constipation, daily BM -- not taking anything over the counter  - sore first thing in the morning, gets better after she takes meds   - exercises three times a week, does something daily at home   - yoga twice a week   - sleeping well at night         Sept 2024  - saw Dr. Mendoza for migraines  - planning botox for that  - feels her PD symptoms are under control  - only issue now is her lower BP and has fallen three times from near-syncope (even broke her nose), worse in the AM near after she wakes up  - has been taking LMNT with sodium and potassium and mag  - has had nice new shows Oofos for cramps and works well!  - has seen Pall care and is pleased  - taking full CD/LD TID plus azilect  - not using stockings, hose, salty foods except element, or HOB elevated      From 6/4/24 [facility]BR:  - taking cd/ld 1 tab TID, 0.5 mg rasagiline   - does not notice much difference   - no falls   - had cataract surgery yesterday   - some dizziness whenever she goes from sitting to standing, worse whenever she is out in the sun/outside in the heat   - has not checked BP at these times   - toe cramping seems to be a little bit better   - did PT   - she was exercising regularly up until she was preparing for cataract surgery      From Feb 2024: Florentino Bowman is a R handed 75 y.o. female with a medical issues significant for PD, lumbar radiculopathy, MDD, who  presents for PD. She has been following with Dr. Mendoza. Currently taking cd/ld 1/2 tab TID + rasagiline 0.5 mg daily. She is here for second opinion of PD (does not think she has PD). Back pain for many years, several laminectomies, injections that wasn't very helpful. Thinks she stopped swinging her L arm about 4-5 years ago. Hurt her left shoulder at some point. A little tremor in her L hand, toe curling in her feet. Mostly notices tremor at rest. She was on 1/2 tab TID of  cd/ld 25/100 -- she recently cut back to 1/2 tab BID. Not much difference with slight increase. Rasagiline 0.5 mg daily. No falls. No shuffling. Some trouble rolling over in bed. Bothered by slowness.       Retired. Math/.     Normally exercises 3 times a week.     Family History: thinks maternal uncle may have had PD     Neuroleptic Exposure: none     Nonmotor/Premotor ROS:  Anosmia: fair (not as good as it used to be)  Dysarthria/Hypophonia: more hoarse / weaker   Dysphagia/Sialorrhea: none   Depression: yes, on cymbalta   Urinary changes: none   Constipation: yes, chronic   Falls: none   Micrographia: not that she knows of   Sleep issues:  -RBD: not that she knows of       Review of Systems:   Review of Systems   Constitutional:  Negative for chills, fever and malaise/fatigue.   HENT:  Negative for hearing loss.    Eyes:  Negative for blurred vision and double vision.   Respiratory:  Negative for cough, shortness of breath and stridor.    Cardiovascular:  Negative for chest pain and leg swelling.   Gastrointestinal:  Positive for constipation. Negative for diarrhea and nausea.   Genitourinary:  Negative for frequency and urgency.   Musculoskeletal:  Negative for falls.   Skin:  Negative for itching and rash.   Neurological:  Positive for tremors. Negative for dizziness, loss of consciousness and weakness.   Psychiatric/Behavioral:  Negative for hallucinations and memory loss.            Past Medical History: The patient  has a  past medical history of Allergy, Arthritis, Carotid stenosis, asymptomatic, Headache(784.0), Hypothyroid, Movement disorder, and Neuropathy.    Social History: The patient  reports that she has never smoked. She has never been exposed to tobacco smoke. She has never used smokeless tobacco. She reports that she does not currently use alcohol after a past usage of about 4.0 standard drinks of alcohol per week. She reports that she does not use drugs.    Family History: Their family history includes Arthritis in her mother; Breast cancer in her sister; Cancer in her father and sister; Colon cancer in her father; Dementia in her mother; Depression in her mother; Hearing loss in her sister; Heart disease in her mother and sister; Hypertension in her mother; No Known Problems in her daughter, sister, and son; Parkinsonism in her maternal uncle; Stroke in her maternal grandmother.    Allergies: Clindamycin, Topamax [topiramate], Adhesive, and Bactrim [sulfamethoxazole-trimethoprim]     Meds:   Current Outpatient Medications on File Prior to Visit   Medication Sig Dispense Refill    ALPRAZolam (XANAX) 0.5 MG tablet Take 1 tablet (0.5 mg total) by mouth daily as needed for Anxiety. PRN ANXIETY 90 tablet 1    atorvastatin (LIPITOR) 40 MG tablet Take 1 tablet (40 mg total) by mouth once daily. 90 tablet 3    carbidopa-levodopa  mg (SINEMET)  mg per tablet Take 1 tablet by mouth 3 (three) times daily. 270 tablet 3    DULoxetine (CYMBALTA) 60 MG capsule Take 1 capsule (60 mg total) by mouth once daily. 90 capsule 3    estradioL (ESTRACE) 0.5 MG tablet Take 1 tablet (0.5 mg total) by mouth every evening. 90 tablet 3    levothyroxine (SYNTHROID) 112 MCG tablet Take 1 tablet (112 mcg total) by mouth once daily. For thyroid 30 tablet 11    rasagiline (AZILECT) 0.5 MG Tab Take 1 tablet (0.5 mg total) by mouth once daily. 30 tablet 11    sumatriptan (IMITREX) 100 MG tablet 1 bid prn migraine headache 90 tablet 1     SYNTHROID 100 mcg tablet Take 1 tablet (100 mcg total) by mouth before breakfast. 90 tablet 3     Current Facility-Administered Medications on File Prior to Visit   Medication Dose Route Frequency Provider Last Rate Last Admin    0.9%  NaCl infusion   Intravenous Continuous Tony Castle MD   New Bag at 09/30/22 0822    0.9%  NaCl infusion   Intravenous Continuous Tony Castle MD        onabotulinumtoxina injection 200 Units  200 Units Intramuscular Q90 Days         sodium chloride 0.9% flush 10 mL  10 mL Intravenous PRN Tony Castle MD        sodium chloride 0.9% flush 10 mL  10 mL Intravenous PRN Tony Castle MD           Exam:  LMP 01/01/2001     Constitutional  Well-developed, well-nourished, appears stated age   Ophthalmoscopic  No papilledema with no hemorrhages or exudates bilaterally   Cardiovascular  Radial pulses 2+ and symmetric, no LE edema bilaterally   Neurological    * Mental status  MOCA =      - Orientation  Oriented to person, place, time, and situation     - Memory   Intact recent and remote     - Attention/concentration  Attentive, vigilant during exam     - Language  Naming & repetition intact, +2-step commands     - Fund of knowledge  Aware of current events     - Executive  Well-organized thoughts     - Other     * Cranial nerves       - CN II  PERRL, visual fields full to confrontation     - CN III, IV, VI  Extraocular movements full, normal pursuits and saccades     - CN V  Sensation V1 - V3 intact     - CN VII  Face strong and symmetric bilaterally     - CN VIII  Hearing intact bilaterally     - CN IX, X  Palate raises midline and symmetric     - CN XI  SCM and trapezius 5/5 bilaterally     - CN XII  Tongue midline   * Motor  Muscle bulk normal, strength 5/5 throughout   * Sensory   Intact to light touch    * Coordination  No dysmetria with finger-to-nose or heel-to-shin   * Gait  See below.   * Deep tendon reflexes  2+ and symmetric throughout   Babinski  downgoing bilaterally   * Specialized movement exam  No hypophonic speech.    No facial masking, appropriately animated    L > R cogwheel rigidity.     L > R bradykinesia.     Dystonic posturing of L hand      LLE dyskinesia with distraction only    No other dystonia, chorea, athetosis, myoclonus, or tics.   No motor impersistence.   Normal-based gait.   No shortened stride length.   Decreased L arm swing    No postural instability.      L head tilt, R tort      Laboratory/Radiological:  - Results:  Lab Visit on 02/11/2025   Component Date Value Ref Range Status    TSH 02/11/2025 0.120 (L)  0.400 - 4.000 uIU/mL Final    Free T4 02/11/2025 1.27  0.71 - 1.51 ng/dL Final       - Independent review of images:      - Independent review of consultant's notes: Reji Chou     ASSESSMENT/PLAN:  PD  - iPD, L-side predominant -- dystonic features (toe curling, limb dystonia most bothersome to her)   - currently taking cd/ld 1 tab TID and rasagiline 0.5 mg daily  - physical activity   - water, increased salt intake -- abdominal binder. Compression stockings were too uncomfortable   - monitor BP over the next week -- if persistently low, will add florinef 0.1 mg. One fall due to orthostasis.       2. MDD  - stable on cymbalta   - continue to monitor       3. Bilateral carotid artery disease  - on lipitor, stable  - continue to monitor     Follow up: in 3 months with RBR      This is a patient with a chronic and complex neurologic diagnosis whose overall, ongoing care is being managed and monitored by me and our Neurology clinic.   As such, since 2024,  is the appropriate add-on code to accompany the other E/M billing for this visit.        Collaborating Physician, Dr. Tipton, was available during today's encounter. Any change to plan along with cosign to appear in the EMR.       I spent 34 minutes with the patient, reviewing past encounters, labs and imaging.        Rachel Rhinehart, PA-C [facility]  Department of Neurology  Movement Disorders

## 2025-04-10 ENCOUNTER — OFFICE VISIT (OUTPATIENT)
Facility: CLINIC | Age: 75
End: 2025-04-10
Payer: MEDICARE

## 2025-04-10 ENCOUNTER — PATIENT MESSAGE (OUTPATIENT)
Dept: ENDOCRINOLOGY | Facility: CLINIC | Age: 75
End: 2025-04-10
Payer: MEDICARE

## 2025-04-10 VITALS
WEIGHT: 125 LBS | HEIGHT: 60 IN | BODY MASS INDEX: 24.54 KG/M2 | SYSTOLIC BLOOD PRESSURE: 155 MMHG | DIASTOLIC BLOOD PRESSURE: 77 MMHG | HEART RATE: 72 BPM

## 2025-04-10 DIAGNOSIS — F33.0 MILD EPISODE OF RECURRENT MAJOR DEPRESSIVE DISORDER: ICD-10-CM

## 2025-04-10 DIAGNOSIS — I65.23 BILATERAL CAROTID ARTERY STENOSIS: ICD-10-CM

## 2025-04-10 DIAGNOSIS — Z71.89 COUNSELING REGARDING GOALS OF CARE: ICD-10-CM

## 2025-04-10 DIAGNOSIS — I95.1 ORTHOSTASIS: ICD-10-CM

## 2025-04-10 DIAGNOSIS — G20.A1 PARKINSON'S DISEASE WITHOUT DYSKINESIA OR FLUCTUATING MANIFESTATIONS: Primary | ICD-10-CM

## 2025-04-10 DIAGNOSIS — R25.2 FOOT CRAMPS: ICD-10-CM

## 2025-04-10 DIAGNOSIS — E03.4 HYPOTHYROIDISM DUE TO ACQUIRED ATROPHY OF THYROID: Primary | ICD-10-CM

## 2025-04-10 PROCEDURE — 99999 PR PBB SHADOW E&M-EST. PATIENT-LVL III: CPT | Mod: PBBFAC,,, | Performed by: PHYSICIAN ASSISTANT

## 2025-04-10 PROCEDURE — 99213 OFFICE O/P EST LOW 20 MIN: CPT | Mod: PBBFAC | Performed by: PHYSICIAN ASSISTANT

## 2025-04-15 ENCOUNTER — PATIENT MESSAGE (OUTPATIENT)
Dept: ENDOCRINOLOGY | Facility: CLINIC | Age: 75
End: 2025-04-15
Payer: MEDICARE

## 2025-04-15 ENCOUNTER — TELEPHONE (OUTPATIENT)
Dept: FAMILY MEDICINE | Facility: CLINIC | Age: 75
End: 2025-04-15
Payer: MEDICARE

## 2025-04-15 NOTE — TELEPHONE ENCOUNTER
Message  Received: Today  Dea Segovia Staff  Phone Number: [phone]     CC conducting monthly call.   Pt reports that she fell this last Saturday-she was dizzy and fell. She reports that she hit the back of her head. no lacerations. she still has a knot on the back of her head.      Thank you for allowing us to be a part of the patient's care team.   Please let me know if I could be of any assistance.    Respectfully,  Dea HSU LVN  Care Coordinator/Care [place]  [phone] ext [phone]      I spoke with the pt  She said she is doing ok  She declines an appt at this time   C/o dizziness on and off   She said she has early stages of parkinson's   Declines a walker at this time

## 2025-04-22 DIAGNOSIS — G20.A1 PARKINSON'S DISEASE, UNSPECIFIED WHETHER DYSKINESIA PRESENT, UNSPECIFIED WHETHER MANIFESTATIONS FLUCTUATE: Primary | ICD-10-CM

## 2025-04-26 NOTE — TELEPHONE ENCOUNTER
Care Due:                  Date            Visit Type   Department     Provider  --------------------------------------------------------------------------------                                EP -                              PRIMARY      [facility]  Last Visit: 12-      CARE ([facility])   LEANNE Chou                              EP -                              PRIMARY      [facility]  Next Visit: 06-      CARE ([facility])   LEANNE Chou                                                            Last  Test          Frequency    Reason                     Performed    Due Date  --------------------------------------------------------------------------------    Lipid Panel.  12 months..  atorvastatin.............  08- 08-    [facility] Embedded Care Due Messages. Reference number: [id number].   4/26/2025 6:58:32 AM CDT

## 2025-04-26 NOTE — TELEPHONE ENCOUNTER
Refill Routing Note   Medication(s) are not appropriate for processing by [facility] for the following reason(s):        Required labs outdated    [facility] action(s):  Defer   Requires labs : Yes             Appointments  past 12m or future 3m with PCP    Date Provider   Last Visit   12/2/2024 Fam Chou MD   Next Visit   6/2/2025 aFm Chou MD   ED visits in past 90 days: 0        Note composed:3:41 PM 04/26/2025

## 2025-04-28 RX ORDER — ATORVASTATIN CALCIUM 40 MG/1
40 TABLET, FILM COATED ORAL
Qty: 90 TABLET | Refills: 3 | Status: SHIPPED | OUTPATIENT
Start: 2025-04-28

## 2025-04-30 ENCOUNTER — EXTERNAL CHRONIC CARE MANAGEMENT (OUTPATIENT)
Dept: PRIMARY CARE CLINIC | Facility: CLINIC | Age: 75
End: 2025-04-30
Payer: MEDICARE

## 2025-05-16 ENCOUNTER — PATIENT MESSAGE (OUTPATIENT)
Facility: CLINIC | Age: 75
End: 2025-05-16
Payer: MEDICARE

## 2025-05-19 ENCOUNTER — RESULTS FOLLOW-UP (OUTPATIENT)
Dept: ENDOCRINOLOGY | Facility: CLINIC | Age: 75
End: 2025-05-19
Payer: MEDICARE

## 2025-05-19 DIAGNOSIS — E03.4 HYPOTHYROIDISM DUE TO ACQUIRED ATROPHY OF THYROID: Primary | ICD-10-CM

## 2025-05-31 ENCOUNTER — EXTERNAL CHRONIC CARE MANAGEMENT (OUTPATIENT)
Dept: PRIMARY CARE CLINIC | Facility: CLINIC | Age: 75
End: 2025-05-31
Payer: MEDICARE

## 2025-06-02 ENCOUNTER — OFFICE VISIT (OUTPATIENT)
Dept: FAMILY MEDICINE | Facility: CLINIC | Age: 75
End: 2025-06-02
Payer: MEDICARE

## 2025-06-02 ENCOUNTER — TELEPHONE (OUTPATIENT)
Dept: FAMILY MEDICINE | Facility: CLINIC | Age: 75
End: 2025-06-02

## 2025-06-02 VITALS
SYSTOLIC BLOOD PRESSURE: 130 MMHG | HEART RATE: 71 BPM | OXYGEN SATURATION: 96 % | BODY MASS INDEX: 25 KG/M2 | TEMPERATURE: 98 F | HEIGHT: 60 IN | DIASTOLIC BLOOD PRESSURE: 70 MMHG | WEIGHT: 127.31 LBS

## 2025-06-02 DIAGNOSIS — I77.9 BILATERAL CAROTID ARTERY DISEASE, UNSPECIFIED TYPE: ICD-10-CM

## 2025-06-02 DIAGNOSIS — F32.1 CURRENT MODERATE EPISODE OF MAJOR DEPRESSIVE DISORDER WITHOUT PRIOR EPISODE: ICD-10-CM

## 2025-06-02 PROCEDURE — 99214 OFFICE O/P EST MOD 30 MIN: CPT | Mod: S$GLB,,, | Performed by: FAMILY MEDICINE

## 2025-06-02 NOTE — PROGRESS NOTES
Subjective:      Patient ID: Florentino Bowman is a 75 y.o. female.    Chief Complaint: Follow-up (6 mon)      Vitals:    25 1514   BP: 130/70   Pulse: 71   Temp: 98.1 °F (36.7 °C)   TempSrc: Oral   SpO2: 96%   Weight: 57.7 kg (127 lb 5.1 oz)   Height: 5' (1.524 m)        HPI   Here for follow up of her dx abnd potential side effects including serotonin syndrome; bad fall at daughters in [place], facial trauma      Problem List  Problem List[1]     ALLERGIES:   Review of patient's allergies indicates:   Allergen Reactions    Clindamycin Diarrhea    Topamax [topiramate] Other (See Comments)     itching    Adhesive      Holter monitor pads    Bactrim [sulfamethoxazole-trimethoprim] Rash       MEDS: Current Medications[2]      History:  Current Providers as of 2025  PCP: Fam Chou MD  Care Team Provider: Karley Kumar MD  Care Team Provider: Shanika Arellano MD  Care Team Provider: Darrian Duran MD  Care Team Provider: David Zhang MD  Care Team Provider: Carla Mendoza MD  Care Team Provider: Louise Silva II, MD  Encounter Provider: Fam Chou MD, starting on  12:00 AM  Referring Provider: not found, starting on  12:00 AM  Consulting Physician: Fam Chou MD, starting on   3:12 PM (Active)   Past Medical History:   Diagnosis Date    Allergy     Arthritis     Carotid stenosis, asymptomatic     50%    Headache(784.0)     Hypothyroid     Movement disorder     Neuropathy      Past Surgical History:   Procedure Laterality Date    BREAST BIOPSY Right     CATARACT EXTRACTION      CAUDAL EPIDURAL STEROID INJECTION N/A 2022    Procedure: INJECTION, STEROID, SPINE, EPIDURAL, CAUDAL;  Surgeon: Tony Castle MD;  [facility];  Service: Pain Management;  Laterality: N/A;     SECTION      x2    COLONOSCOPY N/A 2017    Procedure: COLONOSCOPY Miralax;  Surgeon: Quentin Mercado Jr., MD;  Location: [facility];   Service: Endoscopy;  Laterality: N/A;    COLONOSCOPY N/A 02/11/2021    Procedure: COLONOSCOPY;  Surgeon: Andrew Parekh MD;  Location: [facility] ENDO;  Service: Endoscopy;  Laterality: N/A;    ESOPHAGOGASTRODUODENOSCOPY N/A 02/11/2021    Procedure: EGD (ESOPHAGOGASTRODUODENOSCOPY);  Surgeon: Andrew Parekh MD;  Location: [facility] ENDO;  Service: Endoscopy;  Laterality: N/A;    EYE SURGERY  2001    FLUOROSCOPY  03/20/2023    Procedure: FLUOROSCOPY;  Surgeon: Darrian Duran MD;  Location: [facility] OR;  Service: Pain Management;;    HYSTERECTOMY      INJECTION OF ANESTHETIC AGENT AROUND MEDIAL BRANCH NERVES INNERVATING LUMBAR FACET JOINT Right 01/16/2019    Procedure: BLOCK, NERVE, FACET JOINT, LUMBAR, MEDIAL BRANCH ;  Surgeon: Sixto Moya III, MD;  Location: [facility] OR;  Service: Pain Management;  Laterality: Right;  - L4/5 & L5/S1  PATIENT NEEDS TO BE THE FIRST PATIENT OF THE DAY, PER DR. MOYA!!!    INSERTION, NEUROSTIMULATOR, SPINAL CORD N/A 03/20/2023    Procedure: INSERTION, SPINAL CORD STIMULATOR IMPLANT BOSTON REP;  Surgeon: Darrian Duran MD;  Location: [facility] OR;  Service: Pain Management;  Laterality: N/A;    LAPAROSCOPIC HYSTERECTOMY  2010    supracervical/BSO    OOPHORECTOMY      SPINE SURGERY  9/17/2019, 2017    TONSILLECTOMY      TRANSFORAMINAL EPIDURAL INJECTION OF STEROID Right 06/05/2019    Procedure: INJECTION, STEROID, EPIDURAL, TRANSFORAMINAL APPROACH;  Surgeon: Sixto Moya III, MD;  Location: [facility] OR;  Service: Pain Management;  Laterality: Right;  -right TFESI L4/5    TRANSFORAMINAL EPIDURAL INJECTION OF STEROID Bilateral 02/19/2021    Procedure: INJECTION, STEROID, EPIDURAL, TRANSFORAMINAL APPROACH;  Surgeon: Sixto Moya III, MD;  Location: [facility] OR;  Service: Pain Management;  Laterality: Bilateral;  L5    TRANSFORAMINAL EPIDURAL INJECTION OF STEROID Bilateral 09/30/2022    Procedure: INJECTION, STEROID, EPIDURAL, TRANSFORAMINAL APPROACH;  Surgeon: Tony CHAIREZ  MD Corrine;  Location: [facility] OR;  Service: Pain Management;  Laterality: Bilateral;    TRIAL OF SPINAL CORD NERVE STIMULATOR N/A 02/24/2023    Procedure: SPINAL CORD STIMULATOR TRIAL BOSTON REP *OBED ONLY*;  Surgeon: Darrian Duran MD;  Location: [facility] PAIN [facility]GT;  Service: Pain Management;  Laterality: N/A;    TUBAL LIGATION       Social History[3]      Review of Systems   Constitutional: Negative.    HENT: Negative.     Respiratory: Negative.     Cardiovascular: Negative.    Gastrointestinal: Negative.    Endocrine: Negative.    Genitourinary: Negative.    Musculoskeletal: Negative.    Psychiatric/Behavioral: Negative.     All other systems reviewed and are negative.    Objective:     Physical Exam  Vitals and nursing note reviewed.   Constitutional:       Appearance: She is well-developed.   HENT:      Head: Normocephalic.   Eyes:      Conjunctiva/sclera: Conjunctivae normal.      Pupils: Pupils are equal, round, and reactive to light.   Cardiovascular:      Rate and Rhythm: Normal rate and regular rhythm.      Heart sounds: Normal heart sounds.   Pulmonary:      Effort: Pulmonary effort is normal.      Breath sounds: Normal breath sounds.   Musculoskeletal:         General: Normal range of motion.      Cervical back: Normal range of motion and neck supple.   Skin:     General: Skin is warm and dry.   Neurological:      Mental Status: She is alert and oriented to person, place, and time.      Deep Tendon Reflexes: Reflexes are normal and symmetric.   Psychiatric:         Behavior: Behavior normal.         Thought Content: Thought content normal.         Judgment: Judgment normal.             Assessment:     1. Current moderate episode of major depressive disorder without prior episode    2. Bilateral carotid artery disease, unspecified type      Plan:        Medication List            Accurate as of June 2, 2025 11:59 PM. If you have any questions, ask your nurse or doctor.                CONTINUE taking  these medications      ALPRAZolam 0.5 MG tablet  Commonly known as: XANAX  Take 1 tablet (0.5 mg total) by mouth daily as needed for Anxiety. PRN ANXIETY     atorvastatin 40 MG tablet  Commonly known as: LIPITOR  TAKE 1 TABLET BY MOUTH EVERY DAY     carbidopa-levodopa  mg  mg per tablet  Commonly known as: SINEMET  Take 1 tablet by mouth 3 (three) times daily.     DULoxetine 60 MG capsule  Commonly known as: CYMBALTA  Take 1 capsule (60 mg total) by mouth once daily.     estradioL 0.5 MG tablet  Commonly known as: ESTRACE  Take 1 tablet (0.5 mg total) by mouth every evening.     rasagiline 0.5 MG Tab  Commonly known as: AZILECT  Take 1 tablet (0.5 mg total) by mouth once daily.     sumatriptan 100 MG tablet  Commonly known as: IMITREX  1 bid prn migraine headache     SYNTHROID 100 mcg tablet  Generic drug: levothyroxine  Take 1 tablet (100 mcg total) by mouth before breakfast.            Current moderate episode of major depressive disorder without prior episode    Bilateral carotid artery disease, unspecified type    May to to taper/stop meds assoc with serotonin syndrome and re evaluate; pt notified of this, give written instructions how and neurologist messaged.         [1]   Patient Active Problem List  Diagnosis    Headache    Hypothyroid    Depression    Insomnia    Anxiety    Chronic back pain    Bilateral carotid artery disease    Calcific tendonitis    Left shoulder pain    DDD (degenerative disc disease), lumbar    Change in bowel habit    Screening for colorectal cancer    S/P lumbar laminectomy    Chronic right-sided low back pain with right-sided sciatica    Lumbar radiculopathy    Lumbar radicular pain    Spondylolisthesis    Lumbar spondylosis    Chest pain    Change in bowel habits    Lumbar degenerative disc disease    Wears contact lenses    De Jesus's neuroma of left foot    Acquired posterior equinus of left lower extremity    Bursitis of intermetatarsal bursa of left foot    Muscle  weakness (generalized)    Difficulty walking    Current moderate episode of major depressive disorder without prior episode    Parkinson's disease    Osteopenia after menopause    Dysarthria    Dysphagia    Fine motor impairment    Decreased coordination    Joint stiffness   [2]   Current Outpatient Medications:     ALPRAZolam (XANAX) 0.5 MG tablet, Take 1 tablet (0.5 mg total) by mouth daily as needed for Anxiety. PRN ANXIETY, Disp: 90 tablet, Rfl: 1    atorvastatin (LIPITOR) 40 MG tablet, TAKE 1 TABLET BY MOUTH EVERY DAY, Disp: 90 tablet, Rfl: 3    carbidopa-levodopa  mg (SINEMET)  mg per tablet, Take 1 tablet by mouth 3 (three) times daily., Disp: 270 tablet, Rfl: 3    DULoxetine (CYMBALTA) 60 MG capsule, Take 1 capsule (60 mg total) by mouth once daily., Disp: 90 capsule, Rfl: 3    estradioL (ESTRACE) 0.5 MG tablet, Take 1 tablet (0.5 mg total) by mouth every evening., Disp: 90 tablet, Rfl: 3    rasagiline (AZILECT) 0.5 MG Tab, Take 1 tablet (0.5 mg total) by mouth once daily., Disp: 30 tablet, Rfl: 11    sumatriptan (IMITREX) 100 MG tablet, 1 bid prn migraine headache, Disp: 90 tablet, Rfl: 1    SYNTHROID 100 mcg tablet, Take 1 tablet (100 mcg total) by mouth before breakfast., Disp: 90 tablet, Rfl: 3    Current Facility-Administered Medications:     onabotulinumtoxina injection 200 Units, 200 Units, Intramuscular, Q90 Days,     Facility-Administered Medications Ordered in Other Visits:     0.9%  NaCl infusion, , Intravenous, Continuous, Tony Castle MD, New Bag at 09/30/22 0822    0.9%  NaCl infusion, , Intravenous, Continuous, Tony Castle MD    sodium chloride 0.9% flush 10 mL, 10 mL, Intravenous, PRN, Tony Castle MD    sodium chloride 0.9% flush 10 mL, 10 mL, Intravenous, PRN, Tony Castle MD  [3]   Social History  Tobacco Use    Smoking status: Never     Passive exposure: Never    Smokeless tobacco: Never   Substance Use Topics    Alcohol use: Not Currently      Alcohol/week: 4.0 standard drinks of alcohol     Types: 4 Glasses of wine per week     Comment: 3 glasses weekly    Drug use: No

## 2025-06-03 ENCOUNTER — CLINICAL SUPPORT (OUTPATIENT)
Dept: REHABILITATION | Facility: HOSPITAL | Age: 75
End: 2025-06-03
Attending: STUDENT IN AN ORGANIZED HEALTH CARE EDUCATION/TRAINING PROGRAM
Payer: MEDICARE

## 2025-06-03 ENCOUNTER — OFFICE VISIT (OUTPATIENT)
Facility: CLINIC | Age: 75
End: 2025-06-03
Payer: MEDICARE

## 2025-06-03 VITALS
BODY MASS INDEX: 24.54 KG/M2 | WEIGHT: 125 LBS | HEART RATE: 82 BPM | SYSTOLIC BLOOD PRESSURE: 112 MMHG | HEIGHT: 60 IN | DIASTOLIC BLOOD PRESSURE: 72 MMHG

## 2025-06-03 DIAGNOSIS — R47.1 DYSARTHRIA: Primary | ICD-10-CM

## 2025-06-03 DIAGNOSIS — G20.A1 PARKINSON'S DISEASE, UNSPECIFIED WHETHER DYSKINESIA PRESENT, UNSPECIFIED WHETHER MANIFESTATIONS FLUCTUATE: ICD-10-CM

## 2025-06-03 DIAGNOSIS — G20.A1 PARKINSON'S DISEASE WITHOUT DYSKINESIA OR FLUCTUATING MANIFESTATIONS: Primary | ICD-10-CM

## 2025-06-03 DIAGNOSIS — I95.1 ORTHOSTASIS: ICD-10-CM

## 2025-06-03 DIAGNOSIS — R13.10 DYSPHAGIA, UNSPECIFIED TYPE: ICD-10-CM

## 2025-06-03 PROCEDURE — 97161 PT EVAL LOW COMPLEX 20 MIN: CPT

## 2025-06-03 PROCEDURE — 97165 OT EVAL LOW COMPLEX 30 MIN: CPT

## 2025-06-03 PROCEDURE — 99212 OFFICE O/P EST SF 10 MIN: CPT | Mod: PBBFAC

## 2025-06-03 PROCEDURE — 99999 PR PBB SHADOW E&M-EST. PATIENT-LVL II: CPT | Mod: PBBFAC,,,

## 2025-06-03 PROCEDURE — 92522 EVALUATE SPEECH PRODUCTION: CPT

## 2025-06-03 PROCEDURE — 92610 EVALUATE SWALLOWING FUNCTION: CPT

## 2025-06-05 ENCOUNTER — CLINICAL SUPPORT (OUTPATIENT)
Dept: REHABILITATION | Facility: HOSPITAL | Age: 75
End: 2025-06-05
Payer: MEDICARE

## 2025-06-05 DIAGNOSIS — R29.898 FINE MOTOR IMPAIRMENT: Primary | ICD-10-CM

## 2025-06-05 DIAGNOSIS — R26.2 DIFFICULTY WALKING: Primary | ICD-10-CM

## 2025-06-05 DIAGNOSIS — R29.818 FINE MOTOR IMPAIRMENT: Primary | ICD-10-CM

## 2025-06-05 DIAGNOSIS — M25.60 JOINT STIFFNESS: ICD-10-CM

## 2025-06-05 DIAGNOSIS — R27.8 DECREASED COORDINATION: ICD-10-CM

## 2025-06-05 PROCEDURE — 97530 THERAPEUTIC ACTIVITIES: CPT | Mod: PN

## 2025-06-05 PROCEDURE — 97112 NEUROMUSCULAR REEDUCATION: CPT | Mod: PN

## 2025-06-05 PROCEDURE — 97110 THERAPEUTIC EXERCISES: CPT | Mod: PN

## 2025-06-10 NOTE — PROGRESS NOTES
Subjective:      Patient ID: Florentino Bowman is a 75 y.o. female.    Chief Complaint:  Hypothyroidism due to acquired atrophy of thyroid    History of Present Illness  Florentino Bowman is here for evaluation of hypothyroidism.  Last seen 12/2024 by Dr. Diaz and myself.      History of variable TSH.  Since last visit, reduced Levothyroxine with normalization of TSH.  Variable TSH most likely due to inconsistency with Levothyroxine administration.    With regards to hypothyroidism:    FH of thyroid disease: Mother and sisters have low thyroid  FH of thyroid cancer: Denies  Personal history of FNA, JAMES, thyroid surgery, or head/neck radiation treatment: Denies     Lab Results   Component Value Date    TSH 0.454 05/16/2025    H2YTNTN 107 11/02/2007    L3NHDSJ 9.6 02/09/2023    FREET4 1.27 02/11/2025       Current Medication:  Synthroid 100 mcg daily  Patient has always used a type of generic.  Patient has used armour thyroid in the past.    Reports taking thyroid medication both on empty stomach in the morning and also in the afternoon.  Denies taking biotin, iron, or calcium supplements.  No MVI    Denies unexplained weight gain, hair loss, brittle nails, skin changes, CP, palpitations, SOB, and heat intolerance.    Reports fatigue, heat and cold intolerance, and constipation.  These are not new symptoms.    Patient has tremors associated with Parkinson's.    HRT: Taking estrogen - Spoke with GP and was recommended to continue taking due to risk of fall and osteopenia.      Last BMD dated: 11/15/2023  FINDINGS:  The L1 to L4 vertebral bone mineral density is equal to 1.166 g/cm squared with a T score of -0.1.  The left femoral neck bone mineral density is equal to 0.763 g/cm squared with a T score of -2.0.  There is a 12.7% risk of a major osteoporotic fracture and a 2.8% risk of hip fracture in the next 10 years (FRAX).     Impression:  Lumbar spine normal bone density.  Left femoral neck osteopenia.     Dr. Chou  "recommended Fosamax.  Patient not currently taking due to conflicting information from providers.  Denies significant fracture history.  Taking Vitamin D3 2000 iu daily    Review of Systems:  Above    Objective:   Physical Exam  Constitutional:       Appearance: She is well-developed.   HENT:      Head: Normocephalic.   Eyes:      Conjunctiva/sclera: Conjunctivae normal.   Pulmonary:      Effort: Pulmonary effort is normal.   Musculoskeletal:         General: Normal range of motion.   Skin:     General: Skin is warm.   Neurological:      Mental Status: She is alert and oriented to person, place, and time.       Visit Vitals  /71 (BP Location: Left arm, Patient Position: Sitting)   Pulse 79   Ht 5' (1.524 m)   Wt 56.6 kg (124 lb 12.5 oz)   LMP 01/01/2001   SpO2 98%   BMI 24.37 kg/m²     Body mass index is 24.37 kg/m².    Lab Review:   No results found for: "HGBA1C"    Lab Results   Component Value Date    CHOL 162 08/11/2023    HDL 87 (H) 08/11/2023    LDLCALC 64.8 08/11/2023    TRIG 51 08/11/2023    CHOLHDL 53.7 (H) 08/11/2023     Lab Results   Component Value Date     08/27/2024    K 4.1 08/27/2024     08/27/2024    CO2 26 08/27/2024    GLU 97 08/27/2024    BUN 12 08/27/2024    CREATININE 0.8 08/27/2024    CALCIUM 9.8 08/27/2024    PROT 7.3 08/27/2024    ALBUMIN 3.9 08/27/2024    BILITOT 0.5 08/27/2024    ALKPHOS 91 08/27/2024    AST 18 08/27/2024    ALT 8 (L) 08/27/2024    ANIONGAP 10 08/27/2024    ESTGFRAFRICA >60.0 07/18/2022    EGFRNONAA >60.0 07/18/2022    TSH 0.454 05/16/2025     Vit D, 25-Hydroxy   Date Value Ref Range Status   08/27/2024 49 30 - 96 ng/mL Final     Comment:     Vitamin D deficiency.........<10 ng/mL                              Vitamin D insufficiency......10-29 ng/mL       Vitamin D sufficiency........> or equal to 30 ng/mL  Vitamin D toxicity............>100 ng/mL       Assessment and Plan     1. Hypothyroidism due to acquired atrophy of thyroid  TSH      2. Osteopenia " after menopause  DXA Bone Density Axial Skeleton 1 or more sites        Hypothyroid  -- Most recent TSH at goal  -- Will work to avoid exogenous hyperthyroidism as this can accelerate bone loss and increase risk of CV complications.  -- Educated on how to appropriately take thyroid medication as patient takes medication at variable times throughout the day  -- Calcium and iron need to be  from thyroid hormone replacement by 4 or > hours.  -- Please note: if you are taking biotin please hold it for 5 days prior to labs as it can interfere with the thyroid testing.  -- Continue:  Synthroid 100 mcg daily    Osteopenia after menopause  -- Patient currently on estrogen replacement therapy  -- Recommend repeat BMD every 2 years  -- Continue Vitamin D supplementation  -- Repeat DXA ordered for 12/2025    Follow up in about 1 year (around 6/18/2026).    Lisa Lier, FNP-C [facility]     Case discussed with Dr. Cortez  Recommendations were discussed with the patient in detail  The patient verbalized understanding and agrees with the plan outlined as above.     Visit today included increased complexity associated with the care of the problems addressed and managing the longitudinal care of the patient due to the serious and/or complex managed problems.

## 2025-06-12 ENCOUNTER — CLINICAL SUPPORT (OUTPATIENT)
Dept: REHABILITATION | Facility: HOSPITAL | Age: 75
End: 2025-06-12
Payer: MEDICARE

## 2025-06-12 DIAGNOSIS — R29.898 FINE MOTOR IMPAIRMENT: Primary | ICD-10-CM

## 2025-06-12 DIAGNOSIS — R29.818 FINE MOTOR IMPAIRMENT: Primary | ICD-10-CM

## 2025-06-12 DIAGNOSIS — M25.60 JOINT STIFFNESS: ICD-10-CM

## 2025-06-12 DIAGNOSIS — R27.8 DECREASED COORDINATION: ICD-10-CM

## 2025-06-12 PROCEDURE — 97530 THERAPEUTIC ACTIVITIES: CPT | Mod: PN

## 2025-06-12 NOTE — PROGRESS NOTES
Outpatient Rehab    Occupational Therapy Visit    Patient Name: Florentino Bowman  MRN: [id number]  YOB: 1950  Encounter Date: 6/12/2025    Therapy Diagnosis:   Encounter Diagnoses   Name Primary?    Fine motor impairment Yes    Decreased coordination     Joint stiffness      Physician: Belkis Foster MD    Physician Orders: Eval and Treat  Medical Diagnosis: Parkinson's disease, unspecified whether dyskinesia present, unspecified whether manifestations fluctuate  Surgical Diagnosis: Not applicable for this Episode   Surgical Date: Not applicable for this Episode    Visit # / Visits Authorized: 2 / 20  Insurance Authorization Period: 6/3/2025 to 12/31/2025  Date of Evaluation: 6/3/2025  Plan of Care Certification: 6/3/2025 to 7/30/2025      Time In: 1300   Time Out: 1340  Total Time (in minutes): 40   Total Billable Time (in minutes): 40      Precautions:     standard      Subjective      Pain reported as 2/10. lower back    Objective        No measures obtained this date.     Treatment:    Pt completed ther acts to address LUE  strength and FM control/coordination required for participation in functional tasks. Pt demonstrated the following:  Therapeutic Activity  TA 1: manipulating red theraputty to remove 10 marbles  TA 2: tripod grasp on red clothespin to  small pompoms and place in container  TA 3: palmar grasp on spring resistance hand exerciser (at low resistance) to  small pompoms and place in container  TA 4: in-hand manipulation and palm- > finger translation to  coins and place throught slot in container  TA 5: in-hand manipulation w/ 9-hole peg x 5  TA 6: grooved pegboard  TA 7: stringing beads onto lace    Time Entry(in minutes):  Therapeutic Activity Time Entry: 40    Assessment & Plan   Assessment: Pt completed therapeutic activities w/ good energy and participation. Pt demonstrated fair LUE  stength and FM control/coordination, noted increased difficulty w/  in-hand manipulation and slight tremor.  Evaluation/Treatment Tolerance: Patient tolerated treatment well    The patient will continue to benefit from skilled outpatient occupational therapy in order to address the deficits listed in the problem list on the initial evaluation, provide patient and family education, and maximize the patients level of independence in the home and community environments.     The patient's spiritual, cultural, and educational needs were considered, and the patient is agreeable to the plan of care and goals.     Education  Education was done with Patient. The patient's learning style includes Listening, Pictures/video, and Reading. The patient Verbalizes understanding.         Progress toward goals, upper body stretching HEP       Plan: continue OT POC    Goals:   Active       LTG       Pt will perform all provided OT HEPs/HAPs Independently.  (Progressing)       Start:  06/04/25    Expected End:  07/30/25            Pt will demonstrate Fair+ LUE proprioceptive/kinesthetic movement awareness and motor planning as evidence by requiring 0 verbal cues while performing salient bimanual integration task with divided attention/dual tasking component.  (Progressing)       Start:  06/04/25    Expected End:  07/30/25            Pt will demonstrate improved L FM coordination as evidence by completing 9HPT in </= 28 secs. (Progressing)       Start:  06/04/25    Expected End:  07/30/25               STG       OT will provide training/education on appropriate HEPs/HAPs with GM/FM coordination, stretching, and amplitude based components to promote calibration of fxnl movement patterns and maintain fxnl performance safety, independence, and efficiency.  (Met)       Start:  06/04/25    Expected End:  07/02/25    Resolved:  06/12/25         Pt will demonstrate Fair LUE proprioceptive/kinesthetic movement awareness and motor planning as evidence by requiring </= 2 verbal cues while performing salient  bimanual integration task with divided attention/dual tasking component.  (Progressing)       Start:  06/04/25    Expected End:  07/02/25            Pt will demonstrate improved L FM coordination as evidence by completing 9HPT in </= 30 secs. (Progressing)       Start:  06/04/25    Expected End:  07/02/25                Priyanka Webber OT

## 2025-06-16 ENCOUNTER — OFFICE VISIT (OUTPATIENT)
Dept: PALLIATIVE MEDICINE | Facility: CLINIC | Age: 75
End: 2025-06-16
Payer: MEDICARE

## 2025-06-16 ENCOUNTER — CLINICAL SUPPORT (OUTPATIENT)
Dept: REHABILITATION | Facility: HOSPITAL | Age: 75
End: 2025-06-16
Payer: MEDICARE

## 2025-06-16 VITALS
DIASTOLIC BLOOD PRESSURE: 67 MMHG | HEART RATE: 86 BPM | SYSTOLIC BLOOD PRESSURE: 111 MMHG | WEIGHT: 126.13 LBS | BODY MASS INDEX: 24.63 KG/M2 | OXYGEN SATURATION: 98 %

## 2025-06-16 DIAGNOSIS — F41.9 ANXIETY: ICD-10-CM

## 2025-06-16 DIAGNOSIS — R29.898 FINE MOTOR IMPAIRMENT: Primary | ICD-10-CM

## 2025-06-16 DIAGNOSIS — G20.A1 PARKINSON'S DISEASE WITHOUT DYSKINESIA OR FLUCTUATING MANIFESTATIONS: Primary | ICD-10-CM

## 2025-06-16 DIAGNOSIS — M25.60 JOINT STIFFNESS: ICD-10-CM

## 2025-06-16 DIAGNOSIS — K59.09 OTHER CONSTIPATION: ICD-10-CM

## 2025-06-16 DIAGNOSIS — R26.81 GAIT INSTABILITY: ICD-10-CM

## 2025-06-16 DIAGNOSIS — R26.2 DIFFICULTY WALKING: Primary | ICD-10-CM

## 2025-06-16 DIAGNOSIS — R29.818 FINE MOTOR IMPAIRMENT: Primary | ICD-10-CM

## 2025-06-16 DIAGNOSIS — R27.8 DECREASED COORDINATION: ICD-10-CM

## 2025-06-16 DIAGNOSIS — Z51.5 PALLIATIVE CARE ENCOUNTER: ICD-10-CM

## 2025-06-16 PROCEDURE — 99417 PROLNG OP E/M EACH 15 MIN: CPT | Mod: S$PBB,,, | Performed by: STUDENT IN AN ORGANIZED HEALTH CARE EDUCATION/TRAINING PROGRAM

## 2025-06-16 PROCEDURE — 97530 THERAPEUTIC ACTIVITIES: CPT | Mod: PN,CQ

## 2025-06-16 PROCEDURE — 99213 OFFICE O/P EST LOW 20 MIN: CPT | Mod: PBBFAC | Performed by: STUDENT IN AN ORGANIZED HEALTH CARE EDUCATION/TRAINING PROGRAM

## 2025-06-16 PROCEDURE — 99215 OFFICE O/P EST HI 40 MIN: CPT | Mod: S$PBB,,, | Performed by: STUDENT IN AN ORGANIZED HEALTH CARE EDUCATION/TRAINING PROGRAM

## 2025-06-16 PROCEDURE — 99999 PR PBB SHADOW E&M-EST. PATIENT-LVL III: CPT | Mod: PBBFAC,,, | Performed by: STUDENT IN AN ORGANIZED HEALTH CARE EDUCATION/TRAINING PROGRAM

## 2025-06-16 PROCEDURE — 97112 NEUROMUSCULAR REEDUCATION: CPT | Mod: PN,CQ

## 2025-06-16 PROCEDURE — 97110 THERAPEUTIC EXERCISES: CPT | Mod: PN

## 2025-06-16 PROCEDURE — 97112 NEUROMUSCULAR REEDUCATION: CPT | Mod: PN

## 2025-06-16 RX ORDER — POLYETHYLENE GLYCOL 3350 17 G/17G
17 POWDER, FOR SOLUTION ORAL DAILY
Qty: 510 G | Refills: 2 | Status: SHIPPED | OUTPATIENT
Start: 2025-06-16 | End: 2025-06-17 | Stop reason: SDUPTHER

## 2025-06-16 NOTE — PROGRESS NOTES
Outpatient Rehab    Occupational Therapy Visit    Patient Name: Florentino Bowman  MRN: [id number]  YOB: 1950  Encounter Date: 6/16/2025    Therapy Diagnosis:   Encounter Diagnoses   Name Primary?    Fine motor impairment Yes    Decreased coordination     Joint stiffness      Physician: Belkis Foster MD    Physician Orders: Eval and Treat  Medical Diagnosis: Parkinson's disease, unspecified whether dyskinesia present, unspecified whether manifestations fluctuate  Surgical Diagnosis: Not applicable for this Episode   Surgical Date: Not applicable for this Episode  Days Since Last Surgery: Not applicable for this Episode    Visit # / Visits Authorized: 3 / 20  Insurance Authorization Period: 6/3/2025 to 12/31/2025  Date of Evaluation: 6/3/2025  Plan of Care Certification: 6/3/2025 to 7/30/2025      Time In: 0900   Time Out: 0945  Total Time (in minutes): 45   Total Billable Time (in minutes): 45    Precautions:     standard      Subjective   Pt reports she irritated her lower back this weekend, however, does not hurt at rest.  Pain reported as 0/10.      Objective        No measures obtained this date.     Treatment:    Pt completed ther ex and balance/NMR activities to address LUE shoulder ROM/muscle elasticity and strength required for participation in functional tasks. Pt demonstrated the following:  Therapeutic Exercise  TE 1: Sci[employer] UBE for 4 mins forward cycling and 4 mins backward cycling at level 1.0  TE 2: supine shoulder flex for 20 reps x 2 w/ 1# dowel  TE 3: side-lying shoulder flex and external rotation for 10 reps x 3 w/ 1# dumbbell  TE 4: standing overhead press wall slides for 10 reps x 3  TE 5: seated internal rotation towel stretch  Balance/Neuromuscular Re-Education  NMR 1: child's pose stretch for 60 seconds  NMR 2: quadruped open books for 10 reps each direction    Time Entry(in minutes):  Neuromuscular Re-Education Time Entry: 8  Therapeutic Exercise Time Entry: 37    Assessment &  Plan   Assessment: Pt completed therapeutic exercises and NMR/balance activities w/ good energy and participation. Pt demonstrated good LUE shoulder ROM and fair strength, taking rest breaks as needed. Pt demonstrated good core strength w/ quadruped activities, trunk ROM slightly decreased w/ open books.   Evaluation/Treatment Tolerance: Patient tolerated treatment well    The patient will continue to benefit from skilled outpatient occupational therapy in order to address the deficits listed in the problem list on the initial evaluation, provide patient and family education, and maximize the patients level of independence in the home and community environments.     The patient's spiritual, cultural, and educational needs were considered, and the patient is agreeable to the plan of care and goals.     Education  Education was done with Patient. The patient's learning style includes Listening. The patient Verbalizes understanding.         Progress toward goals       Plan: continue OT POC    Goals:   Active       LTG       Pt will perform all provided OT HEPs/HAPs Independently.  (Progressing)       Start:  06/04/25    Expected End:  07/30/25            Pt will demonstrate Fair+ LUE proprioceptive/kinesthetic movement awareness and motor planning as evidence by requiring 0 verbal cues while performing salient bimanual integration task with divided attention/dual tasking component.  (Progressing)       Start:  06/04/25    Expected End:  07/30/25            Pt will demonstrate improved L FM coordination as evidence by completing 9HPT in </= 28 secs. (Progressing)       Start:  06/04/25    Expected End:  07/30/25               STG       OT will provide training/education on appropriate HEPs/HAPs with GM/FM coordination, stretching, and amplitude based components to promote calibration of fxnl movement patterns and maintain fxnl performance safety, independence, and efficiency.  (Met)       Start:  06/04/25    Expected  End:  07/02/25    Resolved:  06/12/25         Pt will demonstrate Fair LUE proprioceptive/kinesthetic movement awareness and motor planning as evidence by requiring </= 2 verbal cues while performing salient bimanual integration task with divided attention/dual tasking component.  (Progressing)       Start:  06/04/25    Expected End:  07/02/25            Pt will demonstrate improved L FM coordination as evidence by completing 9HPT in </= 30 secs. (Progressing)       Start:  06/04/25    Expected End:  07/02/25                Priyanka Webber OT

## 2025-06-16 NOTE — PROGRESS NOTES
"  Outpatient Rehab    Physical Therapy Visit    Patient Name: Florentino Bowman  MRN: [id number]  YOB: 1950  Encounter Date: 6/16/2025    Therapy Diagnosis:   Encounter Diagnosis   Name Primary?    Difficulty walking Yes     Physician: Belkis Foster MD    Physician Orders: Eval and Treat  Medical Diagnosis: Parkinson's disease, unspecified whether dyskinesia present, unspecified whether manifestations fluctuate    Visit # / Visits Authorized:  2 / 20  Insurance Authorization Period: 6/3/2025 to 12/31/2025  Date of Evaluation: 6/5/2025  Plan of Care Certification:       PT/PTA: PTA   Number of PTA visits since last PT visit:1  Time In: 0945   Time Out: 1030  Total Time (in minutes): 45   Total Billable Time (in minutes): 45    FOTO:  Intake Score:  %  Survey Score 2:  %  Survey Score 3:  %    Precautions:       Subjective   She says dizziness is typical first thing in the morning but resolves as day goes on, denies nay resting dizziness when entering clinic.  Pain reported as 0/10. lower back    Objective            Treatment:  Balance/Neuromuscular Re-Education  NMR 1: 2 x 30" VORx1, standing "x" target, 90 bpm, horizontal head turns  NMR 2: 2 x 30" VORx1, standing "x" target, 90 bpm, vertical head turns  NMR 3: Sit<>stand: 2x10 from black chair with airex under feet  NMR 4: Narrow base of support on airex: horizontal head turns 2x30", vertical head nos 2x30", eyes closed 2x30"  NMR 5: Reciprocal step ups on soft black fitter board: 2x45" ea leg leading  NMR 6: Lateral step up and over on soft black fitter board: 2x45"  Therapeutic Activity  TA 2: x 10 each way, seated D1 flexion<>extension cone taps  TA 3: 2 x 10, vestibular sit<>Stand, keeping eyes closed during transitional movement  TA 4: 2 x 45" ambulating with each: vertical head turns, horizontal head turns, diagonal head turns, eyes closed, backwards walking, walking with quarter turns  giving and recieving volleyball    Time Entry(in " minutes):  Neuromuscular Re-Education Time Entry: 25  Therapeutic Activity Time Entry: 20    Assessment & Plan   Assessment: Florentino arrived to session without complaints, denied any recent dizziness, and was agreeable to treatment.  Session focused on VOR, habituation, and general balance exercises.  No dizzy response to any head or body turning activity reported.  Desired challenge level achieved with all activity.  She would benefit from continued PT services to improve balance deficits and decrease fall risk.   Evaluation/Treatment Tolerance: Patient tolerated treatment well    The patient will continue to benefit from skilled outpatient physical therapy in order to address the deficits listed in the problem list on the initial evaluation, provide patient and family education, and maximize the patients level of independence in the home and community environments.     The patient's spiritual, cultural, and educational needs were considered, and the patient is agreeable to the plan of care and goals.           Plan: Cont POC, progress motion tolerance and dynamic balance training as tolerated    Goals:     Aurea Riley, PTA

## 2025-06-16 NOTE — PROGRESS NOTES
Palliative Medicine Clinic Note        Consult Requested By: No ref. provider found      Reason for Consult: Symptom management and ACP in the setting of Parkinson's disease     Chief Complaint:   No chief complaint on file.          ASSESSMENT/PLAN:      Plan/Recommendations:    Florentino was seen today for insomnia.    Diagnoses and all orders for this visit:      Parkinson's disease without dyskinesia or fluctuating manifestations  -L-side predominant -- dystonic features  -cd/ld 1 tab TID and rasagiline 0.5 mg daily  - Seen by multi-disciplinary [facility] for baseline assessments   - Referred PM BH support - Donna Horton for virtual talk therapy sessions.  - Managing well overall with current treatment regimen for Parkinson's disease.  - Current constipation likely related to weakening muscles, a known symptom of Parkinson's progression.  - Spinal cord stimulator effectively managing leg pain and toe cramping.  - Communicate with family about potential need for assistance with daily activities in future. Patient is considering future living arrangements, including moving closer to her children. She desires to maintain independence as long as possible but has not made concrete plans for downsizing or moving.  - Be mindful of fatigue levels during planned excursions on upcoming cruise.  - Continue current physical therapy and occupational therapy regimens.  - Consider using assistive devices like a cane or walker during upcoming trips for added stability.  - Ordered 2-wheeled walker for patient use during periods of unsteadiness or back pain.   -Rolling walker:  The mobility limitation cannot be sufficiently resolved by the use of a cane.   Patient's functional mobility deficit can be sufficiently resolved with the use of a (Rollator, Rolling Walker or Walker). Patient's mobility limitation significantly impairs their ability to participate in one of more activities of daily living.  The use of a (Rollator, RW  or Walker) will significantly improve the patient's ability to participate in MRADLS and the patient will use it on regular basis in the home       VERTIGO AND DIZZINESS:  - Experiencing intermittent dizziness, particularly after falls, may be related to medication side effects or vestibular issues.  - Educated on importance of vestibular therapy for improving balance and reducing dizziness.  - Continue current vestibular therapy regimen.      Depression   - stable on cymbalta   - Continued Xanax as needed, approximately 2 times per week for anxiety.  - Referred to Donna Horton, therapist within the department, for assistance with care planning and anxiety management.    Constipation  -     sennosides (SENNA) 8.6 mg Cap; Take 2 tablets by mouth every evening.  - Started MiraLAX for constipation management.  - Contact office if MiraLAX is ineffective for constipation or if sleep issues worsen.          Advance Care Planning   Advance Directives:   Living Will: No    LaPOST: No    Do Not Resuscitate Status: No    Medical Power of : Yes    Agent's Name:  Betty HOFFMAN   Agent's Contact Number:      Decision Making:  Patient answered questions  Goals of Care: Advance Care Planning    Date: 12/10/2024    I engaged the patient in a voluntary conversation about advance care planning and we specifically addressed what the goals of care would be moving forward.  We explored the patient's values and preferences for future care.  The patient endorses that what is most important right now is to focus on spending time at home and remaining as independent as possible. Patient expresses anxiety about planning for the future, particularly regarding where she will live as her condition progresses. She has concerns about being a burden to her children. Her daughter in [place] has offered for her to live with her, but the patient is hesitant due to the desire to maintain independence. She is concerned about the impact  on her son's family life if she were to live with them locally. Patient prefers to stay in her current home with caregivers if needed. She made end-of-life decisions for her late  and indicates she would not want to be on life support for a prolonged period without a clear prognosis. Patient plans to discuss future care needs with her children during the upcoming holidays. She is considering moving closer to her son or having a separate living space on his property in the future. Patient values maintaining her independence and current lifestyle as long as possible. No formal advance directives or power of  have been established yet.Accordingly, we have decided that the best plan to meet the patient's goals includes continuing with treatment      Date: 09/06/2024  I initiated the process of voluntary advance care planning today and explained the importance of this process to the patient.  I introduced the concept of advance directives to the patient, as well. Then the patient received detailed information about the importance of designating a Health Care Power of  (HCPOA). The patient has not previously appointed a HCPOA. After our discussion, the patient has decided to complete a HCPOA and has appointed her daughter, health care agent: Betty Peters & health care agent number: . I encouraged her to communicate with this person about their wishes for future healthcare, should she become sick and lose decision-making capacity. The patient expresses hope that she does not have Parkinson's disease. She is seeking to understand her future care options and maintain independence. The patient has a healthcare power of  document in place, naming her daughter, Betty Peters, as the primary decision-maker, with her son Jesus as the secondary choice. She lives alone in her home of 45 years and wants to stay there as long as possible. The patient's daughter, who is a nurse, lives  "in [place] and calls daily. Her son lives in the same town. The patient understands that moving closer to family might be necessary in the future for better care coordination, but is not ready for that step yet. The patient has experience with end-of-life decisions, having made the choice to discontinue life support for her late  based on his wishes. She expresses a similar preference for herself, however she needs to think about her wishes and share them with her POA. Provided ACP booklet             Follow up:     - Follow up in 6 months.  - Contact the office if experiencing increased memory issues or changes in mobility.         SUBJECTIVE:      History of Present Illness / Interval History:  Florentino Bowman is 75 y.o. female with PD.  Presents to Palliative Care Clinic for advance care planning, and additional support.. Please see neuro note for more details on PD      6/16/25  History obtained from: patient     Patient reports two types of dizziness: a "helicopter" sensation triggered by position changes, and unsteadiness occurring 2-3 times weekly, often with foot numbness. She experiences joint and muscle aches, using Lypizof for relief. Patient injured her back last weekend while bending, requiring ice, heat, and a borrowed walker for mobility. She attended physical and occupational therapy sessions today. Foot numbness, typically in mornings, may be related to back surgery in 2018 or 2019. Patient underwent cataract surgery a few months ago, leading to ongoing eye discomfort requiring frequent eye drops. She expresses anxiety about her condition worsening and managing care independently. Patient has two upcoming trips planned: a few days in [place] with friends and a seven-day cruise with family. She denies nausea with dizziness and any changes to Sinemet dosage.    Attends physical therapy and occupational therapy sessions  Participates in stretch class 3 times per week  Previously attended yoga " and cardio classes at the gym  Experiences dizziness, particularly after falls, occurring 2-3 times per week  Uses a walker when experiencing significant back pain or dizziness  Drives independently  Orders groceries from [employer] for delivery  Manages housework with assistance from a   Experiences foot numbness, particularly in the mornings, affecting balance and walking  Has difficulty with close vision following cataract surgery  Experiences occasional constipation  Takes Xanax for sleep when needed  Plan to go on two trips, including a cruise, demonstrating ability to travel        Please refer to prior appt for more details: 9/6/24; 12/20/24    ROS:  Review of Systems   Neurological:  Positive for vertigo.       Review of Symptoms      Symptom Assessment (ESAS 0-10 Scale)  Pain:  4  Dyspnea:  0  Anxiety:  2  Nausea:  0  Depression:  3  Anorexia:  4  Fatigue:  4  Insomnia:  0  Restlessness:  0  Agitation:  0     CAM / Delirium:  Negative  Constipation:  Positive  Diarrhea:  Negative      Pain Assessment:    Location(s): back    Back       Location: lower        Quality: Aching        Quantity: 4/10 in intensity        Chronicity: Onset 0 week(s) ago, stable        Aggravating Factors: None        Alleviating Factors: None       Associated Symptoms: None    Performance Status:  90    Living Arrangements:  Lives alone    Psychosocial/Cultural:   See Palliative Psychosocial Note: Yes  She lives alone in her home of 45 years. She is  and has a daughter and a son who live out of town   **Primary  to Follow**  Palliative Care  Consult: Yes    Spiritual:  F - Raquel and Belief:  Tenriism      Medications:    Current Outpatient Medications:     ALPRAZolam (XANAX) 0.5 MG tablet, Take 1 tablet (0.5 mg total) by mouth daily as needed for Anxiety. PRN ANXIETY, Disp: 90 tablet, Rfl: 1    atorvastatin (LIPITOR) 40 MG tablet, TAKE 1 TABLET BY MOUTH EVERY DAY, Disp: 90 tablet, Rfl: 3     carbidopa-levodopa  mg (SINEMET)  mg per tablet, Take 1 tablet by mouth 3 (three) times daily., Disp: 270 tablet, Rfl: 3    DULoxetine (CYMBALTA) 60 MG capsule, Take 1 capsule (60 mg total) by mouth once daily., Disp: 90 capsule, Rfl: 3    estradioL (ESTRACE) 0.5 MG tablet, Take 1 tablet (0.5 mg total) by mouth every evening., Disp: 90 tablet, Rfl: 3    rasagiline (AZILECT) 0.5 MG Tab, Take 1 tablet (0.5 mg total) by mouth once daily., Disp: 30 tablet, Rfl: 11    sumatriptan (IMITREX) 100 MG tablet, 1 bid prn migraine headache, Disp: 90 tablet, Rfl: 1    SYNTHROID 100 mcg tablet, Take 1 tablet (100 mcg total) by mouth before breakfast., Disp: 90 tablet, Rfl: 3    Current Facility-Administered Medications:     onabotulinumtoxina injection 200 Units, 200 Units, Intramuscular, Q90 Days,     Facility-Administered Medications Ordered in Other Visits:     0.9%  NaCl infusion, , Intravenous, Continuous, Tony Castle MD, New Bag at 09/30/22 0822    0.9%  NaCl infusion, , Intravenous, Continuous, Tony Castle MD    sodium chloride 0.9% flush 10 mL, 10 mL, Intravenous, PRN, Tony Castle MD    sodium chloride 0.9% flush 10 mL, 10 mL, Intravenous, PRN, Tony Castle MD      External  database queried on 06/16/2025  by Ivana CORDOVA :  10/17/2024 10/17/2024 1 Alprazolam 0.5 Mg Tablet 90.00 90 Co Audrey [id number] Pam ([id number]) 0 1.00 LME [place]   08/30/2024 08/30/2024 1 Acetaminophen-Cod #3 Tablet 12.00 4 Ta Felipe [id number] Pam ([id number]) 0 13.50 E [place]   05/13/2024 01/09/2024 1 Alprazolam 0.5 Mg Tablet 90.00 90 Co Audrey [id number] Pam ([id number]) 0 1.00 LME [place]         Review of patient's allergies indicates:   Allergen Reactions    Clindamycin Diarrhea    Topamax [topiramate] Other (See Comments)     itching    Adhesive      Holter monitor pads    Bactrim [sulfamethoxazole-trimethoprim] Rash           OBJECTIVE:         Physical Exam:  Vitals:      Physical  Exam  Constitutional:       General: She is not in acute distress.  HENT:      Head: Atraumatic.   Pulmonary:      Effort: Pulmonary effort is normal. No respiratory distress.   Musculoskeletal:      Comments: Normal gait   Skin:     Findings: No bruising.   Neurological:      Mental Status: She is alert and oriented to person, place, and time.   Psychiatric:         Mood and Affect: Mood and affect normal.           I spent a total of 75 minutes on the day of the visit. This includes face to face time in discussion of goals of care, symptom assessment, coordination of care and emotional support.  This also includes non-face to face time preparing to see the patient (eg, review of tests/imaging), obtaining and/or reviewing separately obtained history, documenting clinical information in the electronic or other health record, independently interpreting results and communicating results to the patient/family/caregiver, or care coordinator.         Ivana Deluna MD

## 2025-06-17 DIAGNOSIS — K59.09 OTHER CONSTIPATION: ICD-10-CM

## 2025-06-17 RX ORDER — POLYETHYLENE GLYCOL 3350 17 G/17G
17 POWDER, FOR SOLUTION ORAL DAILY
Qty: 510 G | Refills: 2 | Status: SHIPPED | OUTPATIENT
Start: 2025-06-17

## 2025-06-18 ENCOUNTER — OFFICE VISIT (OUTPATIENT)
Dept: ENDOCRINOLOGY | Facility: CLINIC | Age: 75
End: 2025-06-18
Payer: MEDICARE

## 2025-06-18 VITALS
SYSTOLIC BLOOD PRESSURE: 110 MMHG | DIASTOLIC BLOOD PRESSURE: 71 MMHG | HEART RATE: 79 BPM | HEIGHT: 60 IN | OXYGEN SATURATION: 98 % | BODY MASS INDEX: 24.49 KG/M2 | WEIGHT: 124.75 LBS

## 2025-06-18 DIAGNOSIS — Z78.0 OSTEOPENIA AFTER MENOPAUSE: ICD-10-CM

## 2025-06-18 DIAGNOSIS — M85.80 OSTEOPENIA AFTER MENOPAUSE: ICD-10-CM

## 2025-06-18 DIAGNOSIS — E03.4 HYPOTHYROIDISM DUE TO ACQUIRED ATROPHY OF THYROID: Primary | ICD-10-CM

## 2025-06-18 PROCEDURE — 99999 PR PBB SHADOW E&M-EST. PATIENT-LVL V: CPT | Mod: PBBFAC,,,

## 2025-06-18 PROCEDURE — 99214 OFFICE O/P EST MOD 30 MIN: CPT | Mod: S$PBB,,,

## 2025-06-18 PROCEDURE — 99215 OFFICE O/P EST HI 40 MIN: CPT | Mod: PBBFAC

## 2025-06-18 PROCEDURE — G2211 COMPLEX E/M VISIT ADD ON: HCPCS | Mod: ,,,

## 2025-06-18 NOTE — ASSESSMENT & PLAN NOTE
-- Patient currently on estrogen replacement therapy  -- Recommend repeat BMD every 2 years  -- Continue Vitamin D supplementation  -- Repeat DXA ordered for 12/2025

## 2025-06-18 NOTE — PATIENT INSTRUCTIONS
Return to clinic in 1 year  Repeat bone density 12/2025 at [facility]  Repeat labs 12/2025      Orders Placed This Encounter   Procedures    DXA Bone Density Axial Skeleton 1 or more sites    TSH

## 2025-06-18 NOTE — ASSESSMENT & PLAN NOTE
-- Most recent TSH at goal  -- Will work to avoid exogenous hyperthyroidism as this can accelerate bone loss and increase risk of CV complications.  -- Educated on how to appropriately take thyroid medication as patient takes medication at variable times throughout the day  -- Calcium and iron need to be  from thyroid hormone replacement by 4 or > hours.  -- Please note: if you are taking biotin please hold it for 5 days prior to labs as it can interfere with the thyroid testing.  -- Continue:  Synthroid 100 mcg daily

## 2025-06-23 ENCOUNTER — CLINICAL SUPPORT (OUTPATIENT)
Dept: REHABILITATION | Facility: HOSPITAL | Age: 75
End: 2025-06-23
Payer: MEDICARE

## 2025-06-23 DIAGNOSIS — R29.818 FINE MOTOR IMPAIRMENT: Primary | ICD-10-CM

## 2025-06-23 DIAGNOSIS — R26.2 DIFFICULTY WALKING: Primary | ICD-10-CM

## 2025-06-23 DIAGNOSIS — R27.8 DECREASED COORDINATION: ICD-10-CM

## 2025-06-23 DIAGNOSIS — M25.60 JOINT STIFFNESS: ICD-10-CM

## 2025-06-23 DIAGNOSIS — R29.898 FINE MOTOR IMPAIRMENT: Primary | ICD-10-CM

## 2025-06-23 PROCEDURE — 97112 NEUROMUSCULAR REEDUCATION: CPT | Mod: PN,CQ

## 2025-06-23 PROCEDURE — 97530 THERAPEUTIC ACTIVITIES: CPT | Mod: PN

## 2025-06-23 PROCEDURE — 97530 THERAPEUTIC ACTIVITIES: CPT | Mod: PN,CQ

## 2025-06-23 NOTE — PROGRESS NOTES
Outpatient Rehab    Occupational Therapy Visit    Patient Name: Florentino Bowman  MRN: [id number]  YOB: 1950  Encounter Date: 6/23/2025    Therapy Diagnosis:   Encounter Diagnoses   Name Primary?    Fine motor impairment Yes    Decreased coordination     Joint stiffness      Physician: Belkis Foster MD    Physician Orders: Eval and Treat  Medical Diagnosis: Parkinson's disease, unspecified whether dyskinesia present, unspecified whether manifestations fluctuate  Surgical Diagnosis: Not applicable for this Episode   Surgical Date: Not applicable for this Episode  Days Since Last Surgery: Not applicable for this Episode    Visit # / Visits Authorized: 4 / 20  Insurance Authorization Period: 6/3/2025 to 12/31/2025  Date of Evaluation: 6/3/2025  Plan of Care Certification: 6/3/2025 to 7/30/2025      Time In: 1030   Time Out: 1110  Total Time (in minutes): 40   Total Billable Time (in minutes): 40    Precautions:     standard      Subjective      Pain reported as 0/10.      Objective        No measures obtained this date.     Treatment:    Pt completed ther acts to address LUE  strength and FM control/coordination required for participation in functional tasks. Pt demonstrated the following:  Therapeutic Activity  TA 1: tripod grasp on red clothespin to  small pompoms and place in container  TA 2: palmar grasp on spring resistance hand exerciser (at mod-low resistance) to  small pompoms and place in container  TA 3: in-hand manipulation w/ 9-hole peg x 5  TA 4: in-hand manipulation and palm- > finger translation to  coins and place throught slot in container  TA 5: grooved pegboard x 3  TA 6: releasing playing cards from palm 1 at a time  TA 7: in-hand manipulation to flip playing cards    Time Entry(in minutes):  Therapeutic Activity Time Entry: 40    Assessment & Plan   Assessment: Pt completed therapeutic activities w/ good energy and participation. Pt demonstrated fair (+)   strength and FM control/coordination, slight tremor noted.   Evaluation/Treatment Tolerance: Patient tolerated treatment well    The patient will continue to benefit from skilled outpatient occupational therapy in order to address the deficits listed in the problem list on the initial evaluation, provide patient and family education, and maximize the patients level of independence in the home and community environments.     The patient's spiritual, cultural, and educational needs were considered, and the patient is agreeable to the plan of care and goals.     Education  Education was done with Patient. The patient's learning style includes Listening. The patient Verbalizes understanding.         Progress toward goals       Plan: taper POC to 1x per week    Goals:   Active       LTG       Pt will perform all provided OT HEPs/HAPs Independently.  (Progressing)       Start:  06/04/25    Expected End:  07/30/25            Pt will demonstrate Fair+ LUE proprioceptive/kinesthetic movement awareness and motor planning as evidence by requiring 0 verbal cues while performing salient bimanual integration task with divided attention/dual tasking component.  (Progressing)       Start:  06/04/25    Expected End:  07/30/25            Pt will demonstrate improved L FM coordination as evidence by completing 9HPT in </= 28 secs. (Progressing)       Start:  06/04/25    Expected End:  07/30/25               STG       OT will provide training/education on appropriate HEPs/HAPs with GM/FM coordination, stretching, and amplitude based components to promote calibration of fxnl movement patterns and maintain fxnl performance safety, independence, and efficiency.  (Met)       Start:  06/04/25    Expected End:  07/02/25    Resolved:  06/12/25         Pt will demonstrate Fair LUE proprioceptive/kinesthetic movement awareness and motor planning as evidence by requiring </= 2 verbal cues while performing salient bimanual integration task with  divided attention/dual tasking component.  (Progressing)       Start:  06/04/25    Expected End:  07/02/25            Pt will demonstrate improved L FM coordination as evidence by completing 9HPT in </= 30 secs. (Progressing)       Start:  06/04/25    Expected End:  07/02/25                Priyanka Webber OT

## 2025-06-23 NOTE — PROGRESS NOTES
"  Outpatient Rehab    Physical Therapy Visit    Patient Name: Florentino Bowman  MRN: [id number]  YOB: 1950  Encounter Date: 6/23/2025    Therapy Diagnosis:   Encounter Diagnosis   Name Primary?    Difficulty walking Yes     Physician: Belkis Foster MD    Physician Orders: Eval and Treat  Medical Diagnosis: Parkinson's disease, unspecified whether dyskinesia present, unspecified whether manifestations fluctuate  Surgical Diagnosis: Not applicable for this Episode   Surgical Date: Not applicable for this Episode  Days Since Last Surgery: Not applicable for this Episode    Visit # / Visits Authorized:  3 / 20  Insurance Authorization Period: 6/3/2025 to 12/31/2025  Date of Evaluation: 6/5/2025  Plan of Care Certification:       PT/PTA: PTA   Number of PTA visits since last PT visit:2  Time In: 0945   Time Out: 1030  Total Time (in minutes): 45   Total Billable Time (in minutes): 45    FOTO:  Intake Score:  %  Survey Score 2:  %  Survey Score 3:  %    Precautions:     standard      Subjective   She says dizziness is typical first thing in the morning but resolves as day goes on, denies any resting dizziness when entering clinic.  Pain reported as 0/10. lower back    Objective            Treatment:  Balance/Neuromuscular Re-Education  NMR 1: 2 x 30" VORx1, standing "x" target, 95 bpm, horizontal head turns  NMR 2: 2 x 30" VORx1, standing "x" target, 95 bpm, vertical head turns  NMR 3: Sit<>stand: 2x10 from black chair with airex under feet  NMR 5: Reciprocal step ups on soft black fitter board: 2x45" ea leg leading  NMR 6: Lateral step up and over on soft black fitter board: 2x45"  Therapeutic Activity  TA 1: Forward cone weaving with bend to tap passing cone: 4 lengths x 20 feet  TA 2: Backwards walking + lettered dice tosses with PTA (naming food, names, etc) 4 lengths x 45 feet, 4 in reciprocal forward step ups with self toss of numbered dice 2 trials with addition to 100 and subtraction to 0  TA 3: 2 x " "10, vestibular sit<>Stand, keeping eyes closed during transitional movement, 2x30" seated leans and bows + eyes closed  TA 4: 4 lengths x 45 feet  ambulating with each: vertical head turns, horizontal head turns, diagonal head turns, eyes closed, backwards walking, walking with quarter turns  giving and recieving volleyball    Time Entry(in minutes):  Neuromuscular Re-Education Time Entry: 25  Therapeutic Activity Time Entry: 20    Assessment & Plan   Assessment: Florentino arrived to session without and resting dizziness but continues to endorse high levels of dizziness when she wakes in the morning that subsides once she gets up and moves.  Session focused on VOR, habituation, and general balance exercises.  No dizzy response to any head or body turning activity reported.  Introduced some dual tasking this session with compensatory decrease in speed observed with backwards walking and reciprocal step ups.  She would benefit from continued PT services to improve balance deficits and decrease fall risk.   Evaluation/Treatment Tolerance: Patient tolerated treatment well    The patient will continue to benefit from skilled outpatient physical therapy in order to address the deficits listed in the problem list on the initial evaluation, provide patient and family education, and maximize the patients level of independence in the home and community environments.     The patient's spiritual, cultural, and educational needs were considered, and the patient is agreeable to the plan of care and goals.           Plan: Cont POC, progress motion tolerance and dynamic balance training as tolerated    Goals:   Short Term Goals = Long Term Goals: 8 weeks   Patient to be Independent with HEP  Patient to score greater than or equal to 25/30 on the FGA for decreased fall risk  Patient able to ambulate greater than 500 ft with reciprocal arm swing, good bilateral foot clearance, no loss of balance  Patient able to complete cognitive dual " task activity with ambulation with less than or equal to 0.1 m/s reduction in gait speed     Aurea Riley, PTA

## 2025-06-25 ENCOUNTER — CLINICAL SUPPORT (OUTPATIENT)
Dept: REHABILITATION | Facility: HOSPITAL | Age: 75
End: 2025-06-25
Payer: MEDICARE

## 2025-06-25 DIAGNOSIS — Z74.09 IMPAIRED FUNCTIONAL MOBILITY AND ACTIVITY TOLERANCE: Primary | ICD-10-CM

## 2025-06-25 PROCEDURE — 97112 NEUROMUSCULAR REEDUCATION: CPT | Mod: PN

## 2025-06-25 PROCEDURE — 97530 THERAPEUTIC ACTIVITIES: CPT | Mod: PN

## 2025-06-25 NOTE — PROGRESS NOTES
Outpatient Rehab    Physical Therapy Visit    Patient Name: Florentino Bowman  MRN: [id number]  YOB: 1950  Encounter Date: 6/25/2025    Therapy Diagnosis: No diagnosis found.  Physician: Belkis Foster MD    Physician Orders: Eval and Treat  Medical Diagnosis: Parkinson's disease, unspecified whether dyskinesia present, unspecified whether manifestations fluctuate  Surgical Diagnosis: Not applicable for this Episode   Surgical Date: Not applicable for this Episode  Days Since Last Surgery: Not applicable for this Episode    Visit # / Visits Authorized:  5 / 20  Insurance Authorization Period: 6/3/2025 to 12/31/2025  Date of Evaluation: 6/3/2025  6/5/2025  Plan of Care Certification:       PT/PTA:     Number of PTA visits since last PT visit:   Time In:     Time Out:    Total Time (in minutes):     Total Billable Time (in minutes):      FOTO:  Intake Score:  %  Survey Score 2:  %  Survey Score 3:  %    Precautions:       Subjective             Objective            Treatment:  Therapeutic Exercise  TE 1: 3 x 8 , cybex leg press, 7 plates, BLE      Time Entry(in minutes):       Assessment & Plan   Assessment:         The patient will continue to benefit from skilled outpatient physical therapy in order to address the deficits listed in the problem list on the initial evaluation, provide patient and family education, and maximize the patients level of independence in the home and community environments.     The patient's spiritual, cultural, and educational needs were considered, and the patient is agreeable to the plan of care and goals.           Plan:      Goals:     Arpan Chou, PT

## 2025-06-30 ENCOUNTER — CLINICAL SUPPORT (OUTPATIENT)
Dept: REHABILITATION | Facility: HOSPITAL | Age: 75
End: 2025-06-30
Payer: MEDICARE

## 2025-06-30 ENCOUNTER — EXTERNAL CHRONIC CARE MANAGEMENT (OUTPATIENT)
Dept: PRIMARY CARE CLINIC | Facility: CLINIC | Age: 75
End: 2025-06-30
Payer: MEDICARE

## 2025-06-30 DIAGNOSIS — R29.898 FINE MOTOR IMPAIRMENT: Primary | ICD-10-CM

## 2025-06-30 DIAGNOSIS — R29.818 FINE MOTOR IMPAIRMENT: Primary | ICD-10-CM

## 2025-06-30 DIAGNOSIS — G20.A1 PARKINSON'S DISEASE, UNSPECIFIED WHETHER DYSKINESIA PRESENT, UNSPECIFIED WHETHER MANIFESTATIONS FLUCTUATE: Primary | ICD-10-CM

## 2025-06-30 DIAGNOSIS — M25.60 JOINT STIFFNESS: ICD-10-CM

## 2025-06-30 DIAGNOSIS — R27.8 DECREASED COORDINATION: ICD-10-CM

## 2025-06-30 DIAGNOSIS — Z74.09 IMPAIRED FUNCTIONAL MOBILITY AND ACTIVITY TOLERANCE: ICD-10-CM

## 2025-06-30 PROCEDURE — 97112 NEUROMUSCULAR REEDUCATION: CPT | Mod: PN

## 2025-06-30 PROCEDURE — 97110 THERAPEUTIC EXERCISES: CPT | Mod: PN

## 2025-06-30 PROCEDURE — 97112 NEUROMUSCULAR REEDUCATION: CPT | Mod: PN,CQ

## 2025-06-30 PROCEDURE — 97530 THERAPEUTIC ACTIVITIES: CPT | Mod: PN,CQ

## 2025-06-30 PROCEDURE — 97110 THERAPEUTIC EXERCISES: CPT | Mod: PN,CQ

## 2025-06-30 PROCEDURE — 99490 CHRNC CARE MGMT STAFF 1ST 20: CPT | Mod: S$GLB,,, | Performed by: FAMILY MEDICINE

## 2025-06-30 NOTE — PROGRESS NOTES
"  Outpatient Rehab    Physical Therapy Visit    Patient Name: Florentino Bowman  MRN: [id number]  YOB: 1950  Encounter Date: 6/30/2025    Therapy Diagnosis:   Encounter Diagnoses   Name Primary?    Impaired functional mobility and activity tolerance     Parkinson's disease, unspecified whether dyskinesia present, unspecified whether manifestations fluctuate Yes     Physician: Belkis Foster MD    Physician Orders: Eval and Treat  Medical Diagnosis: Parkinson's disease, unspecified whether dyskinesia present, unspecified whether manifestations fluctuate  Surgical Diagnosis: Not applicable for this Episode   Surgical Date: Not applicable for this Episode  Days Since Last Surgery: Not applicable for this Episode    Visit # / Visits Authorized:  5 / 20  Insurance Authorization Period: 6/3/2025 to 12/31/2025  Date of Evaluation: 6/5/2025  Plan of Care Certification:       PT/PTA: PTA   Number of PTA visits since last PT visit:1  Time In: 1030   Time Out: 1115  Total Time (in minutes): 45   Total Billable Time (in minutes): 45    FOTO:  Intake Score:  %  Survey Score 2:  %  Survey Score 3:  %    Precautions:     standard      Subjective   dizziness is much improved, felt very sore following last session.  Pain reported as 0/10. lower back    Objective            Treatment:  Therapeutic Exercise  TE 1: 3 x 8 , cybex leg press, 7 plates, BLE  TE 2: Treadmill 3 way: forward 5 minutes 2.5 mph, lateral 2 minutes ea way 0.6 mph, backwards 0.8 mph for 3 minutes  Balance/Neuromuscular Re-Education  NMR 3: Sit<>stand: 2x10 from black chair with airex under feet  NMR 4: Narrow base of support on airex: horizontal head turns 2x30", vertical head nos 2x30", eyes closed 2x30"  NMR 5: Reciprocal step ups on soft black fitter board: 2x45" ea leg leading  NMR 6: Lateral step up and over on soft black fitter board: 2x45"  Therapeutic Activity  TA 1: Forward cone weaving with bend to tap passing cone: 4 lengths x 20 feet  TA 2: " "Backwards walking + lettered dice tosses with PTA (naming food, names, etc) 4 lengths x 45 feet, 4 in reciprocal forward step ups with self toss of numbered dice 2 trials with addition to 100 and subtraction to 0  TA 3: 2 x 10, vestibular sit<>Stand, keeping eyes closed during transitional movement, 2x30" seated leans and bows + eyes closed  TA 4: 4 lengths x 45 feet  ambulating with each: vertical head turns, horizontal head turns, diagonal head turns, eyes closed, backwards walking, walking with quarter turns  giving and recieving volleyball    Time Entry(in minutes):  Neuromuscular Re-Education Time Entry: 20  Therapeutic Activity Time Entry: 10  Therapeutic Exercise Time Entry: 15    Assessment & Plan   Assessment: Florentino arrived to session without and resting dizziness but continues to endorse high levels of dizziness when she wakes in the morning that subsides once she gets up and moves.  Session focused on VOR, habituation, and general balance exercises.  No dizzy response to any head or body turning activity reported.  Introduced some dual tasking this session with compensatory decrease in speed observed with backwards walking and reciprocal step ups.  She would benefit from continued PT services to improve balance deficits and decrease fall risk.        The patient will continue to benefit from skilled outpatient physical therapy in order to address the deficits listed in the problem list on the initial evaluation, provide patient and family education, and maximize the patients level of independence in the home and community environments.     The patient's spiritual, cultural, and educational needs were considered, and the patient is agreeable to the plan of care and goals.           Plan: Cont POC, progress motion tolerance and dynamic balance training as tolerated    Goals:     Short Term Goals = Long Term Goals: 8 weeks   Patient to be Independent with HEP  Patient to score greater than or equal to 25/30 " on the FGA for decreased fall risk  Patient able to ambulate greater than 500 ft with reciprocal arm swing, good bilateral foot clearance, no loss of balance  Patient able to complete cognitive dual task activity with ambulation with less than or equal to 0.1 m/s reduction in gait speed     Aurea Riley, PTA

## 2025-06-30 NOTE — PROGRESS NOTES
Outpatient Rehab    Occupational Therapy Visit    Patient Name: Florentino Bowman  MRN: [id number]  YOB: 1950  Encounter Date: 6/30/2025    Therapy Diagnosis:   Encounter Diagnoses   Name Primary?    Fine motor impairment Yes    Decreased coordination     Joint stiffness      Physician: Belkis Foster MD    Physician Orders: Eval and Treat  Medical Diagnosis: Parkinson's disease, unspecified whether dyskinesia present, unspecified whether manifestations fluctuate  Surgical Diagnosis: Not applicable for this Episode   Surgical Date: Not applicable for this Episode  Days Since Last Surgery: Not applicable for this Episode    Visit # / Visits Authorized: 5 / 20  Insurance Authorization Period: 6/3/2025 to 12/31/2025  Date of Evaluation: 6/3/2025  Plan of Care Certification: 6/3/2025 to 7/30/2025      Time In: 0900   Time Out: 0945  Total Time (in minutes): 45   Total Billable Time (in minutes): 45      Precautions:     standard      Subjective      Pain reported as 2/10. lower back    Objective        No measures obtained this date.     Treatment:    Pt completed ther ex to address LUE ROM/muscle elasticity and strength required for participation in functional tasks. Pt demonstrated the following:  Therapeutic Exercise  TE 1: SciFit UBE for 4 mins forward cycling and 4 mins backward cycling at level 1.0  TE 2: supine shoulder flex for 30 reps x 2 w/ 1# dowel  TE 3: side-lying shoulder abd and external rotation for 30 reps w/ 1# dumbbell  TE 4: seated EOM LUE elbow ext for 10 reps x 3 w/ green theraband  TE 5: standing doorway stretch for 10 reps w/ 10 second holds    Pt completed ther acts and NMR/balance activities to address core/upper body strength/ROM required for participation in functional tasks. Pt demonstrated the following:  Therapeutic Activity  TA 1: seated EOM: large amplitude diagonals and forward lean/press w/ BUEs holding ball for 10 reps  Balance/Neuromuscular Re-Education  NMR 1: child's  pose stretch for ~15 seconds  NMR 2: alternating core twist stretch for ~15 seconds each direction  NMR 3: quadruped open books for 10 reps each direction    Time Entry(in minutes):  Neuromuscular Re-Education Time Entry: 10  Therapeutic Activity Time Entry: 5  Therapeutic Exercise Time Entry: 30    Assessment & Plan   Assessment: Pt completed therapeutic activities, therapeutic exercises, and NMR/balance activities w/ good energy and participation. Pt demonstrated good UE ROM and fair (+) strength, taking rest breaks as needed. Pt demonstrated good core strength w/ quadruped activity, slightly decreased trunk ROM w/ open books.   Evaluation/Treatment Tolerance: Patient tolerated treatment well    The patient will continue to benefit from skilled outpatient occupational therapy in order to address the deficits listed in the problem list on the initial evaluation, provide patient and family education, and maximize the patients level of independence in the home and community environments.     The patient's spiritual, cultural, and educational needs were considered, and the patient is agreeable to the plan of care and goals.     Education  Education was done with Patient. The patient's learning style includes Listening. The patient Verbalizes understanding.         Progress toward goals       Plan: continue OT POC    Goals:   Active       LTG       Pt will perform all provided OT HEPs/HAPs Independently.  (Progressing)       Start:  06/04/25    Expected End:  07/30/25            Pt will demonstrate Fair+ LUE proprioceptive/kinesthetic movement awareness and motor planning as evidence by requiring 0 verbal cues while performing salient bimanual integration task with divided attention/dual tasking component.  (Progressing)       Start:  06/04/25    Expected End:  07/30/25            Pt will demonstrate improved L FM coordination as evidence by completing 9HPT in </= 28 secs. (Progressing)       Start:  06/04/25     Expected End:  07/30/25               STG       OT will provide training/education on appropriate HEPs/HAPs with GM/FM coordination, stretching, and amplitude based components to promote calibration of fxnl movement patterns and maintain fxnl performance safety, independence, and efficiency.  (Met)       Start:  06/04/25    Expected End:  07/02/25    Resolved:  06/12/25         Pt will demonstrate Fair LUE proprioceptive/kinesthetic movement awareness and motor planning as evidence by requiring </= 2 verbal cues while performing salient bimanual integration task with divided attention/dual tasking component.  (Progressing)       Start:  06/04/25    Expected End:  07/02/25            Pt will demonstrate improved L FM coordination as evidence by completing 9HPT in </= 30 secs. (Progressing)       Start:  06/04/25    Expected End:  07/02/25                Priyanka Webber, OT

## 2025-07-08 ENCOUNTER — CLINICAL SUPPORT (OUTPATIENT)
Dept: REHABILITATION | Facility: HOSPITAL | Age: 75
End: 2025-07-08
Payer: MEDICARE

## 2025-07-08 DIAGNOSIS — R27.8 DECREASED COORDINATION: ICD-10-CM

## 2025-07-08 DIAGNOSIS — R29.898 FINE MOTOR IMPAIRMENT: Primary | ICD-10-CM

## 2025-07-08 DIAGNOSIS — G20.A1 PARKINSON'S DISEASE, UNSPECIFIED WHETHER DYSKINESIA PRESENT, UNSPECIFIED WHETHER MANIFESTATIONS FLUCTUATE: Primary | ICD-10-CM

## 2025-07-08 DIAGNOSIS — Z74.09 IMPAIRED FUNCTIONAL MOBILITY AND ACTIVITY TOLERANCE: ICD-10-CM

## 2025-07-08 DIAGNOSIS — M25.60 JOINT STIFFNESS: ICD-10-CM

## 2025-07-08 DIAGNOSIS — R29.818 FINE MOTOR IMPAIRMENT: Primary | ICD-10-CM

## 2025-07-08 PROCEDURE — 97530 THERAPEUTIC ACTIVITIES: CPT | Mod: PN,CQ

## 2025-07-08 PROCEDURE — 97110 THERAPEUTIC EXERCISES: CPT | Mod: PN,CQ

## 2025-07-08 PROCEDURE — 97530 THERAPEUTIC ACTIVITIES: CPT | Mod: PN

## 2025-07-08 PROCEDURE — 97112 NEUROMUSCULAR REEDUCATION: CPT | Mod: PN,CQ

## 2025-07-08 NOTE — PROGRESS NOTES
"  Outpatient Rehab    Physical Therapy Visit    Patient Name: Florentino Bowman  MRN: [id number]  YOB: 1950  Encounter Date: 7/8/2025    Therapy Diagnosis:   Encounter Diagnoses   Name Primary?    Parkinson's disease, unspecified whether dyskinesia present, unspecified whether manifestations fluctuate Yes    Impaired functional mobility and activity tolerance      Physician: Belkis Foster MD    Physician Orders: Eval and Treat  Medical Diagnosis: Parkinson's disease, unspecified whether dyskinesia present, unspecified whether manifestations fluctuate  Surgical Diagnosis: Not applicable for this Episode   Surgical Date: Not applicable for this Episode  Days Since Last Surgery: Not applicable for this Episode    Visit # / Visits Authorized:  6 / 20  Insurance Authorization Period: 6/3/2025 to 12/31/2025  Date of Evaluation: 6/5/2025  Plan of Care Certification:       PT/PTA: PTA   Number of PTA visits since last PT visit:2  Time In: 0945   Time Out: 1030  Total Time (in minutes): 45   Total Billable Time (in minutes): 45    FOTO:  Intake Score:  %  Survey Score 2:  %  Survey Score 3:  %    Precautions:     standard      Subjective   dizziness intensity is much improved, always in monring and subsides throughout day.  Pain reported as 0/10. lower back    Objective            Treatment:  Therapeutic Exercise  TE 1: Treadmill 3 way: forward 5 minutes 2.5 mph, lateral 2 minutes ea way 0.7 mph, backwards 0.8 mph for 3 minutes  TE 2: Cybex leg press, 7 plates, 3x10 DL, 5 plates, 2x10 SL  Balance/Neuromuscular Re-Education  NMR 3: Sit<>stand: 2x10 from black chair with airex under feet with red med ball chest press (3.3#) x10  NMR 4: Narrow base of support on airex: horizontal head turns 2x30", vertical head nos 2x30", eyes closed 2x30"  NMR 5: Reciprocal step ups on soft black fitter board: 2x45" ea leg leading  NMR 6: Lateral step up and over on soft black fitter board: 2x45"  Therapeutic Activity  TA 1: " Forward cone weaving with bend to tap passing cone: 4 lengths x 20 feet  TA 2: Backwards walking + lettered dice tosses with PTA (naming food, names, etc) 4 lengths x 45 feet, 4 in reciprocal forward step ups with self toss of numbered dice 2 trials with addition to 100 and subtraction to 0  TA 4: 4 lengths x 45 feet  ambulating with each: vertical head turns, horizontal head turns, diagonal head turns, eyes closed, backwards walking, walking with quarter turns  giving and recieving beach ball  TA 5: Standing beach ball rolls with patient leaning forward to  and throw back to PTA 2x10    Time Entry(in minutes):  Neuromuscular Re-Education Time Entry: 20  Therapeutic Activity Time Entry: 10  Therapeutic Exercise Time Entry: 15    Assessment & Plan   Assessment: Florentino arrived to session without and resting dizziness but continues to endorse high levels of dizziness when she wakes in the morning that subsides once she gets up and moves, but no resting dizziness upon arrival.  Session focused on VOR, habituation, and general balance exercises.  No dizzy response to any head or body turning activity reported.  Introduced some dual tasking this session with compensatory decrease in speed observed with backwards walking and reciprocal step ups.  She would benefit from continued PT services to improve balance deficits and decrease fall risk.   Evaluation/Treatment Tolerance: Patient tolerated treatment well    The patient will continue to benefit from skilled outpatient physical therapy in order to address the deficits listed in the problem list on the initial evaluation, provide patient and family education, and maximize the patients level of independence in the home and community environments.     The patient's spiritual, cultural, and educational needs were considered, and the patient is agreeable to the plan of care and goals.           Plan: Cont POC, progress motion tolerance and dynamic balance training as  tolerated    Goals:     Short Term Goals = Long Term Goals: 8 weeks   Patient to be Independent with HEP  Patient to score greater than or equal to 25/30 on the FGA for decreased fall risk  Patient able to ambulate greater than 500 ft with reciprocal arm swing, good bilateral foot clearance, no loss of balance  Patient able to complete cognitive dual task activity with ambulation with less than or equal to 0.1 m/s reduction in gait speed     Aurea Riley, PTA

## 2025-07-08 NOTE — PROGRESS NOTES
Outpatient Rehab    Occupational Therapy Visit    Patient Name: Florentino Bowman  MRN: [id number]  YOB: 1950  Encounter Date: 7/8/2025    Therapy Diagnosis:   Encounter Diagnoses   Name Primary?    Fine motor impairment Yes    Decreased coordination     Joint stiffness      Physician: Belkis Foster MD    Physician Orders: Eval and Treat  Medical Diagnosis: Parkinson's disease, unspecified whether dyskinesia present, unspecified whether manifestations fluctuate  Surgical Diagnosis: Not applicable for this Episode   Surgical Date: Not applicable for this Episode  Days Since Last Surgery: Not applicable for this Episode    Visit # / Visits Authorized: 6 / 20  Insurance Authorization Period: 6/3/2025 to 12/31/2025  Date of Evaluation: 6/3/2025  Plan of Care Certification: 6/3/2025 to 7/30/2025      Time In: 0900   Time Out: 0943  Total Time (in minutes): 43   Total Billable Time (in minutes): 43    Precautions:     standard      Subjective      Pain reported as 0/10.      Objective     Wrist/Hand Special Tests  Hand Coordination Special Tests  Left 9-Hole Peg Test (sec): 28       Coordination  Left 9-Hole Peg Test (sec): 28                 Treatment:  Pt completed ther acts to address LUE  strength and FM control/coordination required for participation in functional tasks.   Therapeutic Activity  TA 1: manipulating green theraputty to remove 10 marbles  TA 2: tripod grasp on green clothespin to  small pompoms and place in container  TA 3: palmar grasp on spring resistance hand exerciser to  small pompoms and place in container  TA 4: in-hand manipulation and palm- > finger translation w/ 9-hole peg x 3  TA 5: in-hand manipulation and palm- > finger translation w/ coins to place through slot in container  TA 6: in-hand manipulation and palm- > finger translation small beads to string onto lace    Time Entry(in minutes):  Therapeutic Activity Time Entry: 43    Assessment & Plan    Assessment: Pt completed therapeutic activities w/ good energy and participation. Pt demonstrated fair (+) LUE  strength and FM control/coordination, taking rest breaks as needed. Pt demonstrated handwriting by printing name and address - good legibility and no handwriting deficits noted at this time. Pt administered theraputty and theraputty HEP for use at home.   Evaluation/Treatment Tolerance: Patient tolerated treatment well    The patient will continue to benefit from skilled outpatient occupational therapy in order to address the deficits listed in the problem list on the initial evaluation, provide patient and family education, and maximize the patients level of independence in the home and community environments.     The patient's spiritual, cultural, and educational needs were considered, and the patient is agreeable to the plan of care and goals.     Education  Education was done with Patient. The patient's learning style includes Listening, Pictures/video, and Reading. The patient Verbalizes understanding.         Progress toward goals, theraputty HEP       Plan: continue OT POC    Goals:   Active       LTG       Pt will perform all provided OT HEPs/HAPs Independently.  (Progressing)       Start:  06/04/25    Expected End:  07/30/25            Pt will demonstrate Fair+ LUE proprioceptive/kinesthetic movement awareness and motor planning as evidence by requiring 0 verbal cues while performing salient bimanual integration task with divided attention/dual tasking component.  (Progressing)       Start:  06/04/25    Expected End:  07/30/25            Pt will demonstrate improved L FM coordination as evidence by completing 9HPT in </= 28 secs. (Met)       Start:  06/04/25    Expected End:  07/30/25    Resolved:  07/08/25            STG       OT will provide training/education on appropriate HEPs/HAPs with GM/FM coordination, stretching, and amplitude based components to promote calibration of fxnl  movement patterns and maintain fxnl performance safety, independence, and efficiency.  (Met)       Start:  06/04/25    Expected End:  07/02/25    Resolved:  06/12/25         Pt will demonstrate Fair LUE proprioceptive/kinesthetic movement awareness and motor planning as evidence by requiring </= 2 verbal cues while performing salient bimanual integration task with divided attention/dual tasking component.  (Progressing)       Start:  06/04/25    Expected End:  07/02/25            Pt will demonstrate improved L FM coordination as evidence by completing 9HPT in </= 30 secs. (Met)       Start:  06/04/25    Expected End:  07/02/25    Resolved:  07/08/25             Priyanka Webber OT

## 2025-07-09 ENCOUNTER — OFFICE VISIT (OUTPATIENT)
Facility: CLINIC | Age: 75
End: 2025-07-09
Payer: MEDICARE

## 2025-07-09 DIAGNOSIS — F41.9 ANXIETY: ICD-10-CM

## 2025-07-09 PROCEDURE — 90791 PSYCH DIAGNOSTIC EVALUATION: CPT | Mod: ,,, | Performed by: SOCIAL WORKER

## 2025-07-09 PROCEDURE — 99212 OFFICE O/P EST SF 10 MIN: CPT | Mod: PBBFAC | Performed by: SOCIAL WORKER

## 2025-07-09 PROCEDURE — 99999 PR PBB SHADOW E&M-EST. PATIENT-LVL II: CPT | Mod: PBBFAC,,, | Performed by: SOCIAL WORKER

## 2025-07-09 NOTE — PROGRESS NOTES
"  Psychiatry Initial Visit (PhD/LCSW)  Diagnostic Interview - CPT 90125    Date: 7/9/2025    Site: {PSY SITE OF VISIT:05837}    Referral source: ***    Clinical status of patient: {PSY CLINICAL STATUS:12949}    Florentino Bowman, a 75 y.o. female, for initial evaluation visit.  Met with {Relatives of adult:20512::"patient"}.    Chief complaint/reason for encounter: {PSY  CHIEF COMPLAINTS:14887}    History of present illness:     Going to [facility] tomorrow for a long week.     Has a pool, gets in regular     History of back pain, has had two back surgeries   Had a spinal cord stimulator placed 2 years ago and it was a huge success. This has caused her significant relief    Sleep:       Pain: {PSY SW PAIN:20216}    Symptoms:   Mood: {PSY MOODS:16889}  Anxiety: {PSY ANXIETY SYMPTOMS:61389}  Substance abuse: {PSY SW SUBSTANCE ABUSE:19965}  Cognitive functioning: {Psy Cognitive Func Symptoms:48156}  Health behaviors: {Psy Health Behaviors Symptoms:67093}    Psychiatric history: {PSY SW PSYCH HISTORY:32690}    Medical history: {PSY MEDICAL HISTORY:27370}    Family history of psychiatric illness: {None/Not Known:24912}    Social history (marriage, employment, etc.):      passed away in 2021, two months after the parkinsons dx   Does a grief group at her [facility] - has made friends with these ladies and sees them twice a month   Two children   Daughter in [place], calls every day. Two grandchildren in [place]   Son lives in [place]. Two granddaughter     Family cruise next week       Substance use:   Alcohol: {HISTORY; Alcohol Freq:32367}   Drugs: {NONE:49758}   Tobacco: {NONE:91426}   Caffeine: {NONE:21662}    Current medications and drug reactions (include OTC, herbal): see medication list ***    Strengths and liabilities: {PSY STRENGTHS/LIABILITIES:42156}    Current Evaluation:     Mental Status Exam:  General Appearance:  {appearance:82943::"unremarkable","age appropriate"}   Speech: {findings; speech " "psych:77066::"normal tone","normal rate","normal pitch","normal volume"}      Level of Cooperation: {cooperation:70789}      Thought Processes: {thought process:65811::"normal and logical"}   Mood: {mood:41288}      Thought Content: {thought content:74051::"normal, no suicidality, no homicidality, delusions, or paranoia"}   Affect: {desc; affect:43334::"congruent and appropriate"}   Orientation: {orientation:86515}   Memory: {memory:52716}   Attention Span & Concentration: {attention span:68131}   Fund of General Knowledge: {education:65097}   Abstract Reasoning: {abstract:04638}   Judgment & Insight: {judgment/insight:45063}     Language  {PSY LANGUAGE:13792}     Diagnostic Impression - Plan:       ICD-10-CM ICD-9-CM   1. Anxiety  F41.9 300.00       Plan:{PSY PLAN PHD/LCSW:96109}    Return to Clinic: {return:99823}    Length of Service (minutes): {Length of Service:18600}  "

## 2025-07-14 NOTE — PROGRESS NOTES
"  Outpatient Rehab    Physical Therapy Visit    Patient Name: Florentino Bowman  MRN: [id number]  YOB: 1950  Encounter Date: 6/25/2025    Therapy Diagnosis:   Encounter Diagnosis   Name Primary?    Impaired functional mobility and activity tolerance Yes     Physician: Belkis Foster MD    Physician Orders: Eval and Treat  Medical Diagnosis: Parkinson's disease, unspecified whether dyskinesia present, unspecified whether manifestations fluctuate  Surgical Diagnosis: Not applicable for this Episode   Surgical Date: Not applicable for this Episode  Days Since Last Surgery: Not applicable for this Episode    Visit # / Visits Authorized:  6 / 20  Insurance Authorization Period: 6/3/2025 to 12/31/2025  Date of Evaluation: 6/3/2025  6/5/2025  Plan of Care Certification:       PT/PTA:     Number of PTA visits since last PT visit:   Time In: 1030   Time Out: 1115  Total Time (in minutes): 45   Total Billable Time (in minutes): 45    FOTO:  Intake Score: Not applicable for this Episode%  Survey Score 2: Not applicable for this Episode%  Survey Score 3: Not applicable for this Episode%    Precautions:  Additional Precautions and Protocol Details: standard      Subjective   Pt reports that her dizziness has been much approved.  Pain reported as 0/10.      Objective            Treatment:  Therapeutic Exercise  TE 1: 3 x 8 , cybex leg press, 7 plates, BLE  Balance/Neuromuscular Re-Education  NMR 1: 2 x 30" static standing with horizontal and vertical head turns each  NMR 2: 2 x 30" static stance on foam fitter with eyes closed  NMR 3: 2 x 10 AP and lateral weightshfiting on rockerboard  Therapeutic Activity  TA 1: 4 lengths x 45 feet ambulating with each: vertical head turns, horizontal head turns, diagonal head turns, eyes closed, backwards walking, walking with quarter turns giving and recieving volleyball  TA 2: 4 x 45 feet, ambulation with eyes closed      Time Entry(in minutes):  Neuromuscular Re-Education Time " Entry: 14  Therapeutic Activity Time Entry: 25  Therapeutic Exercise Time Entry: 6    Assessment & Plan   Assessment: Ms. Good tolerated treatment well. Continues to report little to no dizziness. Does have mild complaints of back pain and feelings of stiffness which does correlates with parkinson's disease. Had mild difficutly with dual task activity but improved with reps performed. Pt continues to be a good candidate for OPPT  Evaluation/Treatment Tolerance: Patient tolerated treatment well    The patient will continue to benefit from skilled outpatient physical therapy in order to address the deficits listed in the problem list on the initial evaluation, provide patient and family education, and maximize the patients level of independence in the home and community environments.     The patient's spiritual, cultural, and educational needs were considered, and the patient is agreeable to the plan of care and goals.           Plan: Cont POC, progress motion tolerance and dynamic balance training as tolerated    Goals: Goals:   Short Term Goals = Long Term Goals: 8 weeks   Patient to be Independent with HEP  Patient to score greater than or equal to 25/30 on the FGA for decreased fall risk  Patient able to ambulate greater than 500 ft with reciprocal arm swing, good bilateral foot clearance, no loss of balance  Patient able to complete cognitive dual task activity with ambulation with less than or equal to 0.1 m/s reduction in gait speed        Arpan Chou, PT

## 2025-07-15 NOTE — PROGRESS NOTES
Name: Florentino Bowman  MRN: [id number]   CSN: [id number]      Date: 07/15/2025    Referring physician:  No referring provider defined for this encounter.    Chief Complaint: PD     Interval History:  - seen in multi d PD clinic   - still taking cd/ld 1 tab TID + rasagiline 0.5 mg   - morning, noon, 6 pm -- takes 1/2 tablet at 10/11 pm which has been helpful   - monitored BP for a while, had some high and low Bps    - she was feeling really off balance, also had sensation of the room spinning. Has a history of vertigo  - hasn't happened in two weeks   - going to stretch lab, PT/OT   - drinking waer   - no falls since last visit   - 30 minutes after she takes levodopa, she feels better         April 2025  - here with friend  - currently taking cd/ld 1 tab TID + rasagiline 0.5 mg   - 8 am - 1 - and 10   - sometimes has soreness in her neck   - two falls, broke nose twice -- playing tug of war with big dog   - one fall while feeling lightheaded   - not drinking enough water  - she was drinking liquid IV   - unusually high BP for her today   - compression stockings were uncomfortable   - some constipation, daily BM -- not taking anything over the counter  - sore first thing in the morning, gets better after she takes meds   - exercises three times a week, does something daily at home   - yoga twice a week   - sleeping well at night         Sept 2024  - saw Dr. Mendoza for migraines  - planning botox for that  - feels her PD symptoms are under control  - only issue now is her lower BP and has fallen three times from near-syncope (even broke her nose), worse in the AM near after she wakes up  - has been taking LMNT with sodium and potassium and mag  - has had nice new shows [employer] for cramps and works well!  - has seen Pall care and is pleased  - taking full CD/LD TID plus azilect  - not using stockings, hose, salty foods except element, or HOB elevated      From 6/4/24 RBR:  - taking cd/ld 1 tab TID, 0.5 mg rasagiline   - does not  notice much difference   - no falls   - had cataract surgery yesterday   - some dizziness whenever she goes from sitting to standing, worse whenever she is out in the sun/outside in the heat   - has not checked BP at these times   - toe cramping seems to be a little bit better   - did PT   - she was exercising regularly up until she was preparing for cataract surgery      From Feb 2024: Florentino Bowman is a R handed 75 y.o. female with a medical issues significant for PD, lumbar radiculopathy, MDD, who presents for PD. She has been following with Dr. Mendoza. Currently taking cd/ld 1/2 tab TID + rasagiline 0.5 mg daily. She is here for second opinion of PD (does not think she has PD). Back pain for many years, several laminectomies, injections that wasn't very helpful. Thinks she stopped swinging her L arm about 4-5 years ago. Hurt her left shoulder at some point. A little tremor in her L hand, toe curling in her feet. Mostly notices tremor at rest. She was on 1/2 tab TID of  cd/ld 25/100 -- she recently cut back to 1/2 tab BID. Not much difference with slight increase. Rasagiline 0.5 mg daily. No falls. No shuffling. Some trouble rolling over in bed. Bothered by slowness.       Retired. Math/.     Normally exercises 3 times a week.     Family History: thinks maternal uncle may have had PD     Neuroleptic Exposure: none     Nonmotor/Premotor ROS:  Anosmia: fair (not as good as it used to be)  Dysarthria/Hypophonia: more hoarse / weaker   Dysphagia/Sialorrhea: none   Depression: yes, on cymbalta   Urinary changes: none   Constipation: yes, chronic   Falls: none   Micrographia: not that she knows of   Sleep issues:  -RBD: not that she knows of       Review of Systems:   Review of Systems   Constitutional:  Negative for chills, fever and malaise/fatigue.   HENT:  Negative for hearing loss.    Eyes:  Negative for blurred vision and double vision.   Respiratory:  Negative for cough, shortness of breath and  stridor.    Cardiovascular:  Negative for chest pain and leg swelling.   Gastrointestinal:  Positive for constipation. Negative for diarrhea and nausea.   Genitourinary:  Negative for frequency and urgency.   Musculoskeletal:  Negative for falls.   Skin:  Negative for itching and rash.   Neurological:  Positive for tremors. Negative for dizziness, loss of consciousness and weakness.   Psychiatric/Behavioral:  Negative for hallucinations and memory loss.            Past Medical History: The patient  has a past medical history of Allergy, Arthritis, Carotid stenosis, asymptomatic, Headache(784.0), Hypothyroid, Movement disorder, Neuropathy, and Parkinson's disease (2020).    Social History: The patient  reports that she has never smoked. She has never been exposed to tobacco smoke. She has never used smokeless tobacco. She reports that she does not currently use alcohol after a past usage of about 4.0 standard drinks of alcohol per week. She reports that she does not use drugs.    Family History: Their family history includes Arthritis in her mother; Breast cancer in her sister; Cancer in her father and sister; Colon cancer in her father; Dementia in her mother; Depression in her mother; Hearing loss in her sister; Heart disease in her mother and sister; Hypertension in her mother; No Known Problems in her daughter, sister, and son; Parkinsonism in her maternal uncle; Stroke in her maternal grandmother; Tremor in her maternal uncle.    Allergies: Clindamycin, Topamax [topiramate], Adhesive, and Bactrim [sulfamethoxazole-trimethoprim]     Meds:   Current Outpatient Medications on File Prior to Visit   Medication Sig Dispense Refill    ALPRAZolam (XANAX) 0.5 MG tablet Take 1 tablet (0.5 mg total) by mouth daily as needed for Anxiety. PRN ANXIETY 90 tablet 1    atorvastatin (LIPITOR) 40 MG tablet TAKE 1 TABLET BY MOUTH EVERY DAY 90 tablet 3    carbidopa-levodopa  mg (SINEMET)  mg per tablet Take 1 tablet by  mouth 3 (three) times daily. 270 tablet 3    DULoxetine (CYMBALTA) 60 MG capsule Take 1 capsule (60 mg total) by mouth once daily. 90 capsule 3    estradioL (ESTRACE) 0.5 MG tablet Take 1 tablet (0.5 mg total) by mouth every evening. 90 tablet 3    polyethylene glycol (GLYCOLAX) 17 gram/dose powder Take 17 g by mouth once daily. 510 g 2    rasagiline (AZILECT) 0.5 MG Tab Take 1 tablet (0.5 mg total) by mouth once daily. 30 tablet 11    sumatriptan (IMITREX) 100 MG tablet 1 bid prn migraine headache 90 tablet 1    SYNTHROID 100 mcg tablet Take 1 tablet (100 mcg total) by mouth before breakfast. 90 tablet 3     Current Facility-Administered Medications on File Prior to Visit   Medication Dose Route Frequency Provider Last Rate Last Admin    0.9%  NaCl infusion   Intravenous Continuous Tony Castle MD   New Bag at 09/30/22 0822    0.9%  NaCl infusion   Intravenous Continuous Tony Castle MD        onabotulinumtoxina injection 200 Units  200 Units Intramuscular Q90 Days         sodium chloride 0.9% flush 10 mL  10 mL Intravenous PRN Tony Castle MD        sodium chloride 0.9% flush 10 mL  10 mL Intravenous PRN Tony Castle MD           Exam:  LMP 01/01/2001     Constitutional  Well-developed, well-nourished, appears stated age   Ophthalmoscopic  No papilledema with no hemorrhages or exudates bilaterally   Cardiovascular  Radial pulses 2+ and symmetric, no LE edema bilaterally   Neurological    * Mental status  MOCA =      - Orientation  Oriented to person, place, time, and situation     - Memory   Intact recent and remote     - Attention/concentration  Attentive, vigilant during exam     - Language  Naming & repetition intact, +2-step commands     - Fund of knowledge  Aware of current events     - Executive  Well-organized thoughts     - Other     * Cranial nerves       - CN II  PERRL, visual fields full to confrontation     - CN III, IV, VI  Extraocular movements full, normal  pursuits and saccades     - CN V  Sensation V1 - V3 intact     - CN VII  Face strong and symmetric bilaterally     - CN VIII  Hearing intact bilaterally     - CN IX, X  Palate raises midline and symmetric     - CN XI  SCM and trapezius 5/5 bilaterally     - CN XII  Tongue midline   * Motor  Muscle bulk normal, strength 5/5 throughout   * Sensory   Intact to light touch    * Coordination  No dysmetria with finger-to-nose or heel-to-shin   * Gait  See below.   * Deep tendon reflexes  2+ and symmetric throughout   Babinski downgoing bilaterally   * Specialized movement exam  No hypophonic speech.    No facial masking, appropriately animated    L > R cogwheel rigidity.     L > R bradykinesia.     Dystonic posturing of L hand      LLE dyskinesia with distraction only    No other dystonia, chorea, athetosis, myoclonus, or tics.   No motor impersistence.   Normal-based gait.   No shortened stride length.   Decreased L arm swing    No postural instability.      L head tilt, R tort      Laboratory/Radiological:  - Results:  Lab Visit on 05/16/2025   Component Date Value Ref Range Status    TSH 05/16/2025 0.454  0.400 - 4.000 uIU/mL Final       - Independent review of images:      - Independent review of consultant's notes: Reji Chou     ASSESSMENT/PLAN:  PD  - iPD, L-side predominant -- dystonic features (toe curling, limb dystonia most bothersome to her)   - currently taking cd/ld 1 tab TID and rasagiline 0.5 mg daily, just started with extra 1/2 tablet at bedtime. Can take full tablet qhs.   - physical activity   - discussed syn-one biopsy -- she questions whether she has PD. Discussed given her exam findings, levodopa response, I do suspect iPD.     -- of note, had episode of sensation of the room spinning, suspect peripheral. History of vertigo. Discussed epley maneuver.       2. MDD  - stable on cymbalta   - continue to monitor       3. Bilateral carotid artery disease  - on lipitor, stable  - continue to monitor      Follow up: in 6 months with [facility]      This is a patient with a chronic and complex neurologic diagnosis whose overall, ongoing care is being managed and monitored by me and our Neurology clinic.   As such, since 2024,  is the appropriate add-on code to accompany the other E/M billing for this visit.        Collaborating Physician, Dr. Tipton, was available during today's encounter. Any change to plan along with cosign to appear in the EMR.       I spent 32 minutes with the patient, reviewing past encounters, labs and imaging.        Laura Delgado PA-C   [facility]  Department of Neurology  Movement Disorders

## 2025-07-16 ENCOUNTER — OFFICE VISIT (OUTPATIENT)
Facility: CLINIC | Age: 75
End: 2025-07-16
Payer: MEDICARE

## 2025-07-16 VITALS
HEIGHT: 60 IN | SYSTOLIC BLOOD PRESSURE: 142 MMHG | HEART RATE: 76 BPM | WEIGHT: 125 LBS | BODY MASS INDEX: 24.54 KG/M2 | DIASTOLIC BLOOD PRESSURE: 80 MMHG

## 2025-07-16 DIAGNOSIS — R42 VERTIGO: ICD-10-CM

## 2025-07-16 DIAGNOSIS — G20.A1 PARKINSON'S DISEASE WITHOUT DYSKINESIA OR FLUCTUATING MANIFESTATIONS: Primary | ICD-10-CM

## 2025-07-16 DIAGNOSIS — Z71.89 COUNSELING REGARDING GOALS OF CARE: ICD-10-CM

## 2025-07-16 DIAGNOSIS — F33.0 MILD EPISODE OF RECURRENT MAJOR DEPRESSIVE DISORDER: ICD-10-CM

## 2025-07-16 DIAGNOSIS — I65.23 BILATERAL CAROTID ARTERY STENOSIS: ICD-10-CM

## 2025-07-16 PROCEDURE — 99213 OFFICE O/P EST LOW 20 MIN: CPT | Mod: PBBFAC | Performed by: PHYSICIAN ASSISTANT

## 2025-07-16 PROCEDURE — G2211 COMPLEX E/M VISIT ADD ON: HCPCS | Mod: ,,, | Performed by: PHYSICIAN ASSISTANT

## 2025-07-16 PROCEDURE — 99214 OFFICE O/P EST MOD 30 MIN: CPT | Mod: S$PBB,,, | Performed by: PHYSICIAN ASSISTANT

## 2025-07-16 PROCEDURE — 99999 PR PBB SHADOW E&M-EST. PATIENT-LVL III: CPT | Mod: PBBFAC,,, | Performed by: PHYSICIAN ASSISTANT

## 2025-07-22 ENCOUNTER — TELEPHONE (OUTPATIENT)
Facility: CLINIC | Age: 75
End: 2025-07-22
Payer: MEDICARE

## 2025-07-22 ENCOUNTER — CLINICAL SUPPORT (OUTPATIENT)
Dept: REHABILITATION | Facility: HOSPITAL | Age: 75
End: 2025-07-22
Payer: MEDICARE

## 2025-07-22 ENCOUNTER — TELEPHONE (OUTPATIENT)
Dept: FAMILY MEDICINE | Facility: CLINIC | Age: 75
End: 2025-07-22
Payer: MEDICARE

## 2025-07-22 VITALS — DIASTOLIC BLOOD PRESSURE: 53 MMHG | SYSTOLIC BLOOD PRESSURE: 66 MMHG

## 2025-07-22 DIAGNOSIS — Z74.09 IMPAIRED FUNCTIONAL MOBILITY AND ACTIVITY TOLERANCE: Primary | ICD-10-CM

## 2025-07-22 DIAGNOSIS — M25.60 JOINT STIFFNESS: ICD-10-CM

## 2025-07-22 DIAGNOSIS — R29.898 FINE MOTOR IMPAIRMENT: Primary | ICD-10-CM

## 2025-07-22 DIAGNOSIS — R27.8 DECREASED COORDINATION: ICD-10-CM

## 2025-07-22 DIAGNOSIS — R29.818 FINE MOTOR IMPAIRMENT: Primary | ICD-10-CM

## 2025-07-22 PROCEDURE — 97530 THERAPEUTIC ACTIVITIES: CPT | Mod: PN

## 2025-07-22 NOTE — TELEPHONE ENCOUNTER
Received msg from PT.     She has done well with us but has continued to report of dizziness that sounds consistent more with orthostatic hypotension than anything vestibular. Had a fall yesterday due to her becoming lightheaded and potentially fainting and hitting her head. Did a screen for any cervical instability and she was cleared but she did have some concentration and processing difficulty showing signs of potential mild concussion. Checked orthostatic blood pressure with her today twice and both showed significant decreases. 117/77 >> 66/53 and 138/80>> 74/61. Just wanted to give you a heads up on since you saw her last week .      Reports eating poorly, not drinking enough water. Discussed adequate water intake, salt, compression socks, abdominal binder. Rec'd journaling BP sitting and standing over the next week and send BP log next week via the portal. Consider addition of florinef if unable to manage with conservative measures.       RBR

## 2025-07-22 NOTE — PROGRESS NOTES
Outpatient Rehab    Physical Therapy Progress Note : Updated Plan of Care    Patient Name: Florentino Bowman  MRN: [id number]  YOB: 1950  Encounter Date: 7/22/2025    Therapy Diagnosis:   Encounter Diagnosis   Name Primary?    Impaired functional mobility and activity tolerance Yes     Physician: Belkis Foster MD    Physician Orders: Eval and Treat  Medical Diagnosis: Parkinson's disease, unspecified whether dyskinesia present, unspecified whether manifestations fluctuate  Surgical Diagnosis: Not applicable for this Episode   Surgical Date: Not applicable for this Episode  Days Since Last Surgery: Not applicable for this Episode    Visit # / Visits Authorized: 7 / 20  Insurance Authorization Period: 6/3/2025 to 12/31/2025  Date of Evaluation: 6/3/2025   Plan of Care Certification: 7/22/2025 to 7/29/2025     PT/PTA:     Number of PTA visits since last PT visit:   Time In: 0900   Time Out: 0945  Total Time (in minutes): 45   Total Billable Time (in minutes): 45    FOTO:  Intake Score (%): Not applicable for this Episode  Survey Score 2 (%): Not applicable for this Episode  Survey Score 3 (%): Not applicable for this Episode    Precautions:       Subjective             Objective   Vital Signs  BP (!) 66/53   LMP 01/01/2001            (Supine)117/77 >> (standing)66/53 and (supine)138/80>> (standing)74/61      Cervicovestibular Tests  Negative: Sharp-Gayatri and Vertebrobasilar Insufficiency (VBI) Screen - Seated           Cervical/Thoracic Special Tests            Cervical Tests  Negative: Right Alar Ligament and Left Alar Ligament                  Treatment:  Therapeutic Activity  TA 1: Time used to assess orthostatic hypotension and educate patient on management of symptoms not medicinal.    Time Entry(in minutes):  Therapeutic Activity Time Entry: 45    Assessment & Plan   Assessment  Miss. Good arrived to treatment session today with reports of having a syncopal fall the day prior hitting her head.  She reports of no symptoms but does demonstrate delayed process, inattentiveness, and increased anxiety. She reports of a lightheaded sensation before her fall. She was assessed for orthostatic hypotension with systolic numbers dropping over 30 points during both trials. PT communicated/educated pt on potential of PD medicine causing decrease of BP and methods with symptom management such as increased time when transferring and ingestion of more sodium.Pt continues to be a good candidate for OPPT       The patient will continue to benefit from skilled outpatient physical therapy in order to address the deficits listed in the problem list on the initial evaluation, provide patient and family education, and maximize the patients level of independence in the home and community environments.     The patient's spiritual, cultural, and educational needs were considered, and the patient is agreeable to the plan of care and goals.           Goals:     Arpan Chou, PT

## 2025-07-22 NOTE — PROGRESS NOTES
Outpatient Rehab    Occupational Therapy Visit    Patient Name: Florentino Bowman  MRN: [id number]  YOB: 1950  Encounter Date: 7/22/2025    Therapy Diagnosis:   Encounter Diagnoses   Name Primary?    Fine motor impairment Yes    Decreased coordination     Joint stiffness      Physician: Belkis Foster MD    Physician Orders: Eval and Treat  Medical Diagnosis: Parkinson's disease, unspecified whether dyskinesia present, unspecified whether manifestations fluctuate  Surgical Diagnosis: Not applicable for this Episode   Surgical Date: Not applicable for this Episode  Days Since Last Surgery: Not applicable for this Episode    Visit # / Visits Authorized: 7 / 20  Insurance Authorization Period: 6/3/2025 to 12/31/2025  Date of Evaluation: 6/3/2025  Plan of Care Certification: 6/3/2025 to 7/30/2025      Time In: 0945   Time Out: 1030  Total Time (in minutes): 45   Total Billable Time (in minutes): 45    Precautions:  Additional Precautions and Protocol Details: standard    Subjective   Pt reports she fainted and fell yesterday. Pt limited by orthostatic hypotension this date - see vitals.  Pain reported as 0/10.      Objective   Vital Signs  LMP 01/01/2001   BP Location: Right arm     BP Cuff Size: Adult  BP monitored throughout session: ranging 80//88 in standing and 92//81 in sitting             Treatment:    Pt completed ther acts to address FM control/coordination,  strength, and divided attention skills required for participation in IADLs. Pt demonstrated the following:  Therapeutic Activity  TA 1: tripod grasp on green clothespin to  small pompoms and place in container  TA 2: dual tasking activity: stringing beads onto lace by memory, according to patterns provided  TA 3: vitals monitoring    Time Entry(in minutes):  Therapeutic Activity Time Entry: 45    Assessment & Plan   Assessment: Pt completed therapeutic activities w/ fair energy and participation. Pt demonstrated fair (+)  LUE  strength and FM control/coordination, however, overall limited by orthostatic hypotension this date. Pt appearing to have increased confusion this date, noted difficulty w/ dual tasking activity requiring use of memory/cognitive skills, demonstrating overall fair (-) accuracy. OT encouraged pt to reach out to PCP regarding BP concerns, PT from previous session additionally notified MD regarding BP during session. OT additionally encouraged pt to practice caution regarding driving when having episodes of orthostatic hypotension.  Evaluation/Treatment Tolerance: Treatment limited secondary to medical complications (Comment)    The patient will continue to benefit from skilled outpatient occupational therapy in order to address the deficits listed in the problem list on the initial evaluation, provide patient and family education, and maximize the patients level of independence in the home and community environments.     The patient's spiritual, cultural, and educational needs were considered, and the patient is agreeable to the plan of care and goals.     Education  Education was done with Patient. The patient's learning style includes Listening. The patient Verbalizes understanding.         Address medical concerns w/ MD       Plan: continue OT POC    Goals:   Active       LTG       Pt will perform all provided OT HEPs/HAPs Independently.  (Progressing)       Start:  06/04/25    Expected End:  07/30/25            Pt will demonstrate Fair+ LUE proprioceptive/kinesthetic movement awareness and motor planning as evidence by requiring 0 verbal cues while performing salient bimanual integration task with divided attention/dual tasking component.  (Progressing)       Start:  06/04/25    Expected End:  07/30/25            Pt will demonstrate improved L FM coordination as evidence by completing 9HPT in </= 28 secs. (Met)       Start:  06/04/25    Expected End:  07/30/25    Resolved:  07/08/25            STG        OT will provide training/education on appropriate HEPs/HAPs with GM/FM coordination, stretching, and amplitude based components to promote calibration of fxnl movement patterns and maintain fxnl performance safety, independence, and efficiency.  (Met)       Start:  06/04/25    Expected End:  07/02/25    Resolved:  06/12/25         Pt will demonstrate Fair LUE proprioceptive/kinesthetic movement awareness and motor planning as evidence by requiring </= 2 verbal cues while performing salient bimanual integration task with divided attention/dual tasking component.  (Progressing)       Start:  06/04/25    Expected End:  07/02/25            Pt will demonstrate improved L FM coordination as evidence by completing 9HPT in </= 30 secs. (Met)       Start:  06/04/25    Expected End:  07/02/25    Resolved:  07/08/25             Priyanka Webber OT

## 2025-07-24 NOTE — TELEPHONE ENCOUNTER
CRM # [id number]  Owner: None  Status: Unresolved  Open  Priority: Routine Created on: 07/24/2025 08:38 AM By: Lennie Reese     Primary Information    Source   Florentino Bowman (Patient)    Subject   Florentino Bowman (Patient)    Topic   General Inquiry - Patient Advice      Communication   Type: Patient Call Back            Who called: pt            What is the request in detail: pt is requesting a call back regarding missed call.Please contact to further discuss and advise.                    Can the clinic reply by [facility]? N            Would the patient rather a call back or a response via [facility]? Call            Best call back [phone]      1

## 2025-07-24 NOTE — TELEPHONE ENCOUNTER
Spoke to pt. Pt stated that she has been seeing neurology for her syncopal episodes. She asked about having a virtual appt instead given that she does nt have transportation. Pt also stated that she is going on a cruise starting on July 31st.

## 2025-07-25 ENCOUNTER — OFFICE VISIT (OUTPATIENT)
Facility: CLINIC | Age: 75
End: 2025-07-25
Payer: MEDICARE

## 2025-07-25 DIAGNOSIS — F41.9 ANXIETY: Primary | ICD-10-CM

## 2025-07-25 PROCEDURE — 90834 PSYTX W PT 45 MINUTES: CPT | Mod: 95,,, | Performed by: SOCIAL WORKER

## 2025-07-25 NOTE — TELEPHONE ENCOUNTER
I have spoken to pt and offered pt Monday 7/28/25 at 7:30 virtual with Dr. Chou. Pt confirmed appt.

## 2025-07-25 NOTE — PROGRESS NOTES
"Individual Psychotherapy (PhD/LCSW)    7/25/2025  The patient location is: Home,   The chief complaint leading to consultation is: Depression, Anxiety    Visit type: audiovisual    Face to Face time with patient: 45  50 minutes of total time spent on the encounter, which includes face to face time and non-face to face time preparing to see the patient (eg, review of tests), Obtaining and/or reviewing separately obtained history, Documenting clinical information in the electronic or other health record, Independently interpreting results (not separately reported) and communicating results to the patient/family/caregiver, or Care coordination (not separately reported).     Each patient to whom he or she provides medical services by telemedicine is:  (1) informed of the relationship between the physician and patient and the respective role of any other health care provider with respect to management of the patient; and (2) notified that he or she may decline to receive medical services by telemedicine and may withdraw from such care at any time.      Site:  [facility]         Therapeutic Intervention: Met with patient.  Outpatient - Insight oriented psychotherapy 45 min - CPT code 71308 and Outpatient - Supportive psychotherapy 45 min - CPT Code 07757    Chief complaint/reason for encounter: Anxiety, Depression     Interval history and content of current session:     LCSW and patient completed follow up psychotherapy session this date, virtually. She presents alert, oriented x3, with a pleasant affect.  Her daughter and granddaughters are in town for a week from [place]. Her son's children have also been over which has caused increased stimulation, resulting in a "shorter fuse".   On Monday morning, she was getting out of bed, blacked out and fell. Reports significance blood pressure fluctuation while at PT later that day. Heading on a cruise next week for a week with her children and grandchildren. Feels she " "has been more "down". continued to navigate ongoing stressors affecting pt's depression and anxiety. Engaged in active discussion, constructive processing, support, feedback, and re-framing. Pt fully engaged and remains receptive. Pt to return as scheduled.     Treatment plan:  Target symptoms: recurrent depression, anxiety , adjustment  Why chosen therapy is appropriate versus another modality: relevant to diagnosis, patient responds to this modality, evidence based practice  Outcome monitoring methods: self-report, observation, checklist/rating scale  Therapeutic intervention type: insight oriented psychotherapy, supportive psychotherapy    Risk parameters:  Patient reports no suicidal ideation  Patient reports no homicidal ideation  Patient reports no self-injurious behavior  Patient reports no violent behavior    Verbal deficits: None    Patient's response to intervention:  The patient's response to intervention is accepting, motivated.    Progress toward goals and other mental status changes:  The patient's progress toward goals is good.    Diagnosis:     ICD-10-CM ICD-9-CM   1. Anxiety  F41.9 300.00     Plan:  individual psychotherapy    Return to clinic: 2 weeks    Length of Service (minutes): 45    "

## 2025-07-25 NOTE — PROGRESS NOTES
"  Psychiatry Initial Visit (PhD/LCSW)  Diagnostic Interview - CPT 60915    Date: 7/9/2025    Site: [facility]    Referral source: Dr. Gould     Clinical status of patient: Outpatient    Florentino Bowman, a 75 y.o. female, for initial evaluation visit.  Met with patient.    Chief complaint/reason for encounter: depression, anxiety, and sleep    History of present illness:   LCSW and patient completed initial psychotherapy session this date. She presents alert, oriented x3, with a pleasant affect.   Adjusting to the diagnosis of Parkinson's. Reports receiving this shortly before her  passed away and she is "finally getting around to facing it." She has a daughter who is a PA in [place] and one son locally. She has not told many individuals outside of her immediate family about her diagnosis. She is worried they will "pity" her. She lives alone and able to maintain all her ADL's and IADL's. Reports she is going to [facility] tomorrow for a long week to spend time with her daughter and grandchildren.   She has a history of back pain, has had two back surgeries. Reports having a spinal cord stimulator placed 2 years ago and it was a huge success. This has caused her significant relief.  Reports exercising some issues with balance and is currently seeing physical therapy. Regarding physical activity, patient has a pool, gets in regular  and enjoys this form of exercise.  She used to be an active  but stopped due to fear of falling.     Endorses feelings of anxiety -  restlessness, fatigued,  muscle tightness, feeling constantly on edge, concentration difficulties, and a general state of irritability that all significantly impacts pt's daily life and functioning. Explored pt's anxiety and utilized anxiety reduction techniques in order to identify pt's realistic verus unrealistic thought processes.   Goals discussed and reviewed. . Interventions include participating in individual therapy and " ongoing medication management thorough MD. Psychotherapy interventions include supportive, Interactive, self-oriented, and behavior modification therapies.  Pt receptive to psychotherapy. Wishes to return in 3 weeks     Pain: 0    Symptoms:   Mood: diminished interest, fatigue, and worthlessness/guilt  Anxiety: excessive anxiety/worry and restlessness/keyed up  Substance abuse: denied  Cognitive functioning: denied  Health behaviors: noncontributory    Psychiatric history: none    Medical history:   Past Medical History:   Diagnosis Date    Allergy     Arthritis     Carotid stenosis, asymptomatic     50%    Headache(784.0)     Hypothyroid     Movement disorder     Neuropathy     Parkinson's disease 2020        Family history of psychiatric illness: none    Social history (marriage, employment, etc.):    passed away in 2021, two months after the parkinsons dx   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 Two grandchildren in [place]   Son lives in [place]. Two granddaughter   Family cruise next week   Substance use:   Alcohol: none   Drugs: none   Tobacco: none   Caffeine: none    Current medications and drug reactions (include OTC, herbal): see medication list     Strengths and liabilities: Strength: Patient accepts guidance/feedback, Strength: Patient is expressive/articulate., Liability: Patient has poor health.    Current Evaluation:     Mental Status Exam:  General Appearance:  unremarkable, age appropriate   Speech: normal tone, normal rate, normal pitch, normal volume      Level of Cooperation: cooperative      Thought Processes: normal and logical   Mood: steady      Thought Content: normal, no suicidality, no homicidality, delusions, or paranoia   Affect: congruent and appropriate   Orientation: Oriented x3   Memory: recent >  intact   Attention Span & Concentration: intact   Fund of General  Knowledge: intact and appropriate to age and level of education   Abstract Reasoning: interpretation of similarities was concrete   Judgment & Insight: good     Language  intact     Diagnostic Impression - Plan:       ICD-10-CM ICD-9-CM   1. Anxiety  F41.9 300.00       Plan:individual psychotherapy    Return to Clinic: 2 weeks    Length of Service (minutes): 60

## 2025-07-28 ENCOUNTER — OFFICE VISIT (OUTPATIENT)
Dept: FAMILY MEDICINE | Facility: CLINIC | Age: 75
End: 2025-07-28
Payer: MEDICARE

## 2025-07-28 DIAGNOSIS — I95.1 POSTURAL HYPOTENSION: Primary | ICD-10-CM

## 2025-07-28 PROCEDURE — 98006 SYNCH AUDIO-VIDEO EST MOD 30: CPT | Mod: 95,,, | Performed by: FAMILY MEDICINE

## 2025-07-28 RX ORDER — FLUDROCORTISONE ACETATE 0.1 MG/1
100 TABLET ORAL DAILY
Qty: 30 TABLET | Refills: 2 | Status: SHIPPED | OUTPATIENT
Start: 2025-07-28 | End: 2025-08-27

## 2025-07-28 NOTE — PROGRESS NOTES
Subjective:      Patient ID: Florentino Bowman is a 75 y.o. female.    Chief Complaint: No chief complaint on file.      There were no vitals filed for this visit.     HPI   The patient location is: hypotension    The chief complaint leading to consultation is: post hypotension    Visit type: audiovisual    Face to Face time with patient: 30  40 minutes of total time spent on the encounter, which includes face to face time and non-face to face time preparing to see the patient (eg, review of tests), Obtaining and/or reviewing separately obtained history, Documenting clinical information in the electronic or other health record, Independently interpreting results (not separately reported) and communicating results to the patient/family/caregiver, or Care coordination (not separately reported).         Each patient to whom he or she provides medical services by telemedicine is:  (1) informed of the relationship between the physician and patient and the respective role of any other health care provider with respect to management of the patient; and (2) notified that he or she may decline to receive medical services by telemedicine and may withdraw from such care at any time.    Notes:  follow up of documented post hypotension, checked by PT  Pt with hx of falls and syncope  Adding florinef, to cardiology, echo  Drink fluids, be careful on standing  Her daughter was present at end of meeting    Problem List  Problem List[1]     ALLERGIES:   Review of patient's allergies indicates:   Allergen Reactions    Clindamycin Diarrhea    Topamax [topiramate] Other (See Comments)     itching    Adhesive      Holter monitor pads    Bactrim [sulfamethoxazole-trimethoprim] Rash       MEDS: Current Medications[2]      History:  Current Providers as of 7/28/2025  PCP: Fam Chou MD  Care Team Provider: Karley Kumar MD  Care Team Provider: Shanika Arellano MD  Care Team Provider: Darrian Duran MD  Care Team Provider: Leatha  David HSU MD  Care Team Provider: Louise Silva II, MD  Care Team Provider: Laura Delgado PA-C  Encounter Provider: Fam Chou MD, starting on  12:00 AM  Referring Provider: not found, starting on  12:00 AM  Consulting Physician: Fam Chou MD, starting on   7:27 AM (Active)   Past Medical History:   Diagnosis Date    Allergy     Arthritis     Carotid stenosis, asymptomatic     50%    Headache(784.0)     Hypothyroid     Movement disorder     Neuropathy     Parkinson's disease      Past Surgical History:   Procedure Laterality Date    BREAST BIOPSY Right     CATARACT EXTRACTION      CAUDAL EPIDURAL STEROID INJECTION N/A 2022    Procedure: INJECTION, STEROID, SPINE, EPIDURAL, CAUDAL;  Surgeon: Tony Castle MD;  Location: [facility];  Service: Pain Management;  Laterality: N/A;     SECTION      x2    COLONOSCOPY N/A 2017    Procedure: COLONOSCOPY Miralax;  Surgeon: Quentin Mercado Jr., MD;  Location: [facility];  Service: Endoscopy;  Laterality: N/A;    COLONOSCOPY N/A 2021    Procedure: COLONOSCOPY;  Surgeon: Andrew Parekh MD;  Location: [facility];  Service: Endoscopy;  Laterality: N/A;    ESOPHAGOGASTRODUODENOSCOPY N/A 2021    Procedure: EGD (ESOPHAGOGASTRODUODENOSCOPY);  Surgeon: Andrew Parekh MD;  Location: [facility];  Service: Endoscopy;  Laterality: N/A;    EYE SURGERY      FLUOROSCOPY  2023    Procedure: FLUOROSCOPY;  Surgeon: Darrian Duran MD;  Location: [facility];  Service: Pain Management;;    HYSTERECTOMY      INJECTION OF ANESTHETIC AGENT AROUND MEDIAL BRANCH NERVES INNERVATING LUMBAR FACET JOINT Right 2019    Procedure: BLOCK, NERVE, FACET JOINT, LUMBAR, MEDIAL BRANCH ;  Surgeon: Sixto Moya III, MD;  Location: [facility] OR;  Service: Pain Management;  Laterality: Right;  - L4/5 & L5/S1  PATIENT NEEDS TO BE THE FIRST PATIENT OF THE DAY, PER DR. MOYA!!!     INSERTION, NEUROSTIMULATOR, SPINAL CORD N/A 03/20/2023    Procedure: INSERTION, SPINAL CORD STIMULATOR IMPLANT BOSTON REP;  Surgeon: Darrian Duran MD;  Location: [facility] OR;  Service: Pain Management;  Laterality: N/A;    LAPAROSCOPIC HYSTERECTOMY  2010    supracervical/BSO    OOPHORECTOMY      SPINE SURGERY  9/17/2019, 2017    TONSILLECTOMY      TRANSFORAMINAL EPIDURAL INJECTION OF STEROID Right 06/05/2019    Procedure: INJECTION, STEROID, EPIDURAL, TRANSFORAMINAL APPROACH;  Surgeon: Sixto Moya III, MD;  Location: [facility] OR;  Service: Pain Management;  Laterality: Right;  -right TFESI L4/5    TRANSFORAMINAL EPIDURAL INJECTION OF STEROID Bilateral 02/19/2021    Procedure: INJECTION, STEROID, EPIDURAL, TRANSFORAMINAL APPROACH;  Surgeon: Sixto Moya III, MD;  Location: [facility] OR;  Service: Pain Management;  Laterality: Bilateral;  L5    TRANSFORAMINAL EPIDURAL INJECTION OF STEROID Bilateral 09/30/2022    Procedure: INJECTION, STEROID, EPIDURAL, TRANSFORAMINAL APPROACH;  Surgeon: Tony Castle MD;  Location: [facility] OR;  Service: Pain Management;  Laterality: Bilateral;    TRIAL OF SPINAL CORD NERVE STIMULATOR N/A 02/24/2023    Procedure: SPINAL CORD STIMULATOR TRIAL BOSTON REP *ESNELLA ONLY*;  Surgeon: Darrian Duran MD;  Location: [facility];  Service: Pain Management;  Laterality: N/A;    TUBAL LIGATION       Social History[3]      Review of Systems   Constitutional:  Positive for activity change and unexpected weight change.   HENT:  Negative for hearing loss, rhinorrhea and trouble swallowing.    Eyes:  Positive for discharge and visual disturbance.   Respiratory:  Negative for chest tightness and wheezing.    Cardiovascular:  Negative for chest pain and palpitations.   Gastrointestinal:  Positive for constipation. Negative for blood in stool, diarrhea and vomiting.   Endocrine: Negative for polydipsia and polyuria.   Genitourinary:  Negative for difficulty urinating, dysuria,  hematuria and menstrual problem.   Musculoskeletal:  Positive for arthralgias and neck pain. Negative for joint swelling.   Neurological:  Positive for weakness and headaches.   Psychiatric/Behavioral:  Positive for dysphoric mood. Negative for confusion.    All other systems reviewed and are negative.    Objective:     Physical Exam  Constitutional:       General: She is not in acute distress.     Appearance: Normal appearance. She is not ill-appearing, toxic-appearing or diaphoretic.   Neurological:      Mental Status: She is alert and oriented to person, place, and time. Mental status is at baseline.   Psychiatric:         Mood and Affect: Mood normal.         Behavior: Behavior normal.         Thought Content: Thought content normal.         Judgment: Judgment normal.             Assessment:     1. Postural hypotension      Plan:        Medication List            Accurate as of July 28, 2025  8:01 AM. If you have any questions, ask your nurse or doctor.                START taking these medications      fludrocortisone 0.1 mg Tab  Commonly known as: FLORINEF  Take 1 tablet (100 mcg total) by mouth once daily. For low blood pressure  Started by: Fam Chou MD            CONTINUE taking these medications      ALPRAZolam 0.5 MG tablet  Commonly known as: XANAX  Take 1 tablet (0.5 mg total) by mouth daily as needed for Anxiety. PRN ANXIETY     carbidopa-levodopa  mg  mg per tablet  Commonly known as: SINEMET  Take 1 tablet by mouth 3 (three) times daily.     DULoxetine 60 MG capsule  Commonly known as: CYMBALTA  Take 1 capsule (60 mg total) by mouth once daily.     estradioL 0.5 MG tablet  Commonly known as: ESTRACE  Take 1 tablet (0.5 mg total) by mouth every evening.     polyethylene glycol 17 gram/dose powder  Commonly known as: GLYCOLAX  Take 17 g by mouth once daily.     rasagiline 0.5 MG Tab  Commonly known as: AZILECT  Take 1 tablet (0.5 mg total) by mouth once daily.     sumatriptan 100 MG  tablet  Commonly known as: IMITREX  1 bid prn migraine headache     SYNTHROID 100 mcg tablet  Generic drug: levothyroxine  Take 1 tablet (100 mcg total) by mouth before breakfast.            STOP taking these medications      atorvastatin 40 MG tablet  Commonly known as: LIPITOR  Stopped by: Fam Chou MD               Where to Get Your Medications        These medications were sent to [facility]/pharmacy #[id number] - [place] - [address] [facility]  [address] [place] [address]      Phone: [phone]   fludrocortisone 0.1 mg Tab       Postural hypotension  -     Echo; Future  -     Ambulatory referral/consult to Cardiology; Future; Expected date: 08/04/2025  -     CBC Auto Differential; Future; Expected date: 07/28/2025  -     Comprehensive Metabolic Panel; Future; Expected date: 07/28/2025    Other orders  -     fludrocortisone (FLORINEF) 0.1 mg Tab; Take 1 tablet (100 mcg total) by mouth once daily. For low blood pressure  Dispense: 30 tablet; Refill: 2             [1]   Patient Active Problem List  Diagnosis    Headache    Hypothyroid    Depression    Insomnia    Anxiety    Chronic back pain    Bilateral carotid artery disease    Calcific tendonitis    Left shoulder pain    DDD (degenerative disc disease), lumbar    Change in bowel habit    Screening for colorectal cancer    S/P lumbar laminectomy    Chronic right-sided low back pain with right-sided sciatica    Lumbar radiculopathy    Lumbar radicular pain    Spondylolisthesis    Lumbar spondylosis    Chest pain    Change in bowel habits    Lumbar degenerative disc disease    Wears contact lenses    De Jesus's neuroma of left foot    Acquired posterior equinus of left lower extremity    Bursitis of intermetatarsal bursa of left foot    Muscle weakness (generalized)    Difficulty walking    Current moderate episode of major depressive disorder without prior episode    Parkinson's disease    Impaired functional mobility and activity tolerance    Osteopenia  after menopause    Dysarthria    Dysphagia    Fine motor impairment    Decreased coordination    Joint stiffness   [2]   Current Outpatient Medications:     ALPRAZolam (XANAX) 0.5 MG tablet, Take 1 tablet (0.5 mg total) by mouth daily as needed for Anxiety. PRN ANXIETY, Disp: 90 tablet, Rfl: 1    carbidopa-levodopa  mg (SINEMET)  mg per tablet, Take 1 tablet by mouth 3 (three) times daily., Disp: 270 tablet, Rfl: 3    DULoxetine (CYMBALTA) 60 MG capsule, Take 1 capsule (60 mg total) by mouth once daily., Disp: 90 capsule, Rfl: 3    estradioL (ESTRACE) 0.5 MG tablet, Take 1 tablet (0.5 mg total) by mouth every evening., Disp: 90 tablet, Rfl: 3    fludrocortisone (FLORINEF) 0.1 mg Tab, Take 1 tablet (100 mcg total) by mouth once daily. For low blood pressure, Disp: 30 tablet, Rfl: 2    polyethylene glycol (GLYCOLAX) 17 gram/dose powder, Take 17 g by mouth once daily., Disp: 510 g, Rfl: 2    rasagiline (AZILECT) 0.5 MG Tab, Take 1 tablet (0.5 mg total) by mouth once daily., Disp: 30 tablet, Rfl: 11    sumatriptan (IMITREX) 100 MG tablet, 1 bid prn migraine headache, Disp: 90 tablet, Rfl: 1    SYNTHROID 100 mcg tablet, Take 1 tablet (100 mcg total) by mouth before breakfast., Disp: 90 tablet, Rfl: 3    Current Facility-Administered Medications:     onabotulinumtoxina injection 200 Units, 200 Units, Intramuscular, Q90 Days,     Facility-Administered Medications Ordered in Other Visits:     0.9%  NaCl infusion, , Intravenous, Continuous, Tony Castle MD, New Bag at 09/30/22 0822    0.9%  NaCl infusion, , Intravenous, Continuous, Tony Castle MD    sodium chloride 0.9% flush 10 mL, 10 mL, Intravenous, PRN, Tony Castle MD    sodium chloride 0.9% flush 10 mL, 10 mL, Intravenous, PRN, Tony Castle MD  [3]   Social History  Tobacco Use    Smoking status: Never     Passive exposure: Never    Smokeless tobacco: Never   Substance Use Topics    Alcohol use: Not Currently      Alcohol/week: 4.0 standard drinks of alcohol     Types: 4 Glasses of wine per week     Comment: 3 glasses weekly    Drug use: No      Patient w/ history of hypothyroidism, s/p thyroidectomy. Takes home synthroid 75mcg.  CT neck soft tissue w/ IV cont: 1.0 cm left thyroid nodule. Consider follow-up and correlation with ultrasound.    PLAN:  - c/w home synthroid

## 2025-07-31 ENCOUNTER — EXTERNAL CHRONIC CARE MANAGEMENT (OUTPATIENT)
Dept: PRIMARY CARE CLINIC | Facility: CLINIC | Age: 75
End: 2025-07-31
Payer: MEDICARE

## 2025-08-11 ENCOUNTER — TELEPHONE (OUTPATIENT)
Dept: PALLIATIVE MEDICINE | Facility: CLINIC | Age: 75
End: 2025-08-11
Payer: MEDICARE

## 2025-08-13 RX ORDER — SUMATRIPTAN SUCCINATE 100 MG/1
TABLET ORAL
Qty: 27 TABLET | Refills: 3 | Status: SHIPPED | OUTPATIENT
Start: 2025-08-13

## 2025-08-14 ENCOUNTER — OFFICE VISIT (OUTPATIENT)
Facility: CLINIC | Age: 75
End: 2025-08-14
Payer: MEDICARE

## 2025-08-14 ENCOUNTER — PATIENT MESSAGE (OUTPATIENT)
Facility: CLINIC | Age: 75
End: 2025-08-14
Payer: MEDICARE

## 2025-08-14 DIAGNOSIS — F41.9 ANXIETY: ICD-10-CM

## 2025-08-14 RX ORDER — RASAGILINE 1 MG/1
1 TABLET ORAL DAILY
Qty: 90 TABLET | Refills: 3 | Status: SHIPPED | OUTPATIENT
Start: 2025-08-14 | End: 2026-08-14

## 2025-08-18 ENCOUNTER — CLINICAL SUPPORT (OUTPATIENT)
Dept: REHABILITATION | Facility: HOSPITAL | Age: 75
End: 2025-08-18
Payer: MEDICARE

## 2025-08-18 DIAGNOSIS — R49.0 DYSPHONIA: ICD-10-CM

## 2025-08-18 DIAGNOSIS — R13.10 DYSPHAGIA, UNSPECIFIED TYPE: ICD-10-CM

## 2025-08-18 DIAGNOSIS — R47.1 DYSARTHRIA: Primary | ICD-10-CM

## 2025-08-18 PROCEDURE — 92507 TX SP LANG VOICE COMM INDIV: CPT | Mod: PO

## 2025-08-18 RX ORDER — DULOXETIN HYDROCHLORIDE 60 MG/1
60 CAPSULE, DELAYED RELEASE ORAL
Qty: 90 CAPSULE | Refills: 0 | Status: SHIPPED | OUTPATIENT
Start: 2025-08-18

## 2025-08-19 ENCOUNTER — CLINICAL SUPPORT (OUTPATIENT)
Dept: REHABILITATION | Facility: HOSPITAL | Age: 75
End: 2025-08-19
Payer: MEDICARE

## 2025-08-19 DIAGNOSIS — R13.10 DYSPHAGIA, UNSPECIFIED TYPE: ICD-10-CM

## 2025-08-19 DIAGNOSIS — R49.0 DYSPHONIA: ICD-10-CM

## 2025-08-19 DIAGNOSIS — R47.1 DYSARTHRIA: Primary | ICD-10-CM

## 2025-08-19 PROCEDURE — 92507 TX SP LANG VOICE COMM INDIV: CPT | Mod: PO

## 2025-08-20 ENCOUNTER — CLINICAL SUPPORT (OUTPATIENT)
Dept: REHABILITATION | Facility: HOSPITAL | Age: 75
End: 2025-08-20
Payer: MEDICARE

## 2025-08-20 DIAGNOSIS — R47.1 DYSARTHRIA: Primary | ICD-10-CM

## 2025-08-20 PROCEDURE — 92507 TX SP LANG VOICE COMM INDIV: CPT | Mod: PO

## 2025-08-21 ENCOUNTER — CLINICAL SUPPORT (OUTPATIENT)
Dept: REHABILITATION | Facility: HOSPITAL | Age: 75
End: 2025-08-21
Payer: MEDICARE

## 2025-08-21 DIAGNOSIS — R47.1 DYSARTHRIA: Primary | ICD-10-CM

## 2025-08-21 DIAGNOSIS — R49.0 DYSPHONIA: ICD-10-CM

## 2025-08-21 DIAGNOSIS — G20.A1 PARKINSON'S DISEASE WITHOUT DYSKINESIA OR FLUCTUATING MANIFESTATIONS: Primary | ICD-10-CM

## 2025-08-21 PROCEDURE — 92507 TX SP LANG VOICE COMM INDIV: CPT | Mod: PO

## 2025-08-25 ENCOUNTER — CLINICAL SUPPORT (OUTPATIENT)
Dept: REHABILITATION | Facility: HOSPITAL | Age: 75
End: 2025-08-25
Payer: MEDICARE

## 2025-08-25 DIAGNOSIS — R13.10 DYSPHAGIA, UNSPECIFIED TYPE: ICD-10-CM

## 2025-08-25 DIAGNOSIS — R49.0 DYSPHONIA: ICD-10-CM

## 2025-08-25 DIAGNOSIS — R47.1 DYSARTHRIA: Primary | ICD-10-CM

## 2025-08-25 PROCEDURE — 92507 TX SP LANG VOICE COMM INDIV: CPT | Mod: PO

## 2025-08-26 ENCOUNTER — CLINICAL SUPPORT (OUTPATIENT)
Dept: REHABILITATION | Facility: HOSPITAL | Age: 75
End: 2025-08-26
Payer: MEDICARE

## 2025-08-26 DIAGNOSIS — R47.1 DYSARTHRIA: Primary | ICD-10-CM

## 2025-08-26 DIAGNOSIS — R13.10 DYSPHAGIA, UNSPECIFIED TYPE: ICD-10-CM

## 2025-08-26 DIAGNOSIS — R49.0 DYSPHONIA: ICD-10-CM

## 2025-08-26 PROCEDURE — 92507 TX SP LANG VOICE COMM INDIV: CPT | Mod: PO

## 2025-08-27 ENCOUNTER — CLINICAL SUPPORT (OUTPATIENT)
Dept: REHABILITATION | Facility: HOSPITAL | Age: 75
End: 2025-08-27
Payer: MEDICARE

## 2025-08-27 DIAGNOSIS — R47.1 DYSARTHRIA: Primary | ICD-10-CM

## 2025-08-27 DIAGNOSIS — R49.0 DYSPHONIA: ICD-10-CM

## 2025-08-27 DIAGNOSIS — R13.10 DYSPHAGIA, UNSPECIFIED TYPE: ICD-10-CM

## 2025-08-27 PROCEDURE — 92507 TX SP LANG VOICE COMM INDIV: CPT | Mod: PO

## 2025-08-28 ENCOUNTER — CLINICAL SUPPORT (OUTPATIENT)
Dept: REHABILITATION | Facility: HOSPITAL | Age: 75
End: 2025-08-28
Payer: MEDICARE

## 2025-08-28 ENCOUNTER — TELEPHONE (OUTPATIENT)
Dept: PALLIATIVE MEDICINE | Facility: CLINIC | Age: 75
End: 2025-08-28
Payer: MEDICARE

## 2025-08-28 DIAGNOSIS — R49.0 DYSPHONIA: ICD-10-CM

## 2025-08-28 DIAGNOSIS — R13.12 OROPHARYNGEAL DYSPHAGIA: ICD-10-CM

## 2025-08-28 DIAGNOSIS — R47.1 DYSARTHRIA: Primary | ICD-10-CM

## 2025-08-28 PROCEDURE — 92507 TX SP LANG VOICE COMM INDIV: CPT | Mod: PO

## 2025-08-29 ENCOUNTER — PATIENT MESSAGE (OUTPATIENT)
Facility: CLINIC | Age: 75
End: 2025-08-29
Payer: MEDICARE

## 2025-08-29 ENCOUNTER — CLINICAL SUPPORT (OUTPATIENT)
Dept: REHABILITATION | Facility: HOSPITAL | Age: 75
End: 2025-08-29
Payer: MEDICARE

## 2025-08-29 DIAGNOSIS — Z74.09 IMPAIRED FUNCTIONAL MOBILITY AND ACTIVITY TOLERANCE: Primary | ICD-10-CM

## 2025-08-29 PROCEDURE — 97530 THERAPEUTIC ACTIVITIES: CPT | Mod: PN

## 2025-09-02 ENCOUNTER — CLINICAL SUPPORT (OUTPATIENT)
Dept: REHABILITATION | Facility: HOSPITAL | Age: 75
End: 2025-09-02
Payer: MEDICARE

## 2025-09-02 DIAGNOSIS — R13.12 OROPHARYNGEAL DYSPHAGIA: ICD-10-CM

## 2025-09-02 DIAGNOSIS — R49.0 DYSPHONIA: ICD-10-CM

## 2025-09-02 DIAGNOSIS — R47.1 DYSARTHRIA: Primary | ICD-10-CM

## 2025-09-02 PROCEDURE — 92507 TX SP LANG VOICE COMM INDIV: CPT | Mod: PO

## 2025-09-02 RX ORDER — FLUDROCORTISONE ACETATE 0.1 MG/1
100 TABLET ORAL DAILY
Qty: 30 TABLET | Refills: 3 | Status: SHIPPED | OUTPATIENT
Start: 2025-09-02

## 2025-09-03 ENCOUNTER — CLINICAL SUPPORT (OUTPATIENT)
Dept: REHABILITATION | Facility: HOSPITAL | Age: 75
End: 2025-09-03
Payer: MEDICARE

## 2025-09-03 DIAGNOSIS — R49.0 DYSPHONIA: ICD-10-CM

## 2025-09-03 DIAGNOSIS — R47.1 DYSARTHRIA: Primary | ICD-10-CM

## 2025-09-03 DIAGNOSIS — R13.10 DYSPHAGIA, UNSPECIFIED TYPE: ICD-10-CM

## 2025-09-03 PROCEDURE — 92507 TX SP LANG VOICE COMM INDIV: CPT | Mod: PO

## 2025-09-05 ENCOUNTER — CLINICAL SUPPORT (OUTPATIENT)
Dept: REHABILITATION | Facility: HOSPITAL | Age: 75
End: 2025-09-05
Payer: MEDICARE

## 2025-09-05 DIAGNOSIS — R47.1 DYSARTHRIA: Primary | ICD-10-CM

## 2025-09-05 DIAGNOSIS — R49.0 DYSPHONIA: ICD-10-CM

## 2025-09-05 PROCEDURE — 92507 TX SP LANG VOICE COMM INDIV: CPT | Mod: PO

## (undated) DEVICE — COVER SNAP 36IN X 30IN

## (undated) DEVICE — SYR B-D DISP CONTROL 10CC100/C

## (undated) DEVICE — WRENCH HEXAGONAL 3

## (undated) DEVICE — Device

## (undated) DEVICE — SUT MONOCRYL 4-0 PS-2

## (undated) DEVICE — DRAPE STERI-DRAPE 1000 17X11IN

## (undated) DEVICE — NDL HYPO REG 25G X 1 1/2

## (undated) DEVICE — DEVICE FIXATE SUT BAND DUAL PK

## (undated) DEVICE — SYR LUER SLIP GLASS 5ML

## (undated) DEVICE — NDL SPINAL SPINOCAN 22GX3.5

## (undated) DEVICE — BUR BONE CUT MICRO TPS 3X3.8MM

## (undated) DEVICE — DRAPE C-ARMOR EQUIPMENT COVER

## (undated) DEVICE — SUT VICRYL 2-0 8-18 CP-2

## (undated) DEVICE — DRESSING TRANS 4X4 TEGADERM

## (undated) DEVICE — DRESSING AQUACEL AG FOAM 4X4

## (undated) DEVICE — FREELINK REMOTE CONTROL KIT

## (undated) DEVICE — DRESSING SURGICAL 1/2X1/2

## (undated) DEVICE — TUBE FRAZIER 5MM 2FT SOFT TIP

## (undated) DEVICE — DRAPE INCISE IOBAN 2 23X17IN

## (undated) DEVICE — SUT CTD VICRYL 2-0 CR/CT-2

## (undated) DEVICE — TOOL TUNNELING 28CM

## (undated) DEVICE — DRESSING TELFA STRL 4X3 LF

## (undated) DEVICE — KIT SURGIFLO HEMOSTATIC MATRIX

## (undated) DEVICE — DRAPE ABDOMINAL TIBURON 14X11

## (undated) DEVICE — SEE MEDLINE ITEM 156905

## (undated) DEVICE — DRESSING TRANS 8X12 TEGADERM

## (undated) DEVICE — CLOSURE SKIN STERI STRIP 1/2X4

## (undated) DEVICE — SUT MCRYL PLUS 4-0 PS2 27IN

## (undated) DEVICE — DRAPE STERI INSTRUMENT 1018

## (undated) DEVICE — APPLICATOR CHLORAPREP ORN 26ML

## (undated) DEVICE — DRESSING AQUACEL SACRAL 9 X 9

## (undated) DEVICE — DRESSING TRANS 2X2 TEGADERM

## (undated) DEVICE — KNIFE BAYONET SURGICAL

## (undated) DEVICE — UNDERGLOVES BIOGEL PI SIZE 7.5

## (undated) DEVICE — DRAPE OPMI STERILE

## (undated) DEVICE — SUT 0 VICRYL / UR6 (J603)

## (undated) DEVICE — KIT CHARGING SYSTEM

## (undated) DEVICE — CORD BIPOLAR 12 FOOT

## (undated) DEVICE — ADHESIVE MASTISOL VIAL 48/BX

## (undated) DEVICE — BLADE ELECTRO EDGE INSULATED

## (undated) DEVICE — ELECTRODE REM PLYHSV RETURN 9

## (undated) DEVICE — SPONGE LAP 4X18 PREWASHED

## (undated) DEVICE — SOL IRR SOD CHL .9% POUR

## (undated) DEVICE — DRESSING MEPILEX BORDER 4 X 4

## (undated) DEVICE — KIT SPINAL PATIENT CARE JACK

## (undated) DEVICE — SEE MEDLINE ITEM 157150

## (undated) DEVICE — DRESSING AQUACEL FOAM 5 X 5

## (undated) DEVICE — DRAPE SURG W/TWL 17 5/8X23

## (undated) DEVICE — DRAPE C ARM 42 X 120 10/BX

## (undated) DEVICE — GLOVE BIOGEL SKINSENSE PI 7.5